# Patient Record
Sex: MALE | Race: WHITE | Employment: OTHER | URBAN - METROPOLITAN AREA
[De-identification: names, ages, dates, MRNs, and addresses within clinical notes are randomized per-mention and may not be internally consistent; named-entity substitution may affect disease eponyms.]

---

## 2017-01-23 ENCOUNTER — OFFICE VISIT (OUTPATIENT)
Dept: FAMILY MEDICINE CLINIC | Age: 67
End: 2017-01-23

## 2017-01-23 VITALS
DIASTOLIC BLOOD PRESSURE: 82 MMHG | OXYGEN SATURATION: 96 % | SYSTOLIC BLOOD PRESSURE: 130 MMHG | HEART RATE: 60 BPM | BODY MASS INDEX: 42.99 KG/M2 | RESPIRATION RATE: 16 BRPM | WEIGHT: 315 LBS

## 2017-01-23 DIAGNOSIS — Z79.4 TYPE 2 DIABETES MELLITUS WITH HYPERGLYCEMIA, WITH LONG-TERM CURRENT USE OF INSULIN (HCC): Primary | ICD-10-CM

## 2017-01-23 DIAGNOSIS — N28.9 RENAL INSUFFICIENCY: ICD-10-CM

## 2017-01-23 DIAGNOSIS — E11.65 TYPE 2 DIABETES MELLITUS WITH HYPERGLYCEMIA, WITH LONG-TERM CURRENT USE OF INSULIN (HCC): Primary | ICD-10-CM

## 2017-01-23 DIAGNOSIS — M15.9 PRIMARY OSTEOARTHRITIS INVOLVING MULTIPLE JOINTS: Chronic | ICD-10-CM

## 2017-01-23 DIAGNOSIS — I10 ESSENTIAL HYPERTENSION: Chronic | ICD-10-CM

## 2017-01-23 DIAGNOSIS — Z11.59 NEED FOR HEPATITIS C SCREENING TEST: ICD-10-CM

## 2017-01-23 LAB
A/G RATIO: 1.7 (ref 1.1–2.2)
ALBUMIN SERPL-MCNC: 4.3 G/DL (ref 3.4–5)
ALP BLD-CCNC: 89 U/L (ref 40–129)
ALT SERPL-CCNC: 31 U/L (ref 10–40)
ANION GAP SERPL CALCULATED.3IONS-SCNC: 17 MMOL/L (ref 3–16)
AST SERPL-CCNC: 24 U/L (ref 15–37)
BILIRUB SERPL-MCNC: 0.9 MG/DL (ref 0–1)
BUN BLDV-MCNC: 24 MG/DL (ref 7–20)
CALCIUM SERPL-MCNC: 9.2 MG/DL (ref 8.3–10.6)
CHLORIDE BLD-SCNC: 102 MMOL/L (ref 99–110)
CO2: 27 MMOL/L (ref 21–32)
CREAT SERPL-MCNC: 0.8 MG/DL (ref 0.8–1.3)
GFR AFRICAN AMERICAN: >60
GFR NON-AFRICAN AMERICAN: >60
GLOBULIN: 2.6 G/DL
GLUCOSE BLD-MCNC: 192 MG/DL (ref 70–99)
HEPATITIS C ANTIBODY INTERPRETATION: NORMAL
POTASSIUM SERPL-SCNC: 5.1 MMOL/L (ref 3.5–5.1)
SODIUM BLD-SCNC: 146 MMOL/L (ref 136–145)
TOTAL PROTEIN: 6.9 G/DL (ref 6.4–8.2)

## 2017-01-23 PROCEDURE — G8419 CALC BMI OUT NRM PARAM NOF/U: HCPCS | Performed by: NURSE PRACTITIONER

## 2017-01-23 PROCEDURE — 36415 COLL VENOUS BLD VENIPUNCTURE: CPT | Performed by: NURSE PRACTITIONER

## 2017-01-23 PROCEDURE — 99213 OFFICE O/P EST LOW 20 MIN: CPT | Performed by: NURSE PRACTITIONER

## 2017-01-23 PROCEDURE — G8427 DOCREV CUR MEDS BY ELIG CLIN: HCPCS | Performed by: NURSE PRACTITIONER

## 2017-01-23 PROCEDURE — 1036F TOBACCO NON-USER: CPT | Performed by: NURSE PRACTITIONER

## 2017-01-23 PROCEDURE — G8484 FLU IMMUNIZE NO ADMIN: HCPCS | Performed by: NURSE PRACTITIONER

## 2017-01-23 PROCEDURE — 4040F PNEUMOC VAC/ADMIN/RCVD: CPT | Performed by: NURSE PRACTITIONER

## 2017-01-23 PROCEDURE — 1123F ACP DISCUSS/DSCN MKR DOCD: CPT | Performed by: NURSE PRACTITIONER

## 2017-01-23 PROCEDURE — 3017F COLORECTAL CA SCREEN DOC REV: CPT | Performed by: NURSE PRACTITIONER

## 2017-01-23 PROCEDURE — 3045F PR MOST RECENT HEMOGLOBIN A1C LEVEL 7.0-9.0%: CPT | Performed by: NURSE PRACTITIONER

## 2017-01-23 RX ORDER — MELOXICAM 15 MG/1
TABLET ORAL
Qty: 90 TABLET | Refills: 1 | Status: SHIPPED | OUTPATIENT
Start: 2017-01-23 | End: 2017-05-31 | Stop reason: SDUPTHER

## 2017-01-23 RX ORDER — GLIMEPIRIDE 4 MG/1
4 TABLET ORAL 2 TIMES DAILY
Qty: 180 TABLET | Refills: 1 | Status: SHIPPED | OUTPATIENT
Start: 2017-01-23 | End: 2017-05-31 | Stop reason: SDUPTHER

## 2017-01-23 RX ORDER — HYDROCHLOROTHIAZIDE 50 MG/1
TABLET ORAL
Qty: 90 TABLET | Refills: 1 | Status: SHIPPED | OUTPATIENT
Start: 2017-01-23 | End: 2017-05-31 | Stop reason: SDUPTHER

## 2017-01-23 ASSESSMENT — ENCOUNTER SYMPTOMS
VOMITING: 0
COUGH: 0
BACK PAIN: 0
NAUSEA: 0
CHEST TIGHTNESS: 0

## 2017-03-06 ENCOUNTER — OFFICE VISIT (OUTPATIENT)
Dept: FAMILY MEDICINE CLINIC | Age: 67
End: 2017-03-06

## 2017-03-06 VITALS
HEART RATE: 74 BPM | OXYGEN SATURATION: 97 % | DIASTOLIC BLOOD PRESSURE: 82 MMHG | HEIGHT: 72 IN | WEIGHT: 311.2 LBS | SYSTOLIC BLOOD PRESSURE: 128 MMHG | BODY MASS INDEX: 42.15 KG/M2 | RESPIRATION RATE: 15 BRPM

## 2017-03-06 DIAGNOSIS — E11.9 TYPE 2 DIABETES MELLITUS WITHOUT COMPLICATION, WITH LONG-TERM CURRENT USE OF INSULIN (HCC): Primary | ICD-10-CM

## 2017-03-06 DIAGNOSIS — F33.1 MODERATE EPISODE OF RECURRENT MAJOR DEPRESSIVE DISORDER (HCC): ICD-10-CM

## 2017-03-06 DIAGNOSIS — Z79.4 TYPE 2 DIABETES MELLITUS WITHOUT COMPLICATION, WITH LONG-TERM CURRENT USE OF INSULIN (HCC): Primary | ICD-10-CM

## 2017-03-06 DIAGNOSIS — I10 ESSENTIAL HYPERTENSION: Chronic | ICD-10-CM

## 2017-03-06 DIAGNOSIS — H61.21 IMPACTED CERUMEN OF RIGHT EAR: ICD-10-CM

## 2017-03-06 LAB — HBA1C MFR BLD: 7.8 %

## 2017-03-06 PROCEDURE — 3045F PR MOST RECENT HEMOGLOBIN A1C LEVEL 7.0-9.0%: CPT | Performed by: NURSE PRACTITIONER

## 2017-03-06 PROCEDURE — 4040F PNEUMOC VAC/ADMIN/RCVD: CPT | Performed by: NURSE PRACTITIONER

## 2017-03-06 PROCEDURE — 3017F COLORECTAL CA SCREEN DOC REV: CPT | Performed by: NURSE PRACTITIONER

## 2017-03-06 PROCEDURE — G8427 DOCREV CUR MEDS BY ELIG CLIN: HCPCS | Performed by: NURSE PRACTITIONER

## 2017-03-06 PROCEDURE — 1123F ACP DISCUSS/DSCN MKR DOCD: CPT | Performed by: NURSE PRACTITIONER

## 2017-03-06 PROCEDURE — 99213 OFFICE O/P EST LOW 20 MIN: CPT | Performed by: NURSE PRACTITIONER

## 2017-03-06 PROCEDURE — 83036 HEMOGLOBIN GLYCOSYLATED A1C: CPT | Performed by: NURSE PRACTITIONER

## 2017-03-06 PROCEDURE — G8417 CALC BMI ABV UP PARAM F/U: HCPCS | Performed by: NURSE PRACTITIONER

## 2017-03-06 PROCEDURE — 1036F TOBACCO NON-USER: CPT | Performed by: NURSE PRACTITIONER

## 2017-03-06 PROCEDURE — G8484 FLU IMMUNIZE NO ADMIN: HCPCS | Performed by: NURSE PRACTITIONER

## 2017-03-06 RX ORDER — LOSARTAN POTASSIUM 100 MG/1
100 TABLET ORAL DAILY
Qty: 90 TABLET | Refills: 3 | Status: ON HOLD | OUTPATIENT
Start: 2017-03-06 | End: 2017-10-20 | Stop reason: HOSPADM

## 2017-03-06 RX ORDER — ATENOLOL 50 MG/1
50 TABLET ORAL DAILY
Qty: 90 TABLET | Refills: 3 | Status: SHIPPED | OUTPATIENT
Start: 2017-03-06 | End: 2017-11-14 | Stop reason: SDUPTHER

## 2017-03-06 RX ORDER — ATENOLOL 50 MG/1
50 TABLET ORAL DAILY
Qty: 90 TABLET | Refills: 1 | Status: CANCELLED | OUTPATIENT
Start: 2017-03-06

## 2017-03-06 ASSESSMENT — ENCOUNTER SYMPTOMS
CHEST TIGHTNESS: 0
NAUSEA: 0
BACK PAIN: 0
VOMITING: 0
COUGH: 0

## 2017-04-19 ENCOUNTER — OFFICE VISIT (OUTPATIENT)
Dept: FAMILY MEDICINE CLINIC | Age: 67
End: 2017-04-19

## 2017-04-19 VITALS
HEART RATE: 67 BPM | BODY MASS INDEX: 42.23 KG/M2 | OXYGEN SATURATION: 96 % | RESPIRATION RATE: 17 BRPM | SYSTOLIC BLOOD PRESSURE: 128 MMHG | DIASTOLIC BLOOD PRESSURE: 72 MMHG | WEIGHT: 311.8 LBS | HEIGHT: 72 IN

## 2017-04-19 DIAGNOSIS — E11.65 TYPE 2 DIABETES MELLITUS WITH HYPERGLYCEMIA, WITH LONG-TERM CURRENT USE OF INSULIN (HCC): Primary | ICD-10-CM

## 2017-04-19 DIAGNOSIS — F41.9 ANXIETY: ICD-10-CM

## 2017-04-19 DIAGNOSIS — I10 ESSENTIAL HYPERTENSION: Chronic | ICD-10-CM

## 2017-04-19 DIAGNOSIS — Z79.4 TYPE 2 DIABETES MELLITUS WITH HYPERGLYCEMIA, WITH LONG-TERM CURRENT USE OF INSULIN (HCC): Primary | ICD-10-CM

## 2017-04-19 LAB — HBA1C MFR BLD: 9.2 %

## 2017-04-19 PROCEDURE — G8417 CALC BMI ABV UP PARAM F/U: HCPCS | Performed by: NURSE PRACTITIONER

## 2017-04-19 PROCEDURE — 1036F TOBACCO NON-USER: CPT | Performed by: NURSE PRACTITIONER

## 2017-04-19 PROCEDURE — 1123F ACP DISCUSS/DSCN MKR DOCD: CPT | Performed by: NURSE PRACTITIONER

## 2017-04-19 PROCEDURE — 99213 OFFICE O/P EST LOW 20 MIN: CPT | Performed by: NURSE PRACTITIONER

## 2017-04-19 PROCEDURE — 4040F PNEUMOC VAC/ADMIN/RCVD: CPT | Performed by: NURSE PRACTITIONER

## 2017-04-19 PROCEDURE — 3017F COLORECTAL CA SCREEN DOC REV: CPT | Performed by: NURSE PRACTITIONER

## 2017-04-19 PROCEDURE — 3046F HEMOGLOBIN A1C LEVEL >9.0%: CPT | Performed by: NURSE PRACTITIONER

## 2017-04-19 PROCEDURE — 83036 HEMOGLOBIN GLYCOSYLATED A1C: CPT | Performed by: NURSE PRACTITIONER

## 2017-04-19 PROCEDURE — G8427 DOCREV CUR MEDS BY ELIG CLIN: HCPCS | Performed by: NURSE PRACTITIONER

## 2017-04-19 ASSESSMENT — ENCOUNTER SYMPTOMS
BACK PAIN: 0
CHEST TIGHTNESS: 0
COUGH: 0

## 2017-05-31 ENCOUNTER — OFFICE VISIT (OUTPATIENT)
Dept: FAMILY MEDICINE CLINIC | Age: 67
End: 2017-05-31

## 2017-05-31 VITALS
HEIGHT: 72 IN | RESPIRATION RATE: 18 BRPM | WEIGHT: 311.2 LBS | SYSTOLIC BLOOD PRESSURE: 116 MMHG | DIASTOLIC BLOOD PRESSURE: 76 MMHG | OXYGEN SATURATION: 95 % | HEART RATE: 94 BPM | BODY MASS INDEX: 42.15 KG/M2

## 2017-05-31 DIAGNOSIS — I10 ESSENTIAL HYPERTENSION: Chronic | ICD-10-CM

## 2017-05-31 DIAGNOSIS — M15.9 PRIMARY OSTEOARTHRITIS INVOLVING MULTIPLE JOINTS: Chronic | ICD-10-CM

## 2017-05-31 DIAGNOSIS — E11.65 TYPE 2 DIABETES MELLITUS WITH HYPERGLYCEMIA, WITH LONG-TERM CURRENT USE OF INSULIN (HCC): Primary | ICD-10-CM

## 2017-05-31 DIAGNOSIS — Z79.4 TYPE 2 DIABETES MELLITUS WITH HYPERGLYCEMIA, WITH LONG-TERM CURRENT USE OF INSULIN (HCC): Primary | ICD-10-CM

## 2017-05-31 LAB — HBA1C MFR BLD: 8 %

## 2017-05-31 PROCEDURE — 4040F PNEUMOC VAC/ADMIN/RCVD: CPT | Performed by: NURSE PRACTITIONER

## 2017-05-31 PROCEDURE — 99213 OFFICE O/P EST LOW 20 MIN: CPT | Performed by: NURSE PRACTITIONER

## 2017-05-31 PROCEDURE — G8427 DOCREV CUR MEDS BY ELIG CLIN: HCPCS | Performed by: NURSE PRACTITIONER

## 2017-05-31 PROCEDURE — 1123F ACP DISCUSS/DSCN MKR DOCD: CPT | Performed by: NURSE PRACTITIONER

## 2017-05-31 PROCEDURE — 3017F COLORECTAL CA SCREEN DOC REV: CPT | Performed by: NURSE PRACTITIONER

## 2017-05-31 PROCEDURE — 3045F PR MOST RECENT HEMOGLOBIN A1C LEVEL 7.0-9.0%: CPT | Performed by: NURSE PRACTITIONER

## 2017-05-31 PROCEDURE — 83036 HEMOGLOBIN GLYCOSYLATED A1C: CPT | Performed by: NURSE PRACTITIONER

## 2017-05-31 PROCEDURE — 1036F TOBACCO NON-USER: CPT | Performed by: NURSE PRACTITIONER

## 2017-05-31 PROCEDURE — G8417 CALC BMI ABV UP PARAM F/U: HCPCS | Performed by: NURSE PRACTITIONER

## 2017-05-31 RX ORDER — HYDROCHLOROTHIAZIDE 50 MG/1
TABLET ORAL
Qty: 90 TABLET | Refills: 3 | Status: ON HOLD | OUTPATIENT
Start: 2017-05-31 | End: 2017-10-20 | Stop reason: HOSPADM

## 2017-05-31 RX ORDER — GLIMEPIRIDE 4 MG/1
4 TABLET ORAL 2 TIMES DAILY
Qty: 180 TABLET | Refills: 3 | Status: SHIPPED | OUTPATIENT
Start: 2017-05-31 | End: 2018-04-23 | Stop reason: SDUPTHER

## 2017-05-31 RX ORDER — MELOXICAM 15 MG/1
TABLET ORAL
Qty: 90 TABLET | Refills: 3 | Status: ON HOLD | OUTPATIENT
Start: 2017-05-31 | End: 2017-10-20 | Stop reason: HOSPADM

## 2017-05-31 ASSESSMENT — ENCOUNTER SYMPTOMS
BACK PAIN: 0
CHEST TIGHTNESS: 0
CONSTIPATION: 0
NAUSEA: 0
COUGH: 0

## 2017-06-05 DIAGNOSIS — E11.65 TYPE 2 DIABETES MELLITUS WITH HYPERGLYCEMIA, WITH LONG-TERM CURRENT USE OF INSULIN (HCC): ICD-10-CM

## 2017-06-05 DIAGNOSIS — Z79.4 TYPE 2 DIABETES MELLITUS WITH HYPERGLYCEMIA, WITH LONG-TERM CURRENT USE OF INSULIN (HCC): ICD-10-CM

## 2017-08-30 ENCOUNTER — OFFICE VISIT (OUTPATIENT)
Dept: FAMILY MEDICINE CLINIC | Age: 67
End: 2017-08-30

## 2017-08-30 VITALS
DIASTOLIC BLOOD PRESSURE: 82 MMHG | WEIGHT: 315 LBS | HEART RATE: 64 BPM | SYSTOLIC BLOOD PRESSURE: 136 MMHG | RESPIRATION RATE: 14 BRPM | OXYGEN SATURATION: 96 % | BODY MASS INDEX: 42.66 KG/M2 | HEIGHT: 72 IN

## 2017-08-30 DIAGNOSIS — Z12.5 PROSTATE CANCER SCREENING: ICD-10-CM

## 2017-08-30 DIAGNOSIS — R35.0 FREQUENCY OF MICTURITION: ICD-10-CM

## 2017-08-30 DIAGNOSIS — N28.9 RENAL INSUFFICIENCY: ICD-10-CM

## 2017-08-30 DIAGNOSIS — J30.89 NON-SEASONAL ALLERGIC RHINITIS, UNSPECIFIED ALLERGIC RHINITIS TRIGGER: ICD-10-CM

## 2017-08-30 DIAGNOSIS — Z13.220 LIPID SCREENING: ICD-10-CM

## 2017-08-30 DIAGNOSIS — E11.65 TYPE 2 DIABETES MELLITUS WITH HYPERGLYCEMIA, WITH LONG-TERM CURRENT USE OF INSULIN (HCC): Primary | ICD-10-CM

## 2017-08-30 DIAGNOSIS — Z79.4 TYPE 2 DIABETES MELLITUS WITH HYPERGLYCEMIA, WITH LONG-TERM CURRENT USE OF INSULIN (HCC): Primary | ICD-10-CM

## 2017-08-30 PROBLEM — E78.2 MIXED HYPERLIPIDEMIA: Status: ACTIVE | Noted: 2017-08-30

## 2017-08-30 LAB
A/G RATIO: 1.5 (ref 1.1–2.2)
ALBUMIN SERPL-MCNC: 4.1 G/DL (ref 3.4–5)
ALP BLD-CCNC: 64 U/L (ref 40–129)
ALT SERPL-CCNC: 30 U/L (ref 10–40)
ANION GAP SERPL CALCULATED.3IONS-SCNC: 14 MMOL/L (ref 3–16)
AST SERPL-CCNC: 24 U/L (ref 15–37)
BASOPHILS ABSOLUTE: 0.1 K/UL (ref 0–0.2)
BASOPHILS RELATIVE PERCENT: 1 %
BILIRUB SERPL-MCNC: 1 MG/DL (ref 0–1)
BUN BLDV-MCNC: 23 MG/DL (ref 7–20)
CALCIUM SERPL-MCNC: 9.5 MG/DL (ref 8.3–10.6)
CHLORIDE BLD-SCNC: 98 MMOL/L (ref 99–110)
CHOLESTEROL, TOTAL: 129 MG/DL (ref 0–199)
CO2: 30 MMOL/L (ref 21–32)
CREAT SERPL-MCNC: 0.7 MG/DL (ref 0.8–1.3)
EOSINOPHILS ABSOLUTE: 0.2 K/UL (ref 0–0.6)
EOSINOPHILS RELATIVE PERCENT: 2.8 %
GFR AFRICAN AMERICAN: >60
GFR NON-AFRICAN AMERICAN: >60
GLOBULIN: 2.7 G/DL
GLUCOSE BLD-MCNC: 133 MG/DL (ref 70–99)
HBA1C MFR BLD: 9 %
HCT VFR BLD CALC: 47.2 % (ref 40.5–52.5)
HDLC SERPL-MCNC: 33 MG/DL (ref 40–60)
HEMOGLOBIN: 15.6 G/DL (ref 13.5–17.5)
LDL CHOLESTEROL CALCULATED: 70 MG/DL
LYMPHOCYTES ABSOLUTE: 1.2 K/UL (ref 1–5.1)
LYMPHOCYTES RELATIVE PERCENT: 18.1 %
MCH RBC QN AUTO: 29.4 PG (ref 26–34)
MCHC RBC AUTO-ENTMCNC: 33.1 G/DL (ref 31–36)
MCV RBC AUTO: 88.7 FL (ref 80–100)
MONOCYTES ABSOLUTE: 0.5 K/UL (ref 0–1.3)
MONOCYTES RELATIVE PERCENT: 7.8 %
NEUTROPHILS ABSOLUTE: 4.8 K/UL (ref 1.7–7.7)
NEUTROPHILS RELATIVE PERCENT: 70.3 %
PDW BLD-RTO: 14.6 % (ref 12.4–15.4)
PLATELET # BLD: 165 K/UL (ref 135–450)
PMV BLD AUTO: 9.3 FL (ref 5–10.5)
POTASSIUM SERPL-SCNC: 4.9 MMOL/L (ref 3.5–5.1)
PROSTATE SPECIFIC ANTIGEN: 2.64 NG/ML (ref 0–4)
RBC # BLD: 5.32 M/UL (ref 4.2–5.9)
SODIUM BLD-SCNC: 142 MMOL/L (ref 136–145)
TOTAL PROTEIN: 6.8 G/DL (ref 6.4–8.2)
TRIGL SERPL-MCNC: 132 MG/DL (ref 0–150)
VLDLC SERPL CALC-MCNC: 26 MG/DL
WBC # BLD: 6.8 K/UL (ref 4–11)

## 2017-08-30 PROCEDURE — 1036F TOBACCO NON-USER: CPT | Performed by: NURSE PRACTITIONER

## 2017-08-30 PROCEDURE — 3046F HEMOGLOBIN A1C LEVEL >9.0%: CPT | Performed by: NURSE PRACTITIONER

## 2017-08-30 PROCEDURE — 83036 HEMOGLOBIN GLYCOSYLATED A1C: CPT | Performed by: NURSE PRACTITIONER

## 2017-08-30 PROCEDURE — 3017F COLORECTAL CA SCREEN DOC REV: CPT | Performed by: NURSE PRACTITIONER

## 2017-08-30 PROCEDURE — G8427 DOCREV CUR MEDS BY ELIG CLIN: HCPCS | Performed by: NURSE PRACTITIONER

## 2017-08-30 PROCEDURE — G8417 CALC BMI ABV UP PARAM F/U: HCPCS | Performed by: NURSE PRACTITIONER

## 2017-08-30 PROCEDURE — 99213 OFFICE O/P EST LOW 20 MIN: CPT | Performed by: NURSE PRACTITIONER

## 2017-08-30 PROCEDURE — 1123F ACP DISCUSS/DSCN MKR DOCD: CPT | Performed by: NURSE PRACTITIONER

## 2017-08-30 PROCEDURE — 4040F PNEUMOC VAC/ADMIN/RCVD: CPT | Performed by: NURSE PRACTITIONER

## 2017-08-30 RX ORDER — MONTELUKAST SODIUM 10 MG/1
10 TABLET ORAL DAILY
Qty: 30 TABLET | Refills: 3 | Status: SHIPPED | OUTPATIENT
Start: 2017-08-30 | End: 2017-12-18 | Stop reason: SDUPTHER

## 2017-08-30 ASSESSMENT — ENCOUNTER SYMPTOMS
BACK PAIN: 0
NAUSEA: 0
SHORTNESS OF BREATH: 0
CHEST TIGHTNESS: 0
CONSTIPATION: 0

## 2017-09-12 ENCOUNTER — TELEPHONE (OUTPATIENT)
Dept: FAMILY MEDICINE CLINIC | Age: 67
End: 2017-09-12

## 2017-09-12 DIAGNOSIS — E11.65 TYPE 2 DIABETES MELLITUS WITH HYPERGLYCEMIA, WITH LONG-TERM CURRENT USE OF INSULIN (HCC): ICD-10-CM

## 2017-09-12 DIAGNOSIS — Z79.4 TYPE 2 DIABETES MELLITUS WITH HYPERGLYCEMIA, WITH LONG-TERM CURRENT USE OF INSULIN (HCC): ICD-10-CM

## 2017-09-13 DIAGNOSIS — Z79.4 TYPE 2 DIABETES MELLITUS WITH HYPERGLYCEMIA, WITH LONG-TERM CURRENT USE OF INSULIN (HCC): ICD-10-CM

## 2017-09-13 DIAGNOSIS — E11.65 TYPE 2 DIABETES MELLITUS WITH HYPERGLYCEMIA, WITH LONG-TERM CURRENT USE OF INSULIN (HCC): ICD-10-CM

## 2017-10-16 PROBLEM — I50.9 ACUTE CHF (HCC): Status: ACTIVE | Noted: 2017-10-16

## 2017-10-16 PROBLEM — R06.02 SHORTNESS OF BREATH: Status: ACTIVE | Noted: 2017-10-16

## 2017-10-23 ENCOUNTER — TELEPHONE (OUTPATIENT)
Dept: CARDIAC REHAB | Age: 67
End: 2017-10-23

## 2017-10-23 NOTE — TELEPHONE ENCOUNTER
[address]    SYMPTOM ASSESSMENT: Post discharge follow-up phone call made to pt. Spoke with pt directly who denies any worsening shortness of breath but he does mention he is still sleeping in his recliner chair at night as he was prior to admission and now his nose is dry from \"this Oxygen\", denies chest pain, denies worsening edema but his legs are still swollen, he states he still has difficulty sleeping as he did prior to admission but it is not any worse than his usual; stated he has been feeling \"pretty good overall considering\" since discharge. Discharge weight was 312 lb; today's weight was \"I quite honestly forgot to weigh myself this morning but yesterdays weight was 309 lb\". Reminded pt of importance of daily morning weights and discussed weighs to get in the habit each morning. He mentions his blood sugar is 113 this morning which he did check. MEDICATION REVIEW: pt was able to fill all prescriptions and states he has been taking medications as prescribed. Reviewed patient medications. No discrepancies found. Instructed on all new medications. Pt has his med list on table and reviews it every time he takes medications. He does admit his med regimen is quite different than before admission but he denies problems or questions. Advised to bring his medication list with him to all follow up appointments. FOLLOW-UP APPOINTMENT: Reminded pt of his several appointments scheduled for after hospital care. Pt will bring wife with him and is aware of this Thursday 10/26 at 3:15 pm  with ALEC Winkler CNP in PCP office. Informed pt of cardiology f/u for next Tuesday 10/31 at 1:00 pm where he will see Chantale Connell CNP in the shared group CHF education class. Pt will bring wife to learn more about HF self mgmt and f/u. Pt also has f/u with Dr Jaqcueline Epstein for 11/3 at 7:30 am. Pt will discuss if he needs all the f/u so close together after he is assessed.  Transportation is arranged with spouse. EDUCATION: Educated pt on sodium restriction of <3000 mg daily and fluid restriction of < 64 ounces daily, emphasized the need for daily morning  weights, follow-up, and medication compliance. Reviewed recommended level of activity. Pt to slowly progress activity and advised to walk lightly and avoid any heavy exertion that makes him SOB or heavy lifting at this time until further assessed at follow up visits. Notified pt to call the doctor post discharge if he experiences shortness of breath, chest pain, swelling, cough, or weight gain or loss of three pounds in a day/five pounds in a week. Also notified pt to call the doctor if he feels dizzy, increased fatigue, decreased or difficulty urinating. Pt admits he feels thirsty and c/o dry mouth. Advised on ways to curb thirst without drinking excessive fluids. Advised pt to cont to wear compression hose when on his feet and to elevate legs periodically during the day to help with swelling. Advised pt to call [employer] supplier and to ask if he can benefit from humidification to his O2 since he c/o dry nose. Pt verbalized understanding; stated he will call the doctor with any questions or signs of symptom worsening. No additional questions at this time. HF resource number made available for non-urgent questions. RECOMMENDATIONS:  ~F/U with PCP 10/26. ? If pt would benefit from outpatient sleep study  ~F/U with cardiology 10/31 for group CHF education classes-spouse to come  ~Pt to weigh daily and call early with worsening CHF symptoms  ~Consistent 64 ounces daily fluid intake  ~?  Diabetes education and improved mgmt -recent A1C 8.7% (which has been consistently similar this year)

## 2017-10-26 ENCOUNTER — OFFICE VISIT (OUTPATIENT)
Dept: FAMILY MEDICINE CLINIC | Age: 67
End: 2017-10-26

## 2017-10-26 VITALS
WEIGHT: 315 LBS | DIASTOLIC BLOOD PRESSURE: 76 MMHG | TEMPERATURE: 98.4 F | BODY MASS INDEX: 41.66 KG/M2 | OXYGEN SATURATION: 98 % | HEART RATE: 64 BPM | SYSTOLIC BLOOD PRESSURE: 134 MMHG

## 2017-10-26 DIAGNOSIS — R06.83 SNORING: ICD-10-CM

## 2017-10-26 DIAGNOSIS — Z79.4 TYPE 2 DIABETES MELLITUS WITH HYPERGLYCEMIA, WITH LONG-TERM CURRENT USE OF INSULIN (HCC): Primary | ICD-10-CM

## 2017-10-26 DIAGNOSIS — Z23 FLU VACCINE NEED: ICD-10-CM

## 2017-10-26 DIAGNOSIS — R06.02 SHORTNESS OF BREATH: ICD-10-CM

## 2017-10-26 DIAGNOSIS — G47.33 OBSTRUCTIVE SLEEP APNEA SYNDROME: ICD-10-CM

## 2017-10-26 DIAGNOSIS — I50.9 ACUTE CONGESTIVE HEART FAILURE, UNSPECIFIED CONGESTIVE HEART FAILURE TYPE: ICD-10-CM

## 2017-10-26 DIAGNOSIS — N28.9 RENAL INSUFFICIENCY: ICD-10-CM

## 2017-10-26 DIAGNOSIS — E11.65 TYPE 2 DIABETES MELLITUS WITH HYPERGLYCEMIA, WITH LONG-TERM CURRENT USE OF INSULIN (HCC): Primary | ICD-10-CM

## 2017-10-26 LAB
A/G RATIO: 0.9 (ref 1.1–2.2)
ALBUMIN SERPL-MCNC: 3.2 G/DL (ref 3.4–5)
ALP BLD-CCNC: 64 U/L (ref 40–129)
ALT SERPL-CCNC: 27 U/L (ref 10–40)
ANION GAP SERPL CALCULATED.3IONS-SCNC: 14 MMOL/L (ref 3–16)
AST SERPL-CCNC: 18 U/L (ref 15–37)
BILIRUB SERPL-MCNC: 0.5 MG/DL (ref 0–1)
BUN BLDV-MCNC: 59 MG/DL (ref 7–20)
CALCIUM SERPL-MCNC: 8.9 MG/DL (ref 8.3–10.6)
CHLORIDE BLD-SCNC: 99 MMOL/L (ref 99–110)
CO2: 27 MMOL/L (ref 21–32)
CREAT SERPL-MCNC: 1.1 MG/DL (ref 0.8–1.3)
GFR AFRICAN AMERICAN: >60
GFR NON-AFRICAN AMERICAN: >60
GLOBULIN: 3.4 G/DL
GLUCOSE BLD-MCNC: 71 MG/DL (ref 70–99)
HBA1C MFR BLD: 8.4 %
POTASSIUM SERPL-SCNC: 5 MMOL/L (ref 3.5–5.1)
PRO-BNP: 750 PG/ML (ref 0–124)
SODIUM BLD-SCNC: 140 MMOL/L (ref 136–145)
TOTAL PROTEIN: 6.6 G/DL (ref 6.4–8.2)

## 2017-10-26 PROCEDURE — 4040F PNEUMOC VAC/ADMIN/RCVD: CPT | Performed by: NURSE PRACTITIONER

## 2017-10-26 PROCEDURE — 83036 HEMOGLOBIN GLYCOSYLATED A1C: CPT | Performed by: NURSE PRACTITIONER

## 2017-10-26 PROCEDURE — 3045F PR MOST RECENT HEMOGLOBIN A1C LEVEL 7.0-9.0%: CPT | Performed by: NURSE PRACTITIONER

## 2017-10-26 PROCEDURE — G8427 DOCREV CUR MEDS BY ELIG CLIN: HCPCS | Performed by: NURSE PRACTITIONER

## 2017-10-26 PROCEDURE — 1123F ACP DISCUSS/DSCN MKR DOCD: CPT | Performed by: NURSE PRACTITIONER

## 2017-10-26 PROCEDURE — 1111F DSCHRG MED/CURRENT MED MERGE: CPT | Performed by: NURSE PRACTITIONER

## 2017-10-26 PROCEDURE — 3017F COLORECTAL CA SCREEN DOC REV: CPT | Performed by: NURSE PRACTITIONER

## 2017-10-26 PROCEDURE — G0008 ADMIN INFLUENZA VIRUS VAC: HCPCS | Performed by: NURSE PRACTITIONER

## 2017-10-26 PROCEDURE — G8417 CALC BMI ABV UP PARAM F/U: HCPCS | Performed by: NURSE PRACTITIONER

## 2017-10-26 PROCEDURE — 36415 COLL VENOUS BLD VENIPUNCTURE: CPT | Performed by: NURSE PRACTITIONER

## 2017-10-26 PROCEDURE — 90662 IIV NO PRSV INCREASED AG IM: CPT | Performed by: NURSE PRACTITIONER

## 2017-10-26 PROCEDURE — 1036F TOBACCO NON-USER: CPT | Performed by: NURSE PRACTITIONER

## 2017-10-26 PROCEDURE — 99214 OFFICE O/P EST MOD 30 MIN: CPT | Performed by: NURSE PRACTITIONER

## 2017-10-26 PROCEDURE — G8484 FLU IMMUNIZE NO ADMIN: HCPCS | Performed by: NURSE PRACTITIONER

## 2017-10-26 ASSESSMENT — ENCOUNTER SYMPTOMS
NAUSEA: 1
VOMITING: 0
COUGH: 0
BACK PAIN: 0
SHORTNESS OF BREATH: 1
CHEST TIGHTNESS: 0

## 2017-10-26 NOTE — PATIENT INSTRUCTIONS
1. Limit fluids to 2000 ml daily    2. Limit sodium to less than 2000 mg daily    3.  Colton Haley was seen today for follow-up from hospital.    Diagnoses and all orders for this visit:    Type 2 diabetes mellitus with hyperglycemia, with long-term current use of insulin (HCC)  -     POCT glycosylated hemoglobin (Hb A1C)    Renal insufficiency  -     [facility] [place] Alyssa Esparza MD ([facility]IRA)  -     Comprehensive Metabolic Panel    Shortness of breath  -     Adrian Ramirez MD    Snoring  -     Adrian Ramirez MD    Flu vaccine need  -     INFLUENZA, HIGH DOSE, 65 YRS +, IM, PF, PREFILL SYR, 0.5ML (FLUZONE HD)    Acute congestive heart failure, unspecified congestive heart failure type ([facility]  -     BRAIN NATRIURETIC PEPTIDE (BNP)    Obstructive sleep apnea syndrome  -     Adrian Ramirez MD

## 2017-10-26 NOTE — PROGRESS NOTES
Gait steady. Wearing elastic stockings. Edema bilateral lower leg, right > left. Appears stable from previous. Lymphadenopathy:     He has no cervical adenopathy. Neurological: He is alert and oriented to person, place, and time. Skin: Skin is warm and dry. Psychiatric: He has a normal mood and affect. His behavior is normal. Thought content normal.       Assessment:      1. CHF  2. Snoring  3. DM-not at goal  4. AKD  5. Edema      Plan:      1. Limit fluids to 2000 ml daily    2. Limit sodium to less than 2000 mg daily. Discussed with wife.      3. Leslie Davey was seen today for follow-up from hospital.    Diagnoses and all orders for this visit:    Type 2 diabetes mellitus with hyperglycemia, with long-term current use of insulin ([facility])  -     POCT glycosylated hemoglobin (Hb A1C)    Renal insufficiency  -     [facility] [place] Alyssa Adams MD ([facility])  -     Comprehensive Metabolic Panel    Shortness of breath  -     Timo Pierce MD    Snoring  -     Timo Pierce MD    Flu vaccine need  -     INFLUENZA, HIGH DOSE, 65 YRS +, IM, PF, PREFILL SYR, 0.5ML (FLUZONE HD)    Acute congestive heart failure, unspecified congestive heart failure type ([facility] [address]  -     BRAIN NATRIURETIC PEPTIDE (BNP)    Obstructive sleep apnea syndrome  -     Timo Pierce MD      Lab Results   Component Value Date    LABA1C 8.4 10/26/2017     Lab Results   Component Value Date    .0 10/16/2017     Although not at goal is improving

## 2017-10-31 ENCOUNTER — OFFICE VISIT (OUTPATIENT)
Dept: CARDIOLOGY CLINIC | Age: 67
End: 2017-10-31

## 2017-10-31 VITALS
BODY MASS INDEX: 42.66 KG/M2 | WEIGHT: 315 LBS | DIASTOLIC BLOOD PRESSURE: 74 MMHG | HEART RATE: 57 BPM | OXYGEN SATURATION: 96 % | SYSTOLIC BLOOD PRESSURE: 138 MMHG | HEIGHT: 72 IN

## 2017-10-31 DIAGNOSIS — I10 ESSENTIAL HYPERTENSION: Primary | Chronic | ICD-10-CM

## 2017-10-31 DIAGNOSIS — R06.02 SHORTNESS OF BREATH: ICD-10-CM

## 2017-10-31 DIAGNOSIS — I50.32 CHRONIC DIASTOLIC HEART FAILURE (HCC): ICD-10-CM

## 2017-10-31 PROCEDURE — G8427 DOCREV CUR MEDS BY ELIG CLIN: HCPCS | Performed by: NURSE PRACTITIONER

## 2017-10-31 PROCEDURE — 3017F COLORECTAL CA SCREEN DOC REV: CPT | Performed by: NURSE PRACTITIONER

## 2017-10-31 PROCEDURE — 4040F PNEUMOC VAC/ADMIN/RCVD: CPT | Performed by: NURSE PRACTITIONER

## 2017-10-31 PROCEDURE — 1123F ACP DISCUSS/DSCN MKR DOCD: CPT | Performed by: NURSE PRACTITIONER

## 2017-10-31 PROCEDURE — G8484 FLU IMMUNIZE NO ADMIN: HCPCS | Performed by: NURSE PRACTITIONER

## 2017-10-31 PROCEDURE — 99214 OFFICE O/P EST MOD 30 MIN: CPT | Performed by: NURSE PRACTITIONER

## 2017-10-31 PROCEDURE — 1111F DSCHRG MED/CURRENT MED MERGE: CPT | Performed by: NURSE PRACTITIONER

## 2017-10-31 PROCEDURE — G8417 CALC BMI ABV UP PARAM F/U: HCPCS | Performed by: NURSE PRACTITIONER

## 2017-10-31 PROCEDURE — 1036F TOBACCO NON-USER: CPT | Performed by: NURSE PRACTITIONER

## 2017-10-31 NOTE — PROGRESS NOTES
lbs or more . Carina Santana Daily  8. I am dizzy or lightheaded. Carina Santana Seldom      Allergies   Allergen Reactions    Norco [Hydrocodone-Acetaminophen]      Makes agitated     Current Outpatient Prescriptions   Medication Sig Dispense Refill    aspirin 81 MG chewable tablet Take 1 tablet by mouth daily 30 tablet 0    isosorbide mononitrate (IMDUR) 30 MG extended release tablet Take 1 tablet by mouth daily 30 tablet 0    atorvastatin (LIPITOR) 40 MG tablet Take 1 tablet by mouth nightly 30 tablet 0    hydrALAZINE (APRESOLINE) 25 MG tablet Take 1 tablet by mouth every 8 hours 90 tablet 0    amLODIPine (NORVASC) 5 MG tablet Take 1 tablet by mouth daily 30 tablet 0    furosemide (LASIX) 40 MG tablet Take 1 tablet by mouth 2 times daily 60 tablet 0    potassium chloride (KLOR-CON M) 20 MEQ extended release tablet Take 1 tablet by mouth 2 times daily (with meals) 60 tablet 0    Insulin Pen Needle (B-D UF III MINI PEN NEEDLES) 31G X 5 MM MISC Inject 1 each into the skin 4 times daily E11.65  Please dispense generic needles 400 each 5    montelukast (SINGULAIR) 10 MG tablet Take 1 tablet by mouth daily 30 tablet 3    LANTUS SOLOSTAR 100 UNIT/ML injection pen INJECT 50 UNITS SUBCUTANEOUSLY NIGHTLY (Patient taking differently: 55 units daily) 15 Pen 3    glimepiride (AMARYL) 4 MG tablet Take 1 tablet by mouth 2 times daily 180 tablet 3    insulin aspart (NOVOLOG) 100 UNIT/ML injection pen Inject 5 Units into the skin 2 times daily (with meals) (Patient taking differently: Inject 5 Units into the skin 3 times daily (before meals) ) 5 Pen 3    sertraline (ZOLOFT) 50 MG tablet Take 1 tablet by mouth daily 90 tablet 3    atenolol (TENORMIN) 50 MG tablet Take 1 tablet by mouth daily 90 tablet 3    omeprazole (PRILOSEC) 40 MG capsule Take 1 capsule by mouth daily 90 capsule 3    glucose blood VI test strips (ASCENSIA AUTODISC VI;ONE TOUCH ULTRA TEST VI) strip DX: E11.9 FSBS daily.  One Touch ultra 100 strip 5    Blood Glucose Monitoring Suppl PRABHAKAR 1 Device by Does not apply route daily One Touch Ultra 1 Device 0    oxybutynin (DITROPAN-XL) 10 MG CR tablet Take 10 mg by mouth daily Takes in afternoon      Multiple Vitamins-Minerals (THERAPEUTIC MULTIVITAMIN-MINERALS) tablet Take 1 tablet by mouth daily      ferrous sulfate 325 (65 FE) MG tablet Take 325 mg by mouth daily. No current facility-administered medications for this visit. Past Medical History:   Diagnosis Date    Bladder fistula     resolved    C. difficile diarrhea 8/18/2015    +PCR    Cellulitis of right lower extremity 3/23/2016    Diabetes ([address]     Diverticulitis     DVT of lower extremity, bilateral ([address] 10/16/2013    Hematuria 1/2/2014    Pancreatitis ([address] 8/24/2013    Pulmonary emboli ([facility] [address] [facility] 2013    after cholecystectomy      Past Surgical History:   Procedure Laterality Date    ABDOMEN SURGERY  05/16/2014    reveseral end peristomal hernia repair.  CHOLECYSTECTOMY, OPEN N/A 9-17-13    LAPAROSCOPIC CONVERTED TO OPEN CHOLECYSTECTOMY WITH    COLON SURGERY      COLONOSCOPY  2008    COLONOSCOPY  12/4/2013    Severe Diverticulosis unable to finish    COLONOSCOPY  4/30/14    diverticula-10 year f/u    COLOSTOMY  Jan 2014    CYSTOSCOPY  12/10/13    with bladder biopsy    CYSTOSCOPY  1-28-14    Cystourethroscopy, left ureteral catheter     HERNIA REPAIR  08/14/2015    OPEN INCISIONAL HERNIA REPAIR WITH MESH, BILATERAL COMPONENT SEPARATION, LYSIS OF ADHESIONS    OTHER SURGICAL HISTORY  1-28-14    LeftColectomy with End Colostomy, Splenic Flexure Mobilization, Takedown of Colovesical Fistula    TIBIA FRACTURE SURGERY Right 2003    Fracture lower leg during chain saw accident.  Surgery x 2     Family History   Problem Relation Age of Onset    Heart Disease Father     Heart Attack Father     Stroke Brother     Heart Disease Brother     High Blood Pressure Brother     Kidney Disease Mother      kidney removed     Social History     Social History    Marital status:      Spouse name: N/A    Number of children: N/A    Years of education: N/A     Occupational History    Not on file. Social History Main Topics    Smoking status: Never Smoker    Smokeless tobacco: Never Used    Alcohol use 0.0 oz/week      Comment: rare    Drug use: No    Sexual activity: Yes     Partners: Female     Other Topics Concern    Not on file     Social History Narrative    No narrative on file       Review of System:  · General ROS: negative for - chills, fever   · Psychological ROS: negative for - anxiety or depression  · Ophthalmic ROS: negative for - eye pain or loss of vision  · ENT ROS: negative for - headaches, sore throat   · Allergy and Immunology ROS: negative for - hives  · Hematological and Lymphatic ROS: negative for - bleeding problems, blood clots, bruising or jaundice  · Endocrine ROS: negative for - skin changes, temperature intolerance or unexpected weight changes  · Respiratory ROS: negative for - cough, sputum, wheezing  · Cardiovascular ROS: Per HPI.    · Gastrointestinal ROS: negative for - abdominal pain, diarrhea, nausea/vomiting, bleeding   · Genito-Urinary ROS: negative for - dysuria or incontinence  · Musculoskeletal ROS: negative for - joint swelling   · Neurological ROS: negative for - confusion, numbness/tingling, seizures, weakness  · Dermatological ROS: negative for - rash    Physical Examination:    Vitals:    10/31/17 1306   BP: 138/74   Pulse: 57   SpO2: 96%   Weight: (!) 317 lb 8 oz (144 kg)   Height: 6' (1.829 m)        Constitutional and General Appearance: Warm and dry, no apparent distress, normal coloration, obese  HEENT:  Normocephalic, atraumatic  Respiratory:  · Normal  without use of accessory muscles  · Resp Auscultation: Normal breath sounds throughout  Cardiovascular:  · The apical impulses not displaced  · Heart tones are crisp and normal  · JVP less than 8 cm H2O  · Regular rate and rhythm, + murmur, no r/g  · Peripheral pulses are symmetrical and full  · There is no clubbing, cyanosis of the extremities. · ++ BLE edema, compression socks in place  · Pedal Pulses: 2+ and equal   Abdomen:  · No masses or tenderness  · Liver/Spleen: No Abnormalities Noted  Neurological/Psychiatric:  · Alert and oriented in all spheres  · Moves all extremities   · No abnormalities of mood, affect, memory, mentation, or behavior are noted      CBC:   Lab Results   Component Value Date    WBC 9.5 10/17/2017    WBC 9.2 10/16/2017    WBC 6.8 08/30/2017    RBC 4.50 10/17/2017    RBC 4.50 10/16/2017    RBC 5.32 08/30/2017    HGB 12.8 10/17/2017    HGB 13.2 10/16/2017    HGB 15.6 08/30/2017    HCT 38.5 10/17/2017    HCT 38.6 10/16/2017    HCT 47.2 08/30/2017    MCV 85.4 10/17/2017    MCV 85.9 10/16/2017    MCV 88.7 08/30/2017    RDW 14.0 10/17/2017    RDW 13.9 10/16/2017    RDW 14.6 08/30/2017     10/17/2017     10/16/2017     08/30/2017     BMP:  Lab Results   Component Value Date     10/26/2017     10/20/2017     10/19/2017    K 5.0 10/26/2017    K 3.7 10/20/2017    K 3.7 10/19/2017    CL 99 10/26/2017    CL 98 10/20/2017    CL 98 10/19/2017    CO2 27 10/26/2017    CO2 28 10/20/2017    CO2 26 10/19/2017    PHOS 3.7 05/21/2014    PHOS 2.7 05/19/2014    PHOS 2.3 05/18/2014    BUN 59 10/26/2017    BUN 89 10/20/2017    BUN 88 10/19/2017    CREATININE 1.1 10/26/2017    CREATININE 1.4 10/20/2017    CREATININE 1.6 10/19/2017     BNP:   Lab Results   Component Value Date    PROBNP 750 10/26/2017    PROBNP 1,424 10/16/2017     ECHO: 10/17/17  Left ventricular systolic function is normal with the ejection fraction   estimated at 55%. No regional wall motion abnormalities. Moderate concentric left ventricular hypertrophy. Grade II diastolic dysfunction with elevated filling pressure. Severe bi-atrial enlargement. Right ventricle is moderately enlarged. Mild aortic stenosis.    Systolic pulmonary artery pressure (SPAP) is normal and estimated at 26 mmHg   (RA pressure 3 mmHg).     Stress test on 10/18:  Negative

## 2017-11-01 ENCOUNTER — OFFICE VISIT (OUTPATIENT)
Dept: PULMONOLOGY | Age: 67
End: 2017-11-01

## 2017-11-01 VITALS
HEIGHT: 73 IN | HEART RATE: 60 BPM | BODY MASS INDEX: 41.75 KG/M2 | SYSTOLIC BLOOD PRESSURE: 129 MMHG | OXYGEN SATURATION: 98 % | RESPIRATION RATE: 20 BRPM | WEIGHT: 315 LBS | DIASTOLIC BLOOD PRESSURE: 68 MMHG | TEMPERATURE: 98.1 F

## 2017-11-01 DIAGNOSIS — J96.11 CHRONIC RESPIRATORY FAILURE WITH HYPOXIA (HCC): ICD-10-CM

## 2017-11-01 DIAGNOSIS — I50.30 DIASTOLIC CHF WITH PRESERVED LEFT VENTRICULAR FUNCTION, NYHA CLASS 2 (HCC): ICD-10-CM

## 2017-11-01 DIAGNOSIS — G47.10 EXCESSIVE SOMNOLENCE DISORDER: Primary | ICD-10-CM

## 2017-11-01 DIAGNOSIS — K21.9 HIATAL HERNIA WITH GERD: ICD-10-CM

## 2017-11-01 DIAGNOSIS — K44.9 HIATAL HERNIA WITH GERD: ICD-10-CM

## 2017-11-01 PROCEDURE — 3017F COLORECTAL CA SCREEN DOC REV: CPT | Performed by: INTERNAL MEDICINE

## 2017-11-01 PROCEDURE — 99204 OFFICE O/P NEW MOD 45 MIN: CPT | Performed by: INTERNAL MEDICINE

## 2017-11-01 PROCEDURE — 1111F DSCHRG MED/CURRENT MED MERGE: CPT | Performed by: INTERNAL MEDICINE

## 2017-11-01 PROCEDURE — 1123F ACP DISCUSS/DSCN MKR DOCD: CPT | Performed by: INTERNAL MEDICINE

## 2017-11-01 PROCEDURE — G8417 CALC BMI ABV UP PARAM F/U: HCPCS | Performed by: INTERNAL MEDICINE

## 2017-11-01 PROCEDURE — 1036F TOBACCO NON-USER: CPT | Performed by: INTERNAL MEDICINE

## 2017-11-01 PROCEDURE — G8427 DOCREV CUR MEDS BY ELIG CLIN: HCPCS | Performed by: INTERNAL MEDICINE

## 2017-11-01 PROCEDURE — G8484 FLU IMMUNIZE NO ADMIN: HCPCS | Performed by: INTERNAL MEDICINE

## 2017-11-01 PROCEDURE — 4040F PNEUMOC VAC/ADMIN/RCVD: CPT | Performed by: INTERNAL MEDICINE

## 2017-11-01 RX ORDER — OXYBUTYNIN CHLORIDE 15 MG/1
15 TABLET, EXTENDED RELEASE ORAL DAILY
COMMUNITY
Start: 2017-10-23 | End: 2018-05-21 | Stop reason: SDUPTHER

## 2017-11-01 ASSESSMENT — SLEEP AND FATIGUE QUESTIONNAIRES
HOW LIKELY ARE YOU TO NOD OFF OR FALL ASLEEP WHILE SITTING INACTIVE IN A PUBLIC PLACE: 1
ESS TOTAL SCORE: 15
HOW LIKELY ARE YOU TO NOD OFF OR FALL ASLEEP WHEN YOU ARE A PASSENGER IN A CAR FOR AN HOUR WITHOUT A BREAK: 0
NECK CIRCUMFERENCE (INCHES): 19.75
HOW LIKELY ARE YOU TO NOD OFF OR FALL ASLEEP WHILE LYING DOWN TO REST IN THE AFTERNOON WHEN CIRCUMSTANCES PERMIT: 3
HOW LIKELY ARE YOU TO NOD OFF OR FALL ASLEEP WHILE SITTING AND TALKING TO SOMEONE: 2
HOW LIKELY ARE YOU TO NOD OFF OR FALL ASLEEP WHILE WATCHING TV: 3
HOW LIKELY ARE YOU TO NOD OFF OR FALL ASLEEP WHILE SITTING QUIETLY AFTER LUNCH WITHOUT ALCOHOL: 3
HOW LIKELY ARE YOU TO NOD OFF OR FALL ASLEEP WHILE SITTING AND READING: 3
HOW LIKELY ARE YOU TO NOD OFF OR FALL ASLEEP IN A CAR, WHILE STOPPED FOR A FEW MINUTES IN TRAFFIC: 0

## 2017-11-01 ASSESSMENT — ENCOUNTER SYMPTOMS
SHORTNESS OF BREATH: 1
EYE PAIN: 0
WHEEZING: 1
SORE THROAT: 0
EYE ITCHING: 0
EYE DISCHARGE: 0
COUGH: 0
VOICE CHANGE: 0
ABDOMINAL PAIN: 0
CONSTIPATION: 0
DIARRHEA: 0

## 2017-11-01 NOTE — PROGRESS NOTES
diverticula-10 year f/u    COLOSTOMY  Jan 2014    CYSTOSCOPY  12/10/13    with bladder biopsy    CYSTOSCOPY  1-28-14    Cystourethroscopy, left ureteral catheter     HERNIA REPAIR  08/14/2015    OPEN INCISIONAL HERNIA REPAIR WITH MESH, BILATERAL COMPONENT SEPARATION, LYSIS OF ADHESIONS    OTHER SURGICAL HISTORY  1-28-14    LeftColectomy with End Colostomy, Splenic Flexure Mobilization, Takedown of Colovesical Fistula    TIBIA FRACTURE SURGERY Right 2003    Fracture lower leg during chain saw accident.  Surgery x 2       Social History   Substance Use Topics    Smoking status: Passive Smoke Exposure - Never Smoker    Smokeless tobacco: Never Used    Alcohol use 0.0 oz/week      Comment: rare       Family History   Problem Relation Age of Onset    Heart Disease Father     Heart Attack Father     Stroke Brother     Heart Disease Brother     High Blood Pressure Brother     Kidney Disease Mother      kidney removed         Current Outpatient Prescriptions:     oxybutynin (DITROPAN XL) 15 MG extended release tablet, Take 15 mg by mouth Daily, Disp: , Rfl:     aspirin 81 MG chewable tablet, Take 1 tablet by mouth daily, Disp: 30 tablet, Rfl: 0    isosorbide mononitrate (IMDUR) 30 MG extended release tablet, Take 1 tablet by mouth daily, Disp: 30 tablet, Rfl: 0    atorvastatin (LIPITOR) 40 MG tablet, Take 1 tablet by mouth nightly, Disp: 30 tablet, Rfl: 0    hydrALAZINE (APRESOLINE) 25 MG tablet, Take 1 tablet by mouth every 8 hours, Disp: 90 tablet, Rfl: 0    amLODIPine (NORVASC) 5 MG tablet, Take 1 tablet by mouth daily, Disp: 30 tablet, Rfl: 0    furosemide (LASIX) 40 MG tablet, Take 1 tablet by mouth 2 times daily, Disp: 60 tablet, Rfl: 0    potassium chloride (KLOR-CON M) 20 MEQ extended release tablet, Take 1 tablet by mouth 2 times daily (with meals), Disp: 60 tablet, Rfl: 0    Insulin Pen Needle (B-D UF III MINI PEN NEEDLES) 31G X 5 MM MISC, Inject 1 each into the skin 4 times daily E11.65 oxygen concentrator  -Has multifactorial dyspnea (CHF, restriction from body habitus and hiatal hernia, and probable underlying MONIQUE).  May be a good candidate for cardiac or pulmonary rehab depending on what he qualifies for, also counseled on weight management.   -Follow up after above testing    Orders Placed This Encounter   Procedures    6 MIN WALK TEST    Carbon monoxide diffusing capacity    FULL PFT STUDY    Sleep Study with PAP Titration

## 2017-11-01 NOTE — PATIENT INSTRUCTIONS
Please keep all of your future appointments scheduled [place] [facility]) Pulmonary and Sleep. Here are some tips to to getting better sleep  1- Avoid napping during the day: This will ensure you are tired at bedtime. If you have to take a nap, sleep less than one hour, before 3 pm.   2- Exercise regularly, but not right before bed: but the timing of the workout is important. Exercising in the morning or early afternoon will not interfere with sleep. Exercising within two hours before bedtime can decrease your ability to fall asleep. Regular exercise is recommended to help you deepen the sleep. 3- Avoid heavy, spicy, or sugary foods 4-6 hours before bedtime: These can affect your ability to stay asleep. 4- Have a light snack before bed: Having an empty stomach can interfere with your sleep. Dairy products and turkey contain tryptophan, which acts as a natural sleep inducer. 5- Stay away from caffeine, nicotine and alcohol at least 4-6 hours before bed: Caffeine and nicotine are stimulants that interfere with your ability to fall asleep. While alcohol has an immediate sleep-inducing effect, a few hours later, as alcohol levels in your blood start to fall, there is a stimulant effect and you will experience fragmented sleep. 6- Take a hot bath 90 minutes before bedtime:  A hot bath will raise your body temperature, but it is the drop in body temperature that may leave you feeling sleepy  7- Develop sleep rituals: it is important to give your body cues that it is time to slow down and sleep. Listen to relaxing music, read something soothing for 15 minutes, have a cup of caffeine free tea, or do relaxation exercises such as yoga or deep breathing help relieve anxiety and reduce muscle tension. 8- Fix a bedtime and an awakening time: Even on weekends! When your sleep cycle has a regular rhythm, you will feel better. 9- Sleep only when sleepy:  This reduces the time you are awake in bed. 10- Get into your favorite sleeping position: If you can't fall asleep within 15-30 minutes, get up and do something boring until you feel sleepy. Sit quietly in the dark or read the warranty on your refrigerator. Don't expose yourself to bright light while you are up, it gives cues to your brain that it is time to wake up. 11- Only use your bed for sleeping: Dont use the bed as an office, workroom or recreation room. Let your body \"know\" that the bed is associated with sleeping  12- Use comfortable bedding. Uncomfortable bedding can prevent good sleep. Evaluate whether or not this is a source of your problem, and make appropriate changes. 13- Make sure your bed and bedroom are quiet and comfortable: A hot room can be uncomfortable. A cooler room, along with enough blankets to stay warm is recommended. Get a blackout shade or wear a slumber mask and wear earplugs or get a \"white noise\" machine for light and noise distractions. 14- Use sunlight to set your biological clock: When you get up in the morning, go outside and turn your face to the sun for 15 minutes. 13- Dont take your worries to bed: Leave worries about job, school, daily life, etc., behind when you go to bed. Some people find it useful to assign a \"worry period\" during the evening or afternoon for these issues. PFT TEST  You have been scheduled for a Pulmonary Function Test on 11-6-17 at 9:30 am at [address]:  Nothing my mouth 1 hour prior to test (water only is OK). No smoking or inhalers 4 hours prior to test unless directed differently by the physician. Please PRE-REGISTER at [phone] option 2, option 2,prior to your test date. Also, please remember to arrive 30 minutes prior to testing unless otherwise instructed.

## 2017-11-02 ENCOUNTER — HOSPITAL ENCOUNTER (OUTPATIENT)
Dept: SLEEP MEDICINE | Age: 67
Discharge: HOME | End: 2017-11-04
Attending: INTERNAL MEDICINE | Admitting: INTERNAL MEDICINE

## 2017-11-02 DIAGNOSIS — G47.10 EXCESSIVE SOMNOLENCE DISORDER: ICD-10-CM

## 2017-11-03 ENCOUNTER — OFFICE VISIT (OUTPATIENT)
Dept: CARDIOLOGY CLINIC | Age: 67
End: 2017-11-03

## 2017-11-03 VITALS
SYSTOLIC BLOOD PRESSURE: 128 MMHG | OXYGEN SATURATION: 95 % | WEIGHT: 315 LBS | HEIGHT: 73 IN | HEART RATE: 93 BPM | DIASTOLIC BLOOD PRESSURE: 72 MMHG | BODY MASS INDEX: 41.75 KG/M2

## 2017-11-03 DIAGNOSIS — I25.10 CORONARY ARTERY CALCIFICATION SEEN ON CAT SCAN: ICD-10-CM

## 2017-11-03 DIAGNOSIS — R60.9 PERIPHERAL EDEMA: ICD-10-CM

## 2017-11-03 DIAGNOSIS — I50.9 CHRONIC CONGESTIVE HEART FAILURE, UNSPECIFIED CONGESTIVE HEART FAILURE TYPE: Primary | ICD-10-CM

## 2017-11-03 DIAGNOSIS — I35.0 NONRHEUMATIC AORTIC VALVE STENOSIS: ICD-10-CM

## 2017-11-03 DIAGNOSIS — N18.9 CHRONIC KIDNEY DISEASE, UNSPECIFIED CKD STAGE: ICD-10-CM

## 2017-11-03 PROBLEM — J81.1 CHRONIC PULMONARY EDEMA: Status: ACTIVE | Noted: 2017-11-03

## 2017-11-03 PROBLEM — N17.9 ACUTE KIDNEY FAILURE (HCC): Status: ACTIVE | Noted: 2017-11-03

## 2017-11-03 PROCEDURE — G8417 CALC BMI ABV UP PARAM F/U: HCPCS | Performed by: INTERNAL MEDICINE

## 2017-11-03 PROCEDURE — 99214 OFFICE O/P EST MOD 30 MIN: CPT | Performed by: INTERNAL MEDICINE

## 2017-11-03 PROCEDURE — 1123F ACP DISCUSS/DSCN MKR DOCD: CPT | Performed by: INTERNAL MEDICINE

## 2017-11-03 PROCEDURE — 4040F PNEUMOC VAC/ADMIN/RCVD: CPT | Performed by: INTERNAL MEDICINE

## 2017-11-03 PROCEDURE — G8598 ASA/ANTIPLAT THER USED: HCPCS | Performed by: INTERNAL MEDICINE

## 2017-11-03 PROCEDURE — G8484 FLU IMMUNIZE NO ADMIN: HCPCS | Performed by: INTERNAL MEDICINE

## 2017-11-03 PROCEDURE — G8427 DOCREV CUR MEDS BY ELIG CLIN: HCPCS | Performed by: INTERNAL MEDICINE

## 2017-11-03 PROCEDURE — 1036F TOBACCO NON-USER: CPT | Performed by: INTERNAL MEDICINE

## 2017-11-03 PROCEDURE — 1111F DSCHRG MED/CURRENT MED MERGE: CPT | Performed by: INTERNAL MEDICINE

## 2017-11-03 PROCEDURE — 3017F COLORECTAL CA SCREEN DOC REV: CPT | Performed by: INTERNAL MEDICINE

## 2017-11-03 NOTE — LETTER
[address] Cardiology - [address] [place] [address] [address] [id number]  Phone: [phone]  Fax: [phone]    Tracie Lennon MD        November 3, 2017     [place], [place]  [address] [place] [id number]    Patient: Brandon Sheets  MR Number: [id number]  YOB: 1950  Date of Visit: 11/3/2017    Dear Dr. Alis fernandez:    Thank you for the request for consultation for Joana Salinas to me for the evaluation of CHF. Below are the relevant portions of my assessment and plan of care. Assessment:    - Chronic diastolic heart failure from hypertensive heart disease   - Peripheral edema, likely due to venous insufficiency  - CAD based on coronary calcification on CT chest  - Dilated right ventricle due to cor pulmonale vs heart failure  - Mild aortic stenosis  - CKD      Recommendation:  - His stress test showed no ischemia. Etiology of his CHF is hypertensive heart disease. ACEI/ARB and CCB have shown to be beneficial in regressing LVH, however, as Cr labile, he is on Norvasc.   - Continue Lasix for diuresis. He is compliant with his diet and 1.5 Liter fluid restriction. He is attending the heart failure sessions. He will continue pressure compression stockings for peripheral edema. - I encouraged him to go ahead with his sleep study as he likely has severe sleep apnea and benefit from BiPAP/CPAP. - He will follow up with Alfred Llanos NP in CHF clinic in 2 weeks. - I will see him back in four months. If you have questions, please do not hesitate to call me. I look forward to following Ethan Ranks along with you.     Sincerely,        Tracie Lennon MD

## 2017-11-03 NOTE — PROGRESS NOTES
Section of Cardiology                                     Cardiovascular Evaluation      PATIENT: Faustino Alvarenga  DATE: 11/3/2017  MRN: [id number]  CSN: [id number]  : 1950    Primary Care Doctor: Kavitha Dunham CNP    Reason for evaluation:   Follow-up and Congestive Heart Failure    History of present illness:  Faustino Alvarenga is a 69 y.o. patient who was admitted in the hospital for shortness of breath which was thought to be multifactorial due diastolic heart failure, morbid obesity and cor pulmonale. He underwent stress test to rule out ischemic etiology of the CHF. Stress test was negative. He has done well since the discharge. Today he reports he was diagnosed with sleep apnea. He states he is scheduled to see a nephrologist. He reports he feels well overall. He wears compression stockings most of the day every day. He denies chest pain, palpitations, dizziness or syncope. Past Medical History:   has a past medical history of Bladder fistula; C. difficile diarrhea; Cellulitis of right lower extremity; Diabetes ([address] Diverticulitis; DVT of lower extremity, bilateral ([address] Hematuria; Pancreatitis ([address] and Pulmonary emboli ([address] Surgical History:   has a past surgical history that includes Tibia fracture surgery (Right, ); Cholecystectomy, open (N/A, 13); Cystocopy (12/10/13); Cystoscopy (14); other surgical history (14); Colonoscopy (); Colonoscopy (2013); Colonoscopy (14); colostomy (2014); Colon surgery; Abdomen surgery (2014); and hernia repair (2015). Social History:   reports that he is a non-smoker but has been exposed to tobacco smoke. He has never used smokeless tobacco. He reports that he drinks alcohol. He reports that he does not use drugs.      Family History:  family history includes Heart Attack in his father; Heart Disease in his brother and father; High Blood Pressure in his brother; Kidney Disease in his mother; Stroke in his brother. Home Medications:  Reviewed and are listed in nursing record.  and/or listed below  Current Outpatient Prescriptions   Medication Sig Dispense Refill    oxybutynin (DITROPAN XL) 15 MG extended release tablet Take 15 mg by mouth Daily      aspirin 81 MG chewable tablet Take 1 tablet by mouth daily 30 tablet 0    isosorbide mononitrate (IMDUR) 30 MG extended release tablet Take 1 tablet by mouth daily 30 tablet 0    atorvastatin (LIPITOR) 40 MG tablet Take 1 tablet by mouth nightly 30 tablet 0    hydrALAZINE (APRESOLINE) 25 MG tablet Take 1 tablet by mouth every 8 hours 90 tablet 0    amLODIPine (NORVASC) 5 MG tablet Take 1 tablet by mouth daily 30 tablet 0    furosemide (LASIX) 40 MG tablet Take 1 tablet by mouth 2 times daily 60 tablet 0    potassium chloride (KLOR-CON M) 20 MEQ extended release tablet Take 1 tablet by mouth 2 times daily (with meals) 60 tablet 0    Insulin Pen Needle (B-D UF III MINI PEN NEEDLES) 31G X 5 MM MISC Inject 1 each into the skin 4 times daily E11.65  Please dispense generic needles 400 each 5    montelukast (SINGULAIR) 10 MG tablet Take 1 tablet by mouth daily 30 tablet 3    LANTUS SOLOSTAR 100 UNIT/ML injection pen INJECT 50 UNITS SUBCUTANEOUSLY NIGHTLY (Patient taking differently: 55 units daily) 15 Pen 3    glimepiride (AMARYL) 4 MG tablet Take 1 tablet by mouth 2 times daily 180 tablet 3    insulin aspart (NOVOLOG) 100 UNIT/ML injection pen Inject 5 Units into the skin 2 times daily (with meals) (Patient taking differently: Inject 5 Units into the skin 3 times daily (before meals) ) 5 Pen 3    sertraline (ZOLOFT) 50 MG tablet Take 1 tablet by mouth daily 90 tablet 3    atenolol (TENORMIN) 50 MG tablet Take 1 tablet by mouth daily 90 tablet 3    omeprazole (PRILOSEC) 40 MG capsule Take 1 capsule by mouth daily 90 capsule 3    glucose blood VI test strips (ASCENSIA AUTODISC VI;ONE TOUCH ULTRA TEST VI) strip DX: HGB 12.8 10/17/2017    HCT 38.5 10/17/2017    MCV 85.4 10/17/2017     10/17/2017     BMP:   Lab Results   Component Value Date     10/26/2017    K 5.0 10/26/2017    CL 99 10/26/2017    CO2 27 10/26/2017    PHOS 3.7 05/21/2014    BUN 59 10/26/2017    CREATININE 1.1 10/26/2017    CALCIUM 8.9 10/26/2017    GFRAA >60 10/26/2017    GFRAA >60 01/02/2012    MG 2.20 10/17/2017     LFTS:   Lab Results   Component Value Date    ALT 27 10/26/2017    AST 18 10/26/2017    ALKPHOS 64 10/26/2017    PROT 6.6 10/26/2017    PROT 7.3 01/02/2012    AGRATIO 0.9 10/26/2017    BILITOT 0.5 10/26/2017     PT/INR: No results found for: PTINR  LIPID PANEL: No components found for: CHLPL  Lab Results   Component Value Date    TRIG 132 08/30/2017    TRIG 70 05/25/2016    TRIG 109 09/16/2015     Lab Results   Component Value Date    HDL 33 (L) 08/30/2017    HDL 35 (L) 05/25/2016    HDL 39 (L) 09/16/2015     Lab Results   Component Value Date    LDLCALC 70 08/30/2017    [address] 05/25/2016    LDLCALC 89 09/16/2015     TSH:   Lab Results   Component Value Date    TSH 2.55 05/13/2015     FREET4: No results found for: Nidia Holmanze: No components found for: FREET3  BNP: No results found for: BNP      Data Review:  I have reviewed the below testing personally and my interpretation is below. ECHO: 10/17/17  Left ventricular systolic function is normal with the ejection fraction   estimated at 55%.   No regional wall motion abnormalities.   Moderate concentric left ventricular hypertrophy.   Grade II diastolic dysfunction with elevated filling pressure.   Severe bi-atrial enlargement.   Right ventricle is moderately enlarged.   Mild aortic stenosis.   Systolic pulmonary artery pressure (SPAP) is normal and estimated at 26 mmHg   (RA pressure 3 mmHg).         Assessment:    - Chronic diastolic heart failure from hypertensive heart disease   - Peripheral edema, likely due to venous insufficiency  - CAD based on coronary calcification

## 2017-11-03 NOTE — COMMUNICATION BODY
Assessment:    - Chronic diastolic heart failure from hypertensive heart disease   - Peripheral edema, likely due to venous insufficiency  - CAD based on coronary calcification on CT chest  - Dilated right ventricle due to cor pulmonale vs heart failure  - Mild aortic stenosis  - CKD      Recommendation:  - His stress test showed no ischemia. Etiology of his CHF is hypertensive heart disease. ACEI/ARB and CCB have shown to be beneficial in regressing LVH, however, as Cr labile, he is on Norvasc.   - Continue Lasix for diuresis. He is compliant with his diet and 1.5 Liter fluid restriction. He is attending the heart failure sessions. He will continue pressure compression stockings for peripheral edema. - I encouraged him to go ahead with his sleep study as he likely has severe sleep apnea and benefit from BiPAP/CPAP. - He will follow up with [facility] in CHF clinic in 2 weeks. - I will see him back in four months.

## 2017-11-03 NOTE — PATIENT INSTRUCTIONS
Recommendation:  - His stress test showed no ischemia. Etiology of his CHF is hypertensive heart disease. ACEI/ARB and CCB have shown to be beneficial in regressing LVH, however, as Cr labile, he is on Norvasc.   - Continue Lasix for diuresis. He is compliant with his diet and 1.5 Liter fluid restriction. He is attending the heart failure sessions. He will continue pressure compression stockings for peripheral edema. - I encouraged him to go ahead with his sleep study as he likely has severe sleep apnea and benefit from BiPAP/CPAP. - He will follow up with [facility], NP in CHF clinic in 2 weeks. - I will see him back in four months.

## 2017-11-06 ENCOUNTER — HOSPITAL ENCOUNTER (OUTPATIENT)
Dept: OTHER | Age: 67
Discharge: HOME | End: 2017-11-06
Attending: NURSE PRACTITIONER | Admitting: NURSE PRACTITIONER

## 2017-11-06 ENCOUNTER — OFFICE VISIT (OUTPATIENT)
Dept: PULMONOLOGY | Age: 67
End: 2017-11-06

## 2017-11-06 ENCOUNTER — HOSPITAL ENCOUNTER (OUTPATIENT)
Dept: PULMONOLOGY | Age: 67
Discharge: HOME | End: 2017-11-06
Attending: NURSE PRACTITIONER | Admitting: NURSE PRACTITIONER

## 2017-11-06 ENCOUNTER — TELEPHONE (OUTPATIENT)
Dept: CARDIOLOGY CLINIC | Age: 67
End: 2017-11-06

## 2017-11-06 VITALS
RESPIRATION RATE: 16 BRPM | DIASTOLIC BLOOD PRESSURE: 64 MMHG | HEART RATE: 61 BPM | TEMPERATURE: 98.4 F | OXYGEN SATURATION: 97 % | WEIGHT: 315 LBS | HEIGHT: 73 IN | SYSTOLIC BLOOD PRESSURE: 119 MMHG | BODY MASS INDEX: 41.75 KG/M2

## 2017-11-06 DIAGNOSIS — Z79.899 MEDICATION MANAGEMENT: Primary | ICD-10-CM

## 2017-11-06 DIAGNOSIS — J96.11 CHRONIC RESPIRATORY FAILURE WITH HYPOXIA (HCC): ICD-10-CM

## 2017-11-06 DIAGNOSIS — J41.0 SIMPLE CHRONIC BRONCHITIS (HCC): ICD-10-CM

## 2017-11-06 DIAGNOSIS — G47.33 OSA (OBSTRUCTIVE SLEEP APNEA): Primary | ICD-10-CM

## 2017-11-06 DIAGNOSIS — R06.02 SHORTNESS OF BREATH: ICD-10-CM

## 2017-11-06 DIAGNOSIS — I50.32 CHRONIC DIASTOLIC HEART FAILURE (HCC): ICD-10-CM

## 2017-11-06 DIAGNOSIS — I10 ESSENTIAL HYPERTENSION: Chronic | ICD-10-CM

## 2017-11-06 LAB
ANION GAP SERPL CALCULATED.3IONS-SCNC: 12 MMOL/L (ref 3–16)
BUN BLDV-MCNC: 37 MG/DL (ref 7–20)
CALCIUM SERPL-MCNC: 9.3 MG/DL (ref 8.3–10.6)
CHLORIDE BLD-SCNC: 102 MMOL/L (ref 99–110)
CO2: 28 MMOL/L (ref 21–32)
CREAT SERPL-MCNC: 1.2 MG/DL (ref 0.8–1.3)
GFR AFRICAN AMERICAN: >60
GFR NON-AFRICAN AMERICAN: >60
GLUCOSE BLD-MCNC: 105 MG/DL (ref 70–99)
POTASSIUM SERPL-SCNC: 5.5 MMOL/L (ref 3.5–5.1)
SODIUM BLD-SCNC: 142 MMOL/L (ref 136–145)

## 2017-11-06 PROCEDURE — 3023F SPIROM DOC REV: CPT | Performed by: INTERNAL MEDICINE

## 2017-11-06 PROCEDURE — 1123F ACP DISCUSS/DSCN MKR DOCD: CPT | Performed by: INTERNAL MEDICINE

## 2017-11-06 PROCEDURE — G8427 DOCREV CUR MEDS BY ELIG CLIN: HCPCS | Performed by: INTERNAL MEDICINE

## 2017-11-06 PROCEDURE — G8926 SPIRO NO PERF OR DOC: HCPCS | Performed by: INTERNAL MEDICINE

## 2017-11-06 PROCEDURE — 1111F DSCHRG MED/CURRENT MED MERGE: CPT | Performed by: INTERNAL MEDICINE

## 2017-11-06 PROCEDURE — 1036F TOBACCO NON-USER: CPT | Performed by: INTERNAL MEDICINE

## 2017-11-06 PROCEDURE — 3017F COLORECTAL CA SCREEN DOC REV: CPT | Performed by: INTERNAL MEDICINE

## 2017-11-06 PROCEDURE — G8598 ASA/ANTIPLAT THER USED: HCPCS | Performed by: INTERNAL MEDICINE

## 2017-11-06 PROCEDURE — 99214 OFFICE O/P EST MOD 30 MIN: CPT | Performed by: INTERNAL MEDICINE

## 2017-11-06 PROCEDURE — G8417 CALC BMI ABV UP PARAM F/U: HCPCS | Performed by: INTERNAL MEDICINE

## 2017-11-06 PROCEDURE — 4040F PNEUMOC VAC/ADMIN/RCVD: CPT | Performed by: INTERNAL MEDICINE

## 2017-11-06 PROCEDURE — G8484 FLU IMMUNIZE NO ADMIN: HCPCS | Performed by: INTERNAL MEDICINE

## 2017-11-06 RX ORDER — ALBUTEROL SULFATE 90 UG/1
4 AEROSOL, METERED RESPIRATORY (INHALATION) ONCE
Status: COMPLETED | OUTPATIENT
Start: 2017-11-06 | End: 2017-11-06

## 2017-11-06 RX ADMIN — ALBUTEROL SULFATE 4 PUFF: 90 AEROSOL, METERED RESPIRATORY (INHALATION) at 10:08

## 2017-11-06 ASSESSMENT — SLEEP AND FATIGUE QUESTIONNAIRES
HOW LIKELY ARE YOU TO NOD OFF OR FALL ASLEEP IN A CAR, WHILE STOPPED FOR A FEW MINUTES IN TRAFFIC: 2
HOW LIKELY ARE YOU TO NOD OFF OR FALL ASLEEP WHILE SITTING QUIETLY AFTER LUNCH WITHOUT ALCOHOL: 2
HOW LIKELY ARE YOU TO NOD OFF OR FALL ASLEEP WHILE LYING DOWN TO REST IN THE AFTERNOON WHEN CIRCUMSTANCES PERMIT: 2
HOW LIKELY ARE YOU TO NOD OFF OR FALL ASLEEP WHEN YOU ARE A PASSENGER IN A CAR FOR AN HOUR WITHOUT A BREAK: 0
HOW LIKELY ARE YOU TO NOD OFF OR FALL ASLEEP WHILE WATCHING TV: 3
ESS TOTAL SCORE: 14
HOW LIKELY ARE YOU TO NOD OFF OR FALL ASLEEP WHILE SITTING INACTIVE IN A PUBLIC PLACE: 2
NECK CIRCUMFERENCE (INCHES): 18.5
HOW LIKELY ARE YOU TO NOD OFF OR FALL ASLEEP WHILE SITTING AND READING: 2
HOW LIKELY ARE YOU TO NOD OFF OR FALL ASLEEP WHILE SITTING AND TALKING TO SOMEONE: 1

## 2017-11-06 ASSESSMENT — ENCOUNTER SYMPTOMS
EYE ITCHING: 0
DIARRHEA: 0
SHORTNESS OF BREATH: 1
CHOKING: 0
COUGH: 0
EYE DISCHARGE: 0
EYE PAIN: 0
CONSTIPATION: 0
SORE THROAT: 0
ABDOMINAL PAIN: 0
VOICE CHANGE: 0

## 2017-11-06 NOTE — PROGRESS NOTES
Pulmonary Outpatient Note   Grupo Hays MD       11/6/2017    Chief Complaint:  Results (sleep studt , pft)     HPI:   69y.o. year old male here for follow up of sleep issues and shortness of breath. Sleep study personally reviewed, severe MONIQUE with AHI in the 40s. Desaturation to 85%. PFT personally reviewed, moderate obstruction without significant post bronchodilator improvement. Mild restriction, extrathoracic. No desaturation on walk testing on room air. He continues to use his oxygen during the day, has orthopnea and sleeps in a chair. CT chest personally reviewed, no significant emphysematous changes. Past Medical History:   Diagnosis Date    Bladder fistula     resolved    C. difficile diarrhea 8/18/2015    +PCR    Cellulitis of right lower extremity 3/23/2016    Diabetes ([address]     Diverticulitis     DVT of lower extremity, bilateral ([address] 10/16/2013    Hematuria 1/2/2014    Pancreatitis ([address] 8/24/2013    Pulmonary emboli ([address] 2013    after cholecystectomy        Past Surgical History:   Procedure Laterality Date    ABDOMEN SURGERY  05/16/2014    reveseral end peristomal hernia repair.  CHOLECYSTECTOMY, OPEN N/A 9-17-13    LAPAROSCOPIC CONVERTED TO OPEN CHOLECYSTECTOMY WITH    COLON SURGERY      COLONOSCOPY  2008    COLONOSCOPY  12/4/2013    Severe Diverticulosis unable to finish    COLONOSCOPY  4/30/14    diverticula-10 year f/u    COLOSTOMY  Jan 2014    CYSTOSCOPY  12/10/13    with bladder biopsy    CYSTOSCOPY  1-28-14    Cystourethroscopy, left ureteral catheter     HERNIA REPAIR  08/14/2015    OPEN INCISIONAL HERNIA REPAIR WITH MESH, BILATERAL COMPONENT SEPARATION, LYSIS OF ADHESIONS    OTHER SURGICAL HISTORY  1-28-14    LeftColectomy with End Colostomy, Splenic Flexure Mobilization, Takedown of Colovesical Fistula    TIBIA FRACTURE SURGERY Right 2003    Fracture lower leg during chain saw accident.  Surgery x 2       Social History   Substance Use Topics    Smoking status: Passive Smoke Exposure - Never Smoker    Smokeless tobacco: Never Used    Alcohol use 0.0 oz/week      Comment: rare       Family History   Problem Relation Age of Onset    Heart Disease Father     Heart Attack Father     Stroke Brother     Heart Disease Brother     High Blood Pressure Brother     Kidney Disease Mother      kidney removed         Current Outpatient Prescriptions:     oxybutynin (DITROPAN XL) 15 MG extended release tablet, Take 15 mg by mouth Daily, Disp: , Rfl:     aspirin 81 MG chewable tablet, Take 1 tablet by mouth daily, Disp: 30 tablet, Rfl: 0    isosorbide mononitrate (IMDUR) 30 MG extended release tablet, Take 1 tablet by mouth daily, Disp: 30 tablet, Rfl: 0    atorvastatin (LIPITOR) 40 MG tablet, Take 1 tablet by mouth nightly, Disp: 30 tablet, Rfl: 0    hydrALAZINE (APRESOLINE) 25 MG tablet, Take 1 tablet by mouth every 8 hours, Disp: 90 tablet, Rfl: 0    amLODIPine (NORVASC) 5 MG tablet, Take 1 tablet by mouth daily, Disp: 30 tablet, Rfl: 0    furosemide (LASIX) 40 MG tablet, Take 1 tablet by mouth 2 times daily, Disp: 60 tablet, Rfl: 0    potassium chloride (KLOR-CON M) 20 MEQ extended release tablet, Take 1 tablet by mouth 2 times daily (with meals), Disp: 60 tablet, Rfl: 0    Insulin Pen Needle (B-D UF III MINI PEN NEEDLES) 31G X 5 MM MISC, Inject 1 each into the skin 4 times daily E11.65  Please dispense generic needles, Disp: 400 each, Rfl: 5    montelukast (SINGULAIR) 10 MG tablet, Take 1 tablet by mouth daily, Disp: 30 tablet, Rfl: 3    LANTUS SOLOSTAR 100 UNIT/ML injection pen, INJECT 50 UNITS SUBCUTANEOUSLY NIGHTLY (Patient taking differently: 55 units daily), Disp: 15 Pen, Rfl: 3    glimepiride (AMARYL) 4 MG tablet, Take 1 tablet by mouth 2 times daily, Disp: 180 tablet, Rfl: 3    insulin aspart (NOVOLOG) 100 UNIT/ML injection pen, Inject 5 Units into the skin 2 times daily (with meals) (Patient taking differently: Inject 5 Units into the skin 3 times daily (before meals) ), Disp: 5 Pen, Rfl: 3    sertraline (ZOLOFT) 50 MG tablet, Take 1 tablet by mouth daily, Disp: 90 tablet, Rfl: 3    atenolol (TENORMIN) 50 MG tablet, Take 1 tablet by mouth daily, Disp: 90 tablet, Rfl: 3    omeprazole (PRILOSEC) 40 MG capsule, Take 1 capsule by mouth daily, Disp: 90 capsule, Rfl: 3    glucose blood VI test strips (ASCENSIA AUTODISC VI;ONE TOUCH ULTRA TEST VI) strip, DX: E11.9 FSBS daily. One Touch ultra, Disp: 100 strip, Rfl: 5    Blood Glucose Monitoring Suppl PRABHAKAR, 1 Device by Does not apply route daily One Touch Ultra, Disp: 1 Device, Rfl: 0    oxybutynin (DITROPAN-XL) 10 MG CR tablet, Take 10 mg by mouth daily Takes in afternoon, Disp: , Rfl:     Multiple Vitamins-Minerals (THERAPEUTIC MULTIVITAMIN-MINERALS) tablet, Take 1 tablet by mouth daily, Disp: , Rfl:     ferrous sulfate 325 (65 FE) MG tablet, Take 325 mg by mouth daily. , Disp: , Rfl:     Norco [hydrocodone-acetaminophen]    Vitals:    11/06/17 1438   BP: 119/64   Pulse: 61   Resp: 16   Temp: 98.4 °F (36.9 °C)   TempSrc: Oral   SpO2: 97%   Weight: (!) 316 lb (143.3 kg)   Height: 6' 1\" (1.854 m)       Review of Systems   Constitutional: Negative for chills, fever and unexpected weight change. HENT: Negative for mouth sores, sore throat and voice change. Eyes: Negative for pain, discharge and itching. Respiratory: Positive for shortness of breath. Negative for cough and choking. Cardiovascular: Negative for chest pain, palpitations and leg swelling. Gastrointestinal: Negative for abdominal pain, constipation and diarrhea. Endocrine: Negative for cold intolerance, heat intolerance and polydipsia. Genitourinary: Negative for dysuria, frequency and hematuria. Musculoskeletal: Negative for gait problem, joint swelling and neck stiffness. Neurological: Negative for dizziness, numbness and headaches. Psychiatric/Behavioral: Negative for agitation, confusion and hallucinations. Physical Exam   Constitutional: He appears well-developed and well-nourished. No distress. HENT:   Head: Normocephalic and atraumatic. Mouth/Throat: Oropharynx is clear and moist. No oropharyngeal exudate. Eyes: EOM are normal. Pupils are equal, round, and reactive to light. Neck: Neck supple. No JVD present. Cardiovascular: Normal heart sounds. Exam reveals no gallop and no friction rub. No murmur heard. Pulmonary/Chest: Effort normal. He has no wheezes. He has no rales. Equal chest rise and expansion bilaterally   Abdominal: Soft. Bowel sounds are normal. He exhibits no distension. There is no tenderness. Musculoskeletal: Normal range of motion. He exhibits no edema. Lymphadenopathy:     He has no cervical adenopathy. Neurological: He is alert. No cranial nerve deficit. CN 2-12 grossly intact   Skin: Skin is warm and dry. No rash noted. He is not diaphoretic. ASSESSMENT:    1. MONIQUE (obstructive sleep apnea)    2. Chronic respiratory failure with hypoxia (HCC)    3. Simple chronic bronchitis (HCC)      PLAN:    -Reviewed new diagnosis of MONIQUE with the patient and treatment options, he agreed to proceed. Will set up with mask fitting, PAP therapy and follow up after to review.  -Is achieving a symptom improvement from supplemental oxygen, continue qhs bled through CPAP. No longer requires with activity.   -Counseled on sleep hygiene and weight management.      Orders Placed This Encounter   Procedures    CPAP    CPAP    CPAP continuous - nasal mask    CPAP    CPAP    CPAP

## 2017-11-07 NOTE — PROCEDURES
[address] [address]                              PULMONARY FUNCTION    PATIENT NAME: Kylah Damon                :         1950  MED REC NO:   [id number]                          ROOM:  ACCOUNT NO:   [de-identified]                          ADMIT DATE:  2017  PROVIDER:     Graciela Pinto MD    DATE OF PROCEDURE:  2017    SIX-MINUTE WALK TEST    A 6-minute walk test was performed as per standard ATS criteria on  2017. Baseline oxygen saturation was 95%, heart rate 53 per  minute, Yue dyspnea scale 1 and Yue fatigue scale 2. The patient  walked a total distance of 800 feet, 49% of the predicted distance. There was no desaturation noted. Yue dyspnea scale peaked at 5 and  Yue fatigue scale peaked at 4. IMPRESSION:  Limitation in 6-minute walk distance without evidence of  desaturation. Clinical correlation is recommended.         Rocky Ibarra MD    D: 2017 17:26:26       T: 2017 19:39:29     [email or web address]  Job#: [id number]     Doc#: [id number]    CC:  NGA Daniel CNP Deedee Becket, MD

## 2017-11-09 ENCOUNTER — TELEPHONE (OUTPATIENT)
Dept: PULMONOLOGY | Age: 67
End: 2017-11-09

## 2017-11-09 DIAGNOSIS — G47.33 OSA (OBSTRUCTIVE SLEEP APNEA): Primary | ICD-10-CM

## 2017-11-13 ENCOUNTER — HOSPITAL ENCOUNTER (OUTPATIENT)
Dept: OTHER | Age: 67
Discharge: HOME | End: 2017-11-13
Attending: NURSE PRACTITIONER | Admitting: NURSE PRACTITIONER

## 2017-11-13 DIAGNOSIS — Z79.899 MEDICATION MANAGEMENT: ICD-10-CM

## 2017-11-13 LAB
ANION GAP SERPL CALCULATED.3IONS-SCNC: 12 MMOL/L (ref 3–16)
BUN BLDV-MCNC: 33 MG/DL (ref 7–20)
CALCIUM SERPL-MCNC: 8.8 MG/DL (ref 8.3–10.6)
CHLORIDE BLD-SCNC: 100 MMOL/L (ref 99–110)
CO2: 29 MMOL/L (ref 21–32)
CREAT SERPL-MCNC: 1.4 MG/DL (ref 0.8–1.3)
GFR AFRICAN AMERICAN: >60
GFR NON-AFRICAN AMERICAN: 50
GLUCOSE BLD-MCNC: 77 MG/DL (ref 70–99)
POTASSIUM SERPL-SCNC: 4.2 MMOL/L (ref 3.5–5.1)
SODIUM BLD-SCNC: 141 MMOL/L (ref 136–145)

## 2017-11-14 ENCOUNTER — OFFICE VISIT (OUTPATIENT)
Dept: CARDIOLOGY CLINIC | Age: 67
End: 2017-11-14

## 2017-11-14 VITALS
DIASTOLIC BLOOD PRESSURE: 64 MMHG | HEART RATE: 61 BPM | SYSTOLIC BLOOD PRESSURE: 130 MMHG | OXYGEN SATURATION: 96 % | BODY MASS INDEX: 41.35 KG/M2 | WEIGHT: 312 LBS | HEIGHT: 73 IN

## 2017-11-14 DIAGNOSIS — G47.30 SEVERE SLEEP APNEA: ICD-10-CM

## 2017-11-14 DIAGNOSIS — I10 ESSENTIAL HYPERTENSION: Chronic | ICD-10-CM

## 2017-11-14 DIAGNOSIS — I50.32 CHRONIC DIASTOLIC CONGESTIVE HEART FAILURE (HCC): Primary | ICD-10-CM

## 2017-11-14 DIAGNOSIS — R60.0 LOCALIZED EDEMA: ICD-10-CM

## 2017-11-14 PROCEDURE — 3017F COLORECTAL CA SCREEN DOC REV: CPT | Performed by: NURSE PRACTITIONER

## 2017-11-14 PROCEDURE — 4040F PNEUMOC VAC/ADMIN/RCVD: CPT | Performed by: NURSE PRACTITIONER

## 2017-11-14 PROCEDURE — 1123F ACP DISCUSS/DSCN MKR DOCD: CPT | Performed by: NURSE PRACTITIONER

## 2017-11-14 PROCEDURE — G8598 ASA/ANTIPLAT THER USED: HCPCS | Performed by: NURSE PRACTITIONER

## 2017-11-14 PROCEDURE — G8427 DOCREV CUR MEDS BY ELIG CLIN: HCPCS | Performed by: NURSE PRACTITIONER

## 2017-11-14 PROCEDURE — 99214 OFFICE O/P EST MOD 30 MIN: CPT | Performed by: NURSE PRACTITIONER

## 2017-11-14 PROCEDURE — G8484 FLU IMMUNIZE NO ADMIN: HCPCS | Performed by: NURSE PRACTITIONER

## 2017-11-14 PROCEDURE — G8417 CALC BMI ABV UP PARAM F/U: HCPCS | Performed by: NURSE PRACTITIONER

## 2017-11-14 PROCEDURE — 1111F DSCHRG MED/CURRENT MED MERGE: CPT | Performed by: NURSE PRACTITIONER

## 2017-11-14 PROCEDURE — 1036F TOBACCO NON-USER: CPT | Performed by: NURSE PRACTITIONER

## 2017-11-14 RX ORDER — AMLODIPINE BESYLATE 5 MG/1
5 TABLET ORAL DAILY
Qty: 90 TABLET | Refills: 1 | Status: SHIPPED | OUTPATIENT
Start: 2017-11-14 | End: 2017-11-28 | Stop reason: SDUPTHER

## 2017-11-14 RX ORDER — ATORVASTATIN CALCIUM 40 MG/1
40 TABLET, FILM COATED ORAL NIGHTLY
Qty: 90 TABLET | Refills: 1 | Status: SHIPPED | OUTPATIENT
Start: 2017-11-14 | End: 2017-12-12 | Stop reason: SDUPTHER

## 2017-11-14 RX ORDER — HYDRALAZINE HYDROCHLORIDE 25 MG/1
25 TABLET, FILM COATED ORAL EVERY 8 HOURS SCHEDULED
Qty: 90 TABLET | Refills: 1 | Status: SHIPPED | OUTPATIENT
Start: 2017-11-14 | End: 2017-12-12 | Stop reason: SDUPTHER

## 2017-11-14 RX ORDER — ISOSORBIDE MONONITRATE 30 MG/1
30 TABLET, EXTENDED RELEASE ORAL DAILY
Qty: 90 TABLET | Refills: 1 | Status: SHIPPED | OUTPATIENT
Start: 2017-11-14 | End: 2018-03-08 | Stop reason: SDUPTHER

## 2017-11-14 RX ORDER — ATENOLOL 50 MG/1
50 TABLET ORAL DAILY
Qty: 90 TABLET | Refills: 3 | Status: SHIPPED | OUTPATIENT
Start: 2017-11-14 | End: 2018-01-11 | Stop reason: SDUPTHER

## 2017-11-14 RX ORDER — FUROSEMIDE 40 MG/1
40 TABLET ORAL SEE ADMIN INSTRUCTIONS
Qty: 90 TABLET | Refills: 1 | Status: SHIPPED | OUTPATIENT
Start: 2017-11-14 | End: 2018-01-18 | Stop reason: SDUPTHER

## 2017-11-14 NOTE — PROGRESS NOTES
[facility]  Shared Medical Appointment  [facility] [address] 1950    Primary Care Doctor:  Nayana Meraz CNP      Vitals:    11/14/17 1255   BP: 130/64   Pulse: 61   SpO2: 96%   Weight: (!) 312 lb (141.5 kg)   Height: 6' 1\" (1.854 m)        Wt Readings from Last 3 Encounters:   11/14/17 (!) 312 lb (141.5 kg)   11/06/17 (!) 316 lb (143.3 kg)   11/03/17 (!) 315 lb (142.9 kg)     BP Readings from Last 3 Encounters:   11/06/17 119/64   11/03/17 128/72   11/01/17 129/68       History of Present Illness:   Mr. León Rios is seen in [facility] shared medical appointments for 2st session. Nicho Thomson 69 y.o. male recently admitted from 10/16/17-10/20/17 with progressively worsening shortness of breath, associated with LE edema, and abdominal distention. His echo from 10/17/17 showed normal LVEF 55%. Grade II diastolic dysfunction, moderate LVH, severe bi-atrial enlargement, enlarged RV. He was diuresed 4.5L and discharged on Lasix 40mg BID. He also had NOEL with peak creatinine up to 2. ARB was discontinued. And he was discharged on Norvasc. His discharge weight was 312lbs. Since last visit, his last labs showed improved creatinine 1.2. He underwent sleep study which showed severe MONIQUE and he is going through the process of getting CPAP. He is feeling much better since last visit. He is no longer wearing the oxygen through the day but still wearing it at night. His edema is still significant on his lower legs and he has been wearing compression socks. He has some sores on the legs from the edema. He continues to sleep in his recliner as he hasn't been able to sleep in a bed for years. He is going to try to start sleeping in the bed to get his legs elevated more. He is doing better with his fluid restriction and now sipping water instead of drinking a 2L of diet coke. His shortness of breath is improving. His las potassium level was 5.5 and his oral potassium was discontinued. No    Living with Heart Failure Questionnaire  1. I feel like my overall health is. .. poor  2. I have trouble sleeping at night. [place] Daily  3. I have problems breathing. [place] Frequently   4. I have swelling in my legs/ ankles. ..frequently  5. My appetite is . ..faire  6. My energy is. .. fair  7. My weight varies by 3 lbs or more . ..seldom  8. I am dizzy or lightheaded. [place] Seldom      Allergies   Allergen Reactions    Norco [Hydrocodone-Acetaminophen]      Makes agitated     Current Outpatient Prescriptions   Medication Sig Dispense Refill    oxybutynin (DITROPAN XL) 15 MG extended release tablet Take 15 mg by mouth Daily      aspirin 81 MG chewable tablet Take 1 tablet by mouth daily 30 tablet 0    isosorbide mononitrate (IMDUR) 30 MG extended release tablet Take 1 tablet by mouth daily 30 tablet 0    atorvastatin (LIPITOR) 40 MG tablet Take 1 tablet by mouth nightly 30 tablet 0    hydrALAZINE (APRESOLINE) 25 MG tablet Take 1 tablet by mouth every 8 hours 90 tablet 0    amLODIPine (NORVASC) 5 MG tablet Take 1 tablet by mouth daily 30 tablet 0    furosemide (LASIX) 40 MG tablet Take 1 tablet by mouth 2 times daily 60 tablet 0    Insulin Pen Needle (B-D UF III MINI PEN NEEDLES) 31G X 5 MM MISC Inject 1 each into the skin 4 times daily E11.65  Please dispense generic needles 400 each 5    montelukast (SINGULAIR) 10 MG tablet Take 1 tablet by mouth daily 30 tablet 3    LANTUS SOLOSTAR 100 UNIT/ML injection pen INJECT 50 UNITS SUBCUTANEOUSLY NIGHTLY (Patient taking differently: 55 units daily) 15 Pen 3    glimepiride (AMARYL) 4 MG tablet Take 1 tablet by mouth 2 times daily 180 tablet 3    insulin aspart (NOVOLOG) 100 UNIT/ML injection pen Inject 5 Units into the skin 2 times daily (with meals) (Patient taking differently: Inject 5 Units into the skin 3 times daily (before meals) ) 5 Pen 3    sertraline (ZOLOFT) 50 MG tablet Take 1 tablet by mouth daily 90 tablet 3    atenolol (TENORMIN) 50 MG tablet Take 1 Surgery x 2     Family History   Problem Relation Age of Onset    Heart Disease Father     Heart Attack Father     Stroke Brother     Heart Disease Brother     High Blood Pressure Brother     Kidney Disease Mother      kidney removed     Social History     Social History    Marital status:      Spouse name: N/A    Number of children: N/A    Years of education: N/A     Occupational History    Not on file. Social History Main Topics    Smoking status: Passive Smoke Exposure - Never Smoker    Smokeless tobacco: Never Used    Alcohol use 0.0 oz/week      Comment: rare    Drug use: No    Sexual activity: Yes     Partners: Female     Other Topics Concern    Not on file     Social History Narrative    No narrative on file     Physical Examination:    Vitals:    11/14/17 1255   BP: 130/64   Pulse: 61   SpO2: 96%   Weight: (!) 312 lb (141.5 kg)   Height: 6' 1\" (1.854 m)        Constitutional and General Appearance: Warm and dry, no apparent distress, normal coloration, obese, improved color  HEENT:  Normocephalic, atraumatic  Respiratory:  · Normal  without use of accessory muscles  · Resp Auscultation: Normal breath sounds throughout, diminished  Cardiovascular:  · The apical impulses not displaced  · Heart tones are distant  · JVP ~9-10 cm H2O  · Regular rate and rhythm, + murmur, no r/g  · Peripheral pulses are symmetrical and full  · There is no clubbing, cyanosis of the extremities.   · ++ BLE edema, compression socks in place  · Pedal Pulses: 1+ and equal   Abdomen:  · No masses or tenderness  · Liver/Spleen: No Abnormalities Noted  Neurological/Psychiatric:  · Alert and oriented in all spheres  · Moves all extremities   · No abnormalities of mood, affect, memory, mentation, or behavior are noted      CBC:   Lab Results   Component Value Date    WBC 9.5 10/17/2017    WBC 9.2 10/16/2017    WBC 6.8 08/30/2017    RBC 4.50 10/17/2017    RBC 4.50 10/16/2017    RBC 5.32 08/30/2017    HGB 12.8 10/17/2017    HGB 13.2 10/16/2017    HGB 15.6 08/30/2017    HCT 38.5 10/17/2017    HCT 38.6 10/16/2017    HCT 47.2 08/30/2017    MCV 85.4 10/17/2017    MCV 85.9 10/16/2017    MCV 88.7 08/30/2017    RDW 14.0 10/17/2017    RDW 13.9 10/16/2017    RDW 14.6 08/30/2017     10/17/2017     10/16/2017     08/30/2017     BMP:  Lab Results   Component Value Date     11/13/2017     11/06/2017     10/26/2017    K 4.2 11/13/2017    K 5.5 11/06/2017    K 5.0 10/26/2017     11/13/2017     11/06/2017    CL 99 10/26/2017    CO2 29 11/13/2017    CO2 28 11/06/2017    CO2 27 10/26/2017    PHOS 3.7 05/21/2014    PHOS 2.7 05/19/2014    PHOS 2.3 05/18/2014    BUN 33 11/13/2017    BUN 37 11/06/2017    BUN 59 10/26/2017    CREATININE 1.4 11/13/2017    CREATININE 1.2 11/06/2017    CREATININE 1.1 10/26/2017     BNP:   Lab Results   Component Value Date    PROBNP 750 10/26/2017    PROBNP 1,424 10/16/2017     ECHO: 10/17/17  Left ventricular systolic function is normal with the ejection fraction   estimated at 55%. No regional wall motion abnormalities. Moderate concentric left ventricular hypertrophy. Grade II diastolic dysfunction with elevated filling pressure. Severe bi-atrial enlargement. Right ventricle is moderately enlarged. Mild aortic stenosis. Systolic pulmonary artery pressure (SPAP) is normal and estimated at 26 mmHg   (RA pressure 3 mmHg).     Stress test on 10/18:  Negative    6MWT 11/6/17: ambulated 800 feet, lowest SpO2 94%

## 2017-11-20 DIAGNOSIS — E11.9 TYPE 2 DIABETES MELLITUS WITHOUT COMPLICATION, WITH LONG-TERM CURRENT USE OF INSULIN (HCC): ICD-10-CM

## 2017-11-20 DIAGNOSIS — Z79.4 TYPE 2 DIABETES MELLITUS WITHOUT COMPLICATION, WITH LONG-TERM CURRENT USE OF INSULIN (HCC): ICD-10-CM

## 2017-11-20 RX ORDER — INSULIN GLARGINE 100 [IU]/ML
INJECTION, SOLUTION SUBCUTANEOUS
Qty: 15 PEN | Refills: 5 | Status: SHIPPED | OUTPATIENT
Start: 2017-11-20 | End: 2018-01-22

## 2017-11-20 NOTE — TELEPHONE ENCOUNTER
From: Shadi Prakash  Sent: 11/18/2017 4:38 AM EST  Subject: Medication Renewal Request    Shadi Prakash would like a refill of the following medications:  LANTUS SOLOSTAR 100 UNIT/ML injection pen Esequiel Grove CNP]    Preferred pharmacy: [facility] [address], [address] [facility] [phone]    Comment:

## 2017-11-27 ENCOUNTER — HOSPITAL ENCOUNTER (OUTPATIENT)
Dept: OTHER | Age: 67
Discharge: HOME | End: 2017-11-27
Attending: NURSE PRACTITIONER | Admitting: NURSE PRACTITIONER

## 2017-11-27 ENCOUNTER — TELEPHONE (OUTPATIENT)
Dept: FAMILY MEDICINE CLINIC | Age: 67
End: 2017-11-27

## 2017-11-27 NOTE — TELEPHONE ENCOUNTER
Kaela Young at Cardiologist office called to let us know they need blood orders placed in the system.

## 2017-11-28 ENCOUNTER — OFFICE VISIT (OUTPATIENT)
Dept: CARDIOLOGY CLINIC | Age: 67
End: 2017-11-28

## 2017-11-28 VITALS
DIASTOLIC BLOOD PRESSURE: 66 MMHG | HEART RATE: 66 BPM | HEIGHT: 73 IN | SYSTOLIC BLOOD PRESSURE: 136 MMHG | BODY MASS INDEX: 41.55 KG/M2 | WEIGHT: 313.5 LBS | OXYGEN SATURATION: 90 %

## 2017-11-28 DIAGNOSIS — I50.32 CHRONIC DIASTOLIC CONGESTIVE HEART FAILURE (HCC): Primary | ICD-10-CM

## 2017-11-28 DIAGNOSIS — R60.0 LOCALIZED EDEMA: ICD-10-CM

## 2017-11-28 DIAGNOSIS — I10 ESSENTIAL HYPERTENSION: ICD-10-CM

## 2017-11-28 DIAGNOSIS — R06.02 SHORTNESS OF BREATH: ICD-10-CM

## 2017-11-28 DIAGNOSIS — G47.30 SEVERE SLEEP APNEA: ICD-10-CM

## 2017-11-28 PROCEDURE — 99214 OFFICE O/P EST MOD 30 MIN: CPT | Performed by: NURSE PRACTITIONER

## 2017-11-28 PROCEDURE — 4040F PNEUMOC VAC/ADMIN/RCVD: CPT | Performed by: NURSE PRACTITIONER

## 2017-11-28 PROCEDURE — 1123F ACP DISCUSS/DSCN MKR DOCD: CPT | Performed by: NURSE PRACTITIONER

## 2017-11-28 PROCEDURE — 1036F TOBACCO NON-USER: CPT | Performed by: NURSE PRACTITIONER

## 2017-11-28 PROCEDURE — G8417 CALC BMI ABV UP PARAM F/U: HCPCS | Performed by: NURSE PRACTITIONER

## 2017-11-28 PROCEDURE — G8484 FLU IMMUNIZE NO ADMIN: HCPCS | Performed by: NURSE PRACTITIONER

## 2017-11-28 PROCEDURE — 3017F COLORECTAL CA SCREEN DOC REV: CPT | Performed by: NURSE PRACTITIONER

## 2017-11-28 PROCEDURE — G8598 ASA/ANTIPLAT THER USED: HCPCS | Performed by: NURSE PRACTITIONER

## 2017-11-28 PROCEDURE — G8427 DOCREV CUR MEDS BY ELIG CLIN: HCPCS | Performed by: NURSE PRACTITIONER

## 2017-11-28 RX ORDER — AMLODIPINE BESYLATE 5 MG/1
2.5 TABLET ORAL DAILY
Qty: 90 TABLET | Refills: 1
Start: 2017-11-28 | End: 2017-12-12 | Stop reason: SINTOL

## 2017-11-28 NOTE — LETTER
[address] Cardiology - [address] [place] [id number] [address] LUIS [facility]. [address]  Phone: [phone]  Fax: [phone] [place], [place]        December 5, 2017     [place], [place]  [address] [facility] [address]    Patient: Dimitry Giles  MR Number: [id number]  YOB: 1950  Date of Visit: 11/28/2017    Dear Dr. Alis fernandez:    I recently saw our mutual patient, listed above. Below are the relevant portions of my assessment and plan of care. [address]  Shared Medical Appointment  Shorty, 30/98/1161     DOS: 11/28/17    Primary Care Doctor:  Alis fernandez CNP      Vitals:    11/28/17 1300   BP: 136/66   Pulse: 66   SpO2: 90%   Weight: (!) 313 lb 8 oz (142.2 kg)   Height: 6' 1\" (1.854 m)        Wt Readings from Last 3 Encounters:   11/29/17 (!) 312 lb 3.2 oz (141.6 kg)   11/28/17 (!) 313 lb 8 oz (142.2 kg)   11/14/17 (!) 312 lb (141.5 kg)     BP Readings from Last 3 Encounters:   11/29/17 130/64   11/28/17 136/66   11/14/17 130/64       History of Present Illness:   Mr. Levon Cochran is seen in CHF shared medical appointments for 3rd session. Larwence Oppenheim 69 y.o. male recently admitted from 10/16/17-10/20/17 with progressively worsening shortness of breath, associated with LE edema, and abdominal distention. His echo from 10/17/17 showed normal LVEF 55%. Grade II diastolic dysfunction, moderate LVH, severe bi-atrial enlargement, enlarged RV. He was diuresed 4.5L and discharged on Lasix 40mg BID. He also had NOEL with peak creatinine up to 2. ARB was discontinued. And he was discharged on Norvasc. Since last visit, his energy level continues to improve overall. He is still trying to sleep with the CPAP machine. He is off of oxygen with activity. He does feel better when he does wear the CPAP for at least 2-3 hours.  He continues to have dyspnea with steps and he is trying to move firewood as he heats his house with wood. His wife also helps with moving the wood. He gets fatigued and tired. He continues with edema, which he thinks is a little worse. With a sore on the left leg. His home weight is 315lbs. He is planning to try to sleep in bed, as he continues to sleep in the recliner. Rosa Pruitt describes symptoms including dyspnea, fatigue, edema but denies chest pain, palpitations, syncope. NYHA class:  III ACC/ AHA Stage:  C    Pertinent Problems:  ·  Diastolic heart failure from hypertensive heart disease (LVH on echo) NYHA class III  · CAD based on coronary calcification on CT chest; negative stress 10/18/17  · Shortness of breath-multifactorial; due diastolic heart failure, morbid obesity, ?cor pulmonale  · Dilated right ventricle due to cor pulmonale vs heart failure  · Mild aortic stenosis  · Chronic hypoxemic respiratory failure  · Restrictive lung defect  · Severe MONIQUE on CPAP    Assessment:    1. Chronic diastolic congestive heart failure ([address]    2. Essential hypertension    3. Localized edema    4. Severe sleep apnea    5. Shortness of breath        Rosa Pruitt participated in a shared medical appointment for CHF disease management including a 30 minute educational session on exercise, activity, cardiac rehab. Evaluation and treatment changes were made as described above by nurse practitioner and then further consultation with pharmacist    Plan:   1. Avoid benadryl with your medications  2. Okay to take melatonin, be sure to wear your CPAP if you take a sleep aid  3. Decrease Norvasc 2.5mg daily given the LE edema, may need to consider stopping all together  4. Increase Lasix 2 tabs in the morning and 2 tabs in the evening for the next 3 days and then go back down to 2 tabs in the morning and 1 tab in the evening.   5. Follow up 2 weeks, CHF group, next topic coping and advance directives      Daily Care at [facility] 1. Did I weigh myself at home? Yes   Weight:  315lbs  2. Do I have a sodium restriction? yes   Na limit  ---  3. Do I eat out at restaurants often? Yes   Frequency 2 times a week  4. Do I have a fluid limitation? yes   Limit  ---  5. Do I take all my medications? Yes  6. Am I on an exercise program?  no   Activity  ---  7. Do I smoke? No    Living with Heart Failure Questionnaire  1. I feel like my overall health is. .. poor  2. I have trouble sleeping at night. ..frequently  3. I have problems breathing. ..seldom  4. I have swelling in my legs/ ankles. ..frequently  5. My appetite is . ..good  6. My energy is. .. fair  7. My weight varies by 3 lbs or more . ..seldom  8. I am dizzy or lightheaded. Herb Hernandez Seldom      Allergies   Allergen Reactions    Norco [Hydrocodone-Acetaminophen]      Makes agitated     Current Outpatient Prescriptions   Medication Sig Dispense Refill    amLODIPine (NORVASC) 5 MG tablet Take 0.5 tablets by mouth daily 90 tablet 1    LANTUS SOLOSTAR 100 UNIT/ML injection pen INJECT 50 UNITS SUBCUTANEOUSLY NIGHTLY 15 Pen 5    furosemide (LASIX) 40 MG tablet Take 1 tablet by mouth See Admin Instructions Take 2 tablets in the morning and 1 tablet in the evening 90 tablet 1    atenolol (TENORMIN) 50 MG tablet Take 1 tablet by mouth daily 90 tablet 3    atorvastatin (LIPITOR) 40 MG tablet Take 1 tablet by mouth nightly 90 tablet 1    hydrALAZINE (APRESOLINE) 25 MG tablet Take 1 tablet by mouth every 8 hours 90 tablet 1    isosorbide mononitrate (IMDUR) 30 MG extended release tablet Take 1 tablet by mouth daily 90 tablet 1    oxybutynin (DITROPAN XL) 15 MG extended release tablet Take 15 mg by mouth Daily      aspirin 81 MG chewable tablet Take 1 tablet by mouth daily 30 tablet 0    Insulin Pen Needle (B-D UF III MINI PEN NEEDLES) 31G X 5 MM MISC Inject 1 each into the skin 4 times daily E11.65  Please dispense generic needles 400 each 5 · Peripheral pulses are symmetrical and full  · There is no clubbing, cyanosis of the extremities. · ++ BLE edema, compression socks in place  · Pedal Pulses: 1+ and equal   Abdomen:  · No masses or tenderness  · Liver/Spleen: No Abnormalities Noted  Neurological/Psychiatric:  · Alert and oriented in all spheres  · Moves all extremities   · No abnormalities of mood, affect, memory, mentation, or behavior are noted      CBC:   Lab Results   Component Value Date    WBC 9.5 10/17/2017    WBC 9.2 10/16/2017    WBC 6.8 08/30/2017    RBC 4.50 10/17/2017    RBC 4.50 10/16/2017    RBC 5.32 08/30/2017    HGB 12.8 10/17/2017    HGB 13.2 10/16/2017    HGB 15.6 08/30/2017    HCT 38.5 10/17/2017    HCT 38.6 10/16/2017    HCT 47.2 08/30/2017    MCV 85.4 10/17/2017    MCV 85.9 10/16/2017    MCV 88.7 08/30/2017    RDW 14.0 10/17/2017    RDW 13.9 10/16/2017    RDW 14.6 08/30/2017     10/17/2017     10/16/2017     08/30/2017     BMP:  Lab Results   Component Value Date     11/29/2017     11/13/2017     11/06/2017    K 4.2 11/29/2017    K 4.2 11/13/2017    K 5.5 11/06/2017     11/29/2017     11/13/2017     11/06/2017    CO2 27 11/29/2017    CO2 29 11/13/2017    CO2 28 11/06/2017    PHOS 3.7 05/21/2014    PHOS 2.7 05/19/2014    PHOS 2.3 05/18/2014    BUN 36 11/29/2017    BUN 33 11/13/2017    BUN 37 11/06/2017    CREATININE 1.6 11/29/2017    CREATININE 1.4 11/13/2017    CREATININE 1.2 11/06/2017     BNP:   Lab Results   Component Value Date    PROBNP 750 10/26/2017    PROBNP 1,424 10/16/2017     ECHO: 10/17/17  Left ventricular systolic function is normal with the ejection fraction   estimated at 55%. No regional wall motion abnormalities. Moderate concentric left ventricular hypertrophy. Grade II diastolic dysfunction with elevated filling pressure. Severe bi-atrial enlargement. Right ventricle is moderately enlarged. Mild aortic stenosis. Systolic pulmonary artery pressure (SPAP) is normal and estimated at 26 mmHg   (RA pressure 3 mmHg). Stress test on 10/18:  Negative    6MWT 11/6/17: ambulated 800 feet, lowest SpO2 94%    If you have questions, please do not hesitate to call me. I look forward to following Dirk Board along with you.     Sincerely,        Estephanie Began, CNP

## 2017-11-29 ENCOUNTER — OFFICE VISIT (OUTPATIENT)
Dept: FAMILY MEDICINE CLINIC | Age: 67
End: 2017-11-29

## 2017-11-29 VITALS
HEIGHT: 70 IN | BODY MASS INDEX: 44.69 KG/M2 | OXYGEN SATURATION: 95 % | DIASTOLIC BLOOD PRESSURE: 64 MMHG | WEIGHT: 312.2 LBS | HEART RATE: 62 BPM | TEMPERATURE: 98.8 F | RESPIRATION RATE: 16 BRPM | SYSTOLIC BLOOD PRESSURE: 130 MMHG

## 2017-11-29 DIAGNOSIS — R60.0 LOCALIZED EDEMA: Chronic | ICD-10-CM

## 2017-11-29 DIAGNOSIS — E11.65 TYPE 2 DIABETES MELLITUS WITH HYPERGLYCEMIA, WITH LONG-TERM CURRENT USE OF INSULIN (HCC): Primary | ICD-10-CM

## 2017-11-29 DIAGNOSIS — Z79.4 TYPE 2 DIABETES MELLITUS WITH HYPERGLYCEMIA, WITH LONG-TERM CURRENT USE OF INSULIN (HCC): Primary | ICD-10-CM

## 2017-11-29 DIAGNOSIS — I10 ESSENTIAL HYPERTENSION: Chronic | ICD-10-CM

## 2017-11-29 DIAGNOSIS — L03.116 CELLULITIS OF LEFT LOWER EXTREMITY: ICD-10-CM

## 2017-11-29 DIAGNOSIS — N28.9 RENAL INSUFFICIENCY: ICD-10-CM

## 2017-11-29 PROBLEM — F41.9 ANXIETY: Status: RESOLVED | Noted: 2017-04-19 | Resolved: 2017-11-29

## 2017-11-29 LAB
ANION GAP SERPL CALCULATED.3IONS-SCNC: 17 MMOL/L (ref 3–16)
BUN BLDV-MCNC: 36 MG/DL (ref 7–20)
CALCIUM SERPL-MCNC: 8.8 MG/DL (ref 8.3–10.6)
CHLORIDE BLD-SCNC: 102 MMOL/L (ref 99–110)
CO2: 27 MMOL/L (ref 21–32)
CREAT SERPL-MCNC: 1.6 MG/DL (ref 0.8–1.3)
CREATININE URINE POCT: 50
GFR AFRICAN AMERICAN: 52
GFR NON-AFRICAN AMERICAN: 43
GLUCOSE BLD-MCNC: 91 MG/DL (ref 70–99)
HBA1C MFR BLD: 6.8 %
MICROALBUMIN/CREAT 24H UR: 150 MG/G{CREAT}
MICROALBUMIN/CREAT UR-RTO: >300
POTASSIUM SERPL-SCNC: 4.2 MMOL/L (ref 3.5–5.1)
SODIUM BLD-SCNC: 146 MMOL/L (ref 136–145)

## 2017-11-29 PROCEDURE — G8427 DOCREV CUR MEDS BY ELIG CLIN: HCPCS | Performed by: NURSE PRACTITIONER

## 2017-11-29 PROCEDURE — 3044F HG A1C LEVEL LT 7.0%: CPT | Performed by: NURSE PRACTITIONER

## 2017-11-29 PROCEDURE — 1036F TOBACCO NON-USER: CPT | Performed by: NURSE PRACTITIONER

## 2017-11-29 PROCEDURE — 83036 HEMOGLOBIN GLYCOSYLATED A1C: CPT | Performed by: NURSE PRACTITIONER

## 2017-11-29 PROCEDURE — G8417 CALC BMI ABV UP PARAM F/U: HCPCS | Performed by: NURSE PRACTITIONER

## 2017-11-29 PROCEDURE — 3017F COLORECTAL CA SCREEN DOC REV: CPT | Performed by: NURSE PRACTITIONER

## 2017-11-29 PROCEDURE — G8598 ASA/ANTIPLAT THER USED: HCPCS | Performed by: NURSE PRACTITIONER

## 2017-11-29 PROCEDURE — 99213 OFFICE O/P EST LOW 20 MIN: CPT | Performed by: NURSE PRACTITIONER

## 2017-11-29 PROCEDURE — G8484 FLU IMMUNIZE NO ADMIN: HCPCS | Performed by: NURSE PRACTITIONER

## 2017-11-29 PROCEDURE — 82044 UR ALBUMIN SEMIQUANTITATIVE: CPT | Performed by: NURSE PRACTITIONER

## 2017-11-29 PROCEDURE — 4040F PNEUMOC VAC/ADMIN/RCVD: CPT | Performed by: NURSE PRACTITIONER

## 2017-11-29 PROCEDURE — 1123F ACP DISCUSS/DSCN MKR DOCD: CPT | Performed by: NURSE PRACTITIONER

## 2017-11-29 RX ORDER — SULFAMETHOXAZOLE AND TRIMETHOPRIM 800; 160 MG/1; MG/1
1 TABLET ORAL 2 TIMES DAILY
Qty: 14 TABLET | Refills: 0 | Status: SHIPPED | OUTPATIENT
Start: 2017-11-29 | End: 2017-12-06

## 2017-11-29 RX ORDER — PHENOL 1.4 %
10 AEROSOL, SPRAY (ML) MUCOUS MEMBRANE NIGHTLY
COMMUNITY
End: 2018-01-16 | Stop reason: ALTCHOICE

## 2017-11-29 ASSESSMENT — ENCOUNTER SYMPTOMS
BACK PAIN: 0
CONSTIPATION: 0
CHEST TIGHTNESS: 0
NAUSEA: 0
VOMITING: 0

## 2017-11-29 NOTE — PROGRESS NOTES
from hypertensive heart disease (LVH on echo) NYHA class III  · CAD based on coronary calcification on CT chest; negative stress 10/18/17  · Shortness of breath-multifactorial; due diastolic heart failure, morbid obesity, ?cor pulmonale  · Dilated right ventricle due to cor pulmonale vs heart failure  · Mild aortic stenosis  · Chronic hypoxemic respiratory failure  · Restrictive lung defect  · Severe MONIQUE on CPAP    Assessment:    1. Chronic diastolic congestive heart failure ([address]    2. Essential hypertension    3. Localized edema    4. Severe sleep apnea    5. Shortness of breath        Paulo Varela participated in a shared medical appointment for CHF disease management including a 30 minute educational session on exercise, activity, cardiac rehab. Evaluation and treatment changes were made as described above by nurse practitioner and then further consultation with pharmacist    Plan:   1. Avoid benadryl with your medications  2. Okay to take melatonin, be sure to wear your CPAP if you take a sleep aid  3. Decrease Norvasc 2.5mg daily given the LE edema, may need to consider stopping all together  4. Increase Lasix 2 tabs in the morning and 2 tabs in the evening for the next 3 days and then go back down to 2 tabs in the morning and 1 tab in the evening. 5. Follow up 2 weeks, CHF group, next topic coping and advance directives      Daily Care at Home  1. Did I weigh myself at home? Yes   Weight:  315lbs  2. Do I have a sodium restriction? yes   Na limit  ---  3. Do I eat out at restaurants often? Yes   Frequency 2 times a week  4. Do I have a fluid limitation? yes   Limit  ---  5. Do I take all my medications? Yes  6. Am I on an exercise program?  no   Activity  ---  7. Do I smoke? No    Living with Heart Failure Questionnaire  1. I feel like my overall health is. .. poor  2. I have trouble sleeping at night. ..frequently  3. I have problems breathing. ..seldom  4.  I have swelling in my legs/ ankles. ..frequently  5. My appetite is . ..good  6. My energy is. .. fair  7. My weight varies by 3 lbs or more . ..seldom  8. I am dizzy or lightheaded. Marina Vaz Seldom      Allergies   Allergen Reactions    Norco [Hydrocodone-Acetaminophen]      Makes agitated     Current Outpatient Prescriptions   Medication Sig Dispense Refill    amLODIPine (NORVASC) 5 MG tablet Take 0.5 tablets by mouth daily 90 tablet 1    LANTUS SOLOSTAR 100 UNIT/ML injection pen INJECT 50 UNITS SUBCUTANEOUSLY NIGHTLY 15 Pen 5    furosemide (LASIX) 40 MG tablet Take 1 tablet by mouth See Admin Instructions Take 2 tablets in the morning and 1 tablet in the evening 90 tablet 1    atenolol (TENORMIN) 50 MG tablet Take 1 tablet by mouth daily 90 tablet 3    atorvastatin (LIPITOR) 40 MG tablet Take 1 tablet by mouth nightly 90 tablet 1    hydrALAZINE (APRESOLINE) 25 MG tablet Take 1 tablet by mouth every 8 hours 90 tablet 1    isosorbide mononitrate (IMDUR) 30 MG extended release tablet Take 1 tablet by mouth daily 90 tablet 1    oxybutynin (DITROPAN XL) 15 MG extended release tablet Take 15 mg by mouth Daily      aspirin 81 MG chewable tablet Take 1 tablet by mouth daily 30 tablet 0    Insulin Pen Needle (B-D UF III MINI PEN NEEDLES) 31G X 5 MM MISC Inject 1 each into the skin 4 times daily E11.65  Please dispense generic needles 400 each 5    montelukast (SINGULAIR) 10 MG tablet Take 1 tablet by mouth daily 30 tablet 3    glimepiride (AMARYL) 4 MG tablet Take 1 tablet by mouth 2 times daily 180 tablet 3    insulin aspart (NOVOLOG) 100 UNIT/ML injection pen Inject 5 Units into the skin 2 times daily (with meals) (Patient taking differently: Inject 5 Units into the skin 3 times daily (before meals) ) 5 Pen 3    sertraline (ZOLOFT) 50 MG tablet Take 1 tablet by mouth daily 90 tablet 3    omeprazole (PRILOSEC) 40 MG capsule Take 1 capsule by mouth daily 90 capsule 3    Blood Glucose Monitoring Suppl PRABHAKAR 1 Device by Does not apply route daily HGB 15.6 08/30/2017    HCT 38.5 10/17/2017    HCT 38.6 10/16/2017    HCT 47.2 08/30/2017    MCV 85.4 10/17/2017    MCV 85.9 10/16/2017    MCV 88.7 08/30/2017    RDW 14.0 10/17/2017    RDW 13.9 10/16/2017    RDW 14.6 08/30/2017     10/17/2017     10/16/2017     08/30/2017     BMP:  Lab Results   Component Value Date     11/29/2017     11/13/2017     11/06/2017    K 4.2 11/29/2017    K 4.2 11/13/2017    K 5.5 11/06/2017     11/29/2017     11/13/2017     11/06/2017    CO2 27 11/29/2017    CO2 29 11/13/2017    CO2 28 11/06/2017    PHOS 3.7 05/21/2014    PHOS 2.7 05/19/2014    PHOS 2.3 05/18/2014    BUN 36 11/29/2017    BUN 33 11/13/2017    BUN 37 11/06/2017    CREATININE 1.6 11/29/2017    CREATININE 1.4 11/13/2017    CREATININE 1.2 11/06/2017     BNP:   Lab Results   Component Value Date    PROBNP 750 10/26/2017    PROBNP 1,424 10/16/2017     ECHO: 10/17/17  Left ventricular systolic function is normal with the ejection fraction   estimated at 55%. No regional wall motion abnormalities. Moderate concentric left ventricular hypertrophy. Grade II diastolic dysfunction with elevated filling pressure. Severe bi-atrial enlargement. Right ventricle is moderately enlarged. Mild aortic stenosis. Systolic pulmonary artery pressure (SPAP) is normal and estimated at 26 mmHg   (RA pressure 3 mmHg).     Stress test on 10/18:  Negative    6MWT 11/6/17: ambulated 800 feet, lowest SpO2 94%

## 2017-12-05 NOTE — COMMUNICATION BODY
[facility]  Shared Medical Appointment  Shorty, [birth year]81     DOS: 11/28/17    Primary Care Doctor:  Jj Valladares CNP      Vitals:    11/28/17 1300   BP: 136/66   Pulse: 66   SpO2: 90%   Weight: (!) 313 lb 8 oz (142.2 kg)   Height: 6' 1\" (1.854 m)        Wt Readings from Last 3 Encounters:   11/29/17 (!) 312 lb 3.2 oz (141.6 kg)   11/28/17 (!) 313 lb 8 oz (142.2 kg)   11/14/17 (!) 312 lb (141.5 kg)     BP Readings from Last 3 Encounters:   11/29/17 130/64   11/28/17 136/66   11/14/17 130/64       History of Present Illness:   Mr. Jolynn Powell is seen in CHF shared medical appointments for 3rd session. Hanh Monahan 69 y.o. male recently admitted from 10/16/17-10/20/17 with progressively worsening shortness of breath, associated with LE edema, and abdominal distention. His echo from 10/17/17 showed normal LVEF 55%. Grade II diastolic dysfunction, moderate LVH, severe bi-atrial enlargement, enlarged RV. He was diuresed 4.5L and discharged on Lasix 40mg BID. He also had NOEL with peak creatinine up to 2. ARB was discontinued. And he was discharged on Norvasc. Since last visit, his energy level continues to improve overall. He is still trying to sleep with the CPAP machine. He is off of oxygen with activity. He does feel better when he does wear the CPAP for at least 2-3 hours. He continues to have dyspnea with steps and he is trying to move firewood as he heats his house with wood. His wife also helps with moving the wood. He gets fatigued and tired. He continues with edema, which he thinks is a little worse. With a sore on the left leg. His home weight is 315lbs. He is planning to try to sleep in bed, as he continues to sleep in the recliner. Navjot Room describes symptoms including dyspnea, fatigue, edema but denies chest pain, palpitations, syncope.      NYHA class:  III ACC/ AHA Stage:  C    Pertinent Problems:  ·  Diastolic heart failure from hypertensive heart disease (LVH on echo) NYHA class III  · CAD based on coronary calcification on CT chest; negative stress 10/18/17  · Shortness of breath-multifactorial; due diastolic heart failure, morbid obesity, ?cor pulmonale  · Dilated right ventricle due to cor pulmonale vs heart failure  · Mild aortic stenosis  · Chronic hypoxemic respiratory failure  · Restrictive lung defect  · Severe MONIQUE on CPAP    Assessment:    1. Chronic diastolic congestive heart failure ([address]    2. Essential hypertension    3. Localized edema    4. Severe sleep apnea    5. Shortness of breath        Oneil Poe participated in a shared medical appointment for CHF disease management including a 30 minute educational session on exercise, activity, cardiac rehab. Evaluation and treatment changes were made as described above by nurse practitioner and then further consultation with pharmacist    Plan:   1. Avoid benadryl with your medications  2. Okay to take melatonin, be sure to wear your CPAP if you take a sleep aid  3. Decrease Norvasc 2.5mg daily given the LE edema, may need to consider stopping all together  4. Increase Lasix 2 tabs in the morning and 2 tabs in the evening for the next 3 days and then go back down to 2 tabs in the morning and 1 tab in the evening. 5. Follow up 2 weeks, CHF group, next topic coping and advance directives      Daily Care at Home  1. Did I weigh myself at home? Yes   Weight:  315lbs  2. Do I have a sodium restriction? yes   Na limit  ---  3. Do I eat out at restaurants often? Yes   Frequency 2 times a week  4. Do I have a fluid limitation? yes   Limit  ---  5. Do I take all my medications? Yes  6. Am I on an exercise program?  no   Activity  ---  7. Do I smoke? No    Living with Heart Failure Questionnaire  1. I feel like my overall health is. .. poor  2. I have trouble sleeping at night. ..frequently  3. I have problems breathing. ..seldom  4.  I have swelling in my legs/

## 2017-12-12 ENCOUNTER — OFFICE VISIT (OUTPATIENT)
Dept: CARDIOLOGY CLINIC | Age: 67
End: 2017-12-12

## 2017-12-12 VITALS
WEIGHT: 311 LBS | DIASTOLIC BLOOD PRESSURE: 74 MMHG | BODY MASS INDEX: 44.62 KG/M2 | SYSTOLIC BLOOD PRESSURE: 142 MMHG | HEART RATE: 57 BPM | OXYGEN SATURATION: 92 %

## 2017-12-12 DIAGNOSIS — I50.32 CHRONIC DIASTOLIC CONGESTIVE HEART FAILURE (HCC): Primary | ICD-10-CM

## 2017-12-12 DIAGNOSIS — G47.30 SEVERE SLEEP APNEA: ICD-10-CM

## 2017-12-12 DIAGNOSIS — R60.0 LOCALIZED EDEMA: ICD-10-CM

## 2017-12-12 DIAGNOSIS — I10 ESSENTIAL HYPERTENSION: ICD-10-CM

## 2017-12-12 PROCEDURE — 1123F ACP DISCUSS/DSCN MKR DOCD: CPT | Performed by: NURSE PRACTITIONER

## 2017-12-12 PROCEDURE — G8428 CUR MEDS NOT DOCUMENT: HCPCS | Performed by: NURSE PRACTITIONER

## 2017-12-12 PROCEDURE — 3017F COLORECTAL CA SCREEN DOC REV: CPT | Performed by: NURSE PRACTITIONER

## 2017-12-12 PROCEDURE — G8484 FLU IMMUNIZE NO ADMIN: HCPCS | Performed by: NURSE PRACTITIONER

## 2017-12-12 PROCEDURE — 4040F PNEUMOC VAC/ADMIN/RCVD: CPT | Performed by: NURSE PRACTITIONER

## 2017-12-12 PROCEDURE — 1036F TOBACCO NON-USER: CPT | Performed by: NURSE PRACTITIONER

## 2017-12-12 PROCEDURE — G8598 ASA/ANTIPLAT THER USED: HCPCS | Performed by: NURSE PRACTITIONER

## 2017-12-12 PROCEDURE — G8417 CALC BMI ABV UP PARAM F/U: HCPCS | Performed by: NURSE PRACTITIONER

## 2017-12-12 PROCEDURE — 99214 OFFICE O/P EST MOD 30 MIN: CPT | Performed by: NURSE PRACTITIONER

## 2017-12-12 RX ORDER — HYDRALAZINE HYDROCHLORIDE 25 MG/1
50 TABLET, FILM COATED ORAL EVERY 8 HOURS SCHEDULED
Qty: 90 TABLET | Refills: 1
Start: 2017-12-12 | End: 2017-12-12 | Stop reason: SDUPTHER

## 2017-12-12 RX ORDER — HYDRALAZINE HYDROCHLORIDE 50 MG/1
50 TABLET, FILM COATED ORAL EVERY 8 HOURS SCHEDULED
Qty: 180 TABLET | Refills: 1 | Status: SHIPPED | OUTPATIENT
Start: 2017-12-12 | End: 2018-03-08 | Stop reason: SDUPTHER

## 2017-12-12 RX ORDER — ATORVASTATIN CALCIUM 40 MG/1
40 TABLET, FILM COATED ORAL NIGHTLY
Qty: 90 TABLET | Refills: 1 | Status: SHIPPED | OUTPATIENT
Start: 2017-12-12 | End: 2018-03-08 | Stop reason: SDUPTHER

## 2017-12-12 NOTE — PROGRESS NOTES
stress 10/18/17  · Shortness of breath-multifactorial; due diastolic heart failure, morbid obesity, ?cor pulmonale  · Dilated right ventricle due to cor pulmonale vs heart failure  · Mild aortic stenosis  · Chronic hypoxemic respiratory failure  · Restrictive lung defect  · Severe MONIQUE on CPAP    Assessment:    1. Chronic diastolic congestive heart failure ([address]    2. Essential hypertension    3. Severe sleep apnea    4. Localized edema        Jammie Cates participated in a shared medical appointment for CHF disease management including a 30 minute educational session on advance directives, coping as discussed by palliative care nurse. Evaluation and treatment changes were made as described above by nurse practitioner. Plan:   1. Refill of lipitor to [facility]'s pharmacy  2. Continue daily weights  3. Continue to hold norvasc (due to significant LE edema/wounds)  4. Increase hydralazine to 50mg three times a day (okay to take 2 tabs of the 25mg until low and then  new script of the 50mg three times a day). 5. Repeat BMP this week and again in 2 weeks  6. Follow up in 1 month    He continues to improve, his [place] living with heart failure score improved from 99 to 70 after completion of the CHF classes. Daily Care at Home  1. Did I weigh myself at home? Yes   Weight:  314lbs  2. Do I have a sodium restriction? yes   Na limit  ---  3. Do I eat out at restaurants often? Yes   Frequency 2 times a week  4. Do I have a fluid limitation? ---   Limit  ---  5. Do I take all my medications? Yes  6. Am I on an exercise program?  no   Activity  ---  7. Do I smoke? No    Living with Heart Failure Questionnaire  1. I feel like my overall health is. .. poor  2. I have trouble sleeping at night. ..frequently  3. I have problems breathing. ..seldom  4. I have swelling in my legs/ ankles. ..frequently  5. My appetite is . ..good  6. My energy is. .. fair  7. My weight varies by 3 lbs or more . ..seldom  8.  I am dizzy or lightheaded. ..never      Allergies   Allergen Reactions    Norco [Hydrocodone-Acetaminophen]      Makes agitated     Current Outpatient Prescriptions   Medication Sig Dispense Refill    Melatonin 10 MG TABS Take 10 mg by mouth nightly      glucose blood VI test strips (ASCENSIA AUTODISC VI;ONE TOUCH ULTRA TEST VI) strip DX: E11.9 FSBS daily. One Touch ultra 100 strip 5    silver sulfADIAZINE (SILVADENE) 1 % cream Apply topically daily.  50 g 1    amLODIPine (NORVASC) 5 MG tablet Take 0.5 tablets by mouth daily 90 tablet 1    LANTUS SOLOSTAR 100 UNIT/ML injection pen INJECT 50 UNITS SUBCUTANEOUSLY NIGHTLY 15 Pen 5    furosemide (LASIX) 40 MG tablet Take 1 tablet by mouth See Admin Instructions Take 2 tablets in the morning and 1 tablet in the evening 90 tablet 1    atenolol (TENORMIN) 50 MG tablet Take 1 tablet by mouth daily 90 tablet 3    atorvastatin (LIPITOR) 40 MG tablet Take 1 tablet by mouth nightly 90 tablet 1    hydrALAZINE (APRESOLINE) 25 MG tablet Take 1 tablet by mouth every 8 hours 90 tablet 1    isosorbide mononitrate (IMDUR) 30 MG extended release tablet Take 1 tablet by mouth daily 90 tablet 1    oxybutynin (DITROPAN XL) 15 MG extended release tablet Take 15 mg by mouth Daily      aspirin 81 MG chewable tablet Take 1 tablet by mouth daily 30 tablet 0    Insulin Pen Needle (B-D UF III MINI PEN NEEDLES) 31G X 5 MM MISC Inject 1 each into the skin 4 times daily E11.65  Please dispense generic needles 400 each 5    montelukast (SINGULAIR) 10 MG tablet Take 1 tablet by mouth daily 30 tablet 3    glimepiride (AMARYL) 4 MG tablet Take 1 tablet by mouth 2 times daily 180 tablet 3    insulin aspart (NOVOLOG) 100 UNIT/ML injection pen Inject 5 Units into the skin 2 times daily (with meals) (Patient taking differently: Inject 5 Units into the skin 3 times daily (before meals) ) 5 Pen 3    sertraline (ZOLOFT) 50 MG tablet Take 1 tablet by mouth daily 90 tablet 3    omeprazole (PRILOSEC) Marital status:      Spouse name: N/A    Number of children: N/A    Years of education: N/A     Occupational History    Not on file. Social History Main Topics    Smoking status: Passive Smoke Exposure - Never Smoker    Smokeless tobacco: Never Used    Alcohol use 0.0 oz/week      Comment: rare    Drug use: No    Sexual activity: Yes     Partners: Female     Other Topics Concern    Not on file     Social History Narrative    No narrative on file     Physical Examination:    Vitals:    12/12/17 1257   BP: (!) 152/78   Pulse: 57   SpO2: 92%   Weight: (!) 311 lb (141.1 kg)        Constitutional and General Appearance: Warm and dry, no apparent distress, normal coloration, obese  HEENT:  Normocephalic, atraumatic  Respiratory:  · Normal  without use of accessory muscles  · Resp Auscultation: Normal breath sounds throughout, diminished  Cardiovascular:  · The apical impulses not displaced  · Heart tones are distant  · JVP ~8-9 cm H2O  · Regular rate and rhythm, + murmur, no r/g  · Peripheral pulses are symmetrical and full  · There is no clubbing, cyanosis of the extremities.   · ++ BLE edema, compression socks in place  · Pedal Pulses: 1+ and equal   Abdomen:  · No masses or tenderness  · Liver/Spleen: No Abnormalities Noted  Neurological/Psychiatric:  · Alert and oriented in all spheres  · Moves all extremities   · No abnormalities of mood, affect, memory, mentation, or behavior are noted      CBC:   Lab Results   Component Value Date    WBC 9.5 10/17/2017    WBC 9.2 10/16/2017    WBC 6.8 08/30/2017    RBC 4.50 10/17/2017    RBC 4.50 10/16/2017    RBC 5.32 08/30/2017    HGB 12.8 10/17/2017    HGB 13.2 10/16/2017    HGB 15.6 08/30/2017    HCT 38.5 10/17/2017    HCT 38.6 10/16/2017    HCT 47.2 08/30/2017    MCV 85.4 10/17/2017    MCV 85.9 10/16/2017    MCV 88.7 08/30/2017    RDW 14.0 10/17/2017    RDW 13.9 10/16/2017    RDW 14.6 08/30/2017     10/17/2017     10/16/2017     08/30/2017     BMP:  Lab Results   Component Value Date     11/29/2017     11/13/2017     11/06/2017    K 4.2 11/29/2017    K 4.2 11/13/2017    K 5.5 11/06/2017     11/29/2017     11/13/2017     11/06/2017    CO2 27 11/29/2017    CO2 29 11/13/2017    CO2 28 11/06/2017    PHOS 3.7 05/21/2014    PHOS 2.7 05/19/2014    PHOS 2.3 05/18/2014    BUN 36 11/29/2017    BUN 33 11/13/2017    BUN 37 11/06/2017    CREATININE 1.6 11/29/2017    CREATININE 1.4 11/13/2017    CREATININE 1.2 11/06/2017     BNP:   Lab Results   Component Value Date    PROBNP 750 10/26/2017    PROBNP 1,424 10/16/2017     ECHO: 10/17/17  Left ventricular systolic function is normal with the ejection fraction   estimated at 55%. No regional wall motion abnormalities. Moderate concentric left ventricular hypertrophy. Grade II diastolic dysfunction with elevated filling pressure. Severe bi-atrial enlargement. Right ventricle is moderately enlarged. Mild aortic stenosis. Systolic pulmonary artery pressure (SPAP) is normal and estimated at 26 mmHg   (RA pressure 3 mmHg).     Stress test on 10/18:  Negative    6MWT 11/6/17: ambulated 800 feet, lowest SpO2 94%

## 2017-12-14 ENCOUNTER — OFFICE VISIT (OUTPATIENT)
Dept: FAMILY MEDICINE CLINIC | Age: 67
End: 2017-12-14

## 2017-12-14 VITALS
RESPIRATION RATE: 19 BRPM | HEART RATE: 92 BPM | HEIGHT: 70 IN | WEIGHT: 307 LBS | SYSTOLIC BLOOD PRESSURE: 134 MMHG | BODY MASS INDEX: 43.95 KG/M2 | TEMPERATURE: 98.3 F | DIASTOLIC BLOOD PRESSURE: 82 MMHG | OXYGEN SATURATION: 94 %

## 2017-12-14 DIAGNOSIS — I50.32 CHRONIC DIASTOLIC CONGESTIVE HEART FAILURE (HCC): ICD-10-CM

## 2017-12-14 DIAGNOSIS — I83.022 VENOUS STASIS ULCER OF LEFT CALF LIMITED TO BREAKDOWN OF SKIN WITH VARICOSE VEINS (HCC): ICD-10-CM

## 2017-12-14 DIAGNOSIS — L97.221 VENOUS STASIS ULCER OF LEFT CALF LIMITED TO BREAKDOWN OF SKIN WITH VARICOSE VEINS (HCC): ICD-10-CM

## 2017-12-14 DIAGNOSIS — N28.9 RENAL INSUFFICIENCY: Primary | ICD-10-CM

## 2017-12-14 LAB
ANION GAP SERPL CALCULATED.3IONS-SCNC: 13 MMOL/L (ref 3–16)
BUN BLDV-MCNC: 32 MG/DL (ref 7–20)
CALCIUM SERPL-MCNC: 9.2 MG/DL (ref 8.3–10.6)
CHLORIDE BLD-SCNC: 100 MMOL/L (ref 99–110)
CO2: 28 MMOL/L (ref 21–32)
CREAT SERPL-MCNC: 1.2 MG/DL (ref 0.8–1.3)
GFR AFRICAN AMERICAN: >60
GFR NON-AFRICAN AMERICAN: >60
GLUCOSE BLD-MCNC: 97 MG/DL (ref 70–99)
POTASSIUM SERPL-SCNC: 4.4 MMOL/L (ref 3.5–5.1)
SODIUM BLD-SCNC: 141 MMOL/L (ref 136–145)

## 2017-12-14 PROCEDURE — 1036F TOBACCO NON-USER: CPT | Performed by: NURSE PRACTITIONER

## 2017-12-14 PROCEDURE — 99213 OFFICE O/P EST LOW 20 MIN: CPT | Performed by: NURSE PRACTITIONER

## 2017-12-14 PROCEDURE — 3017F COLORECTAL CA SCREEN DOC REV: CPT | Performed by: NURSE PRACTITIONER

## 2017-12-14 PROCEDURE — G8598 ASA/ANTIPLAT THER USED: HCPCS | Performed by: NURSE PRACTITIONER

## 2017-12-14 PROCEDURE — 1123F ACP DISCUSS/DSCN MKR DOCD: CPT | Performed by: NURSE PRACTITIONER

## 2017-12-14 PROCEDURE — G8427 DOCREV CUR MEDS BY ELIG CLIN: HCPCS | Performed by: NURSE PRACTITIONER

## 2017-12-14 PROCEDURE — G8417 CALC BMI ABV UP PARAM F/U: HCPCS | Performed by: NURSE PRACTITIONER

## 2017-12-14 PROCEDURE — 4040F PNEUMOC VAC/ADMIN/RCVD: CPT | Performed by: NURSE PRACTITIONER

## 2017-12-14 PROCEDURE — G8484 FLU IMMUNIZE NO ADMIN: HCPCS | Performed by: NURSE PRACTITIONER

## 2017-12-14 ASSESSMENT — PATIENT HEALTH QUESTIONNAIRE - PHQ9
SUM OF ALL RESPONSES TO PHQ QUESTIONS 1-9: 0
1. LITTLE INTEREST OR PLEASURE IN DOING THINGS: 0
2. FEELING DOWN, DEPRESSED OR HOPELESS: 0
SUM OF ALL RESPONSES TO PHQ9 QUESTIONS 1 & 2: 0

## 2017-12-14 NOTE — PATIENT INSTRUCTIONS
1. Continue silvadene creme and wash 2 times daily with warm soapy water. 2. Continue to work with weight loss, Doing well.

## 2017-12-28 ENCOUNTER — OFFICE VISIT (OUTPATIENT)
Dept: FAMILY MEDICINE CLINIC | Age: 67
End: 2017-12-28

## 2017-12-28 VITALS
BODY MASS INDEX: 43.62 KG/M2 | HEART RATE: 67 BPM | SYSTOLIC BLOOD PRESSURE: 138 MMHG | HEIGHT: 70 IN | OXYGEN SATURATION: 95 % | RESPIRATION RATE: 18 BRPM | DIASTOLIC BLOOD PRESSURE: 76 MMHG | TEMPERATURE: 97.7 F | WEIGHT: 304.7 LBS

## 2017-12-28 DIAGNOSIS — R60.0 LOCALIZED EDEMA: Chronic | ICD-10-CM

## 2017-12-28 DIAGNOSIS — J30.89 NON-SEASONAL ALLERGIC RHINITIS, UNSPECIFIED CHRONICITY, UNSPECIFIED TRIGGER: ICD-10-CM

## 2017-12-28 DIAGNOSIS — L97.221 VENOUS STASIS ULCER OF LEFT CALF LIMITED TO BREAKDOWN OF SKIN WITH VARICOSE VEINS (HCC): Primary | ICD-10-CM

## 2017-12-28 DIAGNOSIS — I83.022 VENOUS STASIS ULCER OF LEFT CALF LIMITED TO BREAKDOWN OF SKIN WITH VARICOSE VEINS (HCC): Primary | ICD-10-CM

## 2017-12-28 DIAGNOSIS — F51.01 PRIMARY INSOMNIA: ICD-10-CM

## 2017-12-28 PROCEDURE — 99213 OFFICE O/P EST LOW 20 MIN: CPT | Performed by: NURSE PRACTITIONER

## 2017-12-28 PROCEDURE — 4040F PNEUMOC VAC/ADMIN/RCVD: CPT | Performed by: NURSE PRACTITIONER

## 2017-12-28 PROCEDURE — G8427 DOCREV CUR MEDS BY ELIG CLIN: HCPCS | Performed by: NURSE PRACTITIONER

## 2017-12-28 PROCEDURE — G8484 FLU IMMUNIZE NO ADMIN: HCPCS | Performed by: NURSE PRACTITIONER

## 2017-12-28 PROCEDURE — G8598 ASA/ANTIPLAT THER USED: HCPCS | Performed by: NURSE PRACTITIONER

## 2017-12-28 PROCEDURE — 3017F COLORECTAL CA SCREEN DOC REV: CPT | Performed by: NURSE PRACTITIONER

## 2017-12-28 PROCEDURE — 1036F TOBACCO NON-USER: CPT | Performed by: NURSE PRACTITIONER

## 2017-12-28 PROCEDURE — 1123F ACP DISCUSS/DSCN MKR DOCD: CPT | Performed by: NURSE PRACTITIONER

## 2017-12-28 PROCEDURE — G8417 CALC BMI ABV UP PARAM F/U: HCPCS | Performed by: NURSE PRACTITIONER

## 2017-12-28 RX ORDER — TRAZODONE HYDROCHLORIDE 50 MG/1
50 TABLET ORAL NIGHTLY
Qty: 30 TABLET | Refills: 2 | Status: SHIPPED | OUTPATIENT
Start: 2017-12-28 | End: 2018-01-18 | Stop reason: CLARIF

## 2017-12-28 RX ORDER — MONTELUKAST SODIUM 10 MG/1
TABLET ORAL
Qty: 90 TABLET | Refills: 3 | Status: CANCELLED | OUTPATIENT
Start: 2017-12-28

## 2017-12-28 ASSESSMENT — ENCOUNTER SYMPTOMS
BACK PAIN: 0
CHEST TIGHTNESS: 0

## 2017-12-28 NOTE — PROGRESS NOTES
Subjective:      Patient ID: Hayley Madison is a 69 y.o. male. HPI     Leg now less painful, feels it is healing. Weight continues to decrease in spite of his assumption that he gained over the holidays. Support from wife. Using C pap, more energy, very pleased. Having trouble sleeping. Taking PM's x 3 and only sleeping until 2-3. Then up to recliner. Review of Systems   Constitutional: Negative for appetite change and chills. Respiratory: Negative for chest tightness. Cardiovascular: Negative for chest pain and palpitations. Musculoskeletal: Positive for arthralgias. Negative for back pain and gait problem. Skin: Positive for wound. Neurological: Negative for dizziness. Psychiatric/Behavioral: Positive for sleep disturbance. Objective:   Physical Exam   Constitutional: He is oriented to person, place, and time. He appears well-developed and well-nourished. No distress. Cardiovascular: Normal rate and normal heart sounds. Pulmonary/Chest: Effort normal and breath sounds normal.   Abdominal: Soft. Bowel sounds are normal.   Musculoskeletal: Normal range of motion. Neurological: He is alert and oriented to person, place, and time. Skin: Skin is warm and dry. Yellow exudate almost cleared from wound site. Not to pink healthy skin. Healing appears to be progression. Edema well controlled. Psychiatric: He has a normal mood and affect. Assessment:      1. Wound left lower leg  2. Edema-stable  3. insomnia      Plan:      1. Continue GENTLE washing with warm soapy water    2. Continue silvademe creme to site    3. Will see back in 2 weeks    4. Discussed need to control edema    5. Will check BMP on next office visit. Did not wish to do so today    6. Lawson Naskris was seen today for cellulitis.     Diagnoses and all orders for this visit:    Non-seasonal allergic rhinitis, unspecified chronicity, unspecified trigger    Primary insomnia  -     traZODone

## 2017-12-30 ASSESSMENT — ENCOUNTER SYMPTOMS
SHORTNESS OF BREATH: 0
BACK PAIN: 0
CHEST TIGHTNESS: 0
CONSTIPATION: 0

## 2018-01-02 DIAGNOSIS — K21.9 GASTROESOPHAGEAL REFLUX DISEASE WITHOUT ESOPHAGITIS: ICD-10-CM

## 2018-01-03 RX ORDER — OMEPRAZOLE 40 MG/1
40 CAPSULE, DELAYED RELEASE ORAL DAILY
Qty: 90 CAPSULE | Refills: 3 | Status: SHIPPED | OUTPATIENT
Start: 2018-01-03 | End: 2018-08-03 | Stop reason: SDUPTHER

## 2018-01-11 ENCOUNTER — OFFICE VISIT (OUTPATIENT)
Dept: FAMILY MEDICINE CLINIC | Age: 68
End: 2018-01-11

## 2018-01-11 VITALS
BODY MASS INDEX: 44.47 KG/M2 | DIASTOLIC BLOOD PRESSURE: 74 MMHG | HEART RATE: 74 BPM | TEMPERATURE: 98.2 F | HEIGHT: 70 IN | OXYGEN SATURATION: 98 % | SYSTOLIC BLOOD PRESSURE: 130 MMHG | RESPIRATION RATE: 18 BRPM | WEIGHT: 310.6 LBS

## 2018-01-11 DIAGNOSIS — I10 ESSENTIAL HYPERTENSION: Primary | Chronic | ICD-10-CM

## 2018-01-11 DIAGNOSIS — Z79.4 TYPE 2 DIABETES MELLITUS WITH HYPERGLYCEMIA, WITH LONG-TERM CURRENT USE OF INSULIN (HCC): ICD-10-CM

## 2018-01-11 DIAGNOSIS — E11.65 TYPE 2 DIABETES MELLITUS WITH HYPERGLYCEMIA, WITH LONG-TERM CURRENT USE OF INSULIN (HCC): ICD-10-CM

## 2018-01-11 DIAGNOSIS — N28.9 RENAL INSUFFICIENCY: ICD-10-CM

## 2018-01-11 LAB
ANION GAP SERPL CALCULATED.3IONS-SCNC: 13 MMOL/L (ref 3–16)
BUN BLDV-MCNC: 32 MG/DL (ref 7–20)
CALCIUM SERPL-MCNC: 9 MG/DL (ref 8.3–10.6)
CHLORIDE BLD-SCNC: 101 MMOL/L (ref 99–110)
CO2: 28 MMOL/L (ref 21–32)
CREAT SERPL-MCNC: 1.2 MG/DL (ref 0.8–1.3)
GFR AFRICAN AMERICAN: >60
GFR NON-AFRICAN AMERICAN: 60
GLUCOSE BLD-MCNC: 60 MG/DL (ref 70–99)
POTASSIUM SERPL-SCNC: 4.1 MMOL/L (ref 3.5–5.1)
SODIUM BLD-SCNC: 142 MMOL/L (ref 136–145)

## 2018-01-11 PROCEDURE — G8417 CALC BMI ABV UP PARAM F/U: HCPCS | Performed by: NURSE PRACTITIONER

## 2018-01-11 PROCEDURE — 99213 OFFICE O/P EST LOW 20 MIN: CPT | Performed by: NURSE PRACTITIONER

## 2018-01-11 PROCEDURE — 4040F PNEUMOC VAC/ADMIN/RCVD: CPT | Performed by: NURSE PRACTITIONER

## 2018-01-11 PROCEDURE — G8427 DOCREV CUR MEDS BY ELIG CLIN: HCPCS | Performed by: NURSE PRACTITIONER

## 2018-01-11 PROCEDURE — 1123F ACP DISCUSS/DSCN MKR DOCD: CPT | Performed by: NURSE PRACTITIONER

## 2018-01-11 PROCEDURE — 1036F TOBACCO NON-USER: CPT | Performed by: NURSE PRACTITIONER

## 2018-01-11 PROCEDURE — G8484 FLU IMMUNIZE NO ADMIN: HCPCS | Performed by: NURSE PRACTITIONER

## 2018-01-11 PROCEDURE — 3046F HEMOGLOBIN A1C LEVEL >9.0%: CPT | Performed by: NURSE PRACTITIONER

## 2018-01-11 PROCEDURE — 3017F COLORECTAL CA SCREEN DOC REV: CPT | Performed by: NURSE PRACTITIONER

## 2018-01-11 PROCEDURE — G8599 NO ASA/ANTIPLAT THER USE RNG: HCPCS | Performed by: NURSE PRACTITIONER

## 2018-01-11 RX ORDER — ATENOLOL 50 MG/1
50 TABLET ORAL DAILY
Qty: 90 TABLET | Refills: 3 | Status: SHIPPED | OUTPATIENT
Start: 2018-01-11 | End: 2018-02-13 | Stop reason: SDUPTHER

## 2018-01-11 ASSESSMENT — ENCOUNTER SYMPTOMS
CONSTIPATION: 0
CHEST TIGHTNESS: 0
BACK PAIN: 0

## 2018-01-11 NOTE — PROGRESS NOTES
daily. One Touch ultra 100 strip 5    silver sulfADIAZINE (SILVADENE) 1 % cream Apply topically daily. 50 g 1    LANTUS SOLOSTAR 100 UNIT/ML injection pen INJECT 50 UNITS SUBCUTANEOUSLY NIGHTLY 15 Pen 5    furosemide (LASIX) 40 MG tablet Take 1 tablet by mouth See Admin Instructions Take 2 tablets in the morning and 1 tablet in the evening 90 tablet 1    isosorbide mononitrate (IMDUR) 30 MG extended release tablet Take 1 tablet by mouth daily 90 tablet 1    oxybutynin (DITROPAN XL) 15 MG extended release tablet Take 15 mg by mouth Daily      aspirin 81 MG chewable tablet Take 1 tablet by mouth daily 30 tablet 0    Insulin Pen Needle (B-D UF III MINI PEN NEEDLES) 31G X 5 MM MISC Inject 1 each into the skin 4 times daily E11.65  Please dispense generic needles 400 each 5    glimepiride (AMARYL) 4 MG tablet Take 1 tablet by mouth 2 times daily 180 tablet 3    insulin aspart (NOVOLOG) 100 UNIT/ML injection pen Inject 5 Units into the skin 2 times daily (with meals) (Patient taking differently: Inject 5 Units into the skin 3 times daily (before meals) ) 5 Pen 3    sertraline (ZOLOFT) 50 MG tablet Take 1 tablet by mouth daily 90 tablet 3    Blood Glucose Monitoring Suppl PRABHAKAR 1 Device by Does not apply route daily One Touch Ultra 1 Device 0    Multiple Vitamins-Minerals (THERAPEUTIC MULTIVITAMIN-MINERALS) tablet Take 1 tablet by mouth daily      ferrous sulfate 325 (65 FE) MG tablet Take 325 mg by mouth daily. No current facility-administered medications for this visit. Assessment:      1. Venous insufficiency  2. Venous ulcer-improving. 3. Renal insufficiency  4. DM      Plan:      1. Continue silvadene creme to left lower leg. Monitor for improvement. If worsens, return to office. 2.Discussed need to get back on diet. Weight increased     3. Genaro Doss was seen today for wound check.     Diagnoses and all orders for this visit:    Renal insufficiency  -     Basic Metabolic Panel    Essential hypertension  -     atenolol (TENORMIN) 50 MG tablet;  Take 1 tablet by mouth daily  -     Basic Metabolic Panel

## 2018-01-11 NOTE — PATIENT INSTRUCTIONS
1. Continue silvadene creme to left lower leg. Monitor for improvement. If worsens, return to office. 2.Discussed need to get back on diet. Weight increased     3. Diana Lynch was seen today for wound check. Diagnoses and all orders for this visit:    Renal insufficiency  -     Basic Metabolic Panel    Essential hypertension  -     atenolol (TENORMIN) 50 MG tablet;  Take 1 tablet by mouth daily  -     Basic Metabolic Panel

## 2018-01-16 ENCOUNTER — OFFICE VISIT (OUTPATIENT)
Dept: PULMONOLOGY | Age: 68
End: 2018-01-16

## 2018-01-16 VITALS
HEART RATE: 52 BPM | TEMPERATURE: 98.4 F | BODY MASS INDEX: 45.1 KG/M2 | OXYGEN SATURATION: 95 % | DIASTOLIC BLOOD PRESSURE: 65 MMHG | RESPIRATION RATE: 16 BRPM | WEIGHT: 315 LBS | HEIGHT: 70 IN | SYSTOLIC BLOOD PRESSURE: 128 MMHG

## 2018-01-16 DIAGNOSIS — G47.33 OSA (OBSTRUCTIVE SLEEP APNEA): Primary | ICD-10-CM

## 2018-01-16 DIAGNOSIS — J43.2 CENTRILOBULAR EMPHYSEMA (HCC): ICD-10-CM

## 2018-01-16 DIAGNOSIS — J96.11 CHRONIC RESPIRATORY FAILURE WITH HYPOXIA (HCC): ICD-10-CM

## 2018-01-16 PROCEDURE — G8599 NO ASA/ANTIPLAT THER USE RNG: HCPCS | Performed by: INTERNAL MEDICINE

## 2018-01-16 PROCEDURE — 4040F PNEUMOC VAC/ADMIN/RCVD: CPT | Performed by: INTERNAL MEDICINE

## 2018-01-16 PROCEDURE — G8417 CALC BMI ABV UP PARAM F/U: HCPCS | Performed by: INTERNAL MEDICINE

## 2018-01-16 PROCEDURE — 3023F SPIROM DOC REV: CPT | Performed by: INTERNAL MEDICINE

## 2018-01-16 PROCEDURE — G8926 SPIRO NO PERF OR DOC: HCPCS | Performed by: INTERNAL MEDICINE

## 2018-01-16 PROCEDURE — 1036F TOBACCO NON-USER: CPT | Performed by: INTERNAL MEDICINE

## 2018-01-16 PROCEDURE — 1123F ACP DISCUSS/DSCN MKR DOCD: CPT | Performed by: INTERNAL MEDICINE

## 2018-01-16 PROCEDURE — 99214 OFFICE O/P EST MOD 30 MIN: CPT | Performed by: INTERNAL MEDICINE

## 2018-01-16 PROCEDURE — G8427 DOCREV CUR MEDS BY ELIG CLIN: HCPCS | Performed by: INTERNAL MEDICINE

## 2018-01-16 PROCEDURE — G8484 FLU IMMUNIZE NO ADMIN: HCPCS | Performed by: INTERNAL MEDICINE

## 2018-01-16 PROCEDURE — 3017F COLORECTAL CA SCREEN DOC REV: CPT | Performed by: INTERNAL MEDICINE

## 2018-01-16 ASSESSMENT — SLEEP AND FATIGUE QUESTIONNAIRES
HOW LIKELY ARE YOU TO NOD OFF OR FALL ASLEEP WHILE LYING DOWN TO REST IN THE AFTERNOON WHEN CIRCUMSTANCES PERMIT: 3
ESS TOTAL SCORE: 13
HOW LIKELY ARE YOU TO NOD OFF OR FALL ASLEEP WHILE SITTING INACTIVE IN A PUBLIC PLACE: 1
HOW LIKELY ARE YOU TO NOD OFF OR FALL ASLEEP IN A CAR, WHILE STOPPED FOR A FEW MINUTES IN TRAFFIC: 1
HOW LIKELY ARE YOU TO NOD OFF OR FALL ASLEEP WHILE WATCHING TV: 3
NECK CIRCUMFERENCE (INCHES): 19
HOW LIKELY ARE YOU TO NOD OFF OR FALL ASLEEP WHILE SITTING AND TALKING TO SOMEONE: 1
HOW LIKELY ARE YOU TO NOD OFF OR FALL ASLEEP WHILE SITTING QUIETLY AFTER LUNCH WITHOUT ALCOHOL: 1
HOW LIKELY ARE YOU TO NOD OFF OR FALL ASLEEP WHEN YOU ARE A PASSENGER IN A CAR FOR AN HOUR WITHOUT A BREAK: 1
HOW LIKELY ARE YOU TO NOD OFF OR FALL ASLEEP WHILE SITTING AND READING: 2

## 2018-01-16 ASSESSMENT — ENCOUNTER SYMPTOMS
CHOKING: 0
CONSTIPATION: 0
EYE DISCHARGE: 0
EYE PAIN: 0
SORE THROAT: 0
SHORTNESS OF BREATH: 1
VOICE CHANGE: 0
EYE ITCHING: 0
COUGH: 0
DIARRHEA: 0
ABDOMINAL PAIN: 0

## 2018-01-16 NOTE — PROGRESS NOTES
Pulmonary Outpatient Note   Manas Nguyen MD       1/16/2018    Chief Complaint:  Sleep Apnea (31-90)     HPI:   69y.o. year old male here for follow up of sleep issues and shortness of breath. Compliance data reviewed, AHI 12.9, high leak, 93% compliance. He is using a nasal mask and admits to leaks through his mouth. Has had improvement in his daytime somnolence. Was previously prescribed daytime oxygen but no longer requires, he denies shortness of breath. Does regular heavy exertion including hauling and splitting wood. Past Medical History:   Diagnosis Date    Bladder fistula     resolved    C. difficile diarrhea 8/18/2015    +PCR    Cellulitis of right lower extremity 3/23/2016    Diabetes ([address]     Diverticulitis     DVT of lower extremity, bilateral ([address] 10/16/2013    Hematuria 1/2/2014    Pancreatitis ([address] 8/24/2013    Pulmonary emboli ([address] 2013    after cholecystectomy        Past Surgical History:   Procedure Laterality Date    ABDOMEN SURGERY  05/16/2014    reveseral end peristomal hernia repair.  CHOLECYSTECTOMY, OPEN N/A 9-17-13    LAPAROSCOPIC CONVERTED TO OPEN CHOLECYSTECTOMY WITH    COLON SURGERY      COLONOSCOPY  2008    COLONOSCOPY  12/4/2013    Severe Diverticulosis unable to finish    COLONOSCOPY  4/30/14    diverticula-10 year f/u    COLOSTOMY  Jan 2014    CYSTOSCOPY  12/10/13    with bladder biopsy    CYSTOSCOPY  1-28-14    Cystourethroscopy, left ureteral catheter     HERNIA REPAIR  08/14/2015    OPEN INCISIONAL HERNIA REPAIR WITH MESH, BILATERAL COMPONENT SEPARATION, LYSIS OF ADHESIONS    OTHER SURGICAL HISTORY  1-28-14    LeftColectomy with End Colostomy, Splenic Flexure Mobilization, Takedown of Colovesical Fistula    TIBIA FRACTURE SURGERY Right 2003    Fracture lower leg during chain saw accident.  Surgery x 2       Social History   Substance Use Topics    Smoking status: Passive Smoke Exposure - Never Smoker    Smokeless tobacco: Never Used   [facility] Alcohol use 0.0 oz/week      Comment: rare       Family History   Problem Relation Age of Onset    Heart Disease Father     Heart Attack Father     Stroke Brother     Heart Disease Brother     High Blood Pressure Brother     Kidney Disease Mother      kidney removed         Current Outpatient Prescriptions:     atenolol (TENORMIN) 50 MG tablet, Take 1 tablet by mouth daily, Disp: 90 tablet, Rfl: 3    omeprazole (PRILOSEC) 40 MG delayed release capsule, Take 1 capsule by mouth daily, Disp: 90 capsule, Rfl: 3    montelukast (SINGULAIR) 10 MG tablet, TAKE ONE TABLET BY MOUTH ONCE DAILY, Disp: 90 tablet, Rfl: 3    atorvastatin (LIPITOR) 40 MG tablet, Take 1 tablet by mouth nightly, Disp: 90 tablet, Rfl: 1    hydrALAZINE (APRESOLINE) 50 MG tablet, Take 1 tablet by mouth every 8 hours, Disp: 180 tablet, Rfl: 1    glucose blood VI test strips (ASCENSIA AUTODISC VI;ONE TOUCH ULTRA TEST VI) strip, DX: E11.9 FSBS daily. One Touch ultra, Disp: 100 strip, Rfl: 5    silver sulfADIAZINE (SILVADENE) 1 % cream, Apply topically daily. , Disp: 50 g, Rfl: 1    LANTUS SOLOSTAR 100 UNIT/ML injection pen, INJECT 50 UNITS SUBCUTANEOUSLY NIGHTLY, Disp: 15 Pen, Rfl: 5    furosemide (LASIX) 40 MG tablet, Take 1 tablet by mouth See Admin Instructions Take 2 tablets in the morning and 1 tablet in the evening, Disp: 90 tablet, Rfl: 1    isosorbide mononitrate (IMDUR) 30 MG extended release tablet, Take 1 tablet by mouth daily, Disp: 90 tablet, Rfl: 1    oxybutynin (DITROPAN XL) 15 MG extended release tablet, Take 15 mg by mouth Daily, Disp: , Rfl:     aspirin 81 MG chewable tablet, Take 1 tablet by mouth daily, Disp: 30 tablet, Rfl: 0    Insulin Pen Needle (B-D UF III MINI PEN NEEDLES) 31G X 5 MM MISC, Inject 1 each into the skin 4 times daily E11.65  Please dispense generic needles, Disp: 400 each, Rfl: 5    glimepiride (AMARYL) 4 MG tablet, Take 1 tablet by mouth 2 times daily, Disp: 180 tablet, Rfl: 3    insulin aspart

## 2018-01-18 ENCOUNTER — OFFICE VISIT (OUTPATIENT)
Dept: CARDIOLOGY CLINIC | Age: 68
End: 2018-01-18

## 2018-01-18 VITALS
HEART RATE: 59 BPM | BODY MASS INDEX: 41.85 KG/M2 | SYSTOLIC BLOOD PRESSURE: 122 MMHG | HEIGHT: 72 IN | OXYGEN SATURATION: 95 % | DIASTOLIC BLOOD PRESSURE: 60 MMHG | WEIGHT: 309 LBS

## 2018-01-18 DIAGNOSIS — G47.30 SEVERE SLEEP APNEA: ICD-10-CM

## 2018-01-18 DIAGNOSIS — R06.02 SHORTNESS OF BREATH: ICD-10-CM

## 2018-01-18 DIAGNOSIS — I50.32 CHRONIC DIASTOLIC CONGESTIVE HEART FAILURE (HCC): Primary | ICD-10-CM

## 2018-01-18 DIAGNOSIS — I10 ESSENTIAL HYPERTENSION: ICD-10-CM

## 2018-01-18 DIAGNOSIS — R60.0 LOCALIZED EDEMA: ICD-10-CM

## 2018-01-18 DIAGNOSIS — I25.10 CORONARY ARTERY DISEASE INVOLVING NATIVE CORONARY ARTERY OF NATIVE HEART WITHOUT ANGINA PECTORIS: ICD-10-CM

## 2018-01-18 PROCEDURE — 4040F PNEUMOC VAC/ADMIN/RCVD: CPT | Performed by: NURSE PRACTITIONER

## 2018-01-18 PROCEDURE — G8427 DOCREV CUR MEDS BY ELIG CLIN: HCPCS | Performed by: NURSE PRACTITIONER

## 2018-01-18 PROCEDURE — G8599 NO ASA/ANTIPLAT THER USE RNG: HCPCS | Performed by: NURSE PRACTITIONER

## 2018-01-18 PROCEDURE — 1123F ACP DISCUSS/DSCN MKR DOCD: CPT | Performed by: NURSE PRACTITIONER

## 2018-01-18 PROCEDURE — 99213 OFFICE O/P EST LOW 20 MIN: CPT | Performed by: NURSE PRACTITIONER

## 2018-01-18 PROCEDURE — 1036F TOBACCO NON-USER: CPT | Performed by: NURSE PRACTITIONER

## 2018-01-18 PROCEDURE — 3017F COLORECTAL CA SCREEN DOC REV: CPT | Performed by: NURSE PRACTITIONER

## 2018-01-18 PROCEDURE — G8417 CALC BMI ABV UP PARAM F/U: HCPCS | Performed by: NURSE PRACTITIONER

## 2018-01-18 PROCEDURE — G8484 FLU IMMUNIZE NO ADMIN: HCPCS | Performed by: NURSE PRACTITIONER

## 2018-01-18 RX ORDER — FUROSEMIDE 40 MG/1
40 TABLET ORAL SEE ADMIN INSTRUCTIONS
Qty: 270 TABLET | Refills: 1 | Status: SHIPPED | OUTPATIENT
Start: 2018-01-18 | End: 2018-03-08 | Stop reason: SDUPTHER

## 2018-01-18 NOTE — PROGRESS NOTES
05/19/2014    PHOS 2.3 05/18/2014    BUN 32 01/11/2018    BUN 32 12/14/2017    BUN 36 11/29/2017    CREATININE 1.2 01/11/2018    CREATININE 1.2 12/14/2017    CREATININE 1.6 11/29/2017     BNP:   Lab Results   Component Value Date    PROBNP 750 10/26/2017    PROBNP 1,424 10/16/2017       Recent Testing:  ECHO: 10/17/17  Left ventricular systolic function is normal with the ejection fraction   estimated at 55%. No regional wall motion abnormalities. Moderate concentric left ventricular hypertrophy. Grade II diastolic dysfunction with elevated filling pressure. Severe bi-atrial enlargement. Right ventricle is moderately enlarged. Mild aortic stenosis. Systolic pulmonary artery pressure (SPAP) is normal and estimated at 26 mmHg   (RA pressure 3 mmHg). Stress test on 10/18:  Negative      Pertinent Problems:  ·  Diastolic heart failure from hypertensive heart disease (LVH on echo) NYHA class III  · CAD based on coronary calcification on CT chest; negative stress 10/18/17  · Shortness of breath-multifactorial; due diastolic heart failure, morbid obesity, ?cor pulmonale  · Dilated right ventricle due to cor pulmonale vs heart failure  · Mild aortic stenosis  · Chronic hypoxemic respiratory failure- off oxygen  · Restrictive lung defect  · Severe MONIQUE on CPAP      Visit Diagnosis:    1. Chronic diastolic congestive heart failure ([address]    2. Shortness of breath    3. Essential hypertension    4. Coronary artery disease involving native coronary artery of native heart without angina pectoris    5. Severe sleep apnea    6. Localized edema          Plan:   1. No changes to medications today  2. Continue the same lasix regimen; repeat labs next month as planned  3. Continue daily weights; if weight increases or edema worsens will consider changing lasix to torsemide   4. Continue with compression socks  5. Follow up with Dr. Marii Lang as planned      QUALITY MEASURES  1. Tobacco Cessation Counseling: NA  2.  Retake of

## 2018-01-18 NOTE — LETTER
silver sulfADIAZINE (SILVADENE) 1 % cream Apply topically daily. 11/29/17   Yes Guanako Sexton CNP   LANTUS SOLOSTAR 100 UNIT/ML injection pen INJECT 50 UNITS SUBCUTANEOUSLY NIGHTLY 11/20/17   Yes Guanako Sexton CNP   furosemide (LASIX) 40 MG tablet Take 1 tablet by mouth See Admin Instructions Take 2 tablets in the morning and 1 tablet in the evening 11/14/17   Yes Dalila Almeida CNP   isosorbide mononitrate (IMDUR) 30 MG extended release tablet Take 1 tablet by mouth daily 11/14/17   Yes Dalila Almeida CNP   oxybutynin (DITROPAN XL) 15 MG extended release tablet Take 15 mg by mouth Daily 10/23/17   Yes Historical Provider, MD   aspirin 81 MG chewable tablet Take 1 tablet by mouth daily 10/21/17   Yes Jolynn Rosa MD   Insulin Pen Needle (B-D UF III MINI PEN NEEDLES) 31G X 5 MM MISC Inject 1 each into the skin 4 times daily E11.65  Please dispense generic needles 9/14/17   Yes Fanny Neal CNP   glimepiride (AMARYL) 4 MG tablet Take 1 tablet by mouth 2 times daily 5/31/17   Yes Fanny Neal CNP   insulin aspart (NOVOLOG) 100 UNIT/ML injection pen Inject 5 Units into the skin 2 times daily (with meals)  Patient taking differently: Inject 5 Units into the skin 3 times daily (before meals)  4/19/17   Yes Guanako Sexton CNP   sertraline (ZOLOFT) 50 MG tablet Take 1 tablet by mouth daily 3/6/17   Yes Guanako Sexton CNP   Blood Glucose Monitoring Suppl PRABHAKAR 1 Device by Does not apply route daily One Touch Ultra 3/23/16   Yes Guanako Sexton CNP   Multiple Vitamins-Minerals (THERAPEUTIC MULTIVITAMIN-MINERALS) tablet Take 1 tablet by mouth daily     Yes Historical Provider, MD   ferrous sulfate 325 (65 FE) MG tablet Take 325 mg by mouth daily. Yes Historical Provider, MD            Allergies:  Norco [hydrocodone-acetaminophen]      Review of Systems:   · Constitutional: there has been no unanticipated weight loss.      · Eyes: No vision changes RBC 5.32 08/30/2017     HGB 12.8 10/17/2017     HGB 13.2 10/16/2017     HGB 15.6 08/30/2017     HCT 38.5 10/17/2017     HCT 38.6 10/16/2017     HCT 47.2 08/30/2017     MCV 85.4 10/17/2017     MCV 85.9 10/16/2017     MCV 88.7 08/30/2017     RDW 14.0 10/17/2017     RDW 13.9 10/16/2017     RDW 14.6 08/30/2017      10/17/2017      10/16/2017      08/30/2017      BMP:         Lab Results   Component Value Date      01/11/2018      12/14/2017      11/29/2017     K 4.1 01/11/2018     K 4.4 12/14/2017     K 4.2 11/29/2017      01/11/2018      12/14/2017      11/29/2017     CO2 28 01/11/2018     CO2 28 12/14/2017     CO2 27 11/29/2017     PHOS 3.7 05/21/2014     PHOS 2.7 05/19/2014     PHOS 2.3 05/18/2014     BUN 32 01/11/2018     BUN 32 12/14/2017     BUN 36 11/29/2017     CREATININE 1.2 01/11/2018     CREATININE 1.2 12/14/2017     CREATININE 1.6 11/29/2017      BNP:         Lab Results   Component Value Date     PROBNP 750 10/26/2017     PROBNP 1,424 10/16/2017         Recent Testing:  ECHO: 10/17/17  Left ventricular systolic function is normal with the ejection fraction   estimated at 55%. No regional wall motion abnormalities. Moderate concentric left ventricular hypertrophy. Grade II diastolic dysfunction with elevated filling pressure. Severe bi-atrial enlargement. Right ventricle is moderately enlarged. Mild aortic stenosis. Systolic pulmonary artery pressure (SPAP) is normal and estimated at 26 mmHg   (RA pressure 3 mmHg).      Stress test on 10/18:  Negative        Pertinent Problems:  ·  Diastolic heart failure from hypertensive heart disease (LVH on echo) NYHA class III  · CAD based on coronary calcification on CT chest; negative stress 10/18/17  · Shortness of breath-multifactorial; due diastolic heart failure, morbid obesity, ?cor pulmonale  · Dilated right ventricle due to cor pulmonale vs heart failure  · Mild aortic stenosis

## 2018-01-19 ENCOUNTER — TELEPHONE (OUTPATIENT)
Dept: FAMILY MEDICINE CLINIC | Age: 68
End: 2018-01-19

## 2018-01-22 DIAGNOSIS — E11.65 TYPE 2 DIABETES MELLITUS WITH HYPERGLYCEMIA, WITH LONG-TERM CURRENT USE OF INSULIN (HCC): Primary | ICD-10-CM

## 2018-01-22 DIAGNOSIS — Z79.4 TYPE 2 DIABETES MELLITUS WITH HYPERGLYCEMIA, WITH LONG-TERM CURRENT USE OF INSULIN (HCC): Primary | ICD-10-CM

## 2018-01-24 ENCOUNTER — TELEPHONE (OUTPATIENT)
Dept: PULMONOLOGY | Age: 68
End: 2018-01-24

## 2018-01-24 ENCOUNTER — OFFICE VISIT (OUTPATIENT)
Dept: FAMILY MEDICINE CLINIC | Age: 68
End: 2018-01-24

## 2018-01-24 VITALS
DIASTOLIC BLOOD PRESSURE: 78 MMHG | WEIGHT: 304.8 LBS | RESPIRATION RATE: 20 BRPM | HEART RATE: 80 BPM | BODY MASS INDEX: 41.28 KG/M2 | HEIGHT: 72 IN | SYSTOLIC BLOOD PRESSURE: 128 MMHG | TEMPERATURE: 98.8 F | OXYGEN SATURATION: 93 %

## 2018-01-24 DIAGNOSIS — L60.0 INGROWN NAIL OF GREAT TOE OF LEFT FOOT: ICD-10-CM

## 2018-01-24 DIAGNOSIS — Z79.4 TYPE 2 DIABETES MELLITUS WITH HYPERGLYCEMIA, WITH LONG-TERM CURRENT USE OF INSULIN (HCC): ICD-10-CM

## 2018-01-24 DIAGNOSIS — E11.65 TYPE 2 DIABETES MELLITUS WITH HYPERGLYCEMIA, WITH LONG-TERM CURRENT USE OF INSULIN (HCC): ICD-10-CM

## 2018-01-24 DIAGNOSIS — R05.9 COUGH: ICD-10-CM

## 2018-01-24 DIAGNOSIS — R06.2 WHEEZING: ICD-10-CM

## 2018-01-24 DIAGNOSIS — J40 BRONCHITIS: Primary | ICD-10-CM

## 2018-01-24 PROCEDURE — G8484 FLU IMMUNIZE NO ADMIN: HCPCS | Performed by: NURSE PRACTITIONER

## 2018-01-24 PROCEDURE — 4040F PNEUMOC VAC/ADMIN/RCVD: CPT | Performed by: NURSE PRACTITIONER

## 2018-01-24 PROCEDURE — 99213 OFFICE O/P EST LOW 20 MIN: CPT | Performed by: NURSE PRACTITIONER

## 2018-01-24 PROCEDURE — G8427 DOCREV CUR MEDS BY ELIG CLIN: HCPCS | Performed by: NURSE PRACTITIONER

## 2018-01-24 PROCEDURE — 3017F COLORECTAL CA SCREEN DOC REV: CPT | Performed by: NURSE PRACTITIONER

## 2018-01-24 PROCEDURE — 1036F TOBACCO NON-USER: CPT | Performed by: NURSE PRACTITIONER

## 2018-01-24 PROCEDURE — G8417 CALC BMI ABV UP PARAM F/U: HCPCS | Performed by: NURSE PRACTITIONER

## 2018-01-24 PROCEDURE — 1123F ACP DISCUSS/DSCN MKR DOCD: CPT | Performed by: NURSE PRACTITIONER

## 2018-01-24 PROCEDURE — 3046F HEMOGLOBIN A1C LEVEL >9.0%: CPT | Performed by: NURSE PRACTITIONER

## 2018-01-24 PROCEDURE — G8599 NO ASA/ANTIPLAT THER USE RNG: HCPCS | Performed by: NURSE PRACTITIONER

## 2018-01-24 RX ORDER — DOXYCYCLINE HYCLATE 100 MG/1
100 CAPSULE ORAL 2 TIMES DAILY
Qty: 14 CAPSULE | Refills: 0 | Status: SHIPPED | OUTPATIENT
Start: 2018-01-24 | End: 2018-01-31

## 2018-01-24 RX ORDER — METHYLPREDNISOLONE 4 MG/1
TABLET ORAL
Qty: 1 KIT | Refills: 0 | Status: SHIPPED | OUTPATIENT
Start: 2018-01-24 | End: 2018-01-30

## 2018-01-24 RX ORDER — BENZONATATE 100 MG/1
100 CAPSULE ORAL 3 TIMES DAILY PRN
Qty: 30 CAPSULE | Refills: 0 | Status: SHIPPED | OUTPATIENT
Start: 2018-01-24 | End: 2018-02-23

## 2018-01-24 ASSESSMENT — ENCOUNTER SYMPTOMS
NAUSEA: 0
COUGH: 1
BACK PAIN: 0
CHEST TIGHTNESS: 0
SINUS PRESSURE: 1
WHEEZING: 1

## 2018-01-24 NOTE — PROGRESS NOTES
and all orders for this visit:    Bronchitis  -     doxycycline hyclate (VIBRAMYCIN) 100 MG capsule; Take 1 capsule by mouth 2 times daily for 7 days    Type 2 diabetes mellitus with hyperglycemia, with long-term current use of insulin (HCC)  -     insulin glargine (BASAGLAR KWIKPEN) 100 UNIT/ML injection pen; Inject 50 Units into the skin nightly    Wheezing  -     methylPREDNISolone (MEDROL, HAYDEE,) 4 MG tablet; As directed    Cough  -     benzonatate (TESSALON PERLES) 100 MG capsule;  Take 1 capsule by mouth 3 times daily as needed for Cough    Ingrown nail of great toe of left foot  -     Amb External Referral To Podiatry

## 2018-01-24 NOTE — PATIENT INSTRUCTIONS
1. Dr. Dimitrios Bernardo---[phone]    2. Lisseth Casiano was seen today for uri. Diagnoses and all orders for this visit:    Bronchitis  -     doxycycline hyclate (VIBRAMYCIN) 100 MG capsule; Take 1 capsule by mouth 2 times daily for 7 days    Type 2 diabetes mellitus with hyperglycemia, with long-term current use of insulin (HCC)  -     insulin glargine (BASAGLAR KWIKPEN) 100 UNIT/ML injection pen; Inject 50 Units into the skin nightly    Wheezing  -     methylPREDNISolone (MEDROL, HAYDEE,) 4 MG tablet; As directed    Cough  -     benzonatate (TESSALON PERLES) 100 MG capsule;  Take 1 capsule by mouth 3 times daily as needed for Cough    Ingrown nail of great toe of left foot  -     Amb External Referral To Podiatry  Robitussin DM or robitussin CF

## 2018-02-13 DIAGNOSIS — I10 ESSENTIAL HYPERTENSION: Chronic | ICD-10-CM

## 2018-02-13 NOTE — COMMUNICATION BODY
10/23/17   Yes Historical Provider, MD   aspirin 81 MG chewable tablet Take 1 tablet by mouth daily 10/21/17   Yes Jolynn Rosa MD   Insulin Pen Needle (B-D UF III MINI PEN NEEDLES) 31G X 5 MM MISC Inject 1 each into the skin 4 times daily E11.65  Please dispense generic needles 9/14/17   Yes Fanny Neal CNP   glimepiride (AMARYL) 4 MG tablet Take 1 tablet by mouth 2 times daily 5/31/17   Yes Fanny Neal CNP   insulin aspart (NOVOLOG) 100 UNIT/ML injection pen Inject 5 Units into the skin 2 times daily (with meals)  Patient taking differently: Inject 5 Units into the skin 3 times daily (before meals)  4/19/17   Yes Guanako Sexton CNP   sertraline (ZOLOFT) 50 MG tablet Take 1 tablet by mouth daily 3/6/17   Yes Guanako Sexton CNP   Blood Glucose Monitoring Suppl PRABHAKAR 1 Device by Does not apply route daily One Touch Ultra 3/23/16   Yes Guanako Sexton CNP   Multiple Vitamins-Minerals (THERAPEUTIC MULTIVITAMIN-MINERALS) tablet Take 1 tablet by mouth daily     Yes Historical Provider, MD   ferrous sulfate 325 (65 FE) MG tablet Take 325 mg by mouth daily. Yes Historical Provider, MD            Allergies:  Norco [hydrocodone-acetaminophen]      Review of Systems:   · Constitutional: there has been no unanticipated weight loss. · Eyes: No vision changes  · ENT: No Headaches, + nasal congestion. No mouth sores or sore throat. · Cardiovascular: Reviewed in HPI  · Respiratory: No cough or wheezing, no sputum production. · Gastrointestinal: No abdominal pain, no constipation or diarrhea  · Genitourinary: No dysuria, trouble voiding, or hematuria. · Musculoskeletal:  No weakness or joint complaints. · Integumentary: No rash or pruritis. · Neurological: No numbness or tingling. No weakness. No tremor. · Psychiatric: No anxiety, no depression. · Endocrine:  No excessive thirst or urination.   · Hematologic/Lymphatic: No abnormal bruising or bleeding, blood clots or swollen

## 2018-02-14 RX ORDER — ATENOLOL 50 MG/1
50 TABLET ORAL DAILY
Qty: 90 TABLET | Refills: 3 | Status: SHIPPED | OUTPATIENT
Start: 2018-02-14 | End: 2019-03-07 | Stop reason: SDUPTHER

## 2018-02-16 DIAGNOSIS — Z79.4 TYPE 2 DIABETES MELLITUS WITH HYPERGLYCEMIA, WITH LONG-TERM CURRENT USE OF INSULIN (HCC): ICD-10-CM

## 2018-02-16 DIAGNOSIS — E11.65 TYPE 2 DIABETES MELLITUS WITH HYPERGLYCEMIA, WITH LONG-TERM CURRENT USE OF INSULIN (HCC): ICD-10-CM

## 2018-02-23 ENCOUNTER — OFFICE VISIT (OUTPATIENT)
Dept: FAMILY MEDICINE CLINIC | Age: 68
End: 2018-02-23

## 2018-02-23 VITALS
HEIGHT: 72 IN | BODY MASS INDEX: 41.93 KG/M2 | WEIGHT: 309.6 LBS | DIASTOLIC BLOOD PRESSURE: 72 MMHG | HEART RATE: 56 BPM | RESPIRATION RATE: 18 BRPM | SYSTOLIC BLOOD PRESSURE: 124 MMHG | OXYGEN SATURATION: 97 %

## 2018-02-23 DIAGNOSIS — Z13.21 ENCOUNTER FOR VITAMIN DEFICIENCY SCREENING: ICD-10-CM

## 2018-02-23 DIAGNOSIS — I87.2 CHRONIC VENOUS INSUFFICIENCY: Chronic | ICD-10-CM

## 2018-02-23 DIAGNOSIS — Z79.4 TYPE 2 DIABETES MELLITUS WITH HYPERGLYCEMIA, WITH LONG-TERM CURRENT USE OF INSULIN (HCC): Primary | ICD-10-CM

## 2018-02-23 DIAGNOSIS — N18.30 ANEMIA IN STAGE 3 CHRONIC KIDNEY DISEASE (HCC): ICD-10-CM

## 2018-02-23 DIAGNOSIS — D63.1 ANEMIA IN STAGE 3 CHRONIC KIDNEY DISEASE (HCC): ICD-10-CM

## 2018-02-23 DIAGNOSIS — N25.0 RENAL OSTEODYSTROPHY: ICD-10-CM

## 2018-02-23 DIAGNOSIS — E11.65 TYPE 2 DIABETES MELLITUS WITH HYPERGLYCEMIA, WITH LONG-TERM CURRENT USE OF INSULIN (HCC): Primary | ICD-10-CM

## 2018-02-23 DIAGNOSIS — N18.30 STAGE 3 CHRONIC KIDNEY DISEASE (HCC): ICD-10-CM

## 2018-02-23 DIAGNOSIS — I50.32 CHRONIC DIASTOLIC CONGESTIVE HEART FAILURE (HCC): ICD-10-CM

## 2018-02-23 DIAGNOSIS — H69.81 DYSFUNCTION OF RIGHT EUSTACHIAN TUBE: ICD-10-CM

## 2018-02-23 DIAGNOSIS — R00.1 BRADYCARDIA: ICD-10-CM

## 2018-02-23 DIAGNOSIS — F33.1 MODERATE EPISODE OF RECURRENT MAJOR DEPRESSIVE DISORDER (HCC): ICD-10-CM

## 2018-02-23 PROBLEM — R06.02 SHORTNESS OF BREATH: Status: RESOLVED | Noted: 2017-10-16 | Resolved: 2018-02-23

## 2018-02-23 LAB — HBA1C MFR BLD: 6.9 %

## 2018-02-23 PROCEDURE — 1123F ACP DISCUSS/DSCN MKR DOCD: CPT | Performed by: NURSE PRACTITIONER

## 2018-02-23 PROCEDURE — 1036F TOBACCO NON-USER: CPT | Performed by: NURSE PRACTITIONER

## 2018-02-23 PROCEDURE — 99213 OFFICE O/P EST LOW 20 MIN: CPT | Performed by: NURSE PRACTITIONER

## 2018-02-23 PROCEDURE — 4040F PNEUMOC VAC/ADMIN/RCVD: CPT | Performed by: NURSE PRACTITIONER

## 2018-02-23 PROCEDURE — 83036 HEMOGLOBIN GLYCOSYLATED A1C: CPT | Performed by: NURSE PRACTITIONER

## 2018-02-23 PROCEDURE — 3044F HG A1C LEVEL LT 7.0%: CPT | Performed by: NURSE PRACTITIONER

## 2018-02-23 PROCEDURE — G8599 NO ASA/ANTIPLAT THER USE RNG: HCPCS | Performed by: NURSE PRACTITIONER

## 2018-02-23 PROCEDURE — G8427 DOCREV CUR MEDS BY ELIG CLIN: HCPCS | Performed by: NURSE PRACTITIONER

## 2018-02-23 PROCEDURE — 3017F COLORECTAL CA SCREEN DOC REV: CPT | Performed by: NURSE PRACTITIONER

## 2018-02-23 PROCEDURE — G8417 CALC BMI ABV UP PARAM F/U: HCPCS | Performed by: NURSE PRACTITIONER

## 2018-02-23 PROCEDURE — G8484 FLU IMMUNIZE NO ADMIN: HCPCS | Performed by: NURSE PRACTITIONER

## 2018-02-23 RX ORDER — PSEUDOEPHEDRINE HCL 120 MG/1
120 TABLET, FILM COATED, EXTENDED RELEASE ORAL EVERY 12 HOURS
Qty: 24 TABLET | Refills: 0 | Status: SHIPPED | OUTPATIENT
Start: 2018-02-23 | End: 2018-05-21 | Stop reason: SDUPTHER

## 2018-02-23 ASSESSMENT — ENCOUNTER SYMPTOMS
NAUSEA: 0
CONSTIPATION: 0
VOMITING: 0
BACK PAIN: 0
CHEST TIGHTNESS: 0

## 2018-02-23 NOTE — PROGRESS NOTES
Subjective:      Patient ID: Shana Henderson is a 69 y.o. male. HPI     1 month ago right ear started feeling stopped up. Denies pain. Went to see podiatrist after last visit, Dr. Oscar Anthony. Previously had blister and had infection and I&D. Seen back up in follow up. Wrapped leg for compression. Feels healing well. Now again wearing elastic stockings. Insulin needles ordered but too expensive. Needs Easy Touch needles      Review of Systems   Constitutional: Negative for appetite change. HENT: Positive for hearing loss. Respiratory: Negative for chest tightness. Cardiovascular: Negative for chest pain. Gastrointestinal: Negative for constipation, nausea and vomiting. Musculoskeletal: Positive for arthralgias. Negative for back pain and gait problem. Neurological: Negative for dizziness and headaches. Psychiatric/Behavioral: Negative. Objective:   Physical Exam   Constitutional: He is oriented to person, place, and time. He appears well-developed and well-nourished. HENT:   Head: Normocephalic and atraumatic. Right Ear: Ear canal normal. Tympanic membrane is retracted. Tympanic membrane is not erythematous. Left Ear: Ear canal normal. Tympanic membrane is not erythematous. Neck: Normal range of motion. Neck supple. Cardiovascular: Regular rhythm and normal heart sounds. Bradycardia present. Pulmonary/Chest: Effort normal and breath sounds normal. No respiratory distress. Abdominal: Soft. Bowel sounds are normal.   Musculoskeletal: Normal range of motion. Lymphadenopathy:     He has no cervical adenopathy. Neurological: He is alert and oriented to person, place, and time. Skin: Skin is warm and dry. Psychiatric: He has a normal mood and affect. His behavior is normal.       Assessment:      1. DM-stable  2. Eustachian tube dysfunction  3. CHF-weight elevated      Plan:      1. Mariaa Whittaker was seen today for hypertension and diabetes.     Diagnoses and all orders for this visit:    Type 2 diabetes mellitus with hyperglycemia, with long-term current use of insulin (HCC)  -     POCT glycosylated hemoglobin (Hb A1C)  -     Insulin Pen Needle 31G X 5 MM MISC; 1 each by Does not apply route daily    Stage 3 chronic kidney disease  -     Comprehensive Metabolic Panel w/ Reflex to MG; Future  -     PTH, INTACT; Future  -     Protein / Creatinine Ratio, Urine; Future    Anemia in stage 3 chronic kidney disease  -     CBC Auto Differential; Future  -     Ferritin; Future  -     Iron and TIBC; Future    Renal osteodystrophy  -     Comprehensive Metabolic Panel w/ Reflex to MG; Future  -     PTH, INTACT; Future  -     Protein / Creatinine Ratio, Urine; Future    Moderate episode of recurrent major depressive disorder (HCC)  -     sertraline (ZOLOFT) 50 MG tablet; Take 1 tablet by mouth daily    Chronic diastolic congestive heart failure (HCC)  -     BNP; Future    Encounter for vitamin deficiency screening  -     Vitamin D 25 Hydroxy; Future    Chronic kidney disease, stage III (moderate)   -     Vitamin D 25 Hydroxy; Future    Dysfunction of right eustachian tube  -     pseudoephedrine (SUDAFED 12 HOUR) 120 MG extended release tablet; Take 1 tablet by mouth every 12 hours    Labs ordered as per Dr. Amina Viveros. Discussed need to decrease weight. Neg for edema, lungs CTA.

## 2018-03-01 DIAGNOSIS — N18.30 ANEMIA IN STAGE 3 CHRONIC KIDNEY DISEASE (HCC): ICD-10-CM

## 2018-03-01 DIAGNOSIS — I50.32 CHRONIC DIASTOLIC CONGESTIVE HEART FAILURE (HCC): ICD-10-CM

## 2018-03-01 DIAGNOSIS — N25.0 RENAL OSTEODYSTROPHY: ICD-10-CM

## 2018-03-01 DIAGNOSIS — D63.1 ANEMIA IN STAGE 3 CHRONIC KIDNEY DISEASE (HCC): ICD-10-CM

## 2018-03-01 DIAGNOSIS — Z13.21 ENCOUNTER FOR VITAMIN DEFICIENCY SCREENING: ICD-10-CM

## 2018-03-01 DIAGNOSIS — N18.30 STAGE 3 CHRONIC KIDNEY DISEASE (HCC): ICD-10-CM

## 2018-03-01 LAB
A/G RATIO: 1.5 (ref 1.1–2.2)
ALBUMIN SERPL-MCNC: 4 G/DL (ref 3.4–5)
ALP BLD-CCNC: 82 U/L (ref 40–129)
ALT SERPL-CCNC: 25 U/L (ref 10–40)
ANION GAP SERPL CALCULATED.3IONS-SCNC: 11 MMOL/L (ref 3–16)
AST SERPL-CCNC: 22 U/L (ref 15–37)
BASOPHILS ABSOLUTE: 0.1 K/UL (ref 0–0.2)
BASOPHILS RELATIVE PERCENT: 0.8 %
BILIRUB SERPL-MCNC: 0.5 MG/DL (ref 0–1)
BUN BLDV-MCNC: 25 MG/DL (ref 7–20)
CALCIUM SERPL-MCNC: 8.6 MG/DL (ref 8.3–10.6)
CHLORIDE BLD-SCNC: 102 MMOL/L (ref 99–110)
CO2: 29 MMOL/L (ref 21–32)
CREAT SERPL-MCNC: 0.9 MG/DL (ref 0.8–1.3)
CREATININE URINE: 76.4 MG/DL (ref 39–259)
EOSINOPHILS ABSOLUTE: 0.2 K/UL (ref 0–0.6)
EOSINOPHILS RELATIVE PERCENT: 3.1 %
FERRITIN: 184.3 NG/ML (ref 30–400)
GFR AFRICAN AMERICAN: >60
GFR NON-AFRICAN AMERICAN: >60
GLOBULIN: 2.6 G/DL
GLUCOSE BLD-MCNC: 146 MG/DL (ref 70–99)
HCT VFR BLD CALC: 38 % (ref 40.5–52.5)
HEMOGLOBIN: 12.8 G/DL (ref 13.5–17.5)
IRON SATURATION: 20 % (ref 20–50)
IRON: 45 UG/DL (ref 59–158)
LYMPHOCYTES ABSOLUTE: 1 K/UL (ref 1–5.1)
LYMPHOCYTES RELATIVE PERCENT: 13.8 %
MCH RBC QN AUTO: 28.6 PG (ref 26–34)
MCHC RBC AUTO-ENTMCNC: 33.6 G/DL (ref 31–36)
MCV RBC AUTO: 85.2 FL (ref 80–100)
MONOCYTES ABSOLUTE: 0.6 K/UL (ref 0–1.3)
MONOCYTES RELATIVE PERCENT: 7.6 %
NEUTROPHILS ABSOLUTE: 5.6 K/UL (ref 1.7–7.7)
NEUTROPHILS RELATIVE PERCENT: 74.7 %
PARATHYROID HORMONE INTACT: 58.4 PG/ML (ref 14–72)
PDW BLD-RTO: 16.2 % (ref 12.4–15.4)
PLATELET # BLD: 207 K/UL (ref 135–450)
PMV BLD AUTO: 8.4 FL (ref 5–10.5)
POTASSIUM REFLEX MAGNESIUM: 4.3 MMOL/L (ref 3.5–5.1)
PRO-BNP: 398 PG/ML (ref 0–124)
PROTEIN PROTEIN: 29 MG/DL
PROTEIN/CREAT RATIO: 0.4 MG/DL
RBC # BLD: 4.46 M/UL (ref 4.2–5.9)
SODIUM BLD-SCNC: 142 MMOL/L (ref 136–145)
TOTAL IRON BINDING CAPACITY: 224 UG/DL (ref 260–445)
TOTAL PROTEIN: 6.6 G/DL (ref 6.4–8.2)
VITAMIN D 25-HYDROXY: 25.3 NG/ML
WBC # BLD: 7.4 K/UL (ref 4–11)

## 2018-03-08 ENCOUNTER — OFFICE VISIT (OUTPATIENT)
Dept: CARDIOLOGY CLINIC | Age: 68
End: 2018-03-08

## 2018-03-08 VITALS
OXYGEN SATURATION: 95 % | HEART RATE: 61 BPM | DIASTOLIC BLOOD PRESSURE: 72 MMHG | BODY MASS INDEX: 41.17 KG/M2 | WEIGHT: 304 LBS | HEIGHT: 72 IN | SYSTOLIC BLOOD PRESSURE: 134 MMHG

## 2018-03-08 DIAGNOSIS — I25.10 CORONARY ARTERY DISEASE INVOLVING NATIVE CORONARY ARTERY OF NATIVE HEART WITHOUT ANGINA PECTORIS: ICD-10-CM

## 2018-03-08 DIAGNOSIS — I35.0 NONRHEUMATIC AORTIC VALVE STENOSIS: ICD-10-CM

## 2018-03-08 DIAGNOSIS — I50.32 CHRONIC DIASTOLIC CONGESTIVE HEART FAILURE (HCC): Primary | ICD-10-CM

## 2018-03-08 DIAGNOSIS — R60.9 PERIPHERAL EDEMA: ICD-10-CM

## 2018-03-08 DIAGNOSIS — E78.2 MIXED HYPERLIPIDEMIA: ICD-10-CM

## 2018-03-08 DIAGNOSIS — I10 ESSENTIAL HYPERTENSION: Chronic | ICD-10-CM

## 2018-03-08 PROBLEM — R60.0 PERIPHERAL EDEMA: Status: ACTIVE | Noted: 2018-03-08

## 2018-03-08 PROCEDURE — 4040F PNEUMOC VAC/ADMIN/RCVD: CPT | Performed by: INTERNAL MEDICINE

## 2018-03-08 PROCEDURE — G8427 DOCREV CUR MEDS BY ELIG CLIN: HCPCS | Performed by: INTERNAL MEDICINE

## 2018-03-08 PROCEDURE — 99214 OFFICE O/P EST MOD 30 MIN: CPT | Performed by: INTERNAL MEDICINE

## 2018-03-08 PROCEDURE — 1123F ACP DISCUSS/DSCN MKR DOCD: CPT | Performed by: INTERNAL MEDICINE

## 2018-03-08 PROCEDURE — G8599 NO ASA/ANTIPLAT THER USE RNG: HCPCS | Performed by: INTERNAL MEDICINE

## 2018-03-08 PROCEDURE — G8417 CALC BMI ABV UP PARAM F/U: HCPCS | Performed by: INTERNAL MEDICINE

## 2018-03-08 PROCEDURE — 1036F TOBACCO NON-USER: CPT | Performed by: INTERNAL MEDICINE

## 2018-03-08 PROCEDURE — 3017F COLORECTAL CA SCREEN DOC REV: CPT | Performed by: INTERNAL MEDICINE

## 2018-03-08 PROCEDURE — G8482 FLU IMMUNIZE ORDER/ADMIN: HCPCS | Performed by: INTERNAL MEDICINE

## 2018-03-08 RX ORDER — HYDRALAZINE HYDROCHLORIDE 50 MG/1
50 TABLET, FILM COATED ORAL EVERY 8 HOURS SCHEDULED
Qty: 270 TABLET | Refills: 3 | Status: SHIPPED | OUTPATIENT
Start: 2018-03-08 | End: 2018-10-10 | Stop reason: SDUPTHER

## 2018-03-08 RX ORDER — ATORVASTATIN CALCIUM 40 MG/1
40 TABLET, FILM COATED ORAL NIGHTLY
Qty: 90 TABLET | Refills: 3 | Status: SHIPPED | OUTPATIENT
Start: 2018-03-08 | End: 2019-03-01 | Stop reason: SDUPTHER

## 2018-03-08 RX ORDER — ISOSORBIDE MONONITRATE 30 MG/1
30 TABLET, EXTENDED RELEASE ORAL DAILY
Qty: 90 TABLET | Refills: 3 | Status: SHIPPED | OUTPATIENT
Start: 2018-03-08 | End: 2018-11-21 | Stop reason: SDUPTHER

## 2018-03-08 RX ORDER — FUROSEMIDE 40 MG/1
40 TABLET ORAL SEE ADMIN INSTRUCTIONS
Qty: 270 TABLET | Refills: 3 | Status: SHIPPED | OUTPATIENT
Start: 2018-03-08 | End: 2018-10-01 | Stop reason: ALTCHOICE

## 2018-03-08 NOTE — PATIENT INSTRUCTIONS
Recommendation:  - His stress test showed no ischemia. Etiology of his CHF is hypertensive heart disease. ACEI/ARB and CCB have shown to be beneficial in regressing LVH, however, as Cr labile, he is on Norvasc.   - Continue Lasix for diuresis. He is compliant with his diet and 1.5 Liter fluid restriction. He will continue pressure compression stockings for peripheral edema. - His LDL was at goal <70 in Aug 17. He will continue Lipitor.  - He is on CPAP for severe sleep apnea. - He will follow up with Malinda Simpson NP in CHF clinic in six months and see me in 12 months.

## 2018-03-08 NOTE — PROGRESS NOTES
Section of Cardiology                                     Cardiovascular Evaluation      PATIENT: Gail Jett  DATE: 3/8/2018  MRN: [id number]  CSN: [id number]  : 1950    Primary Care Doctor: Luke Sue CNP    Reason for evaluation:   3 Month Follow-Up and Congestive Heart Failure    History of present illness:  Gail Jett is a 69 y.o. patient who was admitted in the hospital for shortness of breath which was thought to be multifactorial due diastolic heart failure, morbid obesity and cor pulmonale. He underwent stress test to rule out ischemic etiology of the CHF. Stress test was negative. He has done well since the discharge. Today he reports he was diagnosed with sleep apnea. He states he is scheduled to see a nephrologist. He reports he feels well overall. He wears compression stockings most of the day every day. He denies chest pain, palpitations, dizziness or syncope. Today he presents for follow up of CHF, peripheral edema, CAD, aortic stenosis, HTN, HLD. He has been feeling well from a cardiology standpoint. Denies chest pain, shortness of breath, edema, dizziness, palpitations and syncope. Past Medical History:   has a past medical history of Bladder fistula; C. difficile diarrhea; Cellulitis of right lower extremity; Diabetes ([address] Diverticulitis; DVT of lower extremity, bilateral ([address] Hematuria; Pancreatitis ([address] and Pulmonary emboli ([address] Surgical History:   has a past surgical history that includes Tibia fracture surgery (Right, ); Cholecystectomy, open (N/A, 13); Cystocopy (12/10/13); Cystoscopy (14); other surgical history (14); Colonoscopy (); Colonoscopy (2013); Colonoscopy (14); colostomy (2014); Colon surgery; Abdomen surgery (2014); and hernia repair (2015). Social History:   reports that he is a non-smoker but has been exposed to tobacco smoke.  He has never used extended release tablet Take 1 tablet by mouth daily 90 tablet 1    oxybutynin (DITROPAN XL) 15 MG extended release tablet Take 15 mg by mouth Daily      aspirin 81 MG chewable tablet Take 1 tablet by mouth daily 30 tablet 0    glimepiride (AMARYL) 4 MG tablet Take 1 tablet by mouth 2 times daily 180 tablet 3    insulin aspart (NOVOLOG) 100 UNIT/ML injection pen Inject 5 Units into the skin 2 times daily (with meals) (Patient taking differently: Inject 5 Units into the skin 3 times daily (before meals) ) 5 Pen 3    Blood Glucose Monitoring Suppl PRABHAKAR 1 Device by Does not apply route daily One Touch Ultra 1 Device 0    Multiple Vitamins-Minerals (THERAPEUTIC MULTIVITAMIN-MINERALS) tablet Take 1 tablet by mouth daily      ferrous sulfate 325 (65 FE) MG tablet Take 325 mg by mouth daily. No current facility-administered medications for this visit. Allergies:  Norco [hydrocodone-acetaminophen]     Review of Systems:   All 14 point review of symptoms completed. Pertinent positives identified in the HPI, all other review of symptoms negative.     Physical Examination:    /72   Pulse 61   Ht 6' (1.829 m)   Wt (!) 304 lb (137.9 kg)   SpO2 95%   BMI 41.23 kg/m²      General Appearance:  Alert, cooperative, no distress, appears stated age       Head:  Normocephalic, without obvious abnormality, atraumatic   Eyes:  PERRL, conjunctiva/corneas clear       Nose: Nares normal, no drainage or sinus tenderness   Throat: Lips, mucosa, and tongue normal       Neck: Supple, symmetrical, trachea midline, no adenopathy, thyroid: not enlarged, symmetric, no tenderness/mass/nodules, no carotid bruit or JVD       Lungs:   Clear to auscultation bilaterally, respirations unlabored   Chest Wall:  No tenderness or deformity       Heart:  Regular rhythm and normal rate; S1, S2 are normal; 2/6 MSM at base; no rub or gallop       Abdomen:   Soft, non-tender, bowel sounds active all four quadrants,  no masses, no filling pressure. Severe bi-atrial enlargement. Right ventricle is moderately enlarged. Mild aortic stenosis. Systolic pulmonary artery pressure (SPAP) is normal and estimated at 26 mmHg  (RA pressure 3 mmHg).       Assessment:    - Chronic diastolic heart failure from hypertensive heart disease   - Peripheral edema, likely due to venous insufficiency  - CAD based on coronary calcification on CT chest  - Dilated right ventricle due to cor pulmonale vs heart failure  - Mild aortic stenosis  - Hypertension  - MONIQUE on CPAP  - CKD      Recommendation:  - His stress test showed no ischemia. Etiology of his CHF is hypertensive heart disease. ACEI/ARB and CCB have shown to be beneficial in regressing LVH, however, as Cr labile, he is on Norvasc.   - Continue Lasix for diuresis. He is compliant with his diet and 1.5 Liter fluid restriction. He will continue pressure compression stockings for peripheral edema. - His LDL was at goal <70 in Aug 17. He will continue Lipitor.  - He is on CPAP for severe sleep apnea. - He will follow up with Tatum Teixeira NP in CHF clinic in six months and see me in 12 months. If you have questions, please do not hesitate to call me. I look forward to following Elizabeth Matter along with you.       Momo Obando MD, [facility] [place], [place]  [phone] [facility]  [phone] [facility]  3/8/2018 8:46 AM

## 2018-03-19 ENCOUNTER — OFFICE VISIT (OUTPATIENT)
Dept: PULMONOLOGY | Age: 68
End: 2018-03-19

## 2018-03-19 VITALS
TEMPERATURE: 98.1 F | HEART RATE: 77 BPM | SYSTOLIC BLOOD PRESSURE: 131 MMHG | BODY MASS INDEX: 41.58 KG/M2 | RESPIRATION RATE: 16 BRPM | OXYGEN SATURATION: 94 % | DIASTOLIC BLOOD PRESSURE: 75 MMHG | WEIGHT: 307 LBS | HEIGHT: 72 IN

## 2018-03-19 DIAGNOSIS — J96.11 CHRONIC RESPIRATORY FAILURE WITH HYPOXIA (HCC): ICD-10-CM

## 2018-03-19 DIAGNOSIS — G47.33 OSA (OBSTRUCTIVE SLEEP APNEA): Primary | ICD-10-CM

## 2018-03-19 DIAGNOSIS — J30.89 NON-SEASONAL ALLERGIC RHINITIS, UNSPECIFIED CHRONICITY, UNSPECIFIED TRIGGER: ICD-10-CM

## 2018-03-19 DIAGNOSIS — J41.0 SIMPLE CHRONIC BRONCHITIS (HCC): ICD-10-CM

## 2018-03-19 PROCEDURE — 99214 OFFICE O/P EST MOD 30 MIN: CPT | Performed by: INTERNAL MEDICINE

## 2018-03-19 PROCEDURE — G8427 DOCREV CUR MEDS BY ELIG CLIN: HCPCS | Performed by: INTERNAL MEDICINE

## 2018-03-19 PROCEDURE — G8599 NO ASA/ANTIPLAT THER USE RNG: HCPCS | Performed by: INTERNAL MEDICINE

## 2018-03-19 PROCEDURE — 1123F ACP DISCUSS/DSCN MKR DOCD: CPT | Performed by: INTERNAL MEDICINE

## 2018-03-19 PROCEDURE — 1036F TOBACCO NON-USER: CPT | Performed by: INTERNAL MEDICINE

## 2018-03-19 PROCEDURE — 4040F PNEUMOC VAC/ADMIN/RCVD: CPT | Performed by: INTERNAL MEDICINE

## 2018-03-19 PROCEDURE — 3023F SPIROM DOC REV: CPT | Performed by: INTERNAL MEDICINE

## 2018-03-19 PROCEDURE — G8482 FLU IMMUNIZE ORDER/ADMIN: HCPCS | Performed by: INTERNAL MEDICINE

## 2018-03-19 PROCEDURE — 3017F COLORECTAL CA SCREEN DOC REV: CPT | Performed by: INTERNAL MEDICINE

## 2018-03-19 PROCEDURE — G8926 SPIRO NO PERF OR DOC: HCPCS | Performed by: INTERNAL MEDICINE

## 2018-03-19 PROCEDURE — G8417 CALC BMI ABV UP PARAM F/U: HCPCS | Performed by: INTERNAL MEDICINE

## 2018-03-19 NOTE — PATIENT INSTRUCTIONS
enough blankets to stay warm is recommended. If your room is too noisy, try a white noise machine. If too bright, try black out shades or an eye mask. Dont take worries to bed. Leave worries about work, school etc. behind you when you go to bed. Some people find it helpful to assign a worry period in the evening or late afternoon to write down your worries and get them out of your system. CPAP Equipment Cleaning and Disinfecting Schedule  Equipment Cleaning Frequency Instructions  Disinfecting Frequency   Non-Disposable Filters  Weekly Mild soapy water, Rinse, Air Dry Not Required   Disposable Filters Change as needed  2-4 weeks Do Not Wash Not Required   Hose/tubing Daily Mild soapy water, Rinse, Air Dry Once a week   Mask / Nasal Pillows Daily Mild soapy water, Rinse, Air Dry Once a week   Headgear Weekly Hand wash, Mild soapy water, Rinse, Dry  Not Required   Humidifier Daily Empty water daily  Mild soapy water, Rinse well, Air Dry  Once a week   CPAP Unit As Needed Dust with damp cloth,  No detergents or sprays Not Required         Disinfect (per schedule) with 1 part white vinegar and 3 parts water- soak mask and water chamber for 30 minutes every 1-2 weeks, more often if sick. Allow water/vinegar mixture to run through tubing. Allow all equipment to air dry. Drying Hints:   Always hang tubing away from direct sunlight, as this will cause the tubing to become yellow, brittle and crack over a period of time. DO NOT attach the wet tubing to your CPAP unit to blow-dry it. The moisture from the tubing can drain back into your machine. Moisture in your unit can cause sudden pressure increases or short circuits  DO's and DON'Ts:  - Don't use alcohol-based products to clean your mask, because it can cause the materials to become hard and brittle.    - Don't put headgear in the washer or dryer  - Don't use any caustic or household cleaning solutions such as bleach on your CPAP   equipment.  - Do follow the recommended cleaning schedule. - Do change your disposable filter frequently. Adapted From: [email or web address]/cleaning. shtm.   These are general suggestions for all models please follow specific s recommendations and specific instructions

## 2018-03-21 ASSESSMENT — ENCOUNTER SYMPTOMS
STRIDOR: 0
EYE ITCHING: 0
DIARRHEA: 0
CONSTIPATION: 0
EYE DISCHARGE: 0
ABDOMINAL PAIN: 0
VOICE CHANGE: 0
CHOKING: 0
SORE THROAT: 0
EYE PAIN: 0
CHEST TIGHTNESS: 0

## 2018-03-21 NOTE — PROGRESS NOTES
Pulmonary Outpatient Note   Juan Shore MD       3/19/2018    Chief Complaint:  Sleep Apnea (5 week follow up)     HPI:   69y.o. year old male here for follow up of sleep apnea. Compliance data reviewed, AHI 8.8, 93% compliance with device. When seen previously he was experiencing leaks with his device, subsequently had mask refitting and this is no longer an issue. Continue to have improvement in daytime somnolence. Will feel more symptoms when not bleeding his oxygen through his device at night, no longer using supplemental oxygen during the daytime. Comorbid CHF and follows with cardiology/nephrology for medical management, determined to be compensated based on notes from last visit. Denies orthopnea or PND symptoms     Past Medical History:   Diagnosis Date    Bladder fistula     resolved    C. difficile diarrhea 8/18/2015    +PCR    Cellulitis of right lower extremity 3/23/2016    Diabetes ([address]     Diverticulitis     DVT of lower extremity, bilateral ([address] 10/16/2013    Hematuria 1/2/2014    Pancreatitis ([address] 8/24/2013    Pulmonary emboli ([address] 2013    after cholecystectomy        Past Surgical History:   Procedure Laterality Date    ABDOMEN SURGERY  05/16/2014    reveseral end peristomal hernia repair.     CHOLECYSTECTOMY, OPEN N/A 9-17-13    LAPAROSCOPIC CONVERTED TO OPEN CHOLECYSTECTOMY WITH    COLON SURGERY      COLONOSCOPY  2008    COLONOSCOPY  12/4/2013    Severe Diverticulosis unable to finish    COLONOSCOPY  4/30/14    diverticula-10 year f/u    COLOSTOMY  Jan 2014    CYSTOSCOPY  12/10/13    with bladder biopsy    CYSTOSCOPY  1-28-14    Cystourethroscopy, left ureteral catheter     HERNIA REPAIR  08/14/2015    OPEN INCISIONAL HERNIA REPAIR WITH MESH, BILATERAL COMPONENT SEPARATION, LYSIS OF ADHESIONS    OTHER SURGICAL HISTORY  1-28-14    LeftColectomy with End Colostomy, Splenic Flexure Mobilization, Takedown of Colovesical Fistula    TIBIA FRACTURE SURGERY Right 2003

## 2018-03-26 ENCOUNTER — TELEPHONE (OUTPATIENT)
Dept: BARIATRICS/WEIGHT MGMT | Age: 68
End: 2018-03-26

## 2018-04-19 DIAGNOSIS — F51.01 PRIMARY INSOMNIA: ICD-10-CM

## 2018-04-19 RX ORDER — TRAZODONE HYDROCHLORIDE 50 MG/1
50 TABLET ORAL NIGHTLY
Qty: 30 TABLET | Refills: 5 | Status: SHIPPED | OUTPATIENT
Start: 2018-04-19 | End: 2018-10-03 | Stop reason: SDUPTHER

## 2018-05-21 ENCOUNTER — OFFICE VISIT (OUTPATIENT)
Dept: FAMILY MEDICINE CLINIC | Age: 68
End: 2018-05-21

## 2018-05-21 VITALS
SYSTOLIC BLOOD PRESSURE: 138 MMHG | HEIGHT: 72 IN | DIASTOLIC BLOOD PRESSURE: 82 MMHG | BODY MASS INDEX: 41.63 KG/M2 | OXYGEN SATURATION: 96 % | HEART RATE: 94 BPM | WEIGHT: 307.4 LBS | RESPIRATION RATE: 18 BRPM

## 2018-05-21 DIAGNOSIS — I35.0 NONRHEUMATIC AORTIC VALVE STENOSIS: ICD-10-CM

## 2018-05-21 DIAGNOSIS — I50.32 CHRONIC DIASTOLIC CONGESTIVE HEART FAILURE (HCC): ICD-10-CM

## 2018-05-21 DIAGNOSIS — E11.65 TYPE 2 DIABETES MELLITUS WITH HYPERGLYCEMIA, WITH LONG-TERM CURRENT USE OF INSULIN (HCC): Primary | ICD-10-CM

## 2018-05-21 DIAGNOSIS — Z79.4 TYPE 2 DIABETES MELLITUS WITH HYPERGLYCEMIA, WITH LONG-TERM CURRENT USE OF INSULIN (HCC): Primary | ICD-10-CM

## 2018-05-21 DIAGNOSIS — E78.2 MIXED HYPERLIPIDEMIA: ICD-10-CM

## 2018-05-21 DIAGNOSIS — B96.89 ACUTE BACTERIAL SINUSITIS: ICD-10-CM

## 2018-05-21 DIAGNOSIS — H69.81 DYSFUNCTION OF RIGHT EUSTACHIAN TUBE: ICD-10-CM

## 2018-05-21 DIAGNOSIS — R60.0 LOCALIZED EDEMA: ICD-10-CM

## 2018-05-21 DIAGNOSIS — I10 ESSENTIAL HYPERTENSION: Chronic | ICD-10-CM

## 2018-05-21 DIAGNOSIS — J01.90 ACUTE BACTERIAL SINUSITIS: ICD-10-CM

## 2018-05-21 LAB — HBA1C MFR BLD: 6.8 %

## 2018-05-21 PROCEDURE — G8599 NO ASA/ANTIPLAT THER USE RNG: HCPCS | Performed by: NURSE PRACTITIONER

## 2018-05-21 PROCEDURE — 99213 OFFICE O/P EST LOW 20 MIN: CPT | Performed by: NURSE PRACTITIONER

## 2018-05-21 PROCEDURE — G8417 CALC BMI ABV UP PARAM F/U: HCPCS | Performed by: NURSE PRACTITIONER

## 2018-05-21 PROCEDURE — 1123F ACP DISCUSS/DSCN MKR DOCD: CPT | Performed by: NURSE PRACTITIONER

## 2018-05-21 PROCEDURE — 3044F HG A1C LEVEL LT 7.0%: CPT | Performed by: NURSE PRACTITIONER

## 2018-05-21 PROCEDURE — 3017F COLORECTAL CA SCREEN DOC REV: CPT | Performed by: NURSE PRACTITIONER

## 2018-05-21 PROCEDURE — G8427 DOCREV CUR MEDS BY ELIG CLIN: HCPCS | Performed by: NURSE PRACTITIONER

## 2018-05-21 PROCEDURE — 2022F DILAT RTA XM EVC RTNOPTHY: CPT | Performed by: NURSE PRACTITIONER

## 2018-05-21 PROCEDURE — 1036F TOBACCO NON-USER: CPT | Performed by: NURSE PRACTITIONER

## 2018-05-21 PROCEDURE — 4040F PNEUMOC VAC/ADMIN/RCVD: CPT | Performed by: NURSE PRACTITIONER

## 2018-05-21 PROCEDURE — 83036 HEMOGLOBIN GLYCOSYLATED A1C: CPT | Performed by: NURSE PRACTITIONER

## 2018-05-21 RX ORDER — AMOXICILLIN AND CLAVULANATE POTASSIUM 875; 125 MG/1; MG/1
1 TABLET, FILM COATED ORAL 2 TIMES DAILY
Qty: 14 TABLET | Refills: 0 | Status: SHIPPED | OUTPATIENT
Start: 2018-05-21 | End: 2018-05-28

## 2018-05-21 RX ORDER — PSEUDOEPHEDRINE HCL 120 MG/1
120 TABLET, FILM COATED, EXTENDED RELEASE ORAL EVERY 12 HOURS
Qty: 24 TABLET | Refills: 0 | Status: SHIPPED | OUTPATIENT
Start: 2018-05-21 | End: 2018-08-20

## 2018-05-21 RX ORDER — OXYBUTYNIN CHLORIDE 15 MG/1
15 TABLET, EXTENDED RELEASE ORAL DAILY
Qty: 90 TABLET | Refills: 1 | Status: SHIPPED | OUTPATIENT
Start: 2018-05-21 | End: 2018-11-14 | Stop reason: SDUPTHER

## 2018-05-21 ASSESSMENT — ENCOUNTER SYMPTOMS
BACK PAIN: 0
NAUSEA: 0
CHEST TIGHTNESS: 0
CONSTIPATION: 0

## 2018-06-25 ENCOUNTER — OFFICE VISIT (OUTPATIENT)
Dept: PULMONOLOGY | Age: 68
End: 2018-06-25

## 2018-06-25 VITALS
SYSTOLIC BLOOD PRESSURE: 138 MMHG | BODY MASS INDEX: 42.12 KG/M2 | HEIGHT: 72 IN | WEIGHT: 311 LBS | HEART RATE: 56 BPM | TEMPERATURE: 98.3 F | RESPIRATION RATE: 16 BRPM | DIASTOLIC BLOOD PRESSURE: 75 MMHG | OXYGEN SATURATION: 97 %

## 2018-06-25 DIAGNOSIS — J41.0 SIMPLE CHRONIC BRONCHITIS (HCC): ICD-10-CM

## 2018-06-25 DIAGNOSIS — J01.01 ACUTE RECURRENT MAXILLARY SINUSITIS: ICD-10-CM

## 2018-06-25 DIAGNOSIS — G47.33 OSA (OBSTRUCTIVE SLEEP APNEA): Primary | ICD-10-CM

## 2018-06-25 DIAGNOSIS — J96.11 CHRONIC RESPIRATORY FAILURE WITH HYPOXIA (HCC): ICD-10-CM

## 2018-06-25 PROCEDURE — 99214 OFFICE O/P EST MOD 30 MIN: CPT | Performed by: INTERNAL MEDICINE

## 2018-06-25 PROCEDURE — G8417 CALC BMI ABV UP PARAM F/U: HCPCS | Performed by: INTERNAL MEDICINE

## 2018-06-25 PROCEDURE — 3017F COLORECTAL CA SCREEN DOC REV: CPT | Performed by: INTERNAL MEDICINE

## 2018-06-25 PROCEDURE — G8599 NO ASA/ANTIPLAT THER USE RNG: HCPCS | Performed by: INTERNAL MEDICINE

## 2018-06-25 PROCEDURE — 3023F SPIROM DOC REV: CPT | Performed by: INTERNAL MEDICINE

## 2018-06-25 PROCEDURE — 1036F TOBACCO NON-USER: CPT | Performed by: INTERNAL MEDICINE

## 2018-06-25 PROCEDURE — 4040F PNEUMOC VAC/ADMIN/RCVD: CPT | Performed by: INTERNAL MEDICINE

## 2018-06-25 PROCEDURE — G8926 SPIRO NO PERF OR DOC: HCPCS | Performed by: INTERNAL MEDICINE

## 2018-06-25 PROCEDURE — G8427 DOCREV CUR MEDS BY ELIG CLIN: HCPCS | Performed by: INTERNAL MEDICINE

## 2018-06-25 PROCEDURE — 1123F ACP DISCUSS/DSCN MKR DOCD: CPT | Performed by: INTERNAL MEDICINE

## 2018-06-25 RX ORDER — AMOXICILLIN AND CLAVULANATE POTASSIUM 875; 125 MG/1; MG/1
1 TABLET, FILM COATED ORAL 2 TIMES DAILY
Qty: 28 TABLET | Refills: 1 | Status: SHIPPED | OUTPATIENT
Start: 2018-06-25 | End: 2018-07-23

## 2018-06-25 ASSESSMENT — SLEEP AND FATIGUE QUESTIONNAIRES
HOW LIKELY ARE YOU TO NOD OFF OR FALL ASLEEP WHEN YOU ARE A PASSENGER IN A CAR FOR AN HOUR WITHOUT A BREAK: 1
HOW LIKELY ARE YOU TO NOD OFF OR FALL ASLEEP WHILE SITTING QUIETLY AFTER LUNCH WITHOUT ALCOHOL: 2
ESS TOTAL SCORE: 13
NECK CIRCUMFERENCE (INCHES): 19
HOW LIKELY ARE YOU TO NOD OFF OR FALL ASLEEP WHILE WATCHING TV: 3
HOW LIKELY ARE YOU TO NOD OFF OR FALL ASLEEP WHILE SITTING AND READING: 2
HOW LIKELY ARE YOU TO NOD OFF OR FALL ASLEEP WHILE SITTING AND TALKING TO SOMEONE: 0
HOW LIKELY ARE YOU TO NOD OFF OR FALL ASLEEP WHILE LYING DOWN TO REST IN THE AFTERNOON WHEN CIRCUMSTANCES PERMIT: 2
HOW LIKELY ARE YOU TO NOD OFF OR FALL ASLEEP WHILE SITTING INACTIVE IN A PUBLIC PLACE: 2
HOW LIKELY ARE YOU TO NOD OFF OR FALL ASLEEP IN A CAR, WHILE STOPPED FOR A FEW MINUTES IN TRAFFIC: 1

## 2018-06-25 ASSESSMENT — ENCOUNTER SYMPTOMS
DIARRHEA: 0
EYE DISCHARGE: 0
SHORTNESS OF BREATH: 0
CONSTIPATION: 0
ABDOMINAL PAIN: 0
CHOKING: 0
VOICE CHANGE: 0
EYE PAIN: 0
EYE ITCHING: 0
COUGH: 0
SORE THROAT: 0

## 2018-07-29 ENCOUNTER — HOSPITAL ENCOUNTER (EMERGENCY)
Age: 68
Discharge: HOME | End: 2018-07-29
Payer: MEDICARE

## 2018-07-29 ENCOUNTER — APPOINTMENT (OUTPATIENT)
Dept: GENERAL RADIOLOGY | Age: 68
End: 2018-07-29
Payer: MEDICARE

## 2018-07-29 VITALS
RESPIRATION RATE: 20 BRPM | DIASTOLIC BLOOD PRESSURE: 87 MMHG | TEMPERATURE: 98.1 F | OXYGEN SATURATION: 95 % | BODY MASS INDEX: 39.76 KG/M2 | HEIGHT: 73 IN | WEIGHT: 300 LBS | SYSTOLIC BLOOD PRESSURE: 167 MMHG | HEART RATE: 57 BPM

## 2018-07-29 DIAGNOSIS — M54.31 SCIATICA OF RIGHT SIDE: ICD-10-CM

## 2018-07-29 DIAGNOSIS — M25.551 RIGHT HIP PAIN: Primary | ICD-10-CM

## 2018-07-29 PROCEDURE — 6370000000 HC RX 637 (ALT 250 FOR IP): Performed by: NURSE PRACTITIONER

## 2018-07-29 PROCEDURE — 73502 X-RAY EXAM HIP UNI 2-3 VIEWS: CPT

## 2018-07-29 PROCEDURE — 6360000002 HC RX W HCPCS: Performed by: NURSE PRACTITIONER

## 2018-07-29 PROCEDURE — 96372 THER/PROPH/DIAG INJ SC/IM: CPT

## 2018-07-29 PROCEDURE — 99283 EMERGENCY DEPT VISIT LOW MDM: CPT

## 2018-07-29 RX ORDER — PREDNISONE 10 MG/1
60 TABLET ORAL DAILY
Qty: 30 TABLET | Refills: 0 | Status: SHIPPED | OUTPATIENT
Start: 2018-07-29 | End: 2018-08-03

## 2018-07-29 RX ORDER — NAPROXEN 500 MG/1
500 TABLET ORAL 2 TIMES DAILY WITH MEALS
Qty: 30 TABLET | Refills: 0 | Status: SHIPPED | OUTPATIENT
Start: 2018-07-29 | End: 2018-08-20

## 2018-07-29 RX ORDER — PREDNISONE 20 MG/1
60 TABLET ORAL ONCE
Status: COMPLETED | OUTPATIENT
Start: 2018-07-29 | End: 2018-07-29

## 2018-07-29 RX ORDER — KETOROLAC TROMETHAMINE 30 MG/ML
30 INJECTION, SOLUTION INTRAMUSCULAR; INTRAVENOUS ONCE
Status: COMPLETED | OUTPATIENT
Start: 2018-07-29 | End: 2018-07-29

## 2018-07-29 RX ORDER — ORPHENADRINE CITRATE 30 MG/ML
60 INJECTION INTRAMUSCULAR; INTRAVENOUS ONCE
Status: COMPLETED | OUTPATIENT
Start: 2018-07-29 | End: 2018-07-29

## 2018-07-29 RX ORDER — METHOCARBAMOL 500 MG/1
500 TABLET, FILM COATED ORAL 3 TIMES DAILY
Qty: 20 TABLET | Refills: 0 | Status: SHIPPED | OUTPATIENT
Start: 2018-07-29 | End: 2018-08-08

## 2018-07-29 RX ADMIN — ORPHENADRINE CITRATE 60 MG: 30 INJECTION INTRAMUSCULAR; INTRAVENOUS at 17:13

## 2018-07-29 RX ADMIN — KETOROLAC TROMETHAMINE 30 MG: 30 INJECTION, SOLUTION INTRAMUSCULAR at 17:13

## 2018-07-29 RX ADMIN — PREDNISONE 60 MG: 20 TABLET ORAL at 17:13

## 2018-07-29 ASSESSMENT — ENCOUNTER SYMPTOMS
ABDOMINAL PAIN: 0
BACK PAIN: 0
SHORTNESS OF BREATH: 0
COUGH: 0
NAUSEA: 0
DIARRHEA: 0
COLOR CHANGE: 0
VOMITING: 0

## 2018-07-29 ASSESSMENT — PAIN SCALES - GENERAL: PAINLEVEL_OUTOF10: 3

## 2018-07-29 ASSESSMENT — PAIN DESCRIPTION - ORIENTATION: ORIENTATION: RIGHT

## 2018-07-29 ASSESSMENT — PAIN DESCRIPTION - LOCATION: LOCATION: HIP

## 2018-07-29 NOTE — ED PROVIDER NOTES
reveseral end peristomal hernia repair.  CHOLECYSTECTOMY, OPEN N/A 9-17-13    LAPAROSCOPIC CONVERTED TO OPEN CHOLECYSTECTOMY WITH    COLON SURGERY      COLONOSCOPY  2008    COLONOSCOPY  12/4/2013    Severe Diverticulosis unable to finish    COLONOSCOPY  4/30/14    diverticula-10 year f/u    COLOSTOMY  Jan 2014    CYSTOSCOPY  12/10/13    with bladder biopsy    CYSTOSCOPY  1-28-14    Cystourethroscopy, left ureteral catheter     HERNIA REPAIR  08/14/2015    OPEN INCISIONAL HERNIA REPAIR WITH MESH, BILATERAL COMPONENT SEPARATION, LYSIS OF ADHESIONS    OTHER SURGICAL HISTORY  1-28-14    LeftColectomy with End Colostomy, Splenic Flexure Mobilization, Takedown of Colovesical Fistula    TIBIA FRACTURE SURGERY Right 2003    Fracture lower leg during chain saw accident. Surgery x 2       Medications:  Discharge Medication List as of 7/29/2018  5:16 PM      CONTINUE these medications which have NOT CHANGED    Details   BASAGLAR KWIKPEN 100 UNIT/ML injection pen INJECT 50 UNITS INTO THE SKIN NIGHTLY, Disp-15 pen, R-3Normal      pseudoephedrine (SUDAFED 12 HOUR) 120 MG extended release tablet Take 1 tablet by mouth every 12 hours, Disp-24 tablet, R-0Normal      silver sulfADIAZINE (SILVADENE) 1 % cream Apply topically daily. , Disp-50 g, R-1, Normal      insulin aspart (NOVOLOG) 100 UNIT/ML injection pen Inject 5 Units into the skin 2 times daily (with meals), Disp-5 pen, R-3Normal      oxybutynin (DITROPAN XL) 15 MG extended release tablet Take 1 tablet by mouth Daily, Disp-90 tablet, R-1Normal      glimepiride (AMARYL) 4 MG tablet TAKE 1 TABLET TWICE A DAY, Disp-180 tablet, R-3Normal      traZODone (DESYREL) 50 MG tablet Take 1 tablet by mouth nightly, Disp-30 tablet, R-5Normal      furosemide (LASIX) 40 MG tablet Take 1 tablet by mouth See Admin Instructions Take 2 tablets in the morning and 1 tablet in the evening, Disp-270 tablet, R-3Normal      hydrALAZINE (APRESOLINE) 50 MG tablet Take 1 tablet by mouth every 8 hours, Disp-270 tablet, R-3Normal      atorvastatin (LIPITOR) 40 MG tablet Take 1 tablet by mouth nightly, Disp-90 tablet, R-3Normal      isosorbide mononitrate (IMDUR) 30 MG extended release tablet Take 1 tablet by mouth daily, Disp-90 tablet, R-3Normal      Insulin Pen Needle 31G X 5 MM MISC DAILY Starting Fri 2/23/2018, Disp-400 each, R-5, Normal      sertraline (ZOLOFT) 50 MG tablet Take 1 tablet by mouth daily, Disp-90 tablet, R-3Normal      atenolol (TENORMIN) 50 MG tablet Take 1 tablet by mouth daily, Disp-90 tablet, R-3Normal      omeprazole (PRILOSEC) 40 MG delayed release capsule Take 1 capsule by mouth daily, Disp-90 capsule, R-3Normal      montelukast (SINGULAIR) 10 MG tablet TAKE ONE TABLET BY MOUTH ONCE DAILY, Disp-90 tablet, R-3Please consider 90 day supplies to promote better adherenceNormal      glucose blood VI test strips (ASCENSIA AUTODISC VI;ONE TOUCH ULTRA TEST VI) strip Disp-100 strip, R-5, NormalDX: E11.9 FSBS daily. One Touch ultra      aspirin 81 MG chewable tablet Take 1 tablet by mouth daily, Disp-30 tablet, R-0Print      Blood Glucose Monitoring Suppl PRABHAKAR DAILY Starting 3/23/2016, Until Discontinued, Disp-1 Device, R-0, NormalOne Touch Ultra      Multiple Vitamins-Minerals (THERAPEUTIC MULTIVITAMIN-MINERALS) tablet Take 1 tablet by mouth daily      ferrous sulfate 325 (65 FE) MG tablet Take 325 mg by mouth daily. Review of Systems:  Review of Systems   HENT: Negative for congestion. Respiratory: Negative for cough and shortness of breath. Cardiovascular: Negative for chest pain. Gastrointestinal: Negative for abdominal pain, diarrhea, nausea and vomiting. Genitourinary: Negative for difficulty urinating and dysuria. Musculoskeletal: Positive for arthralgias. Negative for back pain. Patient is complaining of right hip pain, states about 2 weeks ago he went to step up on a ladder to get out of the swimming pool developed sudden onset of pain.   He states radiates into the thigh when I manipulate the right leg. He also has some underlying arthritis. However, I estimate there is LOW risk for FRACTURE, COMPARTMENT SYNDROME, DEEP VENOUS THROMBOSIS, SEPTIC ARTHRITIS, TENDON OR NEUROVASCULAR INJURY, thus I consider the discharge disposition reasonable. Therefore, shared medical decision was made between the patient myself and we agreed he could be discharged home with outpatient follow-up. Patient was discharged home with a referral to orthopedic. Given prescriptions for Robaxin, Naprosyn and prednisone. Educated take medicine as prescribed. Educated to return for any worsening symptoms with the follow-up with PCP as well. The patient tolerated their visit well. I saw the patient independently with physician available for consultation as needed. The patient and / or the family were informed of the results of any tests, a time was given to answer questions, a plan was proposed and they agreed with plan. Patient verbalized understanding of discharge instructions and was discharged from the department in stable condition. CLINICAL IMPRESSION:  1. Right hip pain    2.  Sciatica of right side        DISPOSITION Decision To Discharge 07/29/2018 05:14:30 PM      PATIENT REFERRED TO:  SHAKIRA Hernandez - [address] [address]  [phone]    Schedule an appointment as soon as possible for a visit   As needed    Elizabeth Henning MD  [facility]  [phone]    Schedule an appointment as soon as possible for a visit in 1 day  This is an orthopedic referral, call tomorrow make an appointment      DISCHARGE MEDICATIONS:  Discharge Medication List as of 7/29/2018  5:16 PM      START taking these medications    Details   naproxen (NAPROSYN) 500 MG tablet Take 1 tablet by mouth 2 times daily (with meals), Disp-30 tablet, R-0Print      methocarbamol (ROBAXIN) 500 MG tablet Take 1 tablet by mouth 3 times daily for 10

## 2018-07-30 ENCOUNTER — OFFICE VISIT (OUTPATIENT)
Dept: ORTHOPEDIC SURGERY | Age: 68
End: 2018-07-30

## 2018-07-30 VITALS
WEIGHT: 300 LBS | DIASTOLIC BLOOD PRESSURE: 77 MMHG | SYSTOLIC BLOOD PRESSURE: 97 MMHG | BODY MASS INDEX: 39.76 KG/M2 | HEART RATE: 63 BPM | HEIGHT: 73 IN

## 2018-07-30 DIAGNOSIS — M70.61 GREATER TROCHANTERIC BURSITIS OF RIGHT HIP: Primary | ICD-10-CM

## 2018-07-30 PROCEDURE — 1036F TOBACCO NON-USER: CPT | Performed by: ORTHOPAEDIC SURGERY

## 2018-07-30 PROCEDURE — 1101F PT FALLS ASSESS-DOCD LE1/YR: CPT | Performed by: ORTHOPAEDIC SURGERY

## 2018-07-30 PROCEDURE — 99203 OFFICE O/P NEW LOW 30 MIN: CPT | Performed by: ORTHOPAEDIC SURGERY

## 2018-07-30 PROCEDURE — 4040F PNEUMOC VAC/ADMIN/RCVD: CPT | Performed by: ORTHOPAEDIC SURGERY

## 2018-07-30 PROCEDURE — G8417 CALC BMI ABV UP PARAM F/U: HCPCS | Performed by: ORTHOPAEDIC SURGERY

## 2018-07-30 PROCEDURE — 3017F COLORECTAL CA SCREEN DOC REV: CPT | Performed by: ORTHOPAEDIC SURGERY

## 2018-07-30 PROCEDURE — 1123F ACP DISCUSS/DSCN MKR DOCD: CPT | Performed by: ORTHOPAEDIC SURGERY

## 2018-07-30 PROCEDURE — 20611 DRAIN/INJ JOINT/BURSA W/US: CPT | Performed by: ORTHOPAEDIC SURGERY

## 2018-07-30 PROCEDURE — G8427 DOCREV CUR MEDS BY ELIG CLIN: HCPCS | Performed by: ORTHOPAEDIC SURGERY

## 2018-07-30 PROCEDURE — G8599 NO ASA/ANTIPLAT THER USE RNG: HCPCS | Performed by: ORTHOPAEDIC SURGERY

## 2018-07-30 NOTE — PROGRESS NOTES
CHIEF COMPLAINT:    Chief Complaint   Patient presents with    Hip Pain     CK RIGHT HIP, INJURED 2 WEEKS AGO WHILE CLIMBING UP A LADDER TO GET ON A BOAT. INJURED SAME HIP ABOUT 1 YEAR AGO DOING SAME THING       HISTORY OF PRESENT ILLNESS:                The patient is a 69 y.o. male who presents to clinic for evaluation of right hip pain. He injured this hip about 2 weeks ago when climbing up a ladder trying to get into a boat. Past Medical History:   Diagnosis Date    Bladder fistula     resolved    C. difficile diarrhea 8/18/2015    +PCR    Cellulitis of right lower extremity 3/23/2016    Diabetes ([address]     Diverticulitis     DVT of lower extremity, bilateral ([address] 10/16/2013    Hematuria 1/2/2014    Pancreatitis ([address] 8/24/2013    Pulmonary emboli ([address] 2013    after cholecystectomy           The pain assessment was noted & is as follows:  Pain Assessment  Location of Pain: Pelvis  Location Modifiers: Right  Severity of Pain: 2  Quality of Pain: Aching, Dull, Sharp, Throbbing  Duration of Pain: Persistent  Frequency of Pain: Intermittent  Aggravating Factors: Stairs, Walking, Standing  Limiting Behavior: Yes  Relieving Factors: Rest  Result of Injury: Yes  Work-Related Injury: No  Are there other pain locations you wish to document?: No]      Work Status/Functionality:     Past Medical History: Medical history form was reviewed today & can be found in the media tab  Past Medical History:   Diagnosis Date    Bladder fistula     resolved    C. difficile diarrhea 8/18/2015    +PCR    Cellulitis of right lower extremity 3/23/2016    Diabetes ([address]     Diverticulitis     DVT of lower extremity, bilateral ([address] 10/16/2013    Hematuria 1/2/2014    Pancreatitis ([address] 8/24/2013    Pulmonary emboli ([address] 2013    after cholecystectomy       Past Surgical History:     Past Surgical History:   Procedure Laterality Date    ABDOMEN SURGERY  05/16/2014    reveseral end peristomal hernia repair.     CHOLECYSTECTOMY, OPEN N/A 9-17-13    LAPAROSCOPIC CONVERTED TO OPEN CHOLECYSTECTOMY WITH    COLON SURGERY      COLONOSCOPY  2008    COLONOSCOPY  12/4/2013    Severe Diverticulosis unable to finish    COLONOSCOPY  4/30/14    diverticula-10 year f/u    COLOSTOMY  Jan 2014    CYSTOSCOPY  12/10/13    with bladder biopsy    CYSTOSCOPY  1-28-14    Cystourethroscopy, left ureteral catheter     HERNIA REPAIR  08/14/2015    OPEN INCISIONAL HERNIA REPAIR WITH MESH, BILATERAL COMPONENT SEPARATION, LYSIS OF ADHESIONS    OTHER SURGICAL HISTORY  1-28-14    LeftColectomy with End Colostomy, Splenic Flexure Mobilization, Takedown of Colovesical Fistula    TIBIA FRACTURE SURGERY Right 2003    Fracture lower leg during chain saw accident. Surgery x 2     Current Medications:     Current Outpatient Prescriptions:     naproxen (NAPROSYN) 500 MG tablet, Take 1 tablet by mouth 2 times daily (with meals), Disp: 30 tablet, Rfl: 0    methocarbamol (ROBAXIN) 500 MG tablet, Take 1 tablet by mouth 3 times daily for 10 days, Disp: 20 tablet, Rfl: 0    predniSONE (DELTASONE) 10 MG tablet, Take 6 tablets by mouth daily for 5 doses, Disp: 30 tablet, Rfl: 0    BASAGLAR KWIKPEN 100 UNIT/ML injection pen, INJECT 50 UNITS INTO THE SKIN NIGHTLY, Disp: 15 pen, Rfl: 3    pseudoephedrine (SUDAFED 12 HOUR) 120 MG extended release tablet, Take 1 tablet by mouth every 12 hours, Disp: 24 tablet, Rfl: 0    silver sulfADIAZINE (SILVADENE) 1 % cream, Apply topically daily. , Disp: 50 g, Rfl: 1    insulin aspart (NOVOLOG) 100 UNIT/ML injection pen, Inject 5 Units into the skin 2 times daily (with meals), Disp: 5 pen, Rfl: 3    oxybutynin (DITROPAN XL) 15 MG extended release tablet, Take 1 tablet by mouth Daily, Disp: 90 tablet, Rfl: 1    glimepiride (AMARYL) 4 MG tablet, TAKE 1 TABLET TWICE A DAY, Disp: 180 tablet, Rfl: 3    traZODone (DESYREL) 50 MG tablet, Take 1 tablet by mouth nightly, Disp: 30 tablet, Rfl: 5    furosemide (LASIX) 40 MG 1.  Right hip trochanteric bursitis     We discussed treatment options and the patient elected to receive a trochanteric cortisone injection to the right hip. He is diabetic and was warned to closely monitor his blood sugars. He was also given some stretching exercises he may perform at home. He will return if his symptoms do not improve with this treatment. I discussed in detail the risks, benefits, and complications of an injection which include but are not limited to infection, skin reactions, hot swollen joints, and anaphylaxis with the patient. The patient verbalized good understanding and gave informed consent for the injection. The skin was prepped using sterile alcohol. A sterile 22-gauge needle was inserted into the area of maximal tenderness over the greater trochanter and a mixture of 4 mL of 2% Carbocaine, 4 mL of 0.25% Marcaine, and 80 mg of Depo-Medrol was injected under sterile technique. The needle was withdrawn and the puncture site sealed with a Band-Aid. Technique: Under sterile conditions a Son[employer] ultrasound unit with a variable frequency (6.0-15.0 MHz) linear transducer was used to localize the placement of a 22-gauge needle into the area of maximal tenderness over the right hip greater trochanter. Findings: Successful needle placement for Hip injection. Final images were taken and saved for permanent record. The patient tolerated the injection well. The patient was instructed to call the office immediately if there is any pain, redness, warmth, fever, or chills. I have personally performed and/or participated in the history, exam and medical decision making and agree with all pertinent clinical information. I have also reviewed and agree with the past medical, family and social history unless otherwise noted. This dictation was performed with a verbal recognition program (DRAGON) and it was checked for errors.  It is possible that there are still dictated errors within this office note. If so, please bring any errors to my attention for an addendum. All efforts were made to ensure that this office note is accurate.           Winter Sanches MD

## 2018-08-03 DIAGNOSIS — K21.9 GASTROESOPHAGEAL REFLUX DISEASE WITHOUT ESOPHAGITIS: ICD-10-CM

## 2018-08-03 RX ORDER — OMEPRAZOLE 40 MG/1
40 CAPSULE, DELAYED RELEASE ORAL DAILY
Qty: 90 CAPSULE | Refills: 3 | Status: SHIPPED | OUTPATIENT
Start: 2018-08-03 | End: 2019-04-15 | Stop reason: SDUPTHER

## 2018-08-13 ENCOUNTER — OFFICE VISIT (OUTPATIENT)
Dept: ORTHOPEDIC SURGERY | Age: 68
End: 2018-08-13

## 2018-08-13 VITALS — WEIGHT: 300 LBS | HEIGHT: 73 IN | BODY MASS INDEX: 39.76 KG/M2

## 2018-08-13 DIAGNOSIS — S76.011A STRAIN OF FLEXOR MUSCLE OF RIGHT HIP, INITIAL ENCOUNTER: Primary | ICD-10-CM

## 2018-08-13 PROCEDURE — G8427 DOCREV CUR MEDS BY ELIG CLIN: HCPCS | Performed by: PHYSICIAN ASSISTANT

## 2018-08-13 PROCEDURE — 4040F PNEUMOC VAC/ADMIN/RCVD: CPT | Performed by: PHYSICIAN ASSISTANT

## 2018-08-13 PROCEDURE — 1036F TOBACCO NON-USER: CPT | Performed by: PHYSICIAN ASSISTANT

## 2018-08-13 PROCEDURE — 1123F ACP DISCUSS/DSCN MKR DOCD: CPT | Performed by: PHYSICIAN ASSISTANT

## 2018-08-13 PROCEDURE — G8417 CALC BMI ABV UP PARAM F/U: HCPCS | Performed by: PHYSICIAN ASSISTANT

## 2018-08-13 PROCEDURE — 99213 OFFICE O/P EST LOW 20 MIN: CPT | Performed by: PHYSICIAN ASSISTANT

## 2018-08-13 PROCEDURE — 3017F COLORECTAL CA SCREEN DOC REV: CPT | Performed by: PHYSICIAN ASSISTANT

## 2018-08-13 PROCEDURE — G8599 NO ASA/ANTIPLAT THER USE RNG: HCPCS | Performed by: PHYSICIAN ASSISTANT

## 2018-08-13 PROCEDURE — 1101F PT FALLS ASSESS-DOCD LE1/YR: CPT | Performed by: PHYSICIAN ASSISTANT

## 2018-08-13 RX ORDER — MELOXICAM 15 MG/1
15 TABLET ORAL DAILY
Qty: 30 TABLET | Refills: 0 | Status: SHIPPED | OUTPATIENT
Start: 2018-08-13 | End: 2018-09-04 | Stop reason: SDUPTHER

## 2018-08-13 NOTE — PROGRESS NOTES
CHIEF COMPLAINT:    Chief Complaint   Patient presents with    Hip Pain     CK RIGHT HIP. CORTISONE LAST VISIT ON 7/30/18-WHICH DIDN'T HELP       HISTORY OF PRESENT ILLNESS:                The patient is a 69 y.o. male returns to clinic with continued right hip pain. He was given a cortisone injection at his last visit which did not help with his symptoms. This is the gentleman who had an injury to this hip when getting out of his boat. He felt a pop in the hip at the time of the injury. Today, he points to the lateral aspect of the hip and groin for most of his pain. Pain is worsened with activity and relieved by rest.  He states that his pain is unpredictable and intermittent.     Past Medical History:   Diagnosis Date    Bladder fistula     resolved    C. difficile diarrhea 8/18/2015    +PCR    Cellulitis of right lower extremity 3/23/2016    Diabetes ([address]     Diverticulitis     DVT of lower extremity, bilateral ([address] 10/16/2013    Hematuria 1/2/2014    Pancreatitis ([address] 8/24/2013    Pulmonary emboli ([address] 2013    after cholecystectomy           The pain assessment was noted & is as follows:  Pain Assessment  Location of Pain: Pelvis  Location Modifiers: Right  Severity of Pain: 7  Quality of Pain: Aching, Sharp, Throbbing  Duration of Pain: Persistent  Frequency of Pain: Intermittent  Aggravating Factors: Stretching, Bending, Walking  Limiting Behavior: Some  Relieving Factors: Rest  Result of Injury: No  Work-Related Injury: No  Are there other pain locations you wish to document?: No]      Work Status/Functionality:     Past Medical History: Medical history form was reviewed today & can be found in the media tab  Past Medical History:   Diagnosis Date    Bladder fistula     resolved    C. difficile diarrhea 8/18/2015    +PCR    Cellulitis of right lower extremity 3/23/2016    Diabetes ([address]     Diverticulitis     DVT of lower extremity, bilateral ([address] 10/16/2013    Hematuria 1/2/2014  Pancreatitis ([facility] 8/24/2013    Pulmonary emboli ([facility] 2013    after cholecystectomy       Past Surgical History:     Past Surgical History:   Procedure Laterality Date    ABDOMEN SURGERY  05/16/2014    reveseral end peristomal hernia repair.  CHOLECYSTECTOMY, OPEN N/A 9-17-13    LAPAROSCOPIC CONVERTED TO OPEN CHOLECYSTECTOMY WITH    COLON SURGERY      COLONOSCOPY  2008    COLONOSCOPY  12/4/2013    Severe Diverticulosis unable to finish    COLONOSCOPY  4/30/14    diverticula-10 year f/u    COLOSTOMY  Jan 2014    CYSTOSCOPY  12/10/13    with bladder biopsy    CYSTOSCOPY  1-28-14    Cystourethroscopy, left ureteral catheter     HERNIA REPAIR  08/14/2015    OPEN INCISIONAL HERNIA REPAIR WITH MESH, BILATERAL COMPONENT SEPARATION, LYSIS OF ADHESIONS    OTHER SURGICAL HISTORY  1-28-14    LeftColectomy with End Colostomy, Splenic Flexure Mobilization, Takedown of Colovesical Fistula    TIBIA FRACTURE SURGERY Right 2003    Fracture lower leg during chain saw accident. Surgery x 2     Current Medications:     Current Outpatient Prescriptions:     meloxicam (MOBIC) 15 MG tablet, Take 1 tablet by mouth daily, Disp: 30 tablet, Rfl: 0    omeprazole (PRILOSEC) 40 MG delayed release capsule, Take 1 capsule by mouth daily, Disp: 90 capsule, Rfl: 3    naproxen (NAPROSYN) 500 MG tablet, Take 1 tablet by mouth 2 times daily (with meals), Disp: 30 tablet, Rfl: 0    BASAGLAR KWIKPEN 100 UNIT/ML injection pen, INJECT 50 UNITS INTO THE SKIN NIGHTLY, Disp: 15 pen, Rfl: 3    pseudoephedrine (SUDAFED 12 HOUR) 120 MG extended release tablet, Take 1 tablet by mouth every 12 hours, Disp: 24 tablet, Rfl: 0    silver sulfADIAZINE (SILVADENE) 1 % cream, Apply topically daily. , Disp: 50 g, Rfl: 1    insulin aspart (NOVOLOG) 100 UNIT/ML injection pen, Inject 5 Units into the skin 2 times daily (with meals), Disp: 5 pen, Rfl: 3    oxybutynin (DITROPAN XL) 15 MG extended release tablet, Take 1 tablet by mouth Daily, Disp: 90 tablet, Rfl: 1    glimepiride (AMARYL) 4 MG tablet, TAKE 1 TABLET TWICE A DAY, Disp: 180 tablet, Rfl: 3    traZODone (DESYREL) 50 MG tablet, Take 1 tablet by mouth nightly, Disp: 30 tablet, Rfl: 5    furosemide (LASIX) 40 MG tablet, Take 1 tablet by mouth See Admin Instructions Take 2 tablets in the morning and 1 tablet in the evening, Disp: 270 tablet, Rfl: 3    hydrALAZINE (APRESOLINE) 50 MG tablet, Take 1 tablet by mouth every 8 hours, Disp: 270 tablet, Rfl: 3    atorvastatin (LIPITOR) 40 MG tablet, Take 1 tablet by mouth nightly, Disp: 90 tablet, Rfl: 3    isosorbide mononitrate (IMDUR) 30 MG extended release tablet, Take 1 tablet by mouth daily, Disp: 90 tablet, Rfl: 3    Insulin Pen Needle 31G X 5 MM MISC, 1 each by Does not apply route daily, Disp: 400 each, Rfl: 5    sertraline (ZOLOFT) 50 MG tablet, Take 1 tablet by mouth daily, Disp: 90 tablet, Rfl: 3    atenolol (TENORMIN) 50 MG tablet, Take 1 tablet by mouth daily, Disp: 90 tablet, Rfl: 3    montelukast (SINGULAIR) 10 MG tablet, TAKE ONE TABLET BY MOUTH ONCE DAILY, Disp: 90 tablet, Rfl: 3    glucose blood VI test strips (ASCENSIA AUTODISC VI;ONE TOUCH ULTRA TEST VI) strip, DX: E11.9 FSBS daily. One Touch ultra, Disp: 100 strip, Rfl: 5    aspirin 81 MG chewable tablet, Take 1 tablet by mouth daily, Disp: 30 tablet, Rfl: 0    Blood Glucose Monitoring Suppl PRABHAKAR, 1 Device by Does not apply route daily One Touch Ultra, Disp: 1 Device, Rfl: 0    Multiple Vitamins-Minerals (THERAPEUTIC MULTIVITAMIN-MINERALS) tablet, Take 1 tablet by mouth daily, Disp: , Rfl:     ferrous sulfate 325 (65 FE) MG tablet, Take 325 mg by mouth daily. , Disp: , Rfl:   Allergies:  Norco [hydrocodone-acetaminophen]  Social History:    reports that he is a non-smoker but has been exposed to tobacco smoke. He has never used smokeless tobacco. He reports that he drinks alcohol. He reports that he does not use drugs.   Family History:   Family History   Problem Relation Age of Onset    Heart Disease Father     Heart Attack Father     Stroke Brother     Heart Disease Brother     High Blood Pressure Brother     Kidney Disease Mother         kidney removed       REVIEW OF SYSTEMS:   For new problems, a full review of systems will be found scanned in the patient's chart. CONSTITUTIONAL: Denies unexplained weight loss, fevers, chills   NEUROLOGICAL: Denies unsteady gait or progressive weakness  SKIN: Denies skin changes, delayed healing, rash, itching       PHYSICAL EXAM:    Vitals: Height 6' 1\" (1.854 m), weight 300 lb (136.1 kg). GENERAL EXAM:  · General Apparence: Patient is adequately groomed with no evidence of malnutrition. · Orientation: The patient is oriented to time, place and person. · Mood & Affect:The patient's mood and affect are appropriate       Right hip PHYSICAL EXAMINATION:  · Inspection:  No visible deformity. No significant edema, erythema or ecchymosis. · Palpation:  Mild tenderness to palpation along the anterior aspect of the greater trochanter and at the groin      · Range of Motion: Range of motion of the hip does not significantly reproduce pain    · Strength: No gross strength deficits are noted    · Special Tests:  Negative logroll testing. Negative straight leg raise testing. Negative Homans testing. · Skin:  There are no rashes, ulcerations or lesions. · Gait & station: Normal gait today      · Additional Examinations:        Left Lower Extremity: Examination of the left lower extremity does not show any tenderness, deformity or injury. Range of motion is unremarkable. There is no gross instability. There are no rashes, ulcerations or lesions.   Strength and tone are normal.      Diagnostic Testing:      none    Orders     Orders Placed This Encounter   Procedures    [facility]     Referral Priority:   Routine     Referral Type:   Eval and Treat     Referral Reason:   Specialty Services Required

## 2018-08-15 ENCOUNTER — HOSPITAL ENCOUNTER (OUTPATIENT)
Dept: PHYSICAL THERAPY | Age: 68
Setting detail: THERAPIES SERIES
Discharge: HOME | End: 2018-08-15
Payer: MEDICARE

## 2018-08-15 PROCEDURE — 97110 THERAPEUTIC EXERCISES: CPT

## 2018-08-15 PROCEDURE — G8979 MOBILITY GOAL STATUS: HCPCS

## 2018-08-15 PROCEDURE — 97161 PT EVAL LOW COMPLEX 20 MIN: CPT

## 2018-08-15 PROCEDURE — G8978 MOBILITY CURRENT STATUS: HCPCS

## 2018-08-15 NOTE — PLAN OF CARE
[facility], [address]  Phone: [phone]  Fax [phone]     Physical Therapy Certification    Dear Referring Practitioner: Dr Gillian Christensen,    We had the pleasure of evaluating the following patient for physical therapy services at [address] [place] [address]. A summary of our findings can be found in the initial assessment below. This includes our plan of care. If you have any questions or concerns regarding these findings, please do not hesitate to contact me at the office phone number checked above. Thank you for the referral.       Physician Signature:_______________________________Date:__________________  By signing above (or electronic signature), therapists plan is approved by physician    Patient: Quita Schaefer   : 1950   MRN: [id number]  Referring Physician: Referring Practitioner: Dr Gillian Christensen      Evaluation Date: 8/15/2018      Medical Diagnosis Information:  Diagnosis: I81.667M (ICD-10-CM) - Strain of flexor muscle of right hip, initial encounter   Treatment Diagnosis: R hip pain M25.551                                         Insurance information: PT Insurance Information:      Precautions/ Contra-indications:Type 2 DM, H/O CHF, h/o DVT, h/o Cellulitis, HTN, [place]  Latex Allergy:  [x]NO      []YES  Preferred Language for Healthcare:   [x]English       []other:    SUBJECTIVE: Patient stated complaint of R hip pain that began about 6 weeks ago while he was exiting the water via Boat ladder and he felt a pain the day after. He has had an injection that did not relieve the pain. He uses Icy hot and extra strength Tylenol for temporary relief.     Relevant Medical History: Similar past hip injury climbing up a boat ladder (resolved itself)  Functional Disability Index:PT G-Codes  Functional Assessment Tool Used: LEFS  Score: 76%  Functional Limitation: Mobility: Walking and moving contact their primary care physician regarding ROS issues if not already being addressed at this time. Co-morbidities/Complexities (which will affect course of rehabilitation):   []None           Arthritic conditions   []Rheumatoid arthritis (M05.9)  []Osteoarthritis (M19.91)   Cardiovascular conditions   [x]Hypertension (I10)  []Hyperlipidemia (E78.5)  []Angina pectoris (I20)  []Atherosclerosis (I70)   Musculoskeletal conditions   []Disc pathology   []Congenital spine pathologies   []Prior surgical intervention  []Osteoporosis (M81.8)  []Osteopenia (M85.8)   Endocrine conditions   []Hypothyroid (E03.9)  []Hyperthyroid Gastrointestinal conditions   []Constipation (F44.23)   Metabolic conditions   [x]Morbid obesity (E66.01)  [x]Diabetes type 1(E10.65) or 2 (E11.65)   []Neuropathy (G60.9)     Pulmonary conditions   []Asthma (J45)  []Coughing   []COPD (J44.9)   Psychological Disorders  []Anxiety (F41.9)  []Depression (F32.9)   []Other:   [x]Other:          Barriers to/and or personal factors that will affect rehab potential:              []Age  []Sex              []Motivation/Lack of Motivation                        [x]Co-Morbidities              []Cognitive Function, education/learning barriers              []Environmental, home barriers              []profession/work barriers  []past PT/medical experience  []other:  Justification:    Falls Risk Assessment (30 days):   [x] Falls Risk assessed and no intervention required. [] Falls Risk assessed and Patient requires intervention due to being higher risk   TUG score (>12s at risk):     [] Falls education provided, including       G-Codes:  PT G-Codes  Functional Assessment Tool Used: LEFS  Score: 76%  Functional Limitation: Mobility: Walking and moving around  Mobility: Walking and Moving Around Current Status (): At least 60 percent but less than 80 percent impaired, limited or restricted  Mobility: Walking and Moving Around Goal Status ():  At least 20 percent but less than 40 percent impaired, limited or restricted    ASSESSMENT:   Functional Impairments:     [x]Noted lumbar/proximal hip/LE joint hypomobility   [x]Decreased LE functional ROM   [x]Decreased core/proximal hip strength and neuromuscular control   [x]Decreased LE functional strength   [x]Reduced balance/proprioceptive control   []other:      Functional Activity Limitations (from functional questionnaire and intake)   [x]Reduced ability to tolerate prolonged functional positions   [x]Reduced ability or difficulty with changes of positions or transfers between positions   []Reduced ability to maintain good posture and demonstrate good body mechanics with sitting, bending, and lifting   []Reduced ability to sleep   [] Reduced ability or tolerance with driving and/or computer work   [x]Reduced ability to perform lifting, carrying tasks   [x]Reduced ability to squat   []Reduced ability to forward bend   [x]Reduced ability to ambulate prolonged functional periods/distances/surfaces   [x]Reduced ability to ascend/descend stairs   []Reduced ability to run, hop, cut or jump   []other:    Participation Restrictions   []Reduced participation in self care activities   [x]Reduced participation in home management activities   []Reduced participation in work activities   [x]Reduced participation in social activities. [x]Reduced participation in sport/recreation activities. Classification :    []Signs/symptoms consistent with post-surgical status including decreased ROM, strength and function.    [x]Signs/symptoms consistent with joint sprain/strain   []Signs/symptoms consistent with patella-femoral syndrome   []Signs/symptoms consistent with knee OA/hip OA   []Signs/symptoms consistent with internal derangement of knee/Hip   []Signs/symptoms consistent with functional hip weakness/NMR control      []Signs/symptoms consistent with tendinitis/tendinosis    []signs/symptoms consistent with pathology which may protection, postural re-education, activity modification, progression of HEP. HEP instruction: (see scanned forms)    GOALS:  Patient stated goal: Pain to go away    Therapist goals for Patient:   Short Term Goals: To be achieved in: 2 weeks  1. Independent in HEP and progression per patient tolerance, in order to prevent re-injury. 2. Patient will have a decrease in pain to facilitate improvement in movement, function, and ADLs as indicated by Functional Deficits. Long Term Goals: To be achieved in: 4 weeks  1. Disability index score of 28% or less for the LEFS to assist with reaching prior level of function. 2. Patient will demonstrate increased AROM to R hip ER = to L to allow for proper joint functioning as indicated by patients Functional Deficits. 3. Patient will demonstrate an increase in Strength to good proximal hip strength and control, within 5lb HHD in LE to allow for proper functional mobility as indicated by patients Functional Deficits. 4. Patient will return to walking, transfers, standing, lifting and stairs functional activities without increased symptoms or restriction.    5. Pain to go away(patient specific functional goal)       Electronically signed by:  Marlene Medina PT

## 2018-08-20 ENCOUNTER — HOSPITAL ENCOUNTER (OUTPATIENT)
Dept: PHYSICAL THERAPY | Age: 68
Setting detail: THERAPIES SERIES
Discharge: HOME | End: 2018-08-20
Payer: MEDICARE

## 2018-08-20 ENCOUNTER — OFFICE VISIT (OUTPATIENT)
Dept: FAMILY MEDICINE CLINIC | Age: 68
End: 2018-08-20

## 2018-08-20 VITALS
DIASTOLIC BLOOD PRESSURE: 90 MMHG | HEIGHT: 73 IN | OXYGEN SATURATION: 96 % | BODY MASS INDEX: 41.54 KG/M2 | RESPIRATION RATE: 19 BRPM | SYSTOLIC BLOOD PRESSURE: 144 MMHG | WEIGHT: 313.4 LBS | HEART RATE: 91 BPM

## 2018-08-20 DIAGNOSIS — J30.89 NON-SEASONAL ALLERGIC RHINITIS, UNSPECIFIED TRIGGER: ICD-10-CM

## 2018-08-20 DIAGNOSIS — Z12.5 PROSTATE CANCER SCREENING: ICD-10-CM

## 2018-08-20 DIAGNOSIS — I10 ESSENTIAL HYPERTENSION: Chronic | ICD-10-CM

## 2018-08-20 DIAGNOSIS — E66.01 CLASS 3 SEVERE OBESITY DUE TO EXCESS CALORIES WITH SERIOUS COMORBIDITY AND BODY MASS INDEX (BMI) OF 40.0 TO 44.9 IN ADULT (HCC): ICD-10-CM

## 2018-08-20 DIAGNOSIS — Z79.4 TYPE 2 DIABETES MELLITUS WITH HYPERGLYCEMIA, WITH LONG-TERM CURRENT USE OF INSULIN (HCC): Primary | ICD-10-CM

## 2018-08-20 DIAGNOSIS — E66.01 MORBID OBESITY WITH BMI OF 40.0-44.9, ADULT (HCC): ICD-10-CM

## 2018-08-20 DIAGNOSIS — E11.65 TYPE 2 DIABETES MELLITUS WITH HYPERGLYCEMIA, WITH LONG-TERM CURRENT USE OF INSULIN (HCC): Primary | ICD-10-CM

## 2018-08-20 DIAGNOSIS — I35.0 NONRHEUMATIC AORTIC VALVE STENOSIS: ICD-10-CM

## 2018-08-20 DIAGNOSIS — E78.2 MIXED HYPERLIPIDEMIA: ICD-10-CM

## 2018-08-20 DIAGNOSIS — H69.81 DYSFUNCTION OF RIGHT EUSTACHIAN TUBE: ICD-10-CM

## 2018-08-20 DIAGNOSIS — I50.42 CHRONIC COMBINED SYSTOLIC AND DIASTOLIC CONGESTIVE HEART FAILURE (HCC): ICD-10-CM

## 2018-08-20 DIAGNOSIS — N28.9 RENAL INSUFFICIENCY: ICD-10-CM

## 2018-08-20 LAB
A/G RATIO: 2 (ref 1.1–2.2)
ALBUMIN SERPL-MCNC: 4.3 G/DL (ref 3.4–5)
ALP BLD-CCNC: 72 U/L (ref 40–129)
ALT SERPL-CCNC: 28 U/L (ref 10–40)
ANION GAP SERPL CALCULATED.3IONS-SCNC: 19 MMOL/L (ref 3–16)
AST SERPL-CCNC: 23 U/L (ref 15–37)
BASOPHILS ABSOLUTE: 0.1 K/UL (ref 0–0.2)
BASOPHILS RELATIVE PERCENT: 0.9 %
BILIRUB SERPL-MCNC: 1 MG/DL (ref 0–1)
BUN BLDV-MCNC: 23 MG/DL (ref 7–20)
CALCIUM SERPL-MCNC: 9.1 MG/DL (ref 8.3–10.6)
CHLORIDE BLD-SCNC: 102 MMOL/L (ref 99–110)
CHOLESTEROL, TOTAL: 98 MG/DL (ref 0–199)
CO2: 22 MMOL/L (ref 21–32)
CREAT SERPL-MCNC: 0.8 MG/DL (ref 0.8–1.3)
EOSINOPHILS ABSOLUTE: 0.2 K/UL (ref 0–0.6)
EOSINOPHILS RELATIVE PERCENT: 2.6 %
GFR AFRICAN AMERICAN: >60
GFR NON-AFRICAN AMERICAN: >60
GLOBULIN: 2.2 G/DL
GLUCOSE BLD-MCNC: 101 MG/DL (ref 70–99)
HBA1C MFR BLD: 6.9 %
HCT VFR BLD CALC: 45 % (ref 40.5–52.5)
HDLC SERPL-MCNC: 41 MG/DL (ref 40–60)
HEMOGLOBIN: 14.7 G/DL (ref 13.5–17.5)
LDL CHOLESTEROL CALCULATED: 44 MG/DL
LYMPHOCYTES ABSOLUTE: 1 K/UL (ref 1–5.1)
LYMPHOCYTES RELATIVE PERCENT: 15 %
MCH RBC QN AUTO: 28.4 PG (ref 26–34)
MCHC RBC AUTO-ENTMCNC: 32.6 G/DL (ref 31–36)
MCV RBC AUTO: 87 FL (ref 80–100)
MONOCYTES ABSOLUTE: 0.4 K/UL (ref 0–1.3)
MONOCYTES RELATIVE PERCENT: 5.7 %
NEUTROPHILS ABSOLUTE: 5.1 K/UL (ref 1.7–7.7)
NEUTROPHILS RELATIVE PERCENT: 75.8 %
PDW BLD-RTO: 17.1 % (ref 12.4–15.4)
PLATELET # BLD: 160 K/UL (ref 135–450)
PMV BLD AUTO: 8.6 FL (ref 5–10.5)
POTASSIUM SERPL-SCNC: 4.4 MMOL/L (ref 3.5–5.1)
PRO-BNP: 945 PG/ML (ref 0–124)
PROSTATE SPECIFIC ANTIGEN: 3.5 NG/ML (ref 0–4)
RBC # BLD: 5.17 M/UL (ref 4.2–5.9)
SODIUM BLD-SCNC: 143 MMOL/L (ref 136–145)
TOTAL PROTEIN: 6.5 G/DL (ref 6.4–8.2)
TRIGL SERPL-MCNC: 66 MG/DL (ref 0–150)
VLDLC SERPL CALC-MCNC: 13 MG/DL
WBC # BLD: 6.7 K/UL (ref 4–11)

## 2018-08-20 PROCEDURE — 99213 OFFICE O/P EST LOW 20 MIN: CPT | Performed by: NURSE PRACTITIONER

## 2018-08-20 PROCEDURE — 1101F PT FALLS ASSESS-DOCD LE1/YR: CPT | Performed by: NURSE PRACTITIONER

## 2018-08-20 PROCEDURE — G8417 CALC BMI ABV UP PARAM F/U: HCPCS | Performed by: NURSE PRACTITIONER

## 2018-08-20 PROCEDURE — G8599 NO ASA/ANTIPLAT THER USE RNG: HCPCS | Performed by: NURSE PRACTITIONER

## 2018-08-20 PROCEDURE — 3044F HG A1C LEVEL LT 7.0%: CPT | Performed by: NURSE PRACTITIONER

## 2018-08-20 PROCEDURE — 97140 MANUAL THERAPY 1/> REGIONS: CPT

## 2018-08-20 PROCEDURE — 97110 THERAPEUTIC EXERCISES: CPT

## 2018-08-20 PROCEDURE — 83036 HEMOGLOBIN GLYCOSYLATED A1C: CPT | Performed by: NURSE PRACTITIONER

## 2018-08-20 PROCEDURE — 1036F TOBACCO NON-USER: CPT | Performed by: NURSE PRACTITIONER

## 2018-08-20 PROCEDURE — 3017F COLORECTAL CA SCREEN DOC REV: CPT | Performed by: NURSE PRACTITIONER

## 2018-08-20 PROCEDURE — G8427 DOCREV CUR MEDS BY ELIG CLIN: HCPCS | Performed by: NURSE PRACTITIONER

## 2018-08-20 PROCEDURE — 1123F ACP DISCUSS/DSCN MKR DOCD: CPT | Performed by: NURSE PRACTITIONER

## 2018-08-20 PROCEDURE — 2022F DILAT RTA XM EVC RTNOPTHY: CPT | Performed by: NURSE PRACTITIONER

## 2018-08-20 PROCEDURE — 4040F PNEUMOC VAC/ADMIN/RCVD: CPT | Performed by: NURSE PRACTITIONER

## 2018-08-20 RX ORDER — AZELASTINE 1 MG/ML
1 SPRAY, METERED NASAL 2 TIMES DAILY
Qty: 1 BOTTLE | Refills: 3 | Status: SHIPPED | OUTPATIENT
Start: 2018-08-20 | End: 2018-12-20 | Stop reason: SDUPTHER

## 2018-08-20 ASSESSMENT — ENCOUNTER SYMPTOMS
NAUSEA: 0
CONSTIPATION: 0
BACK PAIN: 0
SHORTNESS OF BREATH: 1
CHEST TIGHTNESS: 0

## 2018-08-20 ASSESSMENT — PATIENT HEALTH QUESTIONNAIRE - PHQ9
2. FEELING DOWN, DEPRESSED OR HOPELESS: 0
1. LITTLE INTEREST OR PLEASURE IN DOING THINGS: 0
SUM OF ALL RESPONSES TO PHQ9 QUESTIONS 1 & 2: 0
SUM OF ALL RESPONSES TO PHQ QUESTIONS 1-9: 0
SUM OF ALL RESPONSES TO PHQ QUESTIONS 1-9: 0

## 2018-08-20 NOTE — PROGRESS NOTES
He is alert and oriented to person, place, and time. Sensory exam of the foot is normal, tested with the monofilament. Good pulses, no lesions or ulcers, good peripheral pulses. Skin: Skin is warm and dry. Psychiatric: He has a normal mood and affect. Current Outpatient Prescriptions   Medication Sig Dispense Refill    meloxicam (MOBIC) 15 MG tablet Take 1 tablet by mouth daily 30 tablet 0    omeprazole (PRILOSEC) 40 MG delayed release capsule Take 1 capsule by mouth daily 90 capsule 3    BASAGLAR KWIKPEN 100 UNIT/ML injection pen INJECT 50 UNITS INTO THE SKIN NIGHTLY 15 pen 3    silver sulfADIAZINE (SILVADENE) 1 % cream Apply topically daily. 50 g 1    oxybutynin (DITROPAN XL) 15 MG extended release tablet Take 1 tablet by mouth Daily 90 tablet 1    glimepiride (AMARYL) 4 MG tablet TAKE 1 TABLET TWICE A  tablet 3    traZODone (DESYREL) 50 MG tablet Take 1 tablet by mouth nightly 30 tablet 5    furosemide (LASIX) 40 MG tablet Take 1 tablet by mouth See Admin Instructions Take 2 tablets in the morning and 1 tablet in the evening 270 tablet 3    hydrALAZINE (APRESOLINE) 50 MG tablet Take 1 tablet by mouth every 8 hours 270 tablet 3    atorvastatin (LIPITOR) 40 MG tablet Take 1 tablet by mouth nightly 90 tablet 3    isosorbide mononitrate (IMDUR) 30 MG extended release tablet Take 1 tablet by mouth daily 90 tablet 3    Insulin Pen Needle 31G X 5 MM MISC 1 each by Does not apply route daily 400 each 5    sertraline (ZOLOFT) 50 MG tablet Take 1 tablet by mouth daily 90 tablet 3    atenolol (TENORMIN) 50 MG tablet Take 1 tablet by mouth daily 90 tablet 3    montelukast (SINGULAIR) 10 MG tablet TAKE ONE TABLET BY MOUTH ONCE DAILY 90 tablet 3    glucose blood VI test strips (ASCENSIA AUTODISC VI;ONE TOUCH ULTRA TEST VI) strip DX: E11.9 FSBS daily.  One Touch ultra 100 strip 5    aspirin 81 MG chewable tablet Take 1 tablet by mouth daily 30 tablet 0    Blood Glucose Monitoring Suppl PRABHAKAR 1

## 2018-08-20 NOTE — FLOWSHEET NOTE
Manual Treatments:  PROM / STM / Oscillations-Mobs:  G-I, II, III, IV (PA's, Inf., Post.)  [x] (38024) Provided manual therapy to mobilize LE, proximal hip and/or LS spine soft tissue/joints for the purpose of modulating pain, promoting relaxation,  increasing ROM, reducing/eliminating soft tissue swelling/inflammation/restriction, improving soft tissue extensibility and allowing for proper ROM for normal function with self care, mobility, lifting and ambulation. Modalities:   Patient declined  Charges:  Timed Code Treatment Minutes: 40   Total Treatment Minutes: 40     [] EVAL (LOW) 59488 (typically 20 minutes face-to-face)  [] EVAL (MOD) 96151 (typically 30 minutes face-to-face)  [] EVAL (HIGH) 20199 (typically 45 minutes face-to-face)  [] RE-EVAL     [x] DW(44901) x  2   [] IONTO  [] NMR (50014) x      [] VASO  [x] Manual (09832) x  1    [] Other:  [] TA x       [] Mech Traction (73686)  [] ES(attended) (00502)      [] ES (un) (05271):     GOALS:  Patient stated goal: Pain to go away     Therapist goals for Patient:   Short Term Goals: To be achieved in: 2 weeks  1. Independent in HEP and progression per patient tolerance, in order to prevent re-injury. 2. Patient will have a decrease in pain to facilitate improvement in movement, function, and ADLs as indicated by Functional Deficits.     Long Term Goals: To be achieved in: 4 weeks  1. Disability index score of 28% or less for the LEFS to assist with reaching prior level of function. 2. Patient will demonstrate increased AROM to R hip ER = to L to allow for proper joint functioning as indicated by patients Functional Deficits. 3. Patient will demonstrate an increase in Strength to good proximal hip strength and control, within 5lb HHD in LE to allow for proper functional mobility as indicated by patients Functional Deficits.    4. Patient will return to walking, transfers, standing, lifting and stairs functional activities without increased

## 2018-08-22 ENCOUNTER — HOSPITAL ENCOUNTER (OUTPATIENT)
Dept: PHYSICAL THERAPY | Age: 68
Setting detail: THERAPIES SERIES
Discharge: HOME | End: 2018-08-22
Payer: MEDICARE

## 2018-08-22 PROCEDURE — 97110 THERAPEUTIC EXERCISES: CPT | Performed by: PHYSICAL THERAPIST

## 2018-08-22 PROCEDURE — 97140 MANUAL THERAPY 1/> REGIONS: CPT | Performed by: PHYSICAL THERAPIST

## 2018-08-22 NOTE — FLOWSHEET NOTE
Long sit piriformis S HEP   Standing Modified HF S To be done on the stairs  W/ a handrail HEP   Supine HF S over the edge of the bed  HEP   Bridging Add to HEP 8/20/18   Supine B Hip Fallout GTT Add to Hep 8/20/18   Clamshells  2x10    LTR x10 R/L    SL ITB S x1' W/ pillow support             Standing ITB S 3x20\" HEP 8/22/18   Standing [address]  Standing hip ext x10  x10 posture cues and hands on wall for both             Manual Intervention     HF S, Piriformis S and  ITB S, HS S, STM TFL/ITB/lat quad 20 min                             NMR re-education                                       Therapeutic Exercise and NMR EXR  [x] (75597) Provided verbal/tactile cueing for activities related to strengthening, flexibility, endurance, ROM for improvements in LE, proximal hip, and core control with self care, mobility, lifting, ambulation.  [] (73543) Provided verbal/tactile cueing for activities related to improving balance, coordination, kinesthetic sense, posture, motor skill, proprioception  to assist with LE, proximal hip, and core control in self care, mobility, lifting, ambulation and eccentric single leg control.      NMR and Therapeutic Activities:    [] (51865 or 29529) Provided verbal/tactile cueing for activities related to improving balance, coordination, kinesthetic sense, posture, motor skill, proprioception and motor activation to allow for proper function of core, proximal hip and LE with self care and ADLs  [] (23670) Gait Re-education- Provided training and instruction to the patient for proper LE, core and proximal hip recruitment and positioning and eccentric body weight control with ambulation re-education including up and down stairs     Home Exercise Program:    [x] (83333) Reviewed/Progressed HEP activities related to strengthening, flexibility, endurance, ROM of core, proximal hip and LE for functional self-care, mobility, lifting and ambulation/stair navigation   [] (86135)Reviewed/Progressed HEP HHD in LE to allow for proper functional mobility as indicated by patients Functional Deficits. 4. Patient will return to walking, transfers, standing, lifting and stairs functional activities without increased symptoms or restriction. 5. Pain to go away(patient specific functional goal)    Progression Towards Functional goals:  [x] Patient is progressing as expected towards functional goals listed. [] Progression is slowed due to complexities listed. [] Progression has been slowed due to co-morbidities. [] Plan just implemented, too soon to assess goals progression  [] Other:     ASSESSMENT: decreased hip pain and improved posture following manuals and stretching. Cues not to push in to pain with activities and to stretch as soon as able after sitting in the car for extended time periods.     Treatment/Activity Tolerance:  [x] Patient tolerated treatment well [] Patient limited by fatique  [] Patient limited by pain  [] Patient limited by other medical complications  [] Other:     Prognosis: [x] Good [] Fair  [] Poor    Patient Requires Follow-up: [x] Yes  [] No    PLAN:   [x] Continue per plan of care [] Alter current plan (see comments)  [] Plan of care initiated [] Hold pending MD visit [] Discharge    Electronically signed by: Bhanu Scott, PT PT

## 2018-08-27 ENCOUNTER — HOSPITAL ENCOUNTER (OUTPATIENT)
Dept: PHYSICAL THERAPY | Age: 68
Setting detail: THERAPIES SERIES
Discharge: HOME | End: 2018-08-27
Payer: MEDICARE

## 2018-08-27 PROCEDURE — 97110 THERAPEUTIC EXERCISES: CPT | Performed by: PHYSICAL THERAPIST

## 2018-08-27 PROCEDURE — 97140 MANUAL THERAPY 1/> REGIONS: CPT | Performed by: PHYSICAL THERAPIST

## 2018-08-27 NOTE — FLOWSHEET NOTE
[facility],  [address]  Phone: [phone]  Fax [phone]    Physical Therapy Daily Treatment Note  Date:  2018    Patient Name:  Jeri Dooley  \"Joe\"  :  1950  MRN: [id number]  Restrictions/Precautions:    Medical/Treatment Diagnosis Information:  Diagnosis: V08.737T (ICD-10-CM) - Strain of flexor muscle of right hip, initial encounter  Treatment Diagnosis: R hip pain Z79.308  Insurance/Certification information:  PT Insurance Information: 1971 [employer]  Physician Information:  Referring Practitioner: Dr Larry Wagner of care signed (Y/N):     Date of Patient follow up with Physician:     G-Code (if applicable):      Date G-Code Applied:    PT G-Codes  Functional Assessment Tool Used: LEFS  Score: 76%  Functional Limitation: Mobility: Walking and moving around  Mobility: Walking and Moving Around Current Status (): At least 60 percent but less than 80 percent impaired, limited or restricted  Mobility: Walking and Moving Around Goal Status (): At least 20 percent but less than 40 percent impaired, limited or restricted    Progress Note: []  Yes  []  No  Next due by: Visit #10       Latex Allergy:  [x]NO      []YES  Preferred Language for Healthcare:   [x]English       []other:    Visit # Insurance Allowable Requires auth   [address]  [address]    []no        []yes:       Pain level:  6/10  Currently    SUBJECTIVE:  Pt reports feeling better the rest of the day following last session. Pain has come back though and giving him trouble sleeping as well. Standing ITB is bothering his foot so stopped that for now.     OBJECTIVE:  Observation: tightness R ITB/TFL/lateral quad  Test measurements:      RESTRICTIONS/PRECAUTIONS: DM, H/O Cellulitis, H/O DVT  Exercises/Interventions:     Therapeutic Ex Sets/sec/reps Notes   Supine piriformis S HEP   Long sit piriformis S HEP   Standing Modified HF S To be done on the stairs  W/ a handrail HEP   Supine HF S over the edge of the bed  HEP   Bridging Add to HEP 8/20/18   Supine B Hip Fallout GTT Add to Hep 8/20/18   Clamshells  2x10    LTR x10 R/L    SL ITB S x1' W/ pillow support   BKFO x10 R         Standing ITB S  HEP 8/22/18   Standing HSC  Standing hip ext x10  x10 posture cues and hands on wall for both, HEP 8/27   Standing gastroc/hip flexor S 5x10\" R HEP 8/27/18        Manual Intervention     HF S, S, STM TFL/ITB/lat quad 20 min                             NMR re-education                                       Therapeutic Exercise and NMR EXR  [x] (20708) Provided verbal/tactile cueing for activities related to strengthening, flexibility, endurance, ROM for improvements in LE, proximal hip, and core control with self care, mobility, lifting, ambulation.  [] (05042) Provided verbal/tactile cueing for activities related to improving balance, coordination, kinesthetic sense, posture, motor skill, proprioception  to assist with LE, proximal hip, and core control in self care, mobility, lifting, ambulation and eccentric single leg control.      NMR and Therapeutic Activities:    [] (92944 or 19107) Provided verbal/tactile cueing for activities related to improving balance, coordination, kinesthetic sense, posture, motor skill, proprioception and motor activation to allow for proper function of core, proximal hip and LE with self care and ADLs  [] (04907) Gait Re-education- Provided training and instruction to the patient for proper LE, core and proximal hip recruitment and positioning and eccentric body weight control with ambulation re-education including up and down stairs     Home Exercise Program:    [x] (88437) Reviewed/Progressed HEP activities related to strengthening, flexibility, endurance, ROM of core, proximal hip and LE for functional self-care, mobility, lifting and ambulation/stair navigation   [] (65440)Reviewed/Progressed HEP activities related to improving balance, coordination, kinesthetic sense, posture, motor skill, proprioception of core, proximal hip and LE for self care, mobility, lifting, and ambulation/stair navigation      Manual Treatments:  PROM / STM / Oscillations-Mobs:  G-I, II, III, IV (PA's, Inf., Post.)  [x] (92097) Provided manual therapy to mobilize LE, proximal hip and/or LS spine soft tissue/joints for the purpose of modulating pain, promoting relaxation,  increasing ROM, reducing/eliminating soft tissue swelling/inflammation/restriction, improving soft tissue extensibility and allowing for proper ROM for normal function with self care, mobility, lifting and ambulation. Modalities:  CP x 10 min R hip and thigh    Charges:  Timed Code Treatment Minutes: 40   Total Treatment Minutes: 50     [] EVAL (LOW) 34033 (typically 20 minutes face-to-face)  [] EVAL (MOD) 69523 (typically 30 minutes face-to-face)  [] EVAL (HIGH) 64880 (typically 45 minutes face-to-face)  [] RE-EVAL     [x] HN(28490) x  2   [] IONTO  [] NMR (28200) x      [] VASO  [x] Manual (37381) x  1    [] Other:  [] TA x       [] Mech Traction (46824)  [] ES(attended) (25073)      [] ES (un) (27217):     GOALS:  Patient stated goal: Pain to go away     Therapist goals for Patient:   Short Term Goals: To be achieved in: 2 weeks  1. Independent in HEP and progression per patient tolerance, in order to prevent re-injury. 2. Patient will have a decrease in pain to facilitate improvement in movement, function, and ADLs as indicated by Functional Deficits.     Long Term Goals: To be achieved in: 4 weeks  1. Disability index score of 28% or less for the LEFS to assist with reaching prior level of function. 2. Patient will demonstrate increased AROM to R hip ER = to L to allow for proper joint functioning as indicated by patients Functional Deficits.    3. Patient will demonstrate an increase in Strength to good proximal hip strength and control, within 5lb HHD in LE to allow for proper functional mobility as indicated by patients Functional Deficits. 4. Patient will return to walking, transfers, standing, lifting and stairs functional activities without increased symptoms or restriction. 5. Pain to go away(patient specific functional goal)    Progression Towards Functional goals:  [x] Patient is progressing as expected towards functional goals listed. [] Progression is slowed due to complexities listed. [] Progression has been slowed due to co-morbidities. [] Plan just implemented, too soon to assess goals progression  [] Other:     ASSESSMENT: Able to tolerate standing gastroc/hip flexor stretch without increased pain. HEP progressed as above. Continued tightness and difficulty relaxing.     Treatment/Activity Tolerance:  [x] Patient tolerated treatment well [] Patient limited by fatique  [] Patient limited by pain  [] Patient limited by other medical complications  [] Other:     Prognosis: [x] Good [] Fair  [] Poor    Patient Requires Follow-up: [x] Yes  [] No    PLAN:   [x] Continue per plan of care [] Alter current plan (see comments)  [] Plan of care initiated [] Hold pending MD visit [] Discharge    Electronically signed by: Jeff Obrien PT PT

## 2018-08-29 ENCOUNTER — HOSPITAL ENCOUNTER (OUTPATIENT)
Dept: PHYSICAL THERAPY | Age: 68
Setting detail: THERAPIES SERIES
Discharge: HOME | End: 2018-08-29
Payer: MEDICARE

## 2018-08-29 PROCEDURE — 97110 THERAPEUTIC EXERCISES: CPT | Performed by: PHYSICAL THERAPIST

## 2018-08-29 PROCEDURE — 97140 MANUAL THERAPY 1/> REGIONS: CPT | Performed by: PHYSICAL THERAPIST

## 2018-08-29 NOTE — FLOWSHEET NOTE
[facility],  [address]  Phone: [phone]  Fax [phone]    Physical Therapy Daily Treatment Note  Date:  2018    Patient Name:  Graeme Gupta  \"Joe\"  :  1950  MRN: [id number]  Restrictions/Precautions:    Medical/Treatment Diagnosis Information:  Diagnosis: C86.662V (ICD-10-CM) - Strain of flexor muscle of right hip, initial encounter  Treatment Diagnosis: R hip pain R99.561  Insurance/Certification information:  PT Insurance Information: [facility]  Physician Information:  Referring Practitioner: Dr Zunilda Montes De Oca of care signed (Y/N):     Date of Patient follow up with Physician:     G-Code (if applicable):      Date G-Code Applied:    PT G-Codes  Functional Assessment Tool Used: LEFS  Score: 76%  Functional Limitation: Mobility: Walking and moving around  Mobility: Walking and Moving Around Current Status (): At least 60 percent but less than 80 percent impaired, limited or restricted  Mobility: Walking and Moving Around Goal Status (): At least 20 percent but less than 40 percent impaired, limited or restricted    Progress Note: []  Yes  []  No  Next due by: Visit #10       Latex Allergy:  [x]NO      []YES  Preferred Language for Healthcare:   [x]English       []other:    Visit # Insurance Allowable Requires auth   5 [facility]    []no        []yes:       Pain level:  4/10  Currently    SUBJECTIVE:  Pt reports he has been using his SPC with walking as instructed LV. Hip is feeling better and he was able to mow the grass some yesterday.     OBJECTIVE:  Observation: tightness R ITB/TFL/lateral quad  Test measurements:      RESTRICTIONS/PRECAUTIONS: DM, H/O Cellulitis, H/O DVT  Exercises/Interventions:     Therapeutic Ex Sets/sec/reps Notes   Supine piriformis S HEP   Long sit piriformis S HEP   Standing Modified HF S To be done on the stairs  W/ a handrail HEP   Supine HF S over the edge of the related to improving balance, coordination, kinesthetic sense, posture, motor skill, proprioception of core, proximal hip and LE for self care, mobility, lifting, and ambulation/stair navigation      Manual Treatments:  PROM / STM / Oscillations-Mobs:  G-I, II, III, IV (PA's, Inf., Post.)  [x] (78730) Provided manual therapy to mobilize LE, proximal hip and/or LS spine soft tissue/joints for the purpose of modulating pain, promoting relaxation,  increasing ROM, reducing/eliminating soft tissue swelling/inflammation/restriction, improving soft tissue extensibility and allowing for proper ROM for normal function with self care, mobility, lifting and ambulation. Modalities:  CP x 10 min R hip and thigh    Charges:  Timed Code Treatment Minutes: 40   Total Treatment Minutes: 50     [] EVAL (LOW) 93198 (typically 20 minutes face-to-face)  [] EVAL (MOD) 72339 (typically 30 minutes face-to-face)  [] EVAL (HIGH) 09976 (typically 45 minutes face-to-face)  [] RE-EVAL     [x] AG(70500) x  2   [] IONTO  [] NMR (20245) x      [] VASO  [x] Manual (05507) x  1    [] Other:  [] TA x       [] Mech Traction (11831)  [] ES(attended) (94806)      [] ES (un) (54160):     GOALS:  Patient stated goal: Pain to go away     Therapist goals for Patient:   Short Term Goals: To be achieved in: 2 weeks  1. Independent in HEP and progression per patient tolerance, in order to prevent re-injury. 2. Patient will have a decrease in pain to facilitate improvement in movement, function, and ADLs as indicated by Functional Deficits.     Long Term Goals: To be achieved in: 4 weeks  1. Disability index score of 28% or less for the LEFS to assist with reaching prior level of function. 2. Patient will demonstrate increased AROM to R hip ER = to L to allow for proper joint functioning as indicated by patients Functional Deficits.    3. Patient will demonstrate an increase in Strength to good proximal hip strength and control, within 5lb HHD in LE to

## 2018-09-04 ENCOUNTER — HOSPITAL ENCOUNTER (OUTPATIENT)
Dept: PHYSICAL THERAPY | Age: 68
Setting detail: THERAPIES SERIES
Discharge: HOME | End: 2018-09-04
Payer: MEDICARE

## 2018-09-04 ENCOUNTER — OFFICE VISIT (OUTPATIENT)
Dept: ENT CLINIC | Age: 68
End: 2018-09-04

## 2018-09-04 VITALS
TEMPERATURE: 98.4 F | BODY MASS INDEX: 41.75 KG/M2 | SYSTOLIC BLOOD PRESSURE: 172 MMHG | HEIGHT: 73 IN | HEART RATE: 51 BPM | WEIGHT: 315 LBS | DIASTOLIC BLOOD PRESSURE: 83 MMHG

## 2018-09-04 DIAGNOSIS — H61.21 IMPACTED CERUMEN OF RIGHT EAR: ICD-10-CM

## 2018-09-04 DIAGNOSIS — H93.8X1 SENSATION OF FULLNESS IN RIGHT EAR: ICD-10-CM

## 2018-09-04 PROCEDURE — 69210 REMOVE IMPACTED EAR WAX UNI: CPT | Performed by: OTOLARYNGOLOGY

## 2018-09-04 PROCEDURE — 97110 THERAPEUTIC EXERCISES: CPT

## 2018-09-04 PROCEDURE — 97140 MANUAL THERAPY 1/> REGIONS: CPT

## 2018-09-04 NOTE — PROGRESS NOTES
Cerumen removed right ear with suction and curettes. Ear drum and middle ear normal.  Hearing is now subjectively improved.     Follow up: prn

## 2018-09-04 NOTE — FLOWSHEET NOTE
[facility], [address]  Phone: [phone]  Fax [phone]    Physical Therapy Daily Treatment Note  Date:  2018    Patient Name:  Loyda Machado  \"Joe\"  :  1950  MRN: [id number]  Restrictions/Precautions:    Medical/Treatment Diagnosis Information:  Diagnosis: S07.695N (ICD-10-CM) - Strain of flexor muscle of right hip, initial encounter  Treatment Diagnosis: R hip pain C52.777  Insurance/Certification information:  PT Insurance Information:  [address]  Physician Information:  Referring Practitioner: Dr Nicol Serrato of care signed (Y/N):     Date of Patient follow up with Physician:     G-Code (if applicable):      Date G-Code Applied:    PT G-Codes  Functional Assessment Tool Used: LEFS  Score: 76%  Functional Limitation: Mobility: Walking and moving around  Mobility: Walking and Moving Around Current Status (): At least 60 percent but less than 80 percent impaired, limited or restricted  Mobility: Walking and Moving Around Goal Status (): At least 20 percent but less than 40 percent impaired, limited or restricted    Progress Note: []  Yes  [x]  No  Next due by: Visit #10       Latex Allergy:  [x]NO      []YES  Preferred Language for Healthcare:   [x]English       []other:    Visit # Insurance Allowable Requires auth   [address]  [address]    []no        []yes:       Pain level:  4-9/10  Currently    SUBJECTIVE: Patient reports that he is very stiff and painful over the weekend. He did feel good last week until he mowed on a rider and went to [employer] where he had to transition  sit on a very low bench multiple signs. OBJECTIVE: Last MD progress note patient is to call MD office and report his ongoing pain.   Observation: tightness R ITB/TFL/lateral quad  Test measurements:      RESTRICTIONS/PRECAUTIONS: DM, H/O Cellulitis, H/O DVT  Exercises/Interventions:     Therapeutic Ex Sets/sec/reps Notes   Supine piriformis S HEP   Long sit piriformis S HEP   Standing Modified HF S To be done on the stairs  W/ a handrail HEP   Supine HF S over the edge of the bed  HEP   Bridging Add to HEP 8/20/18   Supine B Hip Fallout GTT Add to Hep 8/20/18   Clamshells Reverse clam     LTR x10 R/L    SL ITB S 3 min W/ pillow support prn   BKFO x10 R    Seated ring IR iso's 5\" x15    LAQ R/L 2 x 10           HEP 8/22/18   Standing [address]  Standing hip ext  Standing hip ext/TKE to fwd march    posture cues and hands on wall for both, HEP 8/27   Standing gastroc/hip flexor S 5x10\" R HEP 8/27/18        Manual Intervention     HF S,  ITB SS, STM TFL/ITB/lat quad, [place] HF/ITB 25 min                             NMR re-education                                       Therapeutic Exercise and NMR EXR  [x] (19704) Provided verbal/tactile cueing for activities related to strengthening, flexibility, endurance, ROM for improvements in LE, proximal hip, and core control with self care, mobility, lifting, ambulation.  [] (12026) Provided verbal/tactile cueing for activities related to improving balance, coordination, kinesthetic sense, posture, motor skill, proprioception  to assist with LE, proximal hip, and core control in self care, mobility, lifting, ambulation and eccentric single leg control.      NMR and Therapeutic Activities:    [] (60737 or 33567) Provided verbal/tactile cueing for activities related to improving balance, coordination, kinesthetic sense, posture, motor skill, proprioception and motor activation to allow for proper function of core, proximal hip and LE with self care and ADLs  [] (08797) Gait Re-education- Provided training and instruction to the patient for proper LE, core and proximal hip recruitment and positioning and eccentric body weight control with ambulation re-education including up and down stairs     Home Exercise Program:    [x] (85271) Reviewed/Progressed HEP activities related to strengthening, flexibility, as indicated by patients Functional Deficits. 3. Patient will demonstrate an increase in Strength to good proximal hip strength and control, within 5lb HHD in LE to allow for proper functional mobility as indicated by patients Functional Deficits. 4. Patient will return to walking, transfers, standing, lifting and stairs functional activities without increased symptoms or restriction. 5. Pain to go away(patient specific functional goal)    Progression Towards Functional goals:  [x] Patient is progressing as expected towards functional goals listed. [] Progression is slowed due to complexities listed. [] Progression has been slowed due to co-morbidities. [] Plan just implemented, too soon to assess goals progression  [] Other:     ASSESSMENT: Omit standing TB S at the wall and replace with Sidelying ITB S over the edge of the bed. Patient's pain level is not consistently improving. Tightness ITB and Hip Flexor. Treatment/Activity Tolerance:  [x] Patient tolerated treatment well [] Patient limited by fatique  [] Patient limited by pain  [] Patient limited by other medical complications  [] Other:     Prognosis: [x] Good [] Fair  [] Poor    Patient Requires Follow-up: [x] Yes  [] No    PLAN: Patient is to call MD office as instructed after 3 weeks of PT.   [x] Continue per plan of care [] Alter current plan (see comments)  [] Plan of care initiated [] Hold pending MD visit [] Discharge    Electronically signed by: Milka Nickerson PT

## 2018-09-05 ENCOUNTER — HOSPITAL ENCOUNTER (EMERGENCY)
Age: 68
Discharge: HOME | End: 2018-09-05
Payer: MEDICARE

## 2018-09-05 ENCOUNTER — TELEPHONE (OUTPATIENT)
Dept: FAMILY MEDICINE CLINIC | Age: 68
End: 2018-09-05

## 2018-09-05 ENCOUNTER — APPOINTMENT (OUTPATIENT)
Dept: GENERAL RADIOLOGY | Age: 68
End: 2018-09-05
Payer: MEDICARE

## 2018-09-05 VITALS
RESPIRATION RATE: 18 BRPM | WEIGHT: 300 LBS | DIASTOLIC BLOOD PRESSURE: 95 MMHG | SYSTOLIC BLOOD PRESSURE: 182 MMHG | TEMPERATURE: 98.1 F | HEIGHT: 73 IN | HEART RATE: 56 BPM | BODY MASS INDEX: 39.76 KG/M2 | OXYGEN SATURATION: 96 %

## 2018-09-05 DIAGNOSIS — S43.431A TEAR OF RIGHT GLENOID LABRUM, INITIAL ENCOUNTER: Primary | ICD-10-CM

## 2018-09-05 DIAGNOSIS — R06.02 SHORTNESS OF BREATH: Primary | ICD-10-CM

## 2018-09-05 DIAGNOSIS — S73.191A TEAR OF RIGHT ACETABULAR LABRUM, INITIAL ENCOUNTER: Primary | ICD-10-CM

## 2018-09-05 DIAGNOSIS — M25.551 PAIN OF RIGHT HIP JOINT: Primary | ICD-10-CM

## 2018-09-05 LAB
A/G RATIO: 1.5 (ref 1.1–2.2)
ALBUMIN SERPL-MCNC: 4.3 G/DL (ref 3.4–5)
ALP BLD-CCNC: 71 U/L (ref 40–129)
ALT SERPL-CCNC: 31 U/L (ref 10–40)
ANION GAP SERPL CALCULATED.3IONS-SCNC: 12 MMOL/L (ref 3–16)
AST SERPL-CCNC: 29 U/L (ref 15–37)
BACTERIA: ABNORMAL /HPF
BASOPHILS ABSOLUTE: 0.1 K/UL (ref 0–0.2)
BASOPHILS RELATIVE PERCENT: 1.1 %
BILIRUB SERPL-MCNC: 0.9 MG/DL (ref 0–1)
BILIRUBIN URINE: NEGATIVE
BLOOD, URINE: ABNORMAL
BUN BLDV-MCNC: 22 MG/DL (ref 7–20)
CALCIUM SERPL-MCNC: 9.4 MG/DL (ref 8.3–10.6)
CHLORIDE BLD-SCNC: 103 MMOL/L (ref 99–110)
CLARITY: CLEAR
CO2: 27 MMOL/L (ref 21–32)
COLOR: YELLOW
CREAT SERPL-MCNC: 0.7 MG/DL (ref 0.8–1.3)
EOSINOPHILS ABSOLUTE: 0.2 K/UL (ref 0–0.6)
EOSINOPHILS RELATIVE PERCENT: 3.2 %
EPITHELIAL CELLS, UA: ABNORMAL /HPF
GFR AFRICAN AMERICAN: >60
GFR NON-AFRICAN AMERICAN: >60
GLOBULIN: 2.9 G/DL
GLUCOSE BLD-MCNC: 87 MG/DL (ref 70–99)
GLUCOSE URINE: NEGATIVE MG/DL
HCT VFR BLD CALC: 45 % (ref 40.5–52.5)
HEMOGLOBIN: 14.6 G/DL (ref 13.5–17.5)
KETONES, URINE: NEGATIVE MG/DL
LEUKOCYTE ESTERASE, URINE: NEGATIVE
LYMPHOCYTES ABSOLUTE: 1.3 K/UL (ref 1–5.1)
LYMPHOCYTES RELATIVE PERCENT: 17.6 %
MCH RBC QN AUTO: 28 PG (ref 26–34)
MCHC RBC AUTO-ENTMCNC: 32.5 G/DL (ref 31–36)
MCV RBC AUTO: 86.1 FL (ref 80–100)
MICROSCOPIC EXAMINATION: YES
MONOCYTES ABSOLUTE: 0.5 K/UL (ref 0–1.3)
MONOCYTES RELATIVE PERCENT: 6.7 %
NEUTROPHILS ABSOLUTE: 5.4 K/UL (ref 1.7–7.7)
NEUTROPHILS RELATIVE PERCENT: 71.4 %
NITRITE, URINE: NEGATIVE
PDW BLD-RTO: 16.7 % (ref 12.4–15.4)
PH UA: 7
PLATELET # BLD: 182 K/UL (ref 135–450)
PMV BLD AUTO: 8 FL (ref 5–10.5)
POTASSIUM SERPL-SCNC: 4.5 MMOL/L (ref 3.5–5.1)
PRO-BNP: 1293 PG/ML (ref 0–124)
PROTEIN UA: 100 MG/DL
RBC # BLD: 5.22 M/UL (ref 4.2–5.9)
RBC UA: ABNORMAL /HPF (ref 0–2)
SODIUM BLD-SCNC: 142 MMOL/L (ref 136–145)
SPECIFIC GRAVITY UA: 1.02
TOTAL PROTEIN: 7.2 G/DL (ref 6.4–8.2)
TROPONIN: <0.01 NG/ML
URINE TYPE: ABNORMAL
UROBILINOGEN, URINE: 0.2 E.U./DL
WBC # BLD: 7.5 K/UL (ref 4–11)
WBC UA: ABNORMAL /HPF (ref 0–5)

## 2018-09-05 PROCEDURE — 80053 COMPREHEN METABOLIC PANEL: CPT

## 2018-09-05 PROCEDURE — 99285 EMERGENCY DEPT VISIT HI MDM: CPT

## 2018-09-05 PROCEDURE — 85025 COMPLETE CBC W/AUTO DIFF WBC: CPT

## 2018-09-05 PROCEDURE — 96374 THER/PROPH/DIAG INJ IV PUSH: CPT

## 2018-09-05 PROCEDURE — 71046 X-RAY EXAM CHEST 2 VIEWS: CPT

## 2018-09-05 PROCEDURE — 83880 ASSAY OF NATRIURETIC PEPTIDE: CPT

## 2018-09-05 PROCEDURE — 84484 ASSAY OF TROPONIN QUANT: CPT

## 2018-09-05 PROCEDURE — 6360000002 HC RX W HCPCS: Performed by: PHYSICIAN ASSISTANT

## 2018-09-05 PROCEDURE — 93005 ELECTROCARDIOGRAM TRACING: CPT | Performed by: PHYSICIAN ASSISTANT

## 2018-09-05 PROCEDURE — 81001 URINALYSIS AUTO W/SCOPE: CPT

## 2018-09-05 RX ORDER — TIZANIDINE 4 MG/1
4 TABLET ORAL EVERY 8 HOURS PRN
Qty: 30 TABLET | Refills: 0 | Status: SHIPPED | OUTPATIENT
Start: 2018-09-05 | End: 2018-09-12

## 2018-09-05 RX ORDER — CYCLOBENZAPRINE HCL 5 MG
5 TABLET ORAL 3 TIMES DAILY PRN
Qty: 30 TABLET | Status: CANCELLED | OUTPATIENT
Start: 2018-09-05 | End: 2018-09-15

## 2018-09-05 RX ORDER — MELOXICAM 15 MG/1
15 TABLET ORAL DAILY
Qty: 90 TABLET | Refills: 1 | Status: SHIPPED | OUTPATIENT
Start: 2018-09-05 | End: 2018-09-12

## 2018-09-05 RX ORDER — FUROSEMIDE 10 MG/ML
40 INJECTION INTRAMUSCULAR; INTRAVENOUS ONCE
Status: COMPLETED | OUTPATIENT
Start: 2018-09-05 | End: 2018-09-05

## 2018-09-05 RX ORDER — DIAZEPAM 2 MG/1
TABLET ORAL
Qty: 2 TABLET | Refills: 0 | Status: SHIPPED | OUTPATIENT
Start: 2018-09-05 | End: 2018-09-05

## 2018-09-05 RX ADMIN — FUROSEMIDE 40 MG: 10 INJECTION, SOLUTION INTRAMUSCULAR; INTRAVENOUS at 21:44

## 2018-09-05 ASSESSMENT — PAIN SCALES - GENERAL: PAINLEVEL_OUTOF10: 3

## 2018-09-05 ASSESSMENT — PAIN DESCRIPTION - PAIN TYPE: TYPE: ACUTE PAIN;CHRONIC PAIN

## 2018-09-05 ASSESSMENT — PAIN DESCRIPTION - LOCATION: LOCATION: HIP

## 2018-09-06 LAB
EKG ATRIAL RATE: 66 BPM
EKG DIAGNOSIS: NORMAL
EKG P AXIS: -18 DEGREES
EKG P-R INTERVAL: 284 MS
EKG Q-T INTERVAL: 416 MS
EKG QRS DURATION: 86 MS
EKG QTC CALCULATION (BAZETT): 436 MS
EKG R AXIS: 1 DEGREES
EKG T AXIS: 31 DEGREES
EKG VENTRICULAR RATE: 66 BPM

## 2018-09-06 PROCEDURE — 93010 ELECTROCARDIOGRAM REPORT: CPT | Performed by: INTERNAL MEDICINE

## 2018-09-06 NOTE — ED PROVIDER NOTES
 COLONOSCOPY  2008    COLONOSCOPY  12/4/2013    Severe Diverticulosis unable to finish    COLONOSCOPY  4/30/14    diverticula-10 year f/u    COLOSTOMY  Jan 2014    CYSTOSCOPY  12/10/13    with bladder biopsy    CYSTOSCOPY  1-28-14    Cystourethroscopy, left ureteral catheter     HERNIA REPAIR  08/14/2015    OPEN INCISIONAL HERNIA REPAIR WITH MESH, BILATERAL COMPONENT SEPARATION, LYSIS OF ADHESIONS    OTHER SURGICAL HISTORY  1-28-14    LeftColectomy with End Colostomy, Splenic Flexure Mobilization, Takedown of Colovesical Fistula    TIBIA FRACTURE SURGERY Right 2003    Fracture lower leg during chain saw accident. Surgery x 2     Family History   Problem Relation Age of Onset    Heart Disease Father     Heart Attack Father     Stroke Brother     Heart Disease Brother     High Blood Pressure Brother     Kidney Disease Mother         kidney removed     Social History     Social History    Marital status:      Spouse name: N/A    Number of children: N/A    Years of education: N/A     Occupational History    Not on file. Social History Main Topics    Smoking status: Passive Smoke Exposure - Never Smoker    Smokeless tobacco: Never Used    Alcohol use 0.0 oz/week      Comment: rare    Drug use: No    Sexual activity: Yes     Partners: Female     Other Topics Concern    Not on file     Social History Narrative    No narrative on file     No current facility-administered medications for this encounter.       Current Outpatient Prescriptions   Medication Sig Dispense Refill    meloxicam (MOBIC) 15 MG tablet Take 1 tablet by mouth daily 90 tablet 1    tiZANidine (ZANAFLEX) 4 MG tablet Take 1 tablet by mouth every 8 hours as needed (muscle pain) 30 tablet 0    azelastine (ASTELIN) 0.1 % nasal spray 1 spray by Nasal route 2 times daily Use in each nostril as directed 1 Bottle 3    omeprazole (PRILOSEC) 40 MG delayed release capsule Take 1 capsule by mouth daily 90 capsule 3    BASAGLAR Resp 18   Ht 6' 1\" (1.854 m)   Wt 300 lb (136.1 kg)   SpO2 98%   BMI 39.58 kg/m²   GENERAL APPEARANCE: Awake and alert. Cooperative. No acute distress. HEAD: Normocephalic. Atraumatic. EYES: PERRL. EOM's grossly intact. ENT: Mucous membranes are moist.   NECK: Supple. No JVD. No tracheal tenderness or deviation. No crepitus. HEART: RRR. No murmurs. No chest wall tenderness. LUNGS: Respirations unlabored. CTAB. Good air exchange. Speaking comfortably in full sentences. No wheezing, rhonchi, rales. ABDOMEN: Soft. Non-distended. Non-tender. No guarding or rebound. No midline pulsatile mass. EXTREMITIES: No peripheral edema. Moves all extremities equally. All extremities neurovascularly intact. SKIN: Warm and dry. No acute rashes. NEUROLOGICAL: Alert and oriented. CN's 2-12 intact. No gross facial drooping. Strength 5/5, sensation intact. PSYCHIATRIC: Normal mood and affect. RADIOLOGY  Xr Chest Standard (2 Vw)    Result Date: 9/5/2018  EXAMINATION: TWO VIEWS OF THE CHEST 9/5/2018 8:26 pm COMPARISON: 10/16/2017 HISTORY: ORDERING SYSTEM PROVIDED HISTORY: sob TECHNOLOGIST PROVIDED HISTORY: Reason for exam:->sob Ordering Physician Provided Reason for Exam: sob Acuity: Acute Type of Exam: Initial FINDINGS: Heart size is normal.  Moderate-sized hiatal hernia. Pulmonary vascular redistribution. No pneumothorax or pleural effusion. No lung consolidation. Pulmonary vascular redistribution. Otherwise no acute abnormality. Moderate-sized hiatal hernia. ED COURSE   I have evaluated this patient with him as couple practice. Pain control was not required while here in the emergency department. Urinalysis with 10-20 red blood cells. Otherwise unremarkable. CBC without leukocytosis or anemia. CMP unremarkable. Troponin less than 0.01. BNP approximately 12,000. Chest x-ray with pulmonary vascular redistribution. No other findings otherwise. EKG was sinus rhythm first-degree AV block.

## 2018-09-10 ENCOUNTER — HOSPITAL ENCOUNTER (OUTPATIENT)
Dept: INTERVENTIONAL RADIOLOGY/VASCULAR | Age: 68
Discharge: HOME | End: 2018-09-10
Payer: MEDICARE

## 2018-09-10 ENCOUNTER — HOSPITAL ENCOUNTER (OUTPATIENT)
Dept: MRI IMAGING | Age: 68
Discharge: HOME | End: 2018-09-10
Payer: MEDICARE

## 2018-09-10 DIAGNOSIS — S73.191A TEAR OF RIGHT ACETABULAR LABRUM, INITIAL ENCOUNTER: ICD-10-CM

## 2018-09-10 DIAGNOSIS — S73.191A ACETABULAR LABRUM TEAR, RIGHT, INITIAL ENCOUNTER: ICD-10-CM

## 2018-09-10 PROCEDURE — 73722 MRI JOINT OF LWR EXTR W/DYE: CPT

## 2018-09-10 PROCEDURE — 27093 INJECTION FOR HIP X-RAY: CPT

## 2018-09-10 PROCEDURE — A9579 GAD-BASE MR CONTRAST NOS,1ML: HCPCS | Performed by: RADIOLOGY

## 2018-09-10 PROCEDURE — 77002 NEEDLE LOCALIZATION BY XRAY: CPT

## 2018-09-10 PROCEDURE — 6360000004 HC RX CONTRAST MEDICATION: Performed by: RADIOLOGY

## 2018-09-10 RX ADMIN — GADOPENTETATE DIMEGLUMINE 0.1 ML: 469.01 INJECTION INTRAVENOUS at 12:45

## 2018-09-10 RX ADMIN — IOVERSOL 25 ML: 741 INJECTION INTRA-ARTERIAL; INTRAVENOUS at 12:40

## 2018-09-11 ENCOUNTER — OFFICE VISIT (OUTPATIENT)
Dept: CARDIOLOGY CLINIC | Age: 68
End: 2018-09-11

## 2018-09-11 VITALS
DIASTOLIC BLOOD PRESSURE: 70 MMHG | OXYGEN SATURATION: 95 % | HEART RATE: 61 BPM | SYSTOLIC BLOOD PRESSURE: 130 MMHG | BODY MASS INDEX: 41.75 KG/M2 | HEIGHT: 73 IN | WEIGHT: 315 LBS

## 2018-09-11 DIAGNOSIS — I25.10 CORONARY ARTERY DISEASE INVOLVING NATIVE CORONARY ARTERY OF NATIVE HEART WITHOUT ANGINA PECTORIS: ICD-10-CM

## 2018-09-11 DIAGNOSIS — G47.30 SEVERE SLEEP APNEA: ICD-10-CM

## 2018-09-11 DIAGNOSIS — R60.9 PERIPHERAL EDEMA: ICD-10-CM

## 2018-09-11 DIAGNOSIS — I10 ESSENTIAL HYPERTENSION: ICD-10-CM

## 2018-09-11 DIAGNOSIS — R06.02 SHORTNESS OF BREATH: ICD-10-CM

## 2018-09-11 DIAGNOSIS — I50.33 ACUTE ON CHRONIC DIASTOLIC CONGESTIVE HEART FAILURE (HCC): Primary | ICD-10-CM

## 2018-09-11 PROCEDURE — G8417 CALC BMI ABV UP PARAM F/U: HCPCS | Performed by: NURSE PRACTITIONER

## 2018-09-11 PROCEDURE — 3017F COLORECTAL CA SCREEN DOC REV: CPT | Performed by: NURSE PRACTITIONER

## 2018-09-11 PROCEDURE — 1036F TOBACCO NON-USER: CPT | Performed by: NURSE PRACTITIONER

## 2018-09-11 PROCEDURE — 1123F ACP DISCUSS/DSCN MKR DOCD: CPT | Performed by: NURSE PRACTITIONER

## 2018-09-11 PROCEDURE — 99215 OFFICE O/P EST HI 40 MIN: CPT | Performed by: NURSE PRACTITIONER

## 2018-09-11 PROCEDURE — G8427 DOCREV CUR MEDS BY ELIG CLIN: HCPCS | Performed by: NURSE PRACTITIONER

## 2018-09-11 PROCEDURE — 4040F PNEUMOC VAC/ADMIN/RCVD: CPT | Performed by: NURSE PRACTITIONER

## 2018-09-11 PROCEDURE — 1101F PT FALLS ASSESS-DOCD LE1/YR: CPT | Performed by: NURSE PRACTITIONER

## 2018-09-11 PROCEDURE — G8599 NO ASA/ANTIPLAT THER USE RNG: HCPCS | Performed by: NURSE PRACTITIONER

## 2018-09-11 NOTE — PROGRESS NOTES
[facility]   Cardiac Follow-up    Primary Care Doctor:  SHAKIRA Larios - CNP    Chief Complaint   Patient presents with    6 Month Follow-Up    Shortness of Breath     last wk, went to ER-getting better        History of Present Illness:   I had the pleasure of seeing Elnita Blizzard in follow up for diastolic heart failure. Since last visit, seen in the ED on 9/5/18 with shortness of breath, last week had to call the life squad due to shortness of breath. Issues with the sinuses improved with nasal spray. Continues   He was off of his water pills and his blood pressure pills. Issues with taking the lunch time dose of the hydralazine. He was treated with IV lasix in the ED and had good response. Now he is taking the hydralazine TID. Having issues with hip pain, seeing ortho. Seeing podiatry, issues with blisters on the legs  Taking lasix daily now, but only took 1 tab this morning of the 40mg. He is suppose to be taking 3 tabs of the lasix daily, has only taken the lasix 3 tabs for maybe 2-3 days since seen in the ED. Office weight 319lbs  Home weight 312lbs. Elnita Blizzard describes symptoms including dyspnea, orthopnea, PND but denies chest pain, palpitations, early saiety, syncope. NYHA:   III  ACC/ AHA Stage:    C    Past Medical History:   has a past medical history of Allergic rhinitis; Arthritis; Bladder fistula; C. difficile diarrhea; Cellulitis of right lower extremity; Dental disease; Diabetes ([address] Diverticulitis; Dizziness; DVT of lower extremity, bilateral (HCC); GERD (gastroesophageal reflux disease); Headache; Hearing loss; Hematuria; Hypertension; Lung disease; Pancreatitis ([address] Pulmonary emboli ([address] Rash; Sleep apnea; and Tinnitus. Surgical History:   has a past surgical history that includes Tibia fracture surgery (Right, 2003); Cholecystectomy, open (N/A, 9-17-13); Cystocopy (12/10/13);  Cystoscopy (1-28-14); other surgical history 3/8/18   Chelsie Medina MD   hydrALAZINE (APRESOLINE) 50 MG tablet Take 1 tablet by mouth every 8 hours 3/8/18   Chelsie Medina MD   atorvastatin (LIPITOR) 40 MG tablet Take 1 tablet by mouth nightly 3/8/18   Chelsie Medina MD   isosorbide mononitrate (IMDUR) 30 MG extended release tablet Take 1 tablet by mouth daily 3/8/18   Chelsie Medina MD   Insulin Pen Needle 31G X 5 MM MISC 1 each by Does not apply route daily 2/23/18   SHAKIRA Royal CNP   sertraline (ZOLOFT) 50 MG tablet Take 1 tablet by mouth daily 2/23/18   SHAKIRA Royal CNP   atenolol (TENORMIN) 50 MG tablet Take 1 tablet by mouth daily 2/14/18   SHAKIRA Royal CNP   montelukast (SINGULAIR) 10 MG tablet TAKE ONE TABLET BY MOUTH ONCE DAILY 12/18/17   SHAKIRA Royal CNP   glucose blood VI test strips (ASCENSIA AUTODISC VI;ONE TOUCH ULTRA TEST VI) strip DX: E11.9 FSBS daily. One Touch ultra 11/29/17   SHAKIRA Royal CNP   aspirin 81 MG chewable tablet Take 1 tablet by mouth daily 10/21/17   Kamilla Petersen MD   Blood Glucose Monitoring Suppl PRABHAKAR 1 Device by Does not apply route daily One Touch Ultra 3/23/16   SHAKIRA Royla CNP   Multiple Vitamins-Minerals (THERAPEUTIC MULTIVITAMIN-MINERALS) tablet Take 1 tablet by mouth daily    Historical Provider, MD   ferrous sulfate 325 (65 FE) MG tablet Take 325 mg by mouth daily. Historical Provider, MD        Allergies:  Norco [hydrocodone-acetaminophen]     Review of Systems:   · Constitutional: there has been no unanticipated weight loss. · Eyes: No vision changes  · ENT: No Headaches, + nasal congestion. No mouth sores or sore throat. · Cardiovascular: Reviewed in HPI  · Respiratory: No cough or wheezing, no sputum production. · Gastrointestinal: No abdominal pain, no constipation or diarrhea  · Genitourinary: No dysuria, trouble voiding, or hematuria.   · Musculoskeletal:  + weakness or

## 2018-09-12 ENCOUNTER — TELEPHONE (OUTPATIENT)
Dept: CARDIOLOGY CLINIC | Age: 68
End: 2018-09-12

## 2018-09-12 ENCOUNTER — HOSPITAL ENCOUNTER (OUTPATIENT)
Age: 68
Discharge: HOME | End: 2018-09-12
Payer: MEDICARE

## 2018-09-12 ENCOUNTER — OFFICE VISIT (OUTPATIENT)
Dept: ORTHOPEDIC SURGERY | Age: 68
End: 2018-09-12

## 2018-09-12 VITALS — WEIGHT: 300 LBS | BODY MASS INDEX: 39.76 KG/M2 | HEIGHT: 73 IN

## 2018-09-12 DIAGNOSIS — I25.10 CORONARY ARTERY DISEASE INVOLVING NATIVE CORONARY ARTERY OF NATIVE HEART WITHOUT ANGINA PECTORIS: ICD-10-CM

## 2018-09-12 DIAGNOSIS — I50.33 ACUTE ON CHRONIC DIASTOLIC CONGESTIVE HEART FAILURE (HCC): ICD-10-CM

## 2018-09-12 DIAGNOSIS — G47.30 SEVERE SLEEP APNEA: ICD-10-CM

## 2018-09-12 DIAGNOSIS — R60.9 PERIPHERAL EDEMA: ICD-10-CM

## 2018-09-12 DIAGNOSIS — M16.11 PRIMARY OSTEOARTHRITIS OF RIGHT HIP: Primary | ICD-10-CM

## 2018-09-12 DIAGNOSIS — I10 ESSENTIAL HYPERTENSION: ICD-10-CM

## 2018-09-12 DIAGNOSIS — E66.01 CLASS 2 SEVERE OBESITY DUE TO EXCESS CALORIES WITH SERIOUS COMORBIDITY AND BODY MASS INDEX (BMI) OF 39.0 TO 39.9 IN ADULT (HCC): ICD-10-CM

## 2018-09-12 DIAGNOSIS — R06.02 SHORTNESS OF BREATH: ICD-10-CM

## 2018-09-12 LAB
ANION GAP SERPL CALCULATED.3IONS-SCNC: 12 MMOL/L (ref 3–16)
BUN BLDV-MCNC: 24 MG/DL (ref 7–20)
CALCIUM SERPL-MCNC: 9.2 MG/DL (ref 8.3–10.6)
CHLORIDE BLD-SCNC: 105 MMOL/L (ref 99–110)
CO2: 28 MMOL/L (ref 21–32)
CREAT SERPL-MCNC: 1 MG/DL (ref 0.8–1.3)
GFR AFRICAN AMERICAN: >60
GFR NON-AFRICAN AMERICAN: >60
GLUCOSE BLD-MCNC: 92 MG/DL (ref 70–99)
POTASSIUM SERPL-SCNC: 4.3 MMOL/L (ref 3.5–5.1)
PRO-BNP: 246 PG/ML (ref 0–124)
SODIUM BLD-SCNC: 145 MMOL/L (ref 136–145)

## 2018-09-12 PROCEDURE — 80048 BASIC METABOLIC PNL TOTAL CA: CPT

## 2018-09-12 PROCEDURE — G8417 CALC BMI ABV UP PARAM F/U: HCPCS | Performed by: ORTHOPAEDIC SURGERY

## 2018-09-12 PROCEDURE — 1036F TOBACCO NON-USER: CPT | Performed by: ORTHOPAEDIC SURGERY

## 2018-09-12 PROCEDURE — 36415 COLL VENOUS BLD VENIPUNCTURE: CPT

## 2018-09-12 PROCEDURE — G8599 NO ASA/ANTIPLAT THER USE RNG: HCPCS | Performed by: ORTHOPAEDIC SURGERY

## 2018-09-12 PROCEDURE — 3017F COLORECTAL CA SCREEN DOC REV: CPT | Performed by: ORTHOPAEDIC SURGERY

## 2018-09-12 PROCEDURE — 99214 OFFICE O/P EST MOD 30 MIN: CPT | Performed by: ORTHOPAEDIC SURGERY

## 2018-09-12 PROCEDURE — 83880 ASSAY OF NATRIURETIC PEPTIDE: CPT

## 2018-09-12 PROCEDURE — 1123F ACP DISCUSS/DSCN MKR DOCD: CPT | Performed by: ORTHOPAEDIC SURGERY

## 2018-09-12 PROCEDURE — 4040F PNEUMOC VAC/ADMIN/RCVD: CPT | Performed by: ORTHOPAEDIC SURGERY

## 2018-09-12 PROCEDURE — G8427 DOCREV CUR MEDS BY ELIG CLIN: HCPCS | Performed by: ORTHOPAEDIC SURGERY

## 2018-09-12 PROCEDURE — 1101F PT FALLS ASSESS-DOCD LE1/YR: CPT | Performed by: ORTHOPAEDIC SURGERY

## 2018-09-12 RX ORDER — SULINDAC 200 MG/1
200 TABLET ORAL 2 TIMES DAILY
Qty: 60 TABLET | Refills: 0 | Status: SHIPPED | OUTPATIENT
Start: 2018-09-12 | End: 2018-10-11 | Stop reason: SDUPTHER

## 2018-09-12 NOTE — PROGRESS NOTES
of Pain: Aching  Duration of Pain: Persistent  Frequency of Pain: Constant]      Work Status/Functionality:     Past Medical History: Medical history form was reviewed today & can be found in the media tab  Past Medical History:   Diagnosis Date    Allergic rhinitis     Arthritis     Bladder fistula     resolved    C. difficile diarrhea 8/18/2015    +PCR    Cellulitis of right lower extremity 3/23/2016    Dental disease     Diabetes ([address]     Diverticulitis     Dizziness     DVT of lower extremity, bilateral ([address] 10/16/2013    GERD (gastroesophageal reflux disease)     Headache     Hearing loss     Hematuria 1/2/2014    Hypertension     Lung disease     Pancreatitis ([address] 8/24/2013    Pulmonary emboli ([address] 2013    after cholecystectomy     Rash     Sleep apnea     Tinnitus       Past Surgical History:     Past Surgical History:   Procedure Laterality Date    ABDOMEN SURGERY  05/16/2014    reveseral end peristomal hernia repair.  CHOLECYSTECTOMY, OPEN N/A 9-17-13    LAPAROSCOPIC CONVERTED TO OPEN CHOLECYSTECTOMY WITH    COLON SURGERY      COLONOSCOPY  2008    COLONOSCOPY  12/4/2013    Severe Diverticulosis unable to finish    COLONOSCOPY  4/30/14    diverticula-10 year f/u    COLOSTOMY  Jan 2014    CYSTOSCOPY  12/10/13    with bladder biopsy    CYSTOSCOPY  1-28-14    Cystourethroscopy, left ureteral catheter     HERNIA REPAIR  08/14/2015    OPEN INCISIONAL HERNIA REPAIR WITH MESH, BILATERAL COMPONENT SEPARATION, LYSIS OF ADHESIONS    OTHER SURGICAL HISTORY  1-28-14    LeftColectomy with End Colostomy, Splenic Flexure Mobilization, Takedown of Colovesical Fistula    TIBIA FRACTURE SURGERY Right 2003    Fracture lower leg during chain saw accident.  Surgery x 2     Current Medications:     Current Outpatient Prescriptions:     BASAGLAR KWIKPEN 100 UNIT/ML injection pen, INJECT 50 UNITS INTO THE SKIN NIGHTLY, Disp: 15 pen, Rfl: 3    meloxicam (MOBIC) 15 MG tablet, Take 1 tablet by is not a surgical candidate for a hip replacement at some point he may have to consider this. I'll see, he would require major optimization perform consideration of this operation. 3.Obesity. BMI 39.58. I also discussed with the patient the importance of weight loss and the impact that obesity can have on their overall joint health. I have personally performed and/or participated in the history, exam and medical decision making and agree with all pertinent clinical information. I have also reviewed and agree with the past medical, family and social history unless otherwise noted. This dictation was performed with a verbal recognition program (DRAGON) and it was checked for errors. It is possible that there are still dictated errors within this office note. If so, please bring any errors to my attention for an addendum. All efforts were made to ensure that this office note is accurate.           Tena Mir MD

## 2018-09-18 ENCOUNTER — ANESTHESIA EVENT (OUTPATIENT)
Dept: OPERATING ROOM | Age: 68
End: 2018-09-18
Payer: MEDICARE

## 2018-09-18 ENCOUNTER — OFFICE VISIT (OUTPATIENT)
Dept: FAMILY MEDICINE CLINIC | Age: 68
End: 2018-09-18

## 2018-09-18 ENCOUNTER — TELEPHONE (OUTPATIENT)
Dept: CARDIOLOGY CLINIC | Age: 68
End: 2018-09-18

## 2018-09-18 ENCOUNTER — HOSPITAL ENCOUNTER (OUTPATIENT)
Age: 68
Discharge: HOME | End: 2018-09-18
Payer: MEDICARE

## 2018-09-18 VITALS
SYSTOLIC BLOOD PRESSURE: 138 MMHG | WEIGHT: 315 LBS | HEART RATE: 88 BPM | BODY MASS INDEX: 41.82 KG/M2 | OXYGEN SATURATION: 91 % | DIASTOLIC BLOOD PRESSURE: 88 MMHG

## 2018-09-18 DIAGNOSIS — I25.10 CORONARY ARTERY DISEASE INVOLVING NATIVE CORONARY ARTERY OF NATIVE HEART WITHOUT ANGINA PECTORIS: ICD-10-CM

## 2018-09-18 DIAGNOSIS — E78.2 MIXED HYPERLIPIDEMIA: ICD-10-CM

## 2018-09-18 DIAGNOSIS — G47.30 SEVERE SLEEP APNEA: ICD-10-CM

## 2018-09-18 DIAGNOSIS — R60.9 PERIPHERAL EDEMA: ICD-10-CM

## 2018-09-18 DIAGNOSIS — M16.11 PRIMARY OSTEOARTHRITIS OF RIGHT HIP: ICD-10-CM

## 2018-09-18 DIAGNOSIS — R06.02 SHORTNESS OF BREATH: ICD-10-CM

## 2018-09-18 DIAGNOSIS — I50.33 ACUTE ON CHRONIC DIASTOLIC CONGESTIVE HEART FAILURE (HCC): ICD-10-CM

## 2018-09-18 DIAGNOSIS — Z01.818 PRE-OP EXAMINATION: Primary | ICD-10-CM

## 2018-09-18 DIAGNOSIS — I10 ESSENTIAL HYPERTENSION: ICD-10-CM

## 2018-09-18 DIAGNOSIS — J81.1 CHRONIC PULMONARY EDEMA: ICD-10-CM

## 2018-09-18 LAB
ANION GAP SERPL CALCULATED.3IONS-SCNC: 13 MMOL/L (ref 3–16)
BUN BLDV-MCNC: 22 MG/DL (ref 7–20)
CALCIUM SERPL-MCNC: 8.9 MG/DL (ref 8.3–10.6)
CHLORIDE BLD-SCNC: 103 MMOL/L (ref 99–110)
CO2: 27 MMOL/L (ref 21–32)
CREAT SERPL-MCNC: 0.9 MG/DL (ref 0.8–1.3)
GFR AFRICAN AMERICAN: >60
GFR NON-AFRICAN AMERICAN: >60
GLUCOSE BLD-MCNC: 109 MG/DL (ref 70–99)
POTASSIUM SERPL-SCNC: 4.1 MMOL/L (ref 3.5–5.1)
PRO-BNP: 427 PG/ML (ref 0–124)
SODIUM BLD-SCNC: 143 MMOL/L (ref 136–145)

## 2018-09-18 PROCEDURE — G8427 DOCREV CUR MEDS BY ELIG CLIN: HCPCS | Performed by: PHYSICIAN ASSISTANT

## 2018-09-18 PROCEDURE — G8599 NO ASA/ANTIPLAT THER USE RNG: HCPCS | Performed by: PHYSICIAN ASSISTANT

## 2018-09-18 PROCEDURE — 1036F TOBACCO NON-USER: CPT | Performed by: PHYSICIAN ASSISTANT

## 2018-09-18 PROCEDURE — 1101F PT FALLS ASSESS-DOCD LE1/YR: CPT | Performed by: PHYSICIAN ASSISTANT

## 2018-09-18 PROCEDURE — 99214 OFFICE O/P EST MOD 30 MIN: CPT | Performed by: PHYSICIAN ASSISTANT

## 2018-09-18 PROCEDURE — 80048 BASIC METABOLIC PNL TOTAL CA: CPT

## 2018-09-18 PROCEDURE — 83880 ASSAY OF NATRIURETIC PEPTIDE: CPT

## 2018-09-18 PROCEDURE — G8417 CALC BMI ABV UP PARAM F/U: HCPCS | Performed by: PHYSICIAN ASSISTANT

## 2018-09-18 PROCEDURE — 3017F COLORECTAL CA SCREEN DOC REV: CPT | Performed by: PHYSICIAN ASSISTANT

## 2018-09-18 PROCEDURE — 1123F ACP DISCUSS/DSCN MKR DOCD: CPT | Performed by: PHYSICIAN ASSISTANT

## 2018-09-18 PROCEDURE — 4040F PNEUMOC VAC/ADMIN/RCVD: CPT | Performed by: PHYSICIAN ASSISTANT

## 2018-09-18 PROCEDURE — 36415 COLL VENOUS BLD VENIPUNCTURE: CPT

## 2018-09-18 NOTE — PROGRESS NOTES
Preoperative Consultation      Robert Conley  YOB: 1950    Date of Service:  9/18/2018    Vitals:    09/18/18 1353   BP: 138/88   Site: Left Upper Arm   Position: Sitting   Cuff Size: Large Adult   Pulse: 88   SpO2: 91%   Weight: (!) 317 lb (143.8 kg)      Wt Readings from Last 2 Encounters:   09/18/18 (!) 317 lb (143.8 kg)   09/12/18 300 lb (136.1 kg)     BP Readings from Last 3 Encounters:   09/18/18 138/88   09/11/18 130/70   09/05/18 (!) 182/95        Chief Complaint   Patient presents with   Mora Dakins Pre-op Exam     Surgery 9-19-18, [facility], Cortisone injection right hip, Dr. Keith Tucker [Hydrocodone-Acetaminophen]      Makes agitated     Outpatient Prescriptions Marked as Taking for the 9/18/18 encounter (Office Visit) with ZAYRA Shah   Medication Sig Dispense Refill    sulindac (CLINORIL) 200 MG tablet Take 1 tablet by mouth 2 times daily 60 tablet 0    BASAGLAR KWIKPEN 100 UNIT/ML injection pen INJECT 50 UNITS INTO THE SKIN NIGHTLY 15 pen 3    azelastine (ASTELIN) 0.1 % nasal spray 1 spray by Nasal route 2 times daily Use in each nostril as directed 1 Bottle 3    omeprazole (PRILOSEC) 40 MG delayed release capsule Take 1 capsule by mouth daily 90 capsule 3    silver sulfADIAZINE (SILVADENE) 1 % cream Apply topically daily.  50 g 1    oxybutynin (DITROPAN XL) 15 MG extended release tablet Take 1 tablet by mouth Daily 90 tablet 1    glimepiride (AMARYL) 4 MG tablet TAKE 1 TABLET TWICE A  tablet 3    traZODone (DESYREL) 50 MG tablet Take 1 tablet by mouth nightly 30 tablet 5    furosemide (LASIX) 40 MG tablet Take 1 tablet by mouth See Admin Instructions Take 2 tablets in the morning and 1 tablet in the evening 270 tablet 3    hydrALAZINE (APRESOLINE) 50 MG tablet Take 1 tablet by mouth every 8 hours 270 tablet 3    atorvastatin (LIPITOR) 40 MG tablet Take 1 tablet by mouth nightly 90 tablet 3    isosorbide mononitrate (IMDUR) 30 MG extended release tablet Take 1 tablet by mouth daily 90 tablet 3    Insulin Pen Needle 31G X 5 MM MISC 1 each by Does not apply route daily 400 each 5    sertraline (ZOLOFT) 50 MG tablet Take 1 tablet by mouth daily 90 tablet 3    atenolol (TENORMIN) 50 MG tablet Take 1 tablet by mouth daily 90 tablet 3    montelukast (SINGULAIR) 10 MG tablet TAKE ONE TABLET BY MOUTH ONCE DAILY 90 tablet 3    glucose blood VI test strips (ASCENSIA AUTODISC VI;ONE TOUCH ULTRA TEST VI) strip DX: E11.9 FSBS daily. One Touch ultra 100 strip 5    aspirin 81 MG chewable tablet Take 1 tablet by mouth daily 30 tablet 0    Blood Glucose Monitoring Suppl PRABHAKAR 1 Device by Does not apply route daily One Touch Ultra 1 Device 0    Multiple Vitamins-Minerals (THERAPEUTIC MULTIVITAMIN-MINERALS) tablet Take 1 tablet by mouth daily      ferrous sulfate 325 (65 FE) MG tablet Take 325 mg by mouth daily. This patient presents to the office today for a preoperative consultation at the request of surgeon, Dr. Papi Theodore, who plans on performing arthrogram and cortisone injection right hip on September 19 at [facility]. The current problem began 4 months ago, and symptoms have been worsening with time. Conservative therapy: Yes: PT and cortisone injection, which has been ineffective. .    Planned anesthesia: IV sedation   Known anesthesia problems: None   Bleeding risk: No recent or remote history of abnormal bleeding and taking aspirin  Personal or FH of DVT/PE: Yes - 5 years ago following gallbladder surgery    Patient objection to receiving blood products: No    Patient Active Problem List   Diagnosis    Type 2 diabetes mellitus with hyperglycemia [facility]    Osteoarthritis    Class 2 severe obesity due to excess calories with serious comorbidity and body mass index (BMI) of 39.0 to 39.9 in adult ([address]    Edema    Anemia    Dysfunction of right eustachian tube    Bradycardia    Vasculogenic erectile CYSTOSCOPY  1-28-14    Cystourethroscopy, left ureteral catheter     HERNIA REPAIR  08/14/2015    OPEN INCISIONAL HERNIA REPAIR WITH MESH, BILATERAL COMPONENT SEPARATION, LYSIS OF ADHESIONS    OTHER SURGICAL HISTORY  1-28-14    LeftColectomy with End Colostomy, Splenic Flexure Mobilization, Takedown of Colovesical Fistula    TIBIA FRACTURE SURGERY Right 2003    Fracture lower leg during chain saw accident. Surgery x 2     Family History   Problem Relation Age of Onset    Heart Disease Father     Heart Attack Father     Stroke Brother     Heart Disease Brother     High Blood Pressure Brother     Kidney Disease Mother         kidney removed     Social History     Social History    Marital status:      Spouse name: N/A    Number of children: N/A    Years of education: N/A     Occupational History    Not on file. Social History Main Topics    Smoking status: Passive Smoke Exposure - Never Smoker    Smokeless tobacco: Never Used    Alcohol use 0.0 oz/week      Comment: rare    Drug use: No    Sexual activity: Yes     Partners: Female     Other Topics Concern    Not on file     Social History Narrative    No narrative on file       Review of Systems  A comprehensive review of systems was negative. Physical Exam   Constitutional: He is oriented to person, place, and time. He appears well-developed and well-nourished. No distress. HENT:   Head: Normocephalic and atraumatic. Mouth/Throat: Uvula is midline, oropharynx is clear and moist and mucous membranes are normal.   Eyes: Conjunctivae and EOM are normal. Pupils are equal, round, and reactive to light. Neck: Trachea normal and normal range of motion. Neck supple. Carotid bruit is not present. No mass and no thyromegaly present. Cardiovascular: Normal rate, regular rhythm, normal heart sounds and intact distal pulses. Exam reveals no gallop and no friction rub. No murmur heard.   Pulmonary/Chest: Effort normal and breath sounds normal. No respiratory distress. He has no wheezes. He has no rales. Abdominal: Soft. Normal aorta and bowel sounds are normal. He exhibits no distension and no mass. There is no hepatosplenomegaly. No tenderness. Musculoskeletal: He exhibits no edema and no tenderness. Neurological: He is alert and oriented to person, place, and time. He has normal strength. No cranial nerve deficit or sensory deficit. Coordination and gait normal.   Skin: Skin is warm and dry. No rash noted. No erythema. Psychiatric: He has a normal mood and affect. His behavior is normal.     EKG Interpretation:  1st degree AV block, unchanged from previous tracings. Pt has been cleared by cardiology. Lab Review   Lab Results   Component Value Date     09/18/2018    K 4.1 09/18/2018    K 4.3 03/01/2018     09/18/2018    CO2 27 09/18/2018    BUN 22 09/18/2018    CREATININE 0.9 09/18/2018    GLUCOSE 109 09/18/2018    CALCIUM 8.9 09/18/2018     Lab Results   Component Value Date    WBC 7.5 09/05/2018    HGB 14.6 09/05/2018    HCT 45.0 09/05/2018    MCV 86.1 09/05/2018     09/05/2018           Assessment:       69 y.o. patient with planned surgery as above. Known risk factors for perioperative complications: History of DVT and pulmonary emboli, Congestive heart failure, Diabetes mellitus, Hypertension, Obstructive sleep apnea  Current medications which may produce withdrawal symptoms if withheld perioperatively: none   1. MONIQUE: using CPAP  2. HX of DVT and pulmonary emobli: takes aspirin  3. Hypertension: well controlled with medication; seeing cardiology  4. Diabetes: last HgbA1C: 6.9%  5. Hyperlipidemia: stable on medication  6. GERD: stable on medication     Plan:     1. Preoperative workup as follows: none  2. Change in medication regimen before surgery: Take atenolol and hydralazine on morning of surgery with sip of water, and hold all other medications until after surgery.  Take 40 U of Basaglar on 09/18 and go

## 2018-09-19 ENCOUNTER — ANESTHESIA (OUTPATIENT)
Dept: OPERATING ROOM | Age: 68
End: 2018-09-19
Payer: MEDICARE

## 2018-09-19 ENCOUNTER — HOSPITAL ENCOUNTER (OUTPATIENT)
Age: 68
Setting detail: OUTPATIENT SURGERY
Discharge: HOME | End: 2018-09-19
Attending: ORTHOPAEDIC SURGERY | Admitting: ORTHOPAEDIC SURGERY
Payer: MEDICARE

## 2018-09-19 ENCOUNTER — APPOINTMENT (OUTPATIENT)
Dept: GENERAL RADIOLOGY | Age: 68
End: 2018-09-19
Attending: ORTHOPAEDIC SURGERY
Payer: MEDICARE

## 2018-09-19 VITALS
SYSTOLIC BLOOD PRESSURE: 132 MMHG | RESPIRATION RATE: 3 BRPM | DIASTOLIC BLOOD PRESSURE: 73 MMHG | OXYGEN SATURATION: 100 %

## 2018-09-19 VITALS
RESPIRATION RATE: 22 BRPM | SYSTOLIC BLOOD PRESSURE: 152 MMHG | HEART RATE: 51 BPM | WEIGHT: 300 LBS | BODY MASS INDEX: 39.76 KG/M2 | HEIGHT: 73 IN | OXYGEN SATURATION: 93 % | TEMPERATURE: 97.9 F | DIASTOLIC BLOOD PRESSURE: 77 MMHG

## 2018-09-19 LAB
GLUCOSE BLD-MCNC: 80 MG/DL (ref 70–99)
GLUCOSE BLD-MCNC: 97 MG/DL (ref 70–99)
PERFORMED ON: NORMAL
PERFORMED ON: NORMAL

## 2018-09-19 PROCEDURE — 7100000011 HC PHASE II RECOVERY - ADDTL 15 MIN: Performed by: ORTHOPAEDIC SURGERY

## 2018-09-19 PROCEDURE — 3209999900 FLUORO FOR SURGICAL PROCEDURES

## 2018-09-19 PROCEDURE — 3700000000 HC ANESTHESIA ATTENDED CARE: Performed by: ORTHOPAEDIC SURGERY

## 2018-09-19 PROCEDURE — 2500000003 HC RX 250 WO HCPCS: Performed by: ORTHOPAEDIC SURGERY

## 2018-09-19 PROCEDURE — 3700000001 HC ADD 15 MINUTES (ANESTHESIA): Performed by: ORTHOPAEDIC SURGERY

## 2018-09-19 PROCEDURE — 3600000012 HC SURGERY LEVEL 2 ADDTL 15MIN: Performed by: ORTHOPAEDIC SURGERY

## 2018-09-19 PROCEDURE — 2580000003 HC RX 258: Performed by: ANESTHESIOLOGY

## 2018-09-19 PROCEDURE — 20610 DRAIN/INJ JOINT/BURSA W/O US: CPT

## 2018-09-19 PROCEDURE — 2709999900 HC NON-CHARGEABLE SUPPLY: Performed by: ORTHOPAEDIC SURGERY

## 2018-09-19 PROCEDURE — 7100000001 HC PACU RECOVERY - ADDTL 15 MIN: Performed by: ORTHOPAEDIC SURGERY

## 2018-09-19 PROCEDURE — 7100000010 HC PHASE II RECOVERY - FIRST 15 MIN: Performed by: ORTHOPAEDIC SURGERY

## 2018-09-19 PROCEDURE — 3600000002 HC SURGERY LEVEL 2 BASE: Performed by: ORTHOPAEDIC SURGERY

## 2018-09-19 PROCEDURE — 6360000002 HC RX W HCPCS: Performed by: ORTHOPAEDIC SURGERY

## 2018-09-19 PROCEDURE — L1820 KO ELAS W/ CONDYLE PADS & JO: HCPCS | Performed by: ORTHOPAEDIC SURGERY

## 2018-09-19 PROCEDURE — 6360000004 HC RX CONTRAST MEDICATION: Performed by: ORTHOPAEDIC SURGERY

## 2018-09-19 PROCEDURE — 7100000000 HC PACU RECOVERY - FIRST 15 MIN: Performed by: ORTHOPAEDIC SURGERY

## 2018-09-19 RX ORDER — LIDOCAINE HYDROCHLORIDE 10 MG/ML
1 INJECTION, SOLUTION EPIDURAL; INFILTRATION; INTRACAUDAL; PERINEURAL
Status: DISCONTINUED | OUTPATIENT
Start: 2018-09-19 | End: 2018-09-19 | Stop reason: HOSPADM

## 2018-09-19 RX ORDER — SODIUM CHLORIDE 0.9 % (FLUSH) 0.9 %
10 SYRINGE (ML) INJECTION EVERY 12 HOURS SCHEDULED
Status: DISCONTINUED | OUTPATIENT
Start: 2018-09-19 | End: 2018-09-19 | Stop reason: HOSPADM

## 2018-09-19 RX ORDER — LABETALOL HYDROCHLORIDE 5 MG/ML
5 INJECTION, SOLUTION INTRAVENOUS EVERY 10 MIN PRN
Status: DISCONTINUED | OUTPATIENT
Start: 2018-09-19 | End: 2018-09-19 | Stop reason: HOSPADM

## 2018-09-19 RX ORDER — TIZANIDINE 4 MG/1
4 TABLET ORAL EVERY 8 HOURS PRN
Qty: 270 TABLET | Refills: 3 | Status: SHIPPED | OUTPATIENT
Start: 2018-09-19 | End: 2019-09-10 | Stop reason: SDUPTHER

## 2018-09-19 RX ORDER — ONDANSETRON 2 MG/ML
4 INJECTION INTRAMUSCULAR; INTRAVENOUS PRN
Status: DISCONTINUED | OUTPATIENT
Start: 2018-09-19 | End: 2018-09-19 | Stop reason: HOSPADM

## 2018-09-19 RX ORDER — MEPERIDINE HYDROCHLORIDE 50 MG/ML
12.5 INJECTION INTRAMUSCULAR; INTRAVENOUS; SUBCUTANEOUS EVERY 5 MIN PRN
Status: DISCONTINUED | OUTPATIENT
Start: 2018-09-19 | End: 2018-09-19 | Stop reason: HOSPADM

## 2018-09-19 RX ORDER — SODIUM CHLORIDE, SODIUM LACTATE, POTASSIUM CHLORIDE, CALCIUM CHLORIDE 600; 310; 30; 20 MG/100ML; MG/100ML; MG/100ML; MG/100ML
INJECTION, SOLUTION INTRAVENOUS CONTINUOUS
Status: DISCONTINUED | OUTPATIENT
Start: 2018-09-19 | End: 2018-09-19 | Stop reason: HOSPADM

## 2018-09-19 RX ORDER — SODIUM CHLORIDE 0.9 % (FLUSH) 0.9 %
10 SYRINGE (ML) INJECTION PRN
Status: DISCONTINUED | OUTPATIENT
Start: 2018-09-19 | End: 2018-09-19 | Stop reason: HOSPADM

## 2018-09-19 RX ORDER — PROMETHAZINE HYDROCHLORIDE 25 MG/ML
6.25 INJECTION, SOLUTION INTRAMUSCULAR; INTRAVENOUS
Status: DISCONTINUED | OUTPATIENT
Start: 2018-09-19 | End: 2018-09-19 | Stop reason: HOSPADM

## 2018-09-19 RX ORDER — HYDRALAZINE HYDROCHLORIDE 20 MG/ML
5 INJECTION INTRAMUSCULAR; INTRAVENOUS EVERY 10 MIN PRN
Status: DISCONTINUED | OUTPATIENT
Start: 2018-09-19 | End: 2018-09-19 | Stop reason: HOSPADM

## 2018-09-19 RX ORDER — DIPHENHYDRAMINE HYDROCHLORIDE 50 MG/ML
12.5 INJECTION INTRAMUSCULAR; INTRAVENOUS
Status: DISCONTINUED | OUTPATIENT
Start: 2018-09-19 | End: 2018-09-19 | Stop reason: HOSPADM

## 2018-09-19 RX ADMIN — SODIUM CHLORIDE, POTASSIUM CHLORIDE, SODIUM LACTATE AND CALCIUM CHLORIDE: 600; 310; 30; 20 INJECTION, SOLUTION INTRAVENOUS at 08:29

## 2018-09-19 ASSESSMENT — PULMONARY FUNCTION TESTS
PIF_VALUE: 1
PIF_VALUE: 11
PIF_VALUE: 4
PIF_VALUE: 2
PIF_VALUE: 23
PIF_VALUE: 6
PIF_VALUE: 1
PIF_VALUE: 3
PIF_VALUE: 10
PIF_VALUE: 11
PIF_VALUE: 3
PIF_VALUE: 2
PIF_VALUE: 7
PIF_VALUE: 11

## 2018-09-19 ASSESSMENT — PAIN - FUNCTIONAL ASSESSMENT: PAIN_FUNCTIONAL_ASSESSMENT: 0-10

## 2018-09-19 ASSESSMENT — PAIN DESCRIPTION - DESCRIPTORS: DESCRIPTORS: SHOOTING

## 2018-09-19 NOTE — PROGRESS NOTES
POCT      Blood Glucose:97      (Normal Range 70-99)    PT:      (Normal Range 9.8 - 13)    INR:      (Normal Range 0.86-1.14)

## 2018-09-19 NOTE — ANESTHESIA PRE PROCEDURE
comorbidity and body mass index (BMI) of 39.0 to 39.9 in adult ([facility]) E66.01, Z68.39    Edema R60.9    Anemia D64.9    Dysfunction of right eustachian tube H69.81    Bradycardia R00.1    Vasculogenic erectile dysfunction N52.9    Venous stasis ulcer of right lower extremity ([facility]) I83.019, L97.919    Venous thrombosis of leg I82.90    Chronic venous insufficiency I87.2    Hyperbilirubinemia E80.6    Moderate episode of recurrent major depressive disorder ([facility]) F33.1    Renal insufficiency N28.9    Anxiety F41.9    Essential hypertension I10    Non-seasonal allergic rhinitis J30.89    Mixed hyperlipidemia E78.2    Chronic congestive heart failure ([facility]) I50.9    Chronic pulmonary edema J81.1    Coronary artery disease involving native coronary artery of native heart without angina pectoris I25.10    Nonrheumatic aortic valve stenosis I35.0    Acute kidney inury  N17.9    Severe sleep apnea G47.30    Primary insomnia F51.01    Venous stasis ulcer of left calf limited to breakdown of skin with varicose veins ([facility]) I83.022, L97.221    Stage 3 chronic kidney disease N18.3    Renal osteodystrophy N25.0    Peripheral edema R60.9    Strain of flexor muscle of right hip S76.011A    Primary osteoarthritis of right hip M16.11       Past Medical History:        Diagnosis Date    Allergic rhinitis     Arthritis     Bladder fistula     resolved    C. difficile diarrhea 8/18/2015    +PCR    Cellulitis of right lower extremity 3/23/2016    Dental disease     Diabetes ([address]     Diverticulitis     Dizziness     DVT of lower extremity, bilateral ([address] 10/16/2013    GERD (gastroesophageal reflux disease)     Headache     Hearing loss     Hematuria 1/2/2014    Hypertension     Lung disease     Pancreatitis ([address] 8/24/2013    Pulmonary emboli ([address] 2013    after cholecystectomy     Rash     Sleep apnea     Tinnitus        Past Surgical History:        Procedure Laterality Date    ABDOMEN SURGERY  05/16/2014    reveseral end peristomal hernia repair.  CHOLECYSTECTOMY, OPEN N/A 9-17-13    LAPAROSCOPIC CONVERTED TO OPEN CHOLECYSTECTOMY WITH    COLON SURGERY      COLONOSCOPY  2008    COLONOSCOPY  12/4/2013    Severe Diverticulosis unable to finish    COLONOSCOPY  4/30/14    diverticula-10 year f/u    COLOSTOMY  Jan 2014    CYSTOSCOPY  12/10/13    with bladder biopsy    CYSTOSCOPY  1-28-14    Cystourethroscopy, left ureteral catheter     HERNIA REPAIR  08/14/2015    OPEN INCISIONAL HERNIA REPAIR WITH MESH, BILATERAL COMPONENT SEPARATION, LYSIS OF ADHESIONS    OTHER SURGICAL HISTORY  1-28-14    LeftColectomy with End Colostomy, Splenic Flexure Mobilization, Takedown of Colovesical Fistula    TIBIA FRACTURE SURGERY Right 2003    Fracture lower leg during chain saw accident. Surgery x 2       Social History:    Social History   Substance Use Topics    Smoking status: Passive Smoke Exposure - Never Smoker    Smokeless tobacco: Never Used    Alcohol use 0.0 oz/week      Comment: rare                                Counseling given: Not Answered      Vital Signs (Current):   Vitals:    09/12/18 1334 09/19/18 0759   Pulse:  56   Resp:  20   Temp:  97.5 °F (36.4 °C)   TempSrc:  Temporal   SpO2:  96%   Weight: 300 lb (136.1 kg) 300 lb (136.1 kg)   Height: 6' 1\" (1.854 m) 6' 1\" (1.854 m)                                              BP Readings from Last 3 Encounters:   09/18/18 138/88   09/11/18 130/70   09/05/18 (!) 182/95       NPO Status: Time of last liquid consumption: 2200                        Time of last solid consumption: 2200                        Date of last liquid consumption: 09/18/18                        Date of last solid food consumption: 09/18/18    BMI:   Wt Readings from Last 3 Encounters:   09/19/18 300 lb (136.1 kg)   09/18/18 (!) 317 lb (143.8 kg)   09/12/18 300 lb (136.1 kg)     Body mass index is 39.58 kg/m².     CBC:   Lab Results   Component Value Date    WBC 7.5

## 2018-09-19 NOTE — PROGRESS NOTES
Discharge inst reviewed with pt and family. Pt strong amb without diff. Pt SOB w/ exertion. Pt states normal for him. INst. To call 911 for CP or worsening SOB. Also  BG was 80, drinking regular pepsi. Says he took 40 units of insulin last night as advised by PCP. LEft amb to go home with spouse.  And belongings

## 2018-09-19 NOTE — OP NOTE
[place], [place] [address]                                 OPERATIVE REPORT    PATIENT NAME: Rik Arevalo                :        1950  MED REC NO:   [id number]                          ROOM:  ACCOUNT NO:   [de-identified]                           ADMIT DATE: 2018  PROVIDER:     Joseph Hudson MD    DATE OF PROCEDURE:  2018    SERVICE:  Orthopedic Surgery. SURGEON:  Wilber Ruby MD    FIRST ASSISTANT:  ZAYRA Ni    PREOPERATIVE DIAGNOSIS:  Right hip pain of unclear etiology. POSTOPERATIVE DIAGNOSIS:  Right hip pain of unclear etiology. OPERATIONS PERFORMED:  1. Arthrogram, right hip. 2.  Corticosteroid injection, right hip, 80 mg of Depo-Medrol and 5 mL of  0.25% plain Marcaine. ANESTHESIA:  IV sedation. INDICATIONS FOR PROCEDURE:  The patient is a 57-year-old man who was  struggling with ongoing disabling right hip pain. His clinical examination  was unclear as to whether this is all coming from his back or his hip. We  talked operative measures to treat him and resolving pain. Ultimately, he  is elected to perform a diagnostic and therapeutic hip injection. We  discussed the relatively remote possibility of complications in this type  of procedure. I did elias his hip in the preprocedure holding area. DETAILS OF PROCEDURE:  The patient was taken to the operating room and  positioned on the operating table such an arthrogram could be done to the  right hip. After the IV sedation was administered, the hip was prepped and  draped and then an 18-gauge spinal needle was introduced into the hip  joint. He is a fairly large gentleman, so had some difficulty with getting  the needle exactly into the joint but ultimately this was done and  arthrogram was completed. Once this was documented, steroid/Marcaine  solution was injected without difficulty under minimal resistance. The  patient then had a band-Aid applied to his incision site. After the needle  was removed, the hip was taken through a range of motion. The patient went  to recovery room in stable condition upon awakening from his sedative  state.     Georgiana Toney MD    D: 09/19/2018 11:11:58       T: 09/19/2018 13:11:36     [email or web address]  Job#: [id number]     Doc#: [id number]    CC:

## 2018-09-19 NOTE — ANESTHESIA POSTPROCEDURE EVALUATION
Department of Anesthesiology  Postprocedure Note    Patient: Daina Beck  MRN: [id number]  YOB: 1950  Date of evaluation: 9/19/2018  Time:  11:25 AM     Procedure Summary     Date:  09/19/18 Room / Location:  [facility]ospitals OR [address] [place] [address] ASC OR    Anesthesia Start:  1051 Anesthesia Stop:  5522    Procedure:  ARTHROGRAM AND CORTISONE INJECTION RIGHT HIP (Right Hip) Diagnosis:       Arthritis of right hip      (ARTHRITIS RIGHT HIP)    Surgeon:  Jennie Rutherford MD Responsible Provider:  Zarina Christopher MD    Anesthesia Type:  general ASA Status:  4          Anesthesia Type: general    Tiffany Phase I: Tiffany Score: 7    Tiffany Phase II: Tiffany Score: 10    Last vitals: Reviewed and per EMR flowsheets.        Anesthesia Post Evaluation    Patient location during evaluation: PACU  Patient participation: complete - patient participated  Level of consciousness: awake and alert  Pain score: 0  Airway patency: patent  Nausea & Vomiting: no nausea and no vomiting  Complications: no  Cardiovascular status: blood pressure returned to baseline  Respiratory status: acceptable  Hydration status: stable

## 2018-09-25 ENCOUNTER — HOSPITAL ENCOUNTER (OUTPATIENT)
Age: 68
Discharge: HOME | End: 2018-09-25
Payer: MEDICARE

## 2018-09-25 DIAGNOSIS — I50.33 ACUTE ON CHRONIC DIASTOLIC CONGESTIVE HEART FAILURE (HCC): ICD-10-CM

## 2018-09-25 DIAGNOSIS — R60.9 PERIPHERAL EDEMA: ICD-10-CM

## 2018-09-25 DIAGNOSIS — G47.30 SEVERE SLEEP APNEA: ICD-10-CM

## 2018-09-25 DIAGNOSIS — I10 ESSENTIAL HYPERTENSION: ICD-10-CM

## 2018-09-25 DIAGNOSIS — I25.10 CORONARY ARTERY DISEASE INVOLVING NATIVE CORONARY ARTERY OF NATIVE HEART WITHOUT ANGINA PECTORIS: ICD-10-CM

## 2018-09-25 DIAGNOSIS — R06.02 SHORTNESS OF BREATH: ICD-10-CM

## 2018-09-25 LAB
ANION GAP SERPL CALCULATED.3IONS-SCNC: 13 MMOL/L (ref 3–16)
BUN BLDV-MCNC: 24 MG/DL (ref 7–20)
CALCIUM SERPL-MCNC: 9.2 MG/DL (ref 8.3–10.6)
CHLORIDE BLD-SCNC: 101 MMOL/L (ref 99–110)
CO2: 29 MMOL/L (ref 21–32)
CREAT SERPL-MCNC: 0.8 MG/DL (ref 0.8–1.3)
GFR AFRICAN AMERICAN: >60
GFR NON-AFRICAN AMERICAN: >60
GLUCOSE BLD-MCNC: 107 MG/DL (ref 70–99)
POTASSIUM SERPL-SCNC: 4.1 MMOL/L (ref 3.5–5.1)
PRO-BNP: 635 PG/ML (ref 0–124)
SODIUM BLD-SCNC: 143 MMOL/L (ref 136–145)

## 2018-09-25 PROCEDURE — 80048 BASIC METABOLIC PNL TOTAL CA: CPT

## 2018-09-25 PROCEDURE — 83880 ASSAY OF NATRIURETIC PEPTIDE: CPT

## 2018-09-25 PROCEDURE — 36415 COLL VENOUS BLD VENIPUNCTURE: CPT

## 2018-10-01 ENCOUNTER — OFFICE VISIT (OUTPATIENT)
Dept: CARDIOLOGY CLINIC | Age: 68
End: 2018-10-01
Payer: MEDICARE

## 2018-10-01 VITALS
DIASTOLIC BLOOD PRESSURE: 78 MMHG | SYSTOLIC BLOOD PRESSURE: 138 MMHG | BODY MASS INDEX: 41.75 KG/M2 | HEART RATE: 66 BPM | WEIGHT: 315 LBS | OXYGEN SATURATION: 91 % | HEIGHT: 73 IN

## 2018-10-01 DIAGNOSIS — I25.10 CORONARY ARTERY DISEASE INVOLVING NATIVE CORONARY ARTERY OF NATIVE HEART WITHOUT ANGINA PECTORIS: ICD-10-CM

## 2018-10-01 DIAGNOSIS — I10 ESSENTIAL HYPERTENSION: Chronic | ICD-10-CM

## 2018-10-01 DIAGNOSIS — I50.32 CHRONIC DIASTOLIC CONGESTIVE HEART FAILURE (HCC): Primary | ICD-10-CM

## 2018-10-01 PROCEDURE — 1123F ACP DISCUSS/DSCN MKR DOCD: CPT | Performed by: NURSE PRACTITIONER

## 2018-10-01 PROCEDURE — G8417 CALC BMI ABV UP PARAM F/U: HCPCS | Performed by: NURSE PRACTITIONER

## 2018-10-01 PROCEDURE — 3017F COLORECTAL CA SCREEN DOC REV: CPT | Performed by: NURSE PRACTITIONER

## 2018-10-01 PROCEDURE — 4040F PNEUMOC VAC/ADMIN/RCVD: CPT | Performed by: NURSE PRACTITIONER

## 2018-10-01 PROCEDURE — G8427 DOCREV CUR MEDS BY ELIG CLIN: HCPCS | Performed by: NURSE PRACTITIONER

## 2018-10-01 PROCEDURE — 1101F PT FALLS ASSESS-DOCD LE1/YR: CPT | Performed by: NURSE PRACTITIONER

## 2018-10-01 PROCEDURE — 99214 OFFICE O/P EST MOD 30 MIN: CPT | Performed by: NURSE PRACTITIONER

## 2018-10-01 PROCEDURE — G8484 FLU IMMUNIZE NO ADMIN: HCPCS | Performed by: NURSE PRACTITIONER

## 2018-10-01 PROCEDURE — G8599 NO ASA/ANTIPLAT THER USE RNG: HCPCS | Performed by: NURSE PRACTITIONER

## 2018-10-01 PROCEDURE — 1036F TOBACCO NON-USER: CPT | Performed by: NURSE PRACTITIONER

## 2018-10-01 RX ORDER — TORSEMIDE 20 MG/1
40 TABLET ORAL DAILY
Qty: 60 TABLET | Refills: 3 | Status: SHIPPED | OUTPATIENT
Start: 2018-10-01 | End: 2018-11-01 | Stop reason: SDUPTHER

## 2018-10-02 ENCOUNTER — TELEPHONE (OUTPATIENT)
Dept: CARDIOLOGY CLINIC | Age: 68
End: 2018-10-02

## 2018-10-02 RX ORDER — POTASSIUM CHLORIDE 750 MG/1
20 TABLET, EXTENDED RELEASE ORAL DAILY
Qty: 60 TABLET | Refills: 3
Start: 2018-10-02 | End: 2019-03-07 | Stop reason: SDUPTHER

## 2018-10-09 ENCOUNTER — HOSPITAL ENCOUNTER (OUTPATIENT)
Age: 68
Discharge: HOME | End: 2018-10-09
Payer: MEDICARE

## 2018-10-09 ENCOUNTER — TELEPHONE (OUTPATIENT)
Dept: PULMONOLOGY | Age: 68
End: 2018-10-09

## 2018-10-09 DIAGNOSIS — I50.32 CHRONIC DIASTOLIC CONGESTIVE HEART FAILURE (HCC): ICD-10-CM

## 2018-10-09 DIAGNOSIS — I25.10 CORONARY ARTERY DISEASE INVOLVING NATIVE CORONARY ARTERY OF NATIVE HEART WITHOUT ANGINA PECTORIS: ICD-10-CM

## 2018-10-09 DIAGNOSIS — I10 ESSENTIAL HYPERTENSION: Chronic | ICD-10-CM

## 2018-10-09 LAB
ANION GAP SERPL CALCULATED.3IONS-SCNC: 13 MMOL/L (ref 3–16)
BUN BLDV-MCNC: 33 MG/DL (ref 7–20)
CALCIUM SERPL-MCNC: 9.4 MG/DL (ref 8.3–10.6)
CHLORIDE BLD-SCNC: 102 MMOL/L (ref 99–110)
CO2: 30 MMOL/L (ref 21–32)
CREAT SERPL-MCNC: 1.1 MG/DL (ref 0.8–1.3)
GFR AFRICAN AMERICAN: >60
GFR NON-AFRICAN AMERICAN: >60
GLUCOSE BLD-MCNC: 101 MG/DL (ref 70–99)
POTASSIUM SERPL-SCNC: 4.8 MMOL/L (ref 3.5–5.1)
PRO-BNP: 384 PG/ML (ref 0–124)
SODIUM BLD-SCNC: 145 MMOL/L (ref 136–145)

## 2018-10-09 PROCEDURE — 80048 BASIC METABOLIC PNL TOTAL CA: CPT

## 2018-10-09 PROCEDURE — 36415 COLL VENOUS BLD VENIPUNCTURE: CPT

## 2018-10-09 PROCEDURE — 83880 ASSAY OF NATRIURETIC PEPTIDE: CPT

## 2018-10-09 NOTE — TELEPHONE ENCOUNTER
Khanhe called and stated that Itzel Salcedo needs a face-to-face to re certify for his oxygen sooner than 12/2018. Left message for patient to call the office.

## 2018-10-10 ENCOUNTER — TELEPHONE (OUTPATIENT)
Dept: CARDIOLOGY CLINIC | Age: 68
End: 2018-10-10

## 2018-10-10 RX ORDER — HYDRALAZINE HYDROCHLORIDE 50 MG/1
50 TABLET, FILM COATED ORAL EVERY 8 HOURS SCHEDULED
Qty: 270 TABLET | Refills: 3 | Status: SHIPPED | OUTPATIENT
Start: 2018-10-10 | End: 2019-07-24

## 2018-10-10 NOTE — TELEPHONE ENCOUNTER
Hina Millie, APRN - CNP  P [facility]             Covering for Khloe Matthews CNP   Kidney function overall stable.  Potassium is good.  BNP \"heart failure number\" is improved. Continue present management. Cristiano Payer upcoming appointment with Andrew Chan CNP. SHAKIRA Schmidt CNP      Spoke to patient informed of results patient verbally understood.

## 2018-10-10 NOTE — TELEPHONE ENCOUNTER
[facility] Pt  Last office visit 10/01/18        Plan:   1. Check Standing order labs  2. Continue BMP weekly- next week  3. Continue hydralazine 50mg three times a day  4. Continue atenolol   5. Continue imdur 30mg daily   6. Stop Lasix and change to torsemide 40mg once a day; check weights every day. If your weight goes up above 315lbs at home then increase the torsemide to 40mg twice a day until weight is back down to 310lbs   7. Continue daily weights and record them  8. Call me with your potassium strength   9.  Continue with compression socks  Follow up 5 weeks

## 2018-10-11 RX ORDER — SULINDAC 200 MG/1
200 TABLET ORAL 2 TIMES DAILY
Qty: 180 TABLET | Refills: 0 | Status: SHIPPED | OUTPATIENT
Start: 2018-10-11 | End: 2019-01-07 | Stop reason: SDUPTHER

## 2018-10-15 RX ORDER — MONTELUKAST SODIUM 10 MG/1
TABLET ORAL
Qty: 90 TABLET | Refills: 3 | Status: SHIPPED | OUTPATIENT
Start: 2018-10-15 | End: 2020-01-08

## 2018-10-16 ENCOUNTER — OFFICE VISIT (OUTPATIENT)
Dept: PULMONOLOGY | Age: 68
End: 2018-10-16
Payer: MEDICARE

## 2018-10-16 ENCOUNTER — HOSPITAL ENCOUNTER (OUTPATIENT)
Age: 68
Discharge: HOME | End: 2018-10-16
Payer: MEDICARE

## 2018-10-16 VITALS
SYSTOLIC BLOOD PRESSURE: 151 MMHG | BODY MASS INDEX: 41.75 KG/M2 | DIASTOLIC BLOOD PRESSURE: 84 MMHG | HEIGHT: 73 IN | WEIGHT: 315 LBS | HEART RATE: 55 BPM | RESPIRATION RATE: 16 BRPM | OXYGEN SATURATION: 95 % | TEMPERATURE: 98.1 F

## 2018-10-16 DIAGNOSIS — I25.10 CORONARY ARTERY DISEASE INVOLVING NATIVE CORONARY ARTERY OF NATIVE HEART WITHOUT ANGINA PECTORIS: ICD-10-CM

## 2018-10-16 DIAGNOSIS — I51.89 DIASTOLIC DYSFUNCTION: ICD-10-CM

## 2018-10-16 DIAGNOSIS — G47.30 SEVERE SLEEP APNEA: ICD-10-CM

## 2018-10-16 DIAGNOSIS — J30.89 NON-SEASONAL ALLERGIC RHINITIS, UNSPECIFIED TRIGGER: ICD-10-CM

## 2018-10-16 DIAGNOSIS — R09.02 HYPOXEMIA: ICD-10-CM

## 2018-10-16 DIAGNOSIS — I50.32 CHRONIC DIASTOLIC CONGESTIVE HEART FAILURE (HCC): ICD-10-CM

## 2018-10-16 DIAGNOSIS — R09.81 NASAL CONGESTION: ICD-10-CM

## 2018-10-16 DIAGNOSIS — I10 ESSENTIAL HYPERTENSION: Chronic | ICD-10-CM

## 2018-10-16 DIAGNOSIS — E66.01 CLASS 2 SEVERE OBESITY DUE TO EXCESS CALORIES WITH SERIOUS COMORBIDITY AND BODY MASS INDEX (BMI) OF 39.0 TO 39.9 IN ADULT (HCC): Primary | ICD-10-CM

## 2018-10-16 LAB
ANION GAP SERPL CALCULATED.3IONS-SCNC: 17 MMOL/L (ref 3–16)
BUN BLDV-MCNC: 26 MG/DL (ref 7–20)
CALCIUM SERPL-MCNC: 9.2 MG/DL (ref 8.3–10.6)
CHLORIDE BLD-SCNC: 105 MMOL/L (ref 99–110)
CO2: 23 MMOL/L (ref 21–32)
CREAT SERPL-MCNC: 0.9 MG/DL (ref 0.8–1.3)
GFR AFRICAN AMERICAN: >60
GFR NON-AFRICAN AMERICAN: >60
GLUCOSE BLD-MCNC: 124 MG/DL (ref 70–99)
POTASSIUM SERPL-SCNC: 4.8 MMOL/L (ref 3.5–5.1)
PRO-BNP: 431 PG/ML (ref 0–124)
SODIUM BLD-SCNC: 145 MMOL/L (ref 136–145)

## 2018-10-16 PROCEDURE — G0008 ADMIN INFLUENZA VIRUS VAC: HCPCS | Performed by: INTERNAL MEDICINE

## 2018-10-16 PROCEDURE — 1036F TOBACCO NON-USER: CPT | Performed by: INTERNAL MEDICINE

## 2018-10-16 PROCEDURE — 80048 BASIC METABOLIC PNL TOTAL CA: CPT

## 2018-10-16 PROCEDURE — 1101F PT FALLS ASSESS-DOCD LE1/YR: CPT | Performed by: INTERNAL MEDICINE

## 2018-10-16 PROCEDURE — G8427 DOCREV CUR MEDS BY ELIG CLIN: HCPCS | Performed by: INTERNAL MEDICINE

## 2018-10-16 PROCEDURE — G8599 NO ASA/ANTIPLAT THER USE RNG: HCPCS | Performed by: INTERNAL MEDICINE

## 2018-10-16 PROCEDURE — 83880 ASSAY OF NATRIURETIC PEPTIDE: CPT

## 2018-10-16 PROCEDURE — G8482 FLU IMMUNIZE ORDER/ADMIN: HCPCS | Performed by: INTERNAL MEDICINE

## 2018-10-16 PROCEDURE — 99214 OFFICE O/P EST MOD 30 MIN: CPT | Performed by: INTERNAL MEDICINE

## 2018-10-16 PROCEDURE — 1123F ACP DISCUSS/DSCN MKR DOCD: CPT | Performed by: INTERNAL MEDICINE

## 2018-10-16 PROCEDURE — 36415 COLL VENOUS BLD VENIPUNCTURE: CPT

## 2018-10-16 PROCEDURE — 90662 IIV NO PRSV INCREASED AG IM: CPT | Performed by: INTERNAL MEDICINE

## 2018-10-16 PROCEDURE — 4040F PNEUMOC VAC/ADMIN/RCVD: CPT | Performed by: INTERNAL MEDICINE

## 2018-10-16 PROCEDURE — 3017F COLORECTAL CA SCREEN DOC REV: CPT | Performed by: INTERNAL MEDICINE

## 2018-10-16 PROCEDURE — G8417 CALC BMI ABV UP PARAM F/U: HCPCS | Performed by: INTERNAL MEDICINE

## 2018-10-16 ASSESSMENT — SLEEP AND FATIGUE QUESTIONNAIRES
ESS TOTAL SCORE: 15
HOW LIKELY ARE YOU TO NOD OFF OR FALL ASLEEP WHILE SITTING AND READING: 2
HOW LIKELY ARE YOU TO NOD OFF OR FALL ASLEEP WHILE WATCHING TV: 3
HOW LIKELY ARE YOU TO NOD OFF OR FALL ASLEEP WHILE LYING DOWN TO REST IN THE AFTERNOON WHEN CIRCUMSTANCES PERMIT: 3
HOW LIKELY ARE YOU TO NOD OFF OR FALL ASLEEP IN A CAR, WHILE STOPPED FOR A FEW MINUTES IN TRAFFIC: 1
HOW LIKELY ARE YOU TO NOD OFF OR FALL ASLEEP WHILE SITTING QUIETLY AFTER LUNCH WITHOUT ALCOHOL: 2
HOW LIKELY ARE YOU TO NOD OFF OR FALL ASLEEP WHEN YOU ARE A PASSENGER IN A CAR FOR AN HOUR WITHOUT A BREAK: 0
HOW LIKELY ARE YOU TO NOD OFF OR FALL ASLEEP WHILE SITTING AND TALKING TO SOMEONE: 1
HOW LIKELY ARE YOU TO NOD OFF OR FALL ASLEEP WHILE SITTING INACTIVE IN A PUBLIC PLACE: 3
NECK CIRCUMFERENCE (INCHES): 19

## 2018-10-16 NOTE — PATIENT INSTRUCTIONS
Vaccine Information Statement    Influenza (Flu) Vaccine (Inactivated or Recombinant): What you need to know    Many Vaccine Information Statements are available in [place] and other languages. See www.immunize.org/vis  Hojas de Información Sobre Vacunas están disponibles en Español y en muchos otros idiomas. Visite www.immunize.org/vis    1. Why get vaccinated? Influenza (flu) is a contagious disease that spreads around the [place] every year, usually between October and May. Flu is caused by influenza viruses, and is spread mainly by coughing, sneezing, and close contact. Anyone can get flu. Flu strikes suddenly and can last several days. Symptoms vary by age, but can include:   fever/chills   sore throat   muscle aches   fatigue   cough   headache    runny or stuffy nose    Flu can also lead to pneumonia and blood infections, and cause diarrhea and seizures in children. If you have a medical condition, such as heart or lung disease, flu can make it worse. Flu is more dangerous for some people. Infants and young children, people 72years of age and older, pregnant women, and people with certain health conditions or a weakened immune system are at greatest risk. Each year thousands of people in the [facility] die from flu, and many more are hospitalized. Flu vaccine can:   keep you from getting flu,   make flu less severe if you do get it, and   keep you from spreading flu to your family and other people. 2. Inactivated and recombinant flu vaccines    A dose of flu vaccine is recommended every flu season. Children 6 months through 6years of age may need two doses during the same flu season. Everyone else needs only one dose each flu season.        Some inactivated flu vaccines contain a very small amount of a mercury-based preservative called thimerosal. Studies have not shown thimerosal in vaccines to be harmful, but flu vaccines that do not contain thimerosal are or 2 days. More serious problems following a flu shot can include the following:     There may be a small increased risk of Guillain-Barré Syndrome (GBS) after inactivated flu vaccine. This risk has been estimated at 1 or 2 additional cases per million people vaccinated. This is much lower than the risk of severe complications from flu, which can be prevented by flu vaccine.  Young children who get the flu shot along with pneumococcal vaccine (PCV13) and/or DTaP vaccine at the same time might be slightly more likely to have a seizure caused by fever. Ask your doctor for more information. Tell your doctor if a child who is getting flu vaccine has ever had a seizure. Problems that could happen after any injected vaccine:      People sometimes faint after a medical procedure, including vaccination. Sitting or lying down for about 15 minutes can help prevent fainting, and injuries caused by a fall. Tell your doctor if you feel dizzy, or have vision changes or ringing in the ears.  Some people get severe pain in the shoulder and have difficulty moving the arm where a shot was given. This happens very rarely.  Any medication can cause a severe allergic reaction. Such reactions from a vaccine are very rare, estimated at about 1 in a million doses, and would happen within a few minutes to a few hours after the vaccination. As with any medicine, there is a very remote chance of a vaccine causing a serious injury or death. The safety of vaccines is always being monitored. For more information, visit: www.cdc.gov/vaccinesafety/    5. What if there is a serious reaction? What should I look for?  Look for anything that concerns you, such as signs of a severe allergic reaction, very high fever, or unusual behavior.     Signs of a severe allergic reaction can include hives, swelling of the face and throat, difficulty breathing, a fast heartbeat, dizziness, and weakness - usually within a few

## 2018-10-16 NOTE — PROGRESS NOTES
(1.854 m), weight (!) 321 lb (145.6 kg), SpO2 95 %.'  Constitutional:  No acute distress. HENT:  Oropharynx is clear and moist. Nothyromegaly. Nasal mucosal hyperemia and congestion  Eyes:  Conjunctivae are normal. Pupils equal, round, and reactive to light. No scleral icterus. Neck: . No tracheal deviation present. No obviousthyroid mass. Diameter is increased  Cardiovascular: Normal rate, regular rhythm, normal heart sounds. No right ventricular heave. 2-3+ lower extremity edema. Pulmonary/Chest: No wheezes. No rales. Chest wall is not dull to percussion. No accessory muscle usage or stridor. Decreased breath or intensity  Abdominal: Soft. Bowel sounds present. No distension or hernia. No tenderness. Musculoskeletal: No cyanosis. No clubbing. No obvious joint deformity. Lymphadenopathy: No cervical or supraclavicularadenopathy. Skin: Skin is warm and dry. No rash or nodules on the exposed extremities. Stasis dermatitis  Psychiatric: Normal mood and affect. Behavior is normal.  No anxiety. Neurologic: Alert, awake and oriented. PERRL. Speechfluent      Sleep Medicine Data:  Sitting and reading: Moderate chance of dozing  Watching TV: High chance of dozing  Sitting, inactive in a public place (e.g. a theatre or a meeting): High chance of dozing  As a passenger in a car for an hour without a break: Would never doze  Lying down to rest in the afternoon when circumstances permit: High chance of dozing  Sitting and talking to someone: Slight chance of dozing  Sitting quietly after a lunch without alcohol: Moderate chance of dozing  In a car, while stopped for a few minutes in traffic: Slight chance of dozing  Total score: 15  Neck circumference: 19        Data:     Imaging:  I have reviewed radiology images personally.   No orders to display     Fl Arthr/asp/inj Major Jt/bursa Rt Wo Us    Result Date: 9/20/2018  EXAMINATION: SPOT FLUOROSCOPIC IMAGES OF THE RIGHT HIP 9/19/2018 11:08 am TECHNIQUE:

## 2018-10-18 ENCOUNTER — TELEPHONE (OUTPATIENT)
Dept: CARDIOLOGY CLINIC | Age: 68
End: 2018-10-18

## 2018-10-18 NOTE — TELEPHONE ENCOUNTER
Spoke with patient. He states he is not near where he writes down his weights, but states they are the same. He states his swelling is doing better. He is taking the torsemide twice daily.

## 2018-10-18 NOTE — TELEPHONE ENCOUNTER
Basic Metabolic Panel   Order: [id number]   Status:  Final result   Visible to patient:  Yes (MyChart) Dx:  Essential hypertension; Chronic diast... Notes recorded by SHAKIRA Hou CNP on 10/18/2018 at 8:55 AM EDT  plese get an update on his weights and swelling. Please clarify if he is taking his water pills. Left message to call.

## 2018-10-23 ENCOUNTER — HOSPITAL ENCOUNTER (OUTPATIENT)
Age: 68
Discharge: HOME | End: 2018-10-23
Payer: MEDICARE

## 2018-10-23 DIAGNOSIS — I10 ESSENTIAL HYPERTENSION: Chronic | ICD-10-CM

## 2018-10-23 DIAGNOSIS — I25.10 CORONARY ARTERY DISEASE INVOLVING NATIVE CORONARY ARTERY OF NATIVE HEART WITHOUT ANGINA PECTORIS: ICD-10-CM

## 2018-10-23 DIAGNOSIS — I50.32 CHRONIC DIASTOLIC CONGESTIVE HEART FAILURE (HCC): ICD-10-CM

## 2018-10-23 LAB
ANION GAP SERPL CALCULATED.3IONS-SCNC: 11 MMOL/L (ref 3–16)
BUN BLDV-MCNC: 26 MG/DL (ref 7–20)
CALCIUM SERPL-MCNC: 9.4 MG/DL (ref 8.3–10.6)
CHLORIDE BLD-SCNC: 101 MMOL/L (ref 99–110)
CO2: 28 MMOL/L (ref 21–32)
CREAT SERPL-MCNC: 0.8 MG/DL (ref 0.8–1.3)
GFR AFRICAN AMERICAN: >60
GFR NON-AFRICAN AMERICAN: >60
GLUCOSE BLD-MCNC: 210 MG/DL (ref 70–99)
POTASSIUM SERPL-SCNC: 4.7 MMOL/L (ref 3.5–5.1)
PRO-BNP: 322 PG/ML (ref 0–124)
SODIUM BLD-SCNC: 140 MMOL/L (ref 136–145)

## 2018-10-23 PROCEDURE — 36415 COLL VENOUS BLD VENIPUNCTURE: CPT

## 2018-10-23 PROCEDURE — 80048 BASIC METABOLIC PNL TOTAL CA: CPT

## 2018-10-23 PROCEDURE — 83880 ASSAY OF NATRIURETIC PEPTIDE: CPT

## 2018-10-24 ENCOUNTER — OFFICE VISIT (OUTPATIENT)
Dept: FAMILY MEDICINE CLINIC | Age: 68
End: 2018-10-24
Payer: MEDICARE

## 2018-10-24 VITALS
WEIGHT: 315 LBS | DIASTOLIC BLOOD PRESSURE: 80 MMHG | BODY MASS INDEX: 41.75 KG/M2 | OXYGEN SATURATION: 95 % | HEIGHT: 73 IN | HEART RATE: 57 BPM | RESPIRATION RATE: 16 BRPM | SYSTOLIC BLOOD PRESSURE: 124 MMHG

## 2018-10-24 DIAGNOSIS — Z79.4 TYPE 2 DIABETES MELLITUS WITH HYPERGLYCEMIA, WITH LONG-TERM CURRENT USE OF INSULIN (HCC): Primary | ICD-10-CM

## 2018-10-24 DIAGNOSIS — L97.221 VENOUS STASIS ULCER OF LEFT CALF LIMITED TO BREAKDOWN OF SKIN WITH VARICOSE VEINS (HCC): ICD-10-CM

## 2018-10-24 DIAGNOSIS — E11.65 TYPE 2 DIABETES MELLITUS WITH HYPERGLYCEMIA, WITH LONG-TERM CURRENT USE OF INSULIN (HCC): Primary | ICD-10-CM

## 2018-10-24 DIAGNOSIS — I83.022 VENOUS STASIS ULCER OF LEFT CALF LIMITED TO BREAKDOWN OF SKIN WITH VARICOSE VEINS (HCC): ICD-10-CM

## 2018-10-24 DIAGNOSIS — I83.019 VENOUS STASIS ULCER OF RIGHT LOWER EXTREMITY (HCC): Chronic | ICD-10-CM

## 2018-10-24 DIAGNOSIS — I50.32 CHRONIC DIASTOLIC CONGESTIVE HEART FAILURE (HCC): ICD-10-CM

## 2018-10-24 DIAGNOSIS — F33.1 MODERATE EPISODE OF RECURRENT MAJOR DEPRESSIVE DISORDER (HCC): ICD-10-CM

## 2018-10-24 DIAGNOSIS — L97.919 VENOUS STASIS ULCER OF RIGHT LOWER EXTREMITY (HCC): Chronic | ICD-10-CM

## 2018-10-24 LAB — HBA1C MFR BLD: 7.1 %

## 2018-10-24 PROCEDURE — G8482 FLU IMMUNIZE ORDER/ADMIN: HCPCS | Performed by: NURSE PRACTITIONER

## 2018-10-24 PROCEDURE — G8599 NO ASA/ANTIPLAT THER USE RNG: HCPCS | Performed by: NURSE PRACTITIONER

## 2018-10-24 PROCEDURE — 4040F PNEUMOC VAC/ADMIN/RCVD: CPT | Performed by: NURSE PRACTITIONER

## 2018-10-24 PROCEDURE — 1101F PT FALLS ASSESS-DOCD LE1/YR: CPT | Performed by: NURSE PRACTITIONER

## 2018-10-24 PROCEDURE — 2022F DILAT RTA XM EVC RTNOPTHY: CPT | Performed by: NURSE PRACTITIONER

## 2018-10-24 PROCEDURE — G8427 DOCREV CUR MEDS BY ELIG CLIN: HCPCS | Performed by: NURSE PRACTITIONER

## 2018-10-24 PROCEDURE — 1123F ACP DISCUSS/DSCN MKR DOCD: CPT | Performed by: NURSE PRACTITIONER

## 2018-10-24 PROCEDURE — 83036 HEMOGLOBIN GLYCOSYLATED A1C: CPT | Performed by: NURSE PRACTITIONER

## 2018-10-24 PROCEDURE — 99213 OFFICE O/P EST LOW 20 MIN: CPT | Performed by: NURSE PRACTITIONER

## 2018-10-24 PROCEDURE — 3017F COLORECTAL CA SCREEN DOC REV: CPT | Performed by: NURSE PRACTITIONER

## 2018-10-24 PROCEDURE — 1036F TOBACCO NON-USER: CPT | Performed by: NURSE PRACTITIONER

## 2018-10-24 PROCEDURE — 3045F PR MOST RECENT HEMOGLOBIN A1C LEVEL 7.0-9.0%: CPT | Performed by: NURSE PRACTITIONER

## 2018-10-24 PROCEDURE — G8417 CALC BMI ABV UP PARAM F/U: HCPCS | Performed by: NURSE PRACTITIONER

## 2018-10-24 ASSESSMENT — ENCOUNTER SYMPTOMS
BACK PAIN: 0
NAUSEA: 0
CONSTIPATION: 0
CHEST TIGHTNESS: 0

## 2018-10-24 NOTE — PROGRESS NOTES
1.   Lab Results   Component Value Date    LABA1C 7.1 10/24/2018     Lab Results   Component Value Date    .0 10/16/2017     2. Lab Results   Component Value Date     10/23/2018    K 4.7 10/23/2018    K 4.3 03/01/2018     10/23/2018    CO2 28 10/23/2018    BUN 26 10/23/2018    CREATININE 0.8 10/23/2018    GLUCOSE 210 10/23/2018    CALCIUM 9.4 10/23/2018        3. BNP improved. Yesterday was 322.     4. Encouraged to decrease weight. 5. Recommend follow up with ENT for ongoing decreased hearing right ear. Neg for cerumen impaction. 10. Bridget Fan was seen today for diabetes. Diagnoses and all orders for this visit:    Type 2 diabetes mellitus with hyperglycemia, with long-term current use of insulin (HCC)  -     POCT glycosylated hemoglobin (Hb A1C)    Chronic diastolic congestive heart failure (HCC)    Venous stasis ulcer of right lower extremity (HCC)    Venous stasis ulcer of left calf limited to breakdown of skin with varicose veins (HCC)    Moderate episode of recurrent major depressive disorder (HCC)  -     sertraline (ZOLOFT) 50 MG tablet;  Take 1 tablet by mouth daily                SHAKIRA LOWERY - CNP

## 2018-10-30 ENCOUNTER — HOSPITAL ENCOUNTER (OUTPATIENT)
Age: 68
Discharge: HOME | End: 2018-10-30
Payer: MEDICARE

## 2018-10-30 DIAGNOSIS — I50.32 CHRONIC DIASTOLIC CONGESTIVE HEART FAILURE (HCC): ICD-10-CM

## 2018-10-30 DIAGNOSIS — I25.10 CORONARY ARTERY DISEASE INVOLVING NATIVE CORONARY ARTERY OF NATIVE HEART WITHOUT ANGINA PECTORIS: ICD-10-CM

## 2018-10-30 DIAGNOSIS — I10 ESSENTIAL HYPERTENSION: Chronic | ICD-10-CM

## 2018-10-30 LAB
ANION GAP SERPL CALCULATED.3IONS-SCNC: 13 MMOL/L (ref 3–16)
BUN BLDV-MCNC: 33 MG/DL (ref 7–20)
CALCIUM SERPL-MCNC: 9 MG/DL (ref 8.3–10.6)
CHLORIDE BLD-SCNC: 103 MMOL/L (ref 99–110)
CO2: 27 MMOL/L (ref 21–32)
CREAT SERPL-MCNC: 1 MG/DL (ref 0.8–1.3)
GFR AFRICAN AMERICAN: >60
GFR NON-AFRICAN AMERICAN: >60
GLUCOSE BLD-MCNC: 185 MG/DL (ref 70–99)
POTASSIUM SERPL-SCNC: 4.6 MMOL/L (ref 3.5–5.1)
PRO-BNP: 422 PG/ML (ref 0–124)
SODIUM BLD-SCNC: 143 MMOL/L (ref 136–145)

## 2018-10-30 PROCEDURE — 36415 COLL VENOUS BLD VENIPUNCTURE: CPT

## 2018-10-30 PROCEDURE — 83880 ASSAY OF NATRIURETIC PEPTIDE: CPT

## 2018-10-30 PROCEDURE — 80048 BASIC METABOLIC PNL TOTAL CA: CPT

## 2018-11-01 RX ORDER — TORSEMIDE 20 MG/1
TABLET ORAL
Qty: 60 TABLET | Refills: 3 | Status: SHIPPED | OUTPATIENT
Start: 2018-11-01 | End: 2019-04-25 | Stop reason: SDUPTHER

## 2018-11-06 ENCOUNTER — OFFICE VISIT (OUTPATIENT)
Dept: CARDIOLOGY CLINIC | Age: 68
End: 2018-11-06
Payer: MEDICARE

## 2018-11-06 VITALS
HEART RATE: 54 BPM | DIASTOLIC BLOOD PRESSURE: 60 MMHG | OXYGEN SATURATION: 93 % | SYSTOLIC BLOOD PRESSURE: 104 MMHG | HEIGHT: 73 IN | WEIGHT: 315 LBS | BODY MASS INDEX: 41.75 KG/M2

## 2018-11-06 DIAGNOSIS — I50.32 CHRONIC DIASTOLIC CONGESTIVE HEART FAILURE (HCC): Primary | ICD-10-CM

## 2018-11-06 DIAGNOSIS — I10 ESSENTIAL HYPERTENSION: Chronic | ICD-10-CM

## 2018-11-06 DIAGNOSIS — I25.10 CORONARY ARTERY DISEASE INVOLVING NATIVE CORONARY ARTERY OF NATIVE HEART WITHOUT ANGINA PECTORIS: ICD-10-CM

## 2018-11-06 DIAGNOSIS — G47.30 SEVERE SLEEP APNEA: ICD-10-CM

## 2018-11-06 PROCEDURE — G8482 FLU IMMUNIZE ORDER/ADMIN: HCPCS | Performed by: NURSE PRACTITIONER

## 2018-11-06 PROCEDURE — 99214 OFFICE O/P EST MOD 30 MIN: CPT | Performed by: NURSE PRACTITIONER

## 2018-11-06 PROCEDURE — 1101F PT FALLS ASSESS-DOCD LE1/YR: CPT | Performed by: NURSE PRACTITIONER

## 2018-11-06 PROCEDURE — 3017F COLORECTAL CA SCREEN DOC REV: CPT | Performed by: NURSE PRACTITIONER

## 2018-11-06 PROCEDURE — G8427 DOCREV CUR MEDS BY ELIG CLIN: HCPCS | Performed by: NURSE PRACTITIONER

## 2018-11-06 PROCEDURE — G8599 NO ASA/ANTIPLAT THER USE RNG: HCPCS | Performed by: NURSE PRACTITIONER

## 2018-11-06 PROCEDURE — 1123F ACP DISCUSS/DSCN MKR DOCD: CPT | Performed by: NURSE PRACTITIONER

## 2018-11-06 PROCEDURE — 4040F PNEUMOC VAC/ADMIN/RCVD: CPT | Performed by: NURSE PRACTITIONER

## 2018-11-06 PROCEDURE — 1036F TOBACCO NON-USER: CPT | Performed by: NURSE PRACTITIONER

## 2018-11-06 PROCEDURE — G8417 CALC BMI ABV UP PARAM F/U: HCPCS | Performed by: NURSE PRACTITIONER

## 2018-11-06 NOTE — COMMUNICATION BODY
oxybutynin (DITROPAN XL) 15 MG extended release tablet Take 1 tablet by mouth Daily 5/21/18  Yes SHAKIRA Sims CNP   glimepiride (AMARYL) 4 MG tablet TAKE 1 TABLET TWICE A DAY 4/23/18  Yes SHAKIRA Sims CNP   atorvastatin (LIPITOR) 40 MG tablet Take 1 tablet by mouth nightly 3/8/18  Yes Eduardo Ugalde MD   isosorbide mononitrate (IMDUR) 30 MG extended release tablet Take 1 tablet by mouth daily 3/8/18  Yes Eduardo Ugalde MD   atenolol (TENORMIN) 50 MG tablet Take 1 tablet by mouth daily 2/14/18  Yes SHAKIRA Sims CNP   aspirin 81 MG chewable tablet Take 1 tablet by mouth daily 10/21/17  Yes Reinier Vera MD   Multiple Vitamins-Minerals (THERAPEUTIC MULTIVITAMIN-MINERALS) tablet Take 1 tablet by mouth daily   Yes Historical Provider, MD   ferrous sulfate 325 (65 FE) MG tablet Take 325 mg by mouth daily. Yes Historical Provider, MD   tiZANidine (ZANAFLEX) 4 MG tablet Take 1 tablet by mouth every 8 hours as needed (muscle pain) 9/19/18 12/18/18  Russell, PA   silver sulfADIAZINE (SILVADENE) 1 % cream Apply topically daily. 5/21/18   SHAKIRA Sims CNP   Insulin Pen Needle 31G X 5 MM MISC 1 each by Does not apply route daily 2/23/18   SHAKIRA Sims CNP   glucose blood VI test strips (ASCENSIA AUTODISC VI;ONE TOUCH ULTRA TEST VI) strip DX: E11.9 FSBS daily. One Touch ultra 11/29/17   SHAKIRA Sims CNP   Blood Glucose Monitoring Suppl PRABHAKAR 1 Device by Does not apply route daily One Touch Ultra 3/23/16   SHAKIRA Sims CNP        Allergies:  Norco [hydrocodone-acetaminophen]     Review of Systems:   · Constitutional: there has been no unanticipated weight loss. · Eyes: No vision changes  · ENT: No Headaches, + nasal congestion. No mouth sores or sore throat. · Cardiovascular: Reviewed in HPI  · Respiratory: No cough or wheezing, no sputum production.   · Gastrointestinal: No 09/05/2018    RDW 17.1 08/20/2018    RDW 16.2 03/01/2018     09/05/2018     08/20/2018     03/01/2018     BMP:   Lab Results   Component Value Date     10/30/2018     10/23/2018     10/16/2018    K 4.6 10/30/2018    K 4.7 10/23/2018    K 4.8 10/16/2018    K 4.3 03/01/2018     10/30/2018     10/23/2018     10/16/2018    CO2 27 10/30/2018    CO2 28 10/23/2018    CO2 23 10/16/2018    PHOS 3.7 03/01/2018    PHOS 3.7 05/21/2014    PHOS 2.7 05/19/2014    BUN 33 10/30/2018    BUN 26 10/23/2018    BUN 26 10/16/2018    CREATININE 1.0 10/30/2018    CREATININE 0.8 10/23/2018    CREATININE 0.9 10/16/2018     BNP:   Lab Results   Component Value Date    PROBNP 422 10/30/2018    PROBNP 322 10/23/2018    PROBNP 431 10/16/2018       Recent Testing:  ECHO: 10/17/17  Left ventricular systolic function is normal with the ejection fraction   estimated at 55%. No regional wall motion abnormalities. Moderate concentric left ventricular hypertrophy. Grade II diastolic dysfunction with elevated filling pressure. Severe bi-atrial enlargement. Right ventricle is moderately enlarged. Mild aortic stenosis. Systolic pulmonary artery pressure (SPAP) is normal and estimated at 26 mmHg   (RA pressure 3 mmHg). Stress test on 10/18/17:  Negative    Pertinent Problems:  ·  Diastolic heart failure from hypertensive heart disease; NYHA class III  · CAD based on coronary calcification on CT chest; negative stress 10/18/17  · Shortness of breath-multifactorial; due diastolic heart failure, morbid obesity, cor pulmonale  · Dilated right ventricle due to cor pulmonale vs heart failure  · Mild aortic stenosis  · Chronic hypoxemic respiratory failure- off oxygen  · Restrictive lung defect  · Severe MONIQUE on CPAP    Visit Diagnosis:    1. Chronic diastolic congestive heart failure ([address]    2. Essential hypertension    3.  Coronary artery disease involving native coronary artery of native

## 2018-11-06 NOTE — LETTER
tablets by mouth daily 10/2/18  Yes SHAKIRA Solitario CNP   BASAGLAR KWIKPEN 100 UNIT/ML injection pen INJECT 50 UNITS INTO THE SKIN NIGHTLY 9/10/18  Yes SHAKIRA Wilkins CNP   azelastine (ASTELIN) 0.1 % nasal spray 1 spray by Nasal route 2 times daily Use in each nostril as directed 8/20/18  Yes SHAKIRA Wilkins CNP   omeprazole (PRILOSEC) 40 MG delayed release capsule Take 1 capsule by mouth daily 8/3/18  Yes SHAKIRA Wilkins CNP   oxybutynin (DITROPAN XL) 15 MG extended release tablet Take 1 tablet by mouth Daily 5/21/18  Yes SHAKIRA Wilkins CNP   glimepiride (AMARYL) 4 MG tablet TAKE 1 TABLET TWICE A DAY 4/23/18  Yes SHAKIRA Wilkins CNP   atorvastatin (LIPITOR) 40 MG tablet Take 1 tablet by mouth nightly 3/8/18  Yes Jacquelyn Gutierrez MD   isosorbide mononitrate (IMDUR) 30 MG extended release tablet Take 1 tablet by mouth daily 3/8/18  Yes Jacquelyn Gutierrez MD   atenolol (TENORMIN) 50 MG tablet Take 1 tablet by mouth daily 2/14/18  Yes SHAKIRA Wilkins CNP   aspirin 81 MG chewable tablet Take 1 tablet by mouth daily 10/21/17  Yes Ryan Morrison MD   Multiple Vitamins-Minerals (THERAPEUTIC MULTIVITAMIN-MINERALS) tablet Take 1 tablet by mouth daily   Yes Historical Provider, MD   ferrous sulfate 325 (65 FE) MG tablet Take 325 mg by mouth daily. Yes Historical Provider, MD   tiZANidine (ZANAFLEX) 4 MG tablet Take 1 tablet by mouth every 8 hours as needed (muscle pain) 9/19/18 12/18/18  ZAYRA Druán   silver sulfADIAZINE (SILVADENE) 1 % cream Apply topically daily. 5/21/18   SHAKIRA Wilkins CNP   Insulin Pen Needle 31G X 5 MM MISC 1 each by Does not apply route daily 2/23/18   SHAKIRA Wilkins CNP   glucose blood VI test strips (ASCENSIA AUTODISC VI;ONE TOUCH ULTRA TEST VI) strip DX: E11.9 FSBS daily.  One Touch ultra 11/29/17   Eileen Dsouza, SHAKIRA - CNP Blood Glucose Monitoring Suppl PRABHAKAR 1 Device by Does not apply route daily One Touch Ultra 3/23/16   SHAKIRA Wilkins - CNP        Allergies:  Norco [hydrocodone-acetaminophen]     Review of Systems:   · Constitutional: there has been no unanticipated weight loss. · Eyes: No vision changes  · ENT: No Headaches, + nasal congestion. No mouth sores or sore throat. · Cardiovascular: Reviewed in HPI  · Respiratory: No cough or wheezing, no sputum production. · Gastrointestinal: No abdominal pain, no constipation or diarrhea  · Genitourinary: No dysuria, trouble voiding, or hematuria. · Musculoskeletal:  + weakness or joint complaints. · Integumentary: No rash or pruritis. · Neurological: No numbness or tingling. + weakness. No tremor. · Psychiatric: No anxiety, no depression. · Endocrine:  No excessive thirst or urination. · Hematologic/Lymphatic: No abnormal bruising or bleeding, blood clots or swollen lymph nodes.     Physical Examination:    Vitals:    11/06/18 0949   BP: 104/60   Pulse: 54   SpO2: 93%   Weight: (!) 323 lb (146.5 kg)   Height: 6' 1\" (1.854 m)        Constitutional and General Appearance: no apparent distress  HEENT: non-icteric sclera, oropharynx without exudate, oral mucosa moist  Neck: difficult to assess JVD due to body habitus  Respiratory:  · No use of accessory muscles  · Dim breath sounds throughout, no wheezing, no crackles, no rhonchi  Cardiovascular:  · The apical impulses not displaced  ·  + murmur/rub/gallop  · Regular rate and rhythm, S1,S2 normal  · Radial pulses 2+ and equal bilaterally  · ++  BLE edema, compression socks in place  · Pedal Pulses: 1+ and equal   Abdomen:  · No masses or tenderness- large abd  · Liver: No Abnormalities Noted  Musculoskeletal/Skin:  · Gait is slow- walking with a limp  · There is no clubbing, cyanosis of the extremities  · Skin is warm and dry  · Moves all extremities well  Neurological/Psychiatric:

## 2018-11-06 NOTE — PATIENT INSTRUCTIONS
Plan:   1. Check BMP in 2 weeks  2. Continue hydralazine 50mg three times a day  3. Continue atenolol   4. Continue imdur 30mg daily   5. Continue the torsemide 40mg daily and add a dose in the afternoon of 20mg three times a week  6. Continue daily weights and record them  7. Try sugar free candies, ice chips, frozen grapes to help curb the dry mouth.     Ice chips would count toward your fluid restriction (equates to 1/2 of the volume of the cup)  Continue with compression socks  Follow up 8 weeks

## 2018-11-14 ENCOUNTER — HOSPITAL ENCOUNTER (OUTPATIENT)
Age: 68
Discharge: HOME | End: 2018-11-14
Payer: MEDICARE

## 2018-11-14 DIAGNOSIS — I25.10 CORONARY ARTERY DISEASE INVOLVING NATIVE CORONARY ARTERY OF NATIVE HEART WITHOUT ANGINA PECTORIS: ICD-10-CM

## 2018-11-14 DIAGNOSIS — I10 ESSENTIAL HYPERTENSION: Chronic | ICD-10-CM

## 2018-11-14 DIAGNOSIS — G47.30 SEVERE SLEEP APNEA: ICD-10-CM

## 2018-11-14 DIAGNOSIS — I50.32 CHRONIC DIASTOLIC CONGESTIVE HEART FAILURE (HCC): ICD-10-CM

## 2018-11-14 LAB
ANION GAP SERPL CALCULATED.3IONS-SCNC: 13 MMOL/L (ref 3–16)
BUN BLDV-MCNC: 38 MG/DL (ref 7–20)
CALCIUM SERPL-MCNC: 9.1 MG/DL (ref 8.3–10.6)
CHLORIDE BLD-SCNC: 103 MMOL/L (ref 99–110)
CO2: 27 MMOL/L (ref 21–32)
CREAT SERPL-MCNC: 1 MG/DL (ref 0.8–1.3)
GFR AFRICAN AMERICAN: >60
GFR NON-AFRICAN AMERICAN: >60
GLUCOSE BLD-MCNC: 115 MG/DL (ref 70–99)
POTASSIUM SERPL-SCNC: 4.4 MMOL/L (ref 3.5–5.1)
PRO-BNP: 431 PG/ML (ref 0–124)
SODIUM BLD-SCNC: 143 MMOL/L (ref 136–145)

## 2018-11-14 PROCEDURE — 36415 COLL VENOUS BLD VENIPUNCTURE: CPT

## 2018-11-14 PROCEDURE — 83880 ASSAY OF NATRIURETIC PEPTIDE: CPT

## 2018-11-14 PROCEDURE — 80048 BASIC METABOLIC PNL TOTAL CA: CPT

## 2018-11-21 ENCOUNTER — TELEPHONE (OUTPATIENT)
Dept: CARDIOLOGY CLINIC | Age: 68
End: 2018-11-21

## 2018-11-21 RX ORDER — ISOSORBIDE MONONITRATE 30 MG/1
30 TABLET, EXTENDED RELEASE ORAL DAILY
Qty: 90 TABLET | Refills: 3 | Status: SHIPPED | OUTPATIENT
Start: 2018-11-21 | End: 2018-11-29 | Stop reason: SDUPTHER

## 2018-11-21 NOTE — TELEPHONE ENCOUNTER
11-21-18 LM at [phone] informing pt to call back. Pt needs to be scheduled with [facility] for the first part of Jan 2019. The schedule was not out at his last ov.

## 2018-11-29 ENCOUNTER — TELEPHONE (OUTPATIENT)
Dept: CARDIOLOGY CLINIC | Age: 68
End: 2018-11-29

## 2018-11-29 ENCOUNTER — HOSPITAL ENCOUNTER (OUTPATIENT)
Age: 68
Discharge: HOME | End: 2018-11-29
Payer: MEDICARE

## 2018-11-29 DIAGNOSIS — G47.30 SEVERE SLEEP APNEA: ICD-10-CM

## 2018-11-29 DIAGNOSIS — I50.32 CHRONIC DIASTOLIC CONGESTIVE HEART FAILURE (HCC): ICD-10-CM

## 2018-11-29 DIAGNOSIS — I10 ESSENTIAL HYPERTENSION: Chronic | ICD-10-CM

## 2018-11-29 DIAGNOSIS — I25.10 CORONARY ARTERY DISEASE INVOLVING NATIVE CORONARY ARTERY OF NATIVE HEART WITHOUT ANGINA PECTORIS: ICD-10-CM

## 2018-11-29 LAB
ANION GAP SERPL CALCULATED.3IONS-SCNC: 12 MMOL/L (ref 3–16)
BUN BLDV-MCNC: 40 MG/DL (ref 7–20)
CALCIUM SERPL-MCNC: 9.1 MG/DL (ref 8.3–10.6)
CHLORIDE BLD-SCNC: 99 MMOL/L (ref 99–110)
CO2: 27 MMOL/L (ref 21–32)
CREAT SERPL-MCNC: 1.1 MG/DL (ref 0.8–1.3)
GFR AFRICAN AMERICAN: >60
GFR NON-AFRICAN AMERICAN: >60
GLUCOSE BLD-MCNC: 205 MG/DL (ref 70–99)
POTASSIUM SERPL-SCNC: 4.2 MMOL/L (ref 3.5–5.1)
PRO-BNP: 215 PG/ML (ref 0–124)
SODIUM BLD-SCNC: 138 MMOL/L (ref 136–145)

## 2018-11-29 PROCEDURE — 36415 COLL VENOUS BLD VENIPUNCTURE: CPT

## 2018-11-29 PROCEDURE — 83880 ASSAY OF NATRIURETIC PEPTIDE: CPT

## 2018-11-29 PROCEDURE — 80048 BASIC METABOLIC PNL TOTAL CA: CPT

## 2018-11-29 RX ORDER — ISOSORBIDE MONONITRATE 30 MG/1
30 TABLET, EXTENDED RELEASE ORAL DAILY
Qty: 90 TABLET | Refills: 5 | Status: SHIPPED | OUTPATIENT
Start: 2018-11-29 | End: 2020-02-24 | Stop reason: SDUPTHER

## 2018-12-13 ENCOUNTER — HOSPITAL ENCOUNTER (OUTPATIENT)
Age: 68
Discharge: HOME | End: 2018-12-13
Payer: MEDICARE

## 2018-12-13 DIAGNOSIS — I25.10 CORONARY ARTERY DISEASE INVOLVING NATIVE CORONARY ARTERY OF NATIVE HEART WITHOUT ANGINA PECTORIS: ICD-10-CM

## 2018-12-13 DIAGNOSIS — I50.32 CHRONIC DIASTOLIC CONGESTIVE HEART FAILURE (HCC): ICD-10-CM

## 2018-12-13 DIAGNOSIS — I10 ESSENTIAL HYPERTENSION: Chronic | ICD-10-CM

## 2018-12-13 DIAGNOSIS — G47.30 SEVERE SLEEP APNEA: ICD-10-CM

## 2018-12-13 LAB
ANION GAP SERPL CALCULATED.3IONS-SCNC: 15 MMOL/L (ref 3–16)
BUN BLDV-MCNC: 35 MG/DL (ref 7–20)
CALCIUM SERPL-MCNC: 8.9 MG/DL (ref 8.3–10.6)
CHLORIDE BLD-SCNC: 100 MMOL/L (ref 99–110)
CO2: 28 MMOL/L (ref 21–32)
CREAT SERPL-MCNC: 1.2 MG/DL (ref 0.8–1.3)
GFR AFRICAN AMERICAN: >60
GFR NON-AFRICAN AMERICAN: 60
GLUCOSE BLD-MCNC: 172 MG/DL (ref 70–99)
POTASSIUM SERPL-SCNC: 4.1 MMOL/L (ref 3.5–5.1)
PRO-BNP: 245 PG/ML (ref 0–124)
SODIUM BLD-SCNC: 143 MMOL/L (ref 136–145)

## 2018-12-13 PROCEDURE — 36415 COLL VENOUS BLD VENIPUNCTURE: CPT

## 2018-12-13 PROCEDURE — 80048 BASIC METABOLIC PNL TOTAL CA: CPT

## 2018-12-13 PROCEDURE — 83880 ASSAY OF NATRIURETIC PEPTIDE: CPT

## 2018-12-20 ENCOUNTER — OFFICE VISIT (OUTPATIENT)
Dept: ORTHOPEDIC SURGERY | Age: 68
End: 2018-12-20
Payer: MEDICARE

## 2018-12-20 VITALS — HEIGHT: 73 IN | BODY MASS INDEX: 41.75 KG/M2 | WEIGHT: 315 LBS

## 2018-12-20 DIAGNOSIS — M16.11 PRIMARY OSTEOARTHRITIS OF RIGHT HIP: Primary | ICD-10-CM

## 2018-12-20 PROCEDURE — 1101F PT FALLS ASSESS-DOCD LE1/YR: CPT | Performed by: ORTHOPAEDIC SURGERY

## 2018-12-20 PROCEDURE — G8599 NO ASA/ANTIPLAT THER USE RNG: HCPCS | Performed by: ORTHOPAEDIC SURGERY

## 2018-12-20 PROCEDURE — 1123F ACP DISCUSS/DSCN MKR DOCD: CPT | Performed by: ORTHOPAEDIC SURGERY

## 2018-12-20 PROCEDURE — 3017F COLORECTAL CA SCREEN DOC REV: CPT | Performed by: ORTHOPAEDIC SURGERY

## 2018-12-20 PROCEDURE — G8482 FLU IMMUNIZE ORDER/ADMIN: HCPCS | Performed by: ORTHOPAEDIC SURGERY

## 2018-12-20 PROCEDURE — 4040F PNEUMOC VAC/ADMIN/RCVD: CPT | Performed by: ORTHOPAEDIC SURGERY

## 2018-12-20 PROCEDURE — 1036F TOBACCO NON-USER: CPT | Performed by: ORTHOPAEDIC SURGERY

## 2018-12-20 PROCEDURE — 99213 OFFICE O/P EST LOW 20 MIN: CPT | Performed by: ORTHOPAEDIC SURGERY

## 2018-12-20 PROCEDURE — G8417 CALC BMI ABV UP PARAM F/U: HCPCS | Performed by: ORTHOPAEDIC SURGERY

## 2018-12-20 PROCEDURE — G8427 DOCREV CUR MEDS BY ELIG CLIN: HCPCS | Performed by: ORTHOPAEDIC SURGERY

## 2018-12-20 RX ORDER — DICLOFENAC SODIUM 75 MG/1
75 TABLET, DELAYED RELEASE ORAL 2 TIMES DAILY
Qty: 60 TABLET | Refills: 1 | Status: SHIPPED | OUTPATIENT
Start: 2018-12-20 | End: 2019-01-24

## 2018-12-20 NOTE — PROGRESS NOTES
+PCR    Cellulitis of right lower extremity 3/23/2016    Dental disease     Diabetes ([address]     Diverticulitis     Dizziness     DVT of lower extremity, bilateral (HCC) 10/16/2013    GERD (gastroesophageal reflux disease)     Headache     Hearing loss     Hematuria 1/2/2014    Hypertension     Lung disease     Pancreatitis ([address] 8/24/2013    Pulmonary emboli ([address] 2013    after cholecystectomy     Rash     Sleep apnea     Tinnitus       Past Surgical History:     Past Surgical History:   Procedure Laterality Date    ABDOMEN SURGERY  05/16/2014    reveseral end peristomal hernia repair.  CHOLECYSTECTOMY, OPEN N/A 9-17-13    LAPAROSCOPIC CONVERTED TO OPEN CHOLECYSTECTOMY WITH    COLON SURGERY      COLONOSCOPY  2008    COLONOSCOPY  12/4/2013    Severe Diverticulosis unable to finish    COLONOSCOPY  4/30/14    diverticula-10 year f/u    COLOSTOMY  Jan 2014    CYSTOSCOPY  12/10/13    with bladder biopsy    CYSTOSCOPY  1-28-14    Cystourethroscopy, left ureteral catheter     HERNIA REPAIR  08/14/2015    OPEN INCISIONAL HERNIA REPAIR WITH MESH, BILATERAL COMPONENT SEPARATION, LYSIS OF ADHESIONS    OTHER SURGICAL HISTORY  1-28-14    LeftColectomy with End Colostomy, Splenic Flexure Mobilization, Takedown of Colovesical Fistula    DC INJECTION HIP ARTHROGRAM,ANESTH Right 9/19/2018    ARTHROGRAM AND CORTISONE INJECTION RIGHT HIP performed by Liliya Hill MD at [address] Right 2003    Fracture lower leg during chain saw accident.  Surgery x 2     Current Medications:     Current Outpatient Prescriptions:     isosorbide mononitrate (IMDUR) 30 MG extended release tablet, Take 1 tablet by mouth daily, Disp: 90 tablet, Rfl: 5    oxybutynin (DITROPAN XL) 15 MG extended release tablet, TAKE 1 TABLET BY MOUTH EVERY DAY, Disp: 90 tablet, Rfl: 1    Naproxen Sodium (ALEVE PO), Take by mouth, Disp: , Rfl:     torsemide (DEMADEX) 20 MG tablet, TAKE 2 TABLETS BY MOUTH EVERY

## 2018-12-27 ENCOUNTER — OFFICE VISIT (OUTPATIENT)
Dept: PULMONOLOGY | Age: 68
End: 2018-12-27
Payer: MEDICARE

## 2018-12-27 VITALS
HEIGHT: 73 IN | HEART RATE: 54 BPM | OXYGEN SATURATION: 95 % | DIASTOLIC BLOOD PRESSURE: 65 MMHG | TEMPERATURE: 97.8 F | SYSTOLIC BLOOD PRESSURE: 124 MMHG | BODY MASS INDEX: 41.75 KG/M2 | RESPIRATION RATE: 16 BRPM | WEIGHT: 315 LBS

## 2018-12-27 DIAGNOSIS — J30.89 NON-SEASONAL ALLERGIC RHINITIS, UNSPECIFIED TRIGGER: ICD-10-CM

## 2018-12-27 DIAGNOSIS — G47.33 OSA (OBSTRUCTIVE SLEEP APNEA): Primary | ICD-10-CM

## 2018-12-27 PROCEDURE — G8599 NO ASA/ANTIPLAT THER USE RNG: HCPCS | Performed by: INTERNAL MEDICINE

## 2018-12-27 PROCEDURE — 4040F PNEUMOC VAC/ADMIN/RCVD: CPT | Performed by: INTERNAL MEDICINE

## 2018-12-27 PROCEDURE — 1123F ACP DISCUSS/DSCN MKR DOCD: CPT | Performed by: INTERNAL MEDICINE

## 2018-12-27 PROCEDURE — 1036F TOBACCO NON-USER: CPT | Performed by: INTERNAL MEDICINE

## 2018-12-27 PROCEDURE — 3017F COLORECTAL CA SCREEN DOC REV: CPT | Performed by: INTERNAL MEDICINE

## 2018-12-27 PROCEDURE — G8427 DOCREV CUR MEDS BY ELIG CLIN: HCPCS | Performed by: INTERNAL MEDICINE

## 2018-12-27 PROCEDURE — 99214 OFFICE O/P EST MOD 30 MIN: CPT | Performed by: INTERNAL MEDICINE

## 2018-12-27 PROCEDURE — G8417 CALC BMI ABV UP PARAM F/U: HCPCS | Performed by: INTERNAL MEDICINE

## 2018-12-27 PROCEDURE — 1101F PT FALLS ASSESS-DOCD LE1/YR: CPT | Performed by: INTERNAL MEDICINE

## 2018-12-27 PROCEDURE — G8482 FLU IMMUNIZE ORDER/ADMIN: HCPCS | Performed by: INTERNAL MEDICINE

## 2018-12-27 ASSESSMENT — ENCOUNTER SYMPTOMS
SHORTNESS OF BREATH: 0
EYE ITCHING: 0
DIARRHEA: 0
ABDOMINAL PAIN: 0
SORE THROAT: 0
COUGH: 0
EYE DISCHARGE: 0
EYE PAIN: 0
CHOKING: 0
CONSTIPATION: 0
VOICE CHANGE: 0

## 2018-12-27 ASSESSMENT — SLEEP AND FATIGUE QUESTIONNAIRES
NECK CIRCUMFERENCE (INCHES): 19
HOW LIKELY ARE YOU TO NOD OFF OR FALL ASLEEP WHILE SITTING QUIETLY AFTER LUNCH WITHOUT ALCOHOL: 3
HOW LIKELY ARE YOU TO NOD OFF OR FALL ASLEEP WHILE SITTING AND TALKING TO SOMEONE: 1
HOW LIKELY ARE YOU TO NOD OFF OR FALL ASLEEP WHILE LYING DOWN TO REST IN THE AFTERNOON WHEN CIRCUMSTANCES PERMIT: 2
HOW LIKELY ARE YOU TO NOD OFF OR FALL ASLEEP WHEN YOU ARE A PASSENGER IN A CAR FOR AN HOUR WITHOUT A BREAK: 0
HOW LIKELY ARE YOU TO NOD OFF OR FALL ASLEEP WHILE SITTING AND READING: 2
HOW LIKELY ARE YOU TO NOD OFF OR FALL ASLEEP IN A CAR, WHILE STOPPED FOR A FEW MINUTES IN TRAFFIC: 1
HOW LIKELY ARE YOU TO NOD OFF OR FALL ASLEEP WHILE WATCHING TV: 3
HOW LIKELY ARE YOU TO NOD OFF OR FALL ASLEEP WHILE SITTING INACTIVE IN A PUBLIC PLACE: 1
ESS TOTAL SCORE: 13

## 2018-12-27 NOTE — PROGRESS NOTES
NIGHTLY, Disp: 15 pen, Rfl: 3    omeprazole (PRILOSEC) 40 MG delayed release capsule, Take 1 capsule by mouth daily, Disp: 90 capsule, Rfl: 3    silver sulfADIAZINE (SILVADENE) 1 % cream, Apply topically daily. , Disp: 50 g, Rfl: 1    glimepiride (AMARYL) 4 MG tablet, TAKE 1 TABLET TWICE A DAY, Disp: 180 tablet, Rfl: 3    atorvastatin (LIPITOR) 40 MG tablet, Take 1 tablet by mouth nightly, Disp: 90 tablet, Rfl: 3    Insulin Pen Needle 31G X 5 MM MISC, 1 each by Does not apply route daily, Disp: 400 each, Rfl: 5    atenolol (TENORMIN) 50 MG tablet, Take 1 tablet by mouth daily, Disp: 90 tablet, Rfl: 3    glucose blood VI test strips (ASCENSIA AUTODISC VI;ONE TOUCH ULTRA TEST VI) strip, DX: E11.9 FSBS daily. One Touch ultra, Disp: 100 strip, Rfl: 5    aspirin 81 MG chewable tablet, Take 1 tablet by mouth daily, Disp: 30 tablet, Rfl: 0    Blood Glucose Monitoring Suppl PRABHAKAR, 1 Device by Does not apply route daily One Touch Ultra, Disp: 1 Device, Rfl: 0    Multiple Vitamins-Minerals (THERAPEUTIC MULTIVITAMIN-MINERALS) tablet, Take 1 tablet by mouth daily, Disp: , Rfl:     ferrous sulfate 325 (65 FE) MG tablet, Take 325 mg by mouth daily. , Disp: , Rfl:     Norco [hydrocodone-acetaminophen]    Vitals:    12/27/18 0855   BP: 124/65   Pulse: 54   Resp: 16   Temp: 97.8 °F (36.6 °C)   TempSrc: Oral   SpO2: 95%   Weight: (!) 329 lb (149.2 kg)   Height: 6' 1\" (1.854 m)       Review of Systems   Constitutional: Negative for chills, fever and unexpected weight change. HENT: Negative for mouth sores, sore throat and voice change. Eyes: Negative for pain, discharge and itching. Respiratory: Negative for cough, choking and shortness of breath. Cardiovascular: Negative for chest pain, palpitations and leg swelling. Gastrointestinal: Negative for abdominal pain, constipation and diarrhea. Endocrine: Negative for cold intolerance, heat intolerance and polydipsia.    Genitourinary: Negative for dysuria, frequency and hematuria. Musculoskeletal: Negative for gait problem, joint swelling and neck stiffness. Neurological: Negative for dizziness, numbness and headaches. Psychiatric/Behavioral: Negative for agitation, confusion and hallucinations. Physical Exam   Constitutional: He appears well-developed and well-nourished. No distress. HENT:   Head: Normocephalic and atraumatic. Mouth/Throat: Oropharynx is clear and moist. No oropharyngeal exudate. Eyes: Pupils are equal, round, and reactive to light. EOM are normal.   Neck: Neck supple. No JVD present. Cardiovascular: Normal heart sounds. Exam reveals no gallop and no friction rub. No murmur heard. Pulmonary/Chest: Effort normal. He has no wheezes. He has no rales. Equal chest rise and expansion bilaterally   Abdominal: Soft. Bowel sounds are normal. He exhibits no distension. There is no tenderness. Musculoskeletal: Normal range of motion. He exhibits no edema. Lymphadenopathy:     He has no cervical adenopathy. Neurological: He is alert. No cranial nerve deficit. CN 2-12 grossly intact   Skin: Skin is warm and dry. No rash noted. He is not diaphoretic. ASSESSMENT:    1. MONIQUE (obstructive sleep apnea)    2. Non-seasonal allergic rhinitis, unspecified trigger      PLAN:    -Continue CPAP device, currently set to 7-18 with a mean of 7.   -Sent request for replacement masks and supplies.   -Encouraged to follow up with ENT due to ongoing ear symptoms.   -Discontinue supplemental oxygen.   -Counseled on weight management and sleep hygiene.      Orders Placed This Encounter   Procedures    Nasal Cannula Oxygen    CPAP       Morris Goodson MD

## 2019-01-02 ENCOUNTER — OFFICE VISIT (OUTPATIENT)
Dept: ORTHOPEDIC SURGERY | Age: 69
End: 2019-01-02
Payer: MEDICARE

## 2019-01-02 VITALS
DIASTOLIC BLOOD PRESSURE: 87 MMHG | HEART RATE: 84 BPM | HEIGHT: 73 IN | SYSTOLIC BLOOD PRESSURE: 144 MMHG | WEIGHT: 315 LBS | BODY MASS INDEX: 41.75 KG/M2

## 2019-01-02 DIAGNOSIS — M54.16 LUMBAR RADICULITIS: ICD-10-CM

## 2019-01-02 DIAGNOSIS — M54.5 LOW BACK PAIN, UNSPECIFIED BACK PAIN LATERALITY, UNSPECIFIED CHRONICITY, WITH SCIATICA PRESENCE UNSPECIFIED: Primary | ICD-10-CM

## 2019-01-02 DIAGNOSIS — M51.36 DDD (DEGENERATIVE DISC DISEASE), LUMBAR: ICD-10-CM

## 2019-01-02 PROCEDURE — 99213 OFFICE O/P EST LOW 20 MIN: CPT | Performed by: PHYSICIAN ASSISTANT

## 2019-01-02 RX ORDER — METHYLPREDNISOLONE 4 MG/1
TABLET ORAL
Qty: 1 KIT | Refills: 0 | Status: SHIPPED | OUTPATIENT
Start: 2019-01-02 | End: 2019-01-08

## 2019-01-03 ENCOUNTER — HOSPITAL ENCOUNTER (OUTPATIENT)
Dept: MRI IMAGING | Age: 69
Discharge: HOME | End: 2019-01-03
Payer: MEDICARE

## 2019-01-03 DIAGNOSIS — M51.36 DDD (DEGENERATIVE DISC DISEASE), LUMBAR: ICD-10-CM

## 2019-01-03 DIAGNOSIS — M54.16 LUMBAR RADICULITIS: ICD-10-CM

## 2019-01-03 DIAGNOSIS — M54.5 LOW BACK PAIN, UNSPECIFIED BACK PAIN LATERALITY, UNSPECIFIED CHRONICITY, WITH SCIATICA PRESENCE UNSPECIFIED: ICD-10-CM

## 2019-01-03 PROCEDURE — 72148 MRI LUMBAR SPINE W/O DYE: CPT

## 2019-01-04 ENCOUNTER — HOSPITAL ENCOUNTER (OUTPATIENT)
Age: 69
Setting detail: SPECIMEN
Discharge: HOME | End: 2019-01-04
Payer: MEDICARE

## 2019-01-04 DIAGNOSIS — I25.10 CORONARY ARTERY DISEASE INVOLVING NATIVE CORONARY ARTERY OF NATIVE HEART WITHOUT ANGINA PECTORIS: ICD-10-CM

## 2019-01-04 DIAGNOSIS — I50.32 CHRONIC DIASTOLIC CONGESTIVE HEART FAILURE (HCC): ICD-10-CM

## 2019-01-04 DIAGNOSIS — I10 ESSENTIAL HYPERTENSION: Chronic | ICD-10-CM

## 2019-01-04 DIAGNOSIS — G47.30 SEVERE SLEEP APNEA: ICD-10-CM

## 2019-01-04 LAB
ANION GAP SERPL CALCULATED.3IONS-SCNC: 17 MMOL/L (ref 3–16)
BUN BLDV-MCNC: 35 MG/DL (ref 7–20)
CALCIUM SERPL-MCNC: 9.3 MG/DL (ref 8.3–10.6)
CHLORIDE BLD-SCNC: 99 MMOL/L (ref 99–110)
CO2: 24 MMOL/L (ref 21–32)
CREAT SERPL-MCNC: 0.9 MG/DL (ref 0.8–1.3)
GFR AFRICAN AMERICAN: >60
GFR NON-AFRICAN AMERICAN: >60
GLUCOSE BLD-MCNC: 226 MG/DL (ref 70–99)
POTASSIUM SERPL-SCNC: 4.6 MMOL/L (ref 3.5–5.1)
PRO-BNP: 315 PG/ML (ref 0–124)
SODIUM BLD-SCNC: 140 MMOL/L (ref 136–145)

## 2019-01-04 PROCEDURE — 83880 ASSAY OF NATRIURETIC PEPTIDE: CPT

## 2019-01-04 PROCEDURE — 80048 BASIC METABOLIC PNL TOTAL CA: CPT

## 2019-01-04 PROCEDURE — 36415 COLL VENOUS BLD VENIPUNCTURE: CPT

## 2019-01-07 RX ORDER — SULINDAC 200 MG/1
200 TABLET ORAL 2 TIMES DAILY
Qty: 180 TABLET | Refills: 0 | Status: SHIPPED | OUTPATIENT
Start: 2019-01-07 | End: 2019-03-31 | Stop reason: SDUPTHER

## 2019-01-08 ENCOUNTER — OFFICE VISIT (OUTPATIENT)
Dept: ORTHOPEDIC SURGERY | Age: 69
End: 2019-01-08
Payer: MEDICARE

## 2019-01-08 VITALS
SYSTOLIC BLOOD PRESSURE: 131 MMHG | BODY MASS INDEX: 41.75 KG/M2 | HEIGHT: 73 IN | HEART RATE: 58 BPM | DIASTOLIC BLOOD PRESSURE: 71 MMHG | WEIGHT: 315 LBS

## 2019-01-08 DIAGNOSIS — M54.16 LUMBAR RADICULITIS: ICD-10-CM

## 2019-01-08 DIAGNOSIS — M51.36 DDD (DEGENERATIVE DISC DISEASE), LUMBAR: ICD-10-CM

## 2019-01-08 DIAGNOSIS — M48.062 LUMBAR STENOSIS WITH NEUROGENIC CLAUDICATION: Primary | ICD-10-CM

## 2019-01-08 PROCEDURE — G8482 FLU IMMUNIZE ORDER/ADMIN: HCPCS | Performed by: PHYSICIAN ASSISTANT

## 2019-01-08 PROCEDURE — 3017F COLORECTAL CA SCREEN DOC REV: CPT | Performed by: PHYSICIAN ASSISTANT

## 2019-01-08 PROCEDURE — G8427 DOCREV CUR MEDS BY ELIG CLIN: HCPCS | Performed by: PHYSICIAN ASSISTANT

## 2019-01-08 PROCEDURE — 4040F PNEUMOC VAC/ADMIN/RCVD: CPT | Performed by: PHYSICIAN ASSISTANT

## 2019-01-08 PROCEDURE — G8599 NO ASA/ANTIPLAT THER USE RNG: HCPCS | Performed by: PHYSICIAN ASSISTANT

## 2019-01-08 PROCEDURE — 1123F ACP DISCUSS/DSCN MKR DOCD: CPT | Performed by: PHYSICIAN ASSISTANT

## 2019-01-08 PROCEDURE — 1036F TOBACCO NON-USER: CPT | Performed by: PHYSICIAN ASSISTANT

## 2019-01-08 PROCEDURE — G8417 CALC BMI ABV UP PARAM F/U: HCPCS | Performed by: PHYSICIAN ASSISTANT

## 2019-01-08 PROCEDURE — 99214 OFFICE O/P EST MOD 30 MIN: CPT | Performed by: PHYSICIAN ASSISTANT

## 2019-01-08 PROCEDURE — 1101F PT FALLS ASSESS-DOCD LE1/YR: CPT | Performed by: PHYSICIAN ASSISTANT

## 2019-01-08 RX ORDER — GABAPENTIN 100 MG/1
CAPSULE ORAL
Qty: 30 CAPSULE | Refills: 0 | Status: SHIPPED | OUTPATIENT
Start: 2019-01-08 | End: 2019-01-24

## 2019-01-09 ENCOUNTER — OFFICE VISIT (OUTPATIENT)
Dept: CARDIOLOGY CLINIC | Age: 69
End: 2019-01-09
Payer: MEDICARE

## 2019-01-09 VITALS
WEIGHT: 315 LBS | HEIGHT: 72 IN | OXYGEN SATURATION: 96 % | DIASTOLIC BLOOD PRESSURE: 60 MMHG | SYSTOLIC BLOOD PRESSURE: 110 MMHG | BODY MASS INDEX: 42.66 KG/M2 | HEART RATE: 52 BPM

## 2019-01-09 DIAGNOSIS — I83.019 VENOUS STASIS ULCER OF RIGHT LOWER EXTREMITY (HCC): ICD-10-CM

## 2019-01-09 DIAGNOSIS — I10 ESSENTIAL HYPERTENSION: ICD-10-CM

## 2019-01-09 DIAGNOSIS — G47.30 SEVERE SLEEP APNEA: ICD-10-CM

## 2019-01-09 DIAGNOSIS — L97.919 VENOUS STASIS ULCER OF RIGHT LOWER EXTREMITY (HCC): ICD-10-CM

## 2019-01-09 DIAGNOSIS — I50.32 CHRONIC DIASTOLIC CONGESTIVE HEART FAILURE (HCC): Primary | ICD-10-CM

## 2019-01-09 DIAGNOSIS — I25.10 CORONARY ARTERY DISEASE INVOLVING NATIVE CORONARY ARTERY OF NATIVE HEART WITHOUT ANGINA PECTORIS: ICD-10-CM

## 2019-01-09 PROCEDURE — G8599 NO ASA/ANTIPLAT THER USE RNG: HCPCS | Performed by: NURSE PRACTITIONER

## 2019-01-09 PROCEDURE — 4040F PNEUMOC VAC/ADMIN/RCVD: CPT | Performed by: NURSE PRACTITIONER

## 2019-01-09 PROCEDURE — 1101F PT FALLS ASSESS-DOCD LE1/YR: CPT | Performed by: NURSE PRACTITIONER

## 2019-01-09 PROCEDURE — G8427 DOCREV CUR MEDS BY ELIG CLIN: HCPCS | Performed by: NURSE PRACTITIONER

## 2019-01-09 PROCEDURE — 1036F TOBACCO NON-USER: CPT | Performed by: NURSE PRACTITIONER

## 2019-01-09 PROCEDURE — G8482 FLU IMMUNIZE ORDER/ADMIN: HCPCS | Performed by: NURSE PRACTITIONER

## 2019-01-09 PROCEDURE — G8417 CALC BMI ABV UP PARAM F/U: HCPCS | Performed by: NURSE PRACTITIONER

## 2019-01-09 PROCEDURE — 1123F ACP DISCUSS/DSCN MKR DOCD: CPT | Performed by: NURSE PRACTITIONER

## 2019-01-09 PROCEDURE — 3017F COLORECTAL CA SCREEN DOC REV: CPT | Performed by: NURSE PRACTITIONER

## 2019-01-09 PROCEDURE — 99213 OFFICE O/P EST LOW 20 MIN: CPT | Performed by: NURSE PRACTITIONER

## 2019-01-14 DIAGNOSIS — M16.11 PRIMARY OSTEOARTHRITIS OF RIGHT HIP: ICD-10-CM

## 2019-01-14 RX ORDER — DICLOFENAC SODIUM 75 MG/1
75 TABLET, DELAYED RELEASE ORAL 2 TIMES DAILY
Qty: 180 TABLET | Refills: 0 | OUTPATIENT
Start: 2019-01-14

## 2019-01-14 RX ORDER — MELOXICAM 15 MG/1
15 TABLET ORAL DAILY
Qty: 90 TABLET | Refills: 0 | Status: SHIPPED | OUTPATIENT
Start: 2019-01-14 | End: 2019-02-04

## 2019-01-16 ENCOUNTER — TELEPHONE (OUTPATIENT)
Dept: ORTHOPEDIC SURGERY | Age: 69
End: 2019-01-16

## 2019-01-17 RX ORDER — TIZANIDINE 2 MG/1
2 TABLET ORAL EVERY 6 HOURS PRN
COMMUNITY
End: 2019-01-24

## 2019-01-21 ENCOUNTER — HOSPITAL ENCOUNTER (OUTPATIENT)
Age: 69
Setting detail: OUTPATIENT SURGERY
Discharge: HOME | End: 2019-01-21
Attending: PHYSICAL MEDICINE & REHABILITATION | Admitting: PHYSICAL MEDICINE & REHABILITATION
Payer: MEDICARE

## 2019-01-21 VITALS
WEIGHT: 315 LBS | TEMPERATURE: 97.2 F | DIASTOLIC BLOOD PRESSURE: 75 MMHG | SYSTOLIC BLOOD PRESSURE: 129 MMHG | BODY MASS INDEX: 43.67 KG/M2 | HEART RATE: 57 BPM | RESPIRATION RATE: 16 BRPM | OXYGEN SATURATION: 96 %

## 2019-01-21 LAB
GLUCOSE BLD-MCNC: 100 MG/DL (ref 70–99)
PERFORMED ON: ABNORMAL

## 2019-01-21 PROCEDURE — 7100000011 HC PHASE II RECOVERY - ADDTL 15 MIN: Performed by: PHYSICAL MEDICINE & REHABILITATION

## 2019-01-21 PROCEDURE — 3600000002 HC SURGERY LEVEL 2 BASE: Performed by: PHYSICAL MEDICINE & REHABILITATION

## 2019-01-21 PROCEDURE — 6360000002 HC RX W HCPCS: Performed by: PHYSICAL MEDICINE & REHABILITATION

## 2019-01-21 PROCEDURE — 6360000004 HC RX CONTRAST MEDICATION: Performed by: PHYSICAL MEDICINE & REHABILITATION

## 2019-01-21 PROCEDURE — 2709999900 HC NON-CHARGEABLE SUPPLY: Performed by: PHYSICAL MEDICINE & REHABILITATION

## 2019-01-21 PROCEDURE — 7100000010 HC PHASE II RECOVERY - FIRST 15 MIN: Performed by: PHYSICAL MEDICINE & REHABILITATION

## 2019-01-21 PROCEDURE — 2500000003 HC RX 250 WO HCPCS: Performed by: PHYSICAL MEDICINE & REHABILITATION

## 2019-01-21 PROCEDURE — 3600000012 HC SURGERY LEVEL 2 ADDTL 15MIN: Performed by: PHYSICAL MEDICINE & REHABILITATION

## 2019-01-21 RX ORDER — LIDOCAINE HYDROCHLORIDE 10 MG/ML
INJECTION, SOLUTION EPIDURAL; INFILTRATION; INTRACAUDAL; PERINEURAL PRN
Status: DISCONTINUED | OUTPATIENT
Start: 2019-01-21 | End: 2019-01-21 | Stop reason: HOSPADM

## 2019-01-21 ASSESSMENT — PAIN DESCRIPTION - DESCRIPTORS: DESCRIPTORS: ACHING

## 2019-01-21 ASSESSMENT — PAIN - FUNCTIONAL ASSESSMENT
PAIN_FUNCTIONAL_ASSESSMENT: PREVENTS OR INTERFERES SOME ACTIVE ACTIVITIES AND ADLS
PAIN_FUNCTIONAL_ASSESSMENT: 0-10

## 2019-01-24 ENCOUNTER — OFFICE VISIT (OUTPATIENT)
Dept: FAMILY MEDICINE CLINIC | Age: 69
End: 2019-01-24
Payer: MEDICARE

## 2019-01-24 VITALS
HEIGHT: 72 IN | HEART RATE: 74 BPM | RESPIRATION RATE: 21 BRPM | DIASTOLIC BLOOD PRESSURE: 70 MMHG | OXYGEN SATURATION: 98 % | BODY MASS INDEX: 42.66 KG/M2 | SYSTOLIC BLOOD PRESSURE: 128 MMHG | WEIGHT: 315 LBS

## 2019-01-24 DIAGNOSIS — Z79.4 TYPE 2 DIABETES MELLITUS WITH HYPERGLYCEMIA, WITH LONG-TERM CURRENT USE OF INSULIN (HCC): Primary | ICD-10-CM

## 2019-01-24 DIAGNOSIS — E66.01 MORBID OBESITY WITH BMI OF 40.0-44.9, ADULT (HCC): ICD-10-CM

## 2019-01-24 DIAGNOSIS — E11.65 TYPE 2 DIABETES MELLITUS WITH HYPERGLYCEMIA, WITH LONG-TERM CURRENT USE OF INSULIN (HCC): Primary | ICD-10-CM

## 2019-01-24 DIAGNOSIS — N28.9 RENAL INSUFFICIENCY: ICD-10-CM

## 2019-01-24 DIAGNOSIS — E78.2 MIXED HYPERLIPIDEMIA: ICD-10-CM

## 2019-01-24 DIAGNOSIS — F33.1 MODERATE EPISODE OF RECURRENT MAJOR DEPRESSIVE DISORDER (HCC): ICD-10-CM

## 2019-01-24 LAB — HBA1C MFR BLD: 7.3 %

## 2019-01-24 PROCEDURE — 2022F DILAT RTA XM EVC RTNOPTHY: CPT | Performed by: NURSE PRACTITIONER

## 2019-01-24 PROCEDURE — 82044 UR ALBUMIN SEMIQUANTITATIVE: CPT | Performed by: NURSE PRACTITIONER

## 2019-01-24 PROCEDURE — G8510 SCR DEP NEG, NO PLAN REQD: HCPCS | Performed by: NURSE PRACTITIONER

## 2019-01-24 PROCEDURE — 4040F PNEUMOC VAC/ADMIN/RCVD: CPT | Performed by: NURSE PRACTITIONER

## 2019-01-24 PROCEDURE — G8599 NO ASA/ANTIPLAT THER USE RNG: HCPCS | Performed by: NURSE PRACTITIONER

## 2019-01-24 PROCEDURE — 1036F TOBACCO NON-USER: CPT | Performed by: NURSE PRACTITIONER

## 2019-01-24 PROCEDURE — 1123F ACP DISCUSS/DSCN MKR DOCD: CPT | Performed by: NURSE PRACTITIONER

## 2019-01-24 PROCEDURE — G8417 CALC BMI ABV UP PARAM F/U: HCPCS | Performed by: NURSE PRACTITIONER

## 2019-01-24 PROCEDURE — 99213 OFFICE O/P EST LOW 20 MIN: CPT | Performed by: NURSE PRACTITIONER

## 2019-01-24 PROCEDURE — 3017F COLORECTAL CA SCREEN DOC REV: CPT | Performed by: NURSE PRACTITIONER

## 2019-01-24 PROCEDURE — 3045F PR MOST RECENT HEMOGLOBIN A1C LEVEL 7.0-9.0%: CPT | Performed by: NURSE PRACTITIONER

## 2019-01-24 PROCEDURE — G8427 DOCREV CUR MEDS BY ELIG CLIN: HCPCS | Performed by: NURSE PRACTITIONER

## 2019-01-24 PROCEDURE — 1101F PT FALLS ASSESS-DOCD LE1/YR: CPT | Performed by: NURSE PRACTITIONER

## 2019-01-24 PROCEDURE — G8482 FLU IMMUNIZE ORDER/ADMIN: HCPCS | Performed by: NURSE PRACTITIONER

## 2019-01-24 PROCEDURE — 83036 HEMOGLOBIN GLYCOSYLATED A1C: CPT | Performed by: NURSE PRACTITIONER

## 2019-01-24 ASSESSMENT — PATIENT HEALTH QUESTIONNAIRE - PHQ9
2. FEELING DOWN, DEPRESSED OR HOPELESS: 0
SUM OF ALL RESPONSES TO PHQ QUESTIONS 1-9: 0
SUM OF ALL RESPONSES TO PHQ9 QUESTIONS 1 & 2: 0
SUM OF ALL RESPONSES TO PHQ QUESTIONS 1-9: 0
1. LITTLE INTEREST OR PLEASURE IN DOING THINGS: 0

## 2019-01-24 ASSESSMENT — ENCOUNTER SYMPTOMS
CHEST TIGHTNESS: 0
CONSTIPATION: 0
NAUSEA: 0
BACK PAIN: 1

## 2019-02-04 ENCOUNTER — TELEPHONE (OUTPATIENT)
Dept: ORTHOPEDIC SURGERY | Age: 69
End: 2019-02-04

## 2019-02-04 ENCOUNTER — OFFICE VISIT (OUTPATIENT)
Dept: ORTHOPEDIC SURGERY | Age: 69
End: 2019-02-04
Payer: MEDICARE

## 2019-02-04 VITALS
SYSTOLIC BLOOD PRESSURE: 137 MMHG | HEART RATE: 72 BPM | HEIGHT: 72 IN | BODY MASS INDEX: 42.66 KG/M2 | DIASTOLIC BLOOD PRESSURE: 70 MMHG | WEIGHT: 315 LBS

## 2019-02-04 DIAGNOSIS — M51.36 DDD (DEGENERATIVE DISC DISEASE), LUMBAR: ICD-10-CM

## 2019-02-04 DIAGNOSIS — M54.16 LUMBAR RADICULITIS: ICD-10-CM

## 2019-02-04 DIAGNOSIS — M48.062 LUMBAR STENOSIS WITH NEUROGENIC CLAUDICATION: Primary | ICD-10-CM

## 2019-02-04 PROCEDURE — 99214 OFFICE O/P EST MOD 30 MIN: CPT | Performed by: PHYSICIAN ASSISTANT

## 2019-02-04 PROCEDURE — G8482 FLU IMMUNIZE ORDER/ADMIN: HCPCS | Performed by: PHYSICIAN ASSISTANT

## 2019-02-04 PROCEDURE — 1036F TOBACCO NON-USER: CPT | Performed by: PHYSICIAN ASSISTANT

## 2019-02-04 PROCEDURE — G8417 CALC BMI ABV UP PARAM F/U: HCPCS | Performed by: PHYSICIAN ASSISTANT

## 2019-02-04 PROCEDURE — 4040F PNEUMOC VAC/ADMIN/RCVD: CPT | Performed by: PHYSICIAN ASSISTANT

## 2019-02-04 PROCEDURE — G8599 NO ASA/ANTIPLAT THER USE RNG: HCPCS | Performed by: PHYSICIAN ASSISTANT

## 2019-02-04 PROCEDURE — 1123F ACP DISCUSS/DSCN MKR DOCD: CPT | Performed by: PHYSICIAN ASSISTANT

## 2019-02-04 PROCEDURE — 3017F COLORECTAL CA SCREEN DOC REV: CPT | Performed by: PHYSICIAN ASSISTANT

## 2019-02-04 PROCEDURE — G8427 DOCREV CUR MEDS BY ELIG CLIN: HCPCS | Performed by: PHYSICIAN ASSISTANT

## 2019-02-04 PROCEDURE — 1101F PT FALLS ASSESS-DOCD LE1/YR: CPT | Performed by: PHYSICIAN ASSISTANT

## 2019-02-04 RX ORDER — GABAPENTIN 100 MG/1
CAPSULE ORAL
Qty: 30 CAPSULE | Refills: 0 | Status: SHIPPED | OUTPATIENT
Start: 2019-02-04 | End: 2019-07-15 | Stop reason: SDUPTHER

## 2019-02-11 ENCOUNTER — HOSPITAL ENCOUNTER (OUTPATIENT)
Age: 69
Setting detail: OUTPATIENT SURGERY
Discharge: HOME | End: 2019-02-11
Attending: PHYSICAL MEDICINE & REHABILITATION | Admitting: PHYSICAL MEDICINE & REHABILITATION
Payer: MEDICARE

## 2019-02-11 VITALS
OXYGEN SATURATION: 97 % | DIASTOLIC BLOOD PRESSURE: 69 MMHG | BODY MASS INDEX: 41.75 KG/M2 | RESPIRATION RATE: 17 BRPM | TEMPERATURE: 97 F | WEIGHT: 315 LBS | SYSTOLIC BLOOD PRESSURE: 136 MMHG | HEART RATE: 82 BPM | HEIGHT: 73 IN

## 2019-02-11 LAB
GLUCOSE BLD-MCNC: 155 MG/DL (ref 70–99)
PERFORMED ON: ABNORMAL

## 2019-02-11 PROCEDURE — 6360000004 HC RX CONTRAST MEDICATION: Performed by: PHYSICAL MEDICINE & REHABILITATION

## 2019-02-11 PROCEDURE — 3600000002 HC SURGERY LEVEL 2 BASE: Performed by: PHYSICAL MEDICINE & REHABILITATION

## 2019-02-11 PROCEDURE — 3600000012 HC SURGERY LEVEL 2 ADDTL 15MIN: Performed by: PHYSICAL MEDICINE & REHABILITATION

## 2019-02-11 PROCEDURE — 2709999900 HC NON-CHARGEABLE SUPPLY: Performed by: PHYSICAL MEDICINE & REHABILITATION

## 2019-02-11 PROCEDURE — 7100000010 HC PHASE II RECOVERY - FIRST 15 MIN: Performed by: PHYSICAL MEDICINE & REHABILITATION

## 2019-02-11 PROCEDURE — 6360000002 HC RX W HCPCS: Performed by: PHYSICAL MEDICINE & REHABILITATION

## 2019-02-11 RX ORDER — BETAMETHASONE SODIUM PHOSPHATE AND BETAMETHASONE ACETATE 3; 3 MG/ML; MG/ML
INJECTION, SUSPENSION INTRA-ARTICULAR; INTRALESIONAL; INTRAMUSCULAR; SOFT TISSUE PRN
Status: DISCONTINUED | OUTPATIENT
Start: 2019-02-11 | End: 2019-02-11 | Stop reason: ALTCHOICE

## 2019-02-11 ASSESSMENT — PAIN - FUNCTIONAL ASSESSMENT: PAIN_FUNCTIONAL_ASSESSMENT: 0-10

## 2019-02-11 ASSESSMENT — PAIN SCALES - GENERAL: PAINLEVEL_OUTOF10: 0

## 2019-02-11 ASSESSMENT — PAIN DESCRIPTION - DESCRIPTORS: DESCRIPTORS: TINGLING

## 2019-02-18 DIAGNOSIS — E11.65 TYPE 2 DIABETES MELLITUS WITH HYPERGLYCEMIA, WITH LONG-TERM CURRENT USE OF INSULIN (HCC): ICD-10-CM

## 2019-02-18 DIAGNOSIS — Z79.4 TYPE 2 DIABETES MELLITUS WITH HYPERGLYCEMIA, WITH LONG-TERM CURRENT USE OF INSULIN (HCC): ICD-10-CM

## 2019-02-18 RX ORDER — GLIMEPIRIDE 4 MG/1
4 TABLET ORAL
Qty: 180 TABLET | Refills: 3 | Status: SHIPPED | OUTPATIENT
Start: 2019-02-18 | End: 2019-04-15

## 2019-02-25 ENCOUNTER — OFFICE VISIT (OUTPATIENT)
Dept: ORTHOPEDIC SURGERY | Age: 69
End: 2019-02-25
Payer: MEDICARE

## 2019-02-25 VITALS
DIASTOLIC BLOOD PRESSURE: 77 MMHG | HEART RATE: 79 BPM | HEIGHT: 73 IN | SYSTOLIC BLOOD PRESSURE: 138 MMHG | BODY MASS INDEX: 41.75 KG/M2 | WEIGHT: 315 LBS

## 2019-02-25 DIAGNOSIS — M51.36 DDD (DEGENERATIVE DISC DISEASE), LUMBAR: ICD-10-CM

## 2019-02-25 DIAGNOSIS — M54.16 LUMBAR RADICULITIS: ICD-10-CM

## 2019-02-25 DIAGNOSIS — M48.062 LUMBAR STENOSIS WITH NEUROGENIC CLAUDICATION: Primary | ICD-10-CM

## 2019-02-25 PROCEDURE — 1123F ACP DISCUSS/DSCN MKR DOCD: CPT | Performed by: PHYSICIAN ASSISTANT

## 2019-02-25 PROCEDURE — G8482 FLU IMMUNIZE ORDER/ADMIN: HCPCS | Performed by: PHYSICIAN ASSISTANT

## 2019-02-25 PROCEDURE — G8417 CALC BMI ABV UP PARAM F/U: HCPCS | Performed by: PHYSICIAN ASSISTANT

## 2019-02-25 PROCEDURE — G8599 NO ASA/ANTIPLAT THER USE RNG: HCPCS | Performed by: PHYSICIAN ASSISTANT

## 2019-02-25 PROCEDURE — 4040F PNEUMOC VAC/ADMIN/RCVD: CPT | Performed by: PHYSICIAN ASSISTANT

## 2019-02-25 PROCEDURE — 99213 OFFICE O/P EST LOW 20 MIN: CPT | Performed by: PHYSICIAN ASSISTANT

## 2019-02-25 PROCEDURE — 1101F PT FALLS ASSESS-DOCD LE1/YR: CPT | Performed by: PHYSICIAN ASSISTANT

## 2019-02-25 PROCEDURE — 3017F COLORECTAL CA SCREEN DOC REV: CPT | Performed by: PHYSICIAN ASSISTANT

## 2019-02-25 PROCEDURE — G8427 DOCREV CUR MEDS BY ELIG CLIN: HCPCS | Performed by: PHYSICIAN ASSISTANT

## 2019-02-25 PROCEDURE — 1036F TOBACCO NON-USER: CPT | Performed by: PHYSICIAN ASSISTANT

## 2019-03-01 ENCOUNTER — TELEPHONE (OUTPATIENT)
Dept: CARDIOLOGY CLINIC | Age: 69
End: 2019-03-01

## 2019-03-01 DIAGNOSIS — E78.2 MIXED HYPERLIPIDEMIA: Primary | ICD-10-CM

## 2019-03-01 RX ORDER — ATORVASTATIN CALCIUM 40 MG/1
40 TABLET, FILM COATED ORAL NIGHTLY
Qty: 90 TABLET | Refills: 3 | Status: SHIPPED | OUTPATIENT
Start: 2019-03-01 | End: 2020-02-24 | Stop reason: SDUPTHER

## 2019-03-07 RX ORDER — POTASSIUM CHLORIDE 750 MG/1
20 TABLET, EXTENDED RELEASE ORAL DAILY
Qty: 60 TABLET | Refills: 3 | Status: SHIPPED | OUTPATIENT
Start: 2019-03-07 | End: 2019-05-13 | Stop reason: ALTCHOICE

## 2019-04-01 RX ORDER — SULINDAC 200 MG/1
TABLET ORAL
Qty: 180 TABLET | Refills: 0 | Status: SHIPPED | OUTPATIENT
Start: 2019-04-01 | End: 2019-04-15

## 2019-04-05 ENCOUNTER — HOSPITAL ENCOUNTER (OUTPATIENT)
Age: 69
Discharge: HOME | End: 2019-04-05
Payer: MEDICARE

## 2019-04-05 LAB
A/G RATIO: 1.3 (ref 1.1–2.2)
ALBUMIN SERPL-MCNC: 4 G/DL (ref 3.4–5)
ALP BLD-CCNC: 85 U/L (ref 40–129)
ALT SERPL-CCNC: 24 U/L (ref 10–40)
ANION GAP SERPL CALCULATED.3IONS-SCNC: 13 MMOL/L (ref 3–16)
AST SERPL-CCNC: 25 U/L (ref 15–37)
BILIRUB SERPL-MCNC: 0.9 MG/DL (ref 0–1)
BUN BLDV-MCNC: 29 MG/DL (ref 7–20)
CALCIUM SERPL-MCNC: 9 MG/DL (ref 8.3–10.6)
CHLORIDE BLD-SCNC: 102 MMOL/L (ref 99–110)
CHOLESTEROL, TOTAL: 92 MG/DL (ref 0–199)
CO2: 29 MMOL/L (ref 21–32)
CREAT SERPL-MCNC: 0.9 MG/DL (ref 0.8–1.3)
GFR AFRICAN AMERICAN: >60
GFR NON-AFRICAN AMERICAN: >60
GLOBULIN: 3.1 G/DL
GLUCOSE BLD-MCNC: 143 MG/DL (ref 70–99)
HDLC SERPL-MCNC: 31 MG/DL (ref 40–60)
LDL CHOLESTEROL CALCULATED: 44 MG/DL
POTASSIUM SERPL-SCNC: 4.1 MMOL/L (ref 3.5–5.1)
SODIUM BLD-SCNC: 144 MMOL/L (ref 136–145)
TOTAL PROTEIN: 7.1 G/DL (ref 6.4–8.2)
TRIGL SERPL-MCNC: 85 MG/DL (ref 0–150)
VLDLC SERPL CALC-MCNC: 17 MG/DL

## 2019-04-05 PROCEDURE — 80053 COMPREHEN METABOLIC PANEL: CPT

## 2019-04-05 PROCEDURE — 80061 LIPID PANEL: CPT

## 2019-04-05 PROCEDURE — 36415 COLL VENOUS BLD VENIPUNCTURE: CPT

## 2019-04-12 ENCOUNTER — TELEPHONE (OUTPATIENT)
Dept: PULMONOLOGY | Age: 69
End: 2019-04-12

## 2019-04-12 DIAGNOSIS — G47.33 OSA (OBSTRUCTIVE SLEEP APNEA): Primary | ICD-10-CM

## 2019-04-15 ENCOUNTER — OFFICE VISIT (OUTPATIENT)
Dept: FAMILY MEDICINE CLINIC | Age: 69
End: 2019-04-15
Payer: MEDICARE

## 2019-04-15 VITALS
HEART RATE: 51 BPM | HEIGHT: 73 IN | RESPIRATION RATE: 16 BRPM | TEMPERATURE: 97.8 F | DIASTOLIC BLOOD PRESSURE: 76 MMHG | SYSTOLIC BLOOD PRESSURE: 126 MMHG | BODY MASS INDEX: 41.75 KG/M2 | OXYGEN SATURATION: 98 % | WEIGHT: 315 LBS

## 2019-04-15 DIAGNOSIS — E11.65 TYPE 2 DIABETES MELLITUS WITH HYPERGLYCEMIA, WITH LONG-TERM CURRENT USE OF INSULIN (HCC): Primary | ICD-10-CM

## 2019-04-15 DIAGNOSIS — F33.1 MODERATE EPISODE OF RECURRENT MAJOR DEPRESSIVE DISORDER (HCC): ICD-10-CM

## 2019-04-15 DIAGNOSIS — N32.81 OVERACTIVE BLADDER: ICD-10-CM

## 2019-04-15 DIAGNOSIS — I83.022 VENOUS STASIS ULCER OF LEFT CALF LIMITED TO BREAKDOWN OF SKIN WITH VARICOSE VEINS (HCC): ICD-10-CM

## 2019-04-15 DIAGNOSIS — L97.221 VENOUS STASIS ULCER OF LEFT CALF LIMITED TO BREAKDOWN OF SKIN WITH VARICOSE VEINS (HCC): ICD-10-CM

## 2019-04-15 DIAGNOSIS — K21.9 GASTROESOPHAGEAL REFLUX DISEASE WITHOUT ESOPHAGITIS: ICD-10-CM

## 2019-04-15 DIAGNOSIS — Z79.4 TYPE 2 DIABETES MELLITUS WITH HYPERGLYCEMIA, WITH LONG-TERM CURRENT USE OF INSULIN (HCC): Primary | ICD-10-CM

## 2019-04-15 DIAGNOSIS — E66.01 CLASS 2 SEVERE OBESITY DUE TO EXCESS CALORIES WITH SERIOUS COMORBIDITY AND BODY MASS INDEX (BMI) OF 39.0 TO 39.9 IN ADULT (HCC): ICD-10-CM

## 2019-04-15 LAB — HBA1C MFR BLD: 8.4 %

## 2019-04-15 PROCEDURE — G8599 NO ASA/ANTIPLAT THER USE RNG: HCPCS | Performed by: NURSE PRACTITIONER

## 2019-04-15 PROCEDURE — 2022F DILAT RTA XM EVC RTNOPTHY: CPT | Performed by: NURSE PRACTITIONER

## 2019-04-15 PROCEDURE — 99213 OFFICE O/P EST LOW 20 MIN: CPT | Performed by: NURSE PRACTITIONER

## 2019-04-15 PROCEDURE — 83036 HEMOGLOBIN GLYCOSYLATED A1C: CPT | Performed by: NURSE PRACTITIONER

## 2019-04-15 PROCEDURE — G8417 CALC BMI ABV UP PARAM F/U: HCPCS | Performed by: NURSE PRACTITIONER

## 2019-04-15 PROCEDURE — 3017F COLORECTAL CA SCREEN DOC REV: CPT | Performed by: NURSE PRACTITIONER

## 2019-04-15 PROCEDURE — 1123F ACP DISCUSS/DSCN MKR DOCD: CPT | Performed by: NURSE PRACTITIONER

## 2019-04-15 PROCEDURE — 3045F PR MOST RECENT HEMOGLOBIN A1C LEVEL 7.0-9.0%: CPT | Performed by: NURSE PRACTITIONER

## 2019-04-15 PROCEDURE — 4040F PNEUMOC VAC/ADMIN/RCVD: CPT | Performed by: NURSE PRACTITIONER

## 2019-04-15 PROCEDURE — 1036F TOBACCO NON-USER: CPT | Performed by: NURSE PRACTITIONER

## 2019-04-15 PROCEDURE — G8427 DOCREV CUR MEDS BY ELIG CLIN: HCPCS | Performed by: NURSE PRACTITIONER

## 2019-04-15 RX ORDER — GLIMEPIRIDE 4 MG/1
4 TABLET ORAL 2 TIMES DAILY
Qty: 180 TABLET | Refills: 3 | Status: SHIPPED | OUTPATIENT
Start: 2019-04-15 | End: 2020-04-19

## 2019-04-15 RX ORDER — OMEPRAZOLE 40 MG/1
40 CAPSULE, DELAYED RELEASE ORAL DAILY
Qty: 90 CAPSULE | Refills: 3 | Status: SHIPPED | OUTPATIENT
Start: 2019-04-15 | End: 2020-07-11 | Stop reason: SDUPTHER

## 2019-04-15 RX ORDER — GLIMEPIRIDE 4 MG/1
4 TABLET ORAL 2 TIMES DAILY
COMMUNITY
End: 2019-04-15 | Stop reason: SDUPTHER

## 2019-04-15 RX ORDER — OXYBUTYNIN CHLORIDE 15 MG/1
TABLET, EXTENDED RELEASE ORAL
Qty: 90 TABLET | Refills: 3 | Status: SHIPPED | OUTPATIENT
Start: 2019-04-15 | End: 2020-06-15

## 2019-04-15 ASSESSMENT — ENCOUNTER SYMPTOMS
CONSTIPATION: 0
CHEST TIGHTNESS: 0
NAUSEA: 0
BACK PAIN: 0
COUGH: 1

## 2019-04-15 NOTE — PROGRESS NOTES
Subjective:      Patient ID: Christo Hardin is a 77 y.o. male. HPI     For routine follow up DM, hyperlipidemia. Not paying attention to sugar and legs go. Following with Dr. Manisha Shin for venous insufficiency. Felt  To be progressing. Weight down 3 pounds from January visit. Will follow up with cardiology in May. Basaglar at 50 units. Taking amaryl 4mg BID    Using cpap nightly for 6-7 hours. Review of Systems   Constitutional: Negative for appetite change and chills. Respiratory: Positive for cough. Negative for chest tightness. Cardiovascular: Negative for chest pain. Gastrointestinal: Negative for constipation and nausea. Musculoskeletal: Positive for arthralgias and gait problem. Negative for back pain. Neurological: Negative for dizziness and headaches. Psychiatric/Behavioral: Negative. Objective:   Physical Exam   Constitutional: He is oriented to person, place, and time. He appears well-developed and well-nourished. No distress. Cardiovascular: Normal rate, regular rhythm and normal heart sounds. Pulmonary/Chest: Effort normal and breath sounds normal.   Abdominal: Soft. Bowel sounds are normal.   Musculoskeletal: Normal range of motion. Neurological: He is alert and oriented to person, place, and time. Skin: Skin is warm and dry. Psychiatric: He has a normal mood and affect.         Current Outpatient Medications   Medication Sig Dispense Refill    glimepiride (AMARYL) 4 MG tablet Take 4 mg by mouth 2 times daily      traZODone (DESYREL) 50 MG tablet TAKE 1 TABLET BY MOUTH EVERY DAY AT NIGHT 30 tablet 5    atenolol (TENORMIN) 50 MG tablet Take 1 tablet by mouth daily 90 tablet 3    potassium chloride (KLOR-CON M) 10 MEQ extended release tablet Take 2 tablets by mouth daily 60 tablet 3    atorvastatin (LIPITOR) 40 MG tablet Take 1 tablet by mouth nightly 90 tablet 3    Insulin Pen Needle 31G X 5 MM MISC 1 each by Does not apply route daily 400 each 5    gabapentin (NEURONTIN) 100 MG capsule i po QD PRN. 30 capsule 0    Gabapentin, Once-Daily, 300 MG TABS i po TID & qHS, PRN. 90 tablet 0    azelastine (ASTELIN) 0.1 % nasal spray USE 1 SPRAY IN EACH NOSTRIL TWICE DAILY AS DIRECTED 1 Bottle 5    isosorbide mononitrate (IMDUR) 30 MG extended release tablet Take 1 tablet by mouth daily 90 tablet 5    oxybutynin (DITROPAN XL) 15 MG extended release tablet TAKE 1 TABLET BY MOUTH EVERY DAY 90 tablet 1    torsemide (DEMADEX) 20 MG tablet TAKE 2 TABLETS BY MOUTH EVERY DAY (Patient taking differently: TAKE 2 TABLETS BY MOUTH EVERY DAY, 1 tablet in the evening MOnday Wednesday and Friday when needed.) 60 tablet 3    sertraline (ZOLOFT) 50 MG tablet Take 1 tablet by mouth daily 90 tablet 3    montelukast (SINGULAIR) 10 MG tablet TAKE ONE TABLET BY MOUTH ONCE DAILY 90 tablet 3    hydrALAZINE (APRESOLINE) 50 MG tablet Take 1 tablet by mouth every 8 hours 270 tablet 3    BASAGLAR KWIKPEN 100 UNIT/ML injection pen INJECT 50 UNITS INTO THE SKIN NIGHTLY 15 pen 3    omeprazole (PRILOSEC) 40 MG delayed release capsule Take 1 capsule by mouth daily 90 capsule 3    glucose blood VI test strips (ASCENSIA AUTODISC VI;ONE TOUCH ULTRA TEST VI) strip DX: E11.9 FSBS daily. One Touch ultra 100 strip 5    aspirin 81 MG chewable tablet Take 1 tablet by mouth daily 30 tablet 0    Blood Glucose Monitoring Suppl PRABHAKAR 1 Device by Does not apply route daily One Touch Ultra 1 Device 0    Multiple Vitamins-Minerals (THERAPEUTIC MULTIVITAMIN-MINERALS) tablet Take 1 tablet by mouth daily      ferrous sulfate 325 (65 FE) MG tablet Take 325 mg by mouth daily.  silver sulfADIAZINE (SILVADENE) 1 % cream Apply topically daily. 50 g 1     No current facility-administered medications for this visit. Assessment:      1. DM-worsening  2. Venous insufficiency  3. CAD-stable  4. Obesity-stable but not at goal      Plan:      1.    Lab Results   Component Value Date    LABA1C 8.4

## 2019-04-25 RX ORDER — TORSEMIDE 20 MG/1
TABLET ORAL
Qty: 60 TABLET | Refills: 1 | Status: SHIPPED | OUTPATIENT
Start: 2019-04-25 | End: 2019-06-24 | Stop reason: SDUPTHER

## 2019-04-25 NOTE — TELEPHONE ENCOUNTER
1/9/19 NPRB  1. Check weights every day  2. No medication changes today  3. Renew lab order for every 3 months BNP and BMP   4. Continue with compression socks  5. Stay as active as possible  6. Elevate your legs as much as possible   7.  Follow up 4 months       4/5/19- [facility]

## 2019-04-29 RX ORDER — MELOXICAM 15 MG/1
TABLET ORAL
Qty: 90 TABLET | Refills: 0 | Status: SHIPPED | OUTPATIENT
Start: 2019-04-29 | End: 2019-05-13

## 2019-05-07 ENCOUNTER — HOSPITAL ENCOUNTER (OUTPATIENT)
Age: 69
Discharge: HOME | End: 2019-05-07
Payer: MEDICARE

## 2019-05-07 ENCOUNTER — TELEPHONE (OUTPATIENT)
Dept: CARDIOLOGY CLINIC | Age: 69
End: 2019-05-07

## 2019-05-07 DIAGNOSIS — I50.32 CHRONIC DIASTOLIC CONGESTIVE HEART FAILURE (HCC): ICD-10-CM

## 2019-05-07 DIAGNOSIS — G47.30 SEVERE SLEEP APNEA: ICD-10-CM

## 2019-05-07 DIAGNOSIS — I10 ESSENTIAL HYPERTENSION: ICD-10-CM

## 2019-05-07 DIAGNOSIS — I25.10 CORONARY ARTERY DISEASE INVOLVING NATIVE CORONARY ARTERY OF NATIVE HEART WITHOUT ANGINA PECTORIS: ICD-10-CM

## 2019-05-07 LAB
ANION GAP SERPL CALCULATED.3IONS-SCNC: 11 MMOL/L (ref 3–16)
BUN BLDV-MCNC: 27 MG/DL (ref 7–20)
CALCIUM SERPL-MCNC: 8.9 MG/DL (ref 8.3–10.6)
CHLORIDE BLD-SCNC: 106 MMOL/L (ref 99–110)
CO2: 26 MMOL/L (ref 21–32)
CREAT SERPL-MCNC: 0.9 MG/DL (ref 0.8–1.3)
GFR AFRICAN AMERICAN: >60
GFR NON-AFRICAN AMERICAN: >60
GLUCOSE BLD-MCNC: 134 MG/DL (ref 70–99)
POTASSIUM SERPL-SCNC: 4.6 MMOL/L (ref 3.5–5.1)
PRO-BNP: 260 PG/ML (ref 0–124)
SODIUM BLD-SCNC: 143 MMOL/L (ref 136–145)

## 2019-05-07 PROCEDURE — 80048 BASIC METABOLIC PNL TOTAL CA: CPT

## 2019-05-07 PROCEDURE — 36415 COLL VENOUS BLD VENIPUNCTURE: CPT

## 2019-05-07 PROCEDURE — 83880 ASSAY OF NATRIURETIC PEPTIDE: CPT

## 2019-05-07 NOTE — TELEPHONE ENCOUNTER
----- Message from SHAKIRA Dumas CNP sent at 5/7/2019  4:24 PM EDT -----  Looks great!! Labs very stable

## 2019-05-13 ENCOUNTER — OFFICE VISIT (OUTPATIENT)
Dept: CARDIOLOGY CLINIC | Age: 69
End: 2019-05-13
Payer: MEDICARE

## 2019-05-13 VITALS
HEART RATE: 59 BPM | HEIGHT: 73 IN | BODY MASS INDEX: 41.75 KG/M2 | DIASTOLIC BLOOD PRESSURE: 80 MMHG | SYSTOLIC BLOOD PRESSURE: 118 MMHG | WEIGHT: 315 LBS | OXYGEN SATURATION: 94 %

## 2019-05-13 DIAGNOSIS — I10 ESSENTIAL HYPERTENSION: ICD-10-CM

## 2019-05-13 DIAGNOSIS — I50.32 CHRONIC DIASTOLIC CONGESTIVE HEART FAILURE (HCC): Primary | ICD-10-CM

## 2019-05-13 DIAGNOSIS — G47.30 SEVERE SLEEP APNEA: ICD-10-CM

## 2019-05-13 DIAGNOSIS — I35.0 NONRHEUMATIC AORTIC VALVE STENOSIS: ICD-10-CM

## 2019-05-13 PROCEDURE — 1123F ACP DISCUSS/DSCN MKR DOCD: CPT | Performed by: NURSE PRACTITIONER

## 2019-05-13 PROCEDURE — G8427 DOCREV CUR MEDS BY ELIG CLIN: HCPCS | Performed by: NURSE PRACTITIONER

## 2019-05-13 PROCEDURE — 3017F COLORECTAL CA SCREEN DOC REV: CPT | Performed by: NURSE PRACTITIONER

## 2019-05-13 PROCEDURE — 99214 OFFICE O/P EST MOD 30 MIN: CPT | Performed by: NURSE PRACTITIONER

## 2019-05-13 PROCEDURE — G8599 NO ASA/ANTIPLAT THER USE RNG: HCPCS | Performed by: NURSE PRACTITIONER

## 2019-05-13 PROCEDURE — G8417 CALC BMI ABV UP PARAM F/U: HCPCS | Performed by: NURSE PRACTITIONER

## 2019-05-13 PROCEDURE — 4040F PNEUMOC VAC/ADMIN/RCVD: CPT | Performed by: NURSE PRACTITIONER

## 2019-05-13 PROCEDURE — 1036F TOBACCO NON-USER: CPT | Performed by: NURSE PRACTITIONER

## 2019-05-13 RX ORDER — SPIRONOLACTONE 25 MG/1
25 TABLET ORAL DAILY
Qty: 30 TABLET | Refills: 2 | Status: SHIPPED | OUTPATIENT
Start: 2019-05-13 | End: 2019-08-03 | Stop reason: SDUPTHER

## 2019-05-13 NOTE — LETTER
epidural steroid injection (Right, 1/21/2019); and epidural steroid injection (Right, 2/11/2019). Social History:   reports that he is a non-smoker but has been exposed to tobacco smoke. He has never used smokeless tobacco. He reports that he drinks alcohol. He reports that he does not use drugs. Family History:   Family History   Problem Relation Age of Onset    Heart Disease Father     Heart Attack Father     Stroke Brother     Heart Disease Brother     High Blood Pressure Brother     Kidney Disease Mother         kidney removed       Home Medications:  Prior to Admission medications    Medication Sig Start Date End Date Taking?  Authorizing Provider   meloxicam (MOBIC) 15 MG tablet TAKE 1 TABLET BY MOUTH EVERY DAY 4/29/19   Terence Pichardo MD   torsemide (DEMADEX) 20 MG tablet TAKE 2 TABLETS BY MOUTH EVERY DAY 4/25/19   SHAKIRA Peacock CNP   sertraline (ZOLOFT) 50 MG tablet Take 1 tablet by mouth daily 4/25/19   SHAKIRA Oneil CNP   azelastine (ASTELIN) 0.1 % nasal spray 1 spray by Nasal route 2 times daily Use in each nostril as directed 4/18/19   SHAKIRA Oneil CNP   omeprazole (PRILOSEC) 40 MG delayed release capsule Take 1 capsule by mouth daily 4/15/19   SHAKIRA Oneil CNP   Insulin Pen Needle 31G X 5 MM MISC 1 each by Does not apply route daily 4/15/19   SHAKIRA Oneil CNP   Glucosamine-Chondroitin (GLUCOSAMINE CHONDR COMPLEX PO) Take 1 tablet by mouth 2 times daily    Historical Provider, MD   glimepiride (AMARYL) 4 MG tablet Take 1 tablet by mouth 2 times daily 4/15/19   SHAKIRA Oneil CNP   oxybutynin (DITROPAN XL) 15 MG extended release tablet TAKE 1 TABLET BY MOUTH EVERY DAY 4/15/19   SHAKIRA Oneil CNP   blood glucose monitor kit and supplies by Other route daily One Touch Ultra 4/15/19   SHAKIRA Oneil CNP   insulin glargine (BASAGLAR KWIKPEN) 100 UNIT/ML injection pen Inject 60 Units into the skin nightly 4/15/19   Rosary SHAKIRA Nolan CNP   traZODone (DESYREL) 50 MG tablet TAKE 1 TABLET BY MOUTH EVERY DAY AT NIGHT 3/29/19   Rosary SHAKIRA Nolan - CNP   atenolol (TENORMIN) 50 MG tablet Take 1 tablet by mouth daily 3/7/19   Rosary QuSHAKIRA fraser - CNP   silver sulfADIAZINE (SILVADENE) 1 % cream Apply topically daily. 3/7/19   Rosary QuSHAKIRA fraser CNP   potassium chloride (KLOR-CON M) 10 MEQ extended release tablet Take 2 tablets by mouth daily 3/7/19   SAHKIRA Nelson CNP   atorvastatin (LIPITOR) 40 MG tablet Take 1 tablet by mouth nightly 3/1/19   SHAKIRA Nelson CNP   gabapentin (NEURONTIN) 100 MG capsule i po QD PRN. 2/4/19 4/15/19  Radha Ma PA-C   Gabapentin, Once-Daily, 300 MG TABS i po TID & qHS, PRN. 1/21/19 4/15/19  Ericka Villanueva MD   isosorbide mononitrate (IMDUR) 30 MG extended release tablet Take 1 tablet by mouth daily 11/29/18   Denise Alvarez MD   montelukast (SINGULAIR) 10 MG tablet TAKE ONE TABLET BY MOUTH ONCE DAILY 10/15/18   SHAKIRA Carson CNP   hydrALAZINE (APRESOLINE) 50 MG tablet Take 1 tablet by mouth every 8 hours 10/10/18   SHAKIRA Champion CNP   glucose blood VI test strips (ASCENSIA AUTODISC VI;ONE TOUCH ULTRA TEST VI) strip DX: E11.9 FSBS daily. One Touch ultra 11/29/17   Rosary SHAKIRA Nolan CNP   aspirin 81 MG chewable tablet Take 1 tablet by mouth daily 10/21/17   Felix Dwyer MD   Multiple Vitamins-Minerals (THERAPEUTIC MULTIVITAMIN-MINERALS) tablet Take 1 tablet by mouth daily    Historical Provider, MD   ferrous sulfate 325 (65 FE) MG tablet Take 325 mg by mouth daily. Historical Provider, MD        Allergies:  Norco [hydrocodone-acetaminophen]     Review of Systems:   · Constitutional: there has been no unanticipated weight loss. · Eyes: No vision changes  · ENT: No Headaches, no nasal congestion. No mouth sores or sore throat. · Cardiovascular: Reviewed in HPI  · Respiratory: No cough or wheezing, no sputum production. · Gastrointestinal: No abdominal pain, no constipation or diarrhea  · Genitourinary: No dysuria, trouble voiding, or hematuria. · Musculoskeletal:  + weakness or joint complaints. · Integumentary: No rash or pruritis. · Neurological: No numbness or tingling. + weakness. No tremor. · Psychiatric: No anxiety, no depression. · Endocrine:  No excessive thirst or urination. · Hematologic/Lymphatic: No abnormal bruising or bleeding, blood clots or swollen lymph nodes.     Physical Examination:    Vitals:    05/13/19 0823   BP: 118/80   Pulse: 59   SpO2: 94%   Weight: (!) 316 lb 6.4 oz (143.5 kg)   Height: 6' 1\" (1.854 m)        Constitutional and General Appearance: no apparent distress, obese  HEENT: non-icteric sclera, oropharynx without exudate, oral mucosa moist  Neck: difficult to assess JVD due to body habitus  Respiratory:  · No use of accessory muscles  · Dim breath sounds throughout, no wheezing, no crackles, no rhonchi  Cardiovascular:  · The apical impulses not displaced  ·  + murmur/rub/gallop  · Regular rate and rhythm, S1,S2 normal  · Radial pulses 2+ and equal bilaterally  · ++  BLE edema, compression socks in place  · Pedal Pulses: 1+ and equal; right leg with venous ulcers on the lateral right side   Abdomen:   · No masses or tenderness  · Liver: No Abnormalities Noted  Musculoskeletal/Skin:  · Gait is slow  · There is no clubbing, cyanosis of the extremities  · Skin is warm and dry  · Moves all extremities well  Neurological/Psychiatric:  · Alert and oriented in all spheres  · No abnormalities of mood, affect, memory, mentation, or behavior are noted    Lab Data:  CBC:   Lab Results   Component Value Date    WBC 7.5 09/05/2018    WBC 6.7 08/20/2018    WBC 7.4 03/01/2018    RBC 5.22 09/05/2018    RBC 5.17 08/20/2018    RBC 4.46 03/01/2018    HGB 14.6 09/05/2018    HGB 14.7 08/20/2018 HGB 12.8 03/01/2018    HCT 45.0 09/05/2018    HCT 45.0 08/20/2018    HCT 38.0 03/01/2018    MCV 86.1 09/05/2018    MCV 87.0 08/20/2018    MCV 85.2 03/01/2018    RDW 16.7 09/05/2018    RDW 17.1 08/20/2018    RDW 16.2 03/01/2018     09/05/2018     08/20/2018     03/01/2018     BMP:   Lab Results   Component Value Date     05/07/2019     04/05/2019     01/04/2019    K 4.6 05/07/2019    K 4.1 04/05/2019    K 4.6 01/04/2019    K 4.3 03/01/2018     05/07/2019     04/05/2019    CL 99 01/04/2019    CO2 26 05/07/2019    CO2 29 04/05/2019    CO2 24 01/04/2019    PHOS 3.7 03/01/2018    PHOS 3.7 05/21/2014    PHOS 2.7 05/19/2014    BUN 27 05/07/2019    BUN 29 04/05/2019    BUN 35 01/04/2019    CREATININE 0.9 05/07/2019    CREATININE 0.9 04/05/2019    CREATININE 0.9 01/04/2019     BNP:   Lab Results   Component Value Date    PROBNP 260 05/07/2019    PROBNP 315 01/04/2019    PROBNP 245 12/13/2018       Recent Testing:  ECHO: 10/17/17  Left ventricular systolic function is normal with the ejection fraction   estimated at 55%. No regional wall motion abnormalities. Moderate concentric left ventricular hypertrophy. Grade II diastolic dysfunction with elevated filling pressure. Severe bi-atrial enlargement. Right ventricle is moderately enlarged. Mild aortic stenosis. Systolic pulmonary artery pressure (SPAP) is normal and estimated at 26 mmHg   (RA pressure 3 mmHg).     Stress test on 10/18/17:  Negative    Pertinent Problems:  ·  Diastolic heart failure from hypertensive heart disease; NYHA class III  · CAD based on coronary calcification on CT chest; negative stress 10/18/17  · Shortness of breath-multifactorial; due diastolic heart failure, morbid obesity, cor pulmonale  · Dilated right ventricle due to cor pulmonale vs heart failure  · Mild aortic stenosis  · Chronic hypoxemic respiratory failure- off oxygen  · Restrictive lung defect  · Severe MONIQUE on CPAP Visit Diagnosis:    1. Chronic diastolic congestive heart failure ([facility] [address]    2. Nonrheumatic aortic valve stenosis    3. Severe sleep apnea    4. Essential hypertension        1. Check weights every day  Continue the torsemide 40mg daily   2. Stop potassium and start Spironolactone (aldactone) 25mg once a day  3. Check labs next week with starting the Spironolactone (aldactone) and then repeat labs again   4. Continue with compression socks- wound care for the legs  5. Stay as active as possible  6. Elevate your legs as much as possible   Repeat echocardiogram to evaluate heart valves  7. Follow up 2 months     QUALITY MEASURES  1. Tobacco Cessation Counseling: NA  2. Retake of BP if >140/90:   NA  3. Documentation to PCP/referring for new patient:  Sent to PCP at close of office visit  4. CAD patient on anti-platelet: Yes  5. CAD patient on STATIN therapy:  Yes  6. Patient with CHF and aFib on anticoagulation:  NA     I appreciate the opportunity for caring for this patient.      Cristy Chan CNP, 5/13/2019, 8:16 AM

## 2019-05-13 NOTE — PATIENT INSTRUCTIONS
1. Check weights every day  Continue the torsemide 40mg daily   2. Stop potassium and start Spironolactone (aldactone) 25mg once a day  3. Check labs next week with starting the Spironolactone (aldactone) and then repeat labs again   4. Continue with compression socks- wound care for the legs  5. Stay as active as possible  6. Elevate your legs as much as possible   Repeat echocardiogram to evaluate heart valves  7.  Follow up 2 months

## 2019-05-13 NOTE — PROGRESS NOTES
[facility]   Cardiac Follow-up    Primary Care Doctor:  Werner Kendrick, SHAKIRA - NGA    Chief Complaint   Patient presents with    Follow-up        History of Present Illness:   I had the pleasure of seeing Frida Hayden in follow up for diastolic heart failure. Hx of CKD, DM, MONIQUE. [place] [facility] [place]  Last ED visit on 9/5/18 with shortness of breath    Since last visit, he continues with dyspnea with exertion. Right hip better, not having to using the walker as much. No coughing. He was having his legs wrapped due to previous sores, right leg sores worse than the left. He doesn't take 2 water pills when he has appts, so he will take 1 pill the ams. Continues to have issues with the edema. Doesn't like to take the water pills with camping as he does this on the weekends. Continues to wear CPAP. Frida Hayden describes symptoms including dyspnea, fatigue, edema but denies chest pain, palpitations, orthopnea, early saiety, syncope. NYHA:   II  ACC/ AHA Stage:    C    Past Medical History:   has a past medical history of Allergic rhinitis, Arthritis, Bladder fistula, C. difficile diarrhea, Cellulitis of right lower extremity, Dental disease, Diabetes ([address] Diverticulitis, Dizziness, DVT of lower extremity, bilateral ([address] GERD (gastroesophageal reflux disease), Headache, Hearing loss, Hematuria, Hypertension, Lung disease, Pancreatitis ([address] Pulmonary emboli ([address] Rash, Sleep apnea, and Tinnitus. Surgical History:   has a past surgical history that includes Tibia fracture surgery (Right, 2003); Cholecystectomy, open (N/A, 9-17-13); Cystocopy (12/10/13); Cystoscopy (1-28-14); other surgical history (1-28-14); Colonoscopy (2008); Colonoscopy (12/4/2013); Colonoscopy (4/30/14); colostomy (Jan 2014); Colon surgery;  Abdomen surgery (05/16/2014); hernia repair (08/14/2015); pr injection hip arthrogram,anesth (Right, 9/19/2018); epidural steroid injection SHAKIRA Montelongo CNP   traZODone (DESYREL) 50 MG tablet TAKE 1 TABLET BY MOUTH EVERY DAY AT NIGHT 3/29/19   SHAKIRA Montelongo CNP   atenolol (TENORMIN) 50 MG tablet Take 1 tablet by mouth daily 3/7/19   SHAKIRA Montelongo CNP   silver sulfADIAZINE (SILVADENE) 1 % cream Apply topically daily. 3/7/19   HSAKIRA Montelongo CNP   potassium chloride (KLOR-CON M) 10 MEQ extended release tablet Take 2 tablets by mouth daily 3/7/19   SHAKIRA Renae CNP   atorvastatin (LIPITOR) 40 MG tablet Take 1 tablet by mouth nightly 3/1/19   SHAKIRA Renae CNP   gabapentin (NEURONTIN) 100 MG capsule i po QD PRN. 2/4/19 4/15/19  Radha Ny PA-C   Gabapentin, Once-Daily, 300 MG TABS i po TID & qHS, PRN. 1/21/19 4/15/19  Jennifer Park MD   isosorbide mononitrate (IMDUR) 30 MG extended release tablet Take 1 tablet by mouth daily 11/29/18   Tisha Jones MD   montelukast (SINGULAIR) 10 MG tablet TAKE ONE TABLET BY MOUTH ONCE DAILY 10/15/18   SHAKIRA Montelongo CNP   hydrALAZINE (APRESOLINE) 50 MG tablet Take 1 tablet by mouth every 8 hours 10/10/18   SHAKIRA Cervantes CNP   glucose blood VI test strips (ASCENSIA AUTODISC VI;ONE TOUCH ULTRA TEST VI) strip DX: E11.9 FSBS daily. One Touch ultra 11/29/17   SHAKIRA Montelongo CNP   aspirin 81 MG chewable tablet Take 1 tablet by mouth daily 10/21/17   Maikel Peacock MD   Multiple Vitamins-Minerals (THERAPEUTIC MULTIVITAMIN-MINERALS) tablet Take 1 tablet by mouth daily    Historical Provider, MD   ferrous sulfate 325 (65 FE) MG tablet Take 325 mg by mouth daily. Historical Provider, MD        Allergies:  Norco [hydrocodone-acetaminophen]     Review of Systems:   · Constitutional: there has been no unanticipated weight loss. · Eyes: No vision changes  · ENT: No Headaches, no nasal congestion. No mouth sores or sore throat.   · Cardiovascular: Reviewed in HPI  · Respiratory: No cough or wheezing, no sputum production. · Gastrointestinal: No abdominal pain, no constipation or diarrhea  · Genitourinary: No dysuria, trouble voiding, or hematuria. · Musculoskeletal:  + weakness or joint complaints. · Integumentary: No rash or pruritis. · Neurological: No numbness or tingling. + weakness. No tremor. · Psychiatric: No anxiety, no depression. · Endocrine:  No excessive thirst or urination. · Hematologic/Lymphatic: No abnormal bruising or bleeding, blood clots or swollen lymph nodes.     Physical Examination:    Vitals:    05/13/19 0823   BP: 118/80   Pulse: 59   SpO2: 94%   Weight: (!) 316 lb 6.4 oz (143.5 kg)   Height: 6' 1\" (1.854 m)        Constitutional and General Appearance: no apparent distress, obese  HEENT: non-icteric sclera, oropharynx without exudate, oral mucosa moist  Neck: difficult to assess JVD due to body habitus  Respiratory:  · No use of accessory muscles  · Dim breath sounds throughout, no wheezing, no crackles, no rhonchi  Cardiovascular:  · The apical impulses not displaced  ·  + murmur/rub/gallop  · Regular rate and rhythm, S1,S2 normal  · Radial pulses 2+ and equal bilaterally  · ++  BLE edema, compression socks in place  · Pedal Pulses: 1+ and equal; right leg with venous ulcers on the lateral right side   Abdomen:   · No masses or tenderness  · Liver: No Abnormalities Noted  Musculoskeletal/Skin:  · Gait is slow  · There is no clubbing, cyanosis of the extremities  · Skin is warm and dry  · Moves all extremities well  Neurological/Psychiatric:  · Alert and oriented in all spheres  · No abnormalities of mood, affect, memory, mentation, or behavior are noted    Lab Data:  CBC:   Lab Results   Component Value Date    WBC 7.5 09/05/2018    WBC 6.7 08/20/2018    WBC 7.4 03/01/2018    RBC 5.22 09/05/2018    RBC 5.17 08/20/2018    RBC 4.46 03/01/2018    HGB 14.6 09/05/2018    HGB 14.7 08/20/2018    HGB 12.8 03/01/2018    HCT 45.0 09/05/2018    HCT 45.0 08/20/2018    HCT 38.0 03/01/2018    MCV 86.1 09/05/2018    MCV 87.0 08/20/2018    MCV 85.2 03/01/2018    RDW 16.7 09/05/2018    RDW 17.1 08/20/2018    RDW 16.2 03/01/2018     09/05/2018     08/20/2018     03/01/2018     BMP:   Lab Results   Component Value Date     05/07/2019     04/05/2019     01/04/2019    K 4.6 05/07/2019    K 4.1 04/05/2019    K 4.6 01/04/2019    K 4.3 03/01/2018     05/07/2019     04/05/2019    CL 99 01/04/2019    CO2 26 05/07/2019    CO2 29 04/05/2019    CO2 24 01/04/2019    PHOS 3.7 03/01/2018    PHOS 3.7 05/21/2014    PHOS 2.7 05/19/2014    BUN 27 05/07/2019    BUN 29 04/05/2019    BUN 35 01/04/2019    CREATININE 0.9 05/07/2019    CREATININE 0.9 04/05/2019    CREATININE 0.9 01/04/2019     BNP:   Lab Results   Component Value Date    PROBNP 260 05/07/2019    PROBNP 315 01/04/2019    PROBNP 245 12/13/2018       Recent Testing:  ECHO: 10/17/17  Left ventricular systolic function is normal with the ejection fraction   estimated at 55%. No regional wall motion abnormalities. Moderate concentric left ventricular hypertrophy. Grade II diastolic dysfunction with elevated filling pressure. Severe bi-atrial enlargement. Right ventricle is moderately enlarged. Mild aortic stenosis. Systolic pulmonary artery pressure (SPAP) is normal and estimated at 26 mmHg   (RA pressure 3 mmHg). Stress test on 10/18/17:  Negative    Pertinent Problems:  ·  Diastolic heart failure from hypertensive heart disease; NYHA class III  · CAD based on coronary calcification on CT chest; negative stress 10/18/17  · Shortness of breath-multifactorial; due diastolic heart failure, morbid obesity, cor pulmonale  · Dilated right ventricle due to cor pulmonale vs heart failure  · Mild aortic stenosis  · Chronic hypoxemic respiratory failure- off oxygen  · Restrictive lung defect  · Severe MONIQUE on CPAP    Visit Diagnosis:    1. Chronic diastolic congestive heart failure ([address]    2. Nonrheumatic aortic valve stenosis    3. Severe sleep apnea    4. Essential hypertension        1. Check weights every day  Continue the torsemide 40mg daily   2. Stop potassium and start Spironolactone (aldactone) 25mg once a day  3. Check labs next week with starting the Spironolactone (aldactone) and then repeat labs again   4. Continue with compression socks- wound care for the legs  5. Stay as active as possible  6. Elevate your legs as much as possible   Repeat echocardiogram to evaluate heart valves  7. Follow up 2 months     QUALITY MEASURES  1. Tobacco Cessation Counseling: NA  2. Retake of BP if >140/90:   NA  3. Documentation to PCP/referring for new patient:  Sent to PCP at close of office visit  4. CAD patient on anti-platelet: Yes  5. CAD patient on STATIN therapy:  Yes  6. Patient with CHF and aFib on anticoagulation:  NA     I appreciate the opportunity for caring for this patient.      Cy Barker CNP, 5/13/2019, 8:16 AM

## 2019-05-21 ENCOUNTER — HOSPITAL ENCOUNTER (OUTPATIENT)
Age: 69
Discharge: HOME | End: 2019-05-21
Payer: MEDICARE

## 2019-05-21 DIAGNOSIS — I35.0 NONRHEUMATIC AORTIC VALVE STENOSIS: ICD-10-CM

## 2019-05-21 DIAGNOSIS — I50.32 CHRONIC DIASTOLIC CONGESTIVE HEART FAILURE (HCC): ICD-10-CM

## 2019-05-21 LAB
ANION GAP SERPL CALCULATED.3IONS-SCNC: 14 MMOL/L (ref 3–16)
BUN BLDV-MCNC: 21 MG/DL (ref 7–20)
CALCIUM SERPL-MCNC: 9.3 MG/DL (ref 8.3–10.6)
CHLORIDE BLD-SCNC: 102 MMOL/L (ref 99–110)
CO2: 26 MMOL/L (ref 21–32)
CREAT SERPL-MCNC: 0.9 MG/DL (ref 0.8–1.3)
GFR AFRICAN AMERICAN: >60
GFR NON-AFRICAN AMERICAN: >60
GLUCOSE BLD-MCNC: 189 MG/DL (ref 70–99)
POTASSIUM SERPL-SCNC: 4.2 MMOL/L (ref 3.5–5.1)
SODIUM BLD-SCNC: 142 MMOL/L (ref 136–145)

## 2019-05-21 PROCEDURE — 36415 COLL VENOUS BLD VENIPUNCTURE: CPT

## 2019-05-21 PROCEDURE — 80048 BASIC METABOLIC PNL TOTAL CA: CPT

## 2019-05-23 ENCOUNTER — TELEPHONE (OUTPATIENT)
Dept: CARDIOLOGY CLINIC | Age: 69
End: 2019-05-23

## 2019-05-23 DIAGNOSIS — I50.9 CHRONIC HEART FAILURE, UNSPECIFIED HEART FAILURE TYPE (HCC): Primary | ICD-10-CM

## 2019-05-23 NOTE — TELEPHONE ENCOUNTER
----- Message from SHAKIRA Michael - CNP sent at 5/22/2019  9:16 AM EDT -----  Kidneys and potassium are normal since starting the Spironolactone (aldactone). Please repeat BMP in about 4-6 weeks to monitor the potassium level.

## 2019-06-05 ENCOUNTER — HOSPITAL ENCOUNTER (OUTPATIENT)
Dept: CARDIOLOGY | Age: 69
Discharge: HOME | End: 2019-06-05
Payer: MEDICARE

## 2019-06-05 DIAGNOSIS — I50.32 CHRONIC DIASTOLIC CONGESTIVE HEART FAILURE (HCC): ICD-10-CM

## 2019-06-05 DIAGNOSIS — I35.0 NONRHEUMATIC AORTIC VALVE STENOSIS: ICD-10-CM

## 2019-06-05 LAB
LV EF: 55 %
LVEF MODALITY: NORMAL

## 2019-06-05 PROCEDURE — 93306 TTE W/DOPPLER COMPLETE: CPT

## 2019-06-06 ENCOUNTER — TELEPHONE (OUTPATIENT)
Dept: CARDIOLOGY CLINIC | Age: 69
End: 2019-06-06

## 2019-07-15 ENCOUNTER — TELEPHONE (OUTPATIENT)
Dept: FAMILY MEDICINE CLINIC | Age: 69
End: 2019-07-15

## 2019-07-15 ENCOUNTER — OFFICE VISIT (OUTPATIENT)
Dept: FAMILY MEDICINE CLINIC | Age: 69
End: 2019-07-15
Payer: MEDICARE

## 2019-07-15 VITALS
BODY MASS INDEX: 40.95 KG/M2 | RESPIRATION RATE: 18 BRPM | DIASTOLIC BLOOD PRESSURE: 72 MMHG | WEIGHT: 309 LBS | OXYGEN SATURATION: 98 % | HEIGHT: 73 IN | TEMPERATURE: 98.5 F | SYSTOLIC BLOOD PRESSURE: 116 MMHG | HEART RATE: 65 BPM

## 2019-07-15 DIAGNOSIS — L97.221 VENOUS STASIS ULCER OF LEFT CALF LIMITED TO BREAKDOWN OF SKIN WITH VARICOSE VEINS (HCC): ICD-10-CM

## 2019-07-15 DIAGNOSIS — M54.16 LUMBAR RADICULITIS: ICD-10-CM

## 2019-07-15 DIAGNOSIS — I50.32 CHRONIC DIASTOLIC CONGESTIVE HEART FAILURE (HCC): ICD-10-CM

## 2019-07-15 DIAGNOSIS — I83.019 VENOUS STASIS ULCER OF RIGHT LOWER EXTREMITY (HCC): ICD-10-CM

## 2019-07-15 DIAGNOSIS — M51.36 DDD (DEGENERATIVE DISC DISEASE), LUMBAR: ICD-10-CM

## 2019-07-15 DIAGNOSIS — I83.022 VENOUS STASIS ULCER OF LEFT CALF LIMITED TO BREAKDOWN OF SKIN WITH VARICOSE VEINS (HCC): ICD-10-CM

## 2019-07-15 DIAGNOSIS — F51.01 PRIMARY INSOMNIA: ICD-10-CM

## 2019-07-15 DIAGNOSIS — L97.919 VENOUS STASIS ULCER OF RIGHT LOWER EXTREMITY (HCC): ICD-10-CM

## 2019-07-15 DIAGNOSIS — E11.65 TYPE 2 DIABETES MELLITUS WITH HYPERGLYCEMIA, WITH LONG-TERM CURRENT USE OF INSULIN (HCC): Primary | ICD-10-CM

## 2019-07-15 DIAGNOSIS — Z79.4 TYPE 2 DIABETES MELLITUS WITH HYPERGLYCEMIA, WITH LONG-TERM CURRENT USE OF INSULIN (HCC): Primary | ICD-10-CM

## 2019-07-15 DIAGNOSIS — I87.2 CHRONIC VENOUS INSUFFICIENCY: Chronic | ICD-10-CM

## 2019-07-15 DIAGNOSIS — M48.062 LUMBAR STENOSIS WITH NEUROGENIC CLAUDICATION: ICD-10-CM

## 2019-07-15 LAB
BASOPHILS ABSOLUTE: 0.1 K/UL (ref 0–0.2)
BASOPHILS RELATIVE PERCENT: 1.2 %
EOSINOPHILS ABSOLUTE: 0.2 K/UL (ref 0–0.6)
EOSINOPHILS RELATIVE PERCENT: 2.3 %
HBA1C MFR BLD: 7.8 %
HCT VFR BLD CALC: 41.8 % (ref 40.5–52.5)
HEMOGLOBIN: 13.8 G/DL (ref 13.5–17.5)
LYMPHOCYTES ABSOLUTE: 1.3 K/UL (ref 1–5.1)
LYMPHOCYTES RELATIVE PERCENT: 14.6 %
MCH RBC QN AUTO: 28.7 PG (ref 26–34)
MCHC RBC AUTO-ENTMCNC: 33 G/DL (ref 31–36)
MCV RBC AUTO: 87.1 FL (ref 80–100)
MONOCYTES ABSOLUTE: 0.5 K/UL (ref 0–1.3)
MONOCYTES RELATIVE PERCENT: 5.8 %
NEUTROPHILS ABSOLUTE: 6.7 K/UL (ref 1.7–7.7)
NEUTROPHILS RELATIVE PERCENT: 76.1 %
PDW BLD-RTO: 16 % (ref 12.4–15.4)
PLATELET # BLD: 200 K/UL (ref 135–450)
PMV BLD AUTO: 9.1 FL (ref 5–10.5)
RBC # BLD: 4.8 M/UL (ref 4.2–5.9)
WBC # BLD: 8.9 K/UL (ref 4–11)

## 2019-07-15 PROCEDURE — 99214 OFFICE O/P EST MOD 30 MIN: CPT | Performed by: NURSE PRACTITIONER

## 2019-07-15 PROCEDURE — 3045F PR MOST RECENT HEMOGLOBIN A1C LEVEL 7.0-9.0%: CPT | Performed by: NURSE PRACTITIONER

## 2019-07-15 PROCEDURE — 83036 HEMOGLOBIN GLYCOSYLATED A1C: CPT | Performed by: NURSE PRACTITIONER

## 2019-07-15 PROCEDURE — 1036F TOBACCO NON-USER: CPT | Performed by: NURSE PRACTITIONER

## 2019-07-15 PROCEDURE — 3017F COLORECTAL CA SCREEN DOC REV: CPT | Performed by: NURSE PRACTITIONER

## 2019-07-15 PROCEDURE — G8427 DOCREV CUR MEDS BY ELIG CLIN: HCPCS | Performed by: NURSE PRACTITIONER

## 2019-07-15 PROCEDURE — 2022F DILAT RTA XM EVC RTNOPTHY: CPT | Performed by: NURSE PRACTITIONER

## 2019-07-15 PROCEDURE — 1123F ACP DISCUSS/DSCN MKR DOCD: CPT | Performed by: NURSE PRACTITIONER

## 2019-07-15 PROCEDURE — G8599 NO ASA/ANTIPLAT THER USE RNG: HCPCS | Performed by: NURSE PRACTITIONER

## 2019-07-15 PROCEDURE — G8417 CALC BMI ABV UP PARAM F/U: HCPCS | Performed by: NURSE PRACTITIONER

## 2019-07-15 PROCEDURE — 4040F PNEUMOC VAC/ADMIN/RCVD: CPT | Performed by: NURSE PRACTITIONER

## 2019-07-15 RX ORDER — GABAPENTIN 100 MG/1
100 CAPSULE ORAL 3 TIMES DAILY
Qty: 30 CAPSULE | Refills: 0 | Status: SHIPPED | OUTPATIENT
Start: 2019-07-15 | End: 2019-08-05 | Stop reason: SDUPTHER

## 2019-07-15 RX ORDER — TIZANIDINE 4 MG/1
TABLET ORAL
Refills: 3 | COMMUNITY
Start: 2019-06-13 | End: 2019-10-14

## 2019-07-15 ASSESSMENT — ENCOUNTER SYMPTOMS
CONSTIPATION: 0
COUGH: 0
BACK PAIN: 0
NAUSEA: 0
SHORTNESS OF BREATH: 0
CHEST TIGHTNESS: 0

## 2019-07-15 ASSESSMENT — PATIENT HEALTH QUESTIONNAIRE - PHQ9
2. FEELING DOWN, DEPRESSED OR HOPELESS: 0
SUM OF ALL RESPONSES TO PHQ QUESTIONS 1-9: 0
SUM OF ALL RESPONSES TO PHQ QUESTIONS 1-9: 0
1. LITTLE INTEREST OR PLEASURE IN DOING THINGS: 0
SUM OF ALL RESPONSES TO PHQ9 QUESTIONS 1 & 2: 0

## 2019-07-15 NOTE — PROGRESS NOTES
Subjective:      Patient ID: Dyan Finch is a 77 y.o. male. HPI     For routine follow up DM    Weight down 15 pounds over past 3 months. Not eating desserts. Using c pap 7-8 hours nightly. Again with venous ulcers, following with podiatry, Dr. Monik Rouse. Removed unaboots this AM to shower. Will see her later today. Camping out but using c pap. Review of Systems   Constitutional: Negative for appetite change, chills and fever. Respiratory: Negative for cough, chest tightness and shortness of breath. Cardiovascular: Positive for leg swelling. Negative for chest pain and palpitations. Gastrointestinal: Negative for constipation and nausea. Musculoskeletal: Positive for arthralgias. Negative for back pain and gait problem. Skin: Positive for wound. Neurological: Negative for dizziness and headaches. Psychiatric/Behavioral: Negative. Objective:   Physical Exam   Constitutional: He is oriented to person, place, and time. He appears well-developed and well-nourished. No distress. Cardiovascular: Normal rate, regular rhythm and normal heart sounds. Pulmonary/Chest: Effort normal and breath sounds normal.   Abdominal: Soft. Bowel sounds are normal.   Musculoskeletal:   Limited ROM generalized. Independent. Neurological: He is alert and oriented to person, place, and time. Skin: Skin is warm and dry. Skin lower legs, thickened, dry. Minimal swelling today. Psychiatric: He has a normal mood and affect. Current Outpatient Medications   Medication Sig Dispense Refill    tiZANidine (ZANAFLEX) 4 MG tablet TAKE 1 TABLET BY MOUTH EVERY 8 HOURS AS NEEDED (MUSCLE PAIN)  3    silver sulfADIAZINE (SILVADENE) 1 % cream Apply topically daily.  50 g 3    torsemide (DEMADEX) 20 MG tablet TAKE 2 TABLETS BY MOUTH EVERY DAY 60 tablet 3    spironolactone (ALDACTONE) 25 MG tablet Take 1 tablet by mouth daily 30 tablet 2    sertraline (ZOLOFT) 50 MG tablet Take 1 tablet by mouth daily 90 tablet 3    azelastine (ASTELIN) 0.1 % nasal spray 1 spray by Nasal route 2 times daily Use in each nostril as directed 1 Bottle 5    omeprazole (PRILOSEC) 40 MG delayed release capsule Take 1 capsule by mouth daily 90 capsule 3    Insulin Pen Needle 31G X 5 MM MISC 1 each by Does not apply route daily 400 each 5    Glucosamine-Chondroitin (GLUCOSAMINE CHONDR COMPLEX PO) Take 1 tablet by mouth 2 times daily      glimepiride (AMARYL) 4 MG tablet Take 1 tablet by mouth 2 times daily 180 tablet 3    oxybutynin (DITROPAN XL) 15 MG extended release tablet TAKE 1 TABLET BY MOUTH EVERY DAY 90 tablet 3    blood glucose monitor kit and supplies by Other route daily One Touch Ultra 1 kit 0    insulin glargine (BASAGLAR KWIKPEN) 100 UNIT/ML injection pen Inject 60 Units into the skin nightly 15 pen 5    traZODone (DESYREL) 50 MG tablet TAKE 1 TABLET BY MOUTH EVERY DAY AT NIGHT 30 tablet 5    atenolol (TENORMIN) 50 MG tablet Take 1 tablet by mouth daily 90 tablet 3    atorvastatin (LIPITOR) 40 MG tablet Take 1 tablet by mouth nightly 90 tablet 3    gabapentin (NEURONTIN) 100 MG capsule i po QD PRN. 30 capsule 0    isosorbide mononitrate (IMDUR) 30 MG extended release tablet Take 1 tablet by mouth daily 90 tablet 5    montelukast (SINGULAIR) 10 MG tablet TAKE ONE TABLET BY MOUTH ONCE DAILY 90 tablet 3    hydrALAZINE (APRESOLINE) 50 MG tablet Take 1 tablet by mouth every 8 hours 270 tablet 3    glucose blood VI test strips (ASCENSIA AUTODISC VI;ONE TOUCH ULTRA TEST VI) strip DX: E11.9 FSBS daily. One Touch ultra 100 strip 5    aspirin 81 MG chewable tablet Take 1 tablet by mouth daily 30 tablet 0    Multiple Vitamins-Minerals (THERAPEUTIC MULTIVITAMIN-MINERALS) tablet Take 1 tablet by mouth daily      ferrous sulfate 325 (65 FE) MG tablet Take 325 mg by mouth daily. No current facility-administered medications for this visit. Assessment:      1. DM-stable  2.  Venous insufficiency bilateral.   3. Chronic leg pain  4. CHF  5. insomnia        Plan:      1. Lab Results   Component Value Date    LABA1C 7.8 07/15/2019     Lab Results   Component Value Date    .0 10/16/2017     2. No changes in medication. 3. Evelin Cronin was seen today for diabetes. Diagnoses and all orders for this visit:    Type 2 diabetes mellitus with hyperglycemia, with long-term current use of insulin ([facility])  -     POCT glycosylated hemoglobin (Hb A1C)  -     CBC Auto Differential    Lumbar stenosis with neurogenic claudication  -     gabapentin (NEURONTIN) 100 MG capsule; Take 1 capsule by mouth 3 times daily for 30 doses. i po QD PRN    Lumbar radiculitis  -     gabapentin (NEURONTIN) 100 MG capsule; Take 1 capsule by mouth 3 times daily for 30 doses. i po QD PRN    DDD (degenerative disc disease), lumbar  -     gabapentin (NEURONTIN) 100 MG capsule; Take 1 capsule by mouth 3 times daily for 30 doses.  i po QD PRN    Primary insomnia    Chronic venous insufficiency  -     CBC Auto Differential    Chronic diastolic congestive heart failure (HCC)              SHAKIRA LOWERY - CNP

## 2019-07-24 ENCOUNTER — OFFICE VISIT (OUTPATIENT)
Dept: CARDIOLOGY CLINIC | Age: 69
End: 2019-07-24
Payer: MEDICARE

## 2019-07-24 VITALS
DIASTOLIC BLOOD PRESSURE: 70 MMHG | SYSTOLIC BLOOD PRESSURE: 100 MMHG | HEART RATE: 65 BPM | HEIGHT: 73 IN | BODY MASS INDEX: 40.16 KG/M2 | WEIGHT: 303 LBS | OXYGEN SATURATION: 98 %

## 2019-07-24 DIAGNOSIS — I50.32 CHRONIC DIASTOLIC CONGESTIVE HEART FAILURE (HCC): Primary | ICD-10-CM

## 2019-07-24 DIAGNOSIS — I10 ESSENTIAL HYPERTENSION: ICD-10-CM

## 2019-07-24 DIAGNOSIS — I35.0 NONRHEUMATIC AORTIC VALVE STENOSIS: ICD-10-CM

## 2019-07-24 DIAGNOSIS — R06.02 SHORTNESS OF BREATH: ICD-10-CM

## 2019-07-24 PROBLEM — S76.011A STRAIN OF FLEXOR MUSCLE OF RIGHT HIP: Status: RESOLVED | Noted: 2018-08-13 | Resolved: 2019-07-24

## 2019-07-24 PROBLEM — R09.81 NASAL CONGESTION: Status: RESOLVED | Noted: 2018-10-16 | Resolved: 2019-07-24

## 2019-07-24 PROCEDURE — G8417 CALC BMI ABV UP PARAM F/U: HCPCS | Performed by: NURSE PRACTITIONER

## 2019-07-24 PROCEDURE — 99214 OFFICE O/P EST MOD 30 MIN: CPT | Performed by: NURSE PRACTITIONER

## 2019-07-24 PROCEDURE — 4040F PNEUMOC VAC/ADMIN/RCVD: CPT | Performed by: NURSE PRACTITIONER

## 2019-07-24 PROCEDURE — 1123F ACP DISCUSS/DSCN MKR DOCD: CPT | Performed by: NURSE PRACTITIONER

## 2019-07-24 PROCEDURE — G8599 NO ASA/ANTIPLAT THER USE RNG: HCPCS | Performed by: NURSE PRACTITIONER

## 2019-07-24 PROCEDURE — 1036F TOBACCO NON-USER: CPT | Performed by: NURSE PRACTITIONER

## 2019-07-24 PROCEDURE — 3017F COLORECTAL CA SCREEN DOC REV: CPT | Performed by: NURSE PRACTITIONER

## 2019-07-24 PROCEDURE — G8427 DOCREV CUR MEDS BY ELIG CLIN: HCPCS | Performed by: NURSE PRACTITIONER

## 2019-07-24 RX ORDER — HYDRALAZINE HYDROCHLORIDE 50 MG/1
25 TABLET, FILM COATED ORAL EVERY 8 HOURS SCHEDULED
Qty: 270 TABLET | Refills: 3
Start: 2019-07-24 | End: 2020-01-28 | Stop reason: SDUPTHER

## 2019-07-24 NOTE — PROGRESS NOTES
[facility]   Cardiac Follow-up    Primary Care Doctor:  SHAKIRA Christianson - CNP    Chief Complaint   Patient presents with    Follow-up        History of Present Illness:   I had the pleasure of seeing Rea Sheehan in follow up for diastolic heart failure. Hx of CKD, DM, MONIQUE. Last admission to the [facility] 10/2017  Last ED visit on 9/5/18 with shortness of breath. Since last visit, he had echo showing grade III diastolic dysfunction. He was started on Spironolactone (aldactone), last labs were stable. He went a period without the water pills while he was fishing so his edema was worse for a while, to the point he almost came to the hospital. Restarted his water pills and cut back on his fluid, sugar and salt intake. He is trying to lose weight, weight is down 20lbs since the beginning of the year. Stopped drinking the mountain dew. No dizziness or lightheadedness. Breathing is improved since last month. He was struggling with shortness of breath with short distance walking when he came in for his echo. Now he is able to walk farther without much shortness of breath. More active, going camping. Continues to wear his cpap. Home weights 290lbs     Rea Sheehan describes symptoms including dyspnea, edema but denies chest pain, palpitations, early saiety, syncope. NYHA:   II  ACC/ AHA Stage:    C    Past Medical History:   has a past medical history of Allergic rhinitis, Arthritis, Bladder fistula, C. difficile diarrhea, Cellulitis of right lower extremity, Dental disease, Diabetes ([address] Diverticulitis, Dizziness, DVT of lower extremity, bilateral ([address] GERD (gastroesophageal reflux disease), Headache, Hearing loss, Hematuria, Hypertension, Lung disease, MONIQUE (obstructive sleep apnea), Pancreatitis ([address] Pulmonary emboli ([address] Rash, Sleep apnea, and Tinnitus.   Surgical History:   has a past surgical history that includes Tibia fracture surgery (Right, 2003); Cholecystectomy, open (N/A, 9-17-13); Cystocopy (12/10/13); Cystoscopy (1-28-14); other surgical history (1-28-14); Colonoscopy (2008); Colonoscopy (12/4/2013); Colonoscopy (4/30/14); colostomy (Jan 2014); Colon surgery; Abdomen surgery (05/16/2014); hernia repair (08/14/2015); pr injection hip arthrogram,anesth (Right, 9/19/2018); epidural steroid injection (Right, 1/21/2019); and epidural steroid injection (Right, 2/11/2019). Social History:   reports that he is a non-smoker but has been exposed to tobacco smoke. He has never used smokeless tobacco. He reports that he drinks alcohol. He reports that he does not use drugs. Family History:   Family History   Problem Relation Age of Onset    Heart Disease Father     Heart Attack Father     Stroke Brother     Heart Disease Brother     High Blood Pressure Brother     Kidney Disease Mother         kidney removed       Home Medications:  Prior to Admission medications    Medication Sig Start Date End Date Taking? Authorizing Provider   tiZANidine (ZANAFLEX) 4 MG tablet TAKE 1 TABLET BY MOUTH EVERY 8 HOURS AS NEEDED (MUSCLE PAIN) 6/13/19   Historical Provider, MD   gabapentin (NEURONTIN) 100 MG capsule Take 1 capsule by mouth 3 times daily for 30 doses. i po QD PRN 7/15/19 7/25/19  SHAKIRA Weinstein CNP   silver sulfADIAZINE (SILVADENE) 1 % cream Apply topically daily.  7/8/19   SHAKIRA Weinstein CNP   torsemide (DEMADEX) 20 MG tablet TAKE 2 TABLETS BY MOUTH EVERY DAY 6/25/19   SHAKIRA Lozoya CNP   spironolactone (ALDACTONE) 25 MG tablet Take 1 tablet by mouth daily 5/13/19   SHAKIRA Lozoya CNP   sertraline (ZOLOFT) 50 MG tablet Take 1 tablet by mouth daily 4/25/19   SHAKIRA Weinstein CNP   azelastine (ASTELIN) 0.1 % nasal spray 1 spray by Nasal route 2 times daily Use in each nostril as directed 4/18/19   SHAKIRA Weinstein CNP   omeprazole (PRILOSEC) 40 MG delayed release capsule Take 1 capsule by

## 2019-07-24 NOTE — PATIENT INSTRUCTIONS
1. Check weights every day  Continue the torsemide 40mg daily   2. Spironolactone (aldactone) 25mg once a day  3. Check labs in the next 1-2 weeks to monitor potassium levels  4. Decrease hydralazine to 25mg three times a day   5. Stay as active as possible  6. Elevate your legs as much as possible   7.  Follow up 3 months or sooner if needed

## 2019-07-24 NOTE — LETTER
2003); Cholecystectomy, open (N/A, 9-17-13); Cystocopy (12/10/13); Cystoscopy (1-28-14); other surgical history (1-28-14); Colonoscopy (2008); Colonoscopy (12/4/2013); Colonoscopy (4/30/14); colostomy (Jan 2014); Colon surgery; Abdomen surgery (05/16/2014); hernia repair (08/14/2015); pr injection hip arthrogram,anesth (Right, 9/19/2018); epidural steroid injection (Right, 1/21/2019); and epidural steroid injection (Right, 2/11/2019). Social History:   reports that he is a non-smoker but has been exposed to tobacco smoke. He has never used smokeless tobacco. He reports that he drinks alcohol. He reports that he does not use drugs. Family History:   Family History   Problem Relation Age of Onset    Heart Disease Father     Heart Attack Father     Stroke Brother     Heart Disease Brother     High Blood Pressure Brother     Kidney Disease Mother         kidney removed       Home Medications:  Prior to Admission medications    Medication Sig Start Date End Date Taking? Authorizing Provider   tiZANidine (ZANAFLEX) 4 MG tablet TAKE 1 TABLET BY MOUTH EVERY 8 HOURS AS NEEDED (MUSCLE PAIN) 6/13/19   Historical Provider, MD   gabapentin (NEURONTIN) 100 MG capsule Take 1 capsule by mouth 3 times daily for 30 doses. i po QD PRN 7/15/19 7/25/19  SHAKIRA Varela CNP   silver sulfADIAZINE (SILVADENE) 1 % cream Apply topically daily.  7/8/19   SHAKIRA Varela CNP   torsemide (DEMADEX) 20 MG tablet TAKE 2 TABLETS BY MOUTH EVERY DAY 6/25/19   SHAKIRA Najera CNP   spironolactone (ALDACTONE) 25 MG tablet Take 1 tablet by mouth daily 5/13/19   SHAKIRA Najera CNP   sertraline (ZOLOFT) 50 MG tablet Take 1 tablet by mouth daily 4/25/19   SHAKIRA Varela CNP   azelastine (ASTELIN) 0.1 % nasal spray 1 spray by Nasal route 2 times daily Use in each nostril as directed 4/18/19   SHAKIRA Varela CNP omeprazole (PRILOSEC) 40 MG delayed release capsule Take 1 capsule by mouth daily 4/15/19   SHAKIRA Fernandez CNP   Insulin Pen Needle 31G X 5 MM MISC 1 each by Does not apply route daily 4/15/19   SHAKIRA Fernandez CNP   Glucosamine-Chondroitin (GLUCOSAMINE CHONDR COMPLEX PO) Take 1 tablet by mouth 2 times daily    Historical Provider, MD   glimepiride (AMARYL) 4 MG tablet Take 1 tablet by mouth 2 times daily 4/15/19   SHAKIRA Fernandez CNP   oxybutynin (DITROPAN XL) 15 MG extended release tablet TAKE 1 TABLET BY MOUTH EVERY DAY 4/15/19   SHAKIRA Fernandez CNP   blood glucose monitor kit and supplies by Other route daily One Touch Ultra 4/15/19   SHAKIRA Fernandez CNP   insulin glargine (BASAGLAR KWIKPEN) 100 UNIT/ML injection pen Inject 60 Units into the skin nightly 4/15/19   SHAKIRA Fernandez CNP   traZODone (DESYREL) 50 MG tablet TAKE 1 TABLET BY MOUTH EVERY DAY AT NIGHT 3/29/19   SHAKIRA Fernandez CNP   atenolol (TENORMIN) 50 MG tablet Take 1 tablet by mouth daily 3/7/19   SHAKIRA Fernandez CNP   atorvastatin (LIPITOR) 40 MG tablet Take 1 tablet by mouth nightly 3/1/19   SHAKIRA Bui CNP   isosorbide mononitrate (IMDUR) 30 MG extended release tablet Take 1 tablet by mouth daily 11/29/18   Maddie Mendoza MD   montelukast (SINGULAIR) 10 MG tablet TAKE ONE TABLET BY MOUTH ONCE DAILY 10/15/18   SHAKIRA Fernandez CNP   hydrALAZINE (APRESOLINE) 50 MG tablet Take 1 tablet by mouth every 8 hours 10/10/18   SHAKIRA Cummings CNP   glucose blood VI test strips (ASCENSIA AUTODISC VI;ONE TOUCH ULTRA TEST VI) strip DX: E11.9 FSBS daily.  One Touch ultra 11/29/17   SHAKIRA Fernandez CNP   aspirin 81 MG chewable tablet Take 1 tablet by mouth daily 10/21/17   Yon Shields MD   Multiple Vitamins-Minerals (THERAPEUTIC MULTIVITAMIN-MINERALS) tablet Take · No abnormalities of mood, affect, memory, mentation, or behavior are noted    Lab Data:  CBC:   Lab Results   Component Value Date    WBC 8.9 07/15/2019    WBC 7.5 09/05/2018    WBC 6.7 08/20/2018    RBC 4.80 07/15/2019    RBC 5.22 09/05/2018    RBC 5.17 08/20/2018    HGB 13.8 07/15/2019    HGB 14.6 09/05/2018    HGB 14.7 08/20/2018    HCT 41.8 07/15/2019    HCT 45.0 09/05/2018    HCT 45.0 08/20/2018    MCV 87.1 07/15/2019    MCV 86.1 09/05/2018    MCV 87.0 08/20/2018    RDW 16.0 07/15/2019    RDW 16.7 09/05/2018    RDW 17.1 08/20/2018     07/15/2019     09/05/2018     08/20/2018     BMP:   Lab Results   Component Value Date     05/21/2019     05/07/2019     04/05/2019    K 4.2 05/21/2019    K 4.6 05/07/2019    K 4.1 04/05/2019    K 4.3 03/01/2018     05/21/2019     05/07/2019     04/05/2019    CO2 26 05/21/2019    CO2 26 05/07/2019    CO2 29 04/05/2019    PHOS 3.7 03/01/2018    PHOS 3.7 05/21/2014    PHOS 2.7 05/19/2014    BUN 21 05/21/2019    BUN 27 05/07/2019    BUN 29 04/05/2019    CREATININE 0.9 05/21/2019    CREATININE 0.9 05/07/2019    CREATININE 0.9 04/05/2019     BNP:   Lab Results   Component Value Date    PROBNP 260 05/07/2019    PROBNP 315 01/04/2019    PROBNP 245 12/13/2018       Recent Testing:  ECHO: 10/17/17  Left ventricular systolic function is normal with the ejection fraction   estimated at 55%. No regional wall motion abnormalities. Moderate concentric left ventricular hypertrophy. Grade II diastolic dysfunction with elevated filling pressure. Severe bi-atrial enlargement. Right ventricle is moderately enlarged. Mild aortic stenosis. Systolic pulmonary artery pressure (SPAP) is normal and estimated at 26 mmHg   (RA pressure 3 mmHg). Stress test on 10/18/17:  Negative    Echo 6/2019  Summary   Technically difficult examination due to body habitus. IV access was   attempted but could not be obtained.

## 2019-08-05 RX ORDER — SPIRONOLACTONE 25 MG/1
TABLET ORAL
Qty: 30 TABLET | Refills: 2 | Status: SHIPPED | OUTPATIENT
Start: 2019-08-05 | End: 2019-11-13 | Stop reason: SDUPTHER

## 2019-08-06 ENCOUNTER — OFFICE VISIT (OUTPATIENT)
Dept: PULMONOLOGY | Age: 69
End: 2019-08-06
Payer: MEDICARE

## 2019-08-06 ENCOUNTER — TELEPHONE (OUTPATIENT)
Dept: CARDIOLOGY CLINIC | Age: 69
End: 2019-08-06

## 2019-08-06 ENCOUNTER — HOSPITAL ENCOUNTER (OUTPATIENT)
Age: 69
Discharge: HOME | End: 2019-08-06
Payer: MEDICARE

## 2019-08-06 VITALS
OXYGEN SATURATION: 96 % | RESPIRATION RATE: 16 BRPM | HEIGHT: 73 IN | WEIGHT: 305 LBS | DIASTOLIC BLOOD PRESSURE: 60 MMHG | HEART RATE: 59 BPM | BODY MASS INDEX: 40.42 KG/M2 | SYSTOLIC BLOOD PRESSURE: 111 MMHG

## 2019-08-06 DIAGNOSIS — R06.3 CENTRAL SLEEP APNEA DUE TO CHEYNE-STOKES RESPIRATION: ICD-10-CM

## 2019-08-06 DIAGNOSIS — G47.33 OSA (OBSTRUCTIVE SLEEP APNEA): Primary | ICD-10-CM

## 2019-08-06 DIAGNOSIS — I50.32 CHRONIC DIASTOLIC CONGESTIVE HEART FAILURE (HCC): ICD-10-CM

## 2019-08-06 LAB
ANION GAP SERPL CALCULATED.3IONS-SCNC: 14 MMOL/L (ref 3–16)
BUN BLDV-MCNC: 31 MG/DL (ref 7–20)
CALCIUM SERPL-MCNC: 9.4 MG/DL (ref 8.3–10.6)
CHLORIDE BLD-SCNC: 103 MMOL/L (ref 99–110)
CO2: 24 MMOL/L (ref 21–32)
CREAT SERPL-MCNC: 1 MG/DL (ref 0.8–1.3)
GFR AFRICAN AMERICAN: >60
GFR NON-AFRICAN AMERICAN: >60
GLUCOSE BLD-MCNC: 142 MG/DL (ref 70–99)
POTASSIUM SERPL-SCNC: 4 MMOL/L (ref 3.5–5.1)
SODIUM BLD-SCNC: 141 MMOL/L (ref 136–145)

## 2019-08-06 PROCEDURE — 99214 OFFICE O/P EST MOD 30 MIN: CPT | Performed by: INTERNAL MEDICINE

## 2019-08-06 PROCEDURE — 1036F TOBACCO NON-USER: CPT | Performed by: INTERNAL MEDICINE

## 2019-08-06 PROCEDURE — G8417 CALC BMI ABV UP PARAM F/U: HCPCS | Performed by: INTERNAL MEDICINE

## 2019-08-06 PROCEDURE — 80048 BASIC METABOLIC PNL TOTAL CA: CPT

## 2019-08-06 PROCEDURE — G8427 DOCREV CUR MEDS BY ELIG CLIN: HCPCS | Performed by: INTERNAL MEDICINE

## 2019-08-06 PROCEDURE — 1123F ACP DISCUSS/DSCN MKR DOCD: CPT | Performed by: INTERNAL MEDICINE

## 2019-08-06 PROCEDURE — 3017F COLORECTAL CA SCREEN DOC REV: CPT | Performed by: INTERNAL MEDICINE

## 2019-08-06 PROCEDURE — 4040F PNEUMOC VAC/ADMIN/RCVD: CPT | Performed by: INTERNAL MEDICINE

## 2019-08-06 PROCEDURE — 36415 COLL VENOUS BLD VENIPUNCTURE: CPT

## 2019-08-06 PROCEDURE — G8599 NO ASA/ANTIPLAT THER USE RNG: HCPCS | Performed by: INTERNAL MEDICINE

## 2019-08-06 ASSESSMENT — ENCOUNTER SYMPTOMS
CHOKING: 0
CHEST TIGHTNESS: 0
EYE PAIN: 0
CONSTIPATION: 0
SORE THROAT: 0
DIARRHEA: 0
EYE DISCHARGE: 0
STRIDOR: 0
ABDOMINAL PAIN: 0
VOICE CHANGE: 0
EYE ITCHING: 0

## 2019-08-06 ASSESSMENT — SLEEP AND FATIGUE QUESTIONNAIRES
NECK CIRCUMFERENCE (INCHES): 19
HOW LIKELY ARE YOU TO NOD OFF OR FALL ASLEEP WHILE SITTING AND TALKING TO SOMEONE: 1
ESS TOTAL SCORE: 14
HOW LIKELY ARE YOU TO NOD OFF OR FALL ASLEEP IN A CAR, WHILE STOPPED FOR A FEW MINUTES IN TRAFFIC: 1
HOW LIKELY ARE YOU TO NOD OFF OR FALL ASLEEP WHILE WATCHING TV: 3
HOW LIKELY ARE YOU TO NOD OFF OR FALL ASLEEP WHEN YOU ARE A PASSENGER IN A CAR FOR AN HOUR WITHOUT A BREAK: 1
HOW LIKELY ARE YOU TO NOD OFF OR FALL ASLEEP WHILE SITTING INACTIVE IN A PUBLIC PLACE: 2
HOW LIKELY ARE YOU TO NOD OFF OR FALL ASLEEP WHILE SITTING AND READING: 2
HOW LIKELY ARE YOU TO NOD OFF OR FALL ASLEEP WHILE SITTING QUIETLY AFTER LUNCH WITHOUT ALCOHOL: 1
HOW LIKELY ARE YOU TO NOD OFF OR FALL ASLEEP WHILE LYING DOWN TO REST IN THE AFTERNOON WHEN CIRCUMSTANCES PERMIT: 3

## 2019-08-06 NOTE — PROGRESS NOTES
THREE EPIDURAL STEROID INJECTION SITE CONFIRMED BY FLUOROSCOPY performed by Arcadio Mendieta MD at [address]  08/14/2015    OPEN INCISIONAL HERNIA REPAIR WITH MESH, BILATERAL COMPONENT SEPARATION, LYSIS OF ADHESIONS    OTHER SURGICAL HISTORY  1-28-14    LeftColectomy with End Colostomy, Splenic Flexure Mobilization, Takedown of Colovesical Fistula    NE INJECTION HIP [facility] Right 9/19/2018    ARTHROGRAM AND CORTISONE INJECTION RIGHT HIP performed by Joaquín Briscoe MD at [address] Right 2003    Fracture lower leg during chain saw accident. Surgery x 2       Social History     Tobacco Use    Smoking status: Passive Smoke Exposure - Never Smoker    Smokeless tobacco: Never Used   Substance Use Topics    Alcohol use: Yes     Alcohol/week: 0.0 standard drinks     Comment: rare          Family History   Problem Relation Age of Onset    Heart Disease Father     Heart Attack Father     Stroke Brother     Heart Disease Brother     High Blood Pressure Brother     Kidney Disease Mother         kidney removed         Current Outpatient Medications:     spironolactone (ALDACTONE) 25 MG tablet, TAKE 1 TABLET BY MOUTH EVERY DAY, Disp: 30 tablet, Rfl: 2    hydrALAZINE (APRESOLINE) 50 MG tablet, Take 0.5 tablets by mouth every 8 hours, Disp: 270 tablet, Rfl: 3    tiZANidine (ZANAFLEX) 4 MG tablet, TAKE 1 TABLET BY MOUTH EVERY 8 HOURS AS NEEDED (MUSCLE PAIN), Disp: , Rfl: 3    silver sulfADIAZINE (SILVADENE) 1 % cream, Apply topically daily. , Disp: 50 g, Rfl: 3    torsemide (DEMADEX) 20 MG tablet, TAKE 2 TABLETS BY MOUTH EVERY DAY, Disp: 60 tablet, Rfl: 3    sertraline (ZOLOFT) 50 MG tablet, Take 1 tablet by mouth daily, Disp: 90 tablet, Rfl: 3    azelastine (ASTELIN) 0.1 % nasal spray, 1 spray by Nasal route 2 times daily Use in each nostril as directed, Disp: 1 Bottle, Rfl: 5    omeprazole (PRILOSEC) 40 MG delayed release capsule, Take 1 capsule settings to 10-20 and recheck device next visit in 4 weeks  -Has , this is likely related to his cardiac issues/CHF.  Typically it does not require specific addressment, was identified in previous sleep study     Orders Placed This Encounter   Procedures    CPAP       Sabrina Hicks MD

## 2019-08-06 NOTE — TELEPHONE ENCOUNTER
Created telephone encounter. Per Pt HIPAA from can leave results on machine. LMOM relaying message per NPRB. Pt to call the office with any concerns.

## 2019-08-06 NOTE — TELEPHONE ENCOUNTER
----- Message from SHAKIRA Torres CNP sent at 8/6/2019  3:57 PM EDT -----  Stable. No changes for now.  Keep follow up as planned

## 2019-08-06 NOTE — PATIENT INSTRUCTIONS
fragmented sleep. Sleep rituals are important. Give your body clues it is time to slow down and sleep. Examples include; yoga, deep breathing, listen to relaxing music, a hot bath or a few minutes of reading. Have a fixed bedtime and awakening time, Even on weekends! You will feel better keeping a regular sleep cycle, even if you are retired or not working. Get into your favorite sleep position. If not asleep in 30 minutes, get up and do something boring until you feel sleepy. Remember not to expose yourself to bright lights such as TV, phone or tablet screens. Only use your bed for sleeping. Do not use your bed as an office, workroom or recreation room. Use comfortable bedding. Uncomfortable bedding can prevent good sleep. Ensure your bedroom is quiet and comfortable. A cooler room along with enough blankets to stay warm is recommended. If your room is too noisy, try a white noise machine. If too bright, try black out shades or an eye mask. Dont take worries to bed. Leave worries about work, school etc. behind you when you go to bed. Some people find it helpful to assign a worry period in the evening or late afternoon to write down your worries and get them out of your system.      CPAP Equipment Cleaning and Disinfecting Schedule  Equipment Cleaning Frequency Instructions  Disinfecting Frequency   Non-Disposable Filters  Weekly Mild soapy water, Rinse, Air Dry Not Required   Disposable Filters Change as needed  2-4 weeks Do Not Wash Not Required   Hose/tubing Daily Mild soapy water, Rinse, Air Dry Once a week   Mask / Nasal Pillows Daily Mild soapy water, Rinse, Air Dry Once a week   Headgear Weekly Hand wash, Mild soapy water, Rinse, Dry  Not Required   Humidifier Daily Empty water daily  Mild soapy water, Rinse well, Air Dry  Once a week   CPAP Unit As Needed Dust with damp cloth,  No detergents or sprays Not Required         Disinfect (per schedule) with 1 part white vinegar and 3 parts water-

## 2019-08-14 DIAGNOSIS — M48.062 LUMBAR STENOSIS WITH NEUROGENIC CLAUDICATION: ICD-10-CM

## 2019-08-14 DIAGNOSIS — M54.16 LUMBAR RADICULITIS: ICD-10-CM

## 2019-08-14 DIAGNOSIS — M51.36 DDD (DEGENERATIVE DISC DISEASE), LUMBAR: ICD-10-CM

## 2019-08-14 RX ORDER — GABAPENTIN 100 MG/1
100 CAPSULE ORAL 3 TIMES DAILY
Qty: 90 CAPSULE | Refills: 0 | Status: SHIPPED | OUTPATIENT
Start: 2019-08-14 | End: 2019-09-10 | Stop reason: SDUPTHER

## 2019-08-30 ENCOUNTER — TELEPHONE (OUTPATIENT)
Dept: PULMONOLOGY | Age: 69
End: 2019-08-30

## 2019-09-10 DIAGNOSIS — M48.062 LUMBAR STENOSIS WITH NEUROGENIC CLAUDICATION: ICD-10-CM

## 2019-09-10 DIAGNOSIS — M51.36 DDD (DEGENERATIVE DISC DISEASE), LUMBAR: ICD-10-CM

## 2019-09-10 DIAGNOSIS — M54.16 LUMBAR RADICULITIS: ICD-10-CM

## 2019-09-10 RX ORDER — TIZANIDINE 4 MG/1
4 TABLET ORAL EVERY 8 HOURS PRN
Qty: 270 TABLET | Refills: 3 | Status: SHIPPED | OUTPATIENT
Start: 2019-09-10 | End: 2019-12-09

## 2019-09-13 RX ORDER — GABAPENTIN 100 MG/1
100 CAPSULE ORAL 3 TIMES DAILY
Qty: 90 CAPSULE | Refills: 0 | Status: SHIPPED | OUTPATIENT
Start: 2019-09-13 | End: 2019-10-14 | Stop reason: SDUPTHER

## 2019-09-16 ENCOUNTER — NURSE ONLY (OUTPATIENT)
Dept: FAMILY MEDICINE CLINIC | Age: 69
End: 2019-09-16
Payer: MEDICARE

## 2019-09-16 PROCEDURE — G0008 ADMIN INFLUENZA VIRUS VAC: HCPCS | Performed by: NURSE PRACTITIONER

## 2019-09-16 PROCEDURE — 90653 IIV ADJUVANT VACCINE IM: CPT | Performed by: NURSE PRACTITIONER

## 2019-09-30 RX ORDER — TORSEMIDE 20 MG/1
TABLET ORAL
Qty: 180 TABLET | Refills: 1 | Status: SHIPPED | OUTPATIENT
Start: 2019-09-30 | End: 2020-06-11

## 2019-10-14 ENCOUNTER — OFFICE VISIT (OUTPATIENT)
Dept: FAMILY MEDICINE CLINIC | Age: 69
End: 2019-10-14
Payer: MEDICARE

## 2019-10-14 VITALS
WEIGHT: 309 LBS | OXYGEN SATURATION: 97 % | BODY MASS INDEX: 40.95 KG/M2 | RESPIRATION RATE: 16 BRPM | HEIGHT: 73 IN | TEMPERATURE: 97.8 F | HEART RATE: 64 BPM | DIASTOLIC BLOOD PRESSURE: 70 MMHG | SYSTOLIC BLOOD PRESSURE: 138 MMHG

## 2019-10-14 DIAGNOSIS — E11.65 TYPE 2 DIABETES MELLITUS WITH HYPERGLYCEMIA, WITH LONG-TERM CURRENT USE OF INSULIN (HCC): Primary | ICD-10-CM

## 2019-10-14 DIAGNOSIS — M54.16 LUMBAR RADICULITIS: ICD-10-CM

## 2019-10-14 DIAGNOSIS — Z00.00 ROUTINE GENERAL MEDICAL EXAMINATION AT A HEALTH CARE FACILITY: ICD-10-CM

## 2019-10-14 DIAGNOSIS — M48.062 LUMBAR STENOSIS WITH NEUROGENIC CLAUDICATION: ICD-10-CM

## 2019-10-14 DIAGNOSIS — M51.36 DDD (DEGENERATIVE DISC DISEASE), LUMBAR: ICD-10-CM

## 2019-10-14 DIAGNOSIS — Z79.4 TYPE 2 DIABETES MELLITUS WITH HYPERGLYCEMIA, WITH LONG-TERM CURRENT USE OF INSULIN (HCC): Primary | ICD-10-CM

## 2019-10-14 DIAGNOSIS — E78.2 MIXED HYPERLIPIDEMIA: ICD-10-CM

## 2019-10-14 LAB — HBA1C MFR BLD: 8 %

## 2019-10-14 PROCEDURE — G8599 NO ASA/ANTIPLAT THER USE RNG: HCPCS | Performed by: NURSE PRACTITIONER

## 2019-10-14 PROCEDURE — 3017F COLORECTAL CA SCREEN DOC REV: CPT | Performed by: NURSE PRACTITIONER

## 2019-10-14 PROCEDURE — 1123F ACP DISCUSS/DSCN MKR DOCD: CPT | Performed by: NURSE PRACTITIONER

## 2019-10-14 PROCEDURE — G8482 FLU IMMUNIZE ORDER/ADMIN: HCPCS | Performed by: NURSE PRACTITIONER

## 2019-10-14 PROCEDURE — G0439 PPPS, SUBSEQ VISIT: HCPCS | Performed by: NURSE PRACTITIONER

## 2019-10-14 PROCEDURE — 83036 HEMOGLOBIN GLYCOSYLATED A1C: CPT | Performed by: NURSE PRACTITIONER

## 2019-10-14 PROCEDURE — 4040F PNEUMOC VAC/ADMIN/RCVD: CPT | Performed by: NURSE PRACTITIONER

## 2019-10-14 RX ORDER — GABAPENTIN 100 MG/1
100 CAPSULE ORAL 3 TIMES DAILY
Qty: 90 CAPSULE | Refills: 0 | Status: SHIPPED | OUTPATIENT
Start: 2019-10-14 | End: 2019-11-10 | Stop reason: SDUPTHER

## 2019-10-14 RX ORDER — GABAPENTIN 100 MG/1
100 CAPSULE ORAL 3 TIMES DAILY
Qty: 90 CAPSULE | Refills: 0 | OUTPATIENT
Start: 2019-10-14 | End: 2019-11-13

## 2019-10-14 ASSESSMENT — LIFESTYLE VARIABLES
HOW OFTEN DURING THE LAST YEAR HAVE YOU BEEN UNABLE TO REMEMBER WHAT HAPPENED THE NIGHT BEFORE BECAUSE YOU HAD BEEN DRINKING: 0
AUDIT-C TOTAL SCORE: 2
HOW MANY STANDARD DRINKS CONTAINING ALCOHOL DO YOU HAVE ON A TYPICAL DAY: 0
HOW OFTEN DO YOU HAVE SIX OR MORE DRINKS ON ONE OCCASION: 0
HOW OFTEN DURING THE LAST YEAR HAVE YOU FOUND THAT YOU WERE NOT ABLE TO STOP DRINKING ONCE YOU HAD STARTED: 0
HAVE YOU OR SOMEONE ELSE BEEN INJURED AS A RESULT OF YOUR DRINKING: 0
HOW OFTEN DURING THE LAST YEAR HAVE YOU NEEDED AN ALCOHOLIC DRINK FIRST THING IN THE MORNING TO GET YOURSELF GOING AFTER A NIGHT OF HEAVY DRINKING: 0
HOW OFTEN DURING THE LAST YEAR HAVE YOU FAILED TO DO WHAT WAS NORMALLY EXPECTED FROM YOU BECAUSE OF DRINKING: 0
AUDIT TOTAL SCORE: 2
HOW OFTEN DURING THE LAST YEAR HAVE YOU HAD A FEELING OF GUILT OR REMORSE AFTER DRINKING: 0
HAS A RELATIVE, FRIEND, DOCTOR, OR ANOTHER HEALTH PROFESSIONAL EXPRESSED CONCERN ABOUT YOUR DRINKING OR SUGGESTED YOU CUT DOWN: 0
HOW OFTEN DO YOU HAVE A DRINK CONTAINING ALCOHOL: 2

## 2019-10-14 ASSESSMENT — PATIENT HEALTH QUESTIONNAIRE - PHQ9
SUM OF ALL RESPONSES TO PHQ QUESTIONS 1-9: 0
SUM OF ALL RESPONSES TO PHQ QUESTIONS 1-9: 0

## 2019-10-21 ENCOUNTER — OFFICE VISIT (OUTPATIENT)
Dept: CARDIOLOGY CLINIC | Age: 69
End: 2019-10-21
Payer: MEDICARE

## 2019-10-21 ENCOUNTER — TELEPHONE (OUTPATIENT)
Dept: CARDIOLOGY CLINIC | Age: 69
End: 2019-10-21

## 2019-10-21 VITALS
SYSTOLIC BLOOD PRESSURE: 130 MMHG | OXYGEN SATURATION: 96 % | WEIGHT: 307.2 LBS | BODY MASS INDEX: 40.71 KG/M2 | DIASTOLIC BLOOD PRESSURE: 70 MMHG | HEART RATE: 51 BPM | HEIGHT: 73 IN

## 2019-10-21 DIAGNOSIS — I35.0 NONRHEUMATIC AORTIC VALVE STENOSIS: ICD-10-CM

## 2019-10-21 DIAGNOSIS — I10 ESSENTIAL HYPERTENSION: Chronic | ICD-10-CM

## 2019-10-21 DIAGNOSIS — I49.9 IRREGULAR HEART BEAT: ICD-10-CM

## 2019-10-21 DIAGNOSIS — I50.32 CHRONIC DIASTOLIC CONGESTIVE HEART FAILURE (HCC): Primary | ICD-10-CM

## 2019-10-21 DIAGNOSIS — I48.0 PAROXYSMAL ATRIAL FIBRILLATION (HCC): ICD-10-CM

## 2019-10-21 PROCEDURE — 99214 OFFICE O/P EST MOD 30 MIN: CPT | Performed by: NURSE PRACTITIONER

## 2019-10-21 PROCEDURE — 3017F COLORECTAL CA SCREEN DOC REV: CPT | Performed by: NURSE PRACTITIONER

## 2019-10-21 PROCEDURE — G8482 FLU IMMUNIZE ORDER/ADMIN: HCPCS | Performed by: NURSE PRACTITIONER

## 2019-10-21 PROCEDURE — 4040F PNEUMOC VAC/ADMIN/RCVD: CPT | Performed by: NURSE PRACTITIONER

## 2019-10-21 PROCEDURE — G8598 ASA/ANTIPLAT THER USED: HCPCS | Performed by: NURSE PRACTITIONER

## 2019-10-21 PROCEDURE — 93000 ELECTROCARDIOGRAM COMPLETE: CPT | Performed by: NURSE PRACTITIONER

## 2019-10-21 PROCEDURE — 1123F ACP DISCUSS/DSCN MKR DOCD: CPT | Performed by: NURSE PRACTITIONER

## 2019-10-21 PROCEDURE — 1036F TOBACCO NON-USER: CPT | Performed by: NURSE PRACTITIONER

## 2019-10-21 PROCEDURE — G8427 DOCREV CUR MEDS BY ELIG CLIN: HCPCS | Performed by: NURSE PRACTITIONER

## 2019-10-21 PROCEDURE — G8417 CALC BMI ABV UP PARAM F/U: HCPCS | Performed by: NURSE PRACTITIONER

## 2019-10-21 RX ORDER — ATENOLOL 50 MG/1
25 TABLET ORAL DAILY
Qty: 90 TABLET | Refills: 3 | Status: SHIPPED | OUTPATIENT
Start: 2019-10-21 | End: 2020-05-03 | Stop reason: SDUPTHER

## 2019-10-22 PROBLEM — I48.0 PAROXYSMAL ATRIAL FIBRILLATION (HCC): Status: ACTIVE | Noted: 2019-10-22

## 2019-11-01 ENCOUNTER — OFFICE VISIT (OUTPATIENT)
Dept: CARDIOLOGY CLINIC | Age: 69
End: 2019-11-01
Payer: MEDICARE

## 2019-11-01 ENCOUNTER — TELEPHONE (OUTPATIENT)
Dept: CARDIOLOGY CLINIC | Age: 69
End: 2019-11-01

## 2019-11-01 VITALS
HEART RATE: 63 BPM | WEIGHT: 309 LBS | BODY MASS INDEX: 40.95 KG/M2 | HEIGHT: 73 IN | SYSTOLIC BLOOD PRESSURE: 108 MMHG | OXYGEN SATURATION: 96 % | DIASTOLIC BLOOD PRESSURE: 70 MMHG

## 2019-11-01 DIAGNOSIS — I48.4 ATYPICAL ATRIAL FLUTTER (HCC): ICD-10-CM

## 2019-11-01 DIAGNOSIS — I48.91 ATRIAL FIBRILLATION, UNSPECIFIED TYPE (HCC): Primary | ICD-10-CM

## 2019-11-01 PROCEDURE — 93000 ELECTROCARDIOGRAM COMPLETE: CPT | Performed by: INTERNAL MEDICINE

## 2019-11-01 PROCEDURE — G8427 DOCREV CUR MEDS BY ELIG CLIN: HCPCS | Performed by: INTERNAL MEDICINE

## 2019-11-01 PROCEDURE — 99204 OFFICE O/P NEW MOD 45 MIN: CPT | Performed by: INTERNAL MEDICINE

## 2019-11-01 PROCEDURE — G8482 FLU IMMUNIZE ORDER/ADMIN: HCPCS | Performed by: INTERNAL MEDICINE

## 2019-11-01 PROCEDURE — G8417 CALC BMI ABV UP PARAM F/U: HCPCS | Performed by: INTERNAL MEDICINE

## 2019-11-01 RX ORDER — FLECAINIDE ACETATE 100 MG/1
100 TABLET ORAL 2 TIMES DAILY
Qty: 60 TABLET | Refills: 3 | Status: SHIPPED | OUTPATIENT
Start: 2019-11-01 | End: 2020-01-20

## 2019-11-10 RX ORDER — GABAPENTIN 100 MG/1
100 CAPSULE ORAL 3 TIMES DAILY
Qty: 90 CAPSULE | Refills: 0 | Status: SHIPPED | OUTPATIENT
Start: 2019-11-10 | End: 2019-12-05 | Stop reason: SDUPTHER

## 2019-11-14 RX ORDER — SPIRONOLACTONE 25 MG/1
TABLET ORAL
Qty: 90 TABLET | Refills: 1 | Status: SHIPPED | OUTPATIENT
Start: 2019-11-14 | End: 2020-05-03 | Stop reason: SDUPTHER

## 2019-12-03 ENCOUNTER — OFFICE VISIT (OUTPATIENT)
Dept: PULMONOLOGY | Age: 69
End: 2019-12-03
Payer: MEDICARE

## 2019-12-03 VITALS
HEART RATE: 71 BPM | RESPIRATION RATE: 16 BRPM | DIASTOLIC BLOOD PRESSURE: 77 MMHG | SYSTOLIC BLOOD PRESSURE: 116 MMHG | BODY MASS INDEX: 41.35 KG/M2 | TEMPERATURE: 97.3 F | WEIGHT: 312 LBS | OXYGEN SATURATION: 95 % | HEIGHT: 73 IN

## 2019-12-03 DIAGNOSIS — G47.33 OSA (OBSTRUCTIVE SLEEP APNEA): ICD-10-CM

## 2019-12-03 DIAGNOSIS — R06.3 CENTRAL SLEEP APNEA DUE TO CHEYNE-STOKES RESPIRATION: Primary | ICD-10-CM

## 2019-12-03 PROCEDURE — 99214 OFFICE O/P EST MOD 30 MIN: CPT | Performed by: INTERNAL MEDICINE

## 2019-12-03 PROCEDURE — G8598 ASA/ANTIPLAT THER USED: HCPCS | Performed by: INTERNAL MEDICINE

## 2019-12-03 PROCEDURE — 3017F COLORECTAL CA SCREEN DOC REV: CPT | Performed by: INTERNAL MEDICINE

## 2019-12-03 PROCEDURE — 4040F PNEUMOC VAC/ADMIN/RCVD: CPT | Performed by: INTERNAL MEDICINE

## 2019-12-03 PROCEDURE — G8427 DOCREV CUR MEDS BY ELIG CLIN: HCPCS | Performed by: INTERNAL MEDICINE

## 2019-12-03 PROCEDURE — 1036F TOBACCO NON-USER: CPT | Performed by: INTERNAL MEDICINE

## 2019-12-03 PROCEDURE — G8482 FLU IMMUNIZE ORDER/ADMIN: HCPCS | Performed by: INTERNAL MEDICINE

## 2019-12-03 PROCEDURE — G8417 CALC BMI ABV UP PARAM F/U: HCPCS | Performed by: INTERNAL MEDICINE

## 2019-12-03 PROCEDURE — 1123F ACP DISCUSS/DSCN MKR DOCD: CPT | Performed by: INTERNAL MEDICINE

## 2019-12-03 ASSESSMENT — ENCOUNTER SYMPTOMS
VOICE CHANGE: 0
ABDOMINAL PAIN: 0
CONSTIPATION: 0
EYE PAIN: 0
STRIDOR: 0
CHOKING: 0
EYE DISCHARGE: 0
CHEST TIGHTNESS: 0
DIARRHEA: 0
SORE THROAT: 0
EYE ITCHING: 0

## 2019-12-03 ASSESSMENT — SLEEP AND FATIGUE QUESTIONNAIRES
HOW LIKELY ARE YOU TO NOD OFF OR FALL ASLEEP WHILE WATCHING TV: 3
HOW LIKELY ARE YOU TO NOD OFF OR FALL ASLEEP IN A CAR, WHILE STOPPED FOR A FEW MINUTES IN TRAFFIC: 0
HOW LIKELY ARE YOU TO NOD OFF OR FALL ASLEEP WHILE SITTING QUIETLY AFTER LUNCH WITHOUT ALCOHOL: 2
ESS TOTAL SCORE: 16
HOW LIKELY ARE YOU TO NOD OFF OR FALL ASLEEP WHILE SITTING INACTIVE IN A PUBLIC PLACE: 2
HOW LIKELY ARE YOU TO NOD OFF OR FALL ASLEEP WHILE SITTING AND TALKING TO SOMEONE: 1
HOW LIKELY ARE YOU TO NOD OFF OR FALL ASLEEP WHILE SITTING AND READING: 3
NECK CIRCUMFERENCE (INCHES): 20.5
HOW LIKELY ARE YOU TO NOD OFF OR FALL ASLEEP WHEN YOU ARE A PASSENGER IN A CAR FOR AN HOUR WITHOUT A BREAK: 2
HOW LIKELY ARE YOU TO NOD OFF OR FALL ASLEEP WHILE LYING DOWN TO REST IN THE AFTERNOON WHEN CIRCUMSTANCES PERMIT: 3

## 2019-12-04 ENCOUNTER — HOSPITAL ENCOUNTER (OUTPATIENT)
Dept: CARDIAC CATH/INVASIVE PROCEDURES | Age: 69
Discharge: HOME | End: 2019-12-04
Attending: INTERNAL MEDICINE | Admitting: INTERNAL MEDICINE
Payer: MEDICARE

## 2019-12-04 ENCOUNTER — ANESTHESIA (OUTPATIENT)
Dept: CARDIAC CATH/INVASIVE PROCEDURES | Age: 69
End: 2019-12-04
Payer: MEDICARE

## 2019-12-04 ENCOUNTER — ANESTHESIA EVENT (OUTPATIENT)
Dept: CARDIAC CATH/INVASIVE PROCEDURES | Age: 69
End: 2019-12-04
Payer: MEDICARE

## 2019-12-04 VITALS — OXYGEN SATURATION: 98 % | SYSTOLIC BLOOD PRESSURE: 105 MMHG | DIASTOLIC BLOOD PRESSURE: 67 MMHG

## 2019-12-04 VITALS — WEIGHT: 311 LBS | HEIGHT: 73 IN | BODY MASS INDEX: 41.22 KG/M2

## 2019-12-04 LAB
ANION GAP SERPL CALCULATED.3IONS-SCNC: 11 MMOL/L (ref 3–16)
BUN BLDV-MCNC: 38 MG/DL (ref 7–20)
CALCIUM SERPL-MCNC: 9.3 MG/DL (ref 8.3–10.6)
CHLORIDE BLD-SCNC: 102 MMOL/L (ref 99–110)
CO2: 28 MMOL/L (ref 21–32)
CREAT SERPL-MCNC: 0.9 MG/DL (ref 0.8–1.3)
EKG ATRIAL RATE: 227 BPM
EKG ATRIAL RATE: 60 BPM
EKG DIAGNOSIS: NORMAL
EKG DIAGNOSIS: NORMAL
EKG P AXIS: 40 DEGREES
EKG P AXIS: 90 DEGREES
EKG P-R INTERVAL: 346 MS
EKG Q-T INTERVAL: 456 MS
EKG Q-T INTERVAL: 468 MS
EKG QRS DURATION: 102 MS
EKG QRS DURATION: 104 MS
EKG QTC CALCULATION (BAZETT): 424 MS
EKG QTC CALCULATION (BAZETT): 468 MS
EKG R AXIS: -3 DEGREES
EKG R AXIS: 11 DEGREES
EKG T AXIS: 40 DEGREES
EKG T AXIS: 55 DEGREES
EKG VENTRICULAR RATE: 52 BPM
EKG VENTRICULAR RATE: 60 BPM
GFR AFRICAN AMERICAN: >60
GFR NON-AFRICAN AMERICAN: >60
GLUCOSE BLD-MCNC: 214 MG/DL (ref 70–99)
HCT VFR BLD CALC: 48.2 % (ref 40.5–52.5)
HEMOGLOBIN: 15.9 G/DL (ref 13.5–17.5)
INR BLD: 1.28 (ref 0.86–1.14)
MCH RBC QN AUTO: 28.5 PG (ref 26–34)
MCHC RBC AUTO-ENTMCNC: 33 G/DL (ref 31–36)
MCV RBC AUTO: 86.4 FL (ref 80–100)
PDW BLD-RTO: 15.5 % (ref 12.4–15.4)
PLATELET # BLD: 181 K/UL (ref 135–450)
PMV BLD AUTO: 8 FL (ref 5–10.5)
POTASSIUM REFLEX MAGNESIUM: 4.5 MMOL/L (ref 3.5–5.1)
PROTHROMBIN TIME: 14.9 SEC (ref 10–13.2)
RBC # BLD: 5.59 M/UL (ref 4.2–5.9)
SODIUM BLD-SCNC: 141 MMOL/L (ref 136–145)
WBC # BLD: 7.8 K/UL (ref 4–11)

## 2019-12-04 PROCEDURE — 80048 BASIC METABOLIC PNL TOTAL CA: CPT

## 2019-12-04 PROCEDURE — 85027 COMPLETE CBC AUTOMATED: CPT

## 2019-12-04 PROCEDURE — 6360000002 HC RX W HCPCS: Performed by: NURSE ANESTHETIST, CERTIFIED REGISTERED

## 2019-12-04 PROCEDURE — 2500000003 HC RX 250 WO HCPCS

## 2019-12-04 PROCEDURE — 3700000000 HC ANESTHESIA ATTENDED CARE

## 2019-12-04 PROCEDURE — 2580000003 HC RX 258: Performed by: NURSE ANESTHETIST, CERTIFIED REGISTERED

## 2019-12-04 PROCEDURE — 2580000003 HC RX 258

## 2019-12-04 PROCEDURE — 85610 PROTHROMBIN TIME: CPT

## 2019-12-04 PROCEDURE — 92960 CARDIOVERSION ELECTRIC EXT: CPT

## 2019-12-04 PROCEDURE — 93005 ELECTROCARDIOGRAM TRACING: CPT | Performed by: INTERNAL MEDICINE

## 2019-12-04 RX ORDER — SODIUM CHLORIDE 9 MG/ML
INJECTION, SOLUTION INTRAVENOUS CONTINUOUS PRN
Status: DISCONTINUED | OUTPATIENT
Start: 2019-12-04 | End: 2019-12-04 | Stop reason: SDUPTHER

## 2019-12-04 RX ORDER — PROPOFOL 10 MG/ML
INJECTION, EMULSION INTRAVENOUS PRN
Status: DISCONTINUED | OUTPATIENT
Start: 2019-12-04 | End: 2019-12-04 | Stop reason: SDUPTHER

## 2019-12-04 RX ADMIN — PROPOFOL 80 MG: 10 INJECTION, EMULSION INTRAVENOUS at 10:11

## 2019-12-04 RX ADMIN — SODIUM CHLORIDE: 9 INJECTION, SOLUTION INTRAVENOUS at 10:07

## 2019-12-04 ASSESSMENT — LIFESTYLE VARIABLES: SMOKING_STATUS: 0

## 2019-12-12 ENCOUNTER — TELEPHONE (OUTPATIENT)
Dept: CARDIOLOGY CLINIC | Age: 69
End: 2019-12-12

## 2019-12-12 ENCOUNTER — HOSPITAL ENCOUNTER (OUTPATIENT)
Age: 69
Discharge: HOME | End: 2019-12-12
Payer: MEDICARE

## 2019-12-12 DIAGNOSIS — E78.2 MIXED HYPERLIPIDEMIA: ICD-10-CM

## 2019-12-12 DIAGNOSIS — I49.9 IRREGULAR HEART BEAT: ICD-10-CM

## 2019-12-12 DIAGNOSIS — I10 ESSENTIAL HYPERTENSION: Chronic | ICD-10-CM

## 2019-12-12 LAB
ANION GAP SERPL CALCULATED.3IONS-SCNC: 14 MMOL/L (ref 3–16)
BUN BLDV-MCNC: 33 MG/DL (ref 7–20)
CALCIUM SERPL-MCNC: 9.6 MG/DL (ref 8.3–10.6)
CHLORIDE BLD-SCNC: 96 MMOL/L (ref 99–110)
CHOLESTEROL, TOTAL: 102 MG/DL (ref 0–199)
CO2: 27 MMOL/L (ref 21–32)
CREAT SERPL-MCNC: 0.8 MG/DL (ref 0.8–1.3)
GFR AFRICAN AMERICAN: >60
GFR NON-AFRICAN AMERICAN: >60
GLUCOSE BLD-MCNC: 196 MG/DL (ref 70–99)
HDLC SERPL-MCNC: 48 MG/DL (ref 40–60)
LDL CHOLESTEROL CALCULATED: 41 MG/DL
POTASSIUM SERPL-SCNC: 4.9 MMOL/L (ref 3.5–5.1)
SODIUM BLD-SCNC: 137 MMOL/L (ref 136–145)
TRIGL SERPL-MCNC: 65 MG/DL (ref 0–150)
VLDLC SERPL CALC-MCNC: 13 MG/DL

## 2019-12-12 PROCEDURE — 80061 LIPID PANEL: CPT

## 2019-12-12 PROCEDURE — 80048 BASIC METABOLIC PNL TOTAL CA: CPT

## 2019-12-12 PROCEDURE — 36415 COLL VENOUS BLD VENIPUNCTURE: CPT

## 2019-12-23 ENCOUNTER — OFFICE VISIT (OUTPATIENT)
Dept: CARDIOLOGY CLINIC | Age: 69
End: 2019-12-23
Payer: MEDICARE

## 2019-12-23 VITALS
DIASTOLIC BLOOD PRESSURE: 80 MMHG | BODY MASS INDEX: 41.51 KG/M2 | SYSTOLIC BLOOD PRESSURE: 136 MMHG | HEIGHT: 73 IN | OXYGEN SATURATION: 98 % | HEART RATE: 56 BPM | WEIGHT: 313.2 LBS

## 2019-12-23 DIAGNOSIS — I10 ESSENTIAL HYPERTENSION: ICD-10-CM

## 2019-12-23 DIAGNOSIS — I48.4 ATYPICAL ATRIAL FLUTTER (HCC): ICD-10-CM

## 2019-12-23 DIAGNOSIS — I50.32 CHRONIC DIASTOLIC CONGESTIVE HEART FAILURE (HCC): Primary | ICD-10-CM

## 2019-12-23 PROCEDURE — 99214 OFFICE O/P EST MOD 30 MIN: CPT | Performed by: NURSE PRACTITIONER

## 2019-12-23 PROCEDURE — 1036F TOBACCO NON-USER: CPT | Performed by: NURSE PRACTITIONER

## 2019-12-23 PROCEDURE — 93000 ELECTROCARDIOGRAM COMPLETE: CPT | Performed by: NURSE PRACTITIONER

## 2019-12-23 PROCEDURE — G8417 CALC BMI ABV UP PARAM F/U: HCPCS | Performed by: NURSE PRACTITIONER

## 2019-12-23 PROCEDURE — G8482 FLU IMMUNIZE ORDER/ADMIN: HCPCS | Performed by: NURSE PRACTITIONER

## 2019-12-23 PROCEDURE — 1123F ACP DISCUSS/DSCN MKR DOCD: CPT | Performed by: NURSE PRACTITIONER

## 2019-12-23 PROCEDURE — G8598 ASA/ANTIPLAT THER USED: HCPCS | Performed by: NURSE PRACTITIONER

## 2019-12-23 PROCEDURE — 4040F PNEUMOC VAC/ADMIN/RCVD: CPT | Performed by: NURSE PRACTITIONER

## 2019-12-23 PROCEDURE — G8427 DOCREV CUR MEDS BY ELIG CLIN: HCPCS | Performed by: NURSE PRACTITIONER

## 2019-12-23 PROCEDURE — 3017F COLORECTAL CA SCREEN DOC REV: CPT | Performed by: NURSE PRACTITIONER

## 2020-01-03 NOTE — TELEPHONE ENCOUNTER
.  Last office visit 10/14/2019     Last written 12/9/19 390 no refills     Next office visit scheduled 1/16/2020    Requested Prescriptions     Pending Prescriptions Disp Refills    gabapentin (NEURONTIN) 100 MG capsule [Pharmacy Med Name: GABAPENTIN 100 MG CAPSULE] 90 capsule 0     Sig: TAKE 1 CAPSULE BY MOUTH 3 TIMES DAILY FOR 30 DAYS.

## 2020-01-06 RX ORDER — GABAPENTIN 100 MG/1
CAPSULE ORAL
Qty: 90 CAPSULE | Refills: 0 | Status: SHIPPED | OUTPATIENT
Start: 2020-01-06 | End: 2020-03-03 | Stop reason: SDUPTHER

## 2020-01-16 ENCOUNTER — OFFICE VISIT (OUTPATIENT)
Dept: FAMILY MEDICINE CLINIC | Age: 70
End: 2020-01-16
Payer: MEDICARE

## 2020-01-16 VITALS
DIASTOLIC BLOOD PRESSURE: 70 MMHG | HEART RATE: 62 BPM | TEMPERATURE: 97.7 F | SYSTOLIC BLOOD PRESSURE: 110 MMHG | RESPIRATION RATE: 16 BRPM | BODY MASS INDEX: 41.75 KG/M2 | OXYGEN SATURATION: 98 % | WEIGHT: 315 LBS | HEIGHT: 73 IN

## 2020-01-16 PROBLEM — J41.0 SIMPLE CHRONIC BRONCHITIS (HCC): Status: ACTIVE | Noted: 2020-01-16

## 2020-01-16 LAB
CREATININE URINE POCT: 100
HBA1C MFR BLD: 10 %
MICROALBUMIN/CREAT 24H UR: 30 MG/G{CREAT}
MICROALBUMIN/CREAT UR-RTO: <30

## 2020-01-16 PROCEDURE — 99213 OFFICE O/P EST LOW 20 MIN: CPT | Performed by: NURSE PRACTITIONER

## 2020-01-16 PROCEDURE — 82044 UR ALBUMIN SEMIQUANTITATIVE: CPT | Performed by: NURSE PRACTITIONER

## 2020-01-16 PROCEDURE — 83036 HEMOGLOBIN GLYCOSYLATED A1C: CPT | Performed by: NURSE PRACTITIONER

## 2020-01-16 SDOH — HEALTH STABILITY: MENTAL HEALTH: HOW OFTEN DO YOU HAVE A DRINK CONTAINING ALCOHOL?: MONTHLY OR LESS

## 2020-01-16 ASSESSMENT — ENCOUNTER SYMPTOMS
NAUSEA: 0
CHEST TIGHTNESS: 0
BACK PAIN: 0
DIARRHEA: 0

## 2020-01-16 NOTE — PROGRESS NOTES
Subjective:      Patient ID: Nasim Smalls is a 68 y.o. male. HPI     For follow up DM. BS elevated at home, 140 today. 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 Feels running high all the time. Feels he is not able to stop overeating. Gets hungry, pleasure from food. Weight up up 7 pounds over past 3 months. Recent cardioversion. Stable since  Legs improved. Now that not cutting wood less injuries. Wearing stockings  Breathing stable. Follow up with Dr. Marek Yee. Wearing cpap at night adequate amount of time. Review of Systems   Constitutional: Negative for appetite change and unexpected weight change. Respiratory: Negative for chest tightness. Cardiovascular: Negative for chest pain and palpitations. Gastrointestinal: Negative for diarrhea and nausea. Musculoskeletal: Negative for arthralgias, back pain and gait problem. Neurological: Negative for dizziness and headaches. Psychiatric/Behavioral: Negative. Objective:   Physical Exam  Constitutional:       General: He is not in acute distress. Appearance: Normal appearance. He is well-developed. Cardiovascular:      Rate and Rhythm: Normal rate and regular rhythm. Heart sounds: Normal heart sounds. Pulmonary:      Effort: Pulmonary effort is normal.      Breath sounds: Normal breath sounds. Abdominal:      General: Bowel sounds are normal.      Palpations: Abdomen is soft. Musculoskeletal: Normal range of motion. Skin:     General: Skin is warm and dry. Neurological:      Mental Status: He is alert and oriented to person, place, and time. Comments: Sensory exam of the foot is normal, tested with the monofilament. Good pulses, no lesions or ulcers, good peripheral pulses. Legs neg for edema today. Wearing elastic stockings.            Current Outpatient Medications   Medication Sig Dispense Refill    montelukast (SINGULAIR) 10 MG tablet TAKE 1 TABLET BY MOUTH EVERY DAY 90 tablet 3    gabapentin (NEURONTIN) 100 MG capsule TAKE 1 CAPSULE BY MOUTH 3 TIMES DAILY FOR 30 DAYS. 90 capsule 0    apixaban (ELIQUIS) 5 MG TABS tablet Take 1 tablet by mouth 2 times daily 60 tablet 3    spironolactone (ALDACTONE) 25 MG tablet TAKE 1 TABLET BY MOUTH EVERY DAY 90 tablet 1    flecainide (TAMBOCOR) 100 MG tablet Take 1 tablet by mouth 2 times daily 60 tablet 3    atenolol (TENORMIN) 50 MG tablet Take 0.5 tablets by mouth daily 90 tablet 3    insulin glargine (BASAGLAR KWIKPEN) 100 UNIT/ML injection pen Inject 35 Units into the skin 2 times daily 15 pen 5    torsemide (DEMADEX) 20 MG tablet TAKE 2 TABLETS BY MOUTH EVERY  tablet 1    hydrALAZINE (APRESOLINE) 50 MG tablet Take 0.5 tablets by mouth every 8 hours (Patient taking differently: Take 50 mg by mouth every 8 hours ) 270 tablet 3    silver sulfADIAZINE (SILVADENE) 1 % cream Apply topically daily.  50 g 3    sertraline (ZOLOFT) 50 MG tablet Take 1 tablet by mouth daily 90 tablet 3    azelastine (ASTELIN) 0.1 % nasal spray 1 spray by Nasal route 2 times daily Use in each nostril as directed 1 Bottle 5    omeprazole (PRILOSEC) 40 MG delayed release capsule Take 1 capsule by mouth daily 90 capsule 3    Insulin Pen Needle 31G X 5 MM MISC 1 each by Does not apply route daily 400 each 5    Glucosamine-Chondroitin (GLUCOSAMINE CHONDR COMPLEX PO) Take 1 tablet by mouth 2 times daily      glimepiride (AMARYL) 4 MG tablet Take 1 tablet by mouth 2 times daily 180 tablet 3    oxybutynin (DITROPAN XL) 15 MG extended release tablet TAKE 1 TABLET BY MOUTH EVERY DAY 90 tablet 3    blood glucose monitor kit and supplies by Other route daily One Touch Ultra 1 kit 0    traZODone (DESYREL) 50 MG tablet TAKE 1 TABLET BY MOUTH EVERY DAY AT NIGHT 30 tablet 5    atorvastatin (LIPITOR) 40 MG tablet Take 1 tablet by mouth nightly 90 tablet 3    isosorbide mononitrate (IMDUR) 30 MG extended release tablet Take 1 tablet by mouth daily 90 tablet 5    glucose blood VI test strips (ASCENSIA AUTODISC VI;ONE TOUCH ULTRA TEST VI) strip DX: E11.9 FSBS daily. One Touch ultra 100 strip 5    aspirin 81 MG chewable tablet Take 1 tablet by mouth daily 30 tablet 0    Multiple Vitamins-Minerals (THERAPEUTIC MULTIVITAMIN-MINERALS) tablet Take 1 tablet by mouth daily      ferrous sulfate 325 (65 FE) MG tablet Take 325 mg by mouth daily. No current facility-administered medications for this visit. Assessment:      1. DM-worsening  2. Atrial fib-stable  3. Obesity-worsening  4. Depression-stable  5. Chronic bronchitis-stable        Plan:      1. Lab Results   Component Value Date    LABA1C 10.0 01/16/2020     Lab Results   Component Value Date    .0 10/16/2017     2. Will increase basaglar insulin to 40 units 2 times daily    3. Work to decrease weight by 10 pounds over next 6 weeks    4. Will repeat HgA1c in 6 weeks as well    5. No change in other medication    6. Silvia Dl was seen today for diabetes.     Diagnoses and all orders for this visit:    Type 2 diabetes mellitus with hyperglycemia, with long-term current use of insulin (HCC)  -     POCT glycosylated hemoglobin (Hb A1C)  -      DIABETES FOOT EXAM  -     POCT microalbumin    Venous stasis ulcer of right lower extremity (HCC)    Simple chronic bronchitis (HCC)    Chronic diastolic congestive heart failure (HCC)    Moderate episode of recurrent major depressive disorder (HCC)    Atypical atrial flutter (HCC)    Class 2 severe obesity due to excess calories with serious comorbidity and body mass index (BMI) of 39.0 to 39.9 in adult ([facility] [address]                FRANK MCKEON, APRN - CNP

## 2020-01-28 RX ORDER — HYDRALAZINE HYDROCHLORIDE 50 MG/1
25 TABLET, FILM COATED ORAL EVERY 8 HOURS SCHEDULED
Qty: 270 TABLET | Refills: 3 | Status: SHIPPED | OUTPATIENT
Start: 2020-01-28 | End: 2021-02-01

## 2020-02-24 ENCOUNTER — OFFICE VISIT (OUTPATIENT)
Dept: FAMILY MEDICINE CLINIC | Age: 70
End: 2020-02-24
Payer: MEDICARE

## 2020-02-24 VITALS
BODY MASS INDEX: 41.59 KG/M2 | OXYGEN SATURATION: 93 % | HEIGHT: 73 IN | DIASTOLIC BLOOD PRESSURE: 82 MMHG | TEMPERATURE: 97.6 F | WEIGHT: 313.8 LBS | HEART RATE: 62 BPM | RESPIRATION RATE: 16 BRPM | SYSTOLIC BLOOD PRESSURE: 130 MMHG

## 2020-02-24 LAB — HBA1C MFR BLD: 9.1 %

## 2020-02-24 PROCEDURE — 99213 OFFICE O/P EST LOW 20 MIN: CPT | Performed by: NURSE PRACTITIONER

## 2020-02-24 RX ORDER — ATORVASTATIN CALCIUM 40 MG/1
40 TABLET, FILM COATED ORAL NIGHTLY
Qty: 90 TABLET | Refills: 2 | Status: SHIPPED | OUTPATIENT
Start: 2020-02-24 | End: 2020-12-14 | Stop reason: SDUPTHER

## 2020-02-24 RX ORDER — ISOSORBIDE MONONITRATE 30 MG/1
30 TABLET, EXTENDED RELEASE ORAL DAILY
Qty: 90 TABLET | Refills: 2 | Status: SHIPPED | OUTPATIENT
Start: 2020-02-24 | End: 2020-12-17

## 2020-02-24 ASSESSMENT — ENCOUNTER SYMPTOMS
CHEST TIGHTNESS: 0
BACK PAIN: 0
SHORTNESS OF BREATH: 0
DIARRHEA: 0

## 2020-02-24 NOTE — PROGRESS NOTES
Subjective:      Patient ID: Mariana Damon is a 68 y.o. male. HPI     For follow up DM. Weight down 3 pounds. Fasting BS at home 160's. Feels legs are doing well. Review of Systems   Constitutional: Negative for appetite change, chills and unexpected weight change. Respiratory: Negative for chest tightness and shortness of breath. Cardiovascular: Negative for chest pain and palpitations. Gastrointestinal: Negative for diarrhea. Musculoskeletal: Positive for arthralgias. Negative for back pain and gait problem. Neurological: Negative for dizziness and headaches. Psychiatric/Behavioral: Negative. Objective:   Physical Exam  Constitutional:       General: He is not in acute distress. Appearance: Normal appearance. He is well-developed. Neck:      Vascular: No carotid bruit. Cardiovascular:      Rate and Rhythm: Normal rate and regular rhythm. Heart sounds: Normal heart sounds. Pulmonary:      Effort: Pulmonary effort is normal.      Breath sounds: Normal breath sounds. Abdominal:      General: Bowel sounds are normal.      Palpations: Abdomen is soft. Musculoskeletal: Normal range of motion. Skin:     General: Skin is warm and dry. Neurological:      Mental Status: He is alert and oriented to person, place, and time.          Current Outpatient Medications   Medication Sig Dispense Refill    azelastine (ASTELIN) 0.1 % nasal spray USE 1 SPRAY IN EACH NOSTRIL TWICE DAILY AS DIRECTED 1 Bottle 5    Insulin Glargine, 2 Unit Dial, (TOUJEO MAX SOLOSTAR) 300 UNIT/ML SOPN Inject 40 Units into the skin 2 times daily 9 pen 5    hydrALAZINE (APRESOLINE) 50 MG tablet Take 0.5 tablets by mouth every 8 hours 270 tablet 3    flecainide (TAMBOCOR) 100 MG tablet TAKE 1 TABLET BY MOUTH TWICE A  tablet 1    montelukast (SINGULAIR) 10 MG tablet TAKE 1 TABLET BY MOUTH EVERY DAY 90 tablet 3    gabapentin (NEURONTIN) 100 MG capsule TAKE 1 CAPSULE BY MOUTH 3 TIMES DAILY FOR 30 DAYS. 90 capsule 0    apixaban (ELIQUIS) 5 MG TABS tablet Take 1 tablet by mouth 2 times daily 60 tablet 3    spironolactone (ALDACTONE) 25 MG tablet TAKE 1 TABLET BY MOUTH EVERY DAY 90 tablet 1    atenolol (TENORMIN) 50 MG tablet Take 0.5 tablets by mouth daily 90 tablet 3    torsemide (DEMADEX) 20 MG tablet TAKE 2 TABLETS BY MOUTH EVERY  tablet 1    silver sulfADIAZINE (SILVADENE) 1 % cream Apply topically daily. 50 g 3    sertraline (ZOLOFT) 50 MG tablet Take 1 tablet by mouth daily 90 tablet 3    omeprazole (PRILOSEC) 40 MG delayed release capsule Take 1 capsule by mouth daily 90 capsule 3    Insulin Pen Needle 31G X 5 MM MISC 1 each by Does not apply route daily 400 each 5    Glucosamine-Chondroitin (GLUCOSAMINE CHONDR COMPLEX PO) Take 1 tablet by mouth 2 times daily      glimepiride (AMARYL) 4 MG tablet Take 1 tablet by mouth 2 times daily 180 tablet 3    oxybutynin (DITROPAN XL) 15 MG extended release tablet TAKE 1 TABLET BY MOUTH EVERY DAY 90 tablet 3    blood glucose monitor kit and supplies by Other route daily One Touch Ultra 1 kit 0    traZODone (DESYREL) 50 MG tablet TAKE 1 TABLET BY MOUTH EVERY DAY AT NIGHT 30 tablet 5    atorvastatin (LIPITOR) 40 MG tablet Take 1 tablet by mouth nightly 90 tablet 3    isosorbide mononitrate (IMDUR) 30 MG extended release tablet Take 1 tablet by mouth daily 90 tablet 5    glucose blood VI test strips (ASCENSIA AUTODISC VI;ONE TOUCH ULTRA TEST VI) strip DX: E11.9 FSBS daily. One Touch ultra 100 strip 5    aspirin 81 MG chewable tablet Take 1 tablet by mouth daily 30 tablet 0    Multiple Vitamins-Minerals (THERAPEUTIC MULTIVITAMIN-MINERALS) tablet Take 1 tablet by mouth daily      ferrous sulfate 325 (65 FE) MG tablet Take 325 mg by mouth daily. No current facility-administered medications for this visit. Assessment:      1. DM-improving. 2. HTN-stable  3/ venous insufficiency      Plan:      1.    Lab Results   Component

## 2020-02-27 ENCOUNTER — TELEPHONE (OUTPATIENT)
Dept: PULMONOLOGY | Age: 70
End: 2020-02-27

## 2020-02-27 NOTE — TELEPHONE ENCOUNTER
Appointment canceled for Zachary Carpenter ([id number])   Visit Type: OFFICE VISIT   Date        Time      Length    Provider                  Department   3/2/2020     9:15 AM  15 mins. Karmen Gregorio MD           [facility]      Reason for Cancellation: Patient preference         Patient did not R/S appt

## 2020-03-03 RX ORDER — GABAPENTIN 100 MG/1
100 CAPSULE ORAL 3 TIMES DAILY
Qty: 90 CAPSULE | Refills: 0 | Status: SHIPPED | OUTPATIENT
Start: 2020-03-03 | End: 2020-04-15 | Stop reason: SDUPTHER

## 2020-04-13 NOTE — TELEPHONE ENCOUNTER
12/23/2019 NPRB  Plan:  1. Check weights every day; Continue the torsemide 20mg daily; Okay to take an extra 20mg if your weight is up. 2.  Spironolactone (aldactone) 25mg once a day  3.  hydralazine to 25mg three times a day   4. Stay as active as possible  5 .  Elevate your legs as much as possible, continue compression socks  6. atenolol to 25mg daily; continue flecainide per EP  Eliquis 1 tablet twice a day for blood thinner for the irregular heart rhythm  Follow up with EP in 4 months   Follow up with heart failure team in 6 months

## 2020-05-04 RX ORDER — SPIRONOLACTONE 25 MG/1
25 TABLET ORAL DAILY
Qty: 90 TABLET | Refills: 1 | Status: SHIPPED | OUTPATIENT
Start: 2020-05-04 | End: 2020-06-24 | Stop reason: SDUPTHER

## 2020-05-04 RX ORDER — ATENOLOL 50 MG/1
25 TABLET ORAL DAILY
Qty: 90 TABLET | Refills: 3 | Status: SHIPPED | OUTPATIENT
Start: 2020-05-04 | End: 2021-05-02 | Stop reason: SDUPTHER

## 2020-05-14 ENCOUNTER — TELEPHONE (OUTPATIENT)
Dept: FAMILY MEDICINE CLINIC | Age: 70
End: 2020-05-14

## 2020-05-14 DIAGNOSIS — Z79.4 TYPE 2 DIABETES MELLITUS WITH HYPERGLYCEMIA, WITH LONG-TERM CURRENT USE OF INSULIN (HCC): ICD-10-CM

## 2020-05-14 DIAGNOSIS — E11.65 TYPE 2 DIABETES MELLITUS WITH HYPERGLYCEMIA, WITH LONG-TERM CURRENT USE OF INSULIN (HCC): ICD-10-CM

## 2020-05-14 RX ORDER — INSULIN GLARGINE 300 U/ML
40 INJECTION, SOLUTION SUBCUTANEOUS 2 TIMES DAILY
Qty: 9 PEN | Refills: 5 | Status: SHIPPED | OUTPATIENT
Start: 2020-05-14 | End: 2020-12-06 | Stop reason: SDUPTHER

## 2020-05-18 ENCOUNTER — TELEPHONE (OUTPATIENT)
Dept: FAMILY MEDICINE CLINIC | Age: 70
End: 2020-05-18

## 2020-06-11 RX ORDER — TORSEMIDE 20 MG/1
TABLET ORAL
Qty: 180 TABLET | Refills: 1 | Status: SHIPPED | OUTPATIENT
Start: 2020-06-11 | End: 2021-02-11 | Stop reason: SDUPTHER

## 2020-06-15 ENCOUNTER — OFFICE VISIT (OUTPATIENT)
Dept: FAMILY MEDICINE CLINIC | Age: 70
End: 2020-06-15
Payer: MEDICARE

## 2020-06-15 VITALS
SYSTOLIC BLOOD PRESSURE: 122 MMHG | TEMPERATURE: 97.7 F | OXYGEN SATURATION: 93 % | HEART RATE: 64 BPM | DIASTOLIC BLOOD PRESSURE: 70 MMHG | BODY MASS INDEX: 41.3 KG/M2 | RESPIRATION RATE: 16 BRPM | WEIGHT: 313 LBS

## 2020-06-15 LAB
ANION GAP SERPL CALCULATED.3IONS-SCNC: 8 MMOL/L (ref 3–16)
BUN BLDV-MCNC: 19 MG/DL (ref 7–20)
CALCIUM SERPL-MCNC: 8.3 MG/DL (ref 8.3–10.6)
CHLORIDE BLD-SCNC: 108 MMOL/L (ref 99–110)
CO2: 27 MMOL/L (ref 21–32)
CREAT SERPL-MCNC: 0.6 MG/DL (ref 0.8–1.3)
GFR AFRICAN AMERICAN: >60
GFR NON-AFRICAN AMERICAN: >60
GLUCOSE BLD-MCNC: 177 MG/DL (ref 70–99)
HBA1C MFR BLD: 9.5 %
POTASSIUM SERPL-SCNC: 4.4 MMOL/L (ref 3.5–5.1)
SODIUM BLD-SCNC: 143 MMOL/L (ref 136–145)

## 2020-06-15 PROCEDURE — G8510 SCR DEP NEG, NO PLAN REQD: HCPCS | Performed by: NURSE PRACTITIONER

## 2020-06-15 PROCEDURE — 83036 HEMOGLOBIN GLYCOSYLATED A1C: CPT | Performed by: NURSE PRACTITIONER

## 2020-06-15 PROCEDURE — 99213 OFFICE O/P EST LOW 20 MIN: CPT | Performed by: NURSE PRACTITIONER

## 2020-06-15 RX ORDER — GABAPENTIN 300 MG/1
300 CAPSULE ORAL 2 TIMES DAILY
Qty: 60 CAPSULE | Refills: 0 | Status: SHIPPED | OUTPATIENT
Start: 2020-06-15 | End: 2020-07-13 | Stop reason: SDUPTHER

## 2020-06-15 ASSESSMENT — ENCOUNTER SYMPTOMS
BACK PAIN: 0
CONSTIPATION: 0
DIARRHEA: 0
CHEST TIGHTNESS: 0

## 2020-06-15 ASSESSMENT — PATIENT HEALTH QUESTIONNAIRE - PHQ9
1. LITTLE INTEREST OR PLEASURE IN DOING THINGS: 0
SUM OF ALL RESPONSES TO PHQ QUESTIONS 1-9: 0
SUM OF ALL RESPONSES TO PHQ9 QUESTIONS 1 & 2: 0
2. FEELING DOWN, DEPRESSED OR HOPELESS: 0
SUM OF ALL RESPONSES TO PHQ QUESTIONS 1-9: 0

## 2020-06-24 ENCOUNTER — OFFICE VISIT (OUTPATIENT)
Dept: CARDIOLOGY CLINIC | Age: 70
End: 2020-06-24
Payer: MEDICARE

## 2020-06-24 VITALS
WEIGHT: 307.5 LBS | OXYGEN SATURATION: 98 % | BODY MASS INDEX: 40.75 KG/M2 | SYSTOLIC BLOOD PRESSURE: 138 MMHG | DIASTOLIC BLOOD PRESSURE: 88 MMHG | HEIGHT: 73 IN | HEART RATE: 65 BPM

## 2020-06-24 PROCEDURE — 99214 OFFICE O/P EST MOD 30 MIN: CPT | Performed by: NURSE PRACTITIONER

## 2020-06-24 PROCEDURE — 93000 ELECTROCARDIOGRAM COMPLETE: CPT | Performed by: NURSE PRACTITIONER

## 2020-06-24 RX ORDER — FLECAINIDE ACETATE 100 MG/1
100 TABLET ORAL 2 TIMES DAILY
Qty: 180 TABLET | Refills: 1 | Status: SHIPPED | OUTPATIENT
Start: 2020-06-24 | End: 2021-01-04

## 2020-06-24 RX ORDER — SPIRONOLACTONE 25 MG/1
25 TABLET ORAL DAILY
Qty: 90 TABLET | Refills: 1 | Status: SHIPPED | OUTPATIENT
Start: 2020-06-24 | End: 2020-09-22 | Stop reason: SDUPTHER

## 2020-06-24 NOTE — PATIENT INSTRUCTIONS
Plan:  1. Check weights every day; Continue the torsemide 20mg daily; Okay to take an extra 20mg if your weight is up.   ekg today; call and schedule repeat echocardiogram.   2.  Spironolactone (aldactone) 25mg once a day  3.  hydralazine to 25mg three times a day  5 .  Elevate your legs as much as possible, continue compression socks- monitor left leg for infection   6. atenolol to 25mg daily; continue flecainide per EP  Continue Eliquis 1 tablet twice a day for blood thinner for the irregular heart rhythm  Follow up with EP in 3 months   Follow up with heart failure team in 6 months

## 2020-06-24 NOTE — PROGRESS NOTES
9-17-13); Cystocopy (12/10/13); Cystoscopy (1-28-14); other surgical history (1-28-14); Colonoscopy (2008); Colonoscopy (12/4/2013); Colonoscopy (4/30/14); colostomy (Jan 2014); Colon surgery; Abdomen surgery (05/16/2014); hernia repair (08/14/2015); pr injection hip arthrogram,anesth (Right, 9/19/2018); epidural steroid injection (Right, 1/21/2019); epidural steroid injection (Right, 2/11/2019); and Cardioversion (12/04/2019). Social History:   reports that he is a non-smoker but has been exposed to tobacco smoke. He has never used smokeless tobacco. He reports current alcohol use. He reports that he does not use drugs. Family History:   Family History   Problem Relation Age of Onset    Heart Disease Father     Heart Attack Father     Stroke Brother     Heart Disease Brother     High Blood Pressure Brother     Kidney Disease Mother         kidney removed       Home Medications:  Prior to Admission medications    Medication Sig Start Date End Date Taking? Authorizing Provider   gabapentin (NEURONTIN) 300 MG capsule Take 1 capsule by mouth 2 times daily for 30 days. Intended supply: 90 days 6/15/20 7/15/20 Yes SHAKIRA Lucero CNP   torsemide (DEMADEX) 20 MG tablet TAKE 2 TABLETS BY MOUTH EVERY DAY 6/11/20  Yes SHAKIRA Nelson CNP   Insulin Glargine, 2 Unit Dial, (TOUJEO MAX SOLOSTAR) 300 UNIT/ML SOPN Inject 40 Units into the skin 2 times daily  Patient taking differently: Inject 45 Units into the skin 2 times daily  5/14/20  Yes SHAKIRA Lucero CNP   silver sulfADIAZINE (SILVADENE) 1 % cream Apply topically daily.  5/7/20  Yes SHAKIRA Lucero CNP   atenolol (TENORMIN) 50 MG tablet Take 0.5 tablets by mouth daily 5/4/20  Yes SHAKIRA De CNP   spironolactone (ALDACTONE) 25 MG tablet Take 1 tablet by mouth daily 5/4/20  Yes SHAKIRA De CNP   glimepiride (AMARYL) 4 MG tablet TAKE 1 TABLET BY MOUTH TWICE A DAY 4/19/20  Yes Luis Alfredo Perkins SHAKIRA Neal CNP   glucose blood VI test strips (ASCENSIA AUTODISC VI;ONE TOUCH ULTRA TEST VI) strip DX: E11.9 FSBS daily. One Touch ultra 11/29/17   SHAKIRA Reyes CNP        Allergies:  Norco [hydrocodone-acetaminophen]     Review of Systems:   · Constitutional: there has been no unanticipated weight loss. · Eyes: No vision changes  · ENT: No Headaches, no nasal congestion. No mouth sores or sore throat. · Cardiovascular: Reviewed in HPI  · Respiratory: No cough or wheezing, no sputum production. · Gastrointestinal: No abdominal pain, no constipation or diarrhea  · Genitourinary: No dysuria, trouble voiding, or hematuria. · Musculoskeletal:  + weakness or joint complaints. · Integumentary: No rash or pruritis. · Neurological: No numbness or tingling. + weakness. No tremor. · Psychiatric: No anxiety, no depression. · Endocrine:  No excessive thirst or urination. · Hematologic/Lymphatic: No abnormal bruising or bleeding, blood clots or swollen lymph nodes. Physical Examination:    Vitals:    06/24/20 0902   BP: 138/88   Pulse: 65   SpO2: 98%   Weight: (!) 307 lb 8 oz (139.5 kg)   Height: 6' 1\" (1.854 m)        Constitutional and General Appearance: no apparent distress, obese  HEENT: non-icteric sclera, oropharynx without exudate, oral mucosa moist  Neck: difficult to assess JVD due to body habitus  Respiratory:  · No use of accessory muscles  · Dim breath sounds throughout, no wheezing, no crackles, no rhonchi  Cardiovascular:  · The apical impulses not displaced  ·  + 2/6 systolic  Murmur, no rub/gallop  · Ranjan rate and regular rhythm, S1,S2 normal  · Radial pulses 2+ and equal bilaterally  · + BLE edema, compression socks in place; left anterior shin with 2 small wounds- one scabbed over ,without drainage, no significant redness or warmth.    Abdomen:   · No masses or tenderness  · Liver: No Abnormalities Noted  Musculoskeletal/Skin:  · Gait is slow  · There is no clubbing, blood thinner for the irregular heart rhythm  Follow up with EP in 3 months   Follow up with heart failure team in 6 months     [id number] Score for Atrial Fibrillation Stroke Risk   Risk   Factors  Component Value   C CHF Yes 1   H HTN Yes 1   A2 Age >= 76 No,  (73 y.o.) 0   D DM Yes 1   S2 Prior Stroke/TIA No 0   V Vascular Disease No 0   A Age 74-69 Yes,  (73 y.o.) 1   Sc Sex male 0    [id number]  Score  4   Score last updated 54/95/08 6:67 PM    Click here for a link to the UpToDate guideline \"Atrial Fibrillation: Anticoagulation therapy to prevent embolization    Disclaimer: Risk Score calculation is dependent on accuracy of patient problem list and past encounter diagnosis. QUALITY MEASURES  1. Tobacco Cessation Counseling: NA  2. Retake of BP if >140/90:   NA  3. Documentation to PCP/referring for new patient:  Sent to PCP at close of office visit  4. CAD patient on anti-platelet: Yes  5. CAD patient on STATIN therapy:  Yes  6. Patient with CHF and aFib on anticoagulation:  YES    I appreciate the opportunity for caring for this patient.      Pacheco Aquino CNP, 6/24/2020, 9:49 AM

## 2020-07-08 NOTE — TELEPHONE ENCOUNTER
Last office visit 6/15/2020     Last written 6-    Next office visit scheduled 9/14/2020    Requested Prescriptions     Pending Prescriptions Disp Refills    gabapentin (NEURONTIN) 300 MG capsule 60 capsule 0     Sig: Take 1 capsule by mouth 2 times daily for 30 days.  Intended supply: 90 days

## 2020-07-11 RX ORDER — OMEPRAZOLE 40 MG/1
40 CAPSULE, DELAYED RELEASE ORAL DAILY
Qty: 90 CAPSULE | Refills: 3 | Status: SHIPPED | OUTPATIENT
Start: 2020-07-11 | End: 2021-07-07

## 2020-07-13 RX ORDER — GABAPENTIN 300 MG/1
300 CAPSULE ORAL 2 TIMES DAILY
Qty: 60 CAPSULE | Refills: 0 | Status: SHIPPED | OUTPATIENT
Start: 2020-07-13 | End: 2020-09-14 | Stop reason: DRUGHIGH

## 2020-08-11 ENCOUNTER — HOSPITAL ENCOUNTER (OUTPATIENT)
Dept: CARDIOLOGY | Age: 70
Discharge: HOME | End: 2020-08-11
Payer: MEDICARE

## 2020-08-11 LAB
LV EF: 55 %
LVEF MODALITY: NORMAL

## 2020-08-11 PROCEDURE — C8929 TTE W OR WO FOL WCON,DOPPLER: HCPCS

## 2020-08-11 PROCEDURE — 6360000004 HC RX CONTRAST MEDICATION: Performed by: NURSE PRACTITIONER

## 2020-08-11 RX ADMIN — PERFLUTREN 2.2 MG: 6.52 INJECTION, SUSPENSION INTRAVENOUS at 08:50

## 2020-08-13 NOTE — RESULT ENCOUNTER NOTE
Echo showed improvement in pressures and heart chambers not as dilated compared to prior. He has mild narrowing (stenosis) of the aortic valve - continue to monitor.  Heart strength is normal and walls of the heart are moving normal.

## 2020-08-14 ENCOUNTER — TELEPHONE (OUTPATIENT)
Dept: CARDIOLOGY CLINIC | Age: 70
End: 2020-08-14

## 2020-09-14 ENCOUNTER — OFFICE VISIT (OUTPATIENT)
Dept: FAMILY MEDICINE CLINIC | Age: 70
End: 2020-09-14
Payer: MEDICARE

## 2020-09-14 VITALS
HEART RATE: 63 BPM | TEMPERATURE: 98.3 F | OXYGEN SATURATION: 96 % | RESPIRATION RATE: 18 BRPM | WEIGHT: 298.2 LBS | SYSTOLIC BLOOD PRESSURE: 138 MMHG | BODY MASS INDEX: 39.34 KG/M2 | DIASTOLIC BLOOD PRESSURE: 64 MMHG

## 2020-09-14 LAB — HBA1C MFR BLD: 8 %

## 2020-09-14 PROCEDURE — 99213 OFFICE O/P EST LOW 20 MIN: CPT | Performed by: NURSE PRACTITIONER

## 2020-09-14 PROCEDURE — 83036 HEMOGLOBIN GLYCOSYLATED A1C: CPT | Performed by: NURSE PRACTITIONER

## 2020-09-14 PROCEDURE — G0008 ADMIN INFLUENZA VIRUS VAC: HCPCS | Performed by: NURSE PRACTITIONER

## 2020-09-14 PROCEDURE — 90654 INFLUENZA, QUADV, ADJUVANTED, 65 YRS +, IM, PF, PREFILL SYR, 0.5ML (FLUAD): CPT | Performed by: NURSE PRACTITIONER

## 2020-09-14 PROCEDURE — 3052F HG A1C>EQUAL 8.0%<EQUAL 9.0%: CPT | Performed by: NURSE PRACTITIONER

## 2020-09-14 RX ORDER — GABAPENTIN 100 MG/1
100 CAPSULE ORAL 2 TIMES DAILY
Qty: 180 CAPSULE | Refills: 0 | Status: SHIPPED | OUTPATIENT
Start: 2020-09-14 | End: 2020-12-14 | Stop reason: SINTOL

## 2020-09-14 ASSESSMENT — ENCOUNTER SYMPTOMS
WHEEZING: 0
SHORTNESS OF BREATH: 0
CONSTIPATION: 0
GASTROINTESTINAL NEGATIVE: 1
CHEST TIGHTNESS: 0
COUGH: 0
ABDOMINAL DISTENTION: 0
DIARRHEA: 0

## 2020-09-14 NOTE — PROGRESS NOTES
Vaccine Information Sheet, \"Influenza - Inactivated\"  given to Cristine Dumont, or parent/legal guardian of  Cristine Dumont and verbalized understanding. Patient responses:    Have you ever had a reaction to a flu vaccine? No  Do you have any current illness? No  Have you ever had Guillian Maurepas Syndrome? No  Do you have a serious allergy to any of the follow: Neomycin, Polymyxin, Thimerosal, eggs or egg products? No    Flu vaccine given per order. Please see immunization tab. Risks and benefits explained. Current VIS given.

## 2020-09-14 NOTE — PROGRESS NOTES
Comments: Sore to right and left lower extremities. Not draining. Does not appear infected. Neurological:      Mental Status: He is alert and oriented to person, place, and time. Psychiatric:         Mood and Affect: Mood normal.         Behavior: Behavior normal.       Current Outpatient Medications   Medication Sig Dispense Refill    sertraline (ZOLOFT) 50 MG tablet Take 1 tablet by mouth daily 90 tablet 2    omeprazole (PRILOSEC) 40 MG delayed release capsule Take 1 capsule by mouth daily 90 capsule 3    flecainide (TAMBOCOR) 100 MG tablet Take 1 tablet by mouth 2 times daily 180 tablet 1    spironolactone (ALDACTONE) 25 MG tablet Take 1 tablet by mouth daily 90 tablet 1    torsemide (DEMADEX) 20 MG tablet TAKE 2 TABLETS BY MOUTH EVERY  tablet 1    Insulin Glargine, 2 Unit Dial, (TOUJEO MAX SOLOSTAR) 300 UNIT/ML SOPN Inject 40 Units into the skin 2 times daily 9 pen 5    silver sulfADIAZINE (SILVADENE) 1 % cream Apply topically daily.  50 g 3    atenolol (TENORMIN) 50 MG tablet Take 0.5 tablets by mouth daily 90 tablet 3    glimepiride (AMARYL) 4 MG tablet TAKE 1 TABLET BY MOUTH TWICE A  tablet 1    apixaban (ELIQUIS) 5 MG TABS tablet Take 1 tablet by mouth 2 times daily 180 tablet 1    Insulin Pen Needle 31G X 5 MM MISC 1 each by Does not apply route daily 400 each 5    isosorbide mononitrate (IMDUR) 30 MG extended release tablet Take 1 tablet by mouth daily 90 tablet 2    atorvastatin (LIPITOR) 40 MG tablet Take 1 tablet by mouth nightly 90 tablet 2    azelastine (ASTELIN) 0.1 % nasal spray USE 1 SPRAY IN EACH NOSTRIL TWICE DAILY AS DIRECTED 1 Bottle 5    hydrALAZINE (APRESOLINE) 50 MG tablet Take 0.5 tablets by mouth every 8 hours 270 tablet 3    montelukast (SINGULAIR) 10 MG tablet TAKE 1 TABLET BY MOUTH EVERY DAY 90 tablet 3    Glucosamine-Chondroitin (GLUCOSAMINE CHONDR COMPLEX PO) Take 1 tablet by mouth 2 times daily      blood glucose monitor kit and supplies by Other route daily One Touch Ultra 1 kit 0    traZODone (DESYREL) 50 MG tablet TAKE 1 TABLET BY MOUTH EVERY DAY AT NIGHT 30 tablet 5    glucose blood VI test strips (ASCENSIA AUTODISC VI;ONE TOUCH ULTRA TEST VI) strip DX: E11.9 FSBS daily. One Touch ultra 100 strip 5    aspirin 81 MG chewable tablet Take 1 tablet by mouth daily 30 tablet 0    Multiple Vitamins-Minerals (THERAPEUTIC MULTIVITAMIN-MINERALS) tablet Take 1 tablet by mouth daily      ferrous sulfate 325 (65 FE) MG tablet Take 325 mg by mouth daily.  gabapentin (NEURONTIN) 300 MG capsule Take 1 capsule by mouth 2 times daily for 30 days. Intended supply: 90 days (Patient not taking: Reported on 9/14/2020) 60 capsule 0     No current facility-administered medications for this visit. Assessment:      1. DM- HBA1C down to 8.   2. HTN-stable  3. Neuropathy-Gabapentin dose changed  4. Venous insufficiency-using silvadene cream on two wound sites         Plan:      1. Told patient he could get Shingles Vaccine at local pharmacy. 2. Flu vaccine today  3. Follow up 3 months for AMW   4. Nazanin Isaacs was seen today for diabetes. Diagnoses and all orders for this visit:    Type 2 diabetes mellitus with hyperglycemia, with long-term current use of insulin (HCC)  -     POCT glycosylated hemoglobin (Hb A1C)  -     gabapentin (NEURONTIN) 100 MG capsule; Take 1 capsule by mouth 2 times daily for 180 days.  Intended supply: 90 days    Flu vaccine need  -     INFLUENZA, QUADV, ADJUVANTED, 65 YRS =, IM, PF, PREFILL SYR, 0.5ML (FLUAD)            FRANK MCKEON, SHAKIRA - CNP

## 2020-09-22 RX ORDER — SPIRONOLACTONE 25 MG/1
25 TABLET ORAL DAILY
Qty: 90 TABLET | Refills: 1 | Status: SHIPPED | OUTPATIENT
Start: 2020-09-22 | End: 2021-04-06

## 2020-09-25 NOTE — PROGRESS NOTES
[facility]   Electrophysiology Note      Reason for office visit: 3 Month Follow-Up (no new cardiac complaints); Atrial Fibrillation; and Congestive Heart Failure  Primary Care:  SHAKIRA Bates CNP      HISTORY OF PRESENT ILLNESS: Amber Pacheco is a 68 y.o. male who presents for follow up for afib and heart failure. He has a PMH of diastolic heart failure, aortic valve stenosis,  chronic kidney disease, diabetes mellitus, and sleep apnea; CPAP. He had a normal stress test in 10/2017. Echocardiogram from 6/5/19 showed an EF of 55%, with aortic valve stenosis. Left atrial size is 42. He underwent cardioversion 12/4/2019 for atrial flutter. Echo on 8/11/2020 showed an EF of 55% with type II DD with elevated filling pressures. Today he reports that he is feeling pretty good. He denies any bleeding issues on the Eliquis. Patient denies chest pain, sob, palpitations, dizziness or syncope. He denies edema in his BLE as long as he takes his diuretics. He is taking his medications as prescribed. He is tolerating them well. EKG today shows SB 51 bpm.     Past Medical History:   has a past medical history of Allergic rhinitis, Arthritis, Bladder fistula, C. difficile diarrhea, Cellulitis of right lower extremity, Dental disease, Diabetes ([address] Diverticulitis, Dizziness, DVT of lower extremity, bilateral ([address] GERD (gastroesophageal reflux disease), Headache, Hearing loss, Hematuria, Hyperlipidemia, Hypertension, Lung disease, MONIQUE (obstructive sleep apnea), Pancreatitis ([address] Pulmonary emboli ([address] Rash, Sleep apnea, and Tinnitus. Past Surgical History:   has a past surgical history that includes Tibia fracture surgery (Right, 2003); Cholecystectomy, open (N/A, 9-17-13); Cystocopy (12/10/13); Cystoscopy (1-28-14); other surgical history (1-28-14); Colonoscopy (2008); Colonoscopy (12/4/2013); Colonoscopy (4/30/14); colostomy (Jan 2014); Colon surgery;  Abdomen surgery (05/16/2014); hernia repair (08/14/2015); pr injection hip arthrogram,anesth (Right, 9/19/2018); epidural steroid injection (Right, 1/21/2019); epidural steroid injection (Right, 2/11/2019); and Cardioversion (12/04/2019). Social History:   reports that he is a non-smoker but has been exposed to tobacco smoke. He has never used smokeless tobacco. He reports current alcohol use. He reports that he does not use drugs. Family History: family history includes Heart Attack in his father; Heart Disease in his brother and father; High Blood Pressure in his brother; Kidney Disease in his mother; Stroke in his brother. Allergies:  Norco [hydrocodone-acetaminophen]    Home Medications:    Prior to Admission medications    Medication Sig Start Date End Date Taking? Authorizing Provider   Handicap Placard MISC by Does not apply route Please issue for duration of 5 years 9/24/20  Yes SHAKIRA Freedman CNP   [facility] Does not apply route Please issue for duration of 5 years 9/24/20  Yes SHAKIRA Freedman CNP   spironolactone (ALDACTONE) 25 MG tablet Take 1 tablet by mouth daily 9/22/20  Yes SHAKIRA Bruno CNP   gabapentin (NEURONTIN) 100 MG capsule Take 1 capsule by mouth 2 times daily for 180 days. Intended supply: 90 days  Patient taking differently: Take 100 mg by mouth daily.  Intended supply: 90 days 9/14/20 3/13/21 Yes SHAKIRA Freedman CNP   sertraline (ZOLOFT) 50 MG tablet Take 1 tablet by mouth daily 7/21/20  Yes SHAKIRA Freedman CNP   omeprazole (PRILOSEC) 40 MG delayed release capsule Take 1 capsule by mouth daily 7/11/20  Yes SHAKIRA Freedman CNP   flecainide (TAMBOCOR) 100 MG tablet Take 1 tablet by mouth 2 times daily 6/24/20  Yes SHAKIRA Butler CNP   torsemide (DEMADEX) 20 MG tablet TAKE 2 TABLETS BY MOUTH EVERY DAY 6/11/20  Yes SHAKIRA Butler CNP   Insulin Glargine, 2 Unit Dial, (TOUJEO MAX SOLOSTAR) 300 UNIT/ML SOPN Inject 40 Units into the skin 2 times daily 5/14/20  Yes SHAKIRA Maciel CNP   silver sulfADIAZINE (SILVADENE) 1 % cream Apply topically daily. 5/7/20  Yes SHAKIRA Maciel CNP   atenolol (TENORMIN) 50 MG tablet Take 0.5 tablets by mouth daily 5/4/20  Yes SHAKIRA Stockton CNP   glimepiride (AMARYL) 4 MG tablet TAKE 1 TABLET BY MOUTH TWICE A DAY 4/19/20  Yes SHAKIRA Maciel CNP   apixaban (ELIQUIS) 5 MG TABS tablet Take 1 tablet by mouth 2 times daily 4/13/20  Yes SHAKIRA Rodriguez CNP   Insulin Pen Needle 31G X 5 MM MISC 1 each by Does not apply route daily 2/24/20  Yes SHAKIRA Maciel CNP   isosorbide mononitrate (IMDUR) 30 MG extended release tablet Take 1 tablet by mouth daily 2/24/20  Yes SHAKIRA Maciel CNP   atorvastatin (LIPITOR) 40 MG tablet Take 1 tablet by mouth nightly 2/24/20  Yes SHAKIRA Maciel CNP   azelastine (ASTELIN) 0.1 % nasal spray USE 1 SPRAY IN EACH NOSTRIL TWICE DAILY AS DIRECTED 2/4/20  Yes SHAKIRA Maciel CNP   hydrALAZINE (APRESOLINE) 50 MG tablet Take 0.5 tablets by mouth every 8 hours 1/28/20  Yes SHAKIRA Rodriguez CNP   montelukast (SINGULAIR) 10 MG tablet TAKE 1 TABLET BY MOUTH EVERY DAY 1/8/20  Yes SHAKIRA Maciel CNP   Glucosamine-Chondroitin (GLUCOSAMINE CHONDR COMPLEX PO) Take 1 tablet by mouth 2 times daily   Yes Historical Provider, MD   blood glucose monitor kit and supplies by Other route daily One Touch Ultra 4/15/19  Yes SHAKIRA Maciel CNP   traZODone (DESYREL) 50 MG tablet TAKE 1 TABLET BY MOUTH EVERY DAY AT NIGHT 3/29/19  Yes SHAKIRA Dixon CNP   glucose blood VI test strips (ASCENSIA AUTODISC VI;ONE TOUCH ULTRA TEST VI) strip DX: E11.9 FSBS daily.  One Touch ultra 11/29/17  Yes SHAKIRA Maciel - CNP   aspirin 81 MG chewable tablet Take 1 tablet by mouth daily 10/21/17  Yes Madhuri Parr MD   Multiple Vitamins-Minerals (THERAPEUTIC MULTIVITAMIN-MINERALS) tablet Take 1 tablet by mouth daily   Yes Historical Provider, MD   ferrous sulfate 325 (65 FE) MG tablet Take 325 mg by mouth daily. Yes Historical Provider, MD       REVIEW OF SYSTEMS:      All 14-point review of systems are completed and pertinent positives are mentioned in the history of present illness. Other systems are reviewed and are negative. Physical Examination:    BP 90/64   Pulse 53   Ht 6' 1\" (1.854 m)   Wt 298 lb (135.2 kg)   SpO2 97%   BMI 39.32 kg/m²    Constitutional and General Appearance: alert, cooperative, no distress and appears stated age  [de-identified]: PERRL, no cervical lymphadenopathy. No masses palpable. Normal oral mucosa  Respiratory:  · Normal excursion and expansion without use of accessory muscles  · Resp Auscultation: Normal breath sounds without dullness or wheezing  Cardiovascular:  · The apical impulse is not displaced  · Heart tones are crisp and normal. regular S1 and S2. Abdomen:  · No masses or tenderness  · Bowel sounds present  Extremities:  ·  No Cyanosis or Clubbing  ·  Lower extremity edema: No  · Skin: Warm and dry  Neurological:  · Alert and oriented. · Moves all extremities well  · No abnormalities of mood, affect, memory, mentation, or behavior are noted    DATA:    EC2020  Atrial  Bradycardia  -First degree A-V block   P:QRS - 1:1, Abnormal P axis, H Rate 51   Jessica = 280  -Left axis -anterior fascicular block. ECHO: 2020   Technically difficult examination. Normal LV systolic function with an estimated EF of 55%. No regional wall motion abnormalities are seen. Type II diastolic dysfunction with elevated filling pressure. Lipomatous hypertrophy of the interatrial septum. Mild bi-atrial enlargement. Mild mitral annular calcification. Maximal transaortic velocity is 2.62m/s which gives peak pressure gradient=   27mmHg and mean pressure gradient= 15mmHg c/w mild AS.    Trace mitral and tricuspid regurgitation. Definity contrast administered with no definite evidence of LV mass or   thrombus noted. Systolic pulmonary artery pressure (SPAP) estimated at 40mmHg (RAP 3mmHg),   consistent with mild pulm HTN. ECHO: 6/5/19  Summary  Technically difficult examination due to body habitus. IV access was  attempted but could not be obtained. LV systolic function appears normal with a visually estimated EF of 55%. Endocardium not entirely well visualized but no obvious segmental wall  motion abnormalities. Mild left ventricular hypertrophy. Grade III diastolic dysfunction with elevated LV filling pressures. Individual AV leaflets are not well visualized but appear calcified and  thickened with adequate opening c/w AV sclerosis. The right ventricle is not well visualized but appears mildly dilated in  [address]. Moderate bi-atrial enlargement. Frequent PVCs during exam.   LV Diastolic Dimension: 3.9 cm LV Systolic Dimension: 8.30 cm  LV Septum Diastolic: 5.49 cm  LV PW Diastolic: 5.99 cm       AO Root Dimension: 3.6 cm                                  AV Cusp Separation: 1.6 cm                                  LA Dimension: 4.6 cm   LVOT: 2.6 cm                   LA Area: 30.5 cm2                                  LA volume/Index: 110 ml /42 ml/m2   [id number] Score for Atrial Fibrillation Stroke Risk   Risk   Factors  Component Value   C CHF Yes 1   H HTN Yes 1   A2 Age >= 75 No,  (73 y.o.) 0   D DM Yes 1   S2 Prior Stroke/TIA No 0   V Vascular Disease No 0   A Age 74-69 Yes,  (73 y.o.) 1   Sc Sex male 0    [id number]  Score  4   Score last updated 9/25/20 7:13 PM EDT    Click here for a link to the UpToDate guideline \"Atrial Fibrillation: Anticoagulation therapy to prevent embolization    Disclaimer: Risk Score calculation is dependent on accuracy of patient problem list and past encounter diagnosis.     IMPRESSION:    1.  Atrial flutter ([id number])  Patient is a pleasant 25-year-old male with a medical history significant for atypical flutter status post cardioversion heart failure preserved ejection fraction, aortic valve stenosis, diabetes mellitus type 2, and obstructive sleep apnea who presents from home for follow-up. Patient is doing well with his flecainide. No palpitations, chest pain, shortness of breath, etc.  We discussed ablation however at this point, given that he is a good and stable place, we will continue medical therapy. Patient will take an additional dose of flecainide if he does have some palpitations. These palpitations last more than 6 hours he was called for assistance. We will follow-up with patient in 1 year. Of note, patient has been tolerating his apixaban without any issues. - Continue apixaban. - Continue flecainide. Ok to take additional dose for pill in pocket strategy. - Continue atenolol. 2. Chronic dCHF  - Follow up with cardiology. 3. HTN    4. Aortic valve stenoisis (mild)  - Follow up with cardiology. RECOMMENDATIONS:  1. Continue current course. You can take an extra flecainide (1 tablet only) if you feel palpitations once a day  2. For future reference, ablation was discussed. 3.  Follow up in 1 year or sooner if needed    QUALITY MEASURES  1. Tobacco Cessation Counseling: NA  2. Retake of BP if >140/90:   NA  3. Documentation to PCP/referring for new patient:  Sent to PCP at close of office visit  4. CAD patient on anti-platelet: Yes  5. CAD patient on STATIN therapy:  Yes  6. Patient with CHF and aFib on anticoagulation:  Yes     This note was scribed in the presence of Lei Torres MD by Segundo Barragan RN. All questions and concerns were addressed to the patient/family. Alternatives to my treatment were discussed. Dr. Lei Torres MD  Electrophysiology  [facility] [address].   [place] [id number]  Phone: [phone]  Fax: [phone]   9/29/2020     NOTE: This report was transcribed using voice recognition software. Every effort was made to ensure accuracy, however, inadvertent computerized transcription errors may be present. The scribe's documentation has been prepared under my direction and personally reviewed by me in its entirety. I confirm that the note above accurately reflects all work, physical examination, the discussion of treatments and procedures, and medical decision making performed by me. Joyce Amezcua MD personally performed the services described in this documentation as scribed by nurse in my presence, and is both accurate and complete.     Electronically signed by Shayla Cisse MD on 9/29/2020 at 9:28 AM

## 2020-09-29 ENCOUNTER — OFFICE VISIT (OUTPATIENT)
Dept: CARDIOLOGY CLINIC | Age: 70
End: 2020-09-29
Payer: MEDICARE

## 2020-09-29 VITALS
HEIGHT: 73 IN | BODY MASS INDEX: 39.49 KG/M2 | OXYGEN SATURATION: 97 % | SYSTOLIC BLOOD PRESSURE: 90 MMHG | HEART RATE: 53 BPM | WEIGHT: 298 LBS | DIASTOLIC BLOOD PRESSURE: 64 MMHG

## 2020-09-29 PROCEDURE — 93000 ELECTROCARDIOGRAM COMPLETE: CPT | Performed by: INTERNAL MEDICINE

## 2020-09-29 PROCEDURE — 99214 OFFICE O/P EST MOD 30 MIN: CPT | Performed by: INTERNAL MEDICINE

## 2020-09-29 NOTE — LETTER
[facility]   Electrophysiology Note      Reason for office visit: 3 Month Follow-Up (no new cardiac complaints); Atrial Fibrillation; and Congestive Heart Failure  Primary Care:  SHAKIRA Chiu CNP      HISTORY OF PRESENT ILLNESS: Cristine Dumont is a 68 y.o. male who presents for follow up for afib and heart failure. He has a PMH of diastolic heart failure, aortic valve stenosis,  chronic kidney disease, diabetes mellitus, and sleep apnea; CPAP. He had a normal stress test in 10/2017. Echocardiogram from 6/5/19 showed an EF of 55%, with aortic valve stenosis. Left atrial size is 42. He underwent cardioversion 12/4/2019 for atrial flutter. Echo on 8/11/2020 showed an EF of 55% with type II DD with elevated filling pressures. Today he reports that he is feeling pretty good. He denies any bleeding issues on the Eliquis. Patient denies chest pain, sob, palpitations, dizziness or syncope. He denies edema in his BLE as long as he takes his diuretics. He is taking his medications as prescribed. He is tolerating them well. EKG today shows SB 51 bpm.     Past Medical History:   has a past medical history of Allergic rhinitis, Arthritis, Bladder fistula, C. difficile diarrhea, Cellulitis of right lower extremity, Dental disease, Diabetes ([address] Diverticulitis, Dizziness, DVT of lower extremity, bilateral ([address] GERD (gastroesophageal reflux disease), Headache, Hearing loss, Hematuria, Hyperlipidemia, Hypertension, Lung disease, MONIQUE (obstructive sleep apnea), Pancreatitis ([address] Pulmonary emboli ([address] Rash, Sleep apnea, and Tinnitus. Past Surgical History:   has a past surgical history that includes Tibia fracture surgery (Right, 2003); Cholecystectomy, open (N/A, 9-17-13); Cystocopy (12/10/13); Cystoscopy (1-28-14); other surgical history (1-28-14); Colonoscopy (2008); Colonoscopy (12/4/2013); Colonoscopy (4/30/14); colostomy (Jan 2014); Colon surgery;  Abdomen surgery (05/16/2014); hernia repair (08/14/2015); pr injection hip arthrogram,anesth (Right, 9/19/2018); epidural steroid injection (Right, 1/21/2019); epidural steroid injection (Right, 2/11/2019); and Cardioversion (12/04/2019). Social History:   reports that he is a non-smoker but has been exposed to tobacco smoke. He has never used smokeless tobacco. He reports current alcohol use. He reports that he does not use drugs. Family History: family history includes Heart Attack in his father; Heart Disease in his brother and father; High Blood Pressure in his brother; Kidney Disease in his mother; Stroke in his brother. Allergies:  Norco [hydrocodone-acetaminophen]    Home Medications:    Prior to Admission medications    Medication Sig Start Date End Date Taking? Authorizing Provider   Handicap Placard MISC by Does not apply route Please issue for duration of 5 years 9/24/20  Yes SHAKIRA Domínguez CNP   Handicap Placard MISC by Does not apply route Please issue for duration of 5 years 9/24/20  Yes Evie OrSHAKIRA CNP   spironolactone (ALDACTONE) 25 MG tablet Take 1 tablet by mouth daily 9/22/20  Yes SHAKIRA Peng CNP   gabapentin (NEURONTIN) 100 MG capsule Take 1 capsule by mouth 2 times daily for 180 days. Intended supply: 90 days  Patient taking differently: Take 100 mg by mouth daily.  Intended supply: 90 days 9/14/20 3/13/21 Yes SHAKIRA Domínguez CNP   sertraline (ZOLOFT) 50 MG tablet Take 1 tablet by mouth daily 7/21/20  Yes SHAKIRA Domínguez CNP   omeprazole (PRILOSEC) 40 MG delayed release capsule Take 1 capsule by mouth daily 7/11/20  Yes SHAKIRA Domínguez CNP   flecainide (TAMBOCOR) 100 MG tablet Take 1 tablet by mouth 2 times daily 6/24/20  Yes SHAKIRA Jordan CNP   torsemide (DEMADEX) 20 MG tablet TAKE 2 TABLETS BY MOUTH EVERY DAY 6/11/20  Yes SHAKIRA Jordan CNP Insulin Glargine, 2 Unit Dial, (TOUJEO MAX SOLOSTAR) 300 UNIT/ML SOPN Inject 40 Units into the skin 2 times daily 5/14/20  Yes SHAKIRA Bruno CNP   silver sulfADIAZINE (SILVADENE) 1 % cream Apply topically daily. 5/7/20  Yes SHAKIRA Bruno CNP   atenolol (TENORMIN) 50 MG tablet Take 0.5 tablets by mouth daily 5/4/20  Yes SHAKIRA Herbert CNP   glimepiride (AMARYL) 4 MG tablet TAKE 1 TABLET BY MOUTH TWICE A DAY 4/19/20  Yes SHAKIRA Bruno CNP   apixaban (ELIQUIS) 5 MG TABS tablet Take 1 tablet by mouth 2 times daily 4/13/20  Yes SHAKIRA Villegas CNP   Insulin Pen Needle 31G X 5 MM MISC 1 each by Does not apply route daily 2/24/20  Yes SHAKIRA Bruno CNP   isosorbide mononitrate (IMDUR) 30 MG extended release tablet Take 1 tablet by mouth daily 2/24/20  Yes SHAKIRA Bruno CNP   atorvastatin (LIPITOR) 40 MG tablet Take 1 tablet by mouth nightly 2/24/20  Yes SHAKIRA Bruno CNP   azelastine (ASTELIN) 0.1 % nasal spray USE 1 SPRAY IN EACH NOSTRIL TWICE DAILY AS DIRECTED 2/4/20  Yes SHAKIRA Bruno CNP   hydrALAZINE (APRESOLINE) 50 MG tablet Take 0.5 tablets by mouth every 8 hours 1/28/20  Yes SHAKIRA Villegas CNP   montelukast (SINGULAIR) 10 MG tablet TAKE 1 TABLET BY MOUTH EVERY DAY 1/8/20  Yes SHAKIRA Bruno CNP   Glucosamine-Chondroitin (GLUCOSAMINE CHONDR COMPLEX PO) Take 1 tablet by mouth 2 times daily   Yes Historical Provider, MD   blood glucose monitor kit and supplies by Other route daily One Touch Ultra 4/15/19  Yes SHAKIRA Bruno CNP   traZODone (DESYREL) 50 MG tablet TAKE 1 TABLET BY MOUTH EVERY DAY AT NIGHT 3/29/19  Yes Fanny Rossburg-Michelle, APRN - CNP   glucose blood VI test strips (ASCENSIA AUTODISC VI;ONE TOUCH ULTRA TEST VI) strip DX: E11.9 FSBS daily.  One Touch ultra 11/29/17  Yes SHAKIRA Bruno - CNP aspirin 81 MG chewable tablet Take 1 tablet by mouth daily 10/21/17  Yes Scarlette Gaucher, MD   Multiple Vitamins-Minerals (THERAPEUTIC MULTIVITAMIN-MINERALS) tablet Take 1 tablet by mouth daily   Yes Historical Provider, MD   ferrous sulfate 325 (65 FE) MG tablet Take 325 mg by mouth daily. Yes Historical Provider, MD       REVIEW OF SYSTEMS:      All 14-point review of systems are completed and pertinent positives are mentioned in the history of present illness. Other systems are reviewed and are negative. Physical Examination:    BP 90/64   Pulse 53   Ht 6' 1\" (1.854 m)   Wt 298 lb (135.2 kg)   SpO2 97%   BMI 39.32 kg/m²    Constitutional and General Appearance: alert, cooperative, no distress and appears stated age  [de-identified]: PERRL, no cervical lymphadenopathy. No masses palpable. Normal oral mucosa  Respiratory:  · Normal excursion and expansion without use of accessory muscles  · Resp Auscultation: Normal breath sounds without dullness or wheezing  Cardiovascular:  · The apical impulse is not displaced  · Heart tones are crisp and normal. regular S1 and S2. Abdomen:  · No masses or tenderness  · Bowel sounds present  Extremities:  ·  No Cyanosis or Clubbing  ·  Lower extremity edema: No  · Skin: Warm and dry  Neurological:  · Alert and oriented. · Moves all extremities well  · No abnormalities of mood, affect, memory, mentation, or behavior are noted    DATA:    EC2020  Atrial  Bradycardia  -First degree A-V block   P:QRS - 1:1, Abnormal P axis, H Rate 51   Jessica = 280  -Left axis -anterior fascicular block. ECHO: 2020   Technically difficult examination. Normal LV systolic function with an estimated EF of 55%. No regional wall motion abnormalities are seen. Type II diastolic dysfunction with elevated filling pressure. Lipomatous hypertrophy of the interatrial septum. Mild bi-atrial enlargement. Mild mitral annular calcification. Maximal transaortic velocity is 2.62m/s which gives peak pressure gradient=   27mmHg and mean pressure gradient= 15mmHg c/w mild AS. Trace mitral and tricuspid regurgitation. Definity contrast administered with no definite evidence of LV mass or   thrombus noted. Systolic pulmonary artery pressure (SPAP) estimated at 40mmHg (RAP 3mmHg),   consistent with mild pulm HTN. ECHO: 6/5/19  Summary  Technically difficult examination due to body habitus. IV access was  attempted but could not be obtained. LV systolic function appears normal with a visually estimated EF of 55%. Endocardium not entirely well visualized but no obvious segmental wall  motion abnormalities. Mild left ventricular hypertrophy. Grade III diastolic dysfunction with elevated LV filling pressures. Individual AV leaflets are not well visualized but appear calcified and  thickened with adequate opening c/w AV sclerosis. The right ventricle is not well visualized but appears mildly dilated in  [address]. Moderate bi-atrial enlargement.   Frequent PVCs during exam.   LV Diastolic Dimension: 3.9 cm LV Systolic Dimension: 5.32 cm  LV Septum Diastolic: 7.11 cm  LV PW Diastolic: 0.61 cm       AO Root Dimension: 3.6 cm                                  AV Cusp Separation: 1.6 cm                                  LA Dimension: 4.6 cm   LVOT: 2.6 cm                   LA Area: 30.5 cm2                                  LA volume/Index: 110 ml /42 ml/m2   [id number] Score for Atrial Fibrillation Stroke Risk   Risk   Factors  Component Value   C CHF Yes 1   H HTN Yes 1   A2 Age >= 75 No,  (73 y.o.) 0   D DM Yes 1   S2 Prior Stroke/TIA No 0   V Vascular Disease No 0   A Age 74-69 Yes,  (73 y.o.) 1   Sc Sex male 0    [id number]  Score  4   Score last updated 9/25/20 0:76 PM EDT    Click here for a link to the UpToDate guideline \"Atrial Fibrillation: Anticoagulation therapy to prevent embolization Disclaimer: Risk Score calculation is dependent on accuracy of patient problem list and past encounter diagnosis.     IMPRESSION:    1. Atrial flutter ([id number] 3)  Patient is a pleasant 70-year-old male with a medical history significant for atypical flutter status post cardioversion heart failure preserved ejection fraction, aortic valve stenosis, diabetes mellitus type 2, and obstructive sleep apnea who presents from home for follow-up. Patient is doing well with his flecainide. No palpitations, chest pain, shortness of breath, etc.  We discussed ablation however at this point, given that he is a good and stable place, we will continue medical therapy. Patient will take an additional dose of flecainide if he does have some palpitations. These palpitations last more than 6 hours he was called for assistance. We will follow-up with patient in 1 year. Of note, patient has been tolerating his apixaban without any issues. - Continue apixaban. - Continue flecainide. Ok to take additional dose for pill in pocket strategy. - Continue atenolol. 2. Chronic dCHF  - Follow up with cardiology. 3. HTN    4. Aortic valve stenoisis (mild)  - Follow up with cardiology. RECOMMENDATIONS:  1. Continue current course. You can take an extra flecainide (1 tablet only) if you feel palpitations once a day  2. For future reference, ablation was discussed. 3.  Follow up in 1 year or sooner if needed    QUALITY MEASURES  1. Tobacco Cessation Counseling: NA  2. Retake of BP if >140/90:   NA  3. Documentation to PCP/referring for new patient:  Sent to PCP at close of office visit  4. CAD patient on anti-platelet: Yes  5. CAD patient on STATIN therapy:  Yes  6. Patient with CHF and aFib on anticoagulation:  Yes     This note was scribed in the presence of Bhanu Mcdaniel MD by Mauro Veliz RN. All questions and concerns were addressed to the patient/family. Alternatives to my treatment were discussed. Dr. Vazquez Varma MD  Electrophysiology  [address]. [place] [address]  Phone: [phone]  Fax: [phone]   9/29/2020     NOTE: This report was transcribed using voice recognition software. Every effort was made to ensure accuracy, however, inadvertent computerized transcription errors may be present. The scribe's documentation has been prepared under my direction and personally reviewed by me in its entirety. I confirm that the note above accurately reflects all work, physical examination, the discussion of treatments and procedures, and medical decision making performed by me. Jethro Miranda MD personally performed the services described in this documentation as scribed by nurse in my presence, and is both accurate and complete.     Electronically signed by Que Light MD on 9/29/2020 at 9:28 AM

## 2020-09-29 NOTE — PATIENT INSTRUCTIONS
RECOMMENDATIONS:  1. Continue current course. You can take an extra flecainide (1 tablet only) if you feel palpitations once a day  2. For future reference, ablation was discussed.     3.  Follow up in 1 year or sooner if needed

## 2020-12-14 ENCOUNTER — OFFICE VISIT (OUTPATIENT)
Dept: FAMILY MEDICINE CLINIC | Age: 70
End: 2020-12-14
Payer: MEDICARE

## 2020-12-14 VITALS
SYSTOLIC BLOOD PRESSURE: 138 MMHG | TEMPERATURE: 97.1 F | WEIGHT: 305.4 LBS | HEART RATE: 73 BPM | BODY MASS INDEX: 45.23 KG/M2 | DIASTOLIC BLOOD PRESSURE: 82 MMHG | HEIGHT: 69 IN | OXYGEN SATURATION: 95 % | RESPIRATION RATE: 16 BRPM

## 2020-12-14 LAB — HBA1C MFR BLD: 8.5 %

## 2020-12-14 PROCEDURE — 3052F HG A1C>EQUAL 8.0%<EQUAL 9.0%: CPT | Performed by: NURSE PRACTITIONER

## 2020-12-14 PROCEDURE — G0439 PPPS, SUBSEQ VISIT: HCPCS | Performed by: NURSE PRACTITIONER

## 2020-12-14 PROCEDURE — 83036 HEMOGLOBIN GLYCOSYLATED A1C: CPT | Performed by: NURSE PRACTITIONER

## 2020-12-14 RX ORDER — ATORVASTATIN CALCIUM 40 MG/1
40 TABLET, FILM COATED ORAL NIGHTLY
Qty: 90 TABLET | Refills: 3 | Status: SHIPPED | OUTPATIENT
Start: 2020-12-14 | End: 2021-12-07

## 2020-12-14 RX ORDER — INSULIN GLARGINE 300 U/ML
INJECTION, SOLUTION SUBCUTANEOUS
Qty: 3 PEN | Refills: 5 | Status: SHIPPED | OUTPATIENT
Start: 2020-12-14 | End: 2021-12-17 | Stop reason: SDUPTHER

## 2020-12-14 ASSESSMENT — PATIENT HEALTH QUESTIONNAIRE - PHQ9
SUM OF ALL RESPONSES TO PHQ9 QUESTIONS 1 & 2: 0
2. FEELING DOWN, DEPRESSED OR HOPELESS: 0
SUM OF ALL RESPONSES TO PHQ QUESTIONS 1-9: 0
1. LITTLE INTEREST OR PLEASURE IN DOING THINGS: 0
SUM OF ALL RESPONSES TO PHQ QUESTIONS 1-9: 0
SUM OF ALL RESPONSES TO PHQ QUESTIONS 1-9: 0

## 2020-12-14 ASSESSMENT — LIFESTYLE VARIABLES
HOW OFTEN DURING THE LAST YEAR HAVE YOU FAILED TO DO WHAT WAS NORMALLY EXPECTED FROM YOU BECAUSE OF DRINKING: 0
HAVE YOU OR SOMEONE ELSE BEEN INJURED AS A RESULT OF YOUR DRINKING: 0
HOW OFTEN DO YOU HAVE A DRINK CONTAINING ALCOHOL: 2
HAS A RELATIVE, FRIEND, DOCTOR, OR ANOTHER HEALTH PROFESSIONAL EXPRESSED CONCERN ABOUT YOUR DRINKING OR SUGGESTED YOU CUT DOWN: 0
AUDIT TOTAL SCORE: 2
HOW OFTEN DURING THE LAST YEAR HAVE YOU FOUND THAT YOU WERE NOT ABLE TO STOP DRINKING ONCE YOU HAD STARTED: 0
HOW OFTEN DO YOU HAVE SIX OR MORE DRINKS ON ONE OCCASION: 0
AUDIT-C TOTAL SCORE: 2
HOW MANY STANDARD DRINKS CONTAINING ALCOHOL DO YOU HAVE ON A TYPICAL DAY: 0
HOW OFTEN DURING THE LAST YEAR HAVE YOU HAD A FEELING OF GUILT OR REMORSE AFTER DRINKING: 0
HOW OFTEN DURING THE LAST YEAR HAVE YOU BEEN UNABLE TO REMEMBER WHAT HAPPENED THE NIGHT BEFORE BECAUSE YOU HAD BEEN DRINKING: 0
HOW OFTEN DURING THE LAST YEAR HAVE YOU NEEDED AN ALCOHOLIC DRINK FIRST THING IN THE MORNING TO GET YOURSELF GOING AFTER A NIGHT OF HEAVY DRINKING: 0

## 2020-12-14 NOTE — PATIENT INSTRUCTIONS
Personalized Preventive Plan for Ahsa Abbasi - 12/14/2020  Medicare offers a range of preventive health benefits. Some of the tests and screenings are paid in full while other may be subject to a deductible, co-insurance, and/or copay. Some of these benefits include a comprehensive review of your medical history including lifestyle, illnesses that may run in your family, and various assessments and screenings as appropriate. After reviewing your medical record and screening and assessments performed today your provider may have ordered immunizations, labs, imaging, and/or referrals for you. A list of these orders (if applicable) as well as your Preventive Care list are included within your After Visit Summary for your review. Other Preventive Recommendations:    · A preventive eye exam performed by an eye specialist is recommended every 1-2 years to screen for glaucoma; cataracts, macular degeneration, and other eye disorders. · A preventive dental visit is recommended every 6 months. · Try to get at least 150 minutes of exercise per week or 10,000 steps per day on a pedometer . · Order or download the FREE \"Exercise & Physical Activity: Your Everyday Guide\" from [employer]. Call 5-774.295.6720 or search [employer] online. · You need 9577-4158 mg of calcium and 2264-4731 IU of vitamin D per day. It is possible to meet your calcium requirement with diet alone, but a vitamin D supplement is usually necessary to meet this goal.  · When exposed to the sun, use a sunscreen that protects against both UVA and UVB radiation with an SPF of 30 or greater. Reapply every 2 to 3 hours or after sweating, drying off with a towel, or swimming. · Always wear a seat belt when traveling in a car. Always wear a helmet when riding a bicycle or motorcycle.

## 2020-12-14 NOTE — PROGRESS NOTES
sertraline (ZOLOFT) 50 MG tablet Take 1 tablet by mouth daily Yes SHAKIRA Saldana CNP   omeprazole (PRILOSEC) 40 MG delayed release capsule Take 1 capsule by mouth daily Yes SHAKIRA Saldana CNP   flecainide (TAMBOCOR) 100 MG tablet Take 1 tablet by mouth 2 times daily Yes SHAKIRA Winters CNP   torsemide (DEMADEX) 20 MG tablet TAKE 2 TABLETS BY MOUTH EVERY DAY Yes SHAKIRA Winters CNP   atenolol (TENORMIN) 50 MG tablet Take 0.5 tablets by mouth daily Yes SHAKIRA Man CNP   Insulin Pen Needle 31G X 5 MM MISC 1 each by Does not apply route daily Yes SHAKIRA Saldana CNP   isosorbide mononitrate (IMDUR) 30 MG extended release tablet Take 1 tablet by mouth daily Yes SHAKIRA Saldana CNP   atorvastatin (LIPITOR) 40 MG tablet Take 1 tablet by mouth nightly Yes SHAKIRA Dixon CNP   azelastine (ASTELIN) 0.1 % nasal spray USE 1 SPRAY IN EACH NOSTRIL TWICE DAILY AS DIRECTED Yes SHAKIRA Dixon CNP   hydrALAZINE (APRESOLINE) 50 MG tablet Take 0.5 tablets by mouth every 8 hours Yes SHAKIRA Winters CNP   montelukast (SINGULAIR) 10 MG tablet TAKE 1 TABLET BY MOUTH EVERY DAY Yes SHAKIRA Dixon CNP   Glucosamine-Chondroitin (GLUCOSAMINE CHONDR COMPLEX PO) Take 1 tablet by mouth 2 times daily Yes Historical Provider, MD   blood glucose monitor kit and supplies by Other route daily One Touch Ultra Yes SHAKIRA Dixon CNP   traZODone (DESYREL) 50 MG tablet TAKE 1 TABLET BY MOUTH EVERY DAY AT NIGHT Yes SHAKIRA Dixon CNP   glucose blood VI test strips (ASCENSIA AUTODISC VI;ONE TOUCH ULTRA TEST VI) strip DX: E11.9 FSBS daily.  One Touch ultra Yes SHAKIRA Saldana CNP   aspirin 81 MG chewable tablet Take 1 tablet by mouth daily Yes Darren Cannon MD   Multiple Vitamins-Minerals (THERAPEUTIC MULTIVITAMIN-MINERALS) tablet Take 1 tablet by mouth daily Yes Historical Provider, MD   ferrous sulfate 325 (65 FE) MG tablet Take 325 mg by mouth daily. Yes Historical Provider, MD       Past Medical History:   Diagnosis Date    Allergic rhinitis     Arthritis     Bladder fistula     resolved    C. difficile diarrhea 8/18/2015    +PCR    Cellulitis of right lower extremity 3/23/2016    Dental disease     Diabetes ([address]     Diverticulitis     Dizziness     DVT of lower extremity, bilateral ([address] 10/16/2013    GERD (gastroesophageal reflux disease)     Headache     Hearing loss     Hematuria 1/2/2014    Hyperlipidemia     Hypertension     Lung disease     MONIQUE (obstructive sleep apnea)     Pancreatitis ([address] 8/24/2013    Pulmonary emboli ([address] 2013    after cholecystectomy     Rash     Sleep apnea     Tinnitus        Past Surgical History:   Procedure Laterality Date    ABDOMEN SURGERY  05/16/2014    reveseral end peristomal hernia repair.     CARDIOVERSION  12/04/2019    Dr. Sonja Carreon, OPEN N/A 9-17-13    LAPAROSCOPIC CONVERTED TO OPEN CHOLECYSTECTOMY WITH    COLON SURGERY      COLONOSCOPY  2008    COLONOSCOPY  12/4/2013    Severe Diverticulosis unable to finish    COLONOSCOPY  4/30/14    diverticula-10 year f/u    COLOSTOMY  Jan 2014    CYSTOSCOPY  12/10/13    with bladder biopsy    CYSTOSCOPY  1-28-14    Cystourethroscopy, left ureteral catheter     EPIDURAL STEROID INJECTION Right 1/21/2019    RIGHT LUMBAR TWO THREE EPIDURAL STEROID INJECTION SITE CONFIRMED BY FLUROOSCOPY performed by [facility] Ochoa MD at [facility] Right 2/11/2019    RIGHT LUMBAR TWO THREE EPIDURAL STEROID INJECTION SITE CONFIRMED BY FLUOROSCOPY performed by Amanda Ochoa MD at [place] [address]  08/14/2015    [address] [place], BILATERAL COMPONENT SEPARATION, LYSIS OF ADHESIONS    OTHER SURGICAL HISTORY  1-28-14    LeftColectomy with End Colostomy, Splenic Flexure Mobilization, Takedown of Colovesical Fistula    SD INJECTION HIP [facility] Right 9/19/2018    ARTHROGRAM AND CORTISONE INJECTION RIGHT HIP performed by Willow Carlson MD at [address] Right 2003    Fracture lower leg during chain saw accident. Surgery x 2       Family History   Problem Relation Age of Onset    Heart Disease Father     Heart Attack Father     Stroke Brother     Heart Disease Brother     High Blood Pressure Brother     Kidney Disease Mother         kidney removed       CareTeam (Including outside providers/suppliers regularly involved in providing care):   Patient Care Team:  SHAKIRA Carrasquillo CNP as PCP - General (Nurse Practitioner)  SHAKIRA [facility]rasquillo CNP as PCP - [facility]  Endy Mayer MD as Consulting Physician (Urology)  Cheryl Delgado MD as Wound Care (General Surgery)  Bridget Mendoza MD as Consulting Physician (Vascular Surgery)  Zachary Granado MD as Consulting Physician (General Surgery)  Francisco Leiva MD as Consulting Physician (Otolaryngology)  Zohreh Arias DPM as Consulting Physician (Podiatry)  Errol Hamman, MD as Consulting Physician (Pulmonary Disease)  SHAKIRA Mart CNP as Nurse Practitioner (Cardiology)  Iván Mcmillan MD (Ophthalmology)  Umm Guzmán MD as Consulting Physician (Dermatology)  SHAKIRA Carrasquillo CNP as Nurse Practitioner (Family Nurse Practitioner)    Wt Readings from Last 3 Encounters:   12/14/20 (!) 305 lb 6.4 oz (138.5 kg)   09/29/20 298 lb (135.2 kg)   09/14/20 298 lb 3.2 oz (135.3 kg)     Vitals:    12/14/20 1426   BP: 138/82   Site: Left Upper Arm   Position: Sitting   Cuff Size: Large Adult   Pulse: 68   Resp: 16   Temp: 97.1 °F (36.2 °C)   TempSrc: Temporal   Weight: (!) 305 lb 6.4 oz (138.5 kg)   Height: 5' 9\" (1.753 m)     Body mass index is 45.1 kg/m².     Based upon direct observation of the patient, evaluation of cognition reveals recent and remote memory driving, watching TV, or doing any of your daily activities because of your eyesight?: No  Hearing/Vision Interventions:  · Hearing concerns:  has appt tomorrow for audiology appt      Personalized Preventive Plan   Current Health Maintenance Status  Immunization History   Administered Date(s) Administered    Influenza 09/20/2012    Influenza A ([id number]) Vaccine PF IM 01/26/2010    Influenza Virus Vaccine 09/18/2013    Influenza, High Dose (Fluzone 65 yrs and older) 10/13/2014, 09/16/2015, 09/26/2016, 10/26/2017, 10/16/2018    Influenza, Quadv, adjuvanted, 65 yrs +, IM, PF (Fluad) 09/14/2020    Influenza, Triv, inactivated, subunit, adjuvanted, IM (Fluad 65 yrs and older) 09/16/2019    Pneumococcal Conjugate 13-valent ([id number]) 12/16/2015    Pneumococcal Polysaccharide ([id number]) 08/17/2013, 10/19/2013, 10/13/2014    Td, unspecified formulation 12/08/2014, 12/08/2014    Zoster Live (Zostavax) 06/25/2014        Health Maintenance   Topic Date Due    Shingles Vaccine (2 of 3) 08/20/2014    Diabetic retinal exam  11/20/2016    Annual Wellness Visit (AWV)  01/16/2020    Lipid screen  12/12/2020    DTaP/Tdap/Td vaccine (1 - Tdap) 01/16/2021 (Originally 10/19/1969)    Diabetic foot exam  01/16/2021    Diabetic microalbuminuria test  01/16/2021    Potassium monitoring  06/15/2021    Creatinine monitoring  06/15/2021    A1C test (Diabetic or Prediabetic)  12/14/2021    Colon cancer screen colonoscopy  04/30/2024    Flu vaccine  Completed    Pneumococcal 65+ years Vaccine  Completed    Hepatitis C screen  Completed    Hepatitis A vaccine  Aged Out    Hib vaccine  Aged Out    Meningococcal (ACWY) vaccine  Aged Out     Recommendations for [employer] Due: see orders and patient instructions/AVS.  .   Recommended screening schedule for the next 5-10 years is provided to the patient in written form: see Patient Instructions/AVS.       Will increase toujeo to 45 units in the morning and 40 units in the evening. Marnie Bose was seen today for medicare awv. Diagnoses and all orders for this visit:    Type 2 diabetes mellitus with hyperglycemia, with long-term current use of insulin (HCC)  -     POCT glycosylated hemoglobin (Hb A1C)  -     Insulin Glargine, 2 Unit Dial, (TOUJEO MAX SOLOSTAR) 300 UNIT/ML SOPN; 45 units morning and 40units in the evening. Routine general medical examination at a health care facility    Essential hypertension  -     Comprehensive Metabolic Panel; Future  -     CBC Auto Differential; Future    Mixed hyperlipidemia  -     Lipid Panel; Future  -     Comprehensive Metabolic Panel; Future  -     atorvastatin (LIPITOR) 40 MG tablet;  Take 1 tablet by mouth nightly

## 2021-01-04 RX ORDER — FLECAINIDE ACETATE 100 MG/1
TABLET ORAL
Qty: 180 TABLET | Refills: 1 | Status: SHIPPED | OUTPATIENT
Start: 2021-01-04 | End: 2021-06-30

## 2021-01-11 ENCOUNTER — OFFICE VISIT (OUTPATIENT)
Dept: CARDIOLOGY CLINIC | Age: 71
End: 2021-01-11
Payer: MEDICARE

## 2021-01-11 VITALS
HEART RATE: 63 BPM | HEIGHT: 69 IN | BODY MASS INDEX: 45.47 KG/M2 | WEIGHT: 307 LBS | SYSTOLIC BLOOD PRESSURE: 106 MMHG | OXYGEN SATURATION: 98 % | DIASTOLIC BLOOD PRESSURE: 68 MMHG

## 2021-01-11 DIAGNOSIS — I50.32 CHRONIC DIASTOLIC CONGESTIVE HEART FAILURE (HCC): Primary | ICD-10-CM

## 2021-01-11 DIAGNOSIS — I35.0 NONRHEUMATIC AORTIC VALVE STENOSIS: ICD-10-CM

## 2021-01-11 DIAGNOSIS — D50.9 IRON DEFICIENCY ANEMIA, UNSPECIFIED IRON DEFICIENCY ANEMIA TYPE: ICD-10-CM

## 2021-01-11 DIAGNOSIS — I48.0 PAROXYSMAL ATRIAL FIBRILLATION (HCC): ICD-10-CM

## 2021-01-11 PROCEDURE — 99214 OFFICE O/P EST MOD 30 MIN: CPT | Performed by: NURSE PRACTITIONER

## 2021-01-11 NOTE — LETTER
[address]   Cardiac Follow-up    Primary Care Doctor:  Vanessa Plascencia, APRN - CNP    Chief Complaint   Patient presents with    Follow-up    Congestive Heart Failure        History of Present Illness:   I had the pleasure of seeing Alejandro Saldivar in follow up for diastolic heart failure. Hx of CKD, DM, MONIQUE. [place] [facility] [place]; ED visit on 9/5/18 with shortness of breath. S/p successful  DCCV 12/4/19 for atrial fib/flutter and started on flecainide. Since last visit, he has been having LE edema. Seeing podiatry- legs being wrapped. Had some sores; reports that his legs are improving. No chest pain. Breathing is stable. He is not very active. Compliant with CPAP. He is worried that he wont be able to go camping this year, [employer] was sold. this is his main activity of the year. Able to complete few chores around the house; denies any  Shortness of breath these. He tries to stay busy. frustrated with not being able to see his family through the pandemic. He has been compliant with his medications, he is taking torsemide 1.5 tablets daily. Denies any bleeding. Appetite is fair. No Nausea vomiting or diarrhea. Out of iron for the last 1 month     Home weights: 303lbs, not checking daily. Alejandro Saldivar describes symptoms including fatigue, edema but denies chest pain, syncope. NYHA:   II-III  ACC/ AHA Stage:    C       Past Medical History:   has a past medical history of Allergic rhinitis, Arthritis, Bladder fistula, C. difficile diarrhea, Cellulitis of right lower extremity, Dental disease, Diabetes ([address] Diverticulitis, Dizziness, DVT of lower extremity, bilateral ([address] GERD (gastroesophageal reflux disease), Headache, Hearing loss, Hematuria, Hyperlipidemia, Hypertension, Lung disease, MONIQUE (obstructive sleep apnea), Pancreatitis ([address] Pulmonary emboli ([address] Rash, Sleep apnea, and Tinnitus.   Surgical History: has a past surgical history that includes Tibia fracture surgery (Right, 2003); Cholecystectomy, open (N/A, 9-17-13); Cystocopy (12/10/13); Cystoscopy (1-28-14); other surgical history (1-28-14); Colonoscopy (2008); Colonoscopy (12/4/2013); Colonoscopy (4/30/14); colostomy (Jan 2014); Colon surgery; Abdomen surgery (05/16/2014); hernia repair (08/14/2015); pr injection hip arthrogram,anesth (Right, 9/19/2018); epidural steroid injection (Right, 1/21/2019); epidural steroid injection (Right, 2/11/2019); and Cardioversion (12/04/2019). Social History:   reports that he is a non-smoker but has been exposed to tobacco smoke. He has never used smokeless tobacco. He reports current alcohol use. He reports that he does not use drugs. Family History:   Family History   Problem Relation Age of Onset    Heart Disease Father     Heart Attack Father     Stroke Brother     Heart Disease Brother     High Blood Pressure Brother     Kidney Disease Mother         kidney removed       Home Medications:  Prior to Admission medications    Medication Sig Start Date End Date Taking? Authorizing Provider   flecainide (TAMBOCOR) 100 MG tablet TAKE 1 TABLET BY MOUTH TWICE A DAY 1/4/21   FREDERICK Grant MD   isosorbide mononitrate (IMDUR) 30 MG extended release tablet TAKE 1 TABLET BY MOUTH EVERY DAY 12/17/20   Severo Morn, APRN - CNP   montelukast (SINGULAIR) 10 MG tablet TAKE 1 TABLET BY MOUTH EVERY DAY 12/17/20   Severo Morn, APRN - CNP   Insulin Glargine, 2 Unit Dial, (TOUJEO MAX SOLOSTAR) 300 UNIT/ML SOPN 45 units morning and 40units in the evening. 12/14/20   Severo Morn, APRN - CNP   atorvastatin (LIPITOR) 40 MG tablet Take 1 tablet by mouth nightly 12/14/20   Severo Morn, APRN - CNP   silver sulfADIAZINE (SILVADENE) 1 % cream Apply topically daily.  10/20/20   Severo Morn, APRN - CNP aspirin 81 MG chewable tablet Take 1 tablet by mouth daily 10/21/17   Morgan Isaacs MD   Multiple Vitamins-Minerals (THERAPEUTIC MULTIVITAMIN-MINERALS) tablet Take 1 tablet by mouth daily    Historical Provider, MD   ferrous sulfate 325 (65 FE) MG tablet Take 325 mg by mouth daily. Historical Provider, MD      Allergies:  Norco [hydrocodone-acetaminophen]     Physical Examination:    Vitals:    01/11/21 0828   BP: 106/68   Pulse: 63   SpO2: 98%   Weight: (!) 307 lb (139.3 kg)   Height: 5' 9\" (1.753 m)        Constitutional and General Appearance: no apparent distress, obese  HEENT: non-icteric sclera, mask in place  Neck: difficult to assess JVD due to body habitus  Respiratory:  · No use of accessory muscles  · Dim breath sounds throughout, no wheezing, no crackles, no rhonchi  Cardiovascular:  · The apical impulses not displaced  · 2 /6 systolic murmur. no rub/gallop  · Reg rate and regular rhythm, S1,S2 normal  · Radial pulses 2+ and equal bilaterally  · Bilateral lower extremities wrapped  Abdomen:   · No masses or tenderness  · Liver: No Abnormalities Noted  Musculoskeletal/Skin:  · Gait is slow walks with cane  · There is no clubbing, cyanosis of the extremities  · Skin is warm and dry  · Moves all extremities well  Neurological/Psychiatric:  · Alert and oriented in all spheres  · No abnormalities of mood, affect, memory, mentation, or behavior are noted    Lab Data:  CBC:   Lab Results   Component Value Date    WBC 7.8 12/04/2019    WBC 8.9 07/15/2019    WBC 7.5 09/05/2018    RBC 5.59 12/04/2019    RBC 4.80 07/15/2019    RBC 5.22 09/05/2018    HGB 15.9 12/04/2019    HGB 13.8 07/15/2019    HGB 14.6 09/05/2018    HCT 48.2 12/04/2019    HCT 41.8 07/15/2019    HCT 45.0 09/05/2018    MCV 86.4 12/04/2019    MCV 87.1 07/15/2019    MCV 86.1 09/05/2018    RDW 15.5 12/04/2019    RDW 16.0 07/15/2019    RDW 16.7 09/05/2018     12/04/2019     07/15/2019     09/05/2018     BMP: Lab Results   Component Value Date     06/15/2020     12/12/2019     12/04/2019    K 4.4 06/15/2020    K 4.9 12/12/2019    K 4.5 12/04/2019    K 4.0 08/06/2019    K 4.3 03/01/2018     06/15/2020    CL 96 12/12/2019     12/04/2019    CO2 27 06/15/2020    CO2 27 12/12/2019    CO2 28 12/04/2019    PHOS 3.7 03/01/2018    PHOS 3.7 05/21/2014    PHOS 2.7 05/19/2014    BUN 19 06/15/2020    BUN 33 12/12/2019    BUN 38 12/04/2019    CREATININE 0.6 06/15/2020    CREATININE 0.8 12/12/2019    CREATININE 0.9 12/04/2019     BNP:   Lab Results   Component Value Date    PROBNP 260 05/07/2019    PROBNP 315 01/04/2019    PROBNP 245 12/13/2018       Recent Testing:  ECHO: 10/17/17  Left ventricular systolic function is normal with the ejection fraction estimated at 55%. No regional wall motion abnormalities. Moderate concentric left ventricular hypertrophy. Grade II diastolic dysfunction with elevated filling pressure. Severe bi-atrial enlargement. Right ventricle is moderately enlarged. Mild aortic stenosis. Systolic pulmonary artery pressure (SPAP) is normal and estimated at 26 mmHg   (RA pressure 3 mmHg). Stress test on 10/18/17:  Negative    Echo 6/2019  Summary   Technically difficult examination due to body habitus. IV access was attempted but could not be obtained.   LV systolic function appears normal with a visually estimated EF of 55%.   Endocardium not entirely well visualized but no obvious segmental wall  motion abnormalities.   Mild left ventricular hypertrophy.   Grade III diastolic dysfunction with elevated LV filling pressures.   Individual AV leaflets are not well visualized but appear calcified and thickened with adequate opening c/w AV sclerosis.   The right ventricle is not well visualized but appears mildly dilated in size.   Moderate bi-atrial enlargement.   Frequent PVCs during exam.    Echo 8/11/20  Summary   Technically difficult examination. Normal LV systolic function with an estimated EF of 55%. No regional wall motion abnormalities are seen. Type II diastolic dysfunction with elevated filling pressure. Lipomatous hypertrophy of the interatrial septum. Mild bi-atrial enlargement. Mild mitral annular calcification. Maximal transaortic velocity is 2.62m/s which gives peak pressure gradient=27mmHg and mean pressure gradient= 15mmHg c/w mild AS. Trace mitral and tricuspid regurgitation. Definity contrast administered with no definite evidence of LV mass or thrombus noted. Systolic pulmonary artery pressure (SPAP) estimated at 40mmHg (RAP 3mmHg), consistent with mild pulm HTN. Assessment:  ·  Diastolic heart failure from hypertensive heart disease; NYHA class II-III  · CAD based on coronary calcification on CT chest; negative stress 10/18/17  · Shortness of breath-multifactorial; due diastolic heart failure, morbid obesity, cor pulmonale  · Dilated right ventricle due to cor pulmonale vs heart failure  · Mild aortic stenosis  · Chronic hypoxemic respiratory failure- off oxygen  · Restrictive lung defect  · Severe MONIQUE on CPAP  · Atypical atrial flutter; s/p DCCV 12/4/19; On flecainide   ·  [id number] score 4    Visit Diagnosis:    1. Chronic diastolic congestive heart failure ([address]    2. Iron deficiency anemia, unspecified iron deficiency anemia type    3. Nonrheumatic aortic valve stenosis    4. Paroxysmal atrial fibrillation (HCC)      Plan:  Echocardiogram results were discussed in detail with patient today. He is due to have blood work through his primary care. He appears euvolemic and his heart failure stable.   Check weights every day  Check labs as planned add on iron studies; okay to stop iron for now until labs reviewed   Continue torsemide 1.5 tabs daily   Spironolactone (aldactone) 25mg once a day  Continue imdur 30mg daily   hydralazine to 25mg three times a day   atenolol to 25mg daily; continue flecainide per EP Continue Eliquis  Stay as active as possible  Follow up with EP as planned  Follow up with heart failure team 6 months with repeat echocardiogram    QUALITY MEASURES  1. Tobacco Cessation Counseling: NA  2. Retake of BP if >140/90:   NA  3. Documentation to PCP/referring for new patient:  Sent to PCP at close of office visit  4. CAD patient on anti-platelet: Yes  5. CAD patient on STATIN therapy:  Yes  6. Patient with CHF and aFib on anticoagulation:  YES    I appreciate the opportunity for caring for this patient.      Xochilt Finnegan CNP, 1/11/2021, 9:18 AM

## 2021-01-11 NOTE — PATIENT INSTRUCTIONS
Check weights every day  Check labs as planned add on iron studies; okay to stop iron for now until labs reviewed   Continue torsemide 1.5 tabs daily   Spironolactone (aldactone) 25mg once a day  Continue imdur 30mg daily   hydralazine to 25mg three times a day   atenolol to 25mg daily; continue flecainide per EP  Continue Eliquis  Stay as active as possible  Follow up with EP as planned  Follow up with heart failure team 6 months

## 2021-01-11 NOTE — PROGRESS NOTES
[facility]   Cardiac Follow-up    Primary Care Doctor:  SHAKIRA Diaz - CNP    Chief Complaint   Patient presents with    Follow-up    Congestive Heart Failure        History of Present Illness:   I had the pleasure of seeing Taco Holguin in follow up for diastolic heart failure. Hx of CKD, DM, MONIQUE. [place] [facility] [place] visit on 9/5/18 with shortness of breath. S/p successful  DCCV 12/4/19 for atrial fib/flutter and started on flecainide. Since last visit, he has been having LE edema. Seeing podiatry- legs being wrapped. Had some sores; reports that his legs are improving. No chest pain. Breathing is stable. He is not very active. Compliant with CPAP. He is worried that he wont be able to go camping this year, [employer] was sold. this is his main activity of the year. Able to complete few chores around the house; denies any  Shortness of breath these. He tries to stay busy. frustrated with not being able to see his family through the pandemic. He has been compliant with his medications, he is taking torsemide 1.5 tablets daily. Denies any bleeding. Appetite is fair. No Nausea vomiting or diarrhea. Out of iron for the last 1 month     Home weights: 303lbs, not checking daily. Taco Holguin describes symptoms including fatigue, edema but denies chest pain, syncope. NYHA:   II-III  ACC/ AHA Stage:    C       Past Medical History:   has a past medical history of Allergic rhinitis, Arthritis, Bladder fistula, C. difficile diarrhea, Cellulitis of right lower extremity, Dental disease, Diabetes ([address] Diverticulitis, Dizziness, DVT of lower extremity, bilateral ([address] GERD (gastroesophageal reflux disease), Headache, Hearing loss, Hematuria, Hyperlipidemia, Hypertension, Lung disease, MONIQUE (obstructive sleep apnea), Pancreatitis ([address] Pulmonary emboli ([address] Rash, Sleep apnea, and Tinnitus.   Surgical History:   has a past surgical history that includes Tibia fracture surgery (Right, 2003); Cholecystectomy, open (N/A, 9-17-13); Cystocopy (12/10/13); Cystoscopy (1-28-14); other surgical history (1-28-14); Colonoscopy (2008); Colonoscopy (12/4/2013); Colonoscopy (4/30/14); colostomy (Jan 2014); Colon surgery; Abdomen surgery (05/16/2014); hernia repair (08/14/2015); pr injection hip arthrogram,anesth (Right, 9/19/2018); epidural steroid injection (Right, 1/21/2019); epidural steroid injection (Right, 2/11/2019); and Cardioversion (12/04/2019). Social History:   reports that he is a non-smoker but has been exposed to tobacco smoke. He has never used smokeless tobacco. He reports current alcohol use. He reports that he does not use drugs. Family History:   Family History   Problem Relation Age of Onset    Heart Disease Father     Heart Attack Father     Stroke Brother     Heart Disease Brother     High Blood Pressure Brother     Kidney Disease Mother         kidney removed       Home Medications:  Prior to Admission medications    Medication Sig Start Date End Date Taking? Authorizing Provider   flecainide (TAMBOCOR) 100 MG tablet TAKE 1 TABLET BY MOUTH TWICE A DAY 1/4/21   FREDERICK Gomez MD   isosorbide mononitrate (IMDUR) 30 MG extended release tablet TAKE 1 TABLET BY MOUTH EVERY DAY 12/17/20   SHAKIRA Perera CNP   montelukast (SINGULAIR) 10 MG tablet TAKE 1 TABLET BY MOUTH EVERY DAY 12/17/20   SHAKIRA Perera CNP   Insulin Glargine, 2 Unit Dial, (TOUJEO MAX SOLOSTAR) 300 UNIT/ML SOPN 45 units morning and 40units in the evening. 12/14/20   SHAKIRA Perera CNP   atorvastatin (LIPITOR) 40 MG tablet Take 1 tablet by mouth nightly 12/14/20   SHAKIRA Perera CNP   silver sulfADIAZINE (SILVADENE) 1 % cream Apply topically daily.  10/20/20   SHAKIRA Perera CNP   glimepiride (AMARYL) 4 MG tablet TAKE 1 TABLET BY MOUTH TWICE A DAY 10/7/20   SHAKIRA Perera CNP apixaban (ELIQUIS) 5 MG TABS tablet Take 1 tablet by mouth 2 times daily 10/1/20   SHAKIRA Williamson CNP   Handicap Placard MISC by Does not apply route Please issue for duration of 5 years 9/24/20   SHAKIRA Schmid CNP   Handicap Placard MISC by Does not apply route Please issue for duration of 5 years 9/24/20   SHAKIRA Schmid CNP   spironolactone (ALDACTONE) 25 MG tablet Take 1 tablet by mouth daily 9/22/20   SHAKIRA Bobo CNP   sertraline (ZOLOFT) 50 MG tablet Take 1 tablet by mouth daily 7/21/20   SHAKIRA Schmid CNP   omeprazole (PRILOSEC) 40 MG delayed release capsule Take 1 capsule by mouth daily 7/11/20   SHAKIRA Schmid CNP   torsemide (DEMADEX) 20 MG tablet TAKE 2 TABLETS BY MOUTH EVERY DAY 6/11/20   SHAKIRA Williamson CNP   atenolol (TENORMIN) 50 MG tablet Take 0.5 tablets by mouth daily 5/4/20   SHAKIRA Hernandez CNP   Insulin Pen Needle 31G X 5 MM MISC 1 each by Does not apply route daily 2/24/20   SHAKIRA Schmid CNP   azelastine (ASTELIN) 0.1 % nasal spray USE 1 SPRAY IN EACH NOSTRIL TWICE DAILY AS DIRECTED 2/4/20   SHAKIRA Schmid CNP   hydrALAZINE (APRESOLINE) 50 MG tablet Take 0.5 tablets by mouth every 8 hours 1/28/20   SHAKIRA Williamson CNP   Glucosamine-Chondroitin (GLUCOSAMINE CHONDR COMPLEX PO) Take 1 tablet by mouth 2 times daily    Historical Provider, MD   blood glucose monitor kit and supplies by Other route daily One Touch Ultra 4/15/19   SHAKIRA Schmid CNP   traZODone (DESYREL) 50 MG tablet TAKE 1 TABLET BY MOUTH EVERY DAY AT NIGHT 3/29/19   SHAKIRA Schmid CNP   glucose blood VI test strips (ASCENSIA AUTODISC VI;ONE TOUCH ULTRA TEST VI) strip DX: E11.9 FSBS daily.  One Touch ultra 11/29/17   Grace Cradle, APRN - CNP   aspirin 81 MG chewable tablet Take 1 tablet by mouth daily 10/21/17   Jonas Beck MD   Multiple Vitamins-Minerals (THERAPEUTIC MULTIVITAMIN-MINERALS) tablet Take 1 tablet by mouth daily    Historical Provider, MD   ferrous sulfate 325 (65 FE) MG tablet Take 325 mg by mouth daily. Historical Provider, MD      Allergies:  Norco [hydrocodone-acetaminophen]     Physical Examination:    Vitals:    01/11/21 0828   BP: 106/68   Pulse: 63   SpO2: 98%   Weight: (!) 307 lb (139.3 kg)   Height: 5' 9\" (1.753 m)        Constitutional and General Appearance: no apparent distress, obese  HEENT: non-icteric sclera, mask in place  Neck: difficult to assess JVD due to body habitus  Respiratory:  · No use of accessory muscles  · Dim breath sounds throughout, no wheezing, no crackles, no rhonchi  Cardiovascular:  · The apical impulses not displaced  · 2 /6 systolic murmur. no rub/gallop  · Reg rate and regular rhythm, S1,S2 normal  · Radial pulses 2+ and equal bilaterally  · Bilateral lower extremities wrapped  Abdomen:   · No masses or tenderness  · Liver: No Abnormalities Noted  Musculoskeletal/Skin:  · Gait is slow walks with cane  · There is no clubbing, cyanosis of the extremities  · Skin is warm and dry  · Moves all extremities well  Neurological/Psychiatric:  · Alert and oriented in all spheres  · No abnormalities of mood, affect, memory, mentation, or behavior are noted    Lab Data:  CBC:   Lab Results   Component Value Date    WBC 7.8 12/04/2019    WBC 8.9 07/15/2019    WBC 7.5 09/05/2018    RBC 5.59 12/04/2019    RBC 4.80 07/15/2019    RBC 5.22 09/05/2018    HGB 15.9 12/04/2019    HGB 13.8 07/15/2019    HGB 14.6 09/05/2018    HCT 48.2 12/04/2019    HCT 41.8 07/15/2019    HCT 45.0 09/05/2018    MCV 86.4 12/04/2019    MCV 87.1 07/15/2019    MCV 86.1 09/05/2018    RDW 15.5 12/04/2019    RDW 16.0 07/15/2019    RDW 16.7 09/05/2018     12/04/2019     07/15/2019     09/05/2018     BMP:   Lab Results   Component Value Date     06/15/2020     12/12/2019     12/04/2019    K 4.4 06/15/2020    K 4.9 12/12/2019    K 4.5 12/04/2019    K 4.0 08/06/2019    K 4.3 03/01/2018     06/15/2020    CL 96 12/12/2019     12/04/2019    CO2 27 06/15/2020    CO2 27 12/12/2019    CO2 28 12/04/2019    PHOS 3.7 03/01/2018    PHOS 3.7 05/21/2014    PHOS 2.7 05/19/2014    BUN 19 06/15/2020    BUN 33 12/12/2019    BUN 38 12/04/2019    CREATININE 0.6 06/15/2020    CREATININE 0.8 12/12/2019    CREATININE 0.9 12/04/2019     BNP:   Lab Results   Component Value Date    PROBNP 260 05/07/2019    PROBNP 315 01/04/2019    PROBNP 245 12/13/2018       Recent Testing:  ECHO: 10/17/17  Left ventricular systolic function is normal with the ejection fraction estimated at 55%. No regional wall motion abnormalities. Moderate concentric left ventricular hypertrophy. Grade II diastolic dysfunction with elevated filling pressure. Severe bi-atrial enlargement. Right ventricle is moderately enlarged. Mild aortic stenosis. Systolic pulmonary artery pressure (SPAP) is normal and estimated at 26 mmHg   (RA pressure 3 mmHg). Stress test on 10/18/17:  Negative    Echo 6/2019  Summary   Technically difficult examination due to body habitus. IV access was attempted but could not be obtained.   LV systolic function appears normal with a visually estimated EF of 55%.   Endocardium not entirely well visualized but no obvious segmental wall  motion abnormalities.   Mild left ventricular hypertrophy.   Grade III diastolic dysfunction with elevated LV filling pressures.   Individual AV leaflets are not well visualized but appear calcified and thickened with adequate opening c/w AV sclerosis.   The right ventricle is not well visualized but appears mildly dilated in size.   Moderate bi-atrial enlargement.   Frequent PVCs during exam.    Echo 8/11/20  Summary   Technically difficult examination. Normal LV systolic function with an estimated EF of 55%. No regional wall motion abnormalities are seen.    Type II diastolic dysfunction with elevated filling pressure. Lipomatous hypertrophy of the interatrial septum. Mild bi-atrial enlargement. Mild mitral annular calcification. Maximal transaortic velocity is 2.62m/s which gives peak pressure gradient=27mmHg and mean pressure gradient= 15mmHg c/w mild AS. Trace mitral and tricuspid regurgitation. Definity contrast administered with no definite evidence of LV mass or thrombus noted. Systolic pulmonary artery pressure (SPAP) estimated at 40mmHg (RAP 3mmHg), consistent with mild pulm HTN. Assessment:  ·  Diastolic heart failure from hypertensive heart disease; NYHA class II-III  · CAD based on coronary calcification on CT chest; negative stress 10/18/17  · Shortness of breath-multifactorial; due diastolic heart failure, morbid obesity, cor pulmonale  · Dilated right ventricle due to cor pulmonale vs heart failure  · Mild aortic stenosis  · Chronic hypoxemic respiratory failure- off oxygen  · Restrictive lung defect  · Severe MONIQUE on CPAP  · Atypical atrial flutter; s/p DCCV 12/4/19; On flecainide   ·  [id number] score 4    Visit Diagnosis:    1. Chronic diastolic congestive heart failure ([address]    2. Iron deficiency anemia, unspecified iron deficiency anemia type    3. Nonrheumatic aortic valve stenosis    4. Paroxysmal atrial fibrillation (HCC)      Plan:  Echocardiogram results were discussed in detail with patient today. He is due to have blood work through his primary care. He appears euvolemic and his heart failure stable.   Check weights every day  Check labs as planned add on iron studies; okay to stop iron for now until labs reviewed   Continue torsemide 1.5 tabs daily   Spironolactone (aldactone) 25mg once a day  Continue imdur 30mg daily   hydralazine to 25mg three times a day   atenolol to 25mg daily; continue flecainide per EP  Continue Eliquis  Stay as active as possible  Follow up with EP as planned  Follow up with heart failure team 6 months with repeat echocardiogram    QUALITY MEASURES  1. Tobacco Cessation Counseling: NA  2. Retake of BP if >140/90:   NA  3. Documentation to PCP/referring for new patient:  Sent to PCP at close of office visit  4. CAD patient on anti-platelet: Yes  5. CAD patient on STATIN therapy:  Yes  6. Patient with CHF and aFib on anticoagulation:  YES    I appreciate the opportunity for caring for this patient.      Omero Tejeda CNP, 1/11/2021, 9:18 AM

## 2021-01-15 DIAGNOSIS — D50.9 IRON DEFICIENCY ANEMIA, UNSPECIFIED IRON DEFICIENCY ANEMIA TYPE: ICD-10-CM

## 2021-01-15 DIAGNOSIS — E78.2 MIXED HYPERLIPIDEMIA: ICD-10-CM

## 2021-01-15 DIAGNOSIS — I10 ESSENTIAL HYPERTENSION: Chronic | ICD-10-CM

## 2021-01-15 LAB
A/G RATIO: 1.3 (ref 1.1–2.2)
ALBUMIN SERPL-MCNC: 4 G/DL (ref 3.4–5)
ALP BLD-CCNC: 85 U/L (ref 40–129)
ALT SERPL-CCNC: 29 U/L (ref 10–40)
ANION GAP SERPL CALCULATED.3IONS-SCNC: 11 MMOL/L (ref 3–16)
AST SERPL-CCNC: 25 U/L (ref 15–37)
BASOPHILS ABSOLUTE: 0.1 K/UL (ref 0–0.2)
BASOPHILS RELATIVE PERCENT: 1 %
BILIRUB SERPL-MCNC: 1 MG/DL (ref 0–1)
BUN BLDV-MCNC: 22 MG/DL (ref 7–20)
CALCIUM SERPL-MCNC: 9.2 MG/DL (ref 8.3–10.6)
CHLORIDE BLD-SCNC: 102 MMOL/L (ref 99–110)
CHOLESTEROL, TOTAL: 101 MG/DL (ref 0–199)
CO2: 28 MMOL/L (ref 21–32)
CREAT SERPL-MCNC: 0.8 MG/DL (ref 0.8–1.3)
EOSINOPHILS ABSOLUTE: 0.3 K/UL (ref 0–0.6)
EOSINOPHILS RELATIVE PERCENT: 5 %
FERRITIN: 70.2 NG/ML (ref 30–400)
GFR AFRICAN AMERICAN: >60
GFR NON-AFRICAN AMERICAN: >60
GLOBULIN: 3.1 G/DL
GLUCOSE BLD-MCNC: 117 MG/DL (ref 70–99)
HCT VFR BLD CALC: 43.5 % (ref 40.5–52.5)
HDLC SERPL-MCNC: 37 MG/DL (ref 40–60)
HEMOGLOBIN: 14.3 G/DL (ref 13.5–17.5)
IRON SATURATION: 24 % (ref 20–50)
IRON: 78 UG/DL (ref 59–158)
LDL CHOLESTEROL CALCULATED: 46 MG/DL
LYMPHOCYTES ABSOLUTE: 1.2 K/UL (ref 1–5.1)
LYMPHOCYTES RELATIVE PERCENT: 20.5 %
MCH RBC QN AUTO: 27.8 PG (ref 26–34)
MCHC RBC AUTO-ENTMCNC: 32.9 G/DL (ref 31–36)
MCV RBC AUTO: 84.6 FL (ref 80–100)
MONOCYTES ABSOLUTE: 0.5 K/UL (ref 0–1.3)
MONOCYTES RELATIVE PERCENT: 7.5 %
NEUTROPHILS ABSOLUTE: 4 K/UL (ref 1.7–7.7)
NEUTROPHILS RELATIVE PERCENT: 66 %
PDW BLD-RTO: 17.4 % (ref 12.4–15.4)
PLATELET # BLD: 142 K/UL (ref 135–450)
PMV BLD AUTO: 9 FL (ref 5–10.5)
POTASSIUM SERPL-SCNC: 4.4 MMOL/L (ref 3.5–5.1)
RBC # BLD: 5.14 M/UL (ref 4.2–5.9)
SODIUM BLD-SCNC: 141 MMOL/L (ref 136–145)
TOTAL IRON BINDING CAPACITY: 325 UG/DL (ref 260–445)
TOTAL PROTEIN: 7.1 G/DL (ref 6.4–8.2)
TRIGL SERPL-MCNC: 92 MG/DL (ref 0–150)
VLDLC SERPL CALC-MCNC: 18 MG/DL
WBC # BLD: 6.1 K/UL (ref 4–11)

## 2021-02-01 NOTE — TELEPHONE ENCOUNTER
Pt/pharmacy requesting hydralazine 50 mg tab. Pending script sent to [facility] pharmacy.     Last OV 1/11/2021 w/You  Per last OV note: Pt to continue hydralazine to 25 mg three time a day  Last Labs 1/15/2021  Next OV 7/19/2021 w/You

## 2021-02-02 RX ORDER — HYDRALAZINE HYDROCHLORIDE 50 MG/1
TABLET, FILM COATED ORAL
Qty: 135 TABLET | Refills: 7 | Status: SHIPPED | OUTPATIENT
Start: 2021-02-02

## 2021-02-12 ENCOUNTER — HOSPITAL ENCOUNTER (OUTPATIENT)
Dept: ULTRASOUND IMAGING | Age: 71
Discharge: HOME | End: 2021-02-12
Payer: MEDICARE

## 2021-02-12 ENCOUNTER — HOSPITAL ENCOUNTER (OUTPATIENT)
Dept: ULTRASOUND IMAGING | Age: 71
End: 2021-02-12
Payer: MEDICARE

## 2021-02-12 DIAGNOSIS — M79.89 LEG SWELLING: ICD-10-CM

## 2021-02-12 PROCEDURE — 93970 EXTREMITY STUDY: CPT

## 2021-03-02 RX ORDER — APIXABAN 5 MG/1
TABLET, FILM COATED ORAL
Qty: 180 TABLET | Refills: 3 | Status: SHIPPED | OUTPATIENT
Start: 2021-03-02 | End: 2022-03-07

## 2021-03-23 ENCOUNTER — HOSPITAL ENCOUNTER (OUTPATIENT)
Dept: WOUND CARE | Age: 71
Discharge: HOME | End: 2021-03-23
Payer: MEDICARE

## 2021-03-23 VITALS
RESPIRATION RATE: 18 BRPM | HEART RATE: 65 BPM | WEIGHT: 293.1 LBS | DIASTOLIC BLOOD PRESSURE: 66 MMHG | SYSTOLIC BLOOD PRESSURE: 115 MMHG | HEIGHT: 73 IN | BODY MASS INDEX: 38.85 KG/M2 | TEMPERATURE: 98.1 F

## 2021-03-23 DIAGNOSIS — L97.222 NON-PRESSURE CHRONIC ULCER OF LEFT CALF WITH FAT LAYER EXPOSED (HCC): ICD-10-CM

## 2021-03-23 DIAGNOSIS — L97.212 NON-PRESSURE CHRONIC ULCER OF RIGHT CALF WITH FAT LAYER EXPOSED (HCC): ICD-10-CM

## 2021-03-23 DIAGNOSIS — L97.212 ULCER OF RIGHT CALF WITH FAT LAYER EXPOSED (HCC): ICD-10-CM

## 2021-03-23 DIAGNOSIS — I73.89 OTHER SPECIFIED PERIPHERAL VASCULAR DISEASES (HCC): Primary | ICD-10-CM

## 2021-03-23 DIAGNOSIS — L97.222 ULCER OF LEFT CALF WITH FAT LAYER EXPOSED (HCC): ICD-10-CM

## 2021-03-23 DIAGNOSIS — R60.0 LEG EDEMA: ICD-10-CM

## 2021-03-23 PROCEDURE — 29581 APPL MULTLAYER CMPRN SYS LEG: CPT

## 2021-03-23 PROCEDURE — 11042 DBRDMT SUBQ TIS 1ST 20SQCM/<: CPT

## 2021-03-23 PROCEDURE — 99214 OFFICE O/P EST MOD 30 MIN: CPT

## 2021-03-23 RX ORDER — LIDOCAINE 40 MG/G
CREAM TOPICAL PRN
Status: DISCONTINUED | OUTPATIENT
Start: 2021-03-23 | End: 2021-03-24 | Stop reason: HOSPADM

## 2021-03-23 ASSESSMENT — PAIN DESCRIPTION - PROGRESSION: CLINICAL_PROGRESSION: NOT CHANGED

## 2021-03-23 ASSESSMENT — PAIN DESCRIPTION - ORIENTATION: ORIENTATION: LEFT

## 2021-03-23 ASSESSMENT — PAIN DESCRIPTION - LOCATION: LOCATION: LEG

## 2021-03-23 ASSESSMENT — PAIN DESCRIPTION - FREQUENCY: FREQUENCY: INTERMITTENT

## 2021-03-23 ASSESSMENT — PAIN - FUNCTIONAL ASSESSMENT: PAIN_FUNCTIONAL_ASSESSMENT: ACTIVITIES ARE NOT PREVENTED

## 2021-03-23 NOTE — PLAN OF CARE
Pt seen in [facility] as initial visit - pt reports being seen in [facility] several years ago 2016- presents with BLE pre-tib wounds from trauma- non healing - pt obese and noncompliant with diabetic diet - reports last HgA1C 8?- Pt states he has compression stockings but hasnt worn them since he has open wounds.  Debrided per dr. Hemant Olson and will treat with gentamycin and collagen - CHELITA WNL - will apply compri 2 compression wrap - will also get venous studies ordered for pt R/o perforators- Discussed dietary changes , taking blood sugars daily - elevation and exercise- reviewed AVS - F/U in 1 week

## 2021-03-30 ENCOUNTER — HOSPITAL ENCOUNTER (OUTPATIENT)
Dept: WOUND CARE | Age: 71
Discharge: HOME | End: 2021-03-30
Payer: MEDICARE

## 2021-03-30 VITALS
HEIGHT: 73 IN | TEMPERATURE: 97.9 F | HEART RATE: 70 BPM | BODY MASS INDEX: 38.38 KG/M2 | WEIGHT: 289.6 LBS | SYSTOLIC BLOOD PRESSURE: 120 MMHG | DIASTOLIC BLOOD PRESSURE: 69 MMHG | RESPIRATION RATE: 18 BRPM

## 2021-03-30 PROCEDURE — 29581 APPL MULTLAYER CMPRN SYS LEG: CPT

## 2021-03-30 PROCEDURE — 11042 DBRDMT SUBQ TIS 1ST 20SQCM/<: CPT

## 2021-03-30 RX ORDER — LIDOCAINE 40 MG/G
CREAM TOPICAL PRN
Status: DISCONTINUED | OUTPATIENT
Start: 2021-03-30 | End: 2021-03-31 | Stop reason: HOSPADM

## 2021-03-30 ASSESSMENT — PAIN DESCRIPTION - PROGRESSION: CLINICAL_PROGRESSION: NOT CHANGED

## 2021-03-30 ASSESSMENT — PAIN DESCRIPTION - DESCRIPTORS: DESCRIPTORS: BURNING

## 2021-03-30 ASSESSMENT — PAIN DESCRIPTION - ORIENTATION: ORIENTATION: LEFT;RIGHT

## 2021-03-30 NOTE — PROGRESS NOTES
Jacques   Progress Note and Procedure Note      Aura Diaz  AGE: 65 y.o. GENDER: male  : 1950  TODAY'S DATE:  3/30/2021    Subjective:     Chief Complaint   Patient presents with    Wound Check         HISTORY of PRESENT ILLNESS HPI     Aura Diaz is a 65 y.o. male who presents today for wound evaluation. History of Wound: Patient admits to bilateral leg wounds that have not been healing. He states that he has been improving his diet. He is lost 4 pounds since last week. He has been trying to avoid simple carbohydrates and sugary foods. He has no complaints at this time. He states the legs feel less swollen. He had his vascular studies canceled since his last visit. Wound Pain:  intermittent  Severity:  3 / 10   Wound Type:  venous and diabetic  Modifying Factors:  venous stasis, lymphedema, diabetes, poor glucose control and obesity  Associated Signs/Symptoms:  edema, drainage and pain        PAST MEDICAL HISTORY        Diagnosis Date    Allergic rhinitis     Arthritis     Bladder fistula     resolved    C. difficile diarrhea 2015    +PCR    Cellulitis of right lower extremity 3/23/2016    CHF (congestive heart failure) ([address]     Dental disease     Diabetes ([address]     Diverticulitis     Dizziness     DVT of lower extremity, bilateral ([address] 10/16/2013    GERD (gastroesophageal reflux disease)     Headache     Hearing loss     Hematuria 2014    Hx of blood clots     Hyperlipidemia     Hypertension     Lung disease     MONIQUE (obstructive sleep apnea)     Pancreatitis ([address] 2013    Pulmonary emboli ([address]     after cholecystectomy     Rash     Sleep apnea     Tinnitus        PAST SURGICAL HISTORY    Past Surgical History:   Procedure Laterality Date    ABDOMEN SURGERY  2014    reveseral end peristomal hernia repair.     CARDIOVERSION  2019    Dr. Darshan Stock, OPEN N/A 9-17-13 LAPAROSCOPIC CONVERTED TO OPEN CHOLECYSTECTOMY WITH    COLON SURGERY      COLONOSCOPY  2008    COLONOSCOPY  12/4/2013    Severe Diverticulosis unable to finish    COLONOSCOPY  4/30/14    diverticula-10 year f/u    COLOSTOMY  Jan 2014    CYSTOSCOPY  12/10/13    with bladder biopsy    CYSTOSCOPY  1-28-14    Cystourethroscopy, left ureteral catheter     EPIDURAL STEROID INJECTION Right 1/21/2019    RIGHT LUMBAR TWO THREE EPIDURAL STEROID INJECTION SITE CONFIRMED BY FLUROOSCOPY performed by Chantelle Fernandez MD at [facility] Right 2/11/2019    RIGHT LUMBAR TWO THREE EPIDURAL STEROID INJECTION SITE CONFIRMED BY FLUOROSCOPY performed by Chantelle Fernandez MD at [facility]  08/14/2015    [facility], BILATERAL COMPONENT SEPARATION, LYSIS OF ADHESIONS    OTHER SURGICAL HISTORY  1-28-14    LeftColectomy with End Colostomy, Splenic Flexure Mobilization, Takedown of Colovesical Fistula    AL INJECTION HIP [facility] Right 9/19/2018    ARTHROGRAM AND CORTISONE INJECTION RIGHT HIP performed by Perez Rodríguez MD at [address] Right 2003    Fracture lower leg during chain saw accident. Surgery x 2       FAMILY HISTORY    Family History   Problem Relation Age of Onset    Heart Disease Father     Heart Attack Father     Stroke Brother     Heart Disease Brother     High Blood Pressure Brother     Kidney Disease Mother         kidney removed       SOCIAL HISTORY    Social History     Tobacco Use    Smoking status: Never Smoker    Smokeless tobacco: Never Used   Substance Use Topics    Alcohol use:  Yes     Alcohol/week: 0.0 standard drinks     Frequency: Monthly or less     Comment: every several months     Drug use: No       ALLERGIES    Allergies   Allergen Reactions    Norco [Hydrocodone-Acetaminophen]      Makes agitated       MEDICATIONS    Current Outpatient Medications on File Prior to Encounter   Medication Sig Dispense Refill    ELIQUIS 5 MG TABS tablet TAKE 1 TABLET BY MOUTH TWICE A  tablet 3    torsemide (DEMADEX) 20 MG tablet Take 2 tablets by mouth daily 180 tablet 1    hydrALAZINE (APRESOLINE) 50 MG tablet TAKE 1/2 TABLET BY MOUTH EVERY 8 HOURS 135 tablet 7    flecainide (TAMBOCOR) 100 MG tablet TAKE 1 TABLET BY MOUTH TWICE A  tablet 1    isosorbide mononitrate (IMDUR) 30 MG extended release tablet TAKE 1 TABLET BY MOUTH EVERY DAY 90 tablet 3    montelukast (SINGULAIR) 10 MG tablet TAKE 1 TABLET BY MOUTH EVERY DAY 90 tablet 3    Insulin Glargine, 2 Unit Dial, (TOUJEO MAX SOLOSTAR) 300 UNIT/ML SOPN 45 units morning and 40units in the evening. 3 pen 5    atorvastatin (LIPITOR) 40 MG tablet Take 1 tablet by mouth nightly 90 tablet 3    silver sulfADIAZINE (SILVADENE) 1 % cream Apply topically daily. (Patient taking differently: Apply topically daily.     \"Takes as needed, hasn't used yet on wounds \") 50 g 1    glimepiride (AMARYL) 4 MG tablet TAKE 1 TABLET BY MOUTH TWICE A  tablet 3    Handicap Placard MISC by Does not apply route Please issue for duration of 5 years 1 each 0    Handicap Placard MISC by Does not apply route Please issue for duration of 5 years 1 each 0    spironolactone (ALDACTONE) 25 MG tablet Take 1 tablet by mouth daily 90 tablet 1    sertraline (ZOLOFT) 50 MG tablet Take 1 tablet by mouth daily 90 tablet 2    omeprazole (PRILOSEC) 40 MG delayed release capsule Take 1 capsule by mouth daily 90 capsule 3    Insulin Pen Needle 31G X 5 MM MISC 1 each by Does not apply route daily 400 each 5    azelastine (ASTELIN) 0.1 % nasal spray USE 1 SPRAY IN EACH NOSTRIL TWICE DAILY AS DIRECTED 1 Bottle 5    Glucosamine-Chondroitin (GLUCOSAMINE CHONDR COMPLEX PO) Take 1 tablet by mouth 2 times daily      blood glucose monitor kit and supplies by Other route daily One Touch Ultra 1 kit 0    traZODone (DESYREL) 50 MG tablet TAKE 1 TABLET BY MOUTH EVERY DAY AT NIGHT 30 tablet 5    glucose blood VI test strips (ASCENSIA AUTODISC VI;ONE TOUCH ULTRA TEST VI) strip DX: E11.9 FSBS daily. One Touch ultra 100 strip 5    aspirin 81 MG chewable tablet Take 1 tablet by mouth daily 30 tablet 0    Multiple Vitamins-Minerals (THERAPEUTIC MULTIVITAMIN-MINERALS) tablet Take 1 tablet by mouth daily      atenolol (TENORMIN) 50 MG tablet Take 0.5 tablets by mouth daily 90 tablet 3    ferrous sulfate 325 (65 FE) MG tablet Take 325 mg by mouth daily Currently;y states he is not taking this       No current facility-administered medications on file prior to encounter. REVIEW OF SYSTEMS    Pertinent items are noted in HPI. Objective:      /69   Pulse 70   Temp 97.9 °F (36.6 °C) (Oral)   Resp 18   Ht 6' 1\" (1.854 m)   Wt 289 lb 9.6 oz (131.4 kg)   BMI 38.21 kg/m²     PHYSICAL EXAM    Vascular: Vascular status Impaired  palpable pedal pulses, right DP2/4 and PT1/4, left DP2/4 and PT1/4. CFT 3 seconds digits 1 to 5 bilateral.  Hair growthAbsent  both lower extremities and feet. Skin temperature is warm to warm from pretibial area to distal digits bilateral.  Exam is negative for rubor, pallor, cyanosis or signs of acute vascular compromise bilaterally. Exam is positive for edema bilateral lower extremity. Varicosities Present bilateral lower extremity. Neuro: Neurologic status diminished bilateral with epicritic diminished, proprioceptive diminished,  and protopathic diminished. DTRs Present bilateral Achilles. There were no reproducible neuritic symptoms on exam bilateral feet/ankles. Derm: Ulceration to bilateral legs. Ecchymosis Absent  bilateral feet/foot. Musculoskeletal: Mild pain with debridement of wounds 5/5 muscle strength in/eversion and dorsi/plantarflexion bilateral feet. No gross instability noted.           Assessment:     Patient Active Problem List   Diagnosis    Type 2 diabetes mellitus with hyperglycemia ([facility]    Osteoarthritis    Class 2 severe obesity due to excess calories with serious comorbidity and body mass index (BMI) of 39.0 to 39.9 in adult (HCC)    Edema    Anemia    Bradycardia    Vasculogenic erectile dysfunction    Venous stasis ulcer of right lower extremity (HCC)    Venous thrombosis of leg    Chronic venous insufficiency    Hyperbilirubinemia    Moderate episode of recurrent major depressive disorder (HCC)    Renal insufficiency    Anxiety    Essential hypertension    Non-seasonal allergic rhinitis    Mixed hyperlipidemia    Chronic congestive heart failure (HCC)    Chronic pulmonary edema    Coronary artery disease involving native coronary artery of native heart without angina pectoris    Nonrheumatic aortic valve stenosis    Acute kidney inury     Severe sleep apnea    Primary insomnia    Venous stasis ulcer of left calf limited to breakdown of skin with varicose veins (HCC)    Stage 3 chronic kidney disease (HCC)    Renal osteodystrophy    Peripheral edema    Primary osteoarthritis of right hip    Hypoxemia    Grade III diastolic dysfunction    Paroxysmal atrial fibrillation (HCC)    Atypical atrial flutter (HCC)    Simple chronic bronchitis (HCC)    Other specified peripheral vascular diseases (HCC)    Non-pressure chronic ulcer of right calf with fat layer exposed ([address]    Non-pressure chronic ulcer of left calf with fat layer exposed ([address]    Leg edema       Procedure Note    Performed by: Edelmira Mg DPM    Consent obtained: Yes    Time out taken:  Yes    Pain Control: Anesthetic  Anesthetic: 4% Lidocaine Cream     Debridement:Excisional Debridement    Using curette the wound was sharply debrided    down through and including the removal of epidermis, dermis and subcutaneous tissue.         Devitalized Tissue Debrided:  fibrin, biofilm, slough, necrotic/eschar and exudate    Pre Debridement Measurements:  Are located in the Wound Documentation Flow Sheet      Post  Debridement Measurements:  Wound 04/14/16 wound #1 Right medial lower leg (onset 3/1/16 trauma) Venous, full thickness (Active)   Number of days: 5791       Wound 04/14/16 wound #2 Right lateral lower leg (onset 2/15/16 trauma) Venous, full thickness healed 4/21/16 (Active)   Number of days: 4702       Wound 04/28/16 wound #2 left pretib wound, venous, due to trauma bumped leg 4/26/16 full thicvkness  healed 5/26/16 (Active)   Number of days: 2373       Wound 05/05/16 Wound #3 Left Medial Leg,trauma,venous ulcer,partial thickness,onset 5/3/16 healed 5/19/16 (Active)   Number of days: 7337       Wound 06/02/16 Venous ulcer Pretibial Right #4  Cluster-full thicknessonset 6-1-16) venous,left pretibial (Active)   Number of days: 1762       Wound 10/16/17 Abrasion(s) Leg Left; Lower 2.8 cm x 3 cm (Active)   Number of days: 1260       Incision 05/16/14 Abdomen (Active)   Number of days: 2510       Incision 08/14/15 Abdomen (Active)   Number of days: 2055       Incision 09/19/18 Hip Right (Active)   Number of days: 923       Wound 03/23/21 #1, left leg, venous, full thickness ? onset 12/2020 (Active)   Wound Image   03/23/21 0907   Wound Etiology Venous 03/30/21 0916   Dressing Status New dressing applied 03/23/21 1056   Wound Cleansed Soap and water 03/30/21 0916   Dressing/Treatment Other (comment) 03/23/21 1056   Wound Length (cm) 1.6 cm 03/30/21 0916   Wound Width (cm) 1.8 cm 03/30/21 0916   Wound Depth (cm) 0.2 cm 03/30/21 0916   Wound Surface Area (cm^2) 2.88 cm^2 03/30/21 0916   Change in Wound Size % (l*w) -37.14 03/30/21 0916   Wound Volume (cm^3) 0.58 cm^3 03/30/21 0916   Wound Healing % -38 03/30/21 0916   Post-Procedure Length (cm) 1.6 cm 03/30/21 0958   Post-Procedure Width (cm) 1.8 cm 03/30/21 0958   Post-Procedure Depth (cm) 0.2 cm 03/30/21 0958   Post-Procedure Surface Area (cm^2) 2.88 cm^2 03/30/21 0958   Post-Procedure Volume (cm^3) 0.58 cm^3 03/30/21 0958   Distance Tunneling (cm) 0 cm 03/30/21 0916   Undermining Maxium Distance (cm) 0 03/30/21 0916   Wound Assessment Pink/red;Slough 03/30/21 0916   Drainage Amount Small 03/30/21 0916   Drainage Description Serosanguinous 03/30/21 0916   Odor None 03/30/21 0916   Mago-wound Assessment Hemosiderin staining (brown yellow) 03/30/21 0916   Number of days: 7       Wound 03/23/21 #2, right leg, venous, full thickness, onset 2/2021 (Active)   Wound Image   03/23/21 0907   Wound Etiology Venous 03/30/21 0916   Dressing Status New dressing applied 03/23/21 1056   Wound Cleansed Soap and water 03/30/21 0916   Dressing/Treatment Other (comment) 03/23/21 1056   Wound Length (cm) 1.7 cm 03/30/21 0916   Wound Width (cm) 1.6 cm 03/30/21 0916   Wound Depth (cm) 0.1 cm 03/30/21 0916   Wound Surface Area (cm^2) 2.72 cm^2 03/30/21 0916   Change in Wound Size % (l*w) -20.89 03/30/21 0916   Wound Volume (cm^3) 0.27 cm^3 03/30/21 0916   Wound Healing % -23 03/30/21 0916   Post-Procedure Length (cm) 1.7 cm 03/30/21 0958   Post-Procedure Width (cm) 1.6 cm 03/30/21 0958   Post-Procedure Depth (cm) 0.2 cm 03/30/21 0958   Post-Procedure Surface Area (cm^2) 2.72 cm^2 03/30/21 0958   Post-Procedure Volume (cm^3) 0.54 cm^3 03/30/21 0958   Distance Tunneling (cm) 0 cm 03/30/21 0916   Undermining Maxium Distance (cm) 0 03/30/21 0916   Wound Assessment Pink/red;Slough 03/30/21 0916   Drainage Amount Small 03/30/21 0916   Drainage Description Serosanguinous;Green 03/30/21 0916   Odor None 03/30/21 0916   Mago-wound Assessment Hemosiderin staining (brown yellow) 03/30/21 0916   Number of days: 7           Total Surface Area Debrided: Less than 5.5 sq cm bilateral lower leg wounds including subcutaneous tissue    Percentage of wound debrided 100%    Bleeding:  Minimal    Hemostasis Achieved:  by pressure    Procedural Pain:  0  / 10     Post Procedural Pain:  0 / 10     Response to treatment:  Well tolerated by patient.            Plan:   Patient examined and evaluated wound debrided without incident   Discussed appropriate diabetic diet  Legs elevated all times and not active  Vascular studies ordered these are scheduled for next week    The nature of the patient's condition was explained in depth.  The patient was informed that their compliance to the treatment plan is paramount to successful healing and prevention of further ulceration and/or infection     Discharge Treatment      Written Patient Discharge Instructions Given            Electronically signed by Matilde Brock DPM on 3/30/2021 at 11:30 AM

## 2021-03-30 NOTE — PLAN OF CARE
Pt seen in [address] [place] [address],[address] - not much changed in BLE wounds - debride per Dr. Mariza Hall - weight decreased by 4#, edema unchanged - will changed treatment to [facility] on LLE and cont gent and collagen to RLE wounds - cont compri 2 compression again for the week - pt tolerated wraps well.  Pt has venous studies scheduled for next Tuesday- reviewed AVS - stressed Blood sugar control - pt reports making changes - f/u in 1 week

## 2021-03-30 NOTE — PROGRESS NOTES
Jacques Lu  Progress Note and Procedure Note      Gregor Lamas  AGE: 65 y.o. GENDER: male  : 1950  TODAY'S DATE:  3/23/2021    Subjective:     Chief Complaint   Patient presents with    Wound Check         HISTORY of PRESENT ILLNESS HPI     Gregor Lamas is a 65 y.o. male who presents today for wound evaluation. History of Wound: Patient admits to bilateral leg wounds that have not been healing. He also admits to being diabetic and not following a diabetic diet. He has swelling in his legs and does not keep his legs elevated. He was referred here for wound care. Wound Pain:  intermittent  Severity:  5 / 10   Wound Type:  venous and diabetic  Modifying Factors:  venous stasis, lymphedema, diabetes, poor glucose control and obesity  Associated Signs/Symptoms:  edema, drainage and pain        PAST MEDICAL HISTORY        Diagnosis Date    Allergic rhinitis     Arthritis     Bladder fistula     resolved    C. difficile diarrhea 2015    +PCR    Cellulitis of right lower extremity 3/23/2016    CHF (congestive heart failure) ([address]     Dental disease     Diabetes ([address]     Diverticulitis     Dizziness     DVT of lower extremity, bilateral ([address] 10/16/2013    GERD (gastroesophageal reflux disease)     Headache     Hearing loss     Hematuria 2014    Hx of blood clots     Hyperlipidemia     Hypertension     Lung disease     MONIQUE (obstructive sleep apnea)     Pancreatitis ([address] 2013    Pulmonary emboli ([address]     after cholecystectomy     Rash     Sleep apnea     Tinnitus        PAST SURGICAL HISTORY    Past Surgical History:   Procedure Laterality Date    ABDOMEN SURGERY  2014    reveseral end peristomal hernia repair.     CARDIOVERSION  2019    Dr. Rasheed Wright, OPEN N/A 9-17-13    LAPAROSCOPIC CONVERTED TO OPEN CHOLECYSTECTOMY WITH    COLON SURGERY      COLONOSCOPY      COLONOSCOPY  2013  torsemide (DEMADEX) 20 MG tablet Take 2 tablets by mouth daily 180 tablet 1    hydrALAZINE (APRESOLINE) 50 MG tablet TAKE 1/2 TABLET BY MOUTH EVERY 8 HOURS 135 tablet 7    flecainide (TAMBOCOR) 100 MG tablet TAKE 1 TABLET BY MOUTH TWICE A  tablet 1    isosorbide mononitrate (IMDUR) 30 MG extended release tablet TAKE 1 TABLET BY MOUTH EVERY DAY 90 tablet 3    montelukast (SINGULAIR) 10 MG tablet TAKE 1 TABLET BY MOUTH EVERY DAY 90 tablet 3    Insulin Glargine, 2 Unit Dial, (TOUJEO MAX SOLOSTAR) 300 UNIT/ML SOPN 45 units morning and 40units in the evening. 3 pen 5    atorvastatin (LIPITOR) 40 MG tablet Take 1 tablet by mouth nightly 90 tablet 3    glimepiride (AMARYL) 4 MG tablet TAKE 1 TABLET BY MOUTH TWICE A  tablet 3    Handicap Placard MISC by Does not apply route Please issue for duration of 5 years 1 each 0    Handicap Placard MISC by Does not apply route Please issue for duration of 5 years 1 each 0    spironolactone (ALDACTONE) 25 MG tablet Take 1 tablet by mouth daily 90 tablet 1    sertraline (ZOLOFT) 50 MG tablet Take 1 tablet by mouth daily 90 tablet 2    omeprazole (PRILOSEC) 40 MG delayed release capsule Take 1 capsule by mouth daily 90 capsule 3    atenolol (TENORMIN) 50 MG tablet Take 0.5 tablets by mouth daily 90 tablet 3    Insulin Pen Needle 31G X 5 MM MISC 1 each by Does not apply route daily 400 each 5    azelastine (ASTELIN) 0.1 % nasal spray USE 1 SPRAY IN EACH NOSTRIL TWICE DAILY AS DIRECTED 1 Bottle 5    Glucosamine-Chondroitin (GLUCOSAMINE CHONDR COMPLEX PO) Take 1 tablet by mouth 2 times daily      blood glucose monitor kit and supplies by Other route daily One Touch Ultra 1 kit 0    traZODone (DESYREL) 50 MG tablet TAKE 1 TABLET BY MOUTH EVERY DAY AT NIGHT 30 tablet 5    glucose blood VI test strips (ASCENSIA AUTODISC VI;ONE TOUCH ULTRA TEST VI) strip DX: E11.9 FSBS daily.  One Touch ultra 100 strip 5    aspirin 81 MG chewable tablet Take 1 tablet by mouth daily 30 tablet 0    Multiple Vitamins-Minerals (THERAPEUTIC MULTIVITAMIN-MINERALS) tablet Take 1 tablet by mouth daily      silver sulfADIAZINE (SILVADENE) 1 % cream Apply topically daily. (Patient taking differently: Apply topically daily. \"Takes as needed, hasn't used yet on wounds \") 50 g 1    ferrous sulfate 325 (65 FE) MG tablet Take 325 mg by mouth daily Currently;y states he is not taking this       No current facility-administered medications on file prior to encounter. REVIEW OF SYSTEMS    Pertinent items are noted in HPI. Objective:      /66   Pulse 65   Temp 98.1 °F (36.7 °C) (Oral)   Resp 18   Ht 6' 1\" (1.854 m)   Wt 293 lb 1.6 oz (132.9 kg)   BMI 38.67 kg/m²     PHYSICAL EXAM    Vascular: Vascular status Impaired  palpable pedal pulses, right DP2/4 and PT1/4, left DP2/4 and PT1/4. CFT 3 seconds digits 1 to 5 bilateral.  Hair growthAbsent  both lower extremities and feet. Skin temperature is warm to warm from pretibial area to distal digits bilateral.  Exam is negative for rubor, pallor, cyanosis or signs of acute vascular compromise bilaterally. Exam is positive for edema bilateral lower extremity. Varicosities Present bilateral lower extremity. Neuro: Neurologic status diminished bilateral with epicritic diminished, proprioceptive diminished,  and protopathic diminished. DTRs Present bilateral Achilles. There were no reproducible neuritic symptoms on exam bilateral feet/ankles. Derm: Ulceration to bilateral legs. Ecchymosis Absent  bilateral feet/foot. Musculoskeletal: Mild pain with debridement of wounds 5/5 muscle strength in/eversion and dorsi/plantarflexion bilateral feet. No gross instability noted.           Assessment:     Patient Active Problem List   Diagnosis    Type 2 diabetes mellitus with hyperglycemia (HCC)    Osteoarthritis    Class 2 severe obesity due to excess calories with serious comorbidity and body mass index (BMI) of 39.0 to 39.9 in adult [facility])    Edema    Anemia    Bradycardia    Vasculogenic erectile dysfunction    Venous stasis ulcer of right lower extremity (HCC)    Venous thrombosis of leg    Chronic venous insufficiency    Hyperbilirubinemia    Moderate episode of recurrent major depressive disorder (HCC)    Renal insufficiency    Anxiety    Essential hypertension    Non-seasonal allergic rhinitis    Mixed hyperlipidemia    Chronic congestive heart failure (HCC)    Chronic pulmonary edema    Coronary artery disease involving native coronary artery of native heart without angina pectoris    Nonrheumatic aortic valve stenosis    Acute kidney inury     Severe sleep apnea    Primary insomnia    Venous stasis ulcer of left calf limited to breakdown of skin with varicose veins (HCC)    Stage 3 chronic kidney disease (HCC)    Renal osteodystrophy    Peripheral edema    Primary osteoarthritis of right hip    Hypoxemia    Grade III diastolic dysfunction    Paroxysmal atrial fibrillation (HCC)    Atypical atrial flutter (HCC)    Simple chronic bronchitis (HCC)    Other specified peripheral vascular diseases (HCC)    Non-pressure chronic ulcer of right calf with fat layer exposed ([address]    Non-pressure chronic ulcer of left calf with fat layer exposed ([address]    Leg edema       Procedure Note    Performed by: Chichi Boggs DPM    Consent obtained: Yes    Time out taken:  Yes    Pain Control: Anesthetic  Anesthetic: 4% Lidocaine Cream     Debridement:Excisional Debridement    Using curette the wound was sharply debrided    down through and including the removal of epidermis, dermis and subcutaneous tissue.         Devitalized Tissue Debrided:  fibrin, biofilm, slough, necrotic/eschar and exudate    Pre Debridement Measurements:  Are located in the Wound Documentation Flow Sheet      Post  Debridement Measurements:  Wound 04/14/16 wound #1 Right medial lower leg (onset 3/1/16 trauma) Venous, full thickness (Active)   Number of days: 6111       Wound 04/14/16 wound #2 Right lateral lower leg (onset 2/15/16 trauma) Venous, full thickness healed 4/21/16 (Active)   Number of days: 0992       Wound 04/28/16 wound #2 left pretib wound, venous, due to trauma bumped leg 4/26/16 full thicvkness  healed 5/26/16 (Active)   Number of days: 8776       Wound 05/05/16 Wound #3 Left Medial Leg,trauma,venous ulcer,partial thickness,onset 5/3/16 healed 5/19/16 (Active)   Number of days: 4681       Wound 06/02/16 Venous ulcer Pretibial Right #4  Cluster-full thicknessonset 6-1-16) venous,left pretibial (Active)   Number of days: 8150       Wound 10/16/17 Abrasion(s) Leg Left; Lower 2.8 cm x 3 cm (Active)   Number of days: 1260       Incision 05/16/14 Abdomen (Active)   Number of days: 2510       Incision 08/14/15 Abdomen (Active)   Number of days: 2055       Incision 09/19/18 Hip Right (Active)   Number of days: 923       Wound 03/23/21 #1, left leg, venous, full thickness ? onset 12/2020 (Active)   Wound Image   03/23/21 0907   Wound Etiology Venous 03/30/21 0916   Dressing Status New dressing applied 03/23/21 1056   Wound Cleansed Soap and water 03/30/21 0916   Dressing/Treatment Other (comment) 03/23/21 1056   Wound Length (cm) 1.6 cm 03/30/21 0916   Wound Width (cm) 1.8 cm 03/30/21 0916   Wound Depth (cm) 0.2 cm 03/30/21 0916   Wound Surface Area (cm^2) 2.88 cm^2 03/30/21 0916   Change in Wound Size % (l*w) -37.14 03/30/21 0916   Wound Volume (cm^3) 0.58 cm^3 03/30/21 0916   Wound Healing % -38 03/30/21 0916   Post-Procedure Length (cm) 1.6 cm 03/30/21 0958   Post-Procedure Width (cm) 1.8 cm 03/30/21 0958   Post-Procedure Depth (cm) 0.2 cm 03/30/21 0958   Post-Procedure Surface Area (cm^2) 2.88 cm^2 03/30/21 0958   Post-Procedure Volume (cm^3) 0.58 cm^3 03/30/21 0958   Distance Tunneling (cm) 0 cm 03/30/21 0916   Undermining Maxium Distance (cm) 0 03/30/21 0916   Wound Assessment Pink/red;Slough 03/30/21 0916   Drainage Amount Small

## 2021-04-06 ENCOUNTER — HOSPITAL ENCOUNTER (OUTPATIENT)
Dept: VASCULAR LAB | Age: 71
Discharge: HOME | End: 2021-04-06
Payer: MEDICARE

## 2021-04-06 ENCOUNTER — HOSPITAL ENCOUNTER (OUTPATIENT)
Dept: WOUND CARE | Age: 71
Discharge: HOME | End: 2021-04-06
Payer: MEDICARE

## 2021-04-06 VITALS
DIASTOLIC BLOOD PRESSURE: 70 MMHG | TEMPERATURE: 97.7 F | SYSTOLIC BLOOD PRESSURE: 117 MMHG | WEIGHT: 286.8 LBS | HEIGHT: 73 IN | RESPIRATION RATE: 20 BRPM | HEART RATE: 58 BPM | BODY MASS INDEX: 38.01 KG/M2

## 2021-04-06 DIAGNOSIS — I73.89 OTHER SPECIFIED PERIPHERAL VASCULAR DISEASES (HCC): ICD-10-CM

## 2021-04-06 DIAGNOSIS — L97.222 ULCER OF LEFT CALF WITH FAT LAYER EXPOSED (HCC): ICD-10-CM

## 2021-04-06 DIAGNOSIS — L97.212 ULCER OF RIGHT CALF WITH FAT LAYER EXPOSED (HCC): ICD-10-CM

## 2021-04-06 PROCEDURE — 93970 EXTREMITY STUDY: CPT

## 2021-04-06 PROCEDURE — 29581 APPL MULTLAYER CMPRN SYS LEG: CPT

## 2021-04-06 PROCEDURE — 11042 DBRDMT SUBQ TIS 1ST 20SQCM/<: CPT

## 2021-04-06 RX ORDER — SPIRONOLACTONE 25 MG/1
TABLET ORAL
Qty: 90 TABLET | Refills: 1 | Status: SHIPPED | OUTPATIENT
Start: 2021-04-06 | End: 2021-09-30

## 2021-04-06 RX ORDER — LIDOCAINE 40 MG/G
CREAM TOPICAL ONCE
Status: DISCONTINUED | OUTPATIENT
Start: 2021-04-06 | End: 2021-04-07 | Stop reason: HOSPADM

## 2021-04-06 ASSESSMENT — PAIN DESCRIPTION - ONSET: ONSET: ON-GOING

## 2021-04-06 ASSESSMENT — PAIN DESCRIPTION - ORIENTATION: ORIENTATION: LEFT

## 2021-04-06 NOTE — PLAN OF CARE
Pt seen in [facility] - BLE wounds unchanged - debride per Dr. Nneka Sheppard - treatment with triad to both wounds  and cont compression with compri 2 - pt has recent venous studies - results pending - plan to order long term compression garments beth Gilman - reviewed AVS - f/u in 1 week

## 2021-04-06 NOTE — PROGRESS NOTES
Jacques Lu  Progress Note and Procedure Note      Claudia Esteves  AGE: 65 y.o. GENDER: male  : 1950  TODAY'S DATE:  2021    Subjective:     Chief Complaint   Patient presents with    Wound Check         HISTORY of PRESENT ILLNESS HPI     Claudia Esteves is a 65 y.o. male who presents today for wound evaluation. History of Wound: Patient admits to bilateral leg wounds that have not been healing. He had his vascular studies this morning. He states his left the dressings on as directed. Is been trying to keep the legs elevated and participate in the diabetic diet. He admits to losing some weight and his blood sugars being below 100. Wound Pain:  intermittent  Severity:  3 / 10   Wound Type:  venous and diabetic  Modifying Factors:  venous stasis, lymphedema, diabetes, poor glucose control and obesity  Associated Signs/Symptoms:  edema, drainage and pain        PAST MEDICAL HISTORY        Diagnosis Date    Allergic rhinitis     Arthritis     Bladder fistula     resolved    C. difficile diarrhea 2015    +PCR    Cellulitis of right lower extremity 3/23/2016    CHF (congestive heart failure) ([address]     Dental disease     Diabetes ([address]     Diverticulitis     Dizziness     DVT of lower extremity, bilateral ([address] 10/16/2013    GERD (gastroesophageal reflux disease)     Headache     Hearing loss     Hematuria 2014    Hx of blood clots     Hyperlipidemia     Hypertension     Lung disease     MONIQUE (obstructive sleep apnea)     Pancreatitis ([address] 2013    Pulmonary emboli ([address]     after cholecystectomy     Rash     Sleep apnea     Tinnitus        PAST SURGICAL HISTORY    Past Surgical History:   Procedure Laterality Date    ABDOMEN SURGERY  2014    reveseral end peristomal hernia repair.     CARDIOVERSION  2019    Dr. Ashley Montes, OPEN N/A --    LAPAROSCOPIC CONVERTED TO OPEN CHOLECYSTECTOMY WITH  COLON SURGERY      COLONOSCOPY  2008    COLONOSCOPY  12/4/2013    Severe Diverticulosis unable to finish    COLONOSCOPY  4/30/14    diverticula-10 year f/u    COLOSTOMY  Jan 2014    CYSTOSCOPY  12/10/13    with bladder biopsy    CYSTOSCOPY  1-28-14    Cystourethroscopy, left ureteral catheter     EPIDURAL STEROID INJECTION Right 1/21/2019    RIGHT LUMBAR TWO THREE EPIDURAL STEROID INJECTION SITE CONFIRMED BY FLUROOSCOPY performed by Dorian Domínguez MD at [facility] Right 2/11/2019    RIGHT LUMBAR TWO THREE EPIDURAL STEROID INJECTION SITE CONFIRMED BY FLUOROSCOPY performed by Dorian Domínguez MD at [facility] 66  08/14/2015    [facility], BILATERAL COMPONENT SEPARATION, LYSIS OF ADHESIONS    OTHER SURGICAL HISTORY  1-28-14    LeftColectomy with End Colostomy, Splenic Flexure Mobilization, Takedown of Colovesical Fistula    SD INJECTION HIP [facility] Right 9/19/2018    ARTHROGRAM AND CORTISONE INJECTION RIGHT HIP performed by France Andrews MD at [address] [place] [address] St Right 2003    Fracture lower leg during chain saw accident. Surgery x 2       FAMILY HISTORY    Family History   Problem Relation Age of Onset    Heart Disease Father     Heart Attack Father     Stroke Brother     Heart Disease Brother     High Blood Pressure Brother     Kidney Disease Mother         kidney removed       SOCIAL HISTORY    Social History     Tobacco Use    Smoking status: Never Smoker    Smokeless tobacco: Never Used   Substance Use Topics    Alcohol use:  Yes     Alcohol/week: 0.0 standard drinks     Frequency: Monthly or less     Comment: every several months     Drug use: No       ALLERGIES    Allergies   Allergen Reactions    Norco [Hydrocodone-Acetaminophen]      Makes agitated       MEDICATIONS    Current Outpatient Medications on File Prior to Encounter   Medication Sig Dispense Refill    spironolactone (ALDACTONE) 25 MG tablet TAKE 1 TABLET BY MOUTH EVERY DAY 90 tablet 1    Lactobacillus (PROBIOTIC ACIDOPHILUS PO) Take 1 tablet by mouth as needed      ELIQUIS 5 MG TABS tablet TAKE 1 TABLET BY MOUTH TWICE A  tablet 3    torsemide (DEMADEX) 20 MG tablet Take 2 tablets by mouth daily 180 tablet 1    hydrALAZINE (APRESOLINE) 50 MG tablet TAKE 1/2 TABLET BY MOUTH EVERY 8 HOURS 135 tablet 7    flecainide (TAMBOCOR) 100 MG tablet TAKE 1 TABLET BY MOUTH TWICE A  tablet 1    isosorbide mononitrate (IMDUR) 30 MG extended release tablet TAKE 1 TABLET BY MOUTH EVERY DAY 90 tablet 3    montelukast (SINGULAIR) 10 MG tablet TAKE 1 TABLET BY MOUTH EVERY DAY 90 tablet 3    Insulin Glargine, 2 Unit Dial, (TOUJEO MAX SOLOSTAR) 300 UNIT/ML SOPN 45 units morning and 40units in the evening. 3 pen 5    atorvastatin (LIPITOR) 40 MG tablet Take 1 tablet by mouth nightly 90 tablet 3    silver sulfADIAZINE (SILVADENE) 1 % cream Apply topically daily. (Patient taking differently: Apply topically daily.     \"Takes as needed, hasn't used yet on wounds \") 50 g 1    glimepiride (AMARYL) 4 MG tablet TAKE 1 TABLET BY MOUTH TWICE A  tablet 3    Handicap Placard MISC by Does not apply route Please issue for duration of 5 years 1 each 0    Handicap Placard MISC by Does not apply route Please issue for duration of 5 years 1 each 0    sertraline (ZOLOFT) 50 MG tablet Take 1 tablet by mouth daily 90 tablet 2    omeprazole (PRILOSEC) 40 MG delayed release capsule Take 1 capsule by mouth daily 90 capsule 3    atenolol (TENORMIN) 50 MG tablet Take 0.5 tablets by mouth daily 90 tablet 3    Insulin Pen Needle 31G X 5 MM MISC 1 each by Does not apply route daily 400 each 5    azelastine (ASTELIN) 0.1 % nasal spray USE 1 SPRAY IN EACH NOSTRIL TWICE DAILY AS DIRECTED 1 Bottle 5    Glucosamine-Chondroitin (GLUCOSAMINE CHONDR COMPLEX PO) Take 1 tablet by mouth 2 times daily      blood glucose monitor kit and with hyperglycemia ([address]    Osteoarthritis    Class 2 severe obesity due to excess calories with serious comorbidity and body mass index (BMI) of 39.0 to 39.9 in adult (HCC)    Edema    Anemia    Bradycardia    Vasculogenic erectile dysfunction    Venous stasis ulcer of right lower extremity (HCC)    Venous thrombosis of leg    Chronic venous insufficiency    Hyperbilirubinemia    Moderate episode of recurrent major depressive disorder (HCC)    Renal insufficiency    Anxiety    Essential hypertension    Non-seasonal allergic rhinitis    Mixed hyperlipidemia    Chronic congestive heart failure (HCC)    Chronic pulmonary edema    Coronary artery disease involving native coronary artery of native heart without angina pectoris    Nonrheumatic aortic valve stenosis    Acute kidney inury     Severe sleep apnea    Primary insomnia    Venous stasis ulcer of left calf limited to breakdown of skin with varicose veins (HCC)    Stage 3 chronic kidney disease (HCC)    Renal osteodystrophy    Peripheral edema    Primary osteoarthritis of right hip    Hypoxemia    Grade III diastolic dysfunction    Paroxysmal atrial fibrillation (HCC)    Atypical atrial flutter (HCC)    Simple chronic bronchitis (HCC)    Other specified peripheral vascular diseases (HCC)    Non-pressure chronic ulcer of right calf with fat layer exposed ([address]    Non-pressure chronic ulcer of left calf with fat layer exposed ([address]    Leg edema       Procedure Note    Performed by: Jami Hickman DPM    Consent obtained: Yes    Time out taken:  Yes    Pain Control: Anesthetic  Anesthetic: 4% Lidocaine Cream     Debridement:Excisional Debridement    Using curette the wound was sharply debrided    down through and including the removal of epidermis, dermis and subcutaneous tissue.         Devitalized Tissue Debrided:  fibrin, biofilm, slough, necrotic/eschar and exudate    Pre Debridement Measurements:  Are located in the Wound Documentation Flow Sheet      Wound Care Documentation:  Wound 04/14/16 wound #1 Right medial lower leg (onset 3/1/16 trauma) Venous, full thickness (Active)   Number of days: 1818       Wound 04/14/16 wound #2 Right lateral lower leg (onset 2/15/16 trauma) Venous, full thickness healed 4/21/16 (Active)   Number of days: 8866       Wound 04/28/16 wound #2 left pretib wound, venous, due to trauma bumped leg 4/26/16 full thicvkness  healed 5/26/16 (Active)   Number of days: 0830       Wound 05/05/16 Wound #3 Left Medial Leg,trauma,venous ulcer,partial thickness,onset 5/3/16 healed 5/19/16 (Active)   Number of days: 2068       Wound 06/02/16 Venous ulcer Pretibial Right #4  Cluster-full thicknessonset 6-1-16) venous,left pretibial (Active)   Number of days: 1769       Wound 10/16/17 Abrasion(s) Leg Left; Lower 2.8 cm x 3 cm (Active)   Number of days: 1268       Incision 05/16/14 Abdomen (Active)   Number of days: 6940       Incision 08/14/15 Abdomen (Active)   Number of days: 2062       Incision 09/19/18 Hip Right (Active)   Number of days: 930       Wound 03/23/21 #1, left leg, venous, full thickness ? onset 12/2020 (Active)   Wound Image   03/23/21 0907   Wound Etiology Venous 04/06/21 1126   Dressing Status New dressing applied 03/30/21 1025   Wound Cleansed Irrigated with saline; Soap and water 04/06/21 1126   Dressing/Treatment Other (comment) 03/30/21 1025   Wound Length (cm) 1.5 cm 04/06/21 1126   Wound Width (cm) 1.7 cm 04/06/21 1126   Wound Depth (cm) 0.1 cm 04/06/21 1126   Wound Surface Area (cm^2) 2.55 cm^2 04/06/21 1126   Change in Wound Size % (l*w) -21.43 04/06/21 1126   Wound Volume (cm^3) 0.26 cm^3 04/06/21 1126   Wound Healing % 38 04/06/21 1126   Post-Procedure Length (cm) 1.5 cm 04/06/21 1146   Post-Procedure Width (cm) 1.7 cm 04/06/21 1146   Post-Procedure Depth (cm) 0.2 cm 04/06/21 1146   Post-Procedure Surface Area (cm^2) 2.55 cm^2 04/06/21 1146   Post-Procedure Volume (cm^3) 0.51 cm^3 04/06/21 1146 Distance Tunneling (cm) 0 cm 03/30/21 0916   Undermining Maxium Distance (cm) 0 03/30/21 0916   Wound Assessment Pink/red; Other (Comment) 04/06/21 1126   Drainage Amount Small 04/06/21 1126   Drainage Description Serosanguinous 04/06/21 1126   Odor None 04/06/21 1126   Mago-wound Assessment Hemosiderin staining (brown yellow) 04/06/21 1126   Margins Epibole (rolled edges) 04/06/21 1126   Number of days: 14       Wound 03/23/21 #2, right leg, venous, full thickness, onset 2/2021 (Active)   Wound Image   03/23/21 0907   Wound Etiology Venous 04/06/21 1126   Dressing Status New dressing applied 03/30/21 1025   Wound Cleansed Irrigated with saline; Soap and water 04/06/21 1126   Dressing/Treatment Other (comment) 03/30/21 1025   Wound Length (cm) 1.9 cm 04/06/21 1126   Wound Width (cm) 1 cm 04/06/21 1126   Wound Depth (cm) 0.1 cm 04/06/21 1126   Wound Surface Area (cm^2) 1.9 cm^2 04/06/21 1126   Change in Wound Size % (l*w) 15.56 04/06/21 1126   Wound Volume (cm^3) 0.19 cm^3 04/06/21 1126   Wound Healing % 14 04/06/21 1126   Post-Procedure Length (cm) 1.9 cm 04/06/21 1146   Post-Procedure Width (cm) 1 cm 04/06/21 1146   Post-Procedure Depth (cm) 0.2 cm 04/06/21 1146   Post-Procedure Surface Area (cm^2) 1.9 cm^2 04/06/21 1146   Post-Procedure Volume (cm^3) 0.38 cm^3 04/06/21 1146   Distance Tunneling (cm) 0 cm 03/30/21 0916   Undermining Maxium Distance (cm) 0 03/30/21 0916   Wound Assessment Pink/red; Other (Comment) 04/06/21 1126   Drainage Amount Small 04/06/21 1126   Drainage Description Serosanguinous 04/06/21 1126   Odor None 04/06/21 1126   Mago-wound Assessment Hemosiderin staining (brown yellow) 04/06/21 1126   Margins Epibole (rolled edges) 04/06/21 1126   Number of days: 14           Total Surface Area Debrided: Less than 4.45 sq cm bilateral lower leg wounds including subcutaneous tissue    Percentage of wound debrided 100%    Bleeding:  Minimal    Hemostasis Achieved:  by pressure    Procedural Pain:  0  /

## 2021-04-13 ENCOUNTER — HOSPITAL ENCOUNTER (OUTPATIENT)
Dept: WOUND CARE | Age: 71
Discharge: HOME | End: 2021-04-13
Payer: MEDICARE

## 2021-04-13 VITALS
HEART RATE: 55 BPM | HEIGHT: 73 IN | RESPIRATION RATE: 20 BRPM | BODY MASS INDEX: 38.83 KG/M2 | SYSTOLIC BLOOD PRESSURE: 119 MMHG | WEIGHT: 293 LBS | DIASTOLIC BLOOD PRESSURE: 70 MMHG | TEMPERATURE: 97.2 F

## 2021-04-13 PROCEDURE — 29581 APPL MULTLAYER CMPRN SYS LEG: CPT

## 2021-04-13 PROCEDURE — 11721 DEBRIDE NAIL 6 OR MORE: CPT

## 2021-04-13 RX ORDER — LIDOCAINE 40 MG/G
CREAM TOPICAL
Status: DISPENSED | OUTPATIENT
Start: 2021-04-13 | End: 2021-04-13

## 2021-04-13 ASSESSMENT — PAIN DESCRIPTION - DESCRIPTORS: DESCRIPTORS: STABBING

## 2021-04-13 ASSESSMENT — PAIN DESCRIPTION - PAIN TYPE: TYPE: CHRONIC PAIN

## 2021-04-13 ASSESSMENT — PAIN DESCRIPTION - ORIENTATION: ORIENTATION: RIGHT;LEFT

## 2021-04-13 ASSESSMENT — PAIN DESCRIPTION - PROGRESSION: CLINICAL_PROGRESSION: NOT CHANGED

## 2021-04-13 ASSESSMENT — PAIN - FUNCTIONAL ASSESSMENT: PAIN_FUNCTIONAL_ASSESSMENT: PREVENTS OR INTERFERES SOME ACTIVE ACTIVITIES AND ADLS

## 2021-04-13 NOTE — PLAN OF CARE
Pt seen in [facility] - nails cut per Dr. Horner Penta - no debridement of wounds - noted epithelization - cont current treatment with triad and compri 2 - discussed results of venous studies - pt needs to f/u with Dr. Adelaida Allen . Pt concerned about low BS, weight gain and bills from insurance company- pt encouraged to f/u with PCP re: low blood sugars . Reviewed AVS - f/u in 1 week  .  Pt reports that he is waiting on Compression garments ordered from [facility]- instructed to bring in next week

## 2021-04-13 NOTE — PROGRESS NOTES
Jacques   Progress Note and Procedure Note      Jone Wright  AGE: 65 y.o. GENDER: male  : 1950  TODAY'S DATE:  2021    Subjective:     Chief Complaint   Patient presents with    Wound Check         HISTORY of PRESENT ILLNESS HPI     Jone Wright is a 65 y.o. male who presents today for wound evaluation. History of Wound: Patient admits to bilateral leg wounds that have not been healing. He has left the dressings on as directed. He has no other complaints about his wounds at this time. He is trying to keep the legs elevated when keep up with his diabetic diet. He admits that his blood sugar was 64 this morning. He does have some concerns with his blood sugar. Wound Pain:  intermittent  Severity:  3 / 10   Wound Type:  venous and diabetic  Modifying Factors:  venous stasis, lymphedema, diabetes, poor glucose control and obesity  Associated Signs/Symptoms:  edema, drainage and pain        PAST MEDICAL HISTORY        Diagnosis Date    Allergic rhinitis     Arthritis     Bladder fistula     resolved    C. difficile diarrhea 2015    +PCR    Cellulitis of right lower extremity 3/23/2016    CHF (congestive heart failure) ([address]     Dental disease     Diabetes ([address]     Diverticulitis     Dizziness     DVT of lower extremity, bilateral ([address] 10/16/2013    GERD (gastroesophageal reflux disease)     Headache     Hearing loss     Hematuria 2014    Hx of blood clots     Hyperlipidemia     Hypertension     Lung disease     MONIQUE (obstructive sleep apnea)     Pancreatitis ([address] 2013    Pulmonary emboli ([address]     after cholecystectomy     Rash     Sleep apnea     Tinnitus        PAST SURGICAL HISTORY    Past Surgical History:   Procedure Laterality Date    ABDOMEN SURGERY  2014    reveseral end peristomal hernia repair.     CARDIOVERSION  2019    Dr. Alissa Steward, OPEN N/A 9-17-13    LAPAROSCOPIC CONVERTED TO OPEN CHOLECYSTECTOMY WITH    COLON SURGERY      COLONOSCOPY  2008    COLONOSCOPY  12/4/2013    Severe Diverticulosis unable to finish    COLONOSCOPY  4/30/14    diverticula-10 year f/u    COLOSTOMY  Jan 2014    CYSTOSCOPY  12/10/13    with bladder biopsy    CYSTOSCOPY  1-28-14    Cystourethroscopy, left ureteral catheter     EPIDURAL STEROID INJECTION Right 1/21/2019    RIGHT LUMBAR TWO THREE EPIDURAL STEROID INJECTION SITE CONFIRMED BY FLUROOSCOPY performed by Rubén Richard MD at [facility] Right 2/11/2019    RIGHT LUMBAR TWO THREE EPIDURAL STEROID INJECTION SITE CONFIRMED BY FLUOROSCOPY performed by Rubén Richard MD at [facility] 66  08/14/2015    [facility], BILATERAL COMPONENT SEPARATION, LYSIS OF ADHESIONS    OTHER SURGICAL HISTORY  1-28-14    LeftColectomy with End Colostomy, Splenic Flexure Mobilization, Takedown of Colovesical Fistula    DC INJECTION HIP [facility] Right 9/19/2018    ARTHROGRAM AND CORTISONE INJECTION RIGHT HIP performed by Sebastian Tellez MD at [address] Right 2003    Fracture lower leg during chain saw accident. Surgery x 2       FAMILY HISTORY    Family History   Problem Relation Age of Onset    Heart Disease Father     Heart Attack Father     Stroke Brother     Heart Disease Brother     High Blood Pressure Brother     Kidney Disease Mother         kidney removed       SOCIAL HISTORY    Social History     Tobacco Use    Smoking status: Never Smoker    Smokeless tobacco: Never Used   Substance Use Topics    Alcohol use:  Yes     Alcohol/week: 0.0 standard drinks     Frequency: Monthly or less     Comment: every several months     Drug use: No       ALLERGIES    Allergies   Allergen Reactions    Norco [Hydrocodone-Acetaminophen]      Makes agitated       MEDICATIONS    Current Outpatient Medications on File Prior to Encounter Medication Sig Dispense Refill    spironolactone (ALDACTONE) 25 MG tablet TAKE 1 TABLET BY MOUTH EVERY DAY 90 tablet 1    Lactobacillus (PROBIOTIC ACIDOPHILUS PO) Take 1 tablet by mouth as needed      ELIQUIS 5 MG TABS tablet TAKE 1 TABLET BY MOUTH TWICE A  tablet 3    torsemide (DEMADEX) 20 MG tablet Take 2 tablets by mouth daily 180 tablet 1    hydrALAZINE (APRESOLINE) 50 MG tablet TAKE 1/2 TABLET BY MOUTH EVERY 8 HOURS 135 tablet 7    flecainide (TAMBOCOR) 100 MG tablet TAKE 1 TABLET BY MOUTH TWICE A  tablet 1    isosorbide mononitrate (IMDUR) 30 MG extended release tablet TAKE 1 TABLET BY MOUTH EVERY DAY 90 tablet 3    montelukast (SINGULAIR) 10 MG tablet TAKE 1 TABLET BY MOUTH EVERY DAY 90 tablet 3    Insulin Glargine, 2 Unit Dial, (TOUJEO MAX SOLOSTAR) 300 UNIT/ML SOPN 45 units morning and 40units in the evening. 3 pen 5    atorvastatin (LIPITOR) 40 MG tablet Take 1 tablet by mouth nightly 90 tablet 3    silver sulfADIAZINE (SILVADENE) 1 % cream Apply topically daily. (Patient taking differently: Apply topically daily.     \"Takes as needed, hasn't used yet on wounds \") 50 g 1    glimepiride (AMARYL) 4 MG tablet TAKE 1 TABLET BY MOUTH TWICE A  tablet 3    Handicap Placard MISC by Does not apply route Please issue for duration of 5 years 1 each 0    Handicap Placard MISC by Does not apply route Please issue for duration of 5 years 1 each 0    sertraline (ZOLOFT) 50 MG tablet Take 1 tablet by mouth daily 90 tablet 2    omeprazole (PRILOSEC) 40 MG delayed release capsule Take 1 capsule by mouth daily 90 capsule 3    atenolol (TENORMIN) 50 MG tablet Take 0.5 tablets by mouth daily 90 tablet 3    Insulin Pen Needle 31G X 5 MM MISC 1 each by Does not apply route daily 400 each 5    azelastine (ASTELIN) 0.1 % nasal spray USE 1 SPRAY IN EACH NOSTRIL TWICE DAILY AS DIRECTED 1 Bottle 5    Glucosamine-Chondroitin (GLUCOSAMINE CHONDR COMPLEX PO) Take 1 tablet by mouth 2 times daily  blood glucose monitor kit and supplies by Other route daily One Touch Ultra 1 kit 0    traZODone (DESYREL) 50 MG tablet TAKE 1 TABLET BY MOUTH EVERY DAY AT NIGHT 30 tablet 5    glucose blood VI test strips (ASCENSIA AUTODISC VI;ONE TOUCH ULTRA TEST VI) strip DX: E11.9 FSBS daily. One Touch ultra 100 strip 5    aspirin 81 MG chewable tablet Take 1 tablet by mouth daily 30 tablet 0    Multiple Vitamins-Minerals (THERAPEUTIC MULTIVITAMIN-MINERALS) tablet Take 1 tablet by mouth daily      ferrous sulfate 325 (65 FE) MG tablet Take 325 mg by mouth daily Currently;y states he is not taking this       No current facility-administered medications on file prior to encounter. REVIEW OF SYSTEMS    Pertinent items are noted in HPI. Objective:      /70   Pulse 55   Temp 97.2 °F (36.2 °C) (Oral)   Resp 20   Ht 6' 1\" (1.854 m)   Wt 293 lb (132.9 kg)   BMI 38.66 kg/m²     PHYSICAL EXAM    Vascular: Vascular status Impaired  palpable pedal pulses, right DP2/4 and PT1/4, left DP2/4 and PT1/4. CFT 3 seconds digits 1 to 5 bilateral.  Hair growthAbsent  both lower extremities and feet. Skin temperature is warm to warm from pretibial area to distal digits bilateral.  Exam is negative for rubor, pallor, cyanosis or signs of acute vascular compromise bilaterally. Exam is positive for edema bilateral lower extremity. Varicosities Present bilateral lower extremity. Neuro: Neurologic status diminished bilateral with epicritic diminished, proprioceptive diminished,  and protopathic diminished. DTRs Present bilateral Achilles. There were no reproducible neuritic symptoms on exam bilateral feet/ankles. Derm: Ulceration to bilateral legs. Ecchymosis Absent  bilateral feet/foot. Musculoskeletal: Mild pain with debridement of wounds 5/5 muscle strength in/eversion and dorsi/plantarflexion bilateral feet. No gross instability noted.           Assessment:     Patient Active Problem List   Diagnosis    Type 2 diabetes mellitus with hyperglycemia (HCC)    Osteoarthritis    Class 2 severe obesity due to excess calories with serious comorbidity and body mass index (BMI) of 39.0 to 39.9 in adult (HCC)    Edema    Anemia    Bradycardia    Vasculogenic erectile dysfunction    Venous stasis ulcer of right lower extremity (HCC)    Venous thrombosis of leg    Chronic venous insufficiency    Hyperbilirubinemia    Moderate episode of recurrent major depressive disorder (HCC)    Renal insufficiency    Anxiety    Essential hypertension    Non-seasonal allergic rhinitis    Mixed hyperlipidemia    Chronic congestive heart failure (HCC)    Chronic pulmonary edema    Coronary artery disease involving native coronary artery of native heart without angina pectoris    Nonrheumatic aortic valve stenosis    Acute kidney inury     Severe sleep apnea    Primary insomnia    Venous stasis ulcer of left calf limited to breakdown of skin with varicose veins (HCC)    Stage 3 chronic kidney disease (HCC)    Renal osteodystrophy    Peripheral edema    Primary osteoarthritis of right hip    Hypoxemia    Grade III diastolic dysfunction    Paroxysmal atrial fibrillation (HCC)    Atypical atrial flutter (HCC)    Simple chronic bronchitis (HCC)    Other specified peripheral vascular diseases ([address]    Non-pressure chronic ulcer of right calf with fat layer exposed ([address]    Non-pressure chronic ulcer of left calf with fat layer exposed ([address]    Leg edema       Wound Care Documentation:  Wound 04/14/16 wound #1 Right medial lower leg (onset 3/1/16 trauma) Venous, full thickness (Active)   Number of days: 1825       Wound 04/14/16 wound #2 Right lateral lower leg (onset 2/15/16 trauma) Venous, full thickness healed 4/21/16 (Active)   Number of days: 1825       Wound 04/28/16 wound #2 left pretib wound, venous, due to trauma bumped leg 4/26/16 full thicvkness  healed 5/26/16 (Active)   Number of days: 1811       Wound 05/05/16 Wound #3 Left Medial Leg,trauma,venous ulcer,partial thickness,onset 5/3/16 healed 5/19/16 (Active)   Number of days: 5909       Wound 06/02/16 Venous ulcer Pretibial Right #4  Cluster-full thicknessonset 6-1-16) venous,left pretibial (Active)   Number of days: 2591       Wound 10/16/17 Abrasion(s) Leg Left; Lower 2.8 cm x 3 cm (Active)   Number of days: 1275       Incision 05/16/14 Abdomen (Active)   Number of days: 8965       Incision 08/14/15 Abdomen (Active)   Number of days: 2069       Incision 09/19/18 Hip Right (Active)   Number of days: 099       Wound 03/23/21 #1, left leg, venous, full thickness ? onset 12/2020 (Active)   Wound Image   03/23/21 0907   Wound Etiology Venous 04/13/21 0907   Dressing Status New dressing applied 04/06/21 1214   Wound Cleansed Wound cleanser 04/13/21 0907   Dressing/Treatment Other (comment) 04/06/21 1214   Wound Length (cm) 1 cm 04/13/21 0907   Wound Width (cm) 0.8 cm 04/13/21 0907   Wound Depth (cm) 0.2 cm 04/13/21 0907   Wound Surface Area (cm^2) 0.8 cm^2 04/13/21 0907   Change in Wound Size % (l*w) 61.9 04/13/21 0907   Wound Volume (cm^3) 0.16 cm^3 04/13/21 0907   Wound Healing % 62 04/13/21 0907   Post-Procedure Length (cm) 1.5 cm 04/06/21 1146   Post-Procedure Width (cm) 1.7 cm 04/06/21 1146   Post-Procedure Depth (cm) 0.2 cm 04/06/21 1146   Post-Procedure Surface Area (cm^2) 2.55 cm^2 04/06/21 1146   Post-Procedure Volume (cm^3) 0.51 cm^3 04/06/21 1146   Distance Tunneling (cm) 0 cm 04/13/21 0907   Tunneling Position ___ O'Clock 0 04/13/21 0907   Undermining Starts ___ O'Clock 0 04/13/21 0907   Undermining Ends___ O'Clock 0 04/13/21 0907   Undermining Maxium Distance (cm) 0 04/13/21 0907   Wound Assessment Epithelialization 04/13/21 0954   Drainage Amount Small 04/13/21 0907   Drainage Description Serosanguinous 04/13/21 0907   Odor None 04/13/21 0907   Mago-wound Assessment Hyperpigmented 04/13/21 0907   Margins Epibole (rolled edges) 04/06/21 1126   Number of days: 21       Wound 03/23/21 #2, right leg, venous, full thickness, onset 2/2021 (Active)   Wound Image   03/23/21 0907   Wound Etiology Venous 04/13/21 0907   Dressing Status New dressing applied 04/06/21 1214   Wound Cleansed Wound cleanser 04/13/21 0907   Dressing/Treatment Other (comment) 04/06/21 1214   Wound Length (cm) 2 cm 04/13/21 0907   Wound Width (cm) 1.6 cm 04/13/21 0907   Wound Depth (cm) 0.2 cm 04/13/21 0907   Wound Surface Area (cm^2) 3.2 cm^2 04/13/21 0907   Change in Wound Size % (l*w) -42.22 04/13/21 0907   Wound Volume (cm^3) 0.64 cm^3 04/13/21 0907   Wound Healing % -191 04/13/21 0907   Post-Procedure Length (cm) 1.9 cm 04/06/21 1146   Post-Procedure Width (cm) 1 cm 04/06/21 1146   Post-Procedure Depth (cm) 0.2 cm 04/06/21 1146   Post-Procedure Surface Area (cm^2) 1.9 cm^2 04/06/21 1146   Post-Procedure Volume (cm^3) 0.38 cm^3 04/06/21 1146   Distance Tunneling (cm) 0 cm 04/13/21 3269   Tunneling Position ___ O'Clock 0 04/13/21 0907   Undermining Starts ___ O'Clock 0 04/13/21 0907   Undermining Ends___ O'Clock 0 04/13/21 0907   Undermining Maxium Distance (cm) 0 04/13/21 0907   Wound Assessment Epithelialization 04/13/21 0954   Drainage Amount Small 04/13/21 0907   Drainage Description Serosanguinous 04/13/21 0907   Odor None 04/13/21 0907   Mago-wound Assessment Hyperpigmented 04/13/21 0907   Margins Epibole (rolled edges) 04/06/21 1126   Number of days: 21             Plan:   Patient examined and evaluated  Call primary care to discuss blood sugars in the morning  Discussed appropriate diabetic diet  Legs elevated all times and not active  Vascular studies completed this morning    The nature of the patient's condition was explained in depth.  The patient was informed that their compliance to the treatment plan is paramount to successful healing and prevention of further ulceration and/or infection     Discharge Treatment  Leave compression wraps on for the week    Written Patient Discharge

## 2021-04-14 ENCOUNTER — OFFICE VISIT (OUTPATIENT)
Dept: FAMILY MEDICINE CLINIC | Age: 71
End: 2021-04-14
Payer: MEDICARE

## 2021-04-14 ENCOUNTER — TELEPHONE (OUTPATIENT)
Dept: FAMILY MEDICINE CLINIC | Age: 71
End: 2021-04-14

## 2021-04-14 VITALS
DIASTOLIC BLOOD PRESSURE: 70 MMHG | SYSTOLIC BLOOD PRESSURE: 130 MMHG | HEART RATE: 59 BPM | RESPIRATION RATE: 18 BRPM | WEIGHT: 295.2 LBS | BODY MASS INDEX: 38.95 KG/M2 | OXYGEN SATURATION: 94 %

## 2021-04-14 DIAGNOSIS — E11.65 TYPE 2 DIABETES MELLITUS WITH HYPERGLYCEMIA, WITH LONG-TERM CURRENT USE OF INSULIN (HCC): Primary | ICD-10-CM

## 2021-04-14 DIAGNOSIS — Z79.4 TYPE 2 DIABETES MELLITUS WITH HYPERGLYCEMIA, WITH LONG-TERM CURRENT USE OF INSULIN (HCC): Primary | ICD-10-CM

## 2021-04-14 LAB — HBA1C MFR BLD: 7 %

## 2021-04-14 PROCEDURE — 3051F HG A1C>EQUAL 7.0%<8.0%: CPT | Performed by: NURSE PRACTITIONER

## 2021-04-14 PROCEDURE — 99213 OFFICE O/P EST LOW 20 MIN: CPT | Performed by: NURSE PRACTITIONER

## 2021-04-14 PROCEDURE — 83036 HEMOGLOBIN GLYCOSYLATED A1C: CPT | Performed by: NURSE PRACTITIONER

## 2021-04-14 RX ORDER — GLIMEPIRIDE 4 MG/1
TABLET ORAL
Qty: 90 TABLET | Refills: 3 | Status: SHIPPED | OUTPATIENT
Start: 2021-04-14 | End: 2021-10-21

## 2021-04-14 NOTE — PROGRESS NOTES
Oumar Mike (:  1950) is a 65 y.o. male,Established patient, here for evaluation of the following chief complaint(s):  Diabetes (low blood sugars)      ASSESSMENT/PLAN:  1. Type 2 diabetes mellitus with hyperglycemia, with long-term current use of insulin (HCC)  -     POCT glycosylated hemoglobin (Hb A1C)  -     glimepiride (AMARYL) 4 MG tablet; TAKE 1 TABLET in the morning., Disp-90 tablet, R-3Normal    Will decrease amaryl to 4 mg AM only    Will decrease toujeo to 45 units AM and decrease to 30 units PM.     Encouraged to continue diet efforts. Has lost weight in the past but not able to maintain. Return to office sooner if BS not stable. Will look to monitor blood pressure as well. Return in about 3 months (around 2021) for diabetes. SUBJECTIVE/OBJECTIVE:  HPI     Has a wound left lower leg. Following at Wound center at [facility] with Dr. Sachin Cervantes. 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 Placed on no bread and eating meat and vegetables and feels he is doing well. Now with low blood BS in the mornings. Range 120 to 67. Referred to vein specialist at [facility], Dr. Tamra Donohue. Unable to see record from him in [facility]. Few weeks ago awoke with redness in his eye. Thought was pink eye and went to Urgent Care and at the same time evaluated for wound left lower leg. Continued to follow up with Urgent and then was referred to Wound center at [facility]. Weight down 12 pounds over past 4 months. Review of Systems   All other systems reviewed and are negative. Physical Exam  Constitutional:       Appearance: Normal appearance. Cardiovascular:      Rate and Rhythm: Normal rate and regular rhythm. Heart sounds: Normal heart sounds. Pulmonary:      Breath sounds: Normal breath sounds. Musculoskeletal:      Comments: Elastic stockings bilateral lower legs. Skin:     General: Skin is dry.    Neurological: Mental Status: He is alert and oriented to person, place, and time.                  An electronic signature was used to authenticate this note.    --SHAKIRA LOWERY - CNP

## 2021-04-14 NOTE — TELEPHONE ENCOUNTER
----- Message from Alfredo Barlow sent at 4/13/2021 11:58 AM EDT -----  Subject: Appointment Request    Reason for Call: Routine Existing Condition Follow Up (Diabetes)    QUESTIONS  Type of Appointment? Established Patient  Reason for appointment request? Available appointments did not meet   patient need  Additional Information for Provider? Pt would like to be seen in person   sooner than the 29th of April. He stated he was advised to be seen by PCP   for low sugar due to a diabetic diet that he is on. Advised by melissa Weaver-Please contact if a sooner appt can be made.  ---------------------------------------------------------------------------  --------------  CALL BACK INFO  What is the best way for the office to contact you? OK to leave message on   voicemail  Preferred Call Back Phone Number? [phone]  ---------------------------------------------------------------------------  --------------  SCRIPT ANSWERS  Relationship to Patient? Self  Appointment reason? Well Care/Follow Ups  Select a Well Care/Follow Ups appointment reason? Adult Existing Condition   Follow Up [Diabetes   CHF   COPD   Hypertension/Blood Pressure Check]  (Is the patient requesting to be seen urgently for their symptoms?)? No  Is this follow up request related to routine Diabetes Management? Yes  Are you having any new concerns about your existing condition? No  Have you been diagnosed with   tested for   or told that you are suspected of having COVID-19 (Coronavirus)? No  Have you had a fever or taken medication to treat a fever within the past   3 days? No  Have you had a cough   shortness of breath or flu-like symptoms within the past 3 days? No  Do you currently have flu-like symptoms including fever or chills   cough   shortness of breath   or difficulty breathing   or new loss of taste or smell? No  (Service Expert  click yes below to proceed with [facility] [employer] [facility]   Scheduling)?  Yes

## 2021-04-16 PROBLEM — R60.0 LEG EDEMA: Status: RESOLVED | Noted: 2021-03-23 | Resolved: 2021-04-16

## 2021-04-16 PROBLEM — N17.9 ACUTE KIDNEY FAILURE (HCC): Status: RESOLVED | Noted: 2017-11-03 | Resolved: 2021-04-16

## 2021-04-16 PROBLEM — R09.02 HYPOXEMIA: Status: RESOLVED | Noted: 2018-10-16 | Resolved: 2021-04-16

## 2021-04-16 PROBLEM — I48.4 ATYPICAL ATRIAL FLUTTER (HCC): Status: RESOLVED | Noted: 2019-11-01 | Resolved: 2021-04-16

## 2021-04-20 ENCOUNTER — HOSPITAL ENCOUNTER (OUTPATIENT)
Dept: WOUND CARE | Age: 71
Discharge: HOME | End: 2021-04-20
Payer: MEDICARE

## 2021-04-20 VITALS
HEART RATE: 57 BPM | SYSTOLIC BLOOD PRESSURE: 104 MMHG | DIASTOLIC BLOOD PRESSURE: 60 MMHG | HEIGHT: 73 IN | WEIGHT: 290.6 LBS | BODY MASS INDEX: 38.51 KG/M2 | TEMPERATURE: 99.3 F | RESPIRATION RATE: 20 BRPM

## 2021-04-20 PROCEDURE — 29581 APPL MULTLAYER CMPRN SYS LEG: CPT

## 2021-04-20 RX ORDER — LIDOCAINE 40 MG/G
CREAM TOPICAL ONCE
Status: DISCONTINUED | OUTPATIENT
Start: 2021-04-20 | End: 2021-04-21 | Stop reason: HOSPADM

## 2021-04-20 ASSESSMENT — PAIN SCALES - GENERAL: PAINLEVEL_OUTOF10: 0

## 2021-04-20 NOTE — PROGRESS NOTES
[facility]  Progress Note and Procedure Note      Jennifer Matthews  AGE: 65 y.o. GENDER: male  : 1950  TODAY'S DATE:  2021    Subjective:     Chief Complaint   Patient presents with    Wound Check         HISTORY of PRESENT ILLNESS HPI     Jennifer Matthews is a 65 y.o. male who presents today for wound evaluation. History of Wound: Patient admits to bilateral leg wounds that have not been healing. States that he is followed up with his primary care and with vascular surgery since his last visit. He is in a proceed with treatment for his venous disease. He did get his compression garments he did not bring them this week. He admits the pain is improving  Wound Pain:  intermittent  Severity:  1 / 10   Wound Type:  venous and diabetic  Modifying Factors:  venous stasis, lymphedema, diabetes, poor glucose control and obesity  Associated Signs/Symptoms:  edema, drainage and pain        PAST MEDICAL HISTORY        Diagnosis Date    Allergic rhinitis     Arthritis     Bladder fistula     resolved    C. difficile diarrhea 2015    +PCR    Cellulitis of right lower extremity 3/23/2016    CHF (congestive heart failure) ([address]     Dental disease     Diabetes ([address]     Diverticulitis     Dizziness     DVT of lower extremity, bilateral ([address] 10/16/2013    GERD (gastroesophageal reflux disease)     Headache     Hearing loss     Hematuria 2014    Hx of blood clots     Hyperlipidemia     Hypertension     Lung disease     MONIQUE (obstructive sleep apnea)     Pancreatitis ([address] 2013    Pulmonary emboli ([address]     after cholecystectomy     Rash     Sleep apnea     Tinnitus        PAST SURGICAL HISTORY    Past Surgical History:   Procedure Laterality Date    ABDOMEN SURGERY  2014    reveseral end peristomal hernia repair.     CARDIOVERSION  2019    Dr. Severa Bores, OPEN N/A 9-17-13    LAPAROSCOPIC CONVERTED TO OPEN CHOLECYSTECTOMY WITH    COLON SURGERY      COLONOSCOPY  2008    COLONOSCOPY  12/4/2013    Severe Diverticulosis unable to finish    COLONOSCOPY  4/30/14    diverticula-10 year f/u    COLOSTOMY  Jan 2014    CYSTOSCOPY  12/10/13    with bladder biopsy    CYSTOSCOPY  1-28-14    Cystourethroscopy, left ureteral catheter     EPIDURAL STEROID INJECTION Right 1/21/2019    RIGHT LUMBAR TWO THREE EPIDURAL STEROID INJECTION SITE CONFIRMED BY FLUROOSCOPY performed by Susie Angelo MD at [facility] Right 2/11/2019    RIGHT LUMBAR TWO THREE EPIDURAL STEROID INJECTION SITE CONFIRMED BY FLUOROSCOPY performed by Susie Angelo MD at [facility]  08/14/2015    [facility], BILATERAL COMPONENT SEPARATION, LYSIS OF ADHESIONS    OTHER SURGICAL HISTORY  1-28-14    LeftColectomy with End Colostomy, Splenic Flexure Mobilization, Takedown of Colovesical Fistula    ID INJECTION HIP [facility] Right 9/19/2018    ARTHROGRAM AND CORTISONE INJECTION RIGHT HIP performed by Terence Monteiro MD at [address] Right 2003    Fracture lower leg during chain saw accident. Surgery x 2       FAMILY HISTORY    Family History   Problem Relation Age of Onset    Heart Disease Father     Heart Attack Father     Stroke Brother     Heart Disease Brother     High Blood Pressure Brother     Kidney Disease Mother         kidney removed       SOCIAL HISTORY    Social History     Tobacco Use    Smoking status: Never Smoker    Smokeless tobacco: Never Used   Substance Use Topics    Alcohol use:  Yes     Alcohol/week: 0.0 standard drinks     Frequency: Monthly or less     Comment: every several months     Drug use: No       ALLERGIES    Allergies   Allergen Reactions    Norco [Hydrocodone-Acetaminophen]      Makes agitated       MEDICATIONS    Current Outpatient Medications on File Prior to Encounter   Medication Sig Dispense Refill    sertraline (ZOLOFT) 50 MG tablet TAKE 1 TABLET BY MOUTH EVERY DAY 90 tablet 3    glimepiride (AMARYL) 4 MG tablet TAKE 1 TABLET in the morning. 90 tablet 3    spironolactone (ALDACTONE) 25 MG tablet TAKE 1 TABLET BY MOUTH EVERY DAY 90 tablet 1    Lactobacillus (PROBIOTIC ACIDOPHILUS PO) Take 1 tablet by mouth as needed      ELIQUIS 5 MG TABS tablet TAKE 1 TABLET BY MOUTH TWICE A  tablet 3    torsemide (DEMADEX) 20 MG tablet Take 2 tablets by mouth daily 180 tablet 1    hydrALAZINE (APRESOLINE) 50 MG tablet TAKE 1/2 TABLET BY MOUTH EVERY 8 HOURS 135 tablet 7    flecainide (TAMBOCOR) 100 MG tablet TAKE 1 TABLET BY MOUTH TWICE A  tablet 1    isosorbide mononitrate (IMDUR) 30 MG extended release tablet TAKE 1 TABLET BY MOUTH EVERY DAY 90 tablet 3    montelukast (SINGULAIR) 10 MG tablet TAKE 1 TABLET BY MOUTH EVERY DAY 90 tablet 3    Insulin Glargine, 2 Unit Dial, (TOUJEO MAX SOLOSTAR) 300 UNIT/ML SOPN 45 units morning and 40units in the evening.  (Patient taking differently: 45 units morning and 30 units in the evening.) 3 pen 5    atorvastatin (LIPITOR) 40 MG tablet Take 1 tablet by mouth nightly 90 tablet 3    omeprazole (PRILOSEC) 40 MG delayed release capsule Take 1 capsule by mouth daily 90 capsule 3    atenolol (TENORMIN) 50 MG tablet Take 0.5 tablets by mouth daily 90 tablet 3    azelastine (ASTELIN) 0.1 % nasal spray USE 1 SPRAY IN EACH NOSTRIL TWICE DAILY AS DIRECTED 1 Bottle 5    Glucosamine-Chondroitin (GLUCOSAMINE CHONDR COMPLEX PO) Take 1 tablet by mouth 2 times daily      aspirin 81 MG chewable tablet Take 1 tablet by mouth daily 30 tablet 0    Multiple Vitamins-Minerals (THERAPEUTIC MULTIVITAMIN-MINERALS) tablet Take 1 tablet by mouth daily      ferrous sulfate 325 (65 FE) MG tablet Take 325 mg by mouth daily       Handicap Placard MISC by Does not apply route Please issue for duration of 5 years 1 each 0    Handicap Placard MISC by Does not apply route Please issue for duration of 5 years 1 each 0    Insulin Pen Needle 31G X 5 MM MISC 1 each by Does not apply route daily 400 each 5    blood glucose monitor kit and supplies by Other route daily One Touch Ultra 1 kit 0    glucose blood VI test strips (ASCENSIA AUTODISC VI;ONE TOUCH ULTRA TEST VI) strip DX: E11.9 FSBS daily. One Touch ultra 100 strip 5     No current facility-administered medications on file prior to encounter. REVIEW OF SYSTEMS    Pertinent items are noted in HPI. Objective:      /60   Pulse 57   Temp 99.3 °F (37.4 °C) (Oral)   Resp 20   Ht 6' 1\" (1.854 m)   Wt 290 lb 9.6 oz (131.8 kg)   BMI 38.34 kg/m²     PHYSICAL EXAM    Vascular: Vascular status Impaired  palpable pedal pulses, right DP2/4 and PT1/4, left DP2/4 and PT1/4. CFT 3 seconds digits 1 to 5 bilateral.  Hair growthAbsent  both lower extremities and feet. Skin temperature is warm to warm from pretibial area to distal digits bilateral.  Exam is negative for rubor, pallor, cyanosis or signs of acute vascular compromise bilaterally. Exam is positive for edema bilateral lower extremity. Varicosities Present bilateral lower extremity. Neuro: Neurologic status diminished bilateral with epicritic diminished, proprioceptive diminished,  and protopathic diminished. DTRs Present bilateral Achilles. There were no reproducible neuritic symptoms on exam bilateral feet/ankles. Derm: Ulceration to bilateral legs. Ecchymosis Absent  bilateral feet/foot. Musculoskeletal: Mild pain with debridement of wounds 5/5 muscle strength in/eversion and dorsi/plantarflexion bilateral feet. No gross instability noted.           Assessment:     Patient Active Problem List   Diagnosis    Type 2 diabetes mellitus with hyperglycemia (HCC)    Osteoarthritis    Class 2 severe obesity due to excess calories with serious comorbidity and body mass index (BMI) of 39.0 to 39.9 in adult (HCC)    Edema    Anemia    Bradycardia    Vasculogenic erectile dysfunction    Venous stasis ulcer of right lower extremity (HCC)    Venous thrombosis of leg    Chronic venous insufficiency    Hyperbilirubinemia    Moderate episode of recurrent major depressive disorder (HCC)    Renal insufficiency    Anxiety    Essential hypertension    Non-seasonal allergic rhinitis    Mixed hyperlipidemia    Chronic congestive heart failure (HCC)    Chronic pulmonary edema    Coronary artery disease involving native coronary artery of native heart without angina pectoris    Nonrheumatic aortic valve stenosis    Severe sleep apnea    Primary insomnia    Venous stasis ulcer of left calf limited to breakdown of skin with varicose veins (HCC)    Stage 3 chronic kidney disease (HCC)    Renal osteodystrophy    Peripheral edema    Primary osteoarthritis of right hip    Grade III diastolic dysfunction    Paroxysmal atrial fibrillation (HCC)    Simple chronic bronchitis (HCC)    Other specified peripheral vascular diseases ([address]    Non-pressure chronic ulcer of right calf with fat layer exposed ([address]    Non-pressure chronic ulcer of left calf with fat layer exposed ([address]   Wound Care Documentation:  Wound 04/14/16 wound #1 Right medial lower leg (onset 3/1/16 trauma) Venous, full thickness (Active)   Number of days: 1832       Wound 04/14/16 wound #2 Right lateral lower leg (onset 2/15/16 trauma) Venous, full thickness healed 4/21/16 (Active)   Number of days: 1832       Wound 04/28/16 wound #2 left pretib wound, venous, due to trauma bumped leg 4/26/16 full thicvkness  healed 5/26/16 (Active)   Number of days: 1818       Wound 05/05/16 Wound #3 Left Medial Leg,trauma,venous ulcer,partial thickness,onset 5/3/16 healed 5/19/16 (Active)   Number of days: 1811       Wound 06/02/16 Venous ulcer Pretibial Right #4  Cluster-full thicknessonset 6-1-16) venous,left pretibial (Active)   Number of days: 1783       Wound 10/16/17 Abrasion(s) Leg Left; Lower 2.8 cm x 3 cm (Active)   Number of days: 1281       Incision 05/16/14 Abdomen (Active)   Number of days: 9996       Incision 08/14/15 Abdomen (Active)   Number of days: 2075       Incision 09/19/18 Hip Right (Active)   Number of days: 223       Wound 03/23/21 #1, left leg, venous, full thickness ? onset 12/2020 (Active)   Wound Image   04/20/21 0844   Wound Etiology Venous 04/20/21 0844   Dressing Status New dressing applied 04/13/21 1024   Wound Cleansed Soap and water;Cleansed with saline 04/20/21 0844   Dressing/Treatment Other (comment) 04/13/21 1024   Wound Length (cm) 1.3 cm 04/20/21 0844   Wound Width (cm) 0.4 cm 04/20/21 0844   Wound Depth (cm) 0.1 cm 04/20/21 0844   Wound Surface Area (cm^2) 0.52 cm^2 04/20/21 0844   Change in Wound Size % (l*w) 75.24 04/20/21 0844   Wound Volume (cm^3) 0.05 cm^3 04/20/21 0844   Wound Healing % 88 04/20/21 0844   Post-Procedure Length (cm) 1.5 cm 04/06/21 1146   Post-Procedure Width (cm) 1.7 cm 04/06/21 1146   Post-Procedure Depth (cm) 0.2 cm 04/06/21 1146   Post-Procedure Surface Area (cm^2) 2.55 cm^2 04/06/21 1146   Post-Procedure Volume (cm^3) 0.51 cm^3 04/06/21 1146   Distance Tunneling (cm) 0 cm 04/20/21 0844   Tunneling Position ___ O'Clock 0 04/13/21 0907   Undermining Starts ___ O'Clock 0 04/13/21 0907   Undermining Ends___ O'Clock 0 04/13/21 0907   Undermining Maxium Distance (cm) 0 04/20/21 0844   Wound Assessment Pink/red 04/20/21 0844   Drainage Amount Scant 04/20/21 0844   Drainage Description Serosanguinous 04/20/21 0844   Odor None 04/20/21 0844   Mago-wound Assessment Intact; Hyperpigmented 04/20/21 0844   Margins Epibole (rolled edges) 04/06/21 1126   Number of days: 28       Wound 03/23/21 #2, right leg, venous, full thickness, onset 2/2021 (Active)   Wound Image   04/20/21 0844   Wound Etiology Venous 04/20/21 0844   Dressing Status New dressing applied 04/13/21 1024   Wound Cleansed Soap and water;Cleansed with saline 04/20/21 0844   Dressing/Treatment Other (comment) 04/13/21 1024   Wound Length (cm) 1 cm 04/20/21 0844   Wound Width (cm) 0.9 cm 04/20/21 0844   Wound Depth (cm) 0.1 cm 04/20/21 0844   Wound Surface Area (cm^2) 0.9 cm^2 04/20/21 0844   Change in Wound Size % (l*w) 60 04/20/21 0844   Wound Volume (cm^3) 0.09 cm^3 04/20/21 0844   Wound Healing % 59 04/20/21 0844   Post-Procedure Length (cm) 1.9 cm 04/06/21 1146   Post-Procedure Width (cm) 1 cm 04/06/21 1146   Post-Procedure Depth (cm) 0.2 cm 04/06/21 1146   Post-Procedure Surface Area (cm^2) 1.9 cm^2 04/06/21 1146   Post-Procedure Volume (cm^3) 0.38 cm^3 04/06/21 1146   Distance Tunneling (cm) 0 cm 04/20/21 0844   Tunneling Position ___ O'Clock 0 04/13/21 0907   Undermining Starts ___ O'Clock 0 04/13/21 0907   Undermining Ends___ O'Clock 0 04/13/21 0907   Undermining Maxium Distance (cm) 0 04/20/21 0844   Wound Assessment Pink/red 04/20/21 0844   Drainage Amount Scant 04/20/21 0844   Drainage Description Serosanguinous 04/20/21 0844   Odor None 04/20/21 0844   Mago-wound Assessment Intact; Hyperpigmented 04/20/21 0844   Margins Epibole (rolled edges) 04/06/21 1126   Number of days: 28         Plan:   Patient examined and evaluated  Continue follow-up with primary care for management of diabetes and medications. Discussed appropriate diabetic diet  Legs elevated all times and not active  Tinea follow-up with vascular surgery  The nature of the patient's condition was explained in depth.  The patient was informed that their compliance to the treatment plan is paramount to successful healing and prevention of further ulceration and/or infection     Discharge Treatment  Leave compression wraps on for the week    Written Patient Discharge Instructions Given            Electronically signed by Aleda Epley, DPM on 4/20/2021 at 9:16 AM

## 2021-04-20 NOTE — PLAN OF CARE
Pt seen in [facility] wounds improved - no debride today - will cont current compression with compri 2 - triad to wounds - reviewed AVS - pt has venous procedure May 2rd per Dr. Maryan Steen - F/U in 1 week

## 2021-04-27 ENCOUNTER — HOSPITAL ENCOUNTER (OUTPATIENT)
Dept: WOUND CARE | Age: 71
Discharge: HOME | End: 2021-04-27
Payer: MEDICARE

## 2021-04-27 VITALS
HEIGHT: 73 IN | BODY MASS INDEX: 37.59 KG/M2 | TEMPERATURE: 99.8 F | DIASTOLIC BLOOD PRESSURE: 68 MMHG | HEART RATE: 52 BPM | RESPIRATION RATE: 20 BRPM | SYSTOLIC BLOOD PRESSURE: 115 MMHG | WEIGHT: 283.6 LBS

## 2021-04-27 PROCEDURE — 29581 APPL MULTLAYER CMPRN SYS LEG: CPT

## 2021-04-27 PROCEDURE — 11042 DBRDMT SUBQ TIS 1ST 20SQCM/<: CPT

## 2021-04-27 RX ORDER — LIDOCAINE 40 MG/G
CREAM TOPICAL ONCE
Status: DISCONTINUED | OUTPATIENT
Start: 2021-04-27 | End: 2021-04-28 | Stop reason: HOSPADM

## 2021-04-27 ASSESSMENT — PAIN SCALES - GENERAL: PAINLEVEL_OUTOF10: 0

## 2021-04-27 NOTE — PROGRESS NOTES
[facility]  Progress Note and Procedure Note      Aubrey Macias  AGE: 65 y.o. GENDER: male  : 1950  TODAY'S DATE:  2021    Subjective:     Chief Complaint   Patient presents with    Wound Check         HISTORY of PRESENT ILLNESS HPI     Aubrey Macias is a 65 y.o. male who presents today for wound evaluation. History of Wound: Patient admits to bilateral leg wounds that have not been healing. States that he is followed up with his primary care and with vascular surgery since his last visit. He is in a proceed with treatment for his venous disease. He did get his compression garments he did not bring them this week. He admits the pain is improving  Wound Pain:  intermittent  Severity:  1 / 10   Wound Type:  venous and diabetic  Modifying Factors:  venous stasis, lymphedema, diabetes, poor glucose control and obesity  Associated Signs/Symptoms:  edema, drainage and pain        PAST MEDICAL HISTORY        Diagnosis Date    Allergic rhinitis     Arthritis     Bladder fistula     resolved    C. difficile diarrhea 2015    +PCR    Cellulitis of right lower extremity 3/23/2016    CHF (congestive heart failure) ([address]     Dental disease     Diabetes ([address]     Diverticulitis     Dizziness     DVT of lower extremity, bilateral ([address] 10/16/2013    GERD (gastroesophageal reflux disease)     Headache     Hearing loss     Hematuria 2014    Hx of blood clots     Hyperlipidemia     Hypertension     Lung disease     MONIQUE (obstructive sleep apnea)     Pancreatitis ([address] 2013    Pulmonary emboli ([address]     after cholecystectomy     Rash     Sleep apnea     Tinnitus        PAST SURGICAL HISTORY    Past Surgical History:   Procedure Laterality Date    ABDOMEN SURGERY  2014    reveseral end peristomal hernia repair.     CARDIOVERSION  2019    Dr. Kacie Joe, OPEN N/A 9-17-13    LAPAROSCOPIC CONVERTED TO OPEN CHOLECYSTECTOMY WITH    COLON SURGERY      COLONOSCOPY  2008    COLONOSCOPY  12/4/2013    Severe Diverticulosis unable to finish    COLONOSCOPY  4/30/14    diverticula-10 year f/u    COLOSTOMY  Jan 2014    CYSTOSCOPY  12/10/13    with bladder biopsy    CYSTOSCOPY  1-28-14    Cystourethroscopy, left ureteral catheter     EPIDURAL STEROID INJECTION Right 1/21/2019    RIGHT LUMBAR TWO THREE EPIDURAL STEROID INJECTION SITE CONFIRMED [facility]COPY [facility]    RIGHT LUMBAR TWO THREE EPIDURAL STEROID INJECTION SITE CONFIRMED BY FLUOROSCOPY performed by Jeffrey De La Rosa MD at [facility]  08/14/2015    [facility], BILATERAL COMPONENT SEPARATION, LYSIS OF ADHESIONS    OTHER SURGICAL HISTORY  1-28-14    LeftColectomy with End Colostomy, Splenic Flexure Mobilization, Takedown of Colovesical Fistula    [place] INJECTION HIP [facility] Right 9/19/2018    ARTHROGRAM AND CORTISONE INJECTION RIGHT HIP performed by Víctor Arguello MD at [address] [place] [address] St Right 2003    Fracture lower leg during chain saw accident. Surgery x 2       FAMILY HISTORY    Family History   Problem Relation Age of Onset    Heart Disease Father     Heart Attack Father     Stroke Brother     Heart Disease Brother     High Blood Pressure Brother     Kidney Disease Mother         kidney removed       SOCIAL HISTORY    Social History     Tobacco Use    Smoking status: Never Smoker    Smokeless tobacco: Never Used   Substance Use Topics    Alcohol use:  Yes     Alcohol/week: 0.0 standard drinks     Frequency: Monthly or less     Comment: every several months     Drug use: No       ALLERGIES    Allergies   Allergen Reactions    Norco [Hydrocodone-Acetaminophen]      Makes agitated       MEDICATIONS    Current Outpatient Medications on File Prior to Encounter   Medication Sig Dispense Refill    sertraline (ZOLOFT) 50 MG tablet TAKE 1 TABLET BY MOUTH EVERY DAY 90 tablet 3    glimepiride (AMARYL) 4 MG tablet TAKE 1 TABLET in the morning. 90 tablet 3    spironolactone (ALDACTONE) 25 MG tablet TAKE 1 TABLET BY MOUTH EVERY DAY 90 tablet 1    Lactobacillus (PROBIOTIC ACIDOPHILUS PO) Take 1 tablet by mouth as needed      ELIQUIS 5 MG TABS tablet TAKE 1 TABLET BY MOUTH TWICE A  tablet 3    torsemide (DEMADEX) 20 MG tablet Take 2 tablets by mouth daily 180 tablet 1    hydrALAZINE (APRESOLINE) 50 MG tablet TAKE 1/2 TABLET BY MOUTH EVERY 8 HOURS 135 tablet 7    flecainide (TAMBOCOR) 100 MG tablet TAKE 1 TABLET BY MOUTH TWICE A  tablet 1    isosorbide mononitrate (IMDUR) 30 MG extended release tablet TAKE 1 TABLET BY MOUTH EVERY DAY 90 tablet 3    montelukast (SINGULAIR) 10 MG tablet TAKE 1 TABLET BY MOUTH EVERY DAY 90 tablet 3    Insulin Glargine, 2 Unit Dial, (TOUJEO MAX SOLOSTAR) 300 UNIT/ML SOPN 45 units morning and 40units in the evening.  (Patient taking differently: 45 units morning and 30 units in the evening.) 3 pen 5    atorvastatin (LIPITOR) 40 MG tablet Take 1 tablet by mouth nightly 90 tablet 3    Handicap Placard MISC by Does not apply route Please issue for duration of 5 years 1 each 0    Handicap Placard MISC by Does not apply route Please issue for duration of 5 years 1 each 0    omeprazole (PRILOSEC) 40 MG delayed release capsule Take 1 capsule by mouth daily 90 capsule 3    atenolol (TENORMIN) 50 MG tablet Take 0.5 tablets by mouth daily 90 tablet 3    Insulin Pen Needle 31G X 5 MM MISC 1 each by Does not apply route daily 400 each 5    azelastine (ASTELIN) 0.1 % nasal spray USE 1 SPRAY IN EACH NOSTRIL TWICE DAILY AS DIRECTED 1 Bottle 5    Glucosamine-Chondroitin (GLUCOSAMINE CHONDR COMPLEX PO) Take 1 tablet by mouth 2 times daily      blood glucose monitor kit and supplies by Other route daily One Touch Ultra 1 kit 0    glucose blood VI test strips (ASCENSIA AUTODISC VI;ONE TOUCH ULTRA TEST VI) strip DX: E11.9 FSBS daily. One Touch ultra 100 strip 5    aspirin 81 MG chewable tablet Take 1 tablet by mouth daily 30 tablet 0    Multiple Vitamins-Minerals (THERAPEUTIC MULTIVITAMIN-MINERALS) tablet Take 1 tablet by mouth daily      ferrous sulfate 325 (65 FE) MG tablet Take 325 mg by mouth daily        No current facility-administered medications on file prior to encounter. REVIEW OF SYSTEMS    Pertinent items are noted in HPI. Objective:      /68   Pulse 52   Temp 99.8 °F (37.7 °C) (Oral)   Resp 20   Ht 6' 1\" (1.854 m)   Wt 283 lb 9.6 oz (128.6 kg)   BMI 37.42 kg/m²     PHYSICAL EXAM    Vascular: Vascular status Impaired  palpable pedal pulses, right DP2/4 and PT1/4, left DP2/4 and PT1/4. CFT 3 seconds digits 1 to 5 bilateral.  Hair growthAbsent  both lower extremities and feet. Skin temperature is warm to warm from pretibial area to distal digits bilateral.  Exam is negative for rubor, pallor, cyanosis or signs of acute vascular compromise bilaterally. Exam is positive for edema bilateral lower extremity. Varicosities Present bilateral lower extremity. Neuro: Neurologic status diminished bilateral with epicritic diminished, proprioceptive diminished,  and protopathic diminished. DTRs Present bilateral Achilles. There were no reproducible neuritic symptoms on exam bilateral feet/ankles. Derm: Ulceration to bilateral legs. Ecchymosis Absent  bilateral feet/foot. Musculoskeletal: Mild pain with debridement of wounds 5/5 muscle strength in/eversion and dorsi/plantarflexion bilateral feet. No gross instability noted.           Assessment:     Patient Active Problem List   Diagnosis    Type 2 diabetes mellitus with hyperglycemia (HCC)    Osteoarthritis    Class 2 severe obesity due to excess calories with serious comorbidity and body mass index (BMI) of 39.0 to 39.9 in adult (HCC)    Edema    Anemia    Bradycardia  Vasculogenic erectile dysfunction    Venous stasis ulcer of right lower extremity (HCC)    Venous thrombosis of leg    Chronic venous insufficiency    Hyperbilirubinemia    Moderate episode of recurrent major depressive disorder (HCC)    Renal insufficiency    Anxiety    Essential hypertension    Non-seasonal allergic rhinitis    Mixed hyperlipidemia    Chronic congestive heart failure (HCC)    Chronic pulmonary edema    Coronary artery disease involving native coronary artery of native heart without angina pectoris    Nonrheumatic aortic valve stenosis    Severe sleep apnea    Primary insomnia    Venous stasis ulcer of left calf limited to breakdown of skin with varicose veins (HCC)    Stage 3 chronic kidney disease (HCC)    Renal osteodystrophy    Peripheral edema    Primary osteoarthritis of right hip    Grade III diastolic dysfunction    Paroxysmal atrial fibrillation (HCC)    Simple chronic bronchitis (HCC)    Other specified peripheral vascular diseases (HCC)    Non-pressure chronic ulcer of right calf with fat layer exposed ([address]    Non-pressure chronic ulcer of left calf with fat layer exposed ([address]   Procedure Note    Performed by: Daily Gonzalez DPM    Consent obtained: Yes    Time out taken:  Yes    Pain Control: Anesthetic  Anesthetic: 4% Topical Xylocaine     Debridement:Excisional Debridement    Using curette the wound was sharply debrided    down through and including the removal of epidermis and dermis.         Devitalized Tissue Debrided:  fibrin, slough and exudate    Pre Debridement Measurements:  Are located in the Wound Documentation Flow Sheet    Wound Care Documentation:  Wound 04/14/16 wound #1 Right medial lower leg (onset 3/1/16 trauma) Venous, full thickness (Active)   Number of days: 1839       Wound 04/14/16 wound #2 Right lateral lower leg (onset 2/15/16 trauma) Venous, full thickness healed 4/21/16 (Active)   Number of days: 1839       Wound 04/28/16 Odor None 04/27/21 0857   Mago-wound Assessment Hyperpigmented 04/27/21 0857   Margins Epibole (rolled edges) 04/06/21 1126   Number of days: 35       Wound 03/23/21 #2, right leg, venous, full thickness, onset 2/2021 (Active)   Wound Image   04/20/21 0844   Wound Etiology Venous 04/27/21 0857   Dressing Status New dressing applied 04/20/21 1007   Wound Cleansed Soap and water 04/27/21 0857   Dressing/Treatment Other (comment) 04/20/21 1007   Wound Length (cm) 2.2 cm 04/27/21 0857   Wound Width (cm) 1 cm 04/27/21 0857   Wound Depth (cm) 0.1 cm 04/27/21 0857   Wound Surface Area (cm^2) 2.2 cm^2 04/27/21 0857   Change in Wound Size % (l*w) 2.22 04/27/21 0857   Wound Volume (cm^3) 0.22 cm^3 04/27/21 0857   Wound Healing % 0 04/27/21 0857   Post-Procedure Length (cm) 1 cm 04/27/21 0937   Post-Procedure Width (cm) 0.4 cm 04/27/21 3281   Post-Procedure Depth (cm) 0.1 cm 04/27/21 0937   Post-Procedure Surface Area (cm^2) 0.4 cm^2 04/27/21 0937   Post-Procedure Volume (cm^3) 0.04 cm^3 04/27/21 0937   Distance Tunneling (cm) 0 cm 04/27/21 0857   Tunneling Position ___ O'Clock 0 04/27/21 0857   Undermining Starts ___ O'Clock 0 04/27/21 0857   Undermining Ends___ O'Clock 0 04/27/21 0857   Undermining Maxium Distance (cm) 0 04/27/21 0857   Wound Assessment Pink/red 04/27/21 0857   Drainage Amount Scant 04/27/21 0857   Drainage Description Serosanguinous 04/27/21 0857   Odor None 04/27/21 0857   Mago-wound Assessment Hyperpigmented 04/27/21 0857   Margins Epibole (rolled edges) 04/06/21 1126   Number of days: 35       Total Surface Area Debrided:  1.31 sq cm into the dermis and epidermis bilateral leg    Percentage of wound debrided 100%    Bleeding:  Minimal    Hemostasis Achieved:  by pressure    Procedural Pain:  0  / 10     Post Procedural Pain:  0/ 10     Response to treatment:  Well tolerated by patient.        Plan:   Patient examined and evaluated  Wounds debrided bilaterally without incident  Legs elevated all times and not active  Continue follow-up with vascular surgery  The nature of the patient's condition was explained in depth.  The patient was informed that their compliance to the treatment plan is paramount to successful healing and prevention of further ulceration and/or infection     Discharge Treatment  Leave compression wraps on for the week with collagen and gentamicin cream    Written Patient Discharge Instructions Given            Electronically signed by Clarisa Ordonez DPM on 4/27/2021 at 10:34 AM

## 2021-05-02 DIAGNOSIS — I10 ESSENTIAL HYPERTENSION: Chronic | ICD-10-CM

## 2021-05-03 RX ORDER — ATENOLOL 50 MG/1
25 TABLET ORAL DAILY
Qty: 90 TABLET | Refills: 3 | Status: SHIPPED | OUTPATIENT
Start: 2021-05-03 | End: 2021-07-26 | Stop reason: SDUPTHER

## 2021-05-04 ENCOUNTER — HOSPITAL ENCOUNTER (OUTPATIENT)
Dept: WOUND CARE | Age: 71
Discharge: HOME | End: 2021-05-04
Payer: MEDICARE

## 2021-05-04 VITALS
TEMPERATURE: 97.6 F | RESPIRATION RATE: 21 BRPM | HEART RATE: 51 BPM | BODY MASS INDEX: 37.46 KG/M2 | DIASTOLIC BLOOD PRESSURE: 77 MMHG | SYSTOLIC BLOOD PRESSURE: 130 MMHG | HEIGHT: 73 IN | WEIGHT: 282.6 LBS

## 2021-05-04 PROCEDURE — 29581 APPL MULTLAYER CMPRN SYS LEG: CPT

## 2021-05-04 ASSESSMENT — PAIN SCALES - GENERAL: PAINLEVEL_OUTOF10: 0

## 2021-05-04 NOTE — PROGRESS NOTES
[facility]  Progress Note and Procedure Note      Roberta Rosales  AGE: 65 y.o. GENDER: male  : 1950  TODAY'S DATE:  2021    Subjective:     Chief Complaint   Patient presents with    Wound Check         HISTORY of PRESENT ILLNESS HPI     Roberta Rosales is a 65 y.o. male who presents today for wound evaluation. History of Wound: Patient admits to bilateral leg wounds that have not been healing. States that he is followed up with his primary care and with vascular surgery since his last visit. He is in a proceed with treatment for his venous disease. He did get his compression garments he did not bring them this week. He admits the pain is improving  Wound Pain:  intermittent  Severity:  1 / 10   Wound Type:  venous and diabetic  Modifying Factors:  venous stasis, lymphedema, diabetes, poor glucose control and obesity  Associated Signs/Symptoms:  edema, drainage and pain        PAST MEDICAL HISTORY        Diagnosis Date    Allergic rhinitis     Arthritis     Bladder fistula     resolved    C. difficile diarrhea 2015    +PCR    Cellulitis of right lower extremity 3/23/2016    CHF (congestive heart failure) ([address]     Dental disease     Diabetes ([address]     Diverticulitis     Dizziness     DVT of lower extremity, bilateral ([address] 10/16/2013    GERD (gastroesophageal reflux disease)     Headache     Hearing loss     Hematuria 2014    Hx of blood clots     Hyperlipidemia     Hypertension     Lung disease     MONIQUE (obstructive sleep apnea)     Pancreatitis ([address] 2013    Pulmonary emboli ([address]     after cholecystectomy     Rash     Sleep apnea     Tinnitus        PAST SURGICAL HISTORY    Past Surgical History:   Procedure Laterality Date    ABDOMEN SURGERY  2014    reveseral end peristomal hernia repair.     CARDIOVERSION  2019    Dr. Sharon De Jesus, OPEN N/A 9--    LAPAROSCOPIC CONVERTED TO OPEN CHOLECYSTECTOMY WITH    COLON SURGERY      COLONOSCOPY      COLONOSCOPY  2013    Severe Diverticulosis unable to finish    COLONOSCOPY  14    diverticula-10 year f/u    COLOSTOMY  2014    CYSTOSCOPY  12/10/13    with bladder biopsy    CYSTOSCOPY  14    Cystourethroscopy, left ureteral catheter     EPIDURAL STEROID INJECTION Right 2019    RIGHT LUMBAR TWO THREE EPIDURAL STEROID INJECTION SITE CONFIRMED BY FLUROOSCOPY performed by Yajaira Lopez MD at [facility] Right 2019    RIGHT LUMBAR TWO THREE EPIDURAL STEROID INJECTION SITE CONFIRMED BY FLUOROSCOPY performed by Yajaira Lopez MD at [facility]  2015    [facility], BILATERAL COMPONENT SEPARATION, LYSIS OF ADHESIONS    OTHER SURGICAL HISTORY  14    LeftColectomy with End Colostomy, Splenic Flexure Mobilization, Takedown of Colovesical Fistula    [place] INJECTION HIP [facility] Right 2018    ARTHROGRAM AND CORTISONE INJECTION RIGHT HIP performed by Angeline Rosales MD at [address] Right     Fracture lower leg during chain saw accident. Surgery x 2    VASCULAR SURGERY Left 2021    per Dr. Vogt  History   Problem Relation Age of Onset    Heart Disease Father     Heart Attack Father     Stroke Brother     Heart Disease Brother     High Blood Pressure Brother     Kidney Disease Mother         kidney removed       SOCIAL HISTORY    Social History     Tobacco Use    Smoking status: Never Smoker    Smokeless tobacco: Never Used   Substance Use Topics    Alcohol use:  Yes     Alcohol/week: 0.0 standard drinks     Frequency: Monthly or less     Comment: every several months     Drug use: No       ALLERGIES    Allergies   Allergen Reactions    Norco [Hydrocodone-Acetaminophen]      Makes agitated       MEDICATIONS    Current Outpatient Medications on File Prior to Encounter   Medication Sig Dispense Refill    atenolol (TENORMIN) 50 MG tablet Take 0.5 tablets by mouth daily 90 tablet 3    sertraline (ZOLOFT) 50 MG tablet TAKE 1 TABLET BY MOUTH EVERY DAY 90 tablet 3    glimepiride (AMARYL) 4 MG tablet TAKE 1 TABLET in the morning. 90 tablet 3    spironolactone (ALDACTONE) 25 MG tablet TAKE 1 TABLET BY MOUTH EVERY DAY 90 tablet 1    Lactobacillus (PROBIOTIC ACIDOPHILUS PO) Take 1 tablet by mouth as needed      ELIQUIS 5 MG TABS tablet TAKE 1 TABLET BY MOUTH TWICE A  tablet 3    torsemide (DEMADEX) 20 MG tablet Take 2 tablets by mouth daily 180 tablet 1    hydrALAZINE (APRESOLINE) 50 MG tablet TAKE 1/2 TABLET BY MOUTH EVERY 8 HOURS 135 tablet 7    flecainide (TAMBOCOR) 100 MG tablet TAKE 1 TABLET BY MOUTH TWICE A  tablet 1    isosorbide mononitrate (IMDUR) 30 MG extended release tablet TAKE 1 TABLET BY MOUTH EVERY DAY 90 tablet 3    montelukast (SINGULAIR) 10 MG tablet TAKE 1 TABLET BY MOUTH EVERY DAY 90 tablet 3    Insulin Glargine, 2 Unit Dial, (TOUJEO MAX SOLOSTAR) 300 UNIT/ML SOPN 45 units morning and 40units in the evening.  (Patient taking differently: 45 units morning and 30 units in the evening.) 3 pen 5    atorvastatin (LIPITOR) 40 MG tablet Take 1 tablet by mouth nightly 90 tablet 3    Handicap Placard MISC by Does not apply route Please issue for duration of 5 years 1 each 0    Handicap Placard MISC by Does not apply route Please issue for duration of 5 years 1 each 0    omeprazole (PRILOSEC) 40 MG delayed release capsule Take 1 capsule by mouth daily 90 capsule 3    Insulin Pen Needle 31G X 5 MM MISC 1 each by Does not apply route daily 400 each 5    azelastine (ASTELIN) 0.1 % nasal spray USE 1 SPRAY IN EACH NOSTRIL TWICE DAILY AS DIRECTED 1 Bottle 5    Glucosamine-Chondroitin (GLUCOSAMINE CHONDR COMPLEX PO) Take 1 tablet by mouth 2 times daily      blood glucose monitor kit and supplies by Other route 39.0 to 39.9 in adult [facility])    Edema    Anemia    Bradycardia    Vasculogenic erectile dysfunction    Venous stasis ulcer of right lower extremity (HCC)    Venous thrombosis of leg    Chronic venous insufficiency    Hyperbilirubinemia    Moderate episode of recurrent major depressive disorder (HCC)    Renal insufficiency    Anxiety    Essential hypertension    Non-seasonal allergic rhinitis    Mixed hyperlipidemia    Chronic congestive heart failure (HCC)    Chronic pulmonary edema    Coronary artery disease involving native coronary artery of native heart without angina pectoris    Nonrheumatic aortic valve stenosis    Severe sleep apnea    Primary insomnia    Venous stasis ulcer of left calf limited to breakdown of skin with varicose veins (HCC)    Stage 3 chronic kidney disease (HCC)    Renal osteodystrophy    Peripheral edema    Primary osteoarthritis of right hip    Grade III diastolic dysfunction    Paroxysmal atrial fibrillation (HCC)    Simple chronic bronchitis (HCC)    Other specified peripheral vascular diseases (HCC)    Non-pressure chronic ulcer of right calf with fat layer exposed (HCC)    Non-pressure chronic ulcer of left calf with fat layer exposed ([address]       Plan:   Patient examined and evaluated  Wounds healed with very thin skin coverage   Keep legs elevated all times and not active   Continue follow-up with vascular surgery   the nature of the patient's condition was explained in depth.  The patient was informed that their compliance to the treatment plan is paramount to successful healing and prevention of further ulceration and/or infection     Discharge Treatment  Leave compression wraps on for the week with collagen and gentamicin cream    Written Patient Discharge Instructions Given            Electronically signed by Daily Gonzalez DPM on 5/4/2021 at 10:20 AM

## 2021-05-11 ENCOUNTER — HOSPITAL ENCOUNTER (OUTPATIENT)
Dept: WOUND CARE | Age: 71
Discharge: HOME | End: 2021-05-11
Payer: MEDICARE

## 2021-05-11 VITALS — HEIGHT: 73 IN | WEIGHT: 281.8 LBS | RESPIRATION RATE: 20 BRPM | BODY MASS INDEX: 37.35 KG/M2

## 2021-05-11 PROCEDURE — 99212 OFFICE O/P EST SF 10 MIN: CPT

## 2021-05-11 RX ORDER — BACITRACIN ZINC AND POLYMYXIN B SULFATE 500; 1000 [USP'U]/G; [USP'U]/G
OINTMENT TOPICAL ONCE
Status: CANCELLED | OUTPATIENT
Start: 2021-05-11 | End: 2021-05-11

## 2021-05-11 RX ORDER — LIDOCAINE HYDROCHLORIDE 20 MG/ML
JELLY TOPICAL ONCE
Status: CANCELLED | OUTPATIENT
Start: 2021-05-11 | End: 2021-05-11

## 2021-05-11 RX ORDER — LIDOCAINE 40 MG/G
CREAM TOPICAL ONCE
Status: CANCELLED | OUTPATIENT
Start: 2021-05-11 | End: 2021-05-11

## 2021-05-11 RX ORDER — GENTAMICIN SULFATE 1 MG/G
OINTMENT TOPICAL ONCE
Status: CANCELLED | OUTPATIENT
Start: 2021-05-11 | End: 2021-05-11

## 2021-05-11 RX ORDER — LIDOCAINE HYDROCHLORIDE 40 MG/ML
SOLUTION TOPICAL ONCE
Status: CANCELLED | OUTPATIENT
Start: 2021-05-11 | End: 2021-05-11

## 2021-05-11 RX ORDER — LIDOCAINE 50 MG/G
OINTMENT TOPICAL ONCE
Status: CANCELLED | OUTPATIENT
Start: 2021-05-11 | End: 2021-05-11

## 2021-05-11 RX ORDER — GINSENG 100 MG
CAPSULE ORAL ONCE
Status: CANCELLED | OUTPATIENT
Start: 2021-05-11 | End: 2021-05-11

## 2021-05-11 RX ORDER — BACITRACIN, NEOMYCIN, POLYMYXIN B 400; 3.5; 5 [USP'U]/G; MG/G; [USP'U]/G
OINTMENT TOPICAL ONCE
Status: CANCELLED | OUTPATIENT
Start: 2021-05-11 | End: 2021-05-11

## 2021-05-11 RX ORDER — CLOBETASOL PROPIONATE 0.5 MG/G
OINTMENT TOPICAL ONCE
Status: CANCELLED | OUTPATIENT
Start: 2021-05-11 | End: 2021-05-11

## 2021-05-11 RX ORDER — BETAMETHASONE DIPROPIONATE 0.05 %
OINTMENT (GRAM) TOPICAL ONCE
Status: CANCELLED | OUTPATIENT
Start: 2021-05-11 | End: 2021-05-11

## 2021-05-11 NOTE — PLAN OF CARE
Pt healed - applied long term compression - cont to try to lose weight - reviewed AVS - treatment completed

## 2021-05-11 NOTE — PROGRESS NOTES
[facility] [address]  Progress Note and Procedure Note      Zoila Osuna  AGE: 65 y.o. GENDER: male  : 1950  TODAY'S DATE:  2021    Subjective:     Chief Complaint   Patient presents with    Wound Check         HISTORY of PRESENT ILLNESS HPI     Zoila Osuna is a 65 y.o. male who presents today for wound evaluation. History of Wound: Patient admits to bilateral leg wounds that have not been healing. He states his wounds are healed. He has had no problem since his last visit. Is been following up with vascular surgery for his venous disease. He is receiving treatment and has a couple more procedures to go. He has no other complaints at this time. Wound Pain:  intermittent  Severity:  1 / 10   Wound Type:  venous and diabetic  Modifying Factors:  venous stasis, lymphedema, diabetes, poor glucose control and obesity  Associated Signs/Symptoms:  edema, drainage and pain        PAST MEDICAL HISTORY        Diagnosis Date    Allergic rhinitis     Arthritis     Bladder fistula     resolved    C. difficile diarrhea 2015    +PCR    Cellulitis of right lower extremity 3/23/2016    CHF (congestive heart failure) ([address]     Dental disease     Diabetes ([address]     Diverticulitis     Dizziness     DVT of lower extremity, bilateral ([address] 10/16/2013    GERD (gastroesophageal reflux disease)     Headache     Hearing loss     Hematuria 2014    Hx of blood clots     Hyperlipidemia     Hypertension     Lung disease     MONIQUE (obstructive sleep apnea)     Pancreatitis ([address] 2013    Pulmonary emboli ([address]     after cholecystectomy     Rash     Sleep apnea     Tinnitus        PAST SURGICAL HISTORY    Past Surgical History:   Procedure Laterality Date    ABDOMEN SURGERY  2014    reveseral end peristomal hernia repair.     CARDIOVERSION  2019    Dr. Elie Park, OPEN N/A 9-17-13    LAPAROSCOPIC CONVERTED TO OPEN CHOLECYSTECTOMY WITH    COLON SURGERY      COLONOSCOPY  2008    COLONOSCOPY  12/4/2013    Severe Diverticulosis unable to finish    COLONOSCOPY  4/30/14    diverticula-10 year f/u    COLOSTOMY  Jan 2014    CYSTOSCOPY  12/10/13    with bladder biopsy    CYSTOSCOPY  1-28-14    Cystourethroscopy, left ureteral catheter     EPIDURAL STEROID INJECTION Right 1/21/2019    RIGHT LUMBAR TWO THREE EPIDURAL STEROID INJECTION SITE CONFIRMED BY FLUROOSCOPY performed by Gabbie Rebollar MD at [facility] Right 2/11/2019    RIGHT LUMBAR TWO THREE EPIDURAL STEROID INJECTION SITE CONFIRMED BY FLUOROSCOPY performed by aGbbie Rebollar MD at [facility] 66  08/14/2015    [facility], BILATERAL COMPONENT SEPARATION, LYSIS OF ADHESIONS    OTHER SURGICAL HISTORY  1-28-14    LeftColectomy with End Colostomy, Splenic Flexure Mobilization, Takedown of Colovesical Fistula    [facility] INJECTION HIP [facility] Right 9/19/2018    ARTHROGRAM AND CORTISONE INJECTION RIGHT HIP performed by Aisha Quintanilla MD at [address] Right 2003    Fracture lower leg during chain saw accident. Surgery x 2    VASCULAR SURGERY Left 05/2021    per Dr. Emmy Figueredo Right 05/10/2021    venous procedure RLE-per Dr. Quang Mccormack History   Problem Relation Age of Onset    Heart Disease Father     Heart Attack Father     Stroke Brother     Heart Disease Brother     High Blood Pressure Brother     Kidney Disease Mother         kidney removed       SOCIAL HISTORY    Social History     Tobacco Use    Smoking status: Never Smoker    Smokeless tobacco: Never Used   Substance Use Topics    Alcohol use:  Yes     Alcohol/week: 0.0 standard drinks     Frequency: Monthly or less     Comment: every several months     Drug use: No       ALLERGIES    Allergies   Allergen Reactions    Norco [Hydrocodone-Acetaminophen]      Makes agitated       MEDICATIONS    Current Outpatient Medications on File Prior to Encounter   Medication Sig Dispense Refill    atenolol (TENORMIN) 50 MG tablet Take 0.5 tablets by mouth daily 90 tablet 3    sertraline (ZOLOFT) 50 MG tablet TAKE 1 TABLET BY MOUTH EVERY DAY 90 tablet 3    glimepiride (AMARYL) 4 MG tablet TAKE 1 TABLET in the morning. 90 tablet 3    spironolactone (ALDACTONE) 25 MG tablet TAKE 1 TABLET BY MOUTH EVERY DAY 90 tablet 1    Lactobacillus (PROBIOTIC ACIDOPHILUS PO) Take 1 tablet by mouth as needed      ELIQUIS 5 MG TABS tablet TAKE 1 TABLET BY MOUTH TWICE A  tablet 3    torsemide (DEMADEX) 20 MG tablet Take 2 tablets by mouth daily 180 tablet 1    hydrALAZINE (APRESOLINE) 50 MG tablet TAKE 1/2 TABLET BY MOUTH EVERY 8 HOURS 135 tablet 7    flecainide (TAMBOCOR) 100 MG tablet TAKE 1 TABLET BY MOUTH TWICE A  tablet 1    isosorbide mononitrate (IMDUR) 30 MG extended release tablet TAKE 1 TABLET BY MOUTH EVERY DAY 90 tablet 3    montelukast (SINGULAIR) 10 MG tablet TAKE 1 TABLET BY MOUTH EVERY DAY 90 tablet 3    Insulin Glargine, 2 Unit Dial, (TOUJEO MAX SOLOSTAR) 300 UNIT/ML SOPN 45 units morning and 40units in the evening.  (Patient taking differently: 45 units morning and 30 units in the evening.) 3 pen 5    atorvastatin (LIPITOR) 40 MG tablet Take 1 tablet by mouth nightly 90 tablet 3    Handicap Placard MISC by Does not apply route Please issue for duration of 5 years 1 each 0    Handicap Placard MISC by Does not apply route Please issue for duration of 5 years 1 each 0    omeprazole (PRILOSEC) 40 MG delayed release capsule Take 1 capsule by mouth daily 90 capsule 3    Insulin Pen Needle 31G X 5 MM MISC 1 each by Does not apply route daily 400 each 5    azelastine (ASTELIN) 0.1 % nasal spray USE 1 SPRAY IN EACH NOSTRIL TWICE DAILY AS DIRECTED 1 Bottle 5    Glucosamine-Chondroitin (GLUCOSAMINE CHONDR COMPLEX PO) Take 1 tablet body mass index (BMI) of 39.0 to 39.9 in adult (HCC)    Edema    Anemia    Bradycardia    Vasculogenic erectile dysfunction    Venous stasis ulcer of right lower extremity (HCC)    Venous thrombosis of leg    Chronic venous insufficiency    Hyperbilirubinemia    Moderate episode of recurrent major depressive disorder (HCC)    Renal insufficiency    Anxiety    Essential hypertension    Non-seasonal allergic rhinitis    Mixed hyperlipidemia    Chronic congestive heart failure (HCC)    Chronic pulmonary edema    Coronary artery disease involving native coronary artery of native heart without angina pectoris    Nonrheumatic aortic valve stenosis    Severe sleep apnea    Primary insomnia    Venous stasis ulcer of left calf limited to breakdown of skin with varicose veins (HCC)    Stage 3 chronic kidney disease (HCC)    Renal osteodystrophy    Peripheral edema    Primary osteoarthritis of right hip    Grade III diastolic dysfunction    Paroxysmal atrial fibrillation (HCC)    Simple chronic bronchitis (HCC)    Other specified peripheral vascular diseases (HCC)    Non-pressure chronic ulcer of right calf with fat layer exposed (HCC)    Non-pressure chronic ulcer of left calf with fat layer exposed ([address]       Plan:   Patient examined and evaluated  Wounds remain healed. Keep legs elevated all times and not active   Continue follow-up with vascular surgery   Discharge from the wound care center  Follow-up in my private office for routine diabetic foot care.     Discharge Treatment  Leave compression wraps on for the week with collagen and gentamicin cream    Written Patient Discharge Instructions Given            Electronically signed by Jasmyn Bowers DPM on 5/11/2021 at 10:30 AM

## 2021-05-11 NOTE — PROGRESS NOTES
Patient healed at today's visit. Patient educated on the application of compression garments. Voiced understanding. Instructed patient to call [facility] if wounds reopen or if he notices any substantial changes in edema. Voiced understanding to all.  Anders Palm

## 2021-06-16 ENCOUNTER — OFFICE VISIT (OUTPATIENT)
Dept: FAMILY MEDICINE CLINIC | Age: 71
End: 2021-06-16
Payer: MEDICARE

## 2021-06-16 VITALS
HEART RATE: 63 BPM | OXYGEN SATURATION: 95 % | SYSTOLIC BLOOD PRESSURE: 110 MMHG | WEIGHT: 287 LBS | DIASTOLIC BLOOD PRESSURE: 60 MMHG | RESPIRATION RATE: 22 BRPM | BODY MASS INDEX: 37.87 KG/M2

## 2021-06-16 DIAGNOSIS — I10 ESSENTIAL HYPERTENSION: ICD-10-CM

## 2021-06-16 DIAGNOSIS — E11.65 TYPE 2 DIABETES MELLITUS WITH HYPERGLYCEMIA, WITH LONG-TERM CURRENT USE OF INSULIN (HCC): ICD-10-CM

## 2021-06-16 DIAGNOSIS — E78.2 MIXED HYPERLIPIDEMIA: ICD-10-CM

## 2021-06-16 DIAGNOSIS — Z79.4 TYPE 2 DIABETES MELLITUS WITH HYPERGLYCEMIA, WITH LONG-TERM CURRENT USE OF INSULIN (HCC): ICD-10-CM

## 2021-06-16 DIAGNOSIS — N28.9 RENAL INSUFFICIENCY: Primary | ICD-10-CM

## 2021-06-16 PROCEDURE — 99213 OFFICE O/P EST LOW 20 MIN: CPT | Performed by: NURSE PRACTITIONER

## 2021-06-16 PROCEDURE — 3051F HG A1C>EQUAL 7.0%<8.0%: CPT | Performed by: NURSE PRACTITIONER

## 2021-06-16 NOTE — PROGRESS NOTES
Yarely Phipps (:  1950) is a 65 y.o. male,Established patient, here for evaluation of the following chief complaint(s):  Diabetes         ASSESSMENT/PLAN:  1. Renal insufficiency  2. Essential hypertension  -     Comprehensive Metabolic Panel; Future  3. Type 2 diabetes mellitus with hyperglycemia, with long-term current use of insulin (HCC)  -     Hemoglobin A1C; Future  4. Mixed hyperlipidemia  -     Lipid Panel; Future  -     Comprehensive Metabolic Panel; Future        Encouraged to continue with weight loss    Order for fasting labs to have done after 2021. Will continue insulin and amaryl at current dose given not symptomtic. Hoping to make further reductions as weight decreases. Return in about 3 months (around 2021) for diabetes, hyperlipidemia. Subjective   SUBJECTIVE/OBJECTIVE:  HPI      Trying to follow up no carb diet as best he can. Eating a lot of cheese. Weight down 7 pounds over past 2 months. Has been discharged from wound care. Following with vascular ongoing. Will have a procedure, US right leg. On past visit decreased toujeo to 39 Am and 30 PM and decreased amaryl to 4 mg. Reports FSBS around 100. Most of current health plan is recommended by podiatrist, Dr. Sergio Gordon. Lab Results   Component Value Date    LABA1C 7.0 2021     Lab Results   Component Value Date    .0 10/16/2017         Review of Systems   All other systems reviewed and are negative. Objective   Physical Exam  Constitutional:       General: He is not in acute distress. Appearance: Normal appearance. He is well-developed. He is obese. Cardiovascular:      Rate and Rhythm: Normal rate and regular rhythm. Heart sounds: Normal heart sounds. Pulmonary:      Effort: Pulmonary effort is normal.      Breath sounds: Normal breath sounds. Abdominal:      General: Bowel sounds are normal.      Palpations: Abdomen is soft.    Musculoskeletal: Comments: Limited ROM generalized. Skin:     General: Skin is warm and dry. Neurological:      Mental Status: He is alert and oriented to person, place, and time.                   An electronic signature was used to authenticate this note.    --SHAKIRA LOWERY - CNP

## 2021-06-30 RX ORDER — FLECAINIDE ACETATE 100 MG/1
TABLET ORAL
Qty: 180 TABLET | Refills: 1 | Status: SHIPPED | OUTPATIENT
Start: 2021-06-30 | End: 2021-09-21

## 2021-07-15 DIAGNOSIS — I10 ESSENTIAL HYPERTENSION: ICD-10-CM

## 2021-07-15 DIAGNOSIS — E11.65 TYPE 2 DIABETES MELLITUS WITH HYPERGLYCEMIA, WITH LONG-TERM CURRENT USE OF INSULIN (HCC): ICD-10-CM

## 2021-07-15 DIAGNOSIS — Z79.4 TYPE 2 DIABETES MELLITUS WITH HYPERGLYCEMIA, WITH LONG-TERM CURRENT USE OF INSULIN (HCC): ICD-10-CM

## 2021-07-15 DIAGNOSIS — E78.2 MIXED HYPERLIPIDEMIA: ICD-10-CM

## 2021-07-15 LAB
A/G RATIO: 1.7 (ref 1.1–2.2)
ALBUMIN SERPL-MCNC: 4.2 G/DL (ref 3.4–5)
ALP BLD-CCNC: 93 U/L (ref 40–129)
ALT SERPL-CCNC: 31 U/L (ref 10–40)
ANION GAP SERPL CALCULATED.3IONS-SCNC: 8 MMOL/L (ref 3–16)
AST SERPL-CCNC: 25 U/L (ref 15–37)
BILIRUB SERPL-MCNC: 1.1 MG/DL (ref 0–1)
BUN BLDV-MCNC: 30 MG/DL (ref 7–20)
CALCIUM SERPL-MCNC: 9.5 MG/DL (ref 8.3–10.6)
CHLORIDE BLD-SCNC: 101 MMOL/L (ref 99–110)
CHOLESTEROL, TOTAL: 101 MG/DL (ref 0–199)
CO2: 28 MMOL/L (ref 21–32)
CREAT SERPL-MCNC: 0.8 MG/DL (ref 0.8–1.3)
GFR AFRICAN AMERICAN: >60
GFR NON-AFRICAN AMERICAN: >60
GLOBULIN: 2.5 G/DL
GLUCOSE BLD-MCNC: 109 MG/DL (ref 70–99)
HDLC SERPL-MCNC: 38 MG/DL (ref 40–60)
LDL CHOLESTEROL CALCULATED: 52 MG/DL
POTASSIUM SERPL-SCNC: 4 MMOL/L (ref 3.5–5.1)
SODIUM BLD-SCNC: 137 MMOL/L (ref 136–145)
TOTAL PROTEIN: 6.7 G/DL (ref 6.4–8.2)
TRIGL SERPL-MCNC: 57 MG/DL (ref 0–150)
VLDLC SERPL CALC-MCNC: 11 MG/DL

## 2021-07-16 LAB
ESTIMATED AVERAGE GLUCOSE: 139.9 MG/DL
HBA1C MFR BLD: 6.5 %

## 2021-07-19 ENCOUNTER — OFFICE VISIT (OUTPATIENT)
Dept: CARDIOLOGY CLINIC | Age: 71
End: 2021-07-19
Payer: MEDICARE

## 2021-07-19 VITALS
DIASTOLIC BLOOD PRESSURE: 72 MMHG | HEART RATE: 71 BPM | WEIGHT: 289 LBS | SYSTOLIC BLOOD PRESSURE: 104 MMHG | HEIGHT: 73 IN | OXYGEN SATURATION: 96 % | BODY MASS INDEX: 38.3 KG/M2

## 2021-07-19 DIAGNOSIS — I48.0 PAROXYSMAL ATRIAL FIBRILLATION (HCC): ICD-10-CM

## 2021-07-19 DIAGNOSIS — I50.32 CHRONIC DIASTOLIC CONGESTIVE HEART FAILURE (HCC): Primary | ICD-10-CM

## 2021-07-19 DIAGNOSIS — I10 ESSENTIAL HYPERTENSION: ICD-10-CM

## 2021-07-19 PROCEDURE — 99214 OFFICE O/P EST MOD 30 MIN: CPT | Performed by: NURSE PRACTITIONER

## 2021-07-19 NOTE — LETTER
and Tinnitus. Surgical History:   has a past surgical history that includes Tibia fracture surgery (Right, 2003); Cholecystectomy, open (N/A, 9-17-13); Cystocopy (12/10/13); Cystoscopy (1-28-14); other surgical history (1-28-14); Colonoscopy (2008); Colonoscopy (12/4/2013); Colonoscopy (4/30/14); colostomy (Jan 2014); Colon surgery; Abdomen surgery (05/16/2014); hernia repair (08/14/2015); pr injection hip arthrogram,anesth (Right, 9/19/2018); epidural steroid injection (Right, 1/21/2019); epidural steroid injection (Right, 2/11/2019); Cardioversion (12/04/2019); vascular surgery (Left, 05/2021); and vascular surgery (Right, 05/10/2021). Social History:   reports that he has never smoked. He has never used smokeless tobacco. He reports current alcohol use. He reports that he does not use drugs. Family History:   Family History   Problem Relation Age of Onset    Heart Disease Father     Heart Attack Father     Stroke Brother     Heart Disease Brother     High Blood Pressure Brother     Kidney Disease Mother         kidney removed       Home Medications:  Prior to Admission medications    Medication Sig Start Date End Date Taking? Authorizing Provider   omeprazole (PRILOSEC) 40 MG delayed release capsule TAKE 1 CAPSULE BY MOUTH EVERY DAY 7/7/21   SHAKIRA Lee - CNP   flecainide (TAMBOCOR) 100 MG tablet TAKE 1 TABLET BY MOUTH TWICE A DAY 6/30/21   FREDERICK Aviles MD   atenolol (TENORMIN) 50 MG tablet Take 0.5 tablets by mouth daily 5/3/21   [facility]   sertraline (ZOLOFT) 50 MG tablet TAKE 1 TABLET BY MOUTH EVERY DAY 4/14/21   SHAKIRA Lee CNP   glimepiride (AMARYL) 4 MG tablet TAKE 1 TABLET in the morning.  4/14/21   SHAKIRA Lee CNP   spironolactone (ALDACTONE) 25 MG tablet TAKE 1 TABLET BY MOUTH EVERY DAY 4/6/21   SHAKIRA Magdaleno CNP   Lactobacillus (PROBIOTIC ACIDOPHILUS PO) Take 1 tablet by mouth as needed    Historical Provider, MD ELIQUIS 5 MG TABS tablet TAKE 1 TABLET BY MOUTH TWICE A DAY 3/2/21   SHAKIRA Basurto CNP   torsemide [facility]) 20 MG tablet Take 2 tablets by mouth daily 2/11/21   SHAKIRA Basurto CNP   hydrALAZINE (APRESOLINE) 50 MG tablet TAKE 1/2 TABLET BY MOUTH EVERY 8 HOURS 2/2/21   SHAKIRA Basurto CNP   isosorbide mononitrate (IMDUR) 30 MG extended release tablet TAKE 1 TABLET BY MOUTH EVERY DAY 12/17/20   [facility] SHAKIRA Brantley CNP   montelukast (SINGULAIR) 10 MG tablet TAKE 1 TABLET BY MOUTH EVERY DAY 12/17/20   SHAKIRA Sifuentes CNP   Insulin Glargine, 2 Unit Dial, (TOUJEO MAX SOLOSTAR) 300 UNIT/ML SOPN 45 units morning and 40units in the evening. Patient taking differently: 45 units morning and 30 units in the evening. 12/14/20   [facility] SHAKIRA Brantley CNP   atorvastatin (LIPITOR) 40 MG tablet Take 1 tablet by mouth nightly 12/14/20   [facility] SHAKIRA Brantley CNP   Handicap Placard MISC by Does not apply route Please issue for duration of 5 years 9/24/20   [facility] SHAKIRA Brantley - NGA   Handicap Placard MISC by Does not apply route Please issue for duration of 5 years 9/24/20   [facility] SHAKIRA Brantley CNP   Insulin Pen Needle 31G X 5 MM MISC 1 each by Does not apply route daily 2/24/20   [facility] SHAKIRA Brantley CNP   azelastine (ASTELIN) 0.1 % nasal spray USE 1 SPRAY IN EACH NOSTRIL TWICE DAILY AS DIRECTED 2/4/20   Gilbertr Labrum, APRN - CNP   Glucosamine-Chondroitin (GLUCOSAMINE CHONDR COMPLEX PO) Take 1 tablet by mouth 2 times daily    Historical Provider, MD   blood glucose monitor kit and supplies by Other route daily One Touch Ultra 4/15/19   [facility] SHAKIRA Brantley - CNP   glucose blood VI test strips (ASCENSIA AUTODISC VI;ONE TOUCH ULTRA TEST VI) strip DX: E11.9 FSBS daily.  One Touch ultra 11/29/17   Gilbertr Labrum, APRN - CNP   aspirin 81 MG chewable tablet Take 1 tablet by mouth daily 10/21/17   Kameron Conway MD   Multiple Vitamins-Minerals (THERAPEUTIC MULTIVITAMIN-MINERALS) tablet Take 1 tablet by mouth daily    Historical Provider, MD   ferrous sulfate 325 (65 FE) MG tablet Take 325 mg by mouth daily     Historical Provider, MD      Allergies:  Norco [hydrocodone-acetaminophen]     Physical Examination:    Vitals:    07/19/21 0825   BP: 104/72   Pulse: 71   SpO2: 96%   Weight: 289 lb (131.1 kg)   Height: 6' 1\" (1.854 m)        Constitutional and General Appearance: no apparent distress, obese  HEENT: non-icteric sclera, mask in place  Neck: difficult to assess JVD due to body habitus  Respiratory:  · No use of accessory muscles  · Dim breath sounds throughout, no wheezing, no crackles, no rhonchi  Cardiovascular:  · The apical impulses not displaced  · 2 /6 systolic murmur. no rub/gallop  · Reg rate and regular rhythm, S1,S2 normal  · Radial pulses 2+ and equal bilaterally  · Bilateral lower extremities with compression socks on 1+ BLe edema   Abdomen:   · No masses or tenderness  · Liver: No Abnormalities Noted  Musculoskeletal/Skin:  · Gait is slow walks with cane  · There is no clubbing, cyanosis of the extremities  · Skin is warm and dry  · Moves all extremities well  Neurological/Psychiatric:  · Alert and oriented in all spheres  · No abnormalities of mood, affect, memory, mentation, or behavior are noted    Lab Data:  CBC:   Lab Results   Component Value Date    WBC 6.1 01/15/2021    WBC 7.8 12/04/2019    WBC 8.9 07/15/2019    RBC 5.14 01/15/2021    RBC 5.59 12/04/2019    RBC 4.80 07/15/2019    HGB 14.3 01/15/2021    HGB 15.9 12/04/2019    HGB 13.8 07/15/2019    HCT 43.5 01/15/2021    HCT 48.2 12/04/2019    HCT 41.8 07/15/2019    MCV 84.6 01/15/2021    MCV 86.4 12/04/2019    MCV 87.1 07/15/2019    RDW 17.4 01/15/2021    RDW 15.5 12/04/2019    RDW 16.0 07/15/2019     01/15/2021     12/04/2019     07/15/2019     BMP:   Lab Results   Component Value Date     07/15/2021     01/15/2021     06/15/2020    K 4.0 07/15/2021    K 4.4 01/15/2021    K 4.4 06/15/2020    K 4.5 12/04/2019    K 4.3 03/01/2018     07/15/2021     01/15/2021     06/15/2020    CO2 28 07/15/2021    CO2 28 01/15/2021    CO2 27 06/15/2020    PHOS 3.7 03/01/2018    PHOS 3.7 05/21/2014    PHOS 2.7 05/19/2014    BUN 30 07/15/2021    BUN 22 01/15/2021    BUN 19 06/15/2020    CREATININE 0.8 07/15/2021    CREATININE 0.8 01/15/2021    CREATININE 0.6 06/15/2020     BNP:   Lab Results   Component Value Date    PROBNP 260 05/07/2019    PROBNP 315 01/04/2019    PROBNP 245 12/13/2018       Recent Testing:  ECHO: 10/17/17  Left ventricular systolic function is normal with the ejection fraction estimated at 55%. No regional wall motion abnormalities. Moderate concentric left ventricular hypertrophy. Grade II diastolic dysfunction with elevated filling pressure. Severe bi-atrial enlargement. Right ventricle is moderately enlarged. Mild aortic stenosis. Systolic pulmonary artery pressure (SPAP) is normal and estimated at 26 mmHg   (RA pressure 3 mmHg). Stress test on 10/18/17:  Negative    Echo 6/2019  Summary   Technically difficult examination due to body habitus. IV access was attempted but could not be obtained.   LV systolic function appears normal with a visually estimated EF of 55%.   Endocardium not entirely well visualized but no obvious segmental wall  motion abnormalities.   Mild left ventricular hypertrophy.   Grade III diastolic dysfunction with elevated LV filling pressures.   Individual AV leaflets are not well visualized but appear calcified and thickened with adequate opening c/w AV sclerosis.   The right ventricle is not well visualized but appears mildly dilated in size.   Moderate bi-atrial enlargement.   Frequent PVCs during exam.    Echo 8/11/20  Summary   Technically difficult examination. Normal LV systolic function with an estimated EF of 55%.    No regional wall motion abnormalities are seen.   Type II diastolic dysfunction with elevated filling pressure. Lipomatous hypertrophy of the interatrial septum. Mild bi-atrial enlargement. Mild mitral annular calcification. Maximal transaortic velocity is 2.62m/s which gives peak pressure gradient=27mmHg and mean pressure gradient= 15mmHg c/w mild AS. Trace mitral and tricuspid regurgitation. Definity contrast administered with no definite evidence of LV mass or thrombus noted. Systolic pulmonary artery pressure (SPAP) estimated at 40mmHg (RAP 3mmHg), consistent with mild pulm HTN. Assessment:  ·  Diastolic heart failure from hypertensive heart disease; NYHA class II  · CAD based on coronary calcification on CT chest; negative stress 10/18/17  · Shortness of breath-multifactorial; due diastolic heart failure, morbid obesity, cor pulmonale  · Dilated right ventricle due to cor pulmonale vs heart failure  · Mild aortic stenosis  · Restrictive lung defect  · Severe MONIQUE on CPAP  · Atypical atrial flutter; s/p DCCV 12/4/19; On flecainide   ·  [id number] score 4    Visit Diagnosis:    1. Chronic diastolic congestive heart failure ([address]    2. Essential hypertension    3. Paroxysmal atrial fibrillation (HCC)      Plan:    Check weights every day- continue to do a great job on the weight loss. Continue torsemide 40mg daily   Spironolactone (aldactone) 25mg once a day  Continue imdur 30mg daily   hydralazine to 25mg three times a day  Continue Eliquis, atenolol to 25mg daily; continue flecainide per EP  Stay as active as possible  Follow up with EP as planned   Follow up with CHF team in 6 months     I appreciate the opportunity for caring for this patient.      SHAKIRA Arguello - CNP, CNP, 7/19/2021, 8:58 AM

## 2021-07-19 NOTE — PATIENT INSTRUCTIONS
Check weights every day- continue to do a great job on the weight loss.    Continue torsemide 40mg daily   Spironolactone (aldactone) 25mg once a day  Continue imdur 30mg daily   hydralazine to 25mg three times a day  Continue Eliquis, atenolol to 25mg daily; continue flecainide per EP  Stay as active as possible  Follow up with EP as planned   Follow up with CHF team in 6 months

## 2021-07-19 NOTE — PROGRESS NOTES
[address]   Cardiac Follow-up    Primary Care Doctor:  Anabell Warren, APRN - CNP    Chief Complaint   Patient presents with    6 Month Follow-Up    Congestive Heart Failure        History of Present Illness:   I had the pleasure of seeing Lazaro Carranza in follow up for diastolic heart failure. Hx of CKD, DM, MONIQUE. admission to the [facility] [place] [facility] 10/2017; ED visit on 9/5/18 with shortness of breath. S/p successful  DCCV 12/4/19 for atrial fib/flutter and started on flecainide. Since last visit,  He has been doing well. he continues on the torsemide 40mg daily and Spironolactone (aldactone) daily. Was going to the wound care clinic left leg was healed until he hit his leg on a cement block. He is going to go to the podiatrist soon. Continues with lower extremity edema, which appears to be stable. He denies any chest pain. Breathing is stable. He is compliant with medications. Denies any bleeding. Wearing his compression socks. Able to go to camp over the summer. Most recent lab work completed last week which was reviewed which show stable renal function. Weight was down to 284lbs when he has adjusted his diet, lower carbs. Trying to loose weight  Home weight today 292lb remains off of oxygen, does use a CPAP at night. Lazaro Carranza describes symptoms including edema but denies chest pain, dyspnea, syncope.      NYHA:   II  ACC/ AHA Stage:    C       Past Medical History:   has a past medical history of Allergic rhinitis, Arthritis, Bladder fistula, C. difficile diarrhea, Cellulitis of right lower extremity, CHF (congestive heart failure) ([address] Dental disease, Diabetes ([address] Diverticulitis, Dizziness, DVT of lower extremity, bilateral ([address] GERD (gastroesophageal reflux disease), Headache, Hearing loss, Hematuria, Hx of blood clots, Hyperlipidemia, Hypertension, Lung disease, MONIQUE (obstructive sleep apnea), Pancreatitis ([address] Pulmonary emboli ([address] Rash, Sleep apnea, and Tinnitus. Surgical History:   has a past surgical history that includes Tibia fracture surgery (Right, 2003); Cholecystectomy, open (N/A, 9-17-13); Cystocopy (12/10/13); Cystoscopy (1-28-14); other surgical history (1-28-14); Colonoscopy (2008); Colonoscopy (12/4/2013); Colonoscopy (4/30/14); colostomy (Jan 2014); Colon surgery; Abdomen surgery (05/16/2014); hernia repair (08/14/2015); pr injection hip arthrogram,anesth (Right, 9/19/2018); epidural steroid injection (Right, 1/21/2019); epidural steroid injection (Right, 2/11/2019); Cardioversion (12/04/2019); vascular surgery (Left, 05/2021); and vascular surgery (Right, 05/10/2021). Social History:   reports that he has never smoked. He has never used smokeless tobacco. He reports current alcohol use. He reports that he does not use drugs. Family History:   Family History   Problem Relation Age of Onset    Heart Disease Father     Heart Attack Father     Stroke Brother     Heart Disease Brother     High Blood Pressure Brother     Kidney Disease Mother         kidney removed       Home Medications:  Prior to Admission medications    Medication Sig Start Date End Date Taking? Authorizing Provider   omeprazole (PRILOSEC) 40 MG delayed release capsule TAKE 1 CAPSULE BY MOUTH EVERY DAY 7/7/21   SHAKIRA Saucedo CNP   flecainide (TAMBOCOR) 100 MG tablet TAKE 1 TABLET BY MOUTH TWICE A DAY 6/30/21   FREDERICK Modi MD   atenolol (TENORMIN) 50 MG tablet Take 0.5 tablets by mouth daily 5/3/21   Daryle Reno, APRN - CNP   sertraline (ZOLOFT) 50 MG tablet TAKE 1 TABLET BY MOUTH EVERY DAY 4/14/21   SHAKIRA Saucedo CNP   glimepiride (AMARYL) 4 MG tablet TAKE 1 TABLET in the morning.  4/14/21   SHAKIRA Saucedo CNP   spironolactone (ALDACTONE) 25 MG tablet TAKE 1 TABLET BY MOUTH EVERY DAY 4/6/21   SHAKIRA Acosta CNP   Lactobacillus (PROBIOTIC ACIDOPHILUS PO) Take 1 tablet by mouth as needed    Historical Provider, MD Vitamins-Minerals (THERAPEUTIC MULTIVITAMIN-MINERALS) tablet Take 1 tablet by mouth daily    Historical Provider, MD   ferrous sulfate 325 (65 FE) MG tablet Take 325 mg by mouth daily     Historical Provider, MD      Allergies:  Norco [hydrocodone-acetaminophen]     Physical Examination:    Vitals:    07/19/21 0825   BP: 104/72   Pulse: 71   SpO2: 96%   Weight: 289 lb (131.1 kg)   Height: 6' 1\" (1.854 m)        Constitutional and General Appearance: no apparent distress, obese  HEENT: non-icteric sclera, mask in place  Neck: difficult to assess JVD due to body habitus  Respiratory:  · No use of accessory muscles  · Dim breath sounds throughout, no wheezing, no crackles, no rhonchi  Cardiovascular:  · The apical impulses not displaced  · 2 /6 systolic murmur. no rub/gallop  · Reg rate and regular rhythm, S1,S2 normal  · Radial pulses 2+ and equal bilaterally  · Bilateral lower extremities with compression socks on 1+ BLe edema   Abdomen:   · No masses or tenderness  · Liver: No Abnormalities Noted  Musculoskeletal/Skin:  · Gait is slow walks with cane  · There is no clubbing, cyanosis of the extremities  · Skin is warm and dry  · Moves all extremities well  Neurological/Psychiatric:  · Alert and oriented in all spheres  · No abnormalities of mood, affect, memory, mentation, or behavior are noted    Lab Data:  CBC:   Lab Results   Component Value Date    WBC 6.1 01/15/2021    WBC 7.8 12/04/2019    WBC 8.9 07/15/2019    RBC 5.14 01/15/2021    RBC 5.59 12/04/2019    RBC 4.80 07/15/2019    HGB 14.3 01/15/2021    HGB 15.9 12/04/2019    HGB 13.8 07/15/2019    HCT 43.5 01/15/2021    HCT 48.2 12/04/2019    HCT 41.8 07/15/2019    MCV 84.6 01/15/2021    MCV 86.4 12/04/2019    MCV 87.1 07/15/2019    RDW 17.4 01/15/2021    RDW 15.5 12/04/2019    RDW 16.0 07/15/2019     01/15/2021     12/04/2019     07/15/2019     BMP:   Lab Results   Component Value Date     07/15/2021     01/15/2021     06/15/2020    K 4.0 07/15/2021    K 4.4 01/15/2021    K 4.4 06/15/2020    K 4.5 12/04/2019    K 4.3 03/01/2018     07/15/2021     01/15/2021     06/15/2020    CO2 28 07/15/2021    CO2 28 01/15/2021    CO2 27 06/15/2020    PHOS 3.7 03/01/2018    PHOS 3.7 05/21/2014    PHOS 2.7 05/19/2014    BUN 30 07/15/2021    BUN 22 01/15/2021    BUN 19 06/15/2020    CREATININE 0.8 07/15/2021    CREATININE 0.8 01/15/2021    CREATININE 0.6 06/15/2020     BNP:   Lab Results   Component Value Date    PROBNP 260 05/07/2019    PROBNP 315 01/04/2019    PROBNP 245 12/13/2018       Recent Testing:  ECHO: 10/17/17  Left ventricular systolic function is normal with the ejection fraction estimated at 55%. No regional wall motion abnormalities. Moderate concentric left ventricular hypertrophy. Grade II diastolic dysfunction with elevated filling pressure. Severe bi-atrial enlargement. Right ventricle is moderately enlarged. Mild aortic stenosis. Systolic pulmonary artery pressure (SPAP) is normal and estimated at 26 mmHg   (RA pressure 3 mmHg). Stress test on 10/18/17:  Negative    Echo 6/2019  Summary   Technically difficult examination due to body habitus. IV access was attempted but could not be obtained.   LV systolic function appears normal with a visually estimated EF of 55%.   Endocardium not entirely well visualized but no obvious segmental wall  motion abnormalities.   Mild left ventricular hypertrophy.   Grade III diastolic dysfunction with elevated LV filling pressures.   Individual AV leaflets are not well visualized but appear calcified and thickened with adequate opening c/w AV sclerosis.   The right ventricle is not well visualized but appears mildly dilated in size.   Moderate bi-atrial enlargement.   Frequent PVCs during exam.    Echo 8/11/20  Summary   Technically difficult examination. Normal LV systolic function with an estimated EF of 55%.    No regional wall motion abnormalities are seen.   Type II diastolic dysfunction with elevated filling pressure. Lipomatous hypertrophy of the interatrial septum. Mild bi-atrial enlargement. Mild mitral annular calcification. Maximal transaortic velocity is 2.62m/s which gives peak pressure gradient=27mmHg and mean pressure gradient= 15mmHg c/w mild AS. Trace mitral and tricuspid regurgitation. Definity contrast administered with no definite evidence of LV mass or thrombus noted. Systolic pulmonary artery pressure (SPAP) estimated at 40mmHg (RAP 3mmHg), consistent with mild pulm HTN. Assessment:  ·  Diastolic heart failure from hypertensive heart disease; NYHA class II  · CAD based on coronary calcification on CT chest; negative stress 10/18/17  · Shortness of breath-multifactorial; due diastolic heart failure, morbid obesity, cor pulmonale  · Dilated right ventricle due to cor pulmonale vs heart failure  · Mild aortic stenosis  · Restrictive lung defect  · Severe MONIQUE on CPAP  · Atypical atrial flutter; s/p DCCV 12/4/19; On flecainide   ·  [id number] score 4    Visit Diagnosis:    1. Chronic diastolic congestive heart failure ([address]    2. Essential hypertension    3. Paroxysmal atrial fibrillation (HCC)      Plan:    Check weights every day- continue to do a great job on the weight loss. Continue torsemide 40mg daily   Spironolactone (aldactone) 25mg once a day  Continue imdur 30mg daily   hydralazine to 25mg three times a day  Continue Eliquis, atenolol to 25mg daily; continue flecainide per EP  Stay as active as possible  Follow up with EP as planned   Follow up with CHF team in 6 months     I appreciate the opportunity for caring for this patient.      French Hart, APRN - CNP, CNP, 7/19/2021, 8:58 AM

## 2021-07-26 DIAGNOSIS — I10 ESSENTIAL HYPERTENSION: Chronic | ICD-10-CM

## 2021-07-26 RX ORDER — ATENOLOL 50 MG/1
25 TABLET ORAL DAILY
Qty: 90 TABLET | Refills: 3 | Status: SHIPPED | OUTPATIENT
Start: 2021-07-26 | End: 2022-09-06

## 2021-08-02 ENCOUNTER — TELEPHONE (OUTPATIENT)
Dept: FAMILY MEDICINE CLINIC | Age: 71
End: 2021-08-02

## 2021-08-02 NOTE — TELEPHONE ENCOUNTER
Pt calling asking if Janusz Thompson would be able to write him a letter for jury duty, pt stated that he cannot sit or stand for very long and is hard of hearing.

## 2021-08-12 ENCOUNTER — TELEPHONE (OUTPATIENT)
Dept: FAMILY MEDICINE CLINIC | Age: 71
End: 2021-08-12

## 2021-08-12 NOTE — TELEPHONE ENCOUNTER
Left detailed message informing pt. [phone] Liat Gavin Per [employer]. Will send letter to patient via mail to address on file.

## 2021-08-12 NOTE — TELEPHONE ENCOUNTER
Incoming call from  [address] [place] [address] regarding fax sent for diabetic supplies. No encounter on file, verified fax number & informed that can send & if appropriate orders will be sent however there is no documentation of the request from the patient.

## 2021-08-13 ENCOUNTER — TELEPHONE (OUTPATIENT)
Dept: FAMILY MEDICINE CLINIC | Age: 71
End: 2021-08-13

## 2021-08-13 NOTE — TELEPHONE ENCOUNTER
Called and spoke with Nate Reyes from [facility] and advised him that we have not received anything regarding patient. Forms will be faxed over to us again.

## 2021-08-13 NOTE — TELEPHONE ENCOUNTER
Emily Martinez at [address] [place] [address] to see if request for prescription was received

## 2021-08-13 NOTE — TELEPHONE ENCOUNTER
If alyssa traveling? What pharmacy is this. If I just know what medication is needed we could probably fill it.

## 2021-08-16 NOTE — TELEPHONE ENCOUNTER
Called patient, and he said he received a telephone call regarding diabetic supplies to be shipped to him from [facility]" No such pharmacy in [address] [place] [address] and the only pharmacy that I could find searching the internet was based in [place] Please advise if this is a scam that the patient should be aware of.

## 2021-09-21 ENCOUNTER — OFFICE VISIT (OUTPATIENT)
Dept: CARDIOLOGY CLINIC | Age: 71
End: 2021-09-21
Payer: MEDICARE

## 2021-09-21 VITALS
HEIGHT: 73 IN | DIASTOLIC BLOOD PRESSURE: 60 MMHG | SYSTOLIC BLOOD PRESSURE: 100 MMHG | OXYGEN SATURATION: 98 % | WEIGHT: 296 LBS | BODY MASS INDEX: 39.23 KG/M2 | HEART RATE: 62 BPM

## 2021-09-21 DIAGNOSIS — I48.0 PAROXYSMAL ATRIAL FIBRILLATION (HCC): Primary | ICD-10-CM

## 2021-09-21 DIAGNOSIS — E78.2 MIXED HYPERLIPIDEMIA: ICD-10-CM

## 2021-09-21 DIAGNOSIS — R00.1 BRADYCARDIA: ICD-10-CM

## 2021-09-21 DIAGNOSIS — I10 ESSENTIAL HYPERTENSION: Chronic | ICD-10-CM

## 2021-09-21 DIAGNOSIS — I48.92 ATRIAL FLUTTER, UNSPECIFIED TYPE (HCC): ICD-10-CM

## 2021-09-21 DIAGNOSIS — I50.32 CHRONIC DIASTOLIC CONGESTIVE HEART FAILURE (HCC): ICD-10-CM

## 2021-09-21 PROCEDURE — 99214 OFFICE O/P EST MOD 30 MIN: CPT | Performed by: INTERNAL MEDICINE

## 2021-09-21 PROCEDURE — 93000 ELECTROCARDIOGRAM COMPLETE: CPT | Performed by: INTERNAL MEDICINE

## 2021-09-21 NOTE — LETTER
[address] Cardiology - [address] [facility] [address] [facility]  Phone: [phone]  Fax: [phone]    Uriel Jennings MD    September 22, 2021     Stalin Hernandez, APRN - CNP  [address] [facility] [address]    Patient: Phyllis Kimball   MR Number: [id number]   YOB: 1950   Date of Visit: 9/21/2021       Dear Stalin Hernandez:    Thank you for referring Jaxon Cui to me for evaluation/treatment. Below are the relevant portions of my assessment and plan of care. If you have questions, please do not hesitate to call me. I look forward to following Marge Quintero along with you.     Sincerely,      Uriel Jennings MD

## 2021-09-21 NOTE — PATIENT INSTRUCTIONS
RECOMMENDATIONS:  1. Stop flecainide. 2. Continue all other cardiac medications as prescribed. 3. Encourage activity as tolerated. 4. Get the COVID booster shot as soon as it becomes available. 5. Follow up with EP NP in 1 year.    -Labs prior to visit.

## 2021-09-21 NOTE — PROGRESS NOTES
[facility]   Electrophysiology Note    Date: 9/21/21  Patient Name: Lenin Wallace  YOB: 1950    Primary Care Physician: SHAKIRA Contreras CNP    CHIEF COMPLAINT:   Chief Complaint   Patient presents with    1 Year Follow Up     No new current cardiac symptoms to report at this time.  Congestive Heart Failure    Bradycardia    Atrial Flutter    Atrial Fibrillation    Hypertension    Hyperlipidemia       HISTORY OF PRESENT ILLNESS: Lenin Wallace is a 65 y.o. male who presents for follow up for afib and heart failure. He has a PMH of diastolic heart failure, aortic valve stenosis,  chronic kidney disease, diabetes mellitus, and sleep apnea; CPAP. He had a normal stress test in 10/2017. Echocardiogram from 6/5/19 showed an EF of 55%, with aortic valve stenosis. Left atrial size is 42. He underwent cardioversion 12/4/2019 for atrial flutter. At his office visit 9/2020, EKG showed SB 51 bpm. He reports that he is feeling pretty good. He denies any bleeding issues on the Eliquis. He denies edema in his BLE as long as he takes his diuretics. At the conclusion of 9/2021 visit, patient was instructed to take an additional flecainide PRN for palpitations. AFL RFCA discussed. Interval history since last office visit: ECG today showed AF 65 BPM. He reports that he feels well from a cardiac standpoint. He broke his toe on his right foot 2-3 weeks ago when he fell out of bed. He has a follow up appointment with a podiatrist later today. He walks with a cane. He reports he is taking all medications as prescribed and tolerates them well. Denies recent issues with bleeding or bruising. Patient denies current chest pain, sob, palpitations, dizziness or syncope. He reports he is fully vaccinated for COVID. He plans on getting the booster when it comes available.      Past Medical History:   has a past medical history of Allergic rhinitis, Arthritis, Bladder fistula, Hugo Ann MD   sertraline (ZOLOFT) 50 MG tablet TAKE 1 TABLET BY MOUTH EVERY DAY 4/14/21  Yes SHAKIRA Carter CNP   glimepiride (AMARYL) 4 MG tablet TAKE 1 TABLET in the morning. 4/14/21  Yes SHAKIRA Carter CNP   spironolactone (ALDACTONE) 25 MG tablet TAKE 1 TABLET BY MOUTH EVERY DAY 4/6/21  Yes SHAKIRA Cornelius CNP   Lactobacillus (PROBIOTIC ACIDOPHILUS PO) Take 1 tablet by mouth as needed   Yes Historical Provider, MD YUEN 5 MG TABS tablet TAKE 1 TABLET BY MOUTH TWICE A DAY 3/2/21  Yes Velinda Leyden, APRN - CNP   torsemide (DEMADEX) 20 MG tablet Take 2 tablets by mouth daily 2/11/21  Yes Velinda Leyden, APRN - CNP   hydrALAZINE (APRESOLINE) 50 MG tablet TAKE 1/2 TABLET BY MOUTH EVERY 8 HOURS 2/2/21  Yes Velinda Leyden, APRN - CNP   isosorbide mononitrate (IMDUR) 30 MG extended release tablet TAKE 1 TABLET BY MOUTH EVERY DAY 12/17/20  Yes SHAKIRA Dixon CNP   montelukast (SINGULAIR) 10 MG tablet TAKE 1 TABLET BY MOUTH EVERY DAY 12/17/20  Yes SHAKIRA Dixon CNP   Insulin Glargine, 2 Unit Dial, (TOUJEO MAX SOLOSTAR) 300 UNIT/ML SOPN 45 units morning and 40units in the evening. Patient taking differently: 45 units morning and 30 units in the evening.  12/14/20  Yes SHAKIRA Carter CNP   atorvastatin (LIPITOR) 40 MG tablet Take 1 tablet by mouth nightly 12/14/20  Yes SHAKIRA Carter CNP   Handicap Placard MISC by Does not apply route Please issue for duration of 5 years 9/24/20  Yes SHAKIRA Carter CNP   Handicap Placard MISC by Does not apply route Please issue for duration of 5 years 9/24/20  Yes SHAKIRA Carter CNP   Insulin Pen Needle 31G X 5 MM MISC 1 each by Does not apply route daily 2/24/20  Yes Fanny Yoshi-Michelle, APRN - CNP   Glucosamine-Chondroitin (GLUCOSAMINE CHONDR COMPLEX PO) Take 1 tablet by mouth 2 times daily   Yes Historical Provider, MD   blood glucose monitor kit and supplies by Other route daily One Touch Ultra 4/15/19  Yes SHAKIRA Carter CNP   glucose blood VI test strips (ASCENSIA AUTODISC VI;ONE TOUCH ULTRA TEST VI) strip DX: E11.9 FSBS daily. One Touch ultra 11/29/17  Yes SHAKIRA Carter CNP   aspirin 81 MG chewable tablet Take 1 tablet by mouth daily 10/21/17  Yes Steve Britton MD   Multiple Vitamins-Minerals (THERAPEUTIC MULTIVITAMIN-MINERALS) tablet Take 1 tablet by mouth daily   Yes Historical Provider, MD   ferrous sulfate 325 (65 FE) MG tablet Take 325 mg by mouth daily    Yes Historical Provider, MD       REVIEW OF SYSTEMS:      All 14-point review of systems are completed and pertinent positives are mentioned in the history of present illness. Other systems are reviewed and are negative. Physical Examination:    /60   Pulse 62   Ht 6' 1\" (1.854 m)   Wt 296 lb (134.3 kg)   SpO2 98%   BMI 39.05 kg/m²    Constitutional and General Appearance: alert, cooperative, no distress and appears stated age  [de-identified]: PERRL, no cervical lymphadenopathy. No masses palpable. Normal oral mucosa  Respiratory:  · Normal excursion and expansion without use of accessory muscles  · Resp Auscultation: Normal breath sounds without dullness or wheezing  Cardiovascular:  · The apical impulse is not displaced  · Irregular rate and rhythm w/o M/G/R  Abdomen:  · No masses or tenderness  · Bowel sounds present  Extremities:  ·  No Cyanosis or Clubbing  ·  Lower extremity edema: Trace, right > left  · Skin: Warm and dry  Neurological:  · Alert and oriented. · Moves all extremities well  · No abnormalities of mood, affect, memory, mentation, or behavior are noted    DATA:      ECG 9/21/21: Personally reviewed. ECHO: 8/11/2020   Technically difficult examination. Normal LV systolic function with an estimated EF of 55%. No regional wall motion abnormalities are seen. Type II diastolic dysfunction with elevated filling pressure.    Lipomatous hypertrophy of the interatrial septum. Mild bi-atrial enlargement. Mild mitral annular calcification. Maximal transaortic velocity is 2.62m/s which gives peak pressure gradient=   27mmHg and mean pressure gradient= 15mmHg c/w mild AS. Trace mitral and tricuspid regurgitation. Definity contrast administered with no definite evidence of LV mass or   thrombus noted. Systolic pulmonary artery pressure (SPAP) estimated at 40mmHg (RAP 3mmHg),   consistent with mild pulm HTN. ECHO: 6/5/19  Summary  Technically difficult examination due to body habitus. IV access was  attempted but could not be obtained. LV systolic function appears normal with a visually estimated EF of 55%. Endocardium not entirely well visualized but no obvious segmental wall  motion abnormalities. Mild left ventricular hypertrophy. Grade III diastolic dysfunction with elevated LV filling pressures. Individual AV leaflets are not well visualized but appear calcified and  thickened with adequate opening c/w AV sclerosis. The right ventricle is not well visualized but appears mildly dilated in  [address]. Moderate bi-atrial enlargement.   Frequent PVCs during exam.   LV Diastolic Dimension: 3.9 cm LV Systolic Dimension: 5.21 cm  LV Septum Diastolic: 2.76 cm  LV PW Diastolic: 0.88 cm       AO Root Dimension: 3.6 cm                                  AV Cusp Separation: 1.6 cm                                  LA Dimension: 4.6 cm   LVOT: 2.6 cm                   LA Area: 30.5 cm2                                  LA volume/Index: 110 ml /42 ml/m2   [id number] Score for Atrial Fibrillation Stroke Risk   Risk   Factors  Component Value   C CHF Yes 1   H HTN Yes 1   A2 Age >= 75 No,  (71 y.o.) 0   D DM Yes 1   S2 Prior Stroke/TIA No 0   V Vascular Disease No 0   A Age 74-69 Yes,  (71 y.o.) 1   Sc Sex male 0    [id number]  Score  4   Score last updated 9/25/20 4:15 PM EDT    Click here for a link to the UpToDate guideline \"Atrial Fibrillation: Anticoagulation therapy to prevent embolization    Disclaimer: Risk Score calculation is dependent on accuracy of patient problem list and past encounter diagnosis.     IMPRESSION:    1. Atrial flutter ([id number] 3)  Patient is a pleasant 64-year-old male with a medical history significant for atypical flutter status post cardioversion heart failure preserved ejection fraction, aortic valve stenosis, diabetes mellitus type 2, and obstructive sleep apnea who presents from home for follow-up. Patient is doing well with his flecainide. No palpitations, chest pain, shortness of breath, etc.  We discussed ablation however at this point, given that he is a good and stable place, we will continue medical therapy. Patient will take an additional dose of flecainide if he does have some palpitations. These palpitations last more than 6 hours he was called for assistance. We will follow-up with patient in 1 year. Of note, patient has been tolerating his apixaban without any issues. - Continue apixaban. - Continue flecainide. Ok to take additional dose for pill in pocket strategy. - Continue atenolol. 9/21/2021  Patient presents for follow up. He is in rate controlled atrial fibrillation and is asymptomatic. We will stop his flecainide and have him continue his atenolol and apixaban. - Stop flecainide.  - Continue atenolol.  - Continue apixaban. - Follow up with EP NP in 1 year unless/until procedure/discussion required or PRN. 2. Chronic dCHF  - Follow up with cardiology. 3. HTN    4. Aortic valve stenoisis (mild)  - Follow up with cardiology. RECOMMENDATIONS:  1. Stop flecainide. 2. Continue all other cardiac medications as prescribed. 3. Encourage activity as tolerated. 4. Get the COVID booster shot as soon as it becomes available. 5. Follow up with EP NP in 1 year.    -Labs prior to visit. QUALITY MEASURES  1. Tobacco Cessation Counseling: NA  2. Retake of BP if >140/90:   NA  3.  Documentation to PCP/referring for new patient:  Sent to PCP at close of office visit  4. CAD patient on anti-platelet: Yes  5. CAD patient on STATIN therapy:  Yes  6. Patient with CHF and aFib on anticoagulation:  Yes     This note has been scribed in the presence of Emily Eli MD, by Jhon Alvarado RN. The scribe's documentation has been prepared under my direction and personally reviewed by me in its entirety. I confirm that the note above accurately reflects all work, physical examination, the discussion of treatments and procedures, and medical decision making performed by me. Tee Johnson MD personally performed the services described in this documentation as scribed by nurse in my presence, and is both accurate and complete. Electronically signed by Rocío Snyder MD on 9/21/2021 at 9:09 AM     All questions and concerns were addressed to the patient/family. Alternatives to my treatment were discussed. Dr. Deepti Kim MD  Electrophysiology  [facility]. [address] [place] [address]. [address]. [facility] [address]  Phone: [phone]  Fax: [phone]   9/21/2021     NOTE: This report was transcribed using voice recognition software. Every effort was made to ensure accuracy, however, inadvertent computerized transcription errors may be present.

## 2021-09-30 RX ORDER — SPIRONOLACTONE 25 MG/1
TABLET ORAL
Qty: 90 TABLET | Refills: 1 | Status: SHIPPED | OUTPATIENT
Start: 2021-09-30 | End: 2022-03-22

## 2021-11-05 RX ORDER — TORSEMIDE 20 MG/1
TABLET ORAL
Qty: 180 TABLET | Refills: 1 | Status: SHIPPED | OUTPATIENT
Start: 2021-11-05 | End: 2022-03-30

## 2021-12-03 ENCOUNTER — TELEPHONE (OUTPATIENT)
Dept: FAMILY MEDICINE CLINIC | Age: 71
End: 2021-12-03

## 2021-12-06 ENCOUNTER — HOSPITAL ENCOUNTER (OUTPATIENT)
Age: 71
Discharge: HOME | End: 2021-12-06
Payer: MEDICARE

## 2021-12-06 ENCOUNTER — HOSPITAL ENCOUNTER (OUTPATIENT)
Dept: GENERAL RADIOLOGY | Age: 71
Discharge: HOME | End: 2021-12-06
Payer: MEDICARE

## 2021-12-06 DIAGNOSIS — M54.6 ACUTE MIDLINE THORACIC BACK PAIN: Primary | ICD-10-CM

## 2021-12-06 DIAGNOSIS — M54.6 ACUTE MIDLINE THORACIC BACK PAIN: ICD-10-CM

## 2021-12-06 DIAGNOSIS — S22.050A CLOSED WEDGE COMPRESSION FRACTURE OF T6 VERTEBRA, INITIAL ENCOUNTER (HCC): Primary | ICD-10-CM

## 2021-12-06 PROCEDURE — 72070 X-RAY EXAM THORAC SPINE 2VWS: CPT

## 2021-12-06 RX ORDER — TRAMADOL HYDROCHLORIDE 50 MG/1
50 TABLET ORAL EVERY 6 HOURS PRN
Qty: 12 TABLET | Refills: 0 | Status: SHIPPED | OUTPATIENT
Start: 2021-12-06 | End: 2021-12-08

## 2021-12-07 ENCOUNTER — TELEPHONE (OUTPATIENT)
Dept: ORTHOPEDIC SURGERY | Age: 71
End: 2021-12-07

## 2021-12-07 NOTE — TELEPHONE ENCOUNTER
Spoke with patient after receiving a back and spine referral from Trevon Mazariegos. Patient declined an appointment with a back and spine specialist at this time and wanted to wait until his follow up visit with Yaneth Bolanos on 12/17.  Patient can call [phone] if he would like to be seen by a specialist.

## 2021-12-08 ENCOUNTER — OFFICE VISIT (OUTPATIENT)
Dept: ORTHOPEDIC SURGERY | Age: 71
End: 2021-12-08
Payer: MEDICARE

## 2021-12-08 ENCOUNTER — TELEPHONE (OUTPATIENT)
Dept: ORTHOPEDIC SURGERY | Age: 71
End: 2021-12-08

## 2021-12-08 VITALS — HEIGHT: 73 IN | BODY MASS INDEX: 39.23 KG/M2 | WEIGHT: 296 LBS

## 2021-12-08 DIAGNOSIS — S22.050A CLOSED WEDGE COMPRESSION FRACTURE OF T6 VERTEBRA, INITIAL ENCOUNTER (HCC): Primary | ICD-10-CM

## 2021-12-08 PROCEDURE — 99214 OFFICE O/P EST MOD 30 MIN: CPT | Performed by: PHYSICIAN ASSISTANT

## 2021-12-08 RX ORDER — TIZANIDINE 4 MG/1
4 TABLET ORAL NIGHTLY PRN
Qty: 30 TABLET | Refills: 0 | Status: SHIPPED | OUTPATIENT
Start: 2021-12-08 | End: 2022-01-03

## 2021-12-08 RX ORDER — CYCLOBENZAPRINE HCL 10 MG
10 TABLET ORAL 3 TIMES DAILY PRN
COMMUNITY
Start: 2021-11-22 | End: 2021-12-17

## 2021-12-08 NOTE — PROGRESS NOTES
SPINE: Follow Up  12/8/2021     CHIEF COMPLAINT:    Chief Complaint   Patient presents with    Follow-up     OPNP THORACIC       HISTORY OF PRESENT ILLNESS:                The patient is a 66 y.o. male extensive medical history detailed below, last seen in 2019 for lumbar issues here for thoracic back pain. He reports a 3-week history of aching midline midthoracic pain after twisting while cleaning up his garage. His pain is fairly constant but increased with any activity or laying flat. He is having difficulty getting comfortable at nighttime. He reports relief with ibuprofen. He does feel his symptoms have improved slightly at this time. He currently denies any radiating chest wall or extremity pain. He denies any progressive extremity weakness. Denies any chest pain or new dyspnea. No recent fevers chills or infections. Current/Past Treatment:   · Physical Therapy: YES-hip/spine past  · Chiropractic:  no  · Injection:   · 2/11/19 Right L2-3transforaminal epidural injection--90% IMPROVED                1/21/19 Right L2-3 TX MUSA #1---50%, good DX relief   ·  R IA hip--Dr. Alba Barker (improvement of groin pain after several weeks)  Medications:  · Current: NSAIDS--IBU  · Past: Zanaflex, MDP  · Surgery/Consult: Dr. Wendi Matos recommending spine consultation. States he would not be a good candidate for total hip replacement due to underlying comorbidities     Work Status/Functionality: Retired    Past Medical History: Medical history form was reviewed and scanned into the media tab.   Past Medical History:   Diagnosis Date    Allergic rhinitis     Arthritis     Bladder fistula     resolved    C. difficile diarrhea 8/18/2015    +PCR    Cellulitis of right lower extremity 3/23/2016    [facility]) [facility]     Dental disease     Diabetes ([facility]     Diverticulitis     Dizziness     DVT of lower extremity, bilateral ([address] 10/16/2013    GERD (gastroesophageal reflux disease)     Headache  Hearing loss     Hematuria 1/2/2014    Hx of blood clots     Hyperlipidemia     Hypertension     Lung disease     MONIQUE (obstructive sleep apnea)     Pancreatitis ([facility] 8/24/2013    Pulmonary emboli ([facility] 2013    after cholecystectomy     Rash     Sleep apnea     Tinnitus         REVIEW OF SYSTEMS:   CONSTITUTIONAL: Denies unexplained weight loss, fevers, chills or fatigue  NEUROLOGIC: Denies tremors or seizures         PHYSICAL EXAM:    Vitals: Height 6' 1\" (1.854 m), weight 296 lb (134.3 kg). Pain score 7/10    GENERAL EXAM:  · General Apparence: Patient is adequately groomed with no evidence of malnutrition. Obesity. · Orientation: The patient is oriented to time, place and person. · Mood & Affect:The patient's mood and affect are appropriate   · Lymphatic: Bilateral lower extremity edema, chronic  · Sensation: Sensation is intact without deficit  LUMBAR/SACRAL EXAMINATION:  · Inspection: Local inspection shows no step-off or bruising. Mild kyphosis     · Palpation:   No evidence of tenderness at the midline. He does point to the T6-7 level as to where his pain is located there is no significant tenderness to palpation or percussion  · Range of Motion: Able to sit forward flex extension not assessed due to fracture  · Strength:   Strength testing is 5/5 in all muscle groups tested. · Special Tests:   Straight leg raise and crossed SLR negative. Leg length and pelvis level.  0 out of 5 Carlos's signs. · Skin: There are no rashes, ulcerations or lesions. · Reflexes: Reflexes are symmetrically trace at the patellar and ankle tendons. Clonus absent bilaterally at the feet. · Gait & station: Forward flexed with cane  · Additional Examinations:   ·   · RIGHT LOWER EXTREMITY: Inspection/examination of the right lower extremity does not show any tenderness, deformity or injury. Range of motion is full. There is no gross instability. There are no rashes, ulcerations or lesions.  Strength and tone are normal.  · LEFT LOWER EXTREMITY:  Inspection/examination of the left lower extremity does not show any tenderness, deformity or injury. Range of motion is full. There is no gross instability. There are no rashes, ulcerations or lesions. Strength and tone are normal.    Diagnostic Testing:   Thoracic x-rays films and report independently reviewed from December 2021 showing T6 compression fracture without retropulsion, mild multilevel DDD    Labs reviewed July 2021 BUN 30 last hemoglobin A1c 6.5    Lumbar MRI scan report independently reviewed from 1/3/2019 showing (mod-) severe central stenosis L2-3, disc bulging eccentric towards the right at L3 4     lumbar spine 1/2/2019: Mild scoliosis, multilevel moderate DDD/spondylosis with multilevel facet arthropathy         Impression:  1) 3wks mid thoracic back pain    2) T6 compression fracture, DDD    3) Severe central stenosis L2-3, right disc bulging L3 4     4) CHF, DM, obesity, PE        Plan:  1) We reviewed his recent thoracic x-rays showing a T6 compression fracture. Recommend thoracic MRI for further evaluation.     2) TLSO--this will need to be custom through  due to body habitus    3) no lifting bending twisting    4)   Orders Placed This Encounter   Medications    tiZANidine (ZANAFLEX) 4 MG tablet     Sig: Take 1 tablet by mouth nightly as needed (PAIN)     Dispense:  30 tablet     Refill:  0     5) F/u to review thoracic MRI         Scarlet Rich PA-C, MPAS  Board Certified by the [address] [facility]

## 2021-12-09 ENCOUNTER — TELEPHONE (OUTPATIENT)
Dept: ORTHOPEDIC SURGERY | Age: 71
End: 2021-12-09

## 2021-12-09 NOTE — TELEPHONE ENCOUNTER
Patient contacted regarding Thoracic MRI W/O CONTRAST approval and authorization is valid until 2/6/2022. Patient was notified and instructed to call to schedule their Thoracic MRI W/O CONTRAST at [place] [address] in [facility]    Once completed, patient was instructed on scheduling a follow up appoint to review results. TEST RESULTS WILL NOT BE GIVEN OVER THE PHONE. AN IN-OFFICE APPOINTMENT IS REQUIRED    The patient was provided contact information should the need arise to reschedule any of the appointments.

## 2021-12-17 ENCOUNTER — OFFICE VISIT (OUTPATIENT)
Dept: FAMILY MEDICINE CLINIC | Age: 71
End: 2021-12-17
Payer: MEDICARE

## 2021-12-17 VITALS
WEIGHT: 303.9 LBS | HEART RATE: 63 BPM | HEIGHT: 70 IN | DIASTOLIC BLOOD PRESSURE: 80 MMHG | BODY MASS INDEX: 43.51 KG/M2 | OXYGEN SATURATION: 94 % | SYSTOLIC BLOOD PRESSURE: 150 MMHG

## 2021-12-17 DIAGNOSIS — S22.050D COMPRESSION FRACTURE OF T6 VERTEBRA WITH ROUTINE HEALING, SUBSEQUENT ENCOUNTER: ICD-10-CM

## 2021-12-17 DIAGNOSIS — Z79.4 TYPE 2 DIABETES MELLITUS WITH HYPERGLYCEMIA, WITH LONG-TERM CURRENT USE OF INSULIN (HCC): ICD-10-CM

## 2021-12-17 DIAGNOSIS — D50.9 IRON DEFICIENCY ANEMIA, UNSPECIFIED IRON DEFICIENCY ANEMIA TYPE: ICD-10-CM

## 2021-12-17 DIAGNOSIS — I48.0 PAROXYSMAL ATRIAL FIBRILLATION (HCC): ICD-10-CM

## 2021-12-17 DIAGNOSIS — J30.9 ALLERGIC RHINITIS, UNSPECIFIED SEASONALITY, UNSPECIFIED TRIGGER: ICD-10-CM

## 2021-12-17 DIAGNOSIS — I10 ESSENTIAL HYPERTENSION: ICD-10-CM

## 2021-12-17 DIAGNOSIS — M48.061 SPINAL STENOSIS OF LUMBAR REGION WITHOUT NEUROGENIC CLAUDICATION: ICD-10-CM

## 2021-12-17 DIAGNOSIS — Z00.00 ROUTINE GENERAL MEDICAL EXAMINATION AT A HEALTH CARE FACILITY: Primary | ICD-10-CM

## 2021-12-17 DIAGNOSIS — E11.65 TYPE 2 DIABETES MELLITUS WITH HYPERGLYCEMIA, WITH LONG-TERM CURRENT USE OF INSULIN (HCC): ICD-10-CM

## 2021-12-17 LAB
A/G RATIO: 1.4 (ref 1.1–2.2)
ALBUMIN SERPL-MCNC: 3.9 G/DL (ref 3.4–5)
ALP BLD-CCNC: 125 U/L (ref 40–129)
ALT SERPL-CCNC: 29 U/L (ref 10–40)
ANION GAP SERPL CALCULATED.3IONS-SCNC: 11 MMOL/L (ref 3–16)
AST SERPL-CCNC: 23 U/L (ref 15–37)
BASOPHILS ABSOLUTE: 0.1 K/UL (ref 0–0.2)
BASOPHILS RELATIVE PERCENT: 1.2 %
BILIRUB SERPL-MCNC: 0.9 MG/DL (ref 0–1)
BUN BLDV-MCNC: 22 MG/DL (ref 7–20)
CALCIUM SERPL-MCNC: 8.7 MG/DL (ref 8.3–10.6)
CHLORIDE BLD-SCNC: 109 MMOL/L (ref 99–110)
CO2: 27 MMOL/L (ref 21–32)
CREAT SERPL-MCNC: 0.6 MG/DL (ref 0.8–1.3)
EOSINOPHILS ABSOLUTE: 0.2 K/UL (ref 0–0.6)
EOSINOPHILS RELATIVE PERCENT: 3.3 %
GFR AFRICAN AMERICAN: >60
GFR NON-AFRICAN AMERICAN: >60
GLUCOSE BLD-MCNC: 88 MG/DL (ref 70–99)
HBA1C MFR BLD: 7.2 %
HCT VFR BLD CALC: 45.6 % (ref 40.5–52.5)
HEMOGLOBIN: 14.9 G/DL (ref 13.5–17.5)
LYMPHOCYTES ABSOLUTE: 1.1 K/UL (ref 1–5.1)
LYMPHOCYTES RELATIVE PERCENT: 15.6 %
MCH RBC QN AUTO: 28.5 PG (ref 26–34)
MCHC RBC AUTO-ENTMCNC: 32.7 G/DL (ref 31–36)
MCV RBC AUTO: 87.1 FL (ref 80–100)
MONOCYTES ABSOLUTE: 0.5 K/UL (ref 0–1.3)
MONOCYTES RELATIVE PERCENT: 6.9 %
NEUTROPHILS ABSOLUTE: 5.2 K/UL (ref 1.7–7.7)
NEUTROPHILS RELATIVE PERCENT: 73 %
PDW BLD-RTO: 17.2 % (ref 12.4–15.4)
PLATELET # BLD: 178 K/UL (ref 135–450)
PMV BLD AUTO: 8.3 FL (ref 5–10.5)
POTASSIUM REFLEX MAGNESIUM: 3.7 MMOL/L (ref 3.5–5.1)
RBC # BLD: 5.24 M/UL (ref 4.2–5.9)
SODIUM BLD-SCNC: 147 MMOL/L (ref 136–145)
TOTAL PROTEIN: 6.6 G/DL (ref 6.4–8.2)
WBC # BLD: 7.1 K/UL (ref 4–11)

## 2021-12-17 PROCEDURE — 82044 UR ALBUMIN SEMIQUANTITATIVE: CPT | Performed by: NURSE PRACTITIONER

## 2021-12-17 PROCEDURE — 3051F HG A1C>EQUAL 7.0%<8.0%: CPT | Performed by: NURSE PRACTITIONER

## 2021-12-17 PROCEDURE — G0439 PPPS, SUBSEQ VISIT: HCPCS | Performed by: NURSE PRACTITIONER

## 2021-12-17 PROCEDURE — 83036 HEMOGLOBIN GLYCOSYLATED A1C: CPT | Performed by: NURSE PRACTITIONER

## 2021-12-17 RX ORDER — INSULIN GLARGINE 300 U/ML
45 INJECTION, SOLUTION SUBCUTANEOUS 2 TIMES DAILY
Qty: 3 PEN | Refills: 5 | Status: SHIPPED | OUTPATIENT
Start: 2021-12-17 | End: 2022-03-24 | Stop reason: SDUPTHER

## 2021-12-17 RX ORDER — TRAMADOL HYDROCHLORIDE 50 MG/1
50 TABLET ORAL EVERY 8 HOURS PRN
Qty: 30 TABLET | Refills: 0 | Status: SHIPPED | OUTPATIENT
Start: 2021-12-17 | End: 2021-12-27

## 2021-12-17 RX ORDER — SEMAGLUTIDE 1.34 MG/ML
0.5 INJECTION, SOLUTION SUBCUTANEOUS WEEKLY
Qty: 4 PEN | Refills: 5 | Status: SHIPPED | OUTPATIENT
Start: 2021-12-17 | End: 2022-07-27

## 2021-12-17 RX ORDER — AZELASTINE 1 MG/ML
1 SPRAY, METERED NASAL 2 TIMES DAILY
Qty: 30 ML | Refills: 5 | Status: SHIPPED | OUTPATIENT
Start: 2021-12-17

## 2021-12-17 SDOH — ECONOMIC STABILITY: TRANSPORTATION INSECURITY
IN THE PAST 12 MONTHS, HAS LACK OF TRANSPORTATION KEPT YOU FROM MEETINGS, WORK, OR FROM GETTING THINGS NEEDED FOR DAILY LIVING?: NO

## 2021-12-17 SDOH — ECONOMIC STABILITY: FOOD INSECURITY: WITHIN THE PAST 12 MONTHS, THE FOOD YOU BOUGHT JUST DIDN'T LAST AND YOU DIDN'T HAVE MONEY TO GET MORE.: NEVER TRUE

## 2021-12-17 SDOH — ECONOMIC STABILITY: TRANSPORTATION INSECURITY
IN THE PAST 12 MONTHS, HAS THE LACK OF TRANSPORTATION KEPT YOU FROM MEDICAL APPOINTMENTS OR FROM GETTING MEDICATIONS?: NO

## 2021-12-17 SDOH — ECONOMIC STABILITY: FOOD INSECURITY: WITHIN THE PAST 12 MONTHS, YOU WORRIED THAT YOUR FOOD WOULD RUN OUT BEFORE YOU GOT MONEY TO BUY MORE.: NEVER TRUE

## 2021-12-17 ASSESSMENT — LIFESTYLE VARIABLES
HOW OFTEN DO YOU HAVE SIX OR MORE DRINKS ON ONE OCCASION: 0
AUDIT TOTAL SCORE: 1
HOW OFTEN DURING THE LAST YEAR HAVE YOU HAD A FEELING OF GUILT OR REMORSE AFTER DRINKING: 0
HOW OFTEN DURING THE LAST YEAR HAVE YOU NEEDED AN ALCOHOLIC DRINK FIRST THING IN THE MORNING TO GET YOURSELF GOING AFTER A NIGHT OF HEAVY DRINKING: 0
HOW OFTEN DURING THE LAST YEAR HAVE YOU BEEN UNABLE TO REMEMBER WHAT HAPPENED THE NIGHT BEFORE BECAUSE YOU HAD BEEN DRINKING: 0
HOW OFTEN DURING THE LAST YEAR HAVE YOU FOUND THAT YOU WERE NOT ABLE TO STOP DRINKING ONCE YOU HAD STARTED: 0
HAVE YOU OR SOMEONE ELSE BEEN INJURED AS A RESULT OF YOUR DRINKING: 0
HOW MANY STANDARD DRINKS CONTAINING ALCOHOL DO YOU HAVE ON A TYPICAL DAY: 0
HOW OFTEN DO YOU HAVE A DRINK CONTAINING ALCOHOL: 1
HAS A RELATIVE, FRIEND, DOCTOR, OR ANOTHER HEALTH PROFESSIONAL EXPRESSED CONCERN ABOUT YOUR DRINKING OR SUGGESTED YOU CUT DOWN: 0
AUDIT-C TOTAL SCORE: 1
HOW OFTEN DURING THE LAST YEAR HAVE YOU FAILED TO DO WHAT WAS NORMALLY EXPECTED FROM YOU BECAUSE OF DRINKING: 0

## 2021-12-17 ASSESSMENT — PATIENT HEALTH QUESTIONNAIRE - PHQ9
SUM OF ALL RESPONSES TO PHQ QUESTIONS 1-9: 2
SUM OF ALL RESPONSES TO PHQ QUESTIONS 1-9: 2
2. FEELING DOWN, DEPRESSED OR HOPELESS: 1
SUM OF ALL RESPONSES TO PHQ QUESTIONS 1-9: 2
1. LITTLE INTEREST OR PLEASURE IN DOING THINGS: 1
SUM OF ALL RESPONSES TO PHQ9 QUESTIONS 1 & 2: 2

## 2021-12-17 ASSESSMENT — SOCIAL DETERMINANTS OF HEALTH (SDOH): HOW HARD IS IT FOR YOU TO PAY FOR THE VERY BASICS LIKE FOOD, HOUSING, MEDICAL CARE, AND HEATING?: HARD

## 2021-12-17 NOTE — PROGRESS NOTES
Medicare Annual Wellness Visit  Name: Molly Mcmanus Date: 2021   MRN: [id number] Sex: Male   Age: 66 y.o. Ethnicity: Non- / Non    : 1950 Race: White (non-)      Roslyn Sanchez is here for Medicare AWV    Screenings for behavioral, psychosocial and functional/safety risks, and cognitive dysfunction are all negative except as indicated below. These results, as well as other patient data from the [address] form, are documented in Flowsheets linked to this Encounter. Had some back after cleaning the garage. States slipped over an air hose and twisted. MRI indicated compression fx T6 and severe stenosis L2-3. Referred to spine specialist but declined. Today states he changed his mind and did go to see them. Wearing a brace but not on at this time. States he can not get in and out of his truck with it on. Planning to follow up. Asking for refill on tramadol. Following with Dr Riaz Zamudio for chronic venous ulcers lower legs. Planning to add compression stockings. Weight elevated. States not following diet. Up 20 pounds over the past 6 months. Allergies   Allergen Reactions    Hydrocodone-Acetaminophen Other (See Comments)     jittery         Prior to Visit Medications    Medication Sig Taking? Authorizing Provider   azelastine (ASTELIN) 0.1 % nasal spray 1 spray by Nasal route 2 times daily Use in each nostril as directed Yes SHAKIRA Benitez CNP   Insulin Glargine, 2 Unit Dial, (TOUJECHAI BAH SOLOSTAR) 300 UNIT/ML SOPN Inject 45 Units into the skin 2 times daily 45 units morning and 40units in the evening. Yes SHAKIRA Benitez CNP   Semaglutide,0.25 or 0.5MG/DOS, (OZEMPIC, 0.25 OR 0.5 MG/DOSE,) 2 MG/1.5ML SOPN Inject 0.5 mg into the skin once a week Yes SHAKIRA Benitez CNP   traMADol (ULTRAM) 50 MG tablet Take 1 tablet by mouth every 8 hours as needed for Pain for up to 10 days. Intended supply: 3 days. Take lowest dose possible to manage pain Yes SHAKIRA Brown CNP   tiZANidine (ZANAFLEX) 4 MG tablet Take 1 tablet by mouth nightly as needed (PAIN) Yes Radha Barfield PA-C   atorvastatin (LIPITOR) 40 MG tablet TAKE 1 TABLET BY MOUTH EVERY DAY AT NIGHT Yes SHAKIRA Dixon CNP   isosorbide mononitrate (IMDUR) 30 MG extended release tablet TAKE 1 TABLET BY MOUTH EVERY DAY Yes SHAKIRA Dixon CNP   montelukast (SINGULAIR) 10 MG tablet TAKE 1 TABLET BY MOUTH EVERY DAY Yes SHAKIRA Brown CNP   torsemide (DEMADEX) 20 MG tablet TAKE 2 TABLETS BY MOUTH EVERY DAY Yes SHAKIRA Pineda CNP   glimepiride (AMARYL) 4 MG tablet TAKE 1 TABLET BY MOUTH TWICE A DAY Yes SHAKIRA Brown CNP   spironolactone (ALDACTONE) 25 MG tablet TAKE 1 TABLET BY MOUTH EVERY DAY Yes SHAKIRA Pineda CNP   atenolol (TENORMIN) 50 MG tablet Take 0.5 tablets by mouth daily Yes SHAKIRA Meng CNP   omeprazole (PRILOSEC) 40 MG delayed release capsule TAKE 1 CAPSULE BY MOUTH EVERY DAY Yes SHAKIRA Dixon CNP   sertraline (ZOLOFT) 50 MG tablet TAKE 1 TABLET BY MOUTH EVERY DAY Yes SHAKIRA Dixon CNP   ELIQUIS 5 MG TABS tablet TAKE 1 TABLET BY MOUTH TWICE A DAY Yes SHAKIRA Pineda CNP   hydrALAZINE (APRESOLINE) 50 MG tablet TAKE 1/2 TABLET BY MOUTH EVERY 8 HOURS Yes SHAKIRA Pineda CNP   Handicap Placard MISC by Does not apply route Please issue for duration of 5 years Yes SHAKIRA Brown CNP   Handicap Placard MISC by Does not apply route Please issue for duration of 5 years Yes SHAKIRA Brown CNP   Insulin Pen Needle 31G X 5 MM MISC 1 each by Does not apply route daily Yes SHAKIRA Brown CNP   Glucosamine-Chondroitin (GLUCOSAMINE CHONDR COMPLEX PO) Take 1 tablet by mouth 2 times daily Yes Historical Provider, MD   blood glucose monitor kit and supplies by Other route daily One Touch Ultra Yes SHAKIRA Bowser CNP   glucose blood VI test strips (ASCENSIA AUTODISC VI;ONE TOUCH ULTRA TEST VI) strip DX: E11.9 FSBS daily. One Touch ultra Yes SHAKIRA Bowser CNP   aspirin 81 MG chewable tablet Take 1 tablet by mouth daily Yes Shira Wright MD   Multiple Vitamins-Minerals (THERAPEUTIC MULTIVITAMIN-MINERALS) tablet Take 1 tablet by mouth daily Yes Historical Provider, MD   ferrous sulfate 325 (65 FE) MG tablet Take 325 mg by mouth daily  Yes Historical Provider, MD         Past Medical History:   Diagnosis Date    Allergic rhinitis     Arthritis     Bladder fistula     resolved    C. difficile diarrhea 8/18/2015    +PCR    Cellulitis of right lower extremity 3/23/2016    CHF (congestive heart failure) ([address]     Dental disease     Diabetes ([address]     Diverticulitis     Dizziness     DVT of lower extremity, bilateral ([address] 10/16/2013    GERD (gastroesophageal reflux disease)     Headache     Hearing loss     Hematuria 1/2/2014    Hx of blood clots     Hyperlipidemia     Hypertension     Lung disease     MONIQUE (obstructive sleep apnea)     Pancreatitis ([address] 8/24/2013    Pulmonary emboli ([address] 2013    after cholecystectomy     Rash     Sleep apnea     Tinnitus        Past Surgical History:   Procedure Laterality Date    ABDOMEN SURGERY  05/16/2014    reveseral end peristomal hernia repair.     CARDIOVERSION  12/04/2019    Dr. Marin Pierre, OPEN N/A 9-17-13    LAPAROSCOPIC CONVERTED TO OPEN CHOLECYSTECTOMY WITH    COLON SURGERY      COLONOSCOPY  2008    COLONOSCOPY  12/4/2013    Severe Diverticulosis unable to finish    COLONOSCOPY  4/30/14    diverticula-10 year f/u    COLOSTOMY  Jan 2014    CYSTOSCOPY  12/10/13    with bladder biopsy    CYSTOSCOPY  1-28-14    Cystourethroscopy, left ureteral catheter     EPIDURAL STEROID INJECTION Right 1/21/2019    RIGHT LUMBAR TWO THREE EPIDURAL STEROID INJECTION SITE CONFIRMED BY FLUROOSCOPY (Dermatology)  SHAKIRA Austin - CNP as Nurse Practitioner (Family Nurse Practitioner)    Wt Readings from Last 3 Encounters:   12/17/21 (!) 303 lb 14.4 oz (137.8 kg)   12/08/21 296 lb (134.3 kg)   09/21/21 296 lb (134.3 kg)     Vitals:    12/17/21 0825   BP: (!) 150/80   Site: Left Upper Arm   Position: Sitting   Cuff Size: Large Adult   Pulse: 63   SpO2: 94%   Weight: (!) 303 lb 14.4 oz (137.8 kg)   Height: 5' 10\" (1.778 m)     Body mass index is 43.61 kg/m². Based upon direct observation of the patient, evaluation of cognition reveals recent and remote memory intact. Pulmonary/Chest: clear to auscultation bilaterally- no wheezes, rales or rhonchi, normal air movement, no respiratory distress  Cardiovascular: normal rate, normal S1 and S2, no gallops, intact distal pulses and no carotid bruits  Extremities: no cyanosis and no clubbing  Musculoskeletal: normal range of motion, no joint swelling, deformity or tenderness    Patient's complete Health Risk Assessment and screening values have been reviewed and are found in Flowsheets. The following problems were reviewed today and where indicated follow up appointments were made and/or referrals ordered. Positive Risk Factor Screenings with Interventions:            General Health and ACP:  General  In general, how would you say your health is?: (!) Poor  In the past 7 days, have you experienced any of the following?  New or Increased Pain, New or Increased Fatigue, Loneliness, Social Isolation, Stress or Anger?: (!) New or Increased Pain, New or Increased Fatigue, Loneliness, Social Isolation, Stress, Anger  Do you get the social and emotional support that you need?: (!) No  Do you have a Living Will?: Yes  Advance Directives     Power of  Living Will ACP-Advance Directive ACP-Power of Thu Triplett on 04/27/21 Not on Emilie. Jarad Espinoza Risk Interventions:  · Pain issues: currently have provided tramadol for current back pain. Discussed not planning on chronic use of pain medication. if needed may need to consider pain management. Health Habits/Nutrition:  Health Habits/Nutrition  Do you exercise for at least 20 minutes 2-3 times per week?: (!) No  Have you lost any weight without trying in the past 3 months?: No  Do you eat only one meal per day?: No  Have you seen the dentist within the past year?: (!) No  Body mass index: (!) 43.6  Health Habits/Nutrition Interventions:  · Inadequate physical activity:  encouraged to be as active as possible at home. Prior to back injury limited activity. Hearing/Vision:  No exam data present  Hearing/Vision  Do you or your family notice any trouble with your hearing that hasn't been managed with hearing aids?: (!) Yes  Do you have difficulty driving, watching TV, or doing any of your daily activities because of your eyesight?: No  Have you had an eye exam within the past year?: Yes  Hearing/Vision Interventions:  · Vision concerns:  recommend yearly eye exams. Currently going to Dr. Abraham Dougherty:  Safety  Do you have working smoke detectors?: Yes  Have all throw rugs been removed or fastened?: (!) No  Do you have non-slip mats or surfaces in all bathtubs/showers?: Yes  Do all of your stairways have a railing or banister?: Yes  Are your doorways, halls and stairs free of clutter?: (!) No  Do you always fasten your seatbelt when you are in a car?: Yes  Safety Interventions:  · Home safety tips provided    ADL:  ADLs  In the past 7 days, did you need help from others to perform any of the following everyday activities? Eating, dressing, grooming, bathing, toileting, or walking/balance?: (!) Walking/Balance, Dressing, Grooming, Bathing  In the past 7 days, did you need help from others to take care of any of the following?  Laundry, housekeeping, banking/finances, shopping, telephone use, food preparation, transportation, or taking medications?: None  ADL Interventions:  · Patient declines any further evaluation/treatment for this issue    Personalized Preventive Plan   Current Health Maintenance Status  Immunization History   Administered Date(s) Administered    COVID-19, [employer], PF, 30mcg/0.3mL 02/03/2021, 02/24/2021, 09/28/2021    Influenza 09/20/2012    Influenza A ([id number]) Vaccine PF IM 01/26/2010    Influenza Virus Vaccine 09/18/2013    Influenza, High Dose (Fluzone 65 yrs and older) 10/13/2014, 09/16/2015, 09/26/2016, 10/26/2017, 10/16/2018, 11/22/2021    Influenza, Quadv, adjuvanted, 65 yrs +, IM, PF (Fluad) 09/14/2020    Influenza, Triv, inactivated, subunit, adjuvanted, IM (Fluad 65 yrs and older) 09/16/2019    Pneumococcal Conjugate 13-valent ([id number]) 12/16/2015    Pneumococcal Polysaccharide ([id number]) 08/17/2013, 10/19/2013, 10/13/2014    Td, unspecified formulation 12/08/2014, 12/08/2014    Zoster Live (Zostavax) 06/25/2014        Health Maintenance   Topic Date Due    Shingles Vaccine (2 of 3) 08/20/2014    DTaP/Tdap/Td vaccine (1 - Tdap) 12/09/2014    Diabetic microalbuminuria test  01/16/2021    Annual Wellness Visit (AWV)  12/15/2021    Lipid screen  07/15/2022    Potassium monitoring  07/15/2022    Creatinine monitoring  07/15/2022    Diabetic retinal exam  09/01/2022    Diabetic foot exam  12/17/2022    A1C test (Diabetic or Prediabetic)  12/17/2022    Colon cancer screen colonoscopy  04/30/2024    Flu vaccine  Completed    Pneumococcal 65+ years Vaccine  Completed    COVID-19 Vaccine  Completed    Hepatitis C screen  Completed    Hepatitis A vaccine  Aged Out    Hib vaccine  Aged Out    Meningococcal (ACWY) vaccine  Aged Out     Recommendations for [employer] Due: see orders and patient instructions/AVS.  . Recommended screening schedule for the next 5-10 years is provided to the patient in written form: see Patient Instructions/AVS.    Aliya [place] was seen today for medicare awv.     Diagnoses and all orders for this visit:    Routine general medical examination at a health care facility    Type 2 diabetes mellitus with hyperglycemia, with long-term current use of insulin ([facility])  -     POCT glycosylated hemoglobin (Hb A1C)  -      DIABETES FOOT EXAM  -     POCT microalbumin  -     Insulin Glargine, 2 Unit Dial, (TOUJEO MAX SOLOSTAR) 300 UNIT/ML SOPN; Inject 45 Units into the skin 2 times daily 45 units morning and 40units in the evening.  -     Semaglutide,0.25 or 0.5MG/DOS, (OZEMPIC, 0.25 OR 0.5 MG/DOSE,) 2 MG/1.5ML SOPN; Inject 0.5 mg into the skin once a week    Allergic rhinitis, unspecified seasonality, unspecified trigger  -     azelastine (ASTELIN) 0.1 % nasal spray; 1 spray by Nasal route 2 times daily Use in each nostril as directed    Essential hypertension  -     Comprehensive Metabolic Panel w/ Reflex to MG    Iron deficiency anemia, unspecified iron deficiency anemia type  -     CBC Auto Differential    Compression fracture of T6 vertebra with routine healing, subsequent encounter  -     traMADol (ULTRAM) 50 MG tablet; Take 1 tablet by mouth every 8 hours as needed for Pain for up to 10 days. Intended supply: 3 days. Take lowest dose possible to manage pain    Spinal stenosis of lumbar region without neurogenic claudication  -     traMADol (ULTRAM) 50 MG tablet; Take 1 tablet by mouth every 8 hours as needed for Pain for up to 10 days. Intended supply: 3 days. Take lowest dose possible to manage pain           Controlled Substance Monitoring:    Acute and Chronic Pain Monitoring:   RX Monitoring 12/17/2021   Attestation -   Periodic Controlled Substance Monitoring No signs of potential drug abuse or diversion identified. Lab Results   Component Value Date    LABA1C 7.2 12/17/2021     Lab Results   Component Value Date    .9 07/15/2021     Encouraged to work to decrease weight. Blames wife for the food she cooks. Encouraged to consider smaller portions, not just the type of food.      Recommend shingrix vaccine at the pharmacy. Asking to try weekly non insulin medication. Wife's daughter taking and doing well. Will try to order to the pharmacy.

## 2021-12-17 NOTE — PATIENT INSTRUCTIONS
Personalized Preventive Plan for [facility] - 12/17/2021  Medicare offers a range of preventive health benefits. Some of the tests and screenings are paid in full while other may be subject to a deductible, co-insurance, and/or copay. Some of these benefits include a comprehensive review of your medical history including lifestyle, illnesses that may run in your family, and various assessments and screenings as appropriate. After reviewing your medical record and screening and assessments performed today your provider may have ordered immunizations, labs, imaging, and/or referrals for you. A list of these orders (if applicable) as well as your Preventive Care list are included within your After Visit Summary for your review. Other Preventive Recommendations:    · A preventive eye exam performed by an eye specialist is recommended every 1-2 years to screen for glaucoma; cataracts, macular degeneration, and other eye disorders. · A preventive dental visit is recommended every 6 months. · Try to get at least 150 minutes of exercise per week or 10,000 steps per day on a pedometer . · Order or download the FREE \"Exercise & Physical Activity: Your Everyday Guide\" from [employer]. Call 6-844.503.8808 or search [employer] online. · You need 9723-8140 mg of calcium and 8012-2677 IU of vitamin D per day. It is possible to meet your calcium requirement with diet alone, but a vitamin D supplement is usually necessary to meet this goal.  · When exposed to the sun, use a sunscreen that protects against both UVA and UVB radiation with an SPF of 30 or greater. Reapply every 2 to 3 hours or after sweating, drying off with a towel, or swimming. · Always wear a seat belt when traveling in a car. Always wear a helmet when riding a bicycle or motorcycle.

## 2021-12-31 DIAGNOSIS — S22.050A CLOSED WEDGE COMPRESSION FRACTURE OF T6 VERTEBRA, INITIAL ENCOUNTER (HCC): ICD-10-CM

## 2022-01-03 RX ORDER — TIZANIDINE 4 MG/1
4 TABLET ORAL NIGHTLY PRN
Qty: 30 TABLET | Refills: 0 | Status: SHIPPED | OUTPATIENT
Start: 2022-01-03 | End: 2022-01-31

## 2022-01-03 NOTE — TELEPHONE ENCOUNTER
Patient last seen 12/8/2021 and medication last filled 12/8/2021:    Impression:  1) 3wks mid thoracic back pain     2) T6 compression fracture, DDD     3) Severe central stenosis L2-3, right disc bulging L3 4     4) CHF, DM, obesity, PE           Plan:  1) We reviewed his recent thoracic x-rays showing a T6 compression fracture. Recommend thoracic MRI for further evaluation.      2) TLSO--this will need to be custom through  due to body habitus     3) no lifting bending twisting     4)   Encounter Medications         Orders Placed This Encounter   Medications    tiZANidine (ZANAFLEX) 4 MG tablet       Sig: Take 1 tablet by mouth nightly as needed (PAIN)       Dispense:  30 tablet       Refill:  0         5) F/u to review thoracic MRI            Radha Marin PA-C, MPAS  Board Certified by the [facility]

## 2022-01-29 DIAGNOSIS — S22.050A CLOSED WEDGE COMPRESSION FRACTURE OF T6 VERTEBRA, INITIAL ENCOUNTER (HCC): ICD-10-CM

## 2022-01-31 RX ORDER — TIZANIDINE 4 MG/1
4 TABLET ORAL NIGHTLY PRN
Qty: 30 TABLET | Refills: 0 | Status: SHIPPED | OUTPATIENT
Start: 2022-01-31 | End: 2022-03-01

## 2022-01-31 NOTE — TELEPHONE ENCOUNTER
Patient last seen 12/8/2021 and medication last filled 1/3/2022:      Impression:  1) 3wks mid thoracic back pain     2) T6 compression fracture, DDD     3) Severe central stenosis L2-3, right disc bulging L3 4     4) CHF, DM, obesity, PE           Plan:  1) We reviewed his recent thoracic x-rays showing a T6 compression fracture. Recommend thoracic MRI for further evaluation.      2) TLSO--this will need to be custom through  due to body habitus     3) no lifting bending twisting     4)   Encounter Medications         Orders Placed This Encounter   Medications    tiZANidine (ZANAFLEX) 4 MG tablet       Sig: Take 1 tablet by mouth nightly as needed (PAIN)       Dispense:  30 tablet       Refill:  0         5) F/u to review thoracic MRI            Radha Jamil PA-C, MPAS  Board Certified by the [facility]

## 2022-03-01 DIAGNOSIS — S22.050A CLOSED WEDGE COMPRESSION FRACTURE OF T6 VERTEBRA, INITIAL ENCOUNTER (HCC): ICD-10-CM

## 2022-03-01 RX ORDER — TIZANIDINE 4 MG/1
4 TABLET ORAL NIGHTLY PRN
Qty: 30 TABLET | Refills: 0 | Status: SHIPPED | OUTPATIENT
Start: 2022-03-01

## 2022-03-01 NOTE — TELEPHONE ENCOUNTER
Patient last seen 12/8/2021 and medication last filled 2/2/2022:       Impression:  1) 3wks mid thoracic back pain     2) T6 compression fracture, DDD     3) Severe central stenosis L2-3, right disc bulging L3 4     4) CHF, DM, obesity, PE           Plan:  1) We reviewed his recent thoracic x-rays showing a T6 compression fracture. Recommend thoracic MRI for further evaluation.      2) TLSO--this will need to be custom through  due to body habitus     3) no lifting bending twisting     4)   Encounter Medications         Orders Placed This Encounter   Medications    tiZANidine (ZANAFLEX) 4 MG tablet       Sig: Take 1 tablet by mouth nightly as needed (PAIN)       Dispense:  30 tablet       Refill:  0         5) F/u to review thoracic MRI            Radha Machado PA-C, MPAS  Board Certified by the [facility] [address]

## 2022-03-06 DIAGNOSIS — I48.0 PAROXYSMAL ATRIAL FIBRILLATION (HCC): Primary | ICD-10-CM

## 2022-03-07 RX ORDER — APIXABAN 5 MG/1
TABLET, FILM COATED ORAL
Qty: 180 TABLET | Refills: 2 | Status: SHIPPED | OUTPATIENT
Start: 2022-03-07

## 2022-03-18 ENCOUNTER — OFFICE VISIT (OUTPATIENT)
Dept: FAMILY MEDICINE CLINIC | Age: 72
End: 2022-03-18
Payer: MEDICARE

## 2022-03-18 VITALS
DIASTOLIC BLOOD PRESSURE: 64 MMHG | BODY MASS INDEX: 43.62 KG/M2 | OXYGEN SATURATION: 92 % | SYSTOLIC BLOOD PRESSURE: 118 MMHG | HEART RATE: 63 BPM | RESPIRATION RATE: 18 BRPM | WEIGHT: 304 LBS

## 2022-03-18 DIAGNOSIS — F33.1 MODERATE EPISODE OF RECURRENT MAJOR DEPRESSIVE DISORDER (HCC): ICD-10-CM

## 2022-03-18 DIAGNOSIS — Z79.4 TYPE 2 DIABETES MELLITUS WITH HYPERGLYCEMIA, WITH LONG-TERM CURRENT USE OF INSULIN (HCC): Primary | ICD-10-CM

## 2022-03-18 DIAGNOSIS — E11.65 TYPE 2 DIABETES MELLITUS WITH HYPERGLYCEMIA, WITH LONG-TERM CURRENT USE OF INSULIN (HCC): Primary | ICD-10-CM

## 2022-03-18 LAB — HBA1C MFR BLD: 6.9 %

## 2022-03-18 PROCEDURE — 3044F HG A1C LEVEL LT 7.0%: CPT | Performed by: NURSE PRACTITIONER

## 2022-03-18 PROCEDURE — 83036 HEMOGLOBIN GLYCOSYLATED A1C: CPT | Performed by: NURSE PRACTITIONER

## 2022-03-18 PROCEDURE — 99214 OFFICE O/P EST MOD 30 MIN: CPT | Performed by: NURSE PRACTITIONER

## 2022-03-18 NOTE — PROGRESS NOTES
Sumeet Cooper (:  1950) is a 66 y.o. male,Established patient, here for evaluation of the following chief complaint(s):  Diabetes         ASSESSMENT/PLAN:  1. Type 2 diabetes mellitus with hyperglycemia, with long-term current use of insulin (HCC)  -     POCT glycosylated hemoglobin (Hb A1C)  2. Moderate episode of recurrent major depressive disorder (HCC)  -     sertraline (ZOLOFT) 50 MG tablet; TAKE 1 TABLET BY MOUTH EVERY DAY, Disp-90 tablet, R-3Normal    Lab Results   Component Value Date    LABA1C 6.9 2022     Lab Results   Component Value Date    .9 07/15/2021     Pleased with current HgA1c, Continue medication at current doses. Discussed need to decrease weight by 10 pounds to benefit healing, heart, DM, breathing, etc. Reminded of the success he has had in the past.     Recommend shingrix vaccine. Noted that he is due for fasting lipid. Today non fasting and requested to not check until seen by cardiology. Recommend at least weekly weights, has a scale at home. Reviewed labs from 2021, stable. Return in about 3 months (around 2022) for diabetes, hyperlipidemia, HTN. Subjective   SUBJECTIVE/OBJECTIVE:  HPI     For routine follow up DM. BS at home reported to be usually under 100 when he gets up. Taking ozempic, toujeo at 45 units BID,     Weight stable but not decreasing. Will follow up with cardiology 3/30. Following with Dr. Randy Zacarias for healing ulcer on left lower leg. Using a compression sleeve. Now taking prevagen OTC for memory. Review of Systems   All other systems reviewed and are negative. Objective   Physical Exam  Constitutional:       Appearance: Normal appearance. Cardiovascular:      Rate and Rhythm: Normal rate and regular rhythm. Heart sounds: Normal heart sounds. Pulmonary:      Effort: Pulmonary effort is normal.   Abdominal:      Palpations: Abdomen is soft.    Musculoskeletal:

## 2022-03-22 ENCOUNTER — TELEPHONE (OUTPATIENT)
Dept: FAMILY MEDICINE CLINIC | Age: 72
End: 2022-03-22

## 2022-03-22 DIAGNOSIS — I50.32 CHRONIC DIASTOLIC CONGESTIVE HEART FAILURE (HCC): Primary | ICD-10-CM

## 2022-03-22 RX ORDER — SPIRONOLACTONE 25 MG/1
TABLET ORAL
Qty: 90 TABLET | Refills: 3 | Status: SHIPPED | OUTPATIENT
Start: 2022-03-22 | End: 2022-10-27

## 2022-03-22 NOTE — TELEPHONE ENCOUNTER
----- Message from [address] sent at 3/22/2022  9:15 AM EDT -----  Subject: Medication Problem    QUESTIONS  Name of Medication? Insulin Glargine, 2 Unit Dial, (TOUJEO MAX SOLOSTAR)   300 UNIT/ML SOPN  Patient-reported dosage and instructions? unclear  What question or problem do you have with the medication? Miriam from [employer] called needing clarification on 2 sets of instructions for   Toujeo, in order to fill. 45 units into skin twice daily. 45 in morning   and 40 in eventing. Call [address] @ [phone]  Preferred Pharmacy? [facility] [address] [place] [address], [address] [place] [address] Avenue [address] [facility] [phone] - f [phone]  Pharmacy phone number (if available)? [phone]  Additional Information for Provider?   ---------------------------------------------------------------------------  --------------  CALL BACK INFO  What is the best way for the office to contact you? OK to leave message on   voicemail  Preferred Call Back Phone Number? [phone]  ---------------------------------------------------------------------------  --------------  SCRIPT ANSWERS  Relationship to Patient? Third Party  Representative Name?  Katie Archibald

## 2022-03-24 DIAGNOSIS — E11.65 TYPE 2 DIABETES MELLITUS WITH HYPERGLYCEMIA, WITH LONG-TERM CURRENT USE OF INSULIN (HCC): ICD-10-CM

## 2022-03-24 DIAGNOSIS — S22.050A CLOSED WEDGE COMPRESSION FRACTURE OF T6 VERTEBRA, INITIAL ENCOUNTER (HCC): ICD-10-CM

## 2022-03-24 DIAGNOSIS — Z79.4 TYPE 2 DIABETES MELLITUS WITH HYPERGLYCEMIA, WITH LONG-TERM CURRENT USE OF INSULIN (HCC): ICD-10-CM

## 2022-03-24 PROCEDURE — 3044F HG A1C LEVEL LT 7.0%: CPT | Performed by: NURSE PRACTITIONER

## 2022-03-24 RX ORDER — INSULIN GLARGINE 300 U/ML
45 INJECTION, SOLUTION SUBCUTANEOUS 2 TIMES DAILY
Qty: 3 PEN | Refills: 5 | Status: SHIPPED | OUTPATIENT
Start: 2022-03-24 | End: 2022-05-23 | Stop reason: SDUPTHER

## 2022-03-24 NOTE — TELEPHONE ENCOUNTER
Patient last seen 12/8/2021 and medication last filled 3/1/2022:          Impression:  1) 3wks mid thoracic back pain     2) T6 compression fracture, DDD     3) Severe central stenosis L2-3, right disc bulging L3 4     4) CHF, DM, obesity, PE           Plan:  1) We reviewed his recent thoracic x-rays showing a T6 compression fracture. Recommend thoracic MRI for further evaluation.      2) TLSO--this will need to be custom through  due to body habitus     3) no lifting bending twisting     4)   Encounter Medications         Orders Placed This Encounter   Medications    tiZANidine (ZANAFLEX) 4 MG tablet       Sig: Take 1 tablet by mouth nightly as needed (PAIN)       Dispense:  30 tablet       Refill:  0         5) F/u to review thoracic MRI            Radha Hanson PA-C, MPAS  Board Certified by the [facility]

## 2022-03-25 NOTE — TELEPHONE ENCOUNTER
From: John Doe  Sent: 1/2/2018 11:12 AM EST  Subject: Medication Renewal Request    John Doe would like a refill of the following medications:  omeprazole (PRILOSEC) 40 MG capsule Desean Bonilla CNP]    Preferred pharmacy: [facility] [address] [phone] - F [phone]    Comment: Normal exam  Give ibuprofen every 8 hours as needed for pain and fussiness    Discussed night terrors, hand out provided

## 2022-03-29 ENCOUNTER — TELEPHONE (OUTPATIENT)
Dept: ORTHOPEDIC SURGERY | Age: 72
End: 2022-03-29

## 2022-03-29 NOTE — TELEPHONE ENCOUNTER
Called & spoke with the patient informing him I was calling in regards to his muscle relaxer refill and to see if he had his MRI done on his thoracic spine. Patient states due to medical issues coming up and the weather, he has not gone out to get the MRI, but patient states he is feeling a lot better at this point, He states he is no longer taking the muscle relaxers and is doing fine at this time. I informed him that he can call us if anything comes up, and patient voiced understanding.

## 2022-03-30 ENCOUNTER — OFFICE VISIT (OUTPATIENT)
Dept: CARDIOLOGY CLINIC | Age: 72
End: 2022-03-30
Payer: MEDICARE

## 2022-03-30 VITALS
HEIGHT: 70 IN | BODY MASS INDEX: 43.81 KG/M2 | OXYGEN SATURATION: 95 % | HEART RATE: 63 BPM | WEIGHT: 306 LBS | DIASTOLIC BLOOD PRESSURE: 62 MMHG | SYSTOLIC BLOOD PRESSURE: 108 MMHG

## 2022-03-30 DIAGNOSIS — I48.0 PAROXYSMAL ATRIAL FIBRILLATION (HCC): ICD-10-CM

## 2022-03-30 DIAGNOSIS — I10 ESSENTIAL HYPERTENSION: ICD-10-CM

## 2022-03-30 DIAGNOSIS — E11.65 TYPE 2 DIABETES MELLITUS WITH HYPERGLYCEMIA, WITH LONG-TERM CURRENT USE OF INSULIN (HCC): ICD-10-CM

## 2022-03-30 DIAGNOSIS — Z79.4 TYPE 2 DIABETES MELLITUS WITH HYPERGLYCEMIA, WITH LONG-TERM CURRENT USE OF INSULIN (HCC): ICD-10-CM

## 2022-03-30 DIAGNOSIS — I50.32 CHRONIC DIASTOLIC CONGESTIVE HEART FAILURE (HCC): Primary | ICD-10-CM

## 2022-03-30 PROCEDURE — 99214 OFFICE O/P EST MOD 30 MIN: CPT | Performed by: NURSE PRACTITIONER

## 2022-03-30 PROCEDURE — 3044F HG A1C LEVEL LT 7.0%: CPT | Performed by: NURSE PRACTITIONER

## 2022-03-30 RX ORDER — TORSEMIDE 20 MG/1
20 TABLET ORAL DAILY
Qty: 30 TABLET | Refills: 2
Start: 2022-03-30 | End: 2022-05-04

## 2022-03-30 NOTE — PATIENT INSTRUCTIONS
Plan:  Check CMP, CBC, BNP, lipids, UA   Check weights every day   Continue torsemide 20mg daily   Spironolactone (aldactone) 25mg once a day- will consider increasing to 50mg a day pending labs results.    Continue imdur 30mg daily   hydralazine to 25mg three times a day  Continue Eliquis, atenolol to 25mg daily per EP   Stay as active as possible  Follow up EP this September for 1 year follow up  Follow up with Dr. Donnell Albarran as planned for the leg wound  Follow up with CHF team in 6 months or sooner if needed

## 2022-03-30 NOTE — PROGRESS NOTES
[facility]   Cardiac Follow-up    Primary Care Doctor:  Seema Vigil, APRN - CNP    Chief Complaint   Patient presents with    Follow-up    Shortness of Breath    Edema        History of Present Illness:   I had the pleasure of seeing Ashley Henson in follow up for diastolic heart failure. Hx of CKD, DM, MONIQUE. [place] [facility] [place]; ED visit on 9/5/18 with shortness of breath. S/p successful  DCCV 12/4/19 for atrial fib/flutter and started on flecainide. Since last visit, seen by EP and flecainide was stopped due to ineffectiveness. He hurt his back back November. Heating pad helps the back. He is frustrated with his weight. Gradually increased. He now has sores on the legs. Has a wound on the right leg now, seeing Dr. Shadi Paulson. He is wrapping the legs. Still with a lot of LE edema. Wants to be more active. Still sleeping in the recliner. Using the compression boots for about 1 hour. FSBS was 106 this am.     Ashley Henson describes symptoms including dyspnea, fatigue, edema but denies chest pain, palpitations, orthopnea, syncope. Home weights: 302 lbs yesterday   Appetite: eating too much   Fluid intake: too much     NYHA:   III  ACC/ AHA Stage:    C     Past Medical History:   has a past medical history of Allergic rhinitis, Arthritis, Bladder fistula, C. difficile diarrhea, Cellulitis of right lower extremity, CHF (congestive heart failure) ([address] Dental disease, Diabetes ([address] Diverticulitis, Dizziness, DVT of lower extremity, bilateral ([address] GERD (gastroesophageal reflux disease), Headache, Hearing loss, Hematuria, Hx of blood clots, Hyperlipidemia, Hypertension, Lung disease, MONIQUE (obstructive sleep apnea), Pancreatitis ([address] Pulmonary emboli ([address] Rash, Sleep apnea, and Tinnitus. Surgical History:   has a past surgical history that includes Tibia fracture surgery (Right, 2003); Cholecystectomy, open (N/A, 9-17-13);  Cystocopy (12/10/13); Cystoscopy (1-28-14); other surgical history (1-28-14); Colonoscopy (2008); Colonoscopy (12/4/2013); Colonoscopy (4/30/14); colostomy (Jan 2014); Colon surgery; Abdomen surgery (05/16/2014); hernia repair (08/14/2015); pr injection hip arthrogram,anesth (Right, 9/19/2018); epidural steroid injection (Right, 1/21/2019); epidural steroid injection (Right, 2/11/2019); Cardioversion (12/04/2019); vascular surgery (Left, 05/2021); and vascular surgery (Right, 05/10/2021). Social History:   reports that he has never smoked. He has never used smokeless tobacco. He reports current alcohol use. He reports that he does not use drugs. Family History:   Family History   Problem Relation Age of Onset    Heart Disease Father     Heart Attack Father     Stroke Brother     Heart Disease Brother     High Blood Pressure Brother     Kidney Disease Mother         kidney removed       Home Medications:  Prior to Admission medications    Medication Sig Start Date End Date Taking?  Authorizing Provider   Insulin Glargine, 2 Unit Dial, (TOUJEO MAX SOLOSTAR) 300 UNIT/ML SOPN Inject 45 Units into the skin 2 times daily 3/24/22  Yes SHAKIRA Dixon CNP   spironolactone (ALDACTONE) 25 MG tablet TAKE 1 TABLET BY MOUTH EVERY DAY 3/22/22  Yes SHAKIRA Ann CNP   Zinc Sulfate (ZINC 15 PO) Take by mouth in the morning and at bedtime   Yes Historical Provider, MD   ELDERBERRY PO Take by mouth 2 times daily   Yes Historical Provider, MD   Ascorbic Acid (VITAMIN C PO) Take by mouth in the morning and at bedtime   Yes Historical Provider, MD   Apoaequorin (PREVAGEN) 10 MG CAPS Take 1 capsule by mouth daily   Yes Historical Provider, MD   sertraline (ZOLOFT) 50 MG tablet TAKE 1 TABLET BY MOUTH EVERY DAY 3/18/22  Yes SHAKIRA Dixon CNP   ELIQUIS 5 MG TABS tablet TAKE 1 TABLET BY MOUTH TWICE A DAY 3/7/22  Yes SHAKIRA Ann CNP   tiZANidine (ZANAFLEX) 4 MG tablet TAKE 1 TABLET BY MOUTH NIGHTLY AS NEEDED (PAIN) 3/1/22  Yes Radha Núñez PA-C   azelastine (ASTELIN) 0.1 % nasal spray 1 spray by Nasal route 2 times daily Use in each nostril as directed 12/17/21  Yes SHAKIRA Villanueva CNP   Semaglutide,0.25 or 0.5MG/DOS, (OZEMPIC, 0.25 OR 0.5 MG/DOSE,) 2 MG/1.5ML SOPN Inject 0.5 mg into the skin once a week 12/17/21  Yes SHAKIRA Dixon CNP   atorvastatin (LIPITOR) 40 MG tablet TAKE 1 TABLET BY MOUTH EVERY DAY AT NIGHT 12/7/21  Yes SHAKIRA Dixon CNP   isosorbide mononitrate (IMDUR) 30 MG extended release tablet TAKE 1 TABLET BY MOUTH EVERY DAY 12/6/21  Yes SHAKIRA Dixon CNP   montelukast (SINGULAIR) 10 MG tablet TAKE 1 TABLET BY MOUTH EVERY DAY 12/6/21  Yes SHAKIRA Villanueva CNP   torsemide (DEMADEX) 20 MG tablet TAKE 2 TABLETS BY MOUTH EVERY DAY  Patient taking differently: Take 20 mg by mouth daily  11/5/21  Yes SHAKIRA Henderson CNP   glimepiride (AMARYL) 4 MG tablet TAKE 1 TABLET BY MOUTH TWICE A DAY 10/21/21  Yes SHAKIRA Villanueva CNP   atenolol (TENORMIN) 50 MG tablet Take 0.5 tablets by mouth daily 7/26/21  Yes SHAKIRA Lorenzo CNP   omeprazole (PRILOSEC) 40 MG delayed release capsule TAKE 1 CAPSULE BY MOUTH EVERY DAY 7/7/21  Yes SHAKIRA Villanueva CNP   hydrALAZINE (APRESOLINE) 50 MG tablet TAKE 1/2 TABLET BY MOUTH EVERY 8 HOURS 2/2/21  Yes SHAKIRA Henderson CNP   Glucosamine-Chondroitin (GLUCOSAMINE CHONDR COMPLEX PO) Take 1 tablet by mouth 2 times daily   Yes Historical Provider, MD   aspirin 81 MG chewable tablet Take 1 tablet by mouth daily 10/21/17  Yes Aashish Guerin MD   Multiple Vitamins-Minerals (THERAPEUTIC MULTIVITAMIN-MINERALS) tablet Take 1 tablet by mouth daily   Yes Historical Provider, MD   ferrous sulfate 325 (65 FE) MG tablet Take 325 mg by mouth daily    Yes Historical Provider, MD   Handicap Placard [facility] by Does not apply route Please issue for duration of 5 years 9/24/20 SHAKIRA Bee CNP   Handicap Placard MISC by Does not apply route Please issue for duration of 5 years 9/24/20   [place] SHAKIRA Andersen CNP   Insulin Pen Needle 31G X 5 MM MISC 1 each by Does not apply route daily 2/24/20   SHAKIRA Bee CNP   blood glucose monitor kit and supplies by Other route daily One Touch Ultra 4/15/19   SHAKIRA Bee CNP   glucose blood VI test strips (ASCENSIA AUTODISC VI;ONE TOUCH ULTRA TEST VI) strip DX: E11.9 FSBS daily. One Touch ultra 11/29/17   SHAKIRA Bee CNP      Allergies:  Hydrocodone-acetaminophen     Physical Examination:    Vitals:    03/30/22 0909   BP: 108/62   Pulse: 63   SpO2: 95%   Weight: (!) 306 lb (138.8 kg)   Height: 5' 10\" (1.778 m)        Constitutional and General Appearance: no apparent distress, obese  HEENT: non-icteric sclera, mask in place  Neck: difficult to assess JVD due to body habitus  Respiratory:  · No use of accessory muscles  · Dim breath sounds throughout, no wheezing, no crackles, no rhonchi  Cardiovascular:  · The apical impulses not displaced  · 2 /6 systolic murmur. no rub/gallop  · Reg rate and irregular rhythm, S1,S2 normal  · Radial pulses 2+ and equal bilaterally  · 2+ BLE edema, legs wrapped   Abdomen:   · No masses or tenderness  · Liver: No Abnormalities Noted  Musculoskeletal/Skin:  · Gait is slow walks with cane  · There is no clubbing, cyanosis of the extremities  · Skin is warm and dry  · Moves all extremities well  Neurological/Psychiatric:  · Alert and oriented in all spheres  · No abnormalities of mood, affect, memory, mentation, or behavior are noted    Lab Data:  CBC:   Lab Results   Component Value Date    WBC 7.1 12/17/2021    WBC 6.1 01/15/2021    WBC 7.8 12/04/2019    RBC 5.24 12/17/2021    RBC 5.14 01/15/2021    RBC 5.59 12/04/2019    HGB 14.9 12/17/2021    HGB 14.3 01/15/2021    HGB 15.9 12/04/2019    HCT 45.6 12/17/2021    HCT 43.5 01/15/2021    HCT 48.2 12/04/2019    MCV 87.1 12/17/2021    MCV 84.6 01/15/2021    MCV 86.4 12/04/2019    RDW 17.2 12/17/2021    RDW 17.4 01/15/2021    RDW 15.5 12/04/2019     12/17/2021     01/15/2021     12/04/2019     BMP:   Lab Results   Component Value Date     12/17/2021     07/15/2021     01/15/2021    K 3.7 12/17/2021    K 4.0 07/15/2021    K 4.4 01/15/2021    K 4.4 06/15/2020    K 4.5 12/04/2019    K 4.3 03/01/2018     12/17/2021     07/15/2021     01/15/2021    CO2 27 12/17/2021    CO2 28 07/15/2021    CO2 28 01/15/2021    PHOS 3.7 03/01/2018    PHOS 3.7 05/21/2014    PHOS 2.7 05/19/2014    BUN 22 12/17/2021    BUN 30 07/15/2021    BUN 22 01/15/2021    CREATININE 0.6 12/17/2021    CREATININE 0.8 07/15/2021    CREATININE 0.8 01/15/2021     BNP:   Lab Results   Component Value Date    PROBNP 260 05/07/2019    PROBNP 315 01/04/2019    PROBNP 245 12/13/2018       Recent Testing:  ECHO: 10/17/17  Left ventricular systolic function is normal with the ejection fraction estimated at 55%. No regional wall motion abnormalities. Moderate concentric left ventricular hypertrophy. Grade II diastolic dysfunction with elevated filling pressure. Severe bi-atrial enlargement. Right ventricle is moderately enlarged. Mild aortic stenosis. Systolic pulmonary artery pressure (SPAP) is normal and estimated at 26 mmHg   (RA pressure 3 mmHg). Stress test on 10/18/17:  Negative    Echo 6/2019  Summary   Technically difficult examination due to body habitus.  IV access was attempted but could not be obtained.   LV systolic function appears normal with a visually estimated EF of 55%.   Endocardium not entirely well visualized but no obvious segmental wall  motion abnormalities.   Mild left ventricular hypertrophy.   Grade III diastolic dysfunction with elevated LV filling pressures.   Individual AV leaflets are not well visualized but appear calcified and thickened with adequate opening c/w AV sclerosis.   The right ventricle is not well visualized but appears mildly dilated in size.   Moderate bi-atrial enlargement.   Frequent PVCs during exam.    Echo 8/11/20  Summary   Technically difficult examination. Normal LV systolic function with an estimated EF of 55%. No regional wall motion abnormalities are seen. Type II diastolic dysfunction with elevated filling pressure. Lipomatous hypertrophy of the interatrial septum. Mild bi-atrial enlargement. Mild mitral annular calcification. Maximal transaortic velocity is 2.62m/s which gives peak pressure gradient=27mmHg and mean pressure gradient= 15mmHg c/w mild AS. Trace mitral and tricuspid regurgitation. Definity contrast administered with no definite evidence of LV mass or thrombus noted. Systolic pulmonary artery pressure (SPAP) estimated at 40mmHg (RAP 3mmHg), consistent with mild pulm HTN. Assessment:  ·  Diastolic heart failure from hypertensive heart disease; NYHA class II  · CAD based on coronary calcification on CT chest; negative stress 10/18/17  · Shortness of breath-multifactorial; due diastolic heart failure, morbid obesity, cor pulmonale  · Dilated right ventricle due to cor pulmonale vs heart failure  · Mild aortic stenosis  · Restrictive lung defect  · Severe MONIQUE on CPAP  · Atypical atrial flutter; s/p DCCV 12/4/19;   ·  [id number] score 4    Visit Diagnosis:    1. Chronic diastolic congestive heart failure (HCC)    2. Paroxysmal atrial fibrillation ([address]    3. Essential hypertension      Plan:  Check CMP, CBC, BNP, lipids, UA   Check weights every day   Continue torsemide 20mg daily   Spironolactone (aldactone) 25mg once a day- will consider increasing to 50mg a day pending labs results.    Continue imdur 30mg daily   hydralazine to 25mg three times a day  Continue Eliquis, atenolol to 25mg daily per EP   Stay as active as possible  Follow up EP this September for 1 year follow up  Follow up with Dr. Wilson Angry as planned for the leg wound  Follow up with CHF team in 6 months or sooner if needed     I appreciate the opportunity for caring for this patient.      SHAKIRA Garnett - CNP, CNP, 3/30/2022, 10:27 AM

## 2022-04-04 DIAGNOSIS — Z79.4 TYPE 2 DIABETES MELLITUS WITH HYPERGLYCEMIA, WITH LONG-TERM CURRENT USE OF INSULIN (HCC): ICD-10-CM

## 2022-04-04 DIAGNOSIS — E11.65 TYPE 2 DIABETES MELLITUS WITH HYPERGLYCEMIA, WITH LONG-TERM CURRENT USE OF INSULIN (HCC): ICD-10-CM

## 2022-04-04 DIAGNOSIS — I10 ESSENTIAL HYPERTENSION: ICD-10-CM

## 2022-04-04 DIAGNOSIS — I50.32 CHRONIC DIASTOLIC CONGESTIVE HEART FAILURE (HCC): ICD-10-CM

## 2022-04-04 DIAGNOSIS — I48.0 PAROXYSMAL ATRIAL FIBRILLATION (HCC): ICD-10-CM

## 2022-04-04 LAB
A/G RATIO: 1.6 (ref 1.1–2.2)
ALBUMIN SERPL-MCNC: 4.4 G/DL (ref 3.4–5)
ALP BLD-CCNC: 123 U/L (ref 40–129)
ALT SERPL-CCNC: 32 U/L (ref 10–40)
ANION GAP SERPL CALCULATED.3IONS-SCNC: 19 MMOL/L (ref 3–16)
AST SERPL-CCNC: 27 U/L (ref 15–37)
BILIRUB SERPL-MCNC: 1.9 MG/DL (ref 0–1)
BILIRUBIN URINE: NEGATIVE
BLOOD, URINE: NEGATIVE
BUN BLDV-MCNC: 20 MG/DL (ref 7–20)
CALCIUM SERPL-MCNC: 9.6 MG/DL (ref 8.3–10.6)
CHLORIDE BLD-SCNC: 104 MMOL/L (ref 99–110)
CHOLESTEROL, FASTING: 101 MG/DL (ref 0–199)
CLARITY: ABNORMAL
CO2: 23 MMOL/L (ref 21–32)
COLOR: ABNORMAL
CREAT SERPL-MCNC: 0.7 MG/DL (ref 0.8–1.3)
EPITHELIAL CELLS, UA: 1 /HPF (ref 0–5)
GFR AFRICAN AMERICAN: >60
GFR NON-AFRICAN AMERICAN: >60
GLUCOSE BLD-MCNC: 94 MG/DL (ref 70–99)
GLUCOSE URINE: NEGATIVE MG/DL
HCT VFR BLD CALC: 44.7 % (ref 40.5–52.5)
HDLC SERPL-MCNC: 38 MG/DL (ref 40–60)
HEMOGLOBIN: 15 G/DL (ref 13.5–17.5)
HYALINE CASTS: 2 /LPF (ref 0–8)
KETONES, URINE: NEGATIVE MG/DL
LDL CHOLESTEROL CALCULATED: 51 MG/DL
LEUKOCYTE ESTERASE, URINE: ABNORMAL
MCH RBC QN AUTO: 29 PG (ref 26–34)
MCHC RBC AUTO-ENTMCNC: 33.5 G/DL (ref 31–36)
MCV RBC AUTO: 86.5 FL (ref 80–100)
MICROSCOPIC EXAMINATION: YES
NITRITE, URINE: NEGATIVE
PDW BLD-RTO: 17 % (ref 12.4–15.4)
PH UA: 6 (ref 5–8)
PLATELET # BLD: 146 K/UL (ref 135–450)
PMV BLD AUTO: 8.8 FL (ref 5–10.5)
POTASSIUM SERPL-SCNC: 3.6 MMOL/L (ref 3.5–5.1)
PRO-BNP: 874 PG/ML (ref 0–124)
PROTEIN UA: 100 MG/DL
RBC # BLD: 5.17 M/UL (ref 4.2–5.9)
RBC UA: 3 /HPF (ref 0–4)
SODIUM BLD-SCNC: 146 MMOL/L (ref 136–145)
SPECIFIC GRAVITY UA: >=1.03 (ref 1–1.03)
TOTAL PROTEIN: 7.1 G/DL (ref 6.4–8.2)
TRIGLYCERIDE, FASTING: 62 MG/DL (ref 0–150)
URINE TYPE: ABNORMAL
UROBILINOGEN, URINE: 1 E.U./DL
VLDLC SERPL CALC-MCNC: 12 MG/DL
WBC # BLD: 6.8 K/UL (ref 4–11)
WBC UA: 1 /HPF (ref 0–5)

## 2022-04-08 ENCOUNTER — TELEPHONE (OUTPATIENT)
Dept: CARDIOLOGY CLINIC | Age: 72
End: 2022-04-08

## 2022-04-08 NOTE — TELEPHONE ENCOUNTER
Pt returned call. Message was given. V/U. Pt would like a confirmation call when he bmp orders have been placed in chart.

## 2022-04-08 NOTE — TELEPHONE ENCOUNTER
----- Message from SHAKIRA Galicia - CNP sent at 4/5/2022  8:24 AM EDT -----  Covering for Josef Tapia CNP   Lipids at goal.  Kidney panel and electrolytes stable. Liver panel with elevated bilirubin. BNP \"heart failure number\" mildly elevated compared to prior measurements. Blood counts stable. Urinalysis notable for cloudy, + protein and trace leuk's. Defer elevated bilirubin and abnormal UA to PCP. Otherwise, increase the spironolactone to 50 mg daily as recommended by [facility] at last office visit. Repeat BMP only in 2 weeks.    Thank you, Ashley Rosas

## 2022-04-14 RX ORDER — TIZANIDINE 4 MG/1
4 TABLET ORAL NIGHTLY PRN
Qty: 30 TABLET | Refills: 0 | OUTPATIENT
Start: 2022-04-14

## 2022-05-04 RX ORDER — TORSEMIDE 20 MG/1
TABLET ORAL
Qty: 180 TABLET | Refills: 1 | Status: SHIPPED | OUTPATIENT
Start: 2022-05-04

## 2022-05-23 DIAGNOSIS — E11.65 TYPE 2 DIABETES MELLITUS WITH HYPERGLYCEMIA, WITH LONG-TERM CURRENT USE OF INSULIN (HCC): ICD-10-CM

## 2022-05-23 DIAGNOSIS — Z79.4 TYPE 2 DIABETES MELLITUS WITH HYPERGLYCEMIA, WITH LONG-TERM CURRENT USE OF INSULIN (HCC): ICD-10-CM

## 2022-05-23 RX ORDER — INSULIN GLARGINE 300 U/ML
45 INJECTION, SOLUTION SUBCUTANEOUS 2 TIMES DAILY
Qty: 5 PEN | Refills: 11 | Status: SHIPPED | OUTPATIENT
Start: 2022-05-23 | End: 2022-05-24 | Stop reason: SDUPTHER

## 2022-05-23 NOTE — TELEPHONE ENCOUNTER
Refill Request     CONFIRM preferrred pharmacy with the patient. If Mail Order Rx - Pend for 90 day refill.       Last Seen: Last Seen Department: 3/18/2022  Last Seen by PCP: 3/18/2022    Last Written: 3/24/22    Next Appointment:   Future Appointments   Date Time Provider Joanna Reynolds   6/30/2022 10:00 AM Malissa Flores APRN [facility]ANE[facility]  [place] - [place]   9/28/2022  9:00 AM Dolly Murry APRN - CNP [employer]   9/28/2022  9:30 AM Beverly Cervantes APRN - CNP [employer] [facility]       Future appointment scheduled      Requested Prescriptions     Pending Prescriptions Disp Refills    Insulin Glargine, 2 Unit Dial, (TOUJEO MAX SOLOSTAR) 300 UNIT/ML SOPN 3 pen 5     Sig: Inject 45 Units into the skin 2 times daily

## 2022-05-24 ENCOUNTER — TELEPHONE (OUTPATIENT)
Dept: FAMILY MEDICINE CLINIC | Age: 72
End: 2022-05-24

## 2022-05-24 DIAGNOSIS — Z79.4 TYPE 2 DIABETES MELLITUS WITH HYPERGLYCEMIA, WITH LONG-TERM CURRENT USE OF INSULIN (HCC): ICD-10-CM

## 2022-05-24 DIAGNOSIS — E11.65 TYPE 2 DIABETES MELLITUS WITH HYPERGLYCEMIA, WITH LONG-TERM CURRENT USE OF INSULIN (HCC): ICD-10-CM

## 2022-05-24 RX ORDER — INSULIN GLARGINE 300 U/ML
46 INJECTION, SOLUTION SUBCUTANEOUS 2 TIMES DAILY
Qty: 5 PEN | Refills: 11 | Status: SHIPPED | OUTPATIENT
Start: 2022-05-24 | End: 2022-09-28 | Stop reason: SDUPTHER

## 2022-05-24 RX ORDER — INSULIN GLARGINE 300 U/ML
45 INJECTION, SOLUTION SUBCUTANEOUS 2 TIMES DAILY
Qty: 5 PEN | Refills: 11 | Status: CANCELLED | OUTPATIENT
Start: 2022-05-24

## 2022-05-24 NOTE — TELEPHONE ENCOUNTER
[address] states that this Insulin pen must be Dosed in 2 unit increments, An Even Number. Stating it needs to be dosed at 44 or 46 units into the skin 2 times a day. Writer noted this was filled previously at 45 units and they filled it but Pharmacy states it must have not been caught by the Pharmacist. Please Clarify this order so Patient can get medication. Writer spoke with patient and he states he is currently all out of medication.   Please Advise pend prescription to this just needs dose Clarified in 2 unit increments

## 2022-06-12 ENCOUNTER — HOSPITAL ENCOUNTER (INPATIENT)
Age: 72
LOS: 4 days | Discharge: HOME HEALTH | DRG: 871 | End: 2022-06-16
Attending: EMERGENCY MEDICINE | Admitting: HOSPITALIST
Payer: MEDICARE

## 2022-06-12 ENCOUNTER — APPOINTMENT (OUTPATIENT)
Dept: GENERAL RADIOLOGY | Age: 72
DRG: 871 | End: 2022-06-12
Payer: MEDICARE

## 2022-06-12 ENCOUNTER — APPOINTMENT (OUTPATIENT)
Dept: CT IMAGING | Age: 72
DRG: 871 | End: 2022-06-12
Payer: MEDICARE

## 2022-06-12 DIAGNOSIS — A41.9 SEPSIS, DUE TO UNSPECIFIED ORGANISM, UNSPECIFIED WHETHER ACUTE ORGAN DYSFUNCTION PRESENT (HCC): ICD-10-CM

## 2022-06-12 DIAGNOSIS — R07.9 CHEST PAIN, UNSPECIFIED TYPE: Primary | ICD-10-CM

## 2022-06-12 DIAGNOSIS — L03.90 CELLULITIS, UNSPECIFIED CELLULITIS SITE: ICD-10-CM

## 2022-06-12 DIAGNOSIS — R77.8 ELEVATED TROPONIN: ICD-10-CM

## 2022-06-12 PROBLEM — I50.33 HEART FAILURE, DIASTOLIC, WITH ACUTE DECOMPENSATION (HCC): Status: ACTIVE | Noted: 2022-06-12

## 2022-06-12 PROBLEM — R65.10 SIRS (SYSTEMIC INFLAMMATORY RESPONSE SYNDROME) (HCC): Status: ACTIVE | Noted: 2022-06-12

## 2022-06-12 LAB
A/G RATIO: 1.1 (ref 1.1–2.2)
ALBUMIN SERPL-MCNC: 3.7 G/DL (ref 3.4–5)
ALP BLD-CCNC: 97 U/L (ref 40–129)
ALT SERPL-CCNC: 40 U/L (ref 10–40)
ANION GAP SERPL CALCULATED.3IONS-SCNC: 10 MMOL/L (ref 3–16)
AST SERPL-CCNC: 39 U/L (ref 15–37)
BASE EXCESS VENOUS: 0.2 MMOL/L (ref -3–3)
BASOPHILS ABSOLUTE: 0.1 K/UL (ref 0–0.2)
BASOPHILS RELATIVE PERCENT: 0.3 %
BILIRUB SERPL-MCNC: 1.2 MG/DL (ref 0–1)
BUN BLDV-MCNC: 18 MG/DL (ref 7–20)
C-REACTIVE PROTEIN: 36.5 MG/L (ref 0–5.1)
CALCIUM SERPL-MCNC: 8.6 MG/DL (ref 8.3–10.6)
CARBOXYHEMOGLOBIN: 3.6 % (ref 0–1.5)
CHLORIDE BLD-SCNC: 99 MMOL/L (ref 99–110)
CO2: 29 MMOL/L (ref 21–32)
CREAT SERPL-MCNC: <0.5 MG/DL (ref 0.8–1.3)
EKG ATRIAL RATE: 227 BPM
EKG DIAGNOSIS: NORMAL
EKG Q-T INTERVAL: 374 MS
EKG QRS DURATION: 102 MS
EKG QTC CALCULATION (BAZETT): 445 MS
EKG R AXIS: -27 DEGREES
EKG T AXIS: 38 DEGREES
EKG VENTRICULAR RATE: 85 BPM
EOSINOPHILS ABSOLUTE: 0 K/UL (ref 0–0.6)
EOSINOPHILS RELATIVE PERCENT: 0.1 %
GFR AFRICAN AMERICAN: >60
GFR NON-AFRICAN AMERICAN: >60
GLUCOSE BLD-MCNC: 110 MG/DL (ref 70–99)
GLUCOSE BLD-MCNC: 123 MG/DL (ref 70–99)
GLUCOSE BLD-MCNC: 99 MG/DL (ref 70–99)
HCO3 VENOUS: 25.1 MMOL/L (ref 23–29)
HCT VFR BLD CALC: 42.6 % (ref 40.5–52.5)
HEMOGLOBIN: 13.9 G/DL (ref 13.5–17.5)
LACTIC ACID: 1.4 MMOL/L (ref 0.4–2)
LYMPHOCYTES ABSOLUTE: 0.5 K/UL (ref 1–5.1)
LYMPHOCYTES RELATIVE PERCENT: 2.6 %
MCH RBC QN AUTO: 28.5 PG (ref 26–34)
MCHC RBC AUTO-ENTMCNC: 32.7 G/DL (ref 31–36)
MCV RBC AUTO: 87.3 FL (ref 80–100)
METHEMOGLOBIN VENOUS: 0.3 %
MONOCYTES ABSOLUTE: 0.9 K/UL (ref 0–1.3)
MONOCYTES RELATIVE PERCENT: 4.3 %
NEUTROPHILS ABSOLUTE: 18.6 K/UL (ref 1.7–7.7)
NEUTROPHILS RELATIVE PERCENT: 92.7 %
O2 SAT, VEN: 95 %
O2 THERAPY: ABNORMAL
PCO2, VEN: 41.7 MMHG (ref 40–50)
PDW BLD-RTO: 16.3 % (ref 12.4–15.4)
PERFORMED ON: ABNORMAL
PERFORMED ON: NORMAL
PH VENOUS: 7.4 (ref 7.35–7.45)
PLATELET # BLD: 209 K/UL (ref 135–450)
PMV BLD AUTO: 8.1 FL (ref 5–10.5)
PO2, VEN: 78.8 MMHG (ref 25–40)
POTASSIUM SERPL-SCNC: 4.1 MMOL/L (ref 3.5–5.1)
PRO-BNP: 1621 PG/ML (ref 0–124)
PROCALCITONIN: 0.1 NG/ML (ref 0–0.15)
RBC # BLD: 4.88 M/UL (ref 4.2–5.9)
SEDIMENTATION RATE, ERYTHROCYTE: 13 MM/HR (ref 0–20)
SODIUM BLD-SCNC: 138 MMOL/L (ref 136–145)
TCO2 CALC VENOUS: 26 MMOL/L
TOTAL PROTEIN: 7.1 G/DL (ref 6.4–8.2)
TROPONIN: 0.02 NG/ML
TROPONIN: 0.03 NG/ML
WBC # BLD: 20.1 K/UL (ref 4–11)

## 2022-06-12 PROCEDURE — 6360000002 HC RX W HCPCS: Performed by: HOSPITALIST

## 2022-06-12 PROCEDURE — 82803 BLOOD GASES ANY COMBINATION: CPT

## 2022-06-12 PROCEDURE — 80053 COMPREHEN METABOLIC PANEL: CPT

## 2022-06-12 PROCEDURE — 1200000000 HC SEMI PRIVATE

## 2022-06-12 PROCEDURE — 83036 HEMOGLOBIN GLYCOSYLATED A1C: CPT

## 2022-06-12 PROCEDURE — 99285 EMERGENCY DEPT VISIT HI MDM: CPT

## 2022-06-12 PROCEDURE — 85652 RBC SED RATE AUTOMATED: CPT

## 2022-06-12 PROCEDURE — 94640 AIRWAY INHALATION TREATMENT: CPT

## 2022-06-12 PROCEDURE — 94761 N-INVAS EAR/PLS OXIMETRY MLT: CPT

## 2022-06-12 PROCEDURE — 2700000000 HC OXYGEN THERAPY PER DAY

## 2022-06-12 PROCEDURE — 87040 BLOOD CULTURE FOR BACTERIA: CPT

## 2022-06-12 PROCEDURE — 6370000000 HC RX 637 (ALT 250 FOR IP): Performed by: HOSPITALIST

## 2022-06-12 PROCEDURE — 93010 ELECTROCARDIOGRAM REPORT: CPT | Performed by: INTERNAL MEDICINE

## 2022-06-12 PROCEDURE — 73590 X-RAY EXAM OF LOWER LEG: CPT

## 2022-06-12 PROCEDURE — 6360000004 HC RX CONTRAST MEDICATION: Performed by: HOSPITALIST

## 2022-06-12 PROCEDURE — 84145 PROCALCITONIN (PCT): CPT

## 2022-06-12 PROCEDURE — 6360000002 HC RX W HCPCS: Performed by: PHYSICIAN ASSISTANT

## 2022-06-12 PROCEDURE — 71045 X-RAY EXAM CHEST 1 VIEW: CPT

## 2022-06-12 PROCEDURE — 83605 ASSAY OF LACTIC ACID: CPT

## 2022-06-12 PROCEDURE — 84484 ASSAY OF TROPONIN QUANT: CPT

## 2022-06-12 PROCEDURE — 36415 COLL VENOUS BLD VENIPUNCTURE: CPT

## 2022-06-12 PROCEDURE — 71260 CT THORAX DX C+: CPT

## 2022-06-12 PROCEDURE — 93005 ELECTROCARDIOGRAM TRACING: CPT | Performed by: EMERGENCY MEDICINE

## 2022-06-12 PROCEDURE — 85025 COMPLETE CBC W/AUTO DIFF WBC: CPT

## 2022-06-12 PROCEDURE — 2580000003 HC RX 258: Performed by: HOSPITALIST

## 2022-06-12 PROCEDURE — 6370000000 HC RX 637 (ALT 250 FOR IP): Performed by: PHYSICIAN ASSISTANT

## 2022-06-12 PROCEDURE — 96365 THER/PROPH/DIAG IV INF INIT: CPT

## 2022-06-12 PROCEDURE — 6370000000 HC RX 637 (ALT 250 FOR IP): Performed by: NURSE PRACTITIONER

## 2022-06-12 PROCEDURE — 86140 C-REACTIVE PROTEIN: CPT

## 2022-06-12 PROCEDURE — 2580000003 HC RX 258: Performed by: PHYSICIAN ASSISTANT

## 2022-06-12 PROCEDURE — 83880 ASSAY OF NATRIURETIC PEPTIDE: CPT

## 2022-06-12 RX ORDER — MAGNESIUM SULFATE IN WATER 40 MG/ML
2000 INJECTION, SOLUTION INTRAVENOUS PRN
Status: DISCONTINUED | OUTPATIENT
Start: 2022-06-12 | End: 2022-06-13 | Stop reason: SDUPTHER

## 2022-06-12 RX ORDER — MAGNESIUM SULFATE IN WATER 40 MG/ML
2000 INJECTION, SOLUTION INTRAVENOUS PRN
Status: DISCONTINUED | OUTPATIENT
Start: 2022-06-12 | End: 2022-06-14

## 2022-06-12 RX ORDER — ACETAMINOPHEN 650 MG/1
650 SUPPOSITORY RECTAL EVERY 6 HOURS PRN
Status: DISCONTINUED | OUTPATIENT
Start: 2022-06-12 | End: 2022-06-16 | Stop reason: HOSPADM

## 2022-06-12 RX ORDER — ONDANSETRON 4 MG/1
4 TABLET, ORALLY DISINTEGRATING ORAL EVERY 8 HOURS PRN
Status: DISCONTINUED | OUTPATIENT
Start: 2022-06-12 | End: 2022-06-16 | Stop reason: HOSPADM

## 2022-06-12 RX ORDER — HYDRALAZINE HYDROCHLORIDE 25 MG/1
25 TABLET, FILM COATED ORAL EVERY 8 HOURS SCHEDULED
Status: DISCONTINUED | OUTPATIENT
Start: 2022-06-12 | End: 2022-06-16 | Stop reason: HOSPADM

## 2022-06-12 RX ORDER — SODIUM CHLORIDE 0.9 % (FLUSH) 0.9 %
10 SYRINGE (ML) INJECTION PRN
Status: DISCONTINUED | OUTPATIENT
Start: 2022-06-12 | End: 2022-06-16 | Stop reason: HOSPADM

## 2022-06-12 RX ORDER — IBUPROFEN 600 MG/1
600 TABLET ORAL EVERY 6 HOURS PRN
Status: DISCONTINUED | OUTPATIENT
Start: 2022-06-12 | End: 2022-06-14

## 2022-06-12 RX ORDER — ACETAMINOPHEN 325 MG/1
650 TABLET ORAL EVERY 6 HOURS PRN
Status: DISCONTINUED | OUTPATIENT
Start: 2022-06-12 | End: 2022-06-16 | Stop reason: HOSPADM

## 2022-06-12 RX ORDER — INSULIN LISPRO 100 [IU]/ML
0-12 INJECTION, SOLUTION INTRAVENOUS; SUBCUTANEOUS
Status: DISCONTINUED | OUTPATIENT
Start: 2022-06-12 | End: 2022-06-14

## 2022-06-12 RX ORDER — ATENOLOL 25 MG/1
25 TABLET ORAL DAILY
Status: DISCONTINUED | OUTPATIENT
Start: 2022-06-12 | End: 2022-06-16 | Stop reason: HOSPADM

## 2022-06-12 RX ORDER — M-VIT,TX,IRON,MINS/CALC/FOLIC 27MG-0.4MG
1 TABLET ORAL DAILY
Status: DISCONTINUED | OUTPATIENT
Start: 2022-06-12 | End: 2022-06-16 | Stop reason: HOSPADM

## 2022-06-12 RX ORDER — TORSEMIDE 20 MG/1
40 TABLET ORAL DAILY
Status: DISCONTINUED | OUTPATIENT
Start: 2022-06-13 | End: 2022-06-16 | Stop reason: HOSPADM

## 2022-06-12 RX ORDER — PANTOPRAZOLE SODIUM 40 MG/1
40 TABLET, DELAYED RELEASE ORAL
Status: DISCONTINUED | OUTPATIENT
Start: 2022-06-13 | End: 2022-06-16 | Stop reason: HOSPADM

## 2022-06-12 RX ORDER — SPIRONOLACTONE 25 MG/1
25 TABLET ORAL DAILY
Status: DISCONTINUED | OUTPATIENT
Start: 2022-06-12 | End: 2022-06-16 | Stop reason: HOSPADM

## 2022-06-12 RX ORDER — INSULIN GLARGINE 100 [IU]/ML
46 INJECTION, SOLUTION SUBCUTANEOUS 2 TIMES DAILY
Status: DISCONTINUED | OUTPATIENT
Start: 2022-06-12 | End: 2022-06-14

## 2022-06-12 RX ORDER — IPRATROPIUM BROMIDE AND ALBUTEROL SULFATE 2.5; .5 MG/3ML; MG/3ML
1 SOLUTION RESPIRATORY (INHALATION)
Status: DISCONTINUED | OUTPATIENT
Start: 2022-06-12 | End: 2022-06-12

## 2022-06-12 RX ORDER — SENNA PLUS 8.6 MG/1
1 TABLET ORAL DAILY PRN
Status: DISCONTINUED | OUTPATIENT
Start: 2022-06-12 | End: 2022-06-16 | Stop reason: HOSPADM

## 2022-06-12 RX ORDER — SODIUM CHLORIDE 0.9 % (FLUSH) 0.9 %
10 SYRINGE (ML) INJECTION EVERY 12 HOURS SCHEDULED
Status: DISCONTINUED | OUTPATIENT
Start: 2022-06-12 | End: 2022-06-16 | Stop reason: HOSPADM

## 2022-06-12 RX ORDER — MECOBALAMIN 5000 MCG
5 TABLET,DISINTEGRATING ORAL NIGHTLY
Status: DISCONTINUED | OUTPATIENT
Start: 2022-06-12 | End: 2022-06-16 | Stop reason: HOSPADM

## 2022-06-12 RX ORDER — POTASSIUM CHLORIDE 20 MEQ/1
40 TABLET, EXTENDED RELEASE ORAL PRN
Status: DISCONTINUED | OUTPATIENT
Start: 2022-06-12 | End: 2022-06-14

## 2022-06-12 RX ORDER — SODIUM CHLORIDE 9 MG/ML
INJECTION, SOLUTION INTRAVENOUS PRN
Status: DISCONTINUED | OUTPATIENT
Start: 2022-06-12 | End: 2022-06-16 | Stop reason: HOSPADM

## 2022-06-12 RX ORDER — ONDANSETRON 2 MG/ML
4 INJECTION INTRAMUSCULAR; INTRAVENOUS EVERY 6 HOURS PRN
Status: DISCONTINUED | OUTPATIENT
Start: 2022-06-12 | End: 2022-06-16 | Stop reason: HOSPADM

## 2022-06-12 RX ORDER — ASPIRIN 81 MG/1
81 TABLET ORAL DAILY
Status: DISCONTINUED | OUTPATIENT
Start: 2022-06-13 | End: 2022-06-16 | Stop reason: HOSPADM

## 2022-06-12 RX ORDER — ATORVASTATIN CALCIUM 40 MG/1
40 TABLET, FILM COATED ORAL NIGHTLY
Status: DISCONTINUED | OUTPATIENT
Start: 2022-06-12 | End: 2022-06-16 | Stop reason: HOSPADM

## 2022-06-12 RX ORDER — AZELASTINE 1 MG/ML
1 SPRAY, METERED NASAL 2 TIMES DAILY
Status: DISCONTINUED | OUTPATIENT
Start: 2022-06-12 | End: 2022-06-16 | Stop reason: HOSPADM

## 2022-06-12 RX ORDER — POTASSIUM CHLORIDE 7.45 MG/ML
10 INJECTION INTRAVENOUS PRN
Status: DISCONTINUED | OUTPATIENT
Start: 2022-06-12 | End: 2022-06-14

## 2022-06-12 RX ORDER — DEXTROSE MONOHYDRATE 50 MG/ML
100 INJECTION, SOLUTION INTRAVENOUS PRN
Status: DISCONTINUED | OUTPATIENT
Start: 2022-06-12 | End: 2022-06-16 | Stop reason: HOSPADM

## 2022-06-12 RX ORDER — IPRATROPIUM BROMIDE AND ALBUTEROL SULFATE 2.5; .5 MG/3ML; MG/3ML
1 SOLUTION RESPIRATORY (INHALATION) EVERY 4 HOURS PRN
Status: DISCONTINUED | OUTPATIENT
Start: 2022-06-12 | End: 2022-06-16 | Stop reason: HOSPADM

## 2022-06-12 RX ORDER — POTASSIUM CHLORIDE 20 MEQ/1
40 TABLET, EXTENDED RELEASE ORAL PRN
Status: DISCONTINUED | OUTPATIENT
Start: 2022-06-12 | End: 2022-06-13 | Stop reason: SDUPTHER

## 2022-06-12 RX ORDER — MONTELUKAST SODIUM 10 MG/1
10 TABLET ORAL DAILY
Status: DISCONTINUED | OUTPATIENT
Start: 2022-06-12 | End: 2022-06-16 | Stop reason: HOSPADM

## 2022-06-12 RX ORDER — FERROUS SULFATE 325(65) MG
325 TABLET ORAL DAILY
Status: DISCONTINUED | OUTPATIENT
Start: 2022-06-12 | End: 2022-06-16 | Stop reason: HOSPADM

## 2022-06-12 RX ORDER — INSULIN LISPRO 100 [IU]/ML
0-6 INJECTION, SOLUTION INTRAVENOUS; SUBCUTANEOUS NIGHTLY
Status: DISCONTINUED | OUTPATIENT
Start: 2022-06-12 | End: 2022-06-14

## 2022-06-12 RX ORDER — POTASSIUM CHLORIDE 7.45 MG/ML
10 INJECTION INTRAVENOUS PRN
Status: DISCONTINUED | OUTPATIENT
Start: 2022-06-12 | End: 2022-06-13 | Stop reason: SDUPTHER

## 2022-06-12 RX ORDER — ISOSORBIDE MONONITRATE 30 MG/1
30 TABLET, EXTENDED RELEASE ORAL DAILY
Status: DISCONTINUED | OUTPATIENT
Start: 2022-06-13 | End: 2022-06-16 | Stop reason: HOSPADM

## 2022-06-12 RX ADMIN — NITROGLYCERIN 0.5 INCH: 20 OINTMENT TOPICAL at 13:02

## 2022-06-12 RX ADMIN — HYDRALAZINE HYDROCHLORIDE 25 MG: 25 TABLET, FILM COATED ORAL at 19:18

## 2022-06-12 RX ADMIN — SERTRALINE 50 MG: 50 TABLET, FILM COATED ORAL at 19:17

## 2022-06-12 RX ADMIN — AZELASTINE HYDROCHLORIDE 1 SPRAY: 137 SPRAY, METERED NASAL at 21:47

## 2022-06-12 RX ADMIN — ATORVASTATIN CALCIUM 40 MG: 40 TABLET, FILM COATED ORAL at 21:07

## 2022-06-12 RX ADMIN — Medication 5 MG: at 22:12

## 2022-06-12 RX ADMIN — FUROSEMIDE 3 MG/HR: 10 INJECTION, SOLUTION INTRAMUSCULAR; INTRAVENOUS at 18:01

## 2022-06-12 RX ADMIN — Medication 1 TABLET: at 19:17

## 2022-06-12 RX ADMIN — VANCOMYCIN HYDROCHLORIDE 1250 MG: 10 INJECTION, POWDER, LYOPHILIZED, FOR SOLUTION INTRAVENOUS at 23:33

## 2022-06-12 RX ADMIN — VANCOMYCIN HYDROCHLORIDE 1000 MG: 1 INJECTION, POWDER, LYOPHILIZED, FOR SOLUTION INTRAVENOUS at 13:45

## 2022-06-12 RX ADMIN — IOPAMIDOL 75 ML: 755 INJECTION, SOLUTION INTRAVENOUS at 17:45

## 2022-06-12 RX ADMIN — MONTELUKAST SODIUM 10 MG: 10 TABLET ORAL at 19:19

## 2022-06-12 RX ADMIN — PIPERACILLIN AND TAZOBACTAM 3375 MG: 3; .375 INJECTION, POWDER, LYOPHILIZED, FOR SOLUTION INTRAVENOUS at 13:06

## 2022-06-12 RX ADMIN — FERROUS SULFATE TAB 325 MG (65 MG ELEMENTAL FE) 325 MG: 325 (65 FE) TAB at 19:18

## 2022-06-12 RX ADMIN — APIXABAN 5 MG: 5 TABLET, FILM COATED ORAL at 21:07

## 2022-06-12 RX ADMIN — ACETAMINOPHEN 650 MG: 325 TABLET ORAL at 16:26

## 2022-06-12 RX ADMIN — IPRATROPIUM BROMIDE AND ALBUTEROL SULFATE 1 AMPULE: .5; 2.5 SOLUTION RESPIRATORY (INHALATION) at 20:28

## 2022-06-12 ASSESSMENT — PAIN SCALES - GENERAL
PAINLEVEL_OUTOF10: 0
PAINLEVEL_OUTOF10: 1
PAINLEVEL_OUTOF10: 0
PAINLEVEL_OUTOF10: 4
PAINLEVEL_OUTOF10: 5

## 2022-06-12 ASSESSMENT — PAIN DESCRIPTION - DESCRIPTORS
DESCRIPTORS: DULL
DESCRIPTORS: ACHING

## 2022-06-12 ASSESSMENT — PAIN - FUNCTIONAL ASSESSMENT
PAIN_FUNCTIONAL_ASSESSMENT: PREVENTS OR INTERFERES SOME ACTIVE ACTIVITIES AND ADLS
PAIN_FUNCTIONAL_ASSESSMENT: NONE - DENIES PAIN
PAIN_FUNCTIONAL_ASSESSMENT: NONE - DENIES PAIN

## 2022-06-12 ASSESSMENT — ENCOUNTER SYMPTOMS: TACHYPNEA: 1

## 2022-06-12 ASSESSMENT — PAIN DESCRIPTION - ONSET: ONSET: ON-GOING

## 2022-06-12 ASSESSMENT — PAIN DESCRIPTION - LOCATION
LOCATION: LEG
LOCATION: CHEST

## 2022-06-12 ASSESSMENT — PAIN DESCRIPTION - FREQUENCY: FREQUENCY: CONTINUOUS

## 2022-06-12 ASSESSMENT — PAIN DESCRIPTION - PAIN TYPE: TYPE: CHRONIC PAIN

## 2022-06-12 ASSESSMENT — PAIN DESCRIPTION - ORIENTATION: ORIENTATION: LEFT

## 2022-06-12 NOTE — ED PROVIDER NOTES
[facility] Emergency Department    CHIEF COMPLAINT  Chest Pain (started this morning sts it was more uncomfortable, took 324 asa)      SHARED SERVICE VISIT  I have seen and evaluated this patient with my supervising physician, Dr. Steven Martell. HISTORY OF PRESENT ILLNESS  Alia Valdez is a 66 y.o. male who presents to the ED complaining of episode of left-sided chest pain earlier today. Patient brought in by squad today for evaluation. Chest pain left-sided. Rated as 3-4 out of 10. Took a full aspirin. Reports that pain essentially gone. He denies associated shortness of breath. It did not appear to radiate. He denies headache, lightheadedness, dizziness confusion. No neck, arm, jaw or back pain. Did have some associated nausea without vomiting. No diaphoresis. No pain with deep inspiration. Denies cough congestion. No fevers chills. No other complaints, modifying factors or associated symptoms. Nursing notes reviewed. Past Medical History:   Diagnosis Date    Allergic rhinitis     Arthritis     Bladder fistula     resolved    C. difficile diarrhea 8/18/2015    +PCR    Cellulitis of right lower extremity 3/23/2016    CHF (congestive heart failure) ([address]     Dental disease     Diabetes ([address]     Diverticulitis     Dizziness     DVT of lower extremity, bilateral ([address] 10/16/2013    GERD (gastroesophageal reflux disease)     Headache     Hearing loss     Hematuria 1/2/2014    Hx of blood clots     Hyperlipidemia     Hypertension     Lung disease     MONIQUE (obstructive sleep apnea)     Pancreatitis ([address] 8/24/2013    Pulmonary emboli ([address] 2013    after cholecystectomy     Rash     Sleep apnea     Tinnitus      Past Surgical History:   Procedure Laterality Date    ABDOMEN SURGERY  05/16/2014    reveseral end peristomal hernia repair.     CARDIOVERSION  12/04/2019    Dr. Prieto Mccurdy, OPEN N/A 9-17-13    LAPAROSCOPIC CONVERTED TO OPEN CHOLECYSTECTOMY WITH    COLON SURGERY      COLONOSCOPY  2008    COLONOSCOPY  12/4/2013    Severe Diverticulosis unable to finish    COLONOSCOPY  4/30/14    diverticula-10 year f/u    COLOSTOMY  Jan 2014    CYSTOSCOPY  12/10/13    with bladder biopsy    CYSTOSCOPY  1-28-14    Cystourethroscopy, left ureteral catheter     EPIDURAL STEROID INJECTION Right 1/21/2019    RIGHT LUMBAR TWO THREE EPIDURAL STEROID INJECTION SITE CONFIRMED BY FLUROOSCOPY performed by Genna Yancey MD at [facility] Right 2/11/2019    RIGHT LUMBAR TWO THREE EPIDURAL STEROID INJECTION SITE CONFIRMED BY FLUOROSCOPY performed by Genna Yancey MD at [facility] 66  08/14/2015    [facility], BILATERAL COMPONENT SEPARATION, LYSIS OF ADHESIONS    OTHER SURGICAL HISTORY  1-28-14    LeftColectomy with End Colostomy, Splenic Flexure Mobilization, Takedown of Colovesical Fistula    NH INJECTION HIP [facility] Right 9/19/2018    ARTHROGRAM AND CORTISONE INJECTION RIGHT HIP performed by Ofelia Sauceda MD at [address] Right 2003    Fracture lower leg during chain saw accident.  Surgery x 2    VASCULAR SURGERY Left 05/2021    per Dr. Marcus Orona Right 05/10/2021    venous procedure RLE-per Dr. Chamberlain Saliva      Family History   Problem Relation Age of Onset    Heart Disease Father     Heart Attack Father     Stroke Brother     Heart Disease Brother     High Blood Pressure Brother     Kidney Disease Mother         kidney removed     Social History     Socioeconomic History    Marital status:      Spouse name: Not on file    Number of children: Not on file    Years of education: Not on file    Highest education level: Not on file   Occupational History    Not on file   Tobacco Use    Smoking status: Never Smoker    Smokeless tobacco: Never Used   Vaping Use    Vaping Use: Never used Substance and Sexual Activity    Alcohol use: Yes     Alcohol/week: 0.0 standard drinks     Comment: every several months     Drug use: No    Sexual activity: Yes     Partners: Female   Other Topics Concern    Not on file   Social History Narrative    Not on file     Social Determinants of Health     Financial Resource Strain: High Risk    Difficulty of Paying Living Expenses: Hard   Food Insecurity: No Food Insecurity    Worried About Running Out of Food in the Last Year: Never true    Randal of Food in the Last Year: Never true   Transportation Needs: No Transportation Needs    Lack of Transportation (Medical): No    Lack of Transportation (Non-Medical): No   Physical Activity:     Days of Exercise per Week: Not on file    Minutes of Exercise per Session: Not on file   Stress:     Feeling of Stress : Not on file   Social Connections:     Frequency of Communication with Friends and Family: Not on file    Frequency of Social Gatherings with Friends and Family: Not on file    Attends Protestant Services: Not on file    Active Member of [address] [place] [address] or Organizations: Not on file    Attends Club or Organization Meetings: Not on file    Marital Status: Not on file   Intimate Partner Violence:     Fear of Current or Ex-Partner: Not on file    Emotionally Abused: Not on file    Physically Abused: Not on file    Sexually Abused: Not on file   Housing Stability:     Unable to Pay for Housing in the Last Year: Not on file    Number of [place] in the Last Year: Not on file    Unstable Housing in the Last Year: Not on file     No current facility-administered medications for this encounter.      Current Outpatient Medications   Medication Sig Dispense Refill    Insulin Glargine, 2 Unit Dial, (TOUJEO MAX SOLOSTAR) 300 UNIT/ML SOPN Inject 46 Units into the skin 2 times daily 5 pen 11    torsemide (DEMADEX) 20 MG tablet TAKE 2 TABLETS BY MOUTH EVERY  tablet 1    spironolactone (ALDACTONE) 25 MG tablet TAKE 1 TABLET BY MOUTH EVERY DAY 90 tablet 3    Zinc Sulfate (ZINC 15 PO) Take by mouth in the morning and at bedtime      ELDERBERRY PO Take by mouth 2 times daily      Ascorbic Acid (VITAMIN C PO) Take by mouth in the morning and at bedtime      Apoaequorin (PREVAGEN) 10 MG CAPS Take 1 capsule by mouth daily      sertraline (ZOLOFT) 50 MG tablet TAKE 1 TABLET BY MOUTH EVERY DAY 90 tablet 3    ELIQUIS 5 MG TABS tablet TAKE 1 TABLET BY MOUTH TWICE A  tablet 2    tiZANidine (ZANAFLEX) 4 MG tablet TAKE 1 TABLET BY MOUTH NIGHTLY AS NEEDED (PAIN) 30 tablet 0    azelastine (ASTELIN) 0.1 % nasal spray 1 spray by Nasal route 2 times daily Use in each nostril as directed 30 mL 5    Semaglutide,0.25 or 0.5MG/DOS, (OZEMPIC, 0.25 OR 0.5 MG/DOSE,) 2 MG/1.5ML SOPN Inject 0.5 mg into the skin once a week 4 pen 5    atorvastatin (LIPITOR) 40 MG tablet TAKE 1 TABLET BY MOUTH EVERY DAY AT NIGHT 90 tablet 3    isosorbide mononitrate (IMDUR) 30 MG extended release tablet TAKE 1 TABLET BY MOUTH EVERY DAY 90 tablet 3    montelukast (SINGULAIR) 10 MG tablet TAKE 1 TABLET BY MOUTH EVERY DAY 90 tablet 3    glimepiride (AMARYL) 4 MG tablet TAKE 1 TABLET BY MOUTH TWICE A  tablet 3    atenolol (TENORMIN) 50 MG tablet Take 0.5 tablets by mouth daily 90 tablet 3    omeprazole (PRILOSEC) 40 MG delayed release capsule TAKE 1 CAPSULE BY MOUTH EVERY DAY 90 capsule 3    hydrALAZINE (APRESOLINE) 50 MG tablet TAKE 1/2 TABLET BY MOUTH EVERY 8 HOURS 135 tablet 7    Handicap Placard MISC by Does not apply route Please issue for duration of 5 years 1 each 0    Handicap Placard MISC by Does not apply route Please issue for duration of 5 years 1 each 0    Insulin Pen Needle 31G X 5 MM MISC 1 each by Does not apply route daily 400 each 5    Glucosamine-Chondroitin (GLUCOSAMINE CHONDR COMPLEX PO) Take 1 tablet by mouth 2 times daily      blood glucose monitor kit and supplies by Other route daily One Touch Ultra 1 kit 0    glucose blood VI test strips (ASCENSIA AUTODISC VI;ONE TOUCH ULTRA TEST VI) strip DX: E11.9 FSBS daily. One Touch ultra 100 strip 5    aspirin 81 MG chewable tablet Take 1 tablet by mouth daily 30 tablet 0    Multiple Vitamins-Minerals (THERAPEUTIC MULTIVITAMIN-MINERALS) tablet Take 1 tablet by mouth daily      ferrous sulfate 325 (65 FE) MG tablet Take 325 mg by mouth daily        Allergies   Allergen Reactions    Hydrocodone-Acetaminophen Other (See Comments)     jittery       REVIEW OF SYSTEMS  10 systems reviewed, pertinent positives per HPI otherwise noted to be negative    PHYSICAL EXAM  /77   Pulse 73   Temp 99.1 °F (37.3 °C) (Oral)   Resp 18   SpO2 92%   GENERAL APPEARANCE: Awake and alert. Cooperative. No acute distress. HEAD: Normocephalic. Atraumatic. EYES: PERRL. EOM's grossly intact. ENT: Mucous membranes are moist.   NECK: Supple. No JVD. No tracheal tenderness or deviation. No crepitus. HEART: RRR. No murmurs. LUNGS: Respirations unlabored. CTAB. Good air exchange. Patient does exhibit some conversational dyspnea. .  No wheezing, rhonchi, rales. ABDOMEN: Soft. Non-distended. Non-tender. No guarding or rebound. EXTREMITIES: No peripheral edema. No unilateral leg pain or swelling. The right lower extremity with findings appear consistent with cellulitis. Patient states that he has been seeing wound care. Erythematous associated with purulent drainage to bandages as well as a very's foul odor. Moves all extremities equally. All extremities neurovascularly intact. SKIN: Warm and dry. No acute rashes. NEUROLOGICAL: Alert and oriented. CN's 2-12 intact. No gross facial drooping. Strength 5/5, sensation intact. PSYCHIATRIC: Normal mood and affect.     RADIOLOGY  XR CHEST PORTABLE    Result Date: 6/12/2022  EXAMINATION: ONE XRAY VIEW OF THE CHEST 6/12/2022 10:55 am COMPARISON: 09/05/2018 HISTORY: ORDERING SYSTEM PROVIDED HISTORY: chest pain TECHNOLOGIST PROVIDED HISTORY: Reason for exam:->chest pain FINDINGS: There is left basilar scarring. No change in the mild pulmonary vascular congestion. The cardiac silhouette is stable. There is no pneumothorax or pleural effusion. No change in the moderate hiatal hernia. 1.  No acute abnormality. Is this patient to be included in the SEP-1 Core Measure due to severe sepsis or septic shock? No   Exclusion criteria - the patient is NOT to be included for SEP-1 Core Measure due to:  May have criteria for sepsis, but does not meet criteria for severe sepsis or septic shock    ED COURSE  Pain control was not required while here in the emergency department. Triage vitals stable although patient did develop temperature of 100.5. CBC with a leukocytosis of 20.1. No bandemia. Lactate negative. ESR 13. VBG without acidosis. CMP was unremarkable. Troponin recorded at 0.02. . Right tib-fib showing soft tissue swelling without evidence for fractures, subcutaneous gas, or periosteal reaction. Stable portable chest x-ray. EKG consistent with A. fib with PVCs. No evidence for acute ischemic change. Patient was initiated on vancomycin and Zosyn for lower extremity cellulitis. Does meet sepsis criteria although not in severe sepsis or septic shock. Was hesitant with fluid administration given history of congestive heart failure. Discussed case with hospital medicine. Admission orders placed. A discussion was had with Mr. Carson Banuelos regarding chest pain, lower extremity infections, ED findings and recommendations for admission. Risk management discussed and shared decision making had with patient and/or surrogate. All questions were answered. Is in agreement. CRITICAL CARE TIME  40 Minutes of critical care time spent not including separately billable procedures.     MDM  Results for orders placed or performed during the hospital encounter of 06/12/22   CBC with Auto Differential   Result Value Ref Range WBC 20.1 (H) 4.0 - 11.0 K/uL    RBC 4.88 4.20 - 5.90 M/uL    Hemoglobin 13.9 13.5 - 17.5 g/dL    Hematocrit 42.6 40.5 - 52.5 %    MCV 87.3 80.0 - 100.0 fL    MCH 28.5 26.0 - 34.0 pg    MCHC 32.7 31.0 - 36.0 g/dL    RDW 16.3 (H) 12.4 - 15.4 %    Platelets 838 612 - 874 K/uL    MPV 8.1 5.0 - 10.5 fL    Neutrophils % 92.7 %    Lymphocytes % 2.6 %    Monocytes % 4.3 %    Eosinophils % 0.1 %    Basophils % 0.3 %    Neutrophils Absolute 18.6 (H) 1.7 - 7.7 K/uL    Lymphocytes Absolute 0.5 (L) 1.0 - 5.1 K/uL    Monocytes Absolute 0.9 0.0 - 1.3 K/uL    Eosinophils Absolute 0.0 0.0 - 0.6 K/uL    Basophils Absolute 0.1 0.0 - 0.2 K/uL   Comprehensive Metabolic Panel   Result Value Ref Range    Sodium 138 136 - 145 mmol/L    Potassium 4.1 3.5 - 5.1 mmol/L    Chloride 99 99 - 110 mmol/L    CO2 29 21 - 32 mmol/L    Anion Gap 10 3 - 16    Glucose 123 (H) 70 - 99 mg/dL    BUN 18 7 - 20 mg/dL    CREATININE <0.5 (L) 0.8 - 1.3 mg/dL    GFR Non-African American >60 >60    GFR African American >60 >60    Calcium 8.6 8.3 - 10.6 mg/dL    Total Protein 7.1 6.4 - 8.2 g/dL    Albumin 3.7 3.4 - 5.0 g/dL    Albumin/Globulin Ratio 1.1 1.1 - 2.2    Total Bilirubin 1.2 (H) 0.0 - 1.0 mg/dL    Alkaline Phosphatase 97 40 - 129 U/L    ALT 40 10 - 40 U/L    AST 39 (H) 15 - 37 U/L   Troponin   Result Value Ref Range    Troponin 0.02 (H) <0.01 ng/mL   Lactic Acid   Result Value Ref Range    Lactic Acid 1.4 0.4 - 2.0 mmol/L   Sedimentation Rate   Result Value Ref Range    Sed Rate 13 0 - 20 mm/Hr   C-Reactive Protein   Result Value Ref Range    CRP 36.5 (H) 0.0 - 5.1 mg/L   Brain Natriuretic Peptide   Result Value Ref Range    Pro-BNP 1,621 (H) 0 - 124 pg/mL   Blood gas, venous   Result Value Ref Range    pH, Chavez 7.398 7.350 - 7.450    pCO2, Chavez 41.7 40.0 - 50.0 mmHg    pO2, Chavez 78.8 (H) 25.0 - 40.0 mmHg    HCO3, Venous 25.1 23.0 - 29.0 mmol/L    Base Excess, Chavez 0.2 -3.0 - 3.0 mmol/L    O2 Sat, Chavez 95 Not Established %    Carboxyhemoglobin 3.6 (H) 0.0 - 1.5 %    MetHgb, Chavez 0.3 <1.5 %    TC02 (Calc), Chavez 26 Not Established mmol/L    O2 Therapy Unknown    POCT Glucose   Result Value Ref Range    POC Glucose 110 (H) 70 - 99 mg/dl    Performed on ACCU-CHEK    EKG 12 Lead   Result Value Ref Range    Ventricular Rate 85 BPM    Atrial Rate 227 BPM    QRS Duration 102 ms    Q-T Interval 374 ms    QTc Calculation (Bazett) 445 ms    R Axis -27 degrees    T Axis 38 degrees    Diagnosis       Atrial fibrillation with premature ventricular or aberrantly conducted complexesLow voltage QRSAbnormal ECGWhen compared with ECG of 04-DEC-2019 10:24,Atrial fibrillation has replaced Sinus rhythmConfirmed by Uma Richards MD, Merary Dnog ([id number]) on 6/12/2022 3:06:21 PM     I spoke with Ashley Torres and Jamil Mckeon. We thoroughly discussed the history, physical exam, laboratory and imaging studies, as well as, emergency department course. Based upon that discussion, we've decided to admit Perlita Glass to the hospital for further observation, evaluation and treatment. Final Impression  1. Chest pain, unspecified type    2. Cellulitis, unspecified cellulitis site    3. Elevated troponin    4. Sepsis, due to unspecified organism, unspecified whether acute organ dysfunction present ([facility]      Blood pressure (!) 146/84, pulse 61, temperature (!) 100.5 °F (38.1 °C), temperature source Oral, resp. rate (!) 40, height 6' 1\" (1.854 m), weight 292 lb 1.8 oz (132.5 kg), SpO2 95 %. DISPOSITION  Patient was admitted to the hospital in stable condition.           [place], [place]  06/12/22 [address] [place], [place]  06/12/22 0056

## 2022-06-12 NOTE — CONSULTS
Consult placed    Jones Howard  Date:6/12/2022,  Time:4:40 PM        Electronically signed by Gladis Chris on 6/12/2022 at 4:40 PM

## 2022-06-12 NOTE — ED NOTES
Denies chest pain currently. HR irregular with pvcs. Expiratory audible wheezes. Right leg with open wounds on upper lateral right calf and right anklewith purulent drainage and foul odor. Pt states he is receiving wound care weekly on Tuesdays and leg is \"looking better. \"     Dimitrios West RN  06/12/22 [id number]

## 2022-06-12 NOTE — CONSULTS
Pharmacy Note  Vancomycin Consult    Louie Lea is a 66 y.o. male started on Vancomycin for skin and soft tissue infection; consult received from Dr. Roland Campos to manage therapy. Also receiving the following antibiotics: Cefepime. Allergies:  Hydrocodone-acetaminophen     Tmax: 100.5    Recent Labs     06/12/22  1053   CREATININE <0.5*       Recent Labs     06/12/22  1053   WBC 20.1*       Estimated Creatinine Clearance: 188 mL/min (based on SCr of 0.5 mg/dL). No intake or output data in the 24 hours ending 06/12/22 1747    Wt Readings from Last 1 Encounters:   06/12/22 292 lb 1.8 oz (132.5 kg)         Body mass index is 38.54 kg/m². Loading dose (critically ill or in ICU, require dialysis or renal replacement therapy): Vancomycin 25 mg/kg IVPB x 1 (maximum 2500 mg). Maintenance dose: 15 mg/kg (maximum: 2000 mg/dose and 4500 mg/day) starting at the next dosing interval determined by renal function  Pulse dose: fluctuating renal function, NOEL, ESRD   Goal Vancomycin trough: 10-15 mcg/mL or 15-20 mcg/mL   Goal Vancomycin AUC: 400-600     Assessment/Plan:  Received Vancomycin one timedose of 1000 mg x1, followed by 1250 mg IV every 12 hours. Calculated . Vancomycin trough ordered for 6/13 0600. Timing of trough level will be determined based on culture results, renal function, and clinical response. Thank you for the consult.   Andrea HenaoD  6/12/2022 at 5:54 PM    6/13  Day # 2  SCr: 0.6  NGTD  Vanc level = 7.9 ~8:24am  Continue 1,250mg q12h as ordered  Predicted AUC and trough  AUC24,ss 437 mg/L.hr  Ctrough,ss 14.7 mg/L  Katherine Vinson/Jacinto. 6/13/22 12:12 PM EDT

## 2022-06-12 NOTE — PROGRESS NOTES
Patient arrived to room 0354 from ED. Patient A&Ox4. Patient tachypneic, O2 90-96% on 2 L NC. Patient denies chest pain. Patient oriented to room and instructed to call out for needs. Fall precautions in place. Will monitor.

## 2022-06-12 NOTE — ED PROVIDER NOTES
I independently performed a history and physical on Lazaro Carranza. All diagnostic, treatment, and disposition decisions were made by myself in conjunction with the advanced practice provider. I personally saw the patient and performed a substantive portion of the visit including all aspects of the medical decision making. For further details of Lazaro Carranza emergency department encounter, please see ZAYRA Wright's documentation. Patient is a 75-year-old male who developed some chest discomfort associated with a pressure this morning that overall has improved but also having worsening bilateral leg discomfort with known chronic leg wounds with the right leg being worse than the left. He denies any recent falls or trauma. He denies any abdominal pain. He denies any recent cardiac evaluation. Due to worsening chest discomfort, he came by EMS to the ED for further evaluation. Physical:   Gen: No acute distress. AOx3. Psych: Anxious mood and affect  HEENT: NCAT, MMM  Neck: supple  Cardiac: Regular rate, irregularly irregular rhythm, pulses 2+ in all 4 extremities including bilateral DPs and radial pulses, 3+ pitting edema to bilateral lower extremities with erythema noted to bilateral lower legs  Lungs: C2AB, no R/R/W  Abdomen: soft and nontender with no R/D/G  Neuro: no focal neuro deficits with strength and sensation 5/5 in all 4 extremities including ankle dorsiflexion/plantarflexion and  strength bilaterally  MSK: Normal range of motion of bilateral hips, knees, ankles with mild tenderness to palpation to lower legs from the knee down bilateral    The Ekg interpreted by me shows  atrial fibrillation with a rate of 85 with occasional PVCs noted with different morphologies back to back   Axis is   Normal  QTc is  within an acceptable range  Intervals and Durations are unremarkable.       ST Segments: no acute change and nonspecific changes  No significant change from prior EKG dated - 9/21/21  No STEMI       MDM: Patient was evaluated a concern for chest pain along with bilateral leg discomfort. Leg exam was concerning for cellulitis and therefore he was started on antibiotics. His troponin was mildly elevated although his BNP was also elevated which could be the cause of the troponin. This will need to be trended. No sign of STEMI on EKG. He will need further evaluation in the hospital with wound care for the legs along with IV antibiotics but also cardiac evaluation for his chest pain. He was stable for the floor with telemetry and agreed with this plan. He does take Eliquis and at this time story is not suggestive of pulmonary embolism. If chest discomfort persists although he denies any significant pain currently, he may need a CT of the chest while hospitalized.       Stephani Slade MD  06/12/22 [id number]

## 2022-06-12 NOTE — H&P
Technically difficult examination. Normal LV systolic function with an estimated EF of 55%. No regional wall motion abnormalities are seen. Type II diastolic dysfunction with elevated filling pressure. Lipomatous hypertrophy of the interatrial septum. Mild bi-atrial enlargement. Mild mitral annular calcification. Maximal transaortic velocity is 2.62m/s which gives peak pressure gradient=   27mmHg and mean pressure gradient= 15mmHg c/w mild AS. Trace mitral and tricuspid regurgitation. Definity contrast administered with no definite evidence of LV mass or   thrombus noted. Systolic pulmonary artery pressure (SPAP) estimated at 40mmHg (RAP 3mmHg),   consistent with mild pulm HTN. REVIEW OF SYSTEMS:   Constitutional: Positive for fever,chills, and generalized weakness  ENT: Negative for headache, rhinorrhea, and sore throat.   Respiratory: Positive acutely decompensated dyspnea with cough and wheezing  Cardiovascular: Positive acute substernal chest pain with palpitations; acute on chronic BLE edema; positive orthopnea or PND with known MONIQUE  Gastrointestinal: Negative for N/V/D and abdominal pain; no hematemesis, hematochezia, or melena; no anorexia  Genitourinary: Negative for dysuria, frequency, retention; no incontinence  Hematologic/Lymphatic: Negative for bleeding tendency/excessive bruising  Musculoskeletal:  for myalgias and arthalgias; able to ambulate without difficulty  Neurologic: Negative for LOC, seizure activity, paresthesias, dysarthria, vertigo, and gait disturbance  Skin: BLE venous stasis dermatitis/ulcerations receiving wound care at home  Psychiatric: Negative for depression,anxiety, and agitation; no hallucinations; denies SI/HI  Endocrine: Type II DM, insulin-dependent, with BMI 43.9    Past Medical History:   has a past medical history of Allergic rhinitis, Arthritis, Bladder fistula, C. difficile diarrhea, Cellulitis of right lower extremity, CHF (congestive heart failure) TAKE 1 TABLET BY MOUTH NIGHTLY AS NEEDED (PAIN) 30 tablet 0    azelastine (ASTELIN) 0.1 % nasal spray 1 spray by Nasal route 2 times daily Use in each nostril as directed 30 mL 5    Semaglutide,0.25 or 0.5MG/DOS, (OZEMPIC, 0.25 OR 0.5 MG/DOSE,) 2 MG/1.5ML SOPN Inject 0.5 mg into the skin once a week 4 pen 5    atorvastatin (LIPITOR) 40 MG tablet TAKE 1 TABLET BY MOUTH EVERY DAY AT NIGHT 90 tablet 3    isosorbide mononitrate (IMDUR) 30 MG extended release tablet TAKE 1 TABLET BY MOUTH EVERY DAY 90 tablet 3    montelukast (SINGULAIR) 10 MG tablet TAKE 1 TABLET BY MOUTH EVERY DAY 90 tablet 3    glimepiride (AMARYL) 4 MG tablet TAKE 1 TABLET BY MOUTH TWICE A  tablet 3    atenolol (TENORMIN) 50 MG tablet Take 0.5 tablets by mouth daily 90 tablet 3    omeprazole (PRILOSEC) 40 MG delayed release capsule TAKE 1 CAPSULE BY MOUTH EVERY DAY 90 capsule 3    hydrALAZINE (APRESOLINE) 50 MG tablet TAKE 1/2 TABLET BY MOUTH EVERY 8 HOURS 135 tablet 7    Handicap Placard MISC by Does not apply route Please issue for duration of 5 years 1 each 0    Handicap Placard MISC by Does not apply route Please issue for duration of 5 years 1 each 0    Insulin Pen Needle 31G X 5 MM MISC 1 each by Does not apply route daily 400 each 5    Glucosamine-Chondroitin (GLUCOSAMINE CHONDR COMPLEX PO) Take 1 tablet by mouth 2 times daily      blood glucose monitor kit and supplies by Other route daily One Touch Ultra 1 kit 0    glucose blood VI test strips (ASCENSIA AUTODISC VI;ONE TOUCH ULTRA TEST VI) strip DX: E11.9 FSBS daily. One Touch ultra 100 strip 5    aspirin 81 MG chewable tablet Take 1 tablet by mouth daily 30 tablet 0    Multiple Vitamins-Minerals (THERAPEUTIC MULTIVITAMIN-MINERALS) tablet Take 1 tablet by mouth daily      ferrous sulfate 325 (65 FE) MG tablet Take 325 mg by mouth daily          Allergies:   Allergies   Allergen Reactions    Hydrocodone-Acetaminophen Other (See Comments)     jittery        Social History:   reports that he has never smoked. He has never used smokeless tobacco. He reports current alcohol use. He reports that he does not use drugs. Family History:  family history includes Heart Attack in his father; Heart Disease in his brother and father; High Blood Pressure in his brother; Kidney Disease in his mother; Stroke in his brother. Physical Exam:  /71   Pulse 71   Temp 99.1 °F (37.3 °C) (Oral)   Resp (!) 37   SpO2 98%     General appearance: Disheveled male with dirty BLE bandages from his \"campsite\"  Eyes: Sclera clear without conjunctival injection; PERRLA; EOMI  ENT: Mucous membranes moist without thrush; normal dentition  Neck: Supple without meningismus; no goiter; no carotid bruit bilaterally  Cardiovascular: Irregularly irregular rhythm; normal S1-S2 with no murmurs; 2+ pitting BLE peripheral edema; mild JVD  Respiratory:  Moderate tachypnea; CTAB with diminished air exchange and end expiratory wheeze  Gastrointestinal: Abdomen obese and distended; bowel sounds normal; no masses/organomegaly appreciated  Musculoskeletal: FROM spine and extremities x4  Neurology: A&O x3; cranial nerves 2-12 grossly intact; motor 5/5  BUE/BLE; no seizure activity  Psychiatry: Patient resistant to lifestyle modification and agitated when these issues are discussed; refuses to consider STR at time of discharge  Skin: Chronic venous stasis changes with brawny edema BLE; stage III ulcerations anterior tibial region R>L weeping mucopurulent foul-smelling drainage  PV: 1/4 radial and dorsalis pedis bilaterally; brisk capillary refill    Labs:  CBC:   Lab Results   Component Value Date    WBC 20.1 06/12/2022    RBC 4.88 06/12/2022    HGB 13.9 06/12/2022    HCT 42.6 06/12/2022    MCV 87.3 06/12/2022    MCH 28.5 06/12/2022    MCHC 32.7 06/12/2022    RDW 16.3 06/12/2022     06/12/2022    MPV 8.1 06/12/2022     BMP:    Lab Results   Component Value Date     06/12/2022    K 4.1 06/12/2022 K 3.7 12/17/2021    CL 99 06/12/2022    CO2 29 06/12/2022    BUN 18 06/12/2022    CREATININE <0.5 06/12/2022    CALCIUM 8.6 06/12/2022    GFRAA >60 06/12/2022    GFRAA >60 01/02/2012    LABGLOM >60 06/12/2022    GLUCOSE 123 06/12/2022     XR TIBIA FIBULA RIGHT (2 VIEWS)   Final Result   Soft tissue swelling without acute osseous abnormality         XR CHEST PORTABLE   Final Result   1. No acute abnormality. EKG: Ventricular Rate 85 P BPM QTc Calculation (Bazett) 445 P ms   Atrial Rate 227 P BPM R Axis -27 P degrees   QRS Duration 102 P ms T Axis 38 P degrees   Q-T Interval 374 P ms Diagnosis Atrial fibrillation with premature ventricular or aberrantly conducted complexesLow voltage QRSAb. .. P       I visualized CXR images and EKG strips personally and agree with documented interpretation    Discussed case  with ED provider    Problem List:  Principal Problem:    Acute chest pain  Active Problems:    Non-pressure chronic ulcer of right calf with fat layer exposed (HCC)    SIRS (systemic inflammatory response syndrome) (HCC)    Heart failure, diastolic, with acute decompensation (HCC)    Morbid obesity with BMI of 40.0-44.9, adult (HCC)    Venous stasis dermatitis of both lower extremities    Chronic pulmonary edema    Severe sleep apnea    Paroxysmal atrial fibrillation (HCC)  Resolved Problems:    * No resolved hospital problems.  *        Consults:  PHARMACY TO DOSE MEDICATION  IP CONSULT TO HEART FAILURE NURSE/COORDINATOR  IP CONSULT TO DIETITIAN  IP CONSULT TO CARDIOLOGY      Assessment/Plan:     Acute chest pain with elevated HEART score  -Admit to telemetry floor with serial cardiac enzymes to be obtained overnight  -ASA administered in ED and to be continued at 81 mg PO QAM  -High dose statin therapy initiated overnight; obtain FLP in a.m.  -Nitropaste applied to chest wall (BP permitting)  -Chest CT with PE protocol ordered by admitting physician with results pending at time of dictation  -ECHO scheduled for a.m.  - Nuclear stress test scheduling deferred to cardiologist due to concomitant SIRS and CHF    Acute decompensated CHF  -Admit to floor for continuous telemetry monitoring and strict I's & O's  -IV Lasix 40 mg bolus x1 followed by initiation of continuous infusion at 3 mg/H x18 hours overnight  -Continue home doses of Tenormin, Bumex, Aldactone, and Imdur  -ECHO scheduled to further assess cardiac structure and function  -2G Na and fluid restriction dietary modifications in place  -Consult placed to Cardiology for further recommendations    SIRS with BLE dermatitis/cellulitis  -Patient admitted to telemetry floor with appropriate isolation measures in place  - Sepsis fluid bundle NOT ADMINISTERED due to concomitant acute CHF  -Blood, urine cultures ordered STAT  -Patient initiated on IV vancomycin and cefepime empirically pending pathogen identification; pharmacy consulted for dosage assistance  -Serial CBC, CMP, lactate, procalcitonin to monitor treatment progression    PAF  -Patient currently rate controlled on beta-blocker  -Continue Eliquis anticoagulation    IDDM with BMI 43.91  -A1c ordered with results pending at time of dictation  -Home oral hypoglycemic medications placed on temporary hold  -Continue current Lantus regimen of 46 units twice daily  -PRN Humalog medium dose SSI scheduled before meals and at bedtime based on POC glucose  -Carbohydrate restriction placed on diet    DVT prophylaxis-continue Eliquis twice daily  Code status-full code  Diet-cardiac 2 g sodium with carb and fluid restriction  IV access-PIV established in ED      Admit as inpatient. I anticipate hospitalization spanning more than two midnights for investigation and treatment of the above medically necessary diagnoses.       Comment: Please note this report has been produced using speech recognition software and may contain errors related to that system including errors in grammar, punctuation, and spelling, as well as words and phrases that may be inappropriate. If there are any questions or concerns please feel free to contact the dictating provider for clarification.          Noé Acosta MD    6/12/2022 2:49 PM

## 2022-06-13 LAB
A/G RATIO: 1.7 (ref 1.1–2.2)
ALBUMIN SERPL-MCNC: 3.6 G/DL (ref 3.4–5)
ALBUMIN SERPL-MCNC: 3.7 G/DL (ref 3.4–5)
ALBUMIN SERPL-MCNC: 3.7 G/DL (ref 3.4–5)
ALBUMIN SERPL-MCNC: 4 G/DL (ref 3.4–5)
ALP BLD-CCNC: 81 U/L (ref 40–129)
ALT SERPL-CCNC: 27 U/L (ref 10–40)
ANION GAP SERPL CALCULATED.3IONS-SCNC: 11 MMOL/L (ref 3–16)
ANION GAP SERPL CALCULATED.3IONS-SCNC: 12 MMOL/L (ref 3–16)
ANION GAP SERPL CALCULATED.3IONS-SCNC: 9 MMOL/L (ref 3–16)
ANION GAP SERPL CALCULATED.3IONS-SCNC: 9 MMOL/L (ref 3–16)
AST SERPL-CCNC: 22 U/L (ref 15–37)
BASOPHILS ABSOLUTE: 0 K/UL (ref 0–0.2)
BASOPHILS RELATIVE PERCENT: 0.2 %
BILIRUB SERPL-MCNC: 1.5 MG/DL (ref 0–1)
BUN BLDV-MCNC: 15 MG/DL (ref 7–20)
BUN BLDV-MCNC: 16 MG/DL (ref 7–20)
CALCIUM SERPL-MCNC: 8.2 MG/DL (ref 8.3–10.6)
CALCIUM SERPL-MCNC: 8.4 MG/DL (ref 8.3–10.6)
CALCIUM SERPL-MCNC: 8.6 MG/DL (ref 8.3–10.6)
CALCIUM SERPL-MCNC: 9.1 MG/DL (ref 8.3–10.6)
CHLORIDE BLD-SCNC: 101 MMOL/L (ref 99–110)
CHLORIDE BLD-SCNC: 97 MMOL/L (ref 99–110)
CHLORIDE BLD-SCNC: 98 MMOL/L (ref 99–110)
CHLORIDE BLD-SCNC: 99 MMOL/L (ref 99–110)
CHOLESTEROL, TOTAL: 89 MG/DL (ref 0–199)
CO2: 30 MMOL/L (ref 21–32)
CO2: 31 MMOL/L (ref 21–32)
CO2: 32 MMOL/L (ref 21–32)
CO2: 33 MMOL/L (ref 21–32)
CREAT SERPL-MCNC: 0.6 MG/DL (ref 0.8–1.3)
CREAT SERPL-MCNC: 0.6 MG/DL (ref 0.8–1.3)
CREAT SERPL-MCNC: 0.7 MG/DL (ref 0.8–1.3)
CREAT SERPL-MCNC: <0.5 MG/DL (ref 0.8–1.3)
EOSINOPHILS ABSOLUTE: 0.1 K/UL (ref 0–0.6)
EOSINOPHILS RELATIVE PERCENT: 0.9 %
ESTIMATED AVERAGE GLUCOSE: 165.7 MG/DL
GFR AFRICAN AMERICAN: >60
GFR NON-AFRICAN AMERICAN: >60
GLUCOSE BLD-MCNC: 127 MG/DL (ref 70–99)
GLUCOSE BLD-MCNC: 135 MG/DL (ref 70–99)
GLUCOSE BLD-MCNC: 144 MG/DL (ref 70–99)
GLUCOSE BLD-MCNC: 153 MG/DL (ref 70–99)
GLUCOSE BLD-MCNC: 189 MG/DL (ref 70–99)
GLUCOSE BLD-MCNC: 62 MG/DL (ref 70–99)
GLUCOSE BLD-MCNC: 70 MG/DL (ref 70–99)
GLUCOSE BLD-MCNC: 73 MG/DL (ref 70–99)
GLUCOSE BLD-MCNC: 73 MG/DL (ref 70–99)
HBA1C MFR BLD: 7.4 %
HCT VFR BLD CALC: 42.5 % (ref 40.5–52.5)
HDLC SERPL-MCNC: 40 MG/DL (ref 40–60)
HEMOGLOBIN: 13.8 G/DL (ref 13.5–17.5)
LDL CHOLESTEROL CALCULATED: 38 MG/DL
LYMPHOCYTES ABSOLUTE: 0.9 K/UL (ref 1–5.1)
LYMPHOCYTES RELATIVE PERCENT: 7.9 %
MAGNESIUM: 1.7 MG/DL (ref 1.8–2.4)
MCH RBC QN AUTO: 28.7 PG (ref 26–34)
MCHC RBC AUTO-ENTMCNC: 32.4 G/DL (ref 31–36)
MCV RBC AUTO: 88.5 FL (ref 80–100)
MONOCYTES ABSOLUTE: 0.7 K/UL (ref 0–1.3)
MONOCYTES RELATIVE PERCENT: 5.8 %
NEUTROPHILS ABSOLUTE: 9.6 K/UL (ref 1.7–7.7)
NEUTROPHILS RELATIVE PERCENT: 85.2 %
PDW BLD-RTO: 16.2 % (ref 12.4–15.4)
PERFORMED ON: ABNORMAL
PERFORMED ON: NORMAL
PHOSPHORUS: 2.6 MG/DL (ref 2.5–4.9)
PHOSPHORUS: 2.7 MG/DL (ref 2.5–4.9)
PHOSPHORUS: 2.9 MG/DL (ref 2.5–4.9)
PLATELET # BLD: 188 K/UL (ref 135–450)
PMV BLD AUTO: 8.2 FL (ref 5–10.5)
POTASSIUM REFLEX MAGNESIUM: 3.3 MMOL/L (ref 3.5–5.1)
POTASSIUM SERPL-SCNC: 3.3 MMOL/L (ref 3.5–5.1)
POTASSIUM SERPL-SCNC: 3.4 MMOL/L (ref 3.5–5.1)
POTASSIUM SERPL-SCNC: 3.4 MMOL/L (ref 3.5–5.1)
POTASSIUM SERPL-SCNC: 4.2 MMOL/L (ref 3.5–5.1)
RBC # BLD: 4.81 M/UL (ref 4.2–5.9)
SODIUM BLD-SCNC: 139 MMOL/L (ref 136–145)
SODIUM BLD-SCNC: 140 MMOL/L (ref 136–145)
SODIUM BLD-SCNC: 141 MMOL/L (ref 136–145)
SODIUM BLD-SCNC: 142 MMOL/L (ref 136–145)
TOTAL PROTEIN: 6.3 G/DL (ref 6.4–8.2)
TRIGL SERPL-MCNC: 53 MG/DL (ref 0–150)
VANCOMYCIN TROUGH: 7.9 UG/ML (ref 10–20)
VLDLC SERPL CALC-MCNC: 11 MG/DL
WBC # BLD: 11.2 K/UL (ref 4–11)

## 2022-06-13 PROCEDURE — 6360000002 HC RX W HCPCS: Performed by: HOSPITALIST

## 2022-06-13 PROCEDURE — 36415 COLL VENOUS BLD VENIPUNCTURE: CPT

## 2022-06-13 PROCEDURE — 99222 1ST HOSP IP/OBS MODERATE 55: CPT | Performed by: INTERNAL MEDICINE

## 2022-06-13 PROCEDURE — 80202 ASSAY OF VANCOMYCIN: CPT

## 2022-06-13 PROCEDURE — 1200000000 HC SEMI PRIVATE

## 2022-06-13 PROCEDURE — 2580000003 HC RX 258: Performed by: INTERNAL MEDICINE

## 2022-06-13 PROCEDURE — 83735 ASSAY OF MAGNESIUM: CPT

## 2022-06-13 PROCEDURE — 80061 LIPID PANEL: CPT

## 2022-06-13 PROCEDURE — 6370000000 HC RX 637 (ALT 250 FOR IP): Performed by: HOSPITALIST

## 2022-06-13 PROCEDURE — 80053 COMPREHEN METABOLIC PANEL: CPT

## 2022-06-13 PROCEDURE — 2580000003 HC RX 258: Performed by: HOSPITALIST

## 2022-06-13 PROCEDURE — 85025 COMPLETE CBC W/AUTO DIFF WBC: CPT

## 2022-06-13 PROCEDURE — 94761 N-INVAS EAR/PLS OXIMETRY MLT: CPT

## 2022-06-13 PROCEDURE — 6370000000 HC RX 637 (ALT 250 FOR IP): Performed by: NURSE PRACTITIONER

## 2022-06-13 PROCEDURE — 2700000000 HC OXYGEN THERAPY PER DAY

## 2022-06-13 PROCEDURE — 6360000002 HC RX W HCPCS: Performed by: INTERNAL MEDICINE

## 2022-06-13 RX ORDER — LORAZEPAM 0.5 MG/1
0.5 TABLET ORAL ONCE
Status: COMPLETED | OUTPATIENT
Start: 2022-06-13 | End: 2022-06-13

## 2022-06-13 RX ORDER — ACETAMINOPHEN 650 MG
TABLET, EXTENDED RELEASE ORAL DAILY
Status: DISCONTINUED | OUTPATIENT
Start: 2022-06-14 | End: 2022-06-14

## 2022-06-13 RX ADMIN — CEFEPIME HYDROCHLORIDE 2000 MG: 2 INJECTION, POWDER, FOR SOLUTION INTRAVENOUS at 01:19

## 2022-06-13 RX ADMIN — CEFEPIME HYDROCHLORIDE 2000 MG: 2 INJECTION, POWDER, FOR SOLUTION INTRAVENOUS at 13:12

## 2022-06-13 RX ADMIN — VANCOMYCIN HYDROCHLORIDE 1250 MG: 10 INJECTION, POWDER, LYOPHILIZED, FOR SOLUTION INTRAVENOUS at 23:20

## 2022-06-13 RX ADMIN — SODIUM CHLORIDE, PRESERVATIVE FREE 10 ML: 5 INJECTION INTRAVENOUS at 09:44

## 2022-06-13 RX ADMIN — LORAZEPAM 0.5 MG: 0.5 TABLET ORAL at 21:26

## 2022-06-13 RX ADMIN — ATORVASTATIN CALCIUM 40 MG: 40 TABLET, FILM COATED ORAL at 21:26

## 2022-06-13 RX ADMIN — HYDRALAZINE HYDROCHLORIDE 25 MG: 25 TABLET, FILM COATED ORAL at 21:26

## 2022-06-13 RX ADMIN — INSULIN GLARGINE 46 UNITS: 100 INJECTION, SOLUTION SUBCUTANEOUS at 21:27

## 2022-06-13 RX ADMIN — AZELASTINE HYDROCHLORIDE 1 SPRAY: 137 SPRAY, METERED NASAL at 09:44

## 2022-06-13 RX ADMIN — FERROUS SULFATE TAB 325 MG (65 MG ELEMENTAL FE) 325 MG: 325 (65 FE) TAB at 09:42

## 2022-06-13 RX ADMIN — SODIUM CHLORIDE: 9 INJECTION, SOLUTION INTRAVENOUS at 23:20

## 2022-06-13 RX ADMIN — Medication 1 TABLET: at 09:42

## 2022-06-13 RX ADMIN — HYDRALAZINE HYDROCHLORIDE 25 MG: 25 TABLET, FILM COATED ORAL at 06:41

## 2022-06-13 RX ADMIN — FUROSEMIDE 3 MG/HR: 10 INJECTION, SOLUTION INTRAMUSCULAR; INTRAVENOUS at 11:27

## 2022-06-13 RX ADMIN — ISOSORBIDE MONONITRATE 30 MG: 30 TABLET, EXTENDED RELEASE ORAL at 09:42

## 2022-06-13 RX ADMIN — CEFEPIME HYDROCHLORIDE 2000 MG: 2 INJECTION, POWDER, FOR SOLUTION INTRAVENOUS at 23:21

## 2022-06-13 RX ADMIN — SODIUM CHLORIDE: 9 INJECTION, SOLUTION INTRAVENOUS at 01:17

## 2022-06-13 RX ADMIN — INSULIN GLARGINE 45 UNITS: 100 INJECTION, SOLUTION SUBCUTANEOUS at 09:45

## 2022-06-13 RX ADMIN — VANCOMYCIN HYDROCHLORIDE 1250 MG: 10 INJECTION, POWDER, LYOPHILIZED, FOR SOLUTION INTRAVENOUS at 11:32

## 2022-06-13 RX ADMIN — SODIUM CHLORIDE, PRESERVATIVE FREE 10 ML: 5 INJECTION INTRAVENOUS at 21:27

## 2022-06-13 RX ADMIN — ATENOLOL 25 MG: 25 TABLET ORAL at 09:43

## 2022-06-13 RX ADMIN — AZELASTINE HYDROCHLORIDE 1 SPRAY: 137 SPRAY, METERED NASAL at 21:39

## 2022-06-13 RX ADMIN — IBUPROFEN 600 MG: 600 TABLET ORAL at 23:01

## 2022-06-13 RX ADMIN — APIXABAN 5 MG: 5 TABLET, FILM COATED ORAL at 09:43

## 2022-06-13 RX ADMIN — SPIRONOLACTONE 25 MG: 25 TABLET ORAL at 09:42

## 2022-06-13 RX ADMIN — SERTRALINE 50 MG: 50 TABLET, FILM COATED ORAL at 09:43

## 2022-06-13 RX ADMIN — TORSEMIDE 40 MG: 20 TABLET ORAL at 11:27

## 2022-06-13 RX ADMIN — ASPIRIN 81 MG: 81 TABLET, COATED ORAL at 09:43

## 2022-06-13 RX ADMIN — FUROSEMIDE 3 MG/HR: 10 INJECTION, SOLUTION INTRAMUSCULAR; INTRAVENOUS at 22:58

## 2022-06-13 RX ADMIN — Medication 5 MG: at 21:26

## 2022-06-13 RX ADMIN — PANTOPRAZOLE SODIUM 40 MG: 40 TABLET, DELAYED RELEASE ORAL at 06:41

## 2022-06-13 RX ADMIN — MONTELUKAST SODIUM 10 MG: 10 TABLET ORAL at 09:41

## 2022-06-13 RX ADMIN — APIXABAN 5 MG: 5 TABLET, FILM COATED ORAL at 21:26

## 2022-06-13 RX ADMIN — HYDRALAZINE HYDROCHLORIDE 25 MG: 25 TABLET, FILM COATED ORAL at 16:36

## 2022-06-13 NOTE — PROGRESS NOTES
Patient A&Ox4. VSS, O2 95% on 3L NC. Patient currently denies chest pain. Lasix gtt infusing without complications Patient tolerating diet well. Patient and spouse instructed to call out for needs. Fall precautions in place, will continue to monitor.

## 2022-06-13 NOTE — CARE COORDINATION
CASE MANAGEMENT INITIAL ASSESSMENT      Reviewed chart and completed assessment with patient:yes  Family present: no  Explained Case Management role/services. Primary contact [facility] [place], Spouse [phone]    Health Care Decision Maker :   Primary Decision Maker: Krystal Rodriguez - Spouse - [phone]          Can this person be reached and be able to respond quickly, such as within a few minutes or hours? Yes      Admit date/status:06/12/22  Diagnosis:Chest Pain   Is this a Readmission?:  No      Insurance:BCBS medicare   Precert required for SNF: No       3 night stay required: No    Living arrangements, Adls, care needs, prior to admission:two story house with spouse. Resides on main floor. Durable Medical Equipment at home:  Walker__Cane__RTS__ BSC__Shower Chair__  02__ HHN__ CPAP_X- hasnt been using  BiPap__  Hospital Bed__ W/C___ Other_____    Services in the home and/or outpatient, prior to admission:none    Current [address] [place] [address] Prescription coverage? Yes Will pt require financial assistance with medications No     Transportation needs: drives     Dialysis Facility (if applicable)   · Name:  · Address:  · Dialysis Schedule:  · Phone:  · Fax:    PT/OT recs:n/a at this time    Katie Okfiordaliza 45 Notification (HEN):n/a at this time    Barriers to discharge:none identified    Plan/comments:open to [facility] or SNF if needed, however prefers [facility]. Wife to p/u at d/c. Will follow.

## 2022-06-13 NOTE — CONSULTS
[address] [facility]   Cardiovascular Evaluation    PATIENT: Maricruz Barker  DATE: 2022  MRN: [id number]  CSN: [id number]  : 1950    Primary Care Doctor/Referring provider: Marlen Shah CNP, Cecelia Bustamante MD     Reason for evaluation/Chief complaint:   Chest Pain (started this morning sts it was more uncomfortable, took 324 asa)      Subjective:    History of present illness on initial date of evaluation:   Maricruz Barker is a 66 y.o. patient who presents to the hospital for the evaluation of shortness of breath. Patient states that his main complaint was difficulty breathing for the past couple of days. This was associated with a mild degree of anterior chest tightness, however, the most concerning finding for him was difficulty with his breath. Patient is well-known to our cardiac practice and has a history of heart failure and atrial fibrillation. Patient follows extensively with our heart failure team and EP team in the outpatient setting. Patient was diagnosed with possible acute chest pain and admitted to the hospital.  However, his most relevant clinical finding is progression in his fluid status. Patient was started on IV Lasix therapy. Cardiology is asked to the patient for further recommendations and management. Patient continues to have moderate shortness of breath at rest this morning. He denies any chest pain. He states that his chest pain resolved completely prior to arrival in the emergency room and has not returned. Patient has extensive venous stasis and lower extremity edema. His legs are currently wrapped. He states that he has wound on his right leg that has not been healing well.       Patient Active Problem List   Diagnosis    Type 2 diabetes mellitus with hyperglycemia ([address]    Osteoarthritis    Morbid obesity with BMI of 40.0-44.9, adult ([address]    Edema    Anemia    Bradycardia    Venous stasis ulcer of right lower extremity ([address]    Venous thrombosis of leg    Venous stasis dermatitis of both lower extremities    Hyperbilirubinemia    Moderate episode of recurrent major depressive disorder (HCC)    Renal insufficiency    Anxiety    Essential hypertension    Non-seasonal allergic rhinitis    Mixed hyperlipidemia    Chronic congestive heart failure (HCC)    Chronic pulmonary edema    Coronary artery disease involving native coronary artery of native heart without angina pectoris    Nonrheumatic aortic valve stenosis    Severe sleep apnea    Primary insomnia    Venous stasis ulcer of left calf limited to breakdown of skin with varicose veins (HCC)    Stage 3 chronic kidney disease (HCC)    Renal osteodystrophy    Peripheral edema    Primary osteoarthritis of right hip    Grade III diastolic dysfunction    Paroxysmal atrial fibrillation (HCC)    Simple chronic bronchitis (HCC)    Other specified peripheral vascular diseases (HCC)    Non-pressure chronic ulcer of right calf with fat layer exposed (HCC)    Non-pressure chronic ulcer of left calf with fat layer exposed (HCC)    Atrial flutter (HCC)    Closed wedge compression fracture of sixth thoracic vertebra (HCC)    Acute chest pain    SIRS (systemic inflammatory response syndrome) (HCC)    Heart failure, diastolic, with acute decompensation ([facility])         Cardiac Testing: I have reviewed the findings below.   EKG:  ECHO:   STRESS TEST:  CATH:  BYPASS:  VASCULAR:    Past Medical History:   has a past medical history of Allergic rhinitis, Arthritis, Bladder fistula, C. difficile diarrhea, Cellulitis of right lower extremity, CHF (congestive heart failure) ([address] Dental disease, Diabetes ([address] Diverticulitis, Dizziness, DVT of lower extremity, bilateral ([address] GERD (gastroesophageal reflux disease), Headache, Hearing loss, Hematuria, Hx of blood clots, Hyperlipidemia, Hypertension, Lung disease, MONIQUE (obstructive sleep apnea), Pancreatitis ([address] Pulmonary emboli ([facility] Rash, Sleep apnea, and Tinnitus. Surgical History:   has a past surgical history that includes Tibia fracture surgery (Right, 2003); Cholecystectomy, open (N/A, 9-17-13); Cystocopy (12/10/13); Cystoscopy (1-28-14); other surgical history (1-28-14); Colonoscopy (2008); Colonoscopy (12/4/2013); Colonoscopy (4/30/14); colostomy (Jan 2014); Colon surgery; Abdomen surgery (05/16/2014); hernia repair (08/14/2015); pr injection hip arthrogram,anesth (Right, 9/19/2018); epidural steroid injection (Right, 1/21/2019); epidural steroid injection (Right, 2/11/2019); Cardioversion (12/04/2019); vascular surgery (Left, 05/2021); and vascular surgery (Right, 05/10/2021). Social History:   reports that he has never smoked. He has never used smokeless tobacco. He reports current alcohol use. He reports that he does not use drugs. Family History:  No evidence for sudden cardiac death or premature CAD    Medications:  Reviewed and are listed in nursing record. and/or listed below  Outpatient Medications:  Prior to Admission medications    Medication Sig Start Date End Date Taking?  Authorizing Provider   Insulin Glargine, 2 Unit Dial, (TOUJEO MAX SOLOSTAR) 300 UNIT/ML SOPN Inject 46 Units into the skin 2 times daily 5/24/22   SHAKIRA Saldana CNP   torsemide (DEMADEX) 20 MG tablet TAKE 2 TABLETS BY MOUTH EVERY DAY 5/4/22   SHAKIRA Winters CNP   spironolactone (ALDACTONE) 25 MG tablet TAKE 1 TABLET BY MOUTH EVERY DAY 3/22/22   SHAKIRA Winters CNP   Zinc Sulfate (ZINC 15 PO) Take by mouth in the morning and at bedtime    Historical Provider, MD   ELDERBERRY PO Take by mouth 2 times daily    Historical Provider, MD   Ascorbic Acid (VITAMIN C PO) Take by mouth in the morning and at bedtime    Historical Provider, MD   Apoaequorin (PREVAGEN) 10 MG CAPS Take 1 capsule by mouth daily    Historical Provider, MD   sertraline (ZOLOFT) 50 MG tablet TAKE 1 TABLET BY MOUTH EVERY DAY 3/18/22   SHAKIRA Carter CNP   ELIQUIS 5 MG TABS tablet TAKE 1 TABLET BY MOUTH TWICE A DAY 3/7/22   Velinda Leyden, APRN - CNP   tiZANidine (ZANAFLEX) 4 MG tablet TAKE 1 TABLET BY MOUTH NIGHTLY AS NEEDED (PAIN) 3/1/22   Radha Pop PA-C   azelastine (ASTELIN) 0.1 % nasal spray 1 spray by Nasal route 2 times daily Use in each nostril as directed 12/17/21   SHAKIRA Carter CNP   Semaglutide,0.25 or 0.5MG/DOS, (OZEMPIC, 0.25 OR 0.5 MG/DOSE,) 2 MG/1.5ML SOPN Inject 0.5 mg into the skin once a week 12/17/21   SHAKIRA Carter CNP   atorvastatin (LIPITOR) 40 MG tablet TAKE 1 TABLET BY MOUTH EVERY DAY AT NIGHT 12/7/21   SHAKIRA Carter CNP   isosorbide mononitrate (IMDUR) 30 MG extended release tablet TAKE 1 TABLET BY MOUTH EVERY DAY 12/6/21   SHAKIRA Carter CNP   montelukast (SINGULAIR) 10 MG tablet TAKE 1 TABLET BY MOUTH EVERY DAY 12/6/21   SHAKIRA Carter CNP   glimepiride (AMARYL) 4 MG tablet TAKE 1 TABLET BY MOUTH TWICE A DAY 10/21/21   SHAKIRA Carter CNP   atenolol (TENORMIN) 50 MG tablet Take 0.5 tablets by mouth daily 7/26/21   SHAKIRA Cornelius CNP   omeprazole (PRILOSEC) 40 MG delayed release capsule TAKE 1 CAPSULE BY MOUTH EVERY DAY 7/7/21   SHAKIRA Carter CNP   hydrALAZINE (APRESOLINE) 50 MG tablet TAKE 1/2 TABLET BY MOUTH EVERY 8 HOURS 2/2/21   Velinda Leyden, APRN - CNP   Handicap Placard MISC by Does not apply route Please issue for duration of 5 years 9/24/20   SHAKIRA Carter CNP   Handicap Placard [id number] by Does not apply route Please issue for duration of 5 years 9/24/20   SHAKIRA Carter CNP   Insulin Pen Needle 31G X 5 MM MISC 1 each by Does not apply route daily 2/24/20   SHAKIRA Carter CNP   Glucosamine-Chondroitin (GLUCOSAMINE CHONDR COMPLEX PO) Take 1 tablet by mouth 2 times daily    Historical Provider, MD   blood glucose monitor kit and supplies by Other route daily One Touch Ultra 4/15/19   SHAKIRA Robledo CNP   glucose blood VI test strips (ASCENSIA AUTODISC VI;ONE TOUCH ULTRA TEST VI) strip DX: E11.9 FSBS daily. One Touch ultra 11/29/17   SHAKIRA Robledo CNP   aspirin 81 MG chewable tablet Take 1 tablet by mouth daily 10/21/17   Naveed Cool MD   Multiple Vitamins-Minerals (THERAPEUTIC MULTIVITAMIN-MINERALS) tablet Take 1 tablet by mouth daily    Historical Provider, MD   ferrous sulfate 325 (65 FE) MG tablet Take 325 mg by mouth daily     Historical Provider, MD       In-patient schedule medications:   aspirin  81 mg Oral Daily    atenolol  25 mg Oral Daily    atorvastatin  40 mg Oral Nightly    azelastine  1 spray Each Nostril BID    apixaban  5 mg Oral BID    ferrous sulfate  325 mg Oral Daily    insulin glargine  46 Units SubCUTAneous BID    montelukast  10 mg Oral Daily    sertraline  50 mg Oral Daily    spironolactone  25 mg Oral Daily    torsemide  40 mg Oral Daily    pantoprazole  40 mg Oral QAM AC    therapeutic multivitamin-minerals  1 tablet Oral Daily    isosorbide mononitrate  30 mg Oral Daily    hydrALAZINE  25 mg Oral 3 times per day    insulin lispro  0-12 Units SubCUTAneous TID WC    insulin lispro  0-6 Units SubCUTAneous Nightly    sodium chloride flush  10 mL IntraVENous 2 times per day    cefepime  2,000 mg IntraVENous Q12H    vancomycin  1,250 mg IntraVENous Q12H    melatonin  5 mg Oral Nightly         Infusion Medications:   dextrose      furosemide 3 mg/hr (06/13/22 0628)    sodium chloride Stopped (06/13/22 0547)         Allergies:  Hydrocodone-acetaminophen     Review of Systems:   14 point review of systems unable to be completed due to patient condition/cooperation. All available positives mentioned in history of present illness.          Physical Examination:    [unfilled]  /75   Pulse 66   Temp 98.4 °F (36.9 °C) (Axillary)   Resp (!) 32   Ht 6' 1\" (1.854 m)   Wt 291 lb 14.4 oz (132.4 kg)   SpO2 95%   BMI 38.51 kg/m²    Weight: 291 lb 14.4 oz (132.4 kg)     Wt Readings from Last 3 Encounters:   06/13/22 291 lb 14.4 oz (132.4 kg)   03/30/22 (!) 306 lb (138.8 kg)   03/18/22 (!) 304 lb (137.9 kg)       Intake/Output Summary (Last 24 hours) at 6/13/2022 0941  Last data filed at 6/13/2022 4260  Gross per 24 hour   Intake 605.65 ml   Output 760 ml   Net -154.35 ml     Physical Examination:    [unfilled]  /75   Pulse 66   Temp 98.4 °F (36.9 °C) (Axillary)   Resp (!) 32   Ht 6' 1\" (1.854 m)   Wt 291 lb 14.4 oz (132.4 kg)   SpO2 95%   BMI 38.51 kg/m²    Weight: 291 lb 14.4 oz (132.4 kg)     Wt Readings from Last 3 Encounters:   06/13/22 291 lb 14.4 oz (132.4 kg)   03/30/22 (!) 306 lb (138.8 kg)   03/18/22 (!) 304 lb (137.9 kg)       Intake/Output Summary (Last 24 hours) at 6/13/2022 0942  Last data filed at 6/13/2022 4754  Gross per 24 hour   Intake 605.65 ml   Output 760 ml   Net -154.35 ml       General Appearance:  Alert, cooperative, mild distress, appears stated age   Head:  Normocephalic, without obvious abnormality, atraumatic   Eyes:  PERRL, conjunctiva/corneas clear       Nose: Nares normal, no drainage or sinus tenderness   Throat: Lips, mucosa, and tongue normal   Neck: Supple, symmetrical, trachea midline, no adenopathy, thyroid: not enlarged, symmetric, no tenderness/mass/nodules, no carotid bruit. + JVD       Lungs:   Reduced to auscultation with rales bilaterally, respirations mildly labored   Chest Wall:  No tenderness or deformity   Heart:  irregular rhythm and normal rate; Abdomen:   Soft, non-tender, bowel sounds active all four quadrants,  no masses, no organomegaly           Extremities: Extremities atraumatic, no cyanosis. + +edema   Pulses: LE not palpable. Upper symmetric   Skin:  Chronic venous stasis and edematous changes of the lower extremities bilaterally.    Pysch: Normal mood and affect   Neurologic: Normal gross motor and sensory exam. Labs  Recent Labs     06/12/22  1053 06/13/22  0824   WBC 20.1* 11.2*   HGB 13.9 13.8   HCT 42.6 42.5   MCV 87.3 88.5    188     Recent Labs     06/12/22  1053 06/13/22  0824   CREATININE <0.5* <0.5*   BUN 18 15    142   K 4.1 3.4*   CL 99 101   CO2 29 32     No results for input(s): INR, PROTIME in the last 72 hours. Recent Labs     06/12/22  1053 06/12/22  1914   TROPONINI 0.02* 0.03*     Invalid input(s): PRO-BNP  No results for input(s): CHOL, LDL, HDL in the last 72 hours. Invalid input(s): TG      Imaging:  I have reviewed the below testing personally and my interpretation is below. EKG:  Atrial fibrillation with premature ventricular or aberrantly conducted complexesLow voltage QRSAbnormal ECGWhen compared with ECG of 04-DEC-2019 10:24,Atrial fibrillation has replaced Sinus rhythmConfirmed by Krysta Huizar MD, [facility] ([id number]) on 6/12/2022 3:06:21 PM    CXR:      Assessment:  66 y.o. patient with:  Acute on chronic diastolic CHF      Plan:  The patient's extensive history of heart failure is more relevant at this time. Currently there are no clinical concerns for acute coronary syndrome. I agree with ongoing IV Lasix infusion therapy. The patient does have an extensive history of obesity hypoventilation and cor pulmonale. Clinical findings of right heart failure are very evident in this patient and would benefit from Lasix therapy. Our advanced heart failure team will follow along on the hospital.  CHF education reinforced. ~salt restriction   ~fluid restriction   ~medication compliance   ~daily weights and notify of any significant weight gain/loss   ~establish with CHF nurse   ~outpatient follow-up with our CHF team          Medical Decision Making:   The following items were considered in medical decision making:  Independent review of images  Review / order clinical lab tests  Review / order radiology tests  Decision to obtain old records  Review and summation of old records as accessed through [facility] (a summary of my findings in these old records)            All questions and concerns were addressed to the patient/family. Alternatives to my treatment were discussed. The note was completed using EMR. Every effort was made to ensure accuracy; however, inadvertent computerized transcription errors may be present.     Melany Evans MD, [address], [place], [place]  [phone] [facility] office  [phone] [facility]  6/13/2022  9:41 AM

## 2022-06-13 NOTE — PLAN OF CARE
Problem: Pain  Goal: Verbalizes/displays adequate comfort level or baseline comfort level  6/13/2022 1219 by Tutu Larsen RN  Outcome: Progressing     Problem: Safety - Adult  Goal: Free from fall injury  6/13/2022 1219 by Tutu Larsen RN  Outcome: Progressing     Problem: ABCDS Injury Assessment  Goal: Absence of physical injury  6/13/2022 1219 by Tutu Larsen RN  Outcome: Progressing     Problem: Skin/Tissue Integrity  Goal: Absence of new skin breakdown  Description: 1. Monitor for areas of redness and/or skin breakdown  2. Assess vascular access sites hourly  3. Every 4-6 hours minimum:  Change oxygen saturation probe site  4. Every 4-6 hours:  If on nasal continuous positive airway pressure, respiratory therapy assess nares and determine need for appliance change or resting period.   6/13/2022 1219 by Tutu Larsen RN  Outcome: Progressing

## 2022-06-13 NOTE — CONSULTS
[address] [place] [address] [address] Continence Nurse  Consult Note       NAME:  Asha Abbasi  MEDICAL RECORD NUMBER:  [id number]  AGE: 66 y.o. GENDER: male  : 1950  TODAY'S DATE:  2022    Subjective  I follow Dr Vicente Wilson podiatrist.   Reason for WOCN Evaluation and Assessment: Venous statsis dermatitis, ulcer, cellulitis      Asha Abbasi is a 66 y.o. male referred by:   [] Physician  [] Nursing  [] Other:     Wound Identification:  Wound Type: Venous ulcers right and left lower legs. Contributing Factors: edema, diabetes, chronic pressure, decreased mobility, shear force and obesity    Wound History: Asha Abbasi is a 66 y.o. male who presents to the ED complaining of episode of left-sided chest pain. He has a history of diastolic CHF with chronic venous stasis dermatitis/ulceration. Patient reports that he is currently receiving wound care once weekly by Dr Vicente Wilson (podiaatrist). His wife wraps the legs at home but he is not sure what she is using. He thinks some silver dressing    Current Wound Care Treatment:  Legs wrapped.    Patient Goal of Care:  [x] Wound Healing   [] Odor Control  [] Palliative Care  [] Pain Control   [] Other:         PAST MEDICAL HISTORY        Diagnosis Date    Allergic rhinitis     Arthritis     Bladder fistula     resolved    C. difficile diarrhea 2015    +PCR    Cellulitis of right lower extremity 3/23/2016    [facility]) [facility]     Dental disease     Diabetes ([facility]     Diverticulitis     Dizziness     DVT of lower extremity, bilateral ([address] 10/16/2013    GERD (gastroesophageal reflux disease)     Headache     Hearing loss     Hematuria 2014    Hx of blood clots     Hyperlipidemia     Hypertension     Lung disease     MONIQUE (obstructive sleep apnea)     Pancreatitis ([address] 2013    Pulmonary emboli ([facility] [address] [facility]     after cholecystectomy     Rash     Sleep apnea     Tinnitus        PAST SURGICAL HISTORY    Past Surgical History:   Procedure Laterality Date    ABDOMEN SURGERY  05/16/2014    reveseral end peristomal hernia repair.  CARDIOVERSION  12/04/2019    Dr. Cristofer Culver, OPEN N/A 9-17-13    LAPAROSCOPIC CONVERTED TO OPEN CHOLECYSTECTOMY WITH    COLON SURGERY      COLONOSCOPY  2008    COLONOSCOPY  12/4/2013    Severe Diverticulosis unable to finish    COLONOSCOPY  4/30/14    diverticula-10 year f/u    COLOSTOMY  Jan 2014    CYSTOSCOPY  12/10/13    with bladder biopsy    CYSTOSCOPY  1-28-14    Cystourethroscopy, left ureteral catheter     EPIDURAL STEROID INJECTION Right 1/21/2019    RIGHT LUMBAR TWO THREE EPIDURAL STEROID INJECTION SITE CONFIRMED BY FLUROOSCOPY performed by Issac Ohara MD at [facility] Right 2/11/2019    RIGHT LUMBAR TWO THREE EPIDURAL STEROID INJECTION SITE CONFIRMED BY FLUOROSCOPY performed by Issac Ohara MD at [place] [address]  08/14/2015    [facility], BILATERAL COMPONENT SEPARATION, LYSIS OF ADHESIONS    OTHER SURGICAL HISTORY  1-28-14    LeftColectomy with End Colostomy, Splenic Flexure Mobilization, Takedown of Colovesical Fistula    [place] INJECTION HIP [facility] Right 9/19/2018    ARTHROGRAM AND CORTISONE INJECTION RIGHT HIP performed by Alysha Brown MD at [address] Right 2003    Fracture lower leg during chain saw accident.  Surgery x 2    VASCULAR SURGERY Left 05/2021    per Dr. Mayda Iglesias Right 05/10/2021    venous procedure RLE-per Dr. Carroll Waldron History   Problem Relation Age of Onset    Heart Disease Father     Heart Attack Father     Stroke Brother     Heart Disease Brother     High Blood Pressure Brother     Kidney Disease Mother         kidney removed       SOCIAL HISTORY    Social History     Tobacco Use    Smoking status: Never Smoker    Smokeless tobacco: Never Used Vaping Use    Vaping Use: Never used   Substance Use Topics    Alcohol use: Yes     Alcohol/week: 0.0 standard drinks     Comment: every several months     Drug use: No       ALLERGIES    Allergies   Allergen Reactions    Hydrocodone-Acetaminophen Other (See Comments)     jittery       MEDICATIONS    No current facility-administered medications on file prior to encounter.      Current Outpatient Medications on File Prior to Encounter   Medication Sig Dispense Refill    Insulin Glargine, 2 Unit Dial, (TOUJEO MAX SOLOSTAR) 300 UNIT/ML SOPN Inject 46 Units into the skin 2 times daily 5 pen 11    torsemide (DEMADEX) 20 MG tablet TAKE 2 TABLETS BY MOUTH EVERY  tablet 1    spironolactone (ALDACTONE) 25 MG tablet TAKE 1 TABLET BY MOUTH EVERY DAY 90 tablet 3    Zinc Sulfate (ZINC 15 PO) Take by mouth in the morning and at bedtime      ELDERBERRY PO Take by mouth 2 times daily      Ascorbic Acid (VITAMIN C PO) Take by mouth in the morning and at bedtime      Apoaequorin (PREVAGEN) 10 MG CAPS Take 1 capsule by mouth daily      sertraline (ZOLOFT) 50 MG tablet TAKE 1 TABLET BY MOUTH EVERY DAY 90 tablet 3    ELIQUIS 5 MG TABS tablet TAKE 1 TABLET BY MOUTH TWICE A  tablet 2    tiZANidine (ZANAFLEX) 4 MG tablet TAKE 1 TABLET BY MOUTH NIGHTLY AS NEEDED (PAIN) 30 tablet 0    azelastine (ASTELIN) 0.1 % nasal spray 1 spray by Nasal route 2 times daily Use in each nostril as directed 30 mL 5    Semaglutide,0.25 or 0.5MG/DOS, (OZEMPIC, 0.25 OR 0.5 MG/DOSE,) 2 MG/1.5ML SOPN Inject 0.5 mg into the skin once a week 4 pen 5    atorvastatin (LIPITOR) 40 MG tablet TAKE 1 TABLET BY MOUTH EVERY DAY AT NIGHT 90 tablet 3    isosorbide mononitrate (IMDUR) 30 MG extended release tablet TAKE 1 TABLET BY MOUTH EVERY DAY 90 tablet 3    montelukast (SINGULAIR) 10 MG tablet TAKE 1 TABLET BY MOUTH EVERY DAY 90 tablet 3    glimepiride (AMARYL) 4 MG tablet TAKE 1 TABLET BY MOUTH TWICE A  tablet 3    atenolol (TENORMIN) 50 MG tablet Take 0.5 tablets by mouth daily 90 tablet 3    omeprazole (PRILOSEC) 40 MG delayed release capsule TAKE 1 CAPSULE BY MOUTH EVERY DAY 90 capsule 3    hydrALAZINE (APRESOLINE) 50 MG tablet TAKE 1/2 TABLET BY MOUTH EVERY 8 HOURS 135 tablet 7    Handicap Placard MISC by Does not apply route Please issue for duration of 5 years 1 each 0    Handicap Placard MISC by Does not apply route Please issue for duration of 5 years 1 each 0    Insulin Pen Needle 31G X 5 MM MISC 1 each by Does not apply route daily 400 each 5    Glucosamine-Chondroitin (GLUCOSAMINE CHONDR COMPLEX PO) Take 1 tablet by mouth 2 times daily      blood glucose monitor kit and supplies by Other route daily One Touch Ultra 1 kit 0    glucose blood VI test strips (ASCENSIA AUTODISC VI;ONE TOUCH ULTRA TEST VI) strip DX: E11.9 FSBS daily. One Touch ultra 100 strip 5    aspirin 81 MG chewable tablet Take 1 tablet by mouth daily 30 tablet 0    Multiple Vitamins-Minerals (THERAPEUTIC MULTIVITAMIN-MINERALS) tablet Take 1 tablet by mouth daily      ferrous sulfate 325 (65 FE) MG tablet Take 325 mg by mouth daily          Objective  Lying in bed. Voiding per urinal small amounts 250 ml while wound care RN in room over 1 hours, clear yellow urine. On lasix. /74   Pulse 78   Temp 98.4 °F (36.9 °C) (Axillary)   Resp 30   Ht 6' 1\" (1.854 m)   Wt 291 lb 14.4 oz (132.4 kg)   SpO2 95%   BMI 38.51 kg/m²     LABS:  WBC:    Lab Results   Component Value Date    WBC 11.2 06/13/2022     H/H:    Lab Results   Component Value Date    HGB 13.8 06/13/2022    HCT 42.5 06/13/2022     PTT:    Lab Results   Component Value Date    APTT 49.0 10/18/2017   [APTT}  PT/INR:    Lab Results   Component Value Date    PROTIME 14.9 12/04/2019    INR 1.28 12/04/2019     HgBA1c:    Lab Results   Component Value Date    LABA1C 7.4 06/12/2022       Assessment  Open ulcers right and left lower legs. Legs red and swollen.   See photo's   Germán Risk Score:  Germán Scale Score: 18    Patient Active Problem List   Diagnosis    Type 2 diabetes mellitus with hyperglycemia ([facility]    Osteoarthritis    Morbid obesity with BMI of 40.0-44.9, adult (HCC)    Edema    Anemia    Bradycardia    Venous stasis ulcer of right lower extremity (HCC)    Venous thrombosis of leg    Venous stasis dermatitis of both lower extremities    Hyperbilirubinemia    Moderate episode of recurrent major depressive disorder (HCC)    Renal insufficiency    Anxiety    Essential hypertension    Non-seasonal allergic rhinitis    Mixed hyperlipidemia    Chronic congestive heart failure (HCC)    Chronic pulmonary edema    Coronary artery disease involving native coronary artery of native heart without angina pectoris    Nonrheumatic aortic valve stenosis    Severe sleep apnea    Primary insomnia    Venous stasis ulcer of left calf limited to breakdown of skin with varicose veins (HCC)    Stage 3 chronic kidney disease (HCC)    Renal osteodystrophy    Peripheral edema    Primary osteoarthritis of right hip    Grade III diastolic dysfunction    Paroxysmal atrial fibrillation (HCC)    Simple chronic bronchitis (HCC)    Other specified peripheral vascular diseases (HCC)    Non-pressure chronic ulcer of right calf with fat layer exposed (HCC)    Non-pressure chronic ulcer of left calf with fat layer exposed (HCC)    Atrial flutter ([facility]    Closed wedge compression fracture of sixth thoracic vertebra (HCC)    Acute chest pain    SIRS (systemic inflammatory response syndrome) ([facility])    Heart failure, diastolic, with acute decompensation ([facility])       Measurements:          Wound 06/12/22 Pretibial Distal;Right (Active)   Wound Image     06/13/22 1529   Wound Etiology Venous 06/13/22 1529   Dressing Status New dressing applied 06/13/22 1529   Wound Cleansed Cleansed with saline 06/13/22 1529   Dressing/Treatment Xeroform 06/13/22 1529   Offloading for Diabetic Foot Ulcers Drainage Description Serosanguinous 06/13/22 1529   Odor None 06/13/22 1529   Mago-wound Assessment Hemosiderin staining (brown yellow); Blanchable erythema;Edematous 06/13/22 1529   Margins Attached edges; Defined edges 06/13/22 1529   Wound Thickness Description not for Pressure Injury Partial thickness 06/13/22 1529   Number of days: 0     Left inner lower leg:               Wound 06/13/22 Leg Right; Lower; Outer (Active)   Wound Image   06/13/22 1529   Wound Etiology Venous 06/13/22 1529   Dressing Status New dressing applied 06/13/22 1529   Wound Cleansed Cleansed with saline 06/13/22 1529   Dressing/Treatment Xeroform;Dry dressing;ABD;Roll gauze; Ace wrap 06/13/22 1529   Offloading for Diabetic Foot Ulcers Offloading ordered 06/13/22 1529   Dressing Change Due 06/14/22 06/13/22 1529   Wound Length (cm) 6 cm 06/13/22 1529   Wound Width (cm) 8 cm 06/13/22 1529   Wound Depth (cm) 0.1 cm 06/13/22 1529   Wound Surface Area (cm^2) 48 cm^2 06/13/22 1529   Wound Volume (cm^3) 4.8 cm^3 06/13/22 1529   Distance Tunneling (cm) 0 cm 06/13/22 1529   Tunneling Position ___ O'Clock 0 06/13/22 1529   Undermining Starts ___ O'Clock 0 06/13/22 1529   Undermining Ends___ O'Clock 0 06/13/22 1529   Undermining Maxium Distance (cm) 0 06/13/22 1529   Wound Assessment Pink/red 06/13/22 1529   Drainage Amount Small 06/13/22 1529   Drainage Description Serosanguinous 06/13/22 1529   Odor None 06/13/22 1529   Mago-wound Assessment Dry/flaky; Intact 06/13/22 1529   Margins Attached edges; Defined edges 06/13/22 1529   Wound Thickness Description not for Pressure Injury Partial thickness 06/13/22 1529   Number of days: 0     Right outer lower upper leg:        Left 3rd toe:        Response to treatment:  Well tolerated by patient.      Pain Assessment:  Severity:  0 / 10  Quality of pain: N/A  Wound Pain Timing/Severity: none  Premedicated: No    Plan   Plan of Care: Wound 06/12/22 Pretibial Distal;Right-Dressing/Treatment: Xeroform  Wound 06/12/22 Leg Left; Inner-Dressing/Treatment: Elwood Galas gauze,Ace wrap  Wound 06/13/22 Leg Right; Lower; Outer-Dressing/Treatment: Aidee Galas gauze,Ace wrap     Dr Umesh Balderas notified of wound care results. Recommend:   Wash lower legs daily around wounds with soap and water or bath wipes. Cleanse wounds with normal saline. Pat dry. Apply xeroform to open areas on right inner and outer lower leg and left inner lower leg. Cover with dry dressing, abd pad, roll guaze and 2 ace wraps from toes to knees. Change daily  Apply betadine to left 3rd toe distal tip daily. Wound care to follow. Call wound care for deterioration [phone] or call [phone] and leave message. Specialty Bed Required : Yes   [] Low Air Loss   [x] Pressure Redistribution  [] Fluid Immersion  [] Bariatric  [] Total Pressure Relief  [] Other:     Current Diet: ADULT DIET; Regular; 4 carb choices (60 gm/meal); Low Sodium (2 gm); 2000 ml  Dietician consult:  Yes    Discharge Plan:  Placement for patient upon discharge: skilled nursing    Patient appropriate for Outpatient [address]: Yes    Referrals:  []  / discharge planner  following  [] [address] need for dressing change and monitor wounds  [] Supplies  [] Other    Patient/Caregiver Teaching: updated on treatment.   Level of patient/caregiver understanding able to:   [x] Indicates understanding       [] Needs reinforcement  [] Unsuccessful      [] Verbal Understanding  [] Demonstrated understanding       [] No evidence of learning  [] Refused teaching         [] N/A       Electronically signed by Ez Burgos RN, MSN, Sohail Narvaez on 6/13/2022 at 3:40 PM

## 2022-06-13 NOTE — PROGRESS NOTES
Patient's EF (Ejection Fraction) is greater than 40%    Heart Failure Medications:   Diuretics[de-identified] Furosemide, Torsemide, Spironolactone, Metalozone, Other and None     (One of the following REQUIRED for EF </= 40%/SYSTOLIC FAILURE but MAY be used in EF% >40%/DIASTOLIC FAILURE)        ACE[de-identified] None        ARB[de-identified] None         ARNI[de-identified] None    (Beta Blockers)   NON- Evidenced Based Beta Blocker (for EF% >40%/DIASTOLIC FAILURE): Atenolol- Tenormin     Evidenced Based Beta Blocker::(REQUIRED for EF% <40%/SYSTOLIC FAILURE) None  . .................................................................................................................................................. Patient's weights and intake/output reviewed: Yes    Patient's Last Weight: 132.4 kg obtained by standing scale. Difference of 0.1 kg less than last documented weight. Intake/Output Summary (Last 24 hours) at 6/13/2022 2866  Last data filed at 6/13/2022 3964  Gross per 24 hour   Intake 605.65 ml   Output 660 ml   Net -54.35 ml       Comorbidities Reviewed Yes    Patient has a past medical history of Allergic rhinitis, Arthritis, Bladder fistula, C. difficile diarrhea, Cellulitis of right lower extremity, CHF (congestive heart failure) ([address] Dental disease, Diabetes ([address] Diverticulitis, Dizziness, DVT of lower extremity, bilateral ([address] GERD (gastroesophageal reflux disease), Headache, Hearing loss, Hematuria, Hx of blood clots, Hyperlipidemia, Hypertension, Lung disease, MONIQUE (obstructive sleep apnea), Pancreatitis ([address] Pulmonary emboli ([facility] Rash, Sleep apnea, and Tinnitus.      >>For CHF and Comorbidity documentation on Education Time and Topics, please see Education Tab    Progressive Mobility Assessment:  What is this patient's Current Level of Mobility?: Ambulatory- with Assistance  How was this patient Mobilized today?: Edge of Bed, Up to Chair, Bedside Commode,  Up to Toilet/Shower, Up in Room, Up in [place], Unable to [place] and Patient Refuses to Mobilize, ambulated 5 ft                 With Whom? Nurse, PCA, PT, OT and Self                 Level of Difficulty/Assistance: 1x Assist     Pt resting in bed at this time on 3 L O2. Pt denies shortness of breath. Pt with nonpitting lower extremity edema.      Patient and/or Family's stated Goal of Care this Admission: reduce shortness of breath, increase activity tolerance, better understand heart failure and disease management, be more comfortable and reduce lower extremity edema prior to discharge        :

## 2022-06-13 NOTE — PROGRESS NOTES
06/12/22 2033   RT Protocol   History Pulmonary Disease 0   Respiratory pattern 0   Breath sounds 2   Cough 0   Indications for Bronchodilator Therapy None   Bronchodilator Assessment Score 2

## 2022-06-13 NOTE — CONSULTS
Nutrition Education  Consult received for CHF diet education. Provided pt with written and verbal instruction on HF nutrition therapy. Pt does not use salt to season foods at all and has limited fluid/ soda intake. Pt reports that he \"knows what to do but doesn't do it. \" Pt does seem willing to make some changes when he leaves to promote long lasting health. Encouraged pt to make small practical changes such as cooking at home, adding in more vegetables and limiting soda. Pt reports that his wife cooks at home and she does not always make low sodium options. She does not add salt to foods that she cooks. Pt reports he can drink less than 2000 ml of fluid per day. Pt does not weight self daily, encouraged pt to start. Pt voiced understanding, could benefit from reinforcement. Time spent: 10 minutes    · Educated on CHF diet education  · Learners: Patient  · Readiness: Acceptance  · Method: Explanation and Handout  · Response: Verbalizes Understanding  · Contact name and number provided.     Jennie Palacios MS, RD, LD  Contact Number: Office: [phone]; [address]: [address]

## 2022-06-13 NOTE — PROGRESS NOTES
Patient refused to have his wound dressings unwrapped for assessment , stated it was just done at the ED  And he doesn't want to go through that this night.

## 2022-06-13 NOTE — DISCHARGE INSTR - DIET

## 2022-06-14 LAB
ALBUMIN SERPL-MCNC: 3.5 G/DL (ref 3.4–5)
ANION GAP SERPL CALCULATED.3IONS-SCNC: 9 MMOL/L (ref 3–16)
ANION GAP SERPL CALCULATED.3IONS-SCNC: 9 MMOL/L (ref 3–16)
BUN BLDV-MCNC: 16 MG/DL (ref 7–20)
BUN BLDV-MCNC: 16 MG/DL (ref 7–20)
CALCIUM SERPL-MCNC: 8.7 MG/DL (ref 8.3–10.6)
CALCIUM SERPL-MCNC: 8.7 MG/DL (ref 8.3–10.6)
CHLORIDE BLD-SCNC: 96 MMOL/L (ref 99–110)
CHLORIDE BLD-SCNC: 97 MMOL/L (ref 99–110)
CO2: 34 MMOL/L (ref 21–32)
CO2: 34 MMOL/L (ref 21–32)
CREAT SERPL-MCNC: 0.6 MG/DL (ref 0.8–1.3)
CREAT SERPL-MCNC: 0.6 MG/DL (ref 0.8–1.3)
GFR AFRICAN AMERICAN: >60
GFR AFRICAN AMERICAN: >60
GFR NON-AFRICAN AMERICAN: >60
GFR NON-AFRICAN AMERICAN: >60
GLUCOSE BLD-MCNC: 115 MG/DL (ref 70–99)
GLUCOSE BLD-MCNC: 116 MG/DL (ref 70–99)
GLUCOSE BLD-MCNC: 198 MG/DL (ref 70–99)
GLUCOSE BLD-MCNC: 206 MG/DL (ref 70–99)
GLUCOSE BLD-MCNC: 214 MG/DL (ref 70–99)
GLUCOSE BLD-MCNC: 68 MG/DL (ref 70–99)
MAGNESIUM: 1.7 MG/DL (ref 1.8–2.4)
PERFORMED ON: ABNORMAL
PHOSPHORUS: 3.2 MG/DL (ref 2.5–4.9)
POTASSIUM SERPL-SCNC: 3 MMOL/L (ref 3.5–5.1)
POTASSIUM SERPL-SCNC: 3 MMOL/L (ref 3.5–5.1)
SODIUM BLD-SCNC: 139 MMOL/L (ref 136–145)
SODIUM BLD-SCNC: 140 MMOL/L (ref 136–145)

## 2022-06-14 PROCEDURE — 6370000000 HC RX 637 (ALT 250 FOR IP): Performed by: HOSPITALIST

## 2022-06-14 PROCEDURE — 97535 SELF CARE MNGMENT TRAINING: CPT

## 2022-06-14 PROCEDURE — 94761 N-INVAS EAR/PLS OXIMETRY MLT: CPT

## 2022-06-14 PROCEDURE — 36415 COLL VENOUS BLD VENIPUNCTURE: CPT

## 2022-06-14 PROCEDURE — 97530 THERAPEUTIC ACTIVITIES: CPT

## 2022-06-14 PROCEDURE — 97166 OT EVAL MOD COMPLEX 45 MIN: CPT

## 2022-06-14 PROCEDURE — 97110 THERAPEUTIC EXERCISES: CPT

## 2022-06-14 PROCEDURE — 6370000000 HC RX 637 (ALT 250 FOR IP): Performed by: NURSE PRACTITIONER

## 2022-06-14 PROCEDURE — 2580000003 HC RX 258: Performed by: HOSPITALIST

## 2022-06-14 PROCEDURE — 99233 SBSQ HOSP IP/OBS HIGH 50: CPT | Performed by: NURSE PRACTITIONER

## 2022-06-14 PROCEDURE — 83735 ASSAY OF MAGNESIUM: CPT

## 2022-06-14 PROCEDURE — 6360000002 HC RX W HCPCS: Performed by: INTERNAL MEDICINE

## 2022-06-14 PROCEDURE — 6360000002 HC RX W HCPCS: Performed by: HOSPITALIST

## 2022-06-14 PROCEDURE — 97162 PT EVAL MOD COMPLEX 30 MIN: CPT

## 2022-06-14 PROCEDURE — 80069 RENAL FUNCTION PANEL: CPT

## 2022-06-14 PROCEDURE — 2700000000 HC OXYGEN THERAPY PER DAY

## 2022-06-14 PROCEDURE — 1200000000 HC SEMI PRIVATE

## 2022-06-14 PROCEDURE — 6370000000 HC RX 637 (ALT 250 FOR IP): Performed by: INTERNAL MEDICINE

## 2022-06-14 RX ORDER — INSULIN LISPRO 100 [IU]/ML
0-6 INJECTION, SOLUTION INTRAVENOUS; SUBCUTANEOUS
Status: DISCONTINUED | OUTPATIENT
Start: 2022-06-14 | End: 2022-06-16 | Stop reason: HOSPADM

## 2022-06-14 RX ORDER — INSULIN LISPRO 100 [IU]/ML
0-3 INJECTION, SOLUTION INTRAVENOUS; SUBCUTANEOUS NIGHTLY
Status: DISCONTINUED | OUTPATIENT
Start: 2022-06-14 | End: 2022-06-16 | Stop reason: HOSPADM

## 2022-06-14 RX ORDER — MAGNESIUM SULFATE IN WATER 40 MG/ML
2000 INJECTION, SOLUTION INTRAVENOUS ONCE
Status: COMPLETED | OUTPATIENT
Start: 2022-06-14 | End: 2022-06-14

## 2022-06-14 RX ORDER — POTASSIUM CHLORIDE 20 MEQ/1
20 TABLET, EXTENDED RELEASE ORAL 2 TIMES DAILY
Status: COMPLETED | OUTPATIENT
Start: 2022-06-14 | End: 2022-06-15

## 2022-06-14 RX ORDER — INSULIN GLARGINE 100 [IU]/ML
40 INJECTION, SOLUTION SUBCUTANEOUS 2 TIMES DAILY
Status: DISCONTINUED | OUTPATIENT
Start: 2022-06-14 | End: 2022-06-16 | Stop reason: HOSPADM

## 2022-06-14 RX ORDER — ACETAMINOPHEN 650 MG
TABLET, EXTENDED RELEASE ORAL DAILY
Status: DISCONTINUED | OUTPATIENT
Start: 2022-06-14 | End: 2022-06-16 | Stop reason: HOSPADM

## 2022-06-14 RX ADMIN — SODIUM CHLORIDE: 9 INJECTION, SOLUTION INTRAVENOUS at 23:23

## 2022-06-14 RX ADMIN — Medication 5 MG: at 20:32

## 2022-06-14 RX ADMIN — INSULIN LISPRO 2 UNITS: 100 INJECTION, SOLUTION INTRAVENOUS; SUBCUTANEOUS at 12:38

## 2022-06-14 RX ADMIN — POTASSIUM CHLORIDE 20 MEQ: 20 TABLET, EXTENDED RELEASE ORAL at 20:32

## 2022-06-14 RX ADMIN — ONDANSETRON 4 MG: 4 TABLET, ORALLY DISINTEGRATING ORAL at 12:34

## 2022-06-14 RX ADMIN — TORSEMIDE 40 MG: 20 TABLET ORAL at 08:43

## 2022-06-14 RX ADMIN — ATENOLOL 25 MG: 25 TABLET ORAL at 08:44

## 2022-06-14 RX ADMIN — INSULIN LISPRO 1 UNITS: 100 INJECTION, SOLUTION INTRAVENOUS; SUBCUTANEOUS at 20:33

## 2022-06-14 RX ADMIN — APIXABAN 5 MG: 5 TABLET, FILM COATED ORAL at 20:32

## 2022-06-14 RX ADMIN — INSULIN GLARGINE 40 UNITS: 100 INJECTION, SOLUTION SUBCUTANEOUS at 20:32

## 2022-06-14 RX ADMIN — VANCOMYCIN HYDROCHLORIDE 1250 MG: 10 INJECTION, POWDER, LYOPHILIZED, FOR SOLUTION INTRAVENOUS at 12:11

## 2022-06-14 RX ADMIN — INSULIN LISPRO 2 UNITS: 100 INJECTION, SOLUTION INTRAVENOUS; SUBCUTANEOUS at 17:51

## 2022-06-14 RX ADMIN — ISOSORBIDE MONONITRATE 30 MG: 30 TABLET, EXTENDED RELEASE ORAL at 08:43

## 2022-06-14 RX ADMIN — AZELASTINE HYDROCHLORIDE 1 SPRAY: 137 SPRAY, METERED NASAL at 08:44

## 2022-06-14 RX ADMIN — FERROUS SULFATE TAB 325 MG (65 MG ELEMENTAL FE) 325 MG: 325 (65 FE) TAB at 08:43

## 2022-06-14 RX ADMIN — VANCOMYCIN HYDROCHLORIDE 1250 MG: 10 INJECTION, POWDER, LYOPHILIZED, FOR SOLUTION INTRAVENOUS at 23:21

## 2022-06-14 RX ADMIN — POTASSIUM CHLORIDE 20 MEQ: 20 TABLET, EXTENDED RELEASE ORAL at 08:53

## 2022-06-14 RX ADMIN — Medication 1 TABLET: at 08:43

## 2022-06-14 RX ADMIN — SODIUM CHLORIDE: 9 INJECTION, SOLUTION INTRAVENOUS at 23:20

## 2022-06-14 RX ADMIN — ATORVASTATIN CALCIUM 40 MG: 40 TABLET, FILM COATED ORAL at 20:32

## 2022-06-14 RX ADMIN — SERTRALINE 50 MG: 50 TABLET, FILM COATED ORAL at 08:44

## 2022-06-14 RX ADMIN — INSULIN GLARGINE 40 UNITS: 100 INJECTION, SOLUTION SUBCUTANEOUS at 08:45

## 2022-06-14 RX ADMIN — SPIRONOLACTONE 25 MG: 25 TABLET ORAL at 08:44

## 2022-06-14 RX ADMIN — CEFEPIME HYDROCHLORIDE 2000 MG: 2 INJECTION, POWDER, FOR SOLUTION INTRAVENOUS at 12:26

## 2022-06-14 RX ADMIN — AZELASTINE HYDROCHLORIDE 1 SPRAY: 137 SPRAY, METERED NASAL at 20:38

## 2022-06-14 RX ADMIN — APIXABAN 5 MG: 5 TABLET, FILM COATED ORAL at 08:44

## 2022-06-14 RX ADMIN — CEFEPIME HYDROCHLORIDE 2000 MG: 2 INJECTION, POWDER, FOR SOLUTION INTRAVENOUS at 23:23

## 2022-06-14 RX ADMIN — Medication: at 20:39

## 2022-06-14 RX ADMIN — SODIUM CHLORIDE, PRESERVATIVE FREE 10 ML: 5 INJECTION INTRAVENOUS at 08:50

## 2022-06-14 RX ADMIN — HYDRALAZINE HYDROCHLORIDE 25 MG: 25 TABLET, FILM COATED ORAL at 06:20

## 2022-06-14 RX ADMIN — SODIUM CHLORIDE, PRESERVATIVE FREE 10 ML: 5 INJECTION INTRAVENOUS at 20:39

## 2022-06-14 RX ADMIN — HYDRALAZINE HYDROCHLORIDE 25 MG: 25 TABLET, FILM COATED ORAL at 20:32

## 2022-06-14 RX ADMIN — MAGNESIUM SULFATE HEPTAHYDRATE 2000 MG: 40 INJECTION, SOLUTION INTRAVENOUS at 08:57

## 2022-06-14 RX ADMIN — ASPIRIN 81 MG: 81 TABLET, COATED ORAL at 08:43

## 2022-06-14 RX ADMIN — PANTOPRAZOLE SODIUM 40 MG: 40 TABLET, DELAYED RELEASE ORAL at 06:20

## 2022-06-14 RX ADMIN — MONTELUKAST SODIUM 10 MG: 10 TABLET ORAL at 08:53

## 2022-06-14 NOTE — PROGRESS NOTES
Hospitalist Progress Note      PCP: SHAKIRA Montero - CNP    Date of Admission: 6/12/2022    Chief Complaint: Chest pain, shortness of breath, bilateral lower extremity cellulitis    Hospital Course: Reviewed H&P    Subjective: Patient seen and examined this morning. Evaluated by cardiology with recommendations to continue IV Lasix gtt. for his CHF. Patient reports slightly feeling better. Reports he has CPAP at home but has not been compliant with it and reports that he would be compliant in future after this discharge. Does not use oxygen at baseline. Currently on 2 L/min oxygen by nasal cannula. RN to wean oxygen as able. - Echocardiogram ordered and pending. We will get PT OT evaluation to assess discharge needs.     Medications:  Reviewed    Infusion Medications    dextrose      sodium chloride 15 mL/hr at 06/13/22 2320     Scheduled Medications    magnesium sulfate  2,000 mg IntraVENous Once    potassium chloride  20 mEq Oral BID    insulin glargine  40 Units SubCUTAneous BID    insulin lispro  0-6 Units SubCUTAneous TID WC    insulin lispro  0-3 Units SubCUTAneous Nightly    povidone-iodine   Topical Daily    aspirin  81 mg Oral Daily    atenolol  25 mg Oral Daily    atorvastatin  40 mg Oral Nightly    azelastine  1 spray Each Nostril BID    apixaban  5 mg Oral BID    ferrous sulfate  325 mg Oral Daily    montelukast  10 mg Oral Daily    sertraline  50 mg Oral Daily    spironolactone  25 mg Oral Daily    torsemide  40 mg Oral Daily    pantoprazole  40 mg Oral QAM AC    therapeutic multivitamin-minerals  1 tablet Oral Daily    isosorbide mononitrate  30 mg Oral Daily    hydrALAZINE  25 mg Oral 3 times per day    sodium chloride flush  10 mL IntraVENous 2 times per day    cefepime  2,000 mg IntraVENous Q12H    vancomycin  1,250 mg IntraVENous Q12H    melatonin  5 mg Oral Nightly     PRN Meds: glucose, dextrose bolus **OR** dextrose bolus, glucagon (rDNA), dextrose, perflutren lipid microspheres, sodium chloride flush, sodium chloride, senna, acetaminophen **OR** acetaminophen, ondansetron **OR** ondansetron, ipratropium-albuterol      Intake/Output Summary (Last 24 hours) at 6/14/2022 0840  Last data filed at 6/13/2022 1847  Gross per 24 hour   Intake 240 ml   Output 1475 ml   Net -1235 ml       Physical Exam Performed:    /85   Pulse 83   Temp 98.6 °F (37 °C) (Oral)   Resp 20   Ht 6' 1\" (1.854 m)   Wt 291 lb (132 kg)   SpO2 94%   BMI 38.39 kg/m²     General appearance: No apparent distress, appears stated age and cooperative. Oxygen by nasal cannula 2 L/min  HEENT: Pupils equal, round, and reactive to light. Conjunctivae/corneas clear. Neck: Supple, with full range of motion. No jugular venous distention. Trachea midline. Respiratory:  Normal respiratory effort. Clear to auscultation, bilaterally without Rales/Wheezes/Rhonchi. Cardiovascular: Regular rate and rhythm with normal S1/S2 without murmurs, rubs or gallops. Abdomen: Soft, non-tender, non-distended with normal bowel sounds. Musculoskeletal: No clubbing, cyanosis or edema bilaterally. Full range of motion without deformity. Skin: Chronic venous stasis changes with brawny edema BLE; stage III ulcerations anterior tibial region R>L weeping mucopurulent foul-smelling drainage; currently on creape bandages  Neurologic:  Neurovascularly intact without any focal sensory/motor deficits.  Cranial nerves: II-XII intact, grossly non-focal.  Psychiatric: Alert and oriented, thought content appropriate, normal insight  Capillary Refill: Brisk,< 3 seconds   Peripheral Pulses: +2 palpable, equal bilaterally       Labs:   Recent Labs     06/12/22  1053 06/13/22  0824   WBC 20.1* 11.2*   HGB 13.9 13.8   HCT 42.6 42.5    188     Recent Labs     06/13/22  1441 06/13/22  2057 06/14/22  0437    139 140  139   K 3.4* 4.2 3.0*  3.0*   CL 98* 97* 97*  96*   CO2 33* 30 34*  34*   BUN 15 15 16 16   CREATININE 0.6* 0.7* 0.6*  0.6*   CALCIUM 8.2* 8.4 8.7  8.7   PHOS 2.6 2.7 3.2     Recent Labs     06/12/22  1053 06/13/22  0824   AST 39* 22   ALT 40 27   BILITOT 1.2* 1.5*   ALKPHOS 97 81     No results for input(s): INR in the last 72 hours. Recent Labs     06/12/22  1053 06/12/22  1914   TROPONINI 0.02* 0.03*     Radiology:  CT CHEST PULMONARY EMBOLISM W CONTRAST   Final Result   No evidence of a pulmonary embolus. Mildly dilated and atherosclerotic thoracic aorta with no aneurysm or   dissection. 12 mm bilobed nodule or infiltrate along the right lower lobe posteriorly. Recommend short-term follow-up or PET scan correlation      Mild left basilar atelectasis vs early infiltrates or scarring   posterolaterally on which is less prominent. Moderately severe wedge compression fracture of T6 which is more apparent,   the etiology and age of which is uncertain. Suggest PET scan correlation, if   indicated. Moderate coronary artery calcifications. Moderate hiatal hernia         XR TIBIA FIBULA RIGHT (2 VIEWS)   Final Result   Soft tissue swelling without acute osseous abnormality         XR CHEST PORTABLE   Final Result   1. No acute abnormality.            Active Hospital Problems    Diagnosis Date Noted    Severe sleep apnea [G47.30] 11/14/2017     Priority: High    Acute chest pain [R07.9] 06/12/2022     Priority: Medium    SIRS (systemic inflammatory response syndrome) (HCC) [R65.10] 06/12/2022     Priority: Medium    Heart failure, diastolic, with acute decompensation ([facility] [I50.33] 06/12/2022     Priority: Medium    Venous stasis dermatitis of both lower extremities [I87.2] 06/03/2016     Priority: Medium    Morbid obesity with BMI of 40.0-44.9, adult ([facility] [E66.01, Z68.41] 08/16/2013     Priority: Medium    Non-pressure chronic ulcer of right calf with fat layer exposed ([facility] [facility] [L97.212] 03/23/2021    Paroxysmal atrial fibrillation ([facility] [I48.0] 10/22/2019    Chronic    DVT Prophylaxis: Eliquis twice daily   diet: ADULT DIET; Regular; 4 carb choices (60 gm/meal); Low Sodium (2 gm); 2000 ml  Code Status: Full Code    PT/OT Eval Status: Requested    Dispo -likely 2 to 3 days pending clinical improvement. The note was completed using Dragon -speech recognition software & EMR  . Every effort was made to ensure accuracy; however, inadvertent computerized transcription errors may be present.     Valentín Buckley MD

## 2022-06-14 NOTE — PROGRESS NOTES
Physician Progress Note      Lance Roy  CSN #:                  [id number]  :                       1950  ADMIT DATE:       2022 10:31 AM  DISCH DATE:  RESPONDING  PROVIDER #:        Zenia Tejeda MD          QUERY TEXT:    Pt admitted with CHF and possible cellulitis/dermatitis to BLE. Noted   documentation of Sepsis per ED provider and SIRS per attending provider. If   possible, please document in progress notes and discharge summary:    The medical record reflects the following:  Risk Factors: CHF, BLE cellulitis/dermatitis, DM2, obesity    Clinical Indicators: per ED provider- \"does meet sepsis criteria. .. Was   hesitant with fluid administration given history of congestive heart failure. \"   Per H&P and PN  of attending- \"SIRS with BLE dermatitis/cellulitis\"  on arrival to ED- Temp 99.1 to 100.5 after 6 hours, WBC 20.1, CRP 36.5, BC-   NGTD, trop 0.02, in acute on chronic HFpEF on arrival    Treatment: Cefepime, Zosynx1, Vancomycin, labs, Lasix gtt, Wound RN consult,   dressing changes daily, supportive care/monitoring    Thank you,  Jessica Laguna RN [email or web address]  Options provided:  -- Sepsis treated for possible cellulitis/dermatitis BLE confirmed POA  -- SIRS confirmed w/ organ dysfunction d/t ## please specify  -- Other - I will add my own diagnosis  -- Disagree - Not applicable / Not valid  -- Disagree - Clinically unable to determine / Unknown  -- Refer to Clinical Documentation Reviewer    PROVIDER RESPONSE TEXT:    The diagnosis of sepsis treated for possible cellulitis/dermatitis BLE was   confirmed POA.     Query created by: Kati Olivarez on 2022 12:44 PM      Electronically signed by:  Zenia Tejeda MD 2022 5:02 PM

## 2022-06-14 NOTE — PROGRESS NOTES
Physical Therapy  Facility/Department: [place] [id number] - [facility]  Physical Therapy Initial Assessment/Treatment    Name: Judith Sparrow  : 1950  MRN: [id number]  Date of Service: 2022    Discharge Recommendations:  Subacute/Skilled Nursing Facility   PT Equipment Recommendations  Equipment Needed: No  Other: defer      Patient Diagnosis(es): The primary encounter diagnosis was Chest pain, unspecified type. Diagnoses of Cellulitis, unspecified cellulitis site, Elevated troponin, and Sepsis, due to unspecified organism, unspecified whether acute organ dysfunction present [facility]) were also pertinent to this visit. Past Medical History:  has a past medical history of Allergic rhinitis, Arthritis, Bladder fistula, C. difficile diarrhea, Cellulitis of right lower extremity, CHF (congestive heart failure) ([address] Dental disease, Diabetes ([address] Diverticulitis, Dizziness, DVT of lower extremity, bilateral ([address] GERD (gastroesophageal reflux disease), Headache, Hearing loss, Hematuria, Hx of blood clots, Hyperlipidemia, Hypertension, Lung disease, MONIQUE (obstructive sleep apnea), Pancreatitis ([address] Pulmonary emboli ([address] Rash, Sleep apnea, and Tinnitus. Past Surgical History:  has a past surgical history that includes Tibia fracture surgery (Right, ); Cholecystectomy, open (N/A, 13); Cystocopy (12/10/13); Cystoscopy (14); other surgical history (14); Colonoscopy (); Colonoscopy (2013); Colonoscopy (14); colostomy (2014); Colon surgery; Abdomen surgery (2014); hernia repair (2015); pr injection hip arthrogram,anesth (Right, 2018); epidural steroid injection (Right, 2019); epidural steroid injection (Right, 2019); Cardioversion (2019); vascular surgery (Left, 2021); and vascular surgery (Right, 05/10/2021).     Assessment   Body Structures, Functions, Activity Limitations Requiring Skilled Therapeutic Intervention: Decreased functional mobility ;Decreased strength;Decreased safe awareness;Decreased endurance;Decreased balance;Decreased sensation; Increased pain  Assessment: Pt presents to [address] [place] [address] with acute chest pain. PTA, pt lives with spouse and performs functional mobility IND with [facility] in [facility]. Pt currently functioning below baseline level requiring mod(A) for bed mobility and mod(A)x2 for transfers. Pt becomes very anxious during transfers and requires verbal cues for safety. Pt would benefit from continued skilled PT to address current deficits. Recommend SNF upon d/c  Treatment Diagnosis: impaired functional mobility  Therapy Prognosis: Good  Decision Making: Medium Complexity  Requires PT Follow-Up: Yes     Plan   Plan  Plan: 3-5 times per week  Current Treatment Recommendations: Strengthening,Balance training,Functional mobility training,Transfer training,Endurance training,Neuromuscular re-education,Stair training,Gait training,Pain management,Home exercise program,Safety education & training,Patient/Caregiver education & training,Therapeutic activities  Safety Devices  Type of Devices: Nurse notified,Gait belt,Call light within reach,Left in chair,Chair alarm in place     Restrictions  Restrictions/Precautions  Restrictions/Precautions: Contact Precautions  Position Activity Restriction  Other position/activity restrictions: IV, tele     Subjective   General  Chart Reviewed: Yes  Patient assessed for rehabilitation services?: Yes  Response To Previous Treatment: Not applicable  Referring Practitioner: Koki Milton MD  Referral Date : 06/14/22  Diagnosis: Acute Chest Pain  Follows Commands: Impaired  General Comment  Comments: RN cleared pt for PT evaluation  Subjective  Subjective: Pt supine in bed upon arrival, agreeable to PT eval. Pt appears intermittently anxious throughout session, requiring cues for controlled breathing and relaxation.  Pt reports he has been using urinal frequently, but upon mobility bed and chux pad saturated with urine         Social/Functional History  Social/Functional History  Lives With: Spouse (Wife is at home 24/7 but cannot perform heavy assistance.)  Type of Home: House  Home Layout: Two level,Laundry in basement,Performs ADL's on one level  Home Access: Stairs to enter with rails  Entrance Stairs - Number of Steps: 5  Entrance Stairs - Rails: Both  Bathroom Shower/Tub: Walk-in shower  Bathroom Toilet: Handicap height  Bathroom Equipment: Grab bars around toilet,Grab bars in shower  Home Equipment: Jim Tran  Has the patient had two or more falls in the past year or any fall with injury in the past year?: No  ADL Assistance: Independent  Homemaking Responsibilities: Yes  Meal Prep Responsibility: Secondary (wife and daughter perform)  Laundry Responsibility: No  Cleaning Responsibility: No (daughter performs)  Shopping Responsibility: Primary  Ambulation Assistance: Independent (Pt uses quad cane.)  Transfer Assistance: Independent  Active : Yes     Vision/Hearing  Vision  Vision: Impaired  Vision Exceptions: Wears glasses at all times  Hearing  Hearing: Exceptions to [facility]  Hearing Exceptions: Hard of hearing/hearing concerns;Bilateral hearing aid      Cognition   Orientation  Overall Orientation Status: Within Functional Limits  Orientation Level: Oriented X4  Cognition  Overall Cognitive Status: Exceptions  Arousal/Alertness: Appropriate responses to stimuli  Following Commands: Follows one step commands with repetition  Attention Span: Attends with cues to redirect  Safety Judgement: Decreased awareness of need for assistance;Decreased awareness of need for safety  Problem Solving: Decreased awareness of errors  Insights: Decreased awareness of deficits  Initiation: Requires cues for some  Sequencing: Requires cues for some  Cognition Comment: anxiety in stance about falling.      Objective   Heart Rate: 62  Heart Rate Source: Monitor  BP: 117/67  BP Location: Left upper arm  BP Method: supervision  Short term goal 2: Pt will perform transfers with LRAD and min(A)  Short term goal 3: Pt will ambulate 25 ft with LRAD and min(A)  Short term goal 4: Pt will perform 12-15 reps of BLE exercises to improve strength and mobility by 6/17/22  Patient Goals   Patient goals : \"to get stronger and move out of bed\"       Education  Patient Education  Education Given To: Patient  Education Provided: Role of Therapy;Plan of Care;Transfer Training; Fall Prevention Strategies  Education Provided Comments: Pt educated on safety with transfers and technique, importance of continued mobility and risks of prolonged bedrest  Education Method: Demonstration;Verbal  Barriers to Learning: None  Education Outcome: Verbalized understanding;Demonstrated understanding;Continued education needed      Therapy Time   Individual Concurrent Group Co-treatment   Time In 1114         Time Out 1147         Minutes 33         Timed Code Treatment Minutes: 23 Minutes (10 min eval)     If pt is unable to be seen after this session, please let this note serve as discharge summary. Please see case management note for discharge disposition. Thank you.     Keith Chapin, PT

## 2022-06-14 NOTE — PROGRESS NOTES
[facility]   Daily Progress Note    Admit Date:  6/12/2022  HPI:    Chief Complaint   Patient presents with    Chest Pain     started this morning sts it was more uncomfortable, took 324 asa        Interval history: Endy White is being followed for chest pain and shortness of breath. Subjective:  Mr. Bard Martinez denies much shortness of breath. No further chest pain. Concerned about his infected leg wounds.    Worried about getting home, has things to do/and things to take care of.     Objective:   /85   Pulse 83   Temp 98.6 °F (37 °C) (Oral)   Resp 20   Ht 6' 1\" (1.854 m)   Wt 291 lb (132 kg)   SpO2 94%   BMI 38.39 kg/m²       Intake/Output Summary (Last 24 hours) at 6/14/2022 1103  Last data filed at 6/14/2022 1029  Gross per 24 hour   Intake 480 ml   Output 1575 ml   Net -1095 ml       NYHA: IV    Physical Exam:  General:  Awake, alert, NAD  Skin:  Warm and dry  Neck:  JVD difficult to assess due to body habitus   Chest:  Clear to auscultation, no wheezes/rhonchi/rales  Telemetry: NSR   Cardiovascular:  RRR S1S2, faint murmur, no r/g   Abdomen:  Soft, nontender, +bowel sounds  Extremities:  Legs wrapped +  bilateral lower extremity edema    Medications:    potassium chloride  20 mEq Oral BID    insulin glargine  40 Units SubCUTAneous BID    insulin lispro  0-6 Units SubCUTAneous TID WC    insulin lispro  0-3 Units SubCUTAneous Nightly    povidone-iodine   Topical Daily    aspirin  81 mg Oral Daily    atenolol  25 mg Oral Daily    atorvastatin  40 mg Oral Nightly    azelastine  1 spray Each Nostril BID    apixaban  5 mg Oral BID    ferrous sulfate  325 mg Oral Daily    montelukast  10 mg Oral Daily    sertraline  50 mg Oral Daily    spironolactone  25 mg Oral Daily    torsemide  40 mg Oral Daily    pantoprazole  40 mg Oral QAM AC    therapeutic multivitamin-minerals  1 tablet Oral Daily    isosorbide mononitrate  30 mg Oral Daily    hydrALAZINE  25 mg Oral 3 times per day    sodium chloride flush  10 mL IntraVENous 2 times per day    cefepime  2,000 mg IntraVENous Q12H    vancomycin  1,250 mg IntraVENous Q12H    melatonin  5 mg Oral Nightly      dextrose      sodium chloride 15 mL/hr at 06/13/22 2320       Lab Data:  CBC:   Recent Labs     06/12/22  1053 06/13/22  0824   WBC 20.1* 11.2*   HGB 13.9 13.8    188     BMP:    Recent Labs     06/13/22  1441 06/13/22  2057 06/14/22  0437    139 140  139   K 3.4* 4.2 3.0*  3.0*   CO2 33* 30 34*  34*   BUN 15 15 16  16   CREATININE 0.6* 0.7* 0.6*  0.6*     INR:  No results for input(s): INR in the last 72 hours. BNP:    Recent Labs     06/12/22  1053   PROBNP 1,621*     Lab Results   Component Value Date    LVEF 55 08/11/2020       Testing:    Echo 8/11/20  Summary   Technically difficult examination.   Normal LV systolic function with an estimated EF of 55%.   No regional wall motion abnormalities are seen.   Type II diastolic dysfunction with elevated filling pressure.   Lipomatous hypertrophy of the interatrial septum.   Mild bi-atrial enlargement.   Mild mitral annular calcification.   Maximal transaortic velocity is 2.62m/s which gives peak pressure gradient=27mmHg and mean pressure gradient= 15mmHg c/w mild AS.   Trace mitral and tricuspid regurgitation.   Definity contrast administered with no definite evidence of LV mass or thrombus noted.   Systolic pulmonary artery pressure (SPAP) estimated at 40mmHg (RAP 3mmHg), consistent with mild pulm HTN.       Chest CTPA 6/12/22       FINDINGS:   Pulmonary Arteries: Pulmonary arteries are adequately opacified for   evaluation.  No evidence of intraluminal filling defect to suggest pulmonary   embolism.  Main pulmonary artery is normal in caliber.       Mediastinum: No evidence of mediastinal lymphadenopathy.  The heart and   pericardium demonstrate no acute abnormality.  There is no acute abnormality   of the thoracic aorta.  There is a moderate hiatal hernia along the lower   mediastinum.  There are moderate coronary artery calcifications.       Lungs/pleura: T there is a bilobed nodule along the right lung base   posteriorly measuring 12 mm which was not seen previously some questionable   central lucency in the nodule.  There is subsegmental left basilar opacity   posterolaterally which is less prominent no effusion is seen.       Upper Abdomen: Limited images of the upper abdomen are unremarkable.       Soft Tissues/Bones: There is a moderately severe wedge compression fracture   of T6 is more apparent there is no retropulsed fragment. Wally Mckee is vague   sclerosis throughout rest vertebra.           No evidence of a pulmonary embolus.       Mildly dilated and atherosclerotic thoracic aorta with no aneurysm or   dissection.       12 mm bilobed nodule or infiltrate along the right lower lobe posteriorly. Recommend short-term follow-up or PET scan correlation       Mild left basilar atelectasis vs early infiltrates or scarring   posterolaterally on which is less prominent.       Moderately severe wedge compression fracture of T6 which is more apparent,   the etiology and age of which is uncertain.  Suggest PET scan correlation, if   indicated.       Moderate coronary artery calcifications.       Moderate hiatal hernia     06/14/22 0434 291 lb (132 kg) --      06/13/22 0615 291 lb 14.4 oz (132.4 kg) Standing scale     06/12/22 1617 292 lb 1.8 oz (132.5 kg) --       Principal Problem:    Acute chest pain  Active Problems:    Severe sleep apnea    Morbid obesity with BMI of 40.0-44.9, adult (HCC)    Venous stasis dermatitis of both lower extremities    SIRS (systemic inflammatory response syndrome) ([facility])    Heart failure, diastolic, with acute decompensation ([facility])    Chronic pulmonary edema    Paroxysmal atrial fibrillation ([facility])    Non-pressure chronic ulcer of right calf with fat layer exposed ([address]  Resolved Problems:    * No resolved hospital problems. *      Assessment:  Chest pain/shortness of breath   Acute on chronic diastolic heart failure  CAD  PAF/flutter   Mild AS   Obese  Severe MONIQUE  Hypokalemia   Elevated troponin   Leg cellulitis  Abnormal chest CT- RLL nodule vs infiltrate     Plan:  Continue daily weights; hospital weight is lower than his baseline weight at last OV with me in march. D/C lasix infusion and restart torsemide 40mg daily   Restart Spironolactone (aldactone) 25 mg daily   Continue hydralazine 25mg TID  Continue atenolol and imdur   Cellulitis - per IM   Echocardiogram pending     Dispo: 1-2 days    Education provided today Disease process and medications discussed. Questions answered fully. Encouraged regular exercise.  encouraged activity, CPAP use   Total Time spent educating today 25minutes     SHAKIRA Basurto - CNP,  6/14/2022, 3:07 PM

## 2022-06-14 NOTE — PROGRESS NOTES
Occupational Therapy  Facility/Department: [place] [id number] - [facility] SURG  Occupational Therapy Initial Assessment/Treatment    Name: Johnson Cohen  : 1950  MRN: [id number]  Date of Service: 2022    Discharge Recommendations:  Subacute/Skilled Nursing Facility  OT Equipment Recommendations  Equipment Needed: Yes  Mobility Devices: ADL Assistive Devices  ADL Assistive Devices: Shower Chair with back       Patient Diagnosis(es): The primary encounter diagnosis was Chest pain, unspecified type. Diagnoses of Cellulitis, unspecified cellulitis site, Elevated troponin, and Sepsis, due to unspecified organism, unspecified whether acute organ dysfunction present [facility]) were also pertinent to this visit. Past Medical History:  has a past medical history of Allergic rhinitis, Arthritis, Bladder fistula, C. difficile diarrhea, Cellulitis of right lower extremity, CHF (congestive heart failure) ([address] Dental disease, Diabetes ([address] Diverticulitis, Dizziness, DVT of lower extremity, bilateral ([address] GERD (gastroesophageal reflux disease), Headache, Hearing loss, Hematuria, Hx of blood clots, Hyperlipidemia, Hypertension, Lung disease, MONIQUE (obstructive sleep apnea), Pancreatitis ([address] Pulmonary emboli ([address] Rash, Sleep apnea, and Tinnitus. Past Surgical History:  has a past surgical history that includes Tibia fracture surgery (Right, ); Cholecystectomy, open (N/A, 13); Cystocopy (12/10/13); Cystoscopy (14); other surgical history (14); Colonoscopy (); Colonoscopy (2013); Colonoscopy (14); colostomy (2014); Colon surgery; Abdomen surgery (2014); hernia repair (2015); pr injection hip arthrogram,anesth (Right, 2018); epidural steroid injection (Right, 2019); epidural steroid injection (Right, 2019); Cardioversion (2019); vascular surgery (Left, 2021); and vascular surgery (Right, 05/10/2021).            Assessment   Performance deficits / Impairments: Decreased functional mobility ; Decreased safe awareness;Decreased cognition;Decreased ADL status; Decreased high-level IADLs;Decreased endurance;Decreased ROM; Decreased strength;Decreased balance    Assessment: Pt is a 75yo male with deficits in the areas listed above that [facility] to [facility] with chest pain and chronic B/L venous wounds in his LE. Pt is typically (I) with ADLS and functional mobility with a cane. Today, pt requiring mod-max Ax2 from functional t/fs and total A for LB ADLS. Pt with increased fear of falling during stand-pivot t/f and reporting L knee pain throughout movement of LLE. Pt repositioned for comfort at end of session. Pt would continue to benefit from skilled OT services to increase safety, balance, endurance and independence in ADLS and functional mobility. Recommend SNF at d/c d/t need for Ax2 for safe t/fs and total A with LB ADLS at this time. Prognosis: Good  Decision Making: Medium Complexity  REQUIRES OT FOLLOW-UP: Yes  Activity Tolerance  Activity Tolerance: Treatment limited secondary to medical complications (free text)  Activity Tolerance Comments: BP decreasing to 80/52 up in chair. Pt with no complaint of dizziness. Pt BP increasing to 97/62 with ther ex seated in chair. RN approved pt continuing to sit up in chair. Pt educated on safety with sitting up in chair. Pt verbalized understanding.         Plan   Plan  Times per Week: 3-5x/week  Current Treatment Recommendations: Strengthening,Functional mobility training,Endurance training,Balance training,Patient/Caregiver education & training,Self-Care / ADL,Home management training,Safety education & training,Equipment evaluation, education, & procurement     Restrictions  Restrictions/Precautions  Restrictions/Precautions: Contact Precautions  Position Activity Restriction  Other position/activity restrictions: IV    Subjective   General  Chart Reviewed: Yes  Patient assessed for rehabilitation services?: Yes  Additional Pertinent Hx: SIRS with BLE dermatitis/cellulitis  Response to previous treatment: Patient with no complaints from previous session  Family / Caregiver Present: No  Referring Practitioner: Tomeka Krishnan MD  Diagnosis: Chest discomfort with exertional shortness of breath POA  Subjective  Subjective: Pt in bed, expressing discomfort and not feeling well. Pt agreeable to therapy. General Comment  Comments: RN approved therapy. Social/Functional History  Social/Functional History  Lives With: Spouse (Wife is at home 24/7 but cannot perform heavy assistance.)  Type of Home: House  Home Layout: Two level,Laundry in basement,Performs ADL's on one level  Home Access: Stairs to enter with rails  Entrance Stairs - Number of Steps: 5  Entrance Stairs - Rails: Both  Bathroom Shower/Tub: Walk-in shower  Bathroom Toilet: Handicap height  Bathroom Equipment: Grab bars around toilet,Grab bars in shower  Home Equipment: [place]  Has the patient had two or more falls in the past year or any fall with injury in the past year?: No  ADL Assistance: Independent  Homemaking Responsibilities: Yes  Meal Prep Responsibility: Secondary (wife and daughter perform)  Laundry Responsibility: No  Cleaning Responsibility: No (daughter performs)  Shopping Responsibility: Primary  Ambulation Assistance: Independent (Pt uses quad cane.)  Transfer Assistance: Independent  Active : Yes       Objective   Heart Rate: 62  Heart Rate Source: Monitor  BP: 117/67  BP Location: Left upper arm  BP Method: Automatic  Patient Position: Semi fowlers  MAP (Calculated): 83.67  Resp: 20  SpO2: 94 %  O2 Device: Nasal cannula  Vision Exceptions: Wears glasses at all times  Hearing: Exceptions to [facility]  Hearing Exceptions: Hard of hearing/hearing concerns;Bilateral hearing aid (Pt does not use hearing aids.)          Safety Devices  Type of Devices: Nurse notified;Gait belt;Call light within reach; Left in chair;Chair alarm in place     Bed Mobility Training  Bed Mobility Training: Yes  Interventions: Verbal cues  Supine to Sit: Moderate assistance; Additional time; Adaptive equipment (HOB elevated, use of BR)    Balance  Sitting: Intact  Standing: Impaired  Standing - Static: Constant support; Fair  Standing - Dynamic: Constant support;Poor    Transfer Training  Transfer Training: Yes  Interventions: Safety awareness training;Verbal cues; Tactile cues (cues for safe t/fs)  Sit to Stand: Assist X2;Moderate assistance; Adaptive equipment; Additional time (use fo cane in L hand, from elevated EOB, HHA R hand.)  Stand to Sit: Assist X2;Moderate assistance; Adaptive equipment; Additional time (B/L HHA.)  Stand Pivot Transfers: Assist X2;Maximum assistance; Adaptive equipment; Additional time (b/l HHA d/t decreased safety with cane. Cues to decrease anxiety.)  Bed to Chair: Assist X2;Maximum assistance; Adaptive equipment; Additional time (b/l HHA d/t decreased safety with cane. Cues to decrease anxiety.)     AROM: Generally decreased, functional (L shoulder ROM limited to ~85*)  PROM: Generally decreased, functional  Strength: Generally decreased, functional  Coordination: Within functional limits  Tone: Normal  Sensation: Intact     ADL  Feeding: Setup  LE Dressing: Dependent/Total  LE Dressing Skilled Clinical Factors: to don/doff socks. Toileting: Dependent/Total  Toileting Skilled Clinical Factors: incontinence of bladder. Cognition  Overall Cognitive Status: Exceptions  Arousal/Alertness: Appropriate responses to stimuli  Following Commands:  Follows one step commands with repetition  Attention Span: Attends with cues to redirect  Safety Judgement: Decreased awareness of need for assistance;Decreased awareness of need for safety  Problem Solving: Decreased awareness of errors  Insights: Decreased awareness of deficits  Initiation: Requires cues for some  Sequencing: Requires cues for some  Cognition Comment: anxiety in stance about falling. A/AROM Exercises: BUE/LE ther ex seated in chair: grasp/release, wrist flexion/extension, elbow flexion/extension, ankle pumps. Education Given To: Patient  Education Provided: Role of Therapy;Transfer Training;Plan of Care;Equipment  Education Provided Comments: disease specific: benefits of sock aid, d/c recommendations and reasonning, safe t/fs, use of call light, safety with sitting up in chair, ther ex, importance of OOB mobility and protecting skin. Education Method: Demonstration;Verbal  Barriers to Learning: None  Education Outcome: Verbalized understanding;Continued education needed;Demonstrated understanding     AM-PAC Score        AM-[facility] Inpatient Daily Activity Raw Score: 13 (06/14/22 1245)  AM-PAC Inpatient ADL T-Scale Score : 32.03 (06/14/22 1245)  ADL Inpatient CMS 0-100% Score: 63.03 (06/14/22 1245)  ADL Inpatient CMS G-Code Modifier : CL (06/14/22 1245)    Goals  Short Term Goals  Time Frame for Short term goals: 1 week (6/21) unless stated otherwise. Short Term Goal 1: Pt will LB dress with mod A and AD PRN. Short Term Goal 2: Pt will perform functional t/fs with min A and AD PRN. Short Term Goal 3: Pt will toilet with mod Ax2 and AD PRN. Short Term Goal 4: Pt will perform BUE ther ex x20 to increase endurance for functional activities (6/19)  Patient Goals   Patient goals : \"to go home\"       Therapy Time   Individual Concurrent Group Co-treatment   Time In 1115         Time Out 1216         Minutes 61         Timed Code Treatment Minutes: 46 Minutes (10minute evaluation)       Yenifer Odor, OTR/L  If pt discharges prior to next session, this note will serve as discharge summary. See case management note for discharge disposition.

## 2022-06-14 NOTE — PROGRESS NOTES
Hospitalist Progress Note      PCP: SHAKIRA Rizvi - CNP    Date of Admission: 6/12/2022    Chief Complaint: Chest pain, shortness of breath, bilateral lower extremity cellulitis    Hospital Course: Reviewed H&P    Subjective: Patient seen and examined this morning. Evaluated by cardiology this morning-discontinued Lasix infusion and restarted home dose of torsemide 40 mg daily along with Aldactone 25 mg daily. Noted patient hypoglycemic this morning-decrease home dose of Lantus to 40 twice daily. On empiric vancomycin/cefepime for bilateral lower extremity cellulitis. Labs suggestive of hypokalemia along with hypomagnesemia being replaced . Patient reports  feeling better. Reports he has CPAP at home but has not been compliant with it and reports that he would be compliant in future after this discharge. Does not use oxygen at baseline. Currently on 2 L/min oxygen by nasal cannula. RN to wean oxygen as able. - Echocardiogram ordered and pending. We will get PT OT evaluation to assess discharge needs.     Medications:  Reviewed    Infusion Medications    dextrose      sodium chloride 15 mL/hr at 06/13/22 6340     Scheduled Medications    potassium chloride  20 mEq Oral BID    insulin glargine  40 Units SubCUTAneous BID    insulin lispro  0-6 Units SubCUTAneous TID WC    insulin lispro  0-3 Units SubCUTAneous Nightly    povidone-iodine   Topical Daily    aspirin  81 mg Oral Daily    atenolol  25 mg Oral Daily    atorvastatin  40 mg Oral Nightly    azelastine  1 spray Each Nostril BID    apixaban  5 mg Oral BID    ferrous sulfate  325 mg Oral Daily    montelukast  10 mg Oral Daily    sertraline  50 mg Oral Daily    spironolactone  25 mg Oral Daily    torsemide  40 mg Oral Daily    pantoprazole  40 mg Oral QAM AC    therapeutic multivitamin-minerals  1 tablet Oral Daily    isosorbide mononitrate  30 mg Oral Daily    hydrALAZINE  25 mg Oral 3 times per day    sodium chloride flush  10 mL IntraVENous 2 times per day    cefepime  2,000 mg IntraVENous Q12H    vancomycin  1,250 mg IntraVENous Q12H    melatonin  5 mg Oral Nightly     PRN Meds: glucose, dextrose bolus **OR** dextrose bolus, glucagon (rDNA), dextrose, perflutren lipid microspheres, sodium chloride flush, sodium chloride, senna, acetaminophen **OR** acetaminophen, ondansetron **OR** ondansetron, ipratropium-albuterol      Intake/Output Summary (Last 24 hours) at 6/14/2022 1655  Last data filed at 6/14/2022 1211  Gross per 24 hour   Intake 480 ml   Output 1400 ml   Net -920 ml       Physical Exam Performed:    /68   Pulse 74   Temp 98.4 °F (36.9 °C) (Oral)   Resp 20   Ht 6' 1\" (1.854 m)   Wt 291 lb (132 kg)   SpO2 95%   BMI 38.39 kg/m²     General appearance: No apparent distress, appears stated age and cooperative. Oxygen by nasal cannula 2 L/min  HEENT: Pupils equal, round, and reactive to light. Conjunctivae/corneas clear. Neck: Supple, with full range of motion. No jugular venous distention. Trachea midline. Respiratory:  Normal respiratory effort. Clear to auscultation, bilaterally without Rales/Wheezes/Rhonchi. Cardiovascular: Regular rate and rhythm with normal S1/S2 without murmurs, rubs or gallops. Abdomen: Soft, non-tender, non-distended with normal bowel sounds. Musculoskeletal: No clubbing, cyanosis or edema bilaterally. Full range of motion without deformity. Skin: Chronic venous stasis changes with brawny edema BLE; stage III ulcerations anterior tibial region R>L weeping mucopurulent foul-smelling drainage; currently on creape bandages  Neurologic:  Neurovascularly intact without any focal sensory/motor deficits.  Cranial nerves: II-XII intact, grossly non-focal.  Psychiatric: Alert and oriented, thought content appropriate, normal insight  Capillary Refill: Brisk,< 3 seconds   Peripheral Pulses: +2 palpable, equal bilaterally       Labs:   Recent Labs     06/12/22  1053 06/13/22  0824   WBC 20.1* 11.2*   HGB 13.9 13.8   HCT 42.6 42.5    188     Recent Labs     06/13/22  1441 06/13/22 2057 06/14/22  0437    139 140  139   K 3.4* 4.2 3.0*  3.0*   CL 98* 97* 97*  96*   CO2 33* 30 34*  34*   BUN 15 15 16  16   CREATININE 0.6* 0.7* 0.6*  0.6*   CALCIUM 8.2* 8.4 8.7  8.7   PHOS 2.6 2.7 3.2     Recent Labs     06/12/22  1053 06/13/22  0824   AST 39* 22   ALT 40 27   BILITOT 1.2* 1.5*   ALKPHOS 97 81     No results for input(s): INR in the last 72 hours. Recent Labs     06/12/22  1053 06/12/22  1914   TROPONINI 0.02* 0.03*     Radiology:  CT CHEST PULMONARY EMBOLISM W CONTRAST   Final Result   No evidence of a pulmonary embolus. Mildly dilated and atherosclerotic thoracic aorta with no aneurysm or   dissection. 12 mm bilobed nodule or infiltrate along the right lower lobe posteriorly. Recommend short-term follow-up or PET scan correlation      Mild left basilar atelectasis vs early infiltrates or scarring   posterolaterally on which is less prominent. Moderately severe wedge compression fracture of T6 which is more apparent,   the etiology and age of which is uncertain. Suggest PET scan correlation, if   indicated. Moderate coronary artery calcifications. Moderate hiatal hernia         XR TIBIA FIBULA RIGHT (2 VIEWS)   Final Result   Soft tissue swelling without acute osseous abnormality         XR CHEST PORTABLE   Final Result   1. No acute abnormality.            Active Hospital Problems    Diagnosis Date Noted    Severe sleep apnea [G47.30] 11/14/2017     Priority: High    Acute chest pain [R07.9] 06/12/2022     Priority: Medium    SIRS (systemic inflammatory response syndrome) (HCC) [R65.10] 06/12/2022     Priority: Medium    Heart failure, diastolic, with acute decompensation ([facility] [I50.33] 06/12/2022     Priority: Medium    Venous stasis dermatitis of both lower extremities [I87.2] 06/03/2016     Priority: Medium    Morbid obesity with BMI of 40.0-44.9, adult ([facility] [E66.01, Z68.41] 08/16/2013     Priority: Medium    Non-pressure chronic ulcer of right calf with fat layer exposed [facility]) [L97.212] 03/23/2021    Paroxysmal atrial fibrillation ([facility] [I48.0] 10/22/2019    Chronic pulmonary edema [J81.1] 11/03/2017     Assessment/Plan:  Chest discomfort with exertional shortness of breath POA  -Admitted to telemetry monitor and serial cardiac enzymes monitored (0.02--> 0.03)  -Continue aspirin, high-dose statin, nitro as needed  -Chest CT with PE protocol ordered by admitting physician with results pending at time of dictation  -ECHO pending  -  Cardiology evaluated and felt chest discomfort not consistent with ACS; further recommendations pending echocardiogram     Acute on chronic diastolic CHF POA  -Received IV Lasix gtt. initially; cardiology evaluated and discontinued Lasix infusion this morning and transition to home dose of torsemide, Aldactone.  -Continue home doses of Tenormin,  Imdur; continue I/O monitoring and daily weights  -ECHO scheduled to further assess cardiac structure and function  -2G Na and fluid restriction dietary modifications in place     Sepsis due to BLE dermatitis/cellulitis POA  -  Sepsis fluid bundle NOT ADMINISTERED due to concomitant acute CHF; clinically sepsis syndrome resolved  -Blood, urine cultures ordered -no growth to date  -Patient initiated on IV vancomycin and cefepime empirically pending pathogen identification; pharmacy consulted for dosage assistance  -Serial CBC, CMP, lactate, procalcitonin to monitor treatment progression  -Wound care following and assisting with management    PAF  -Patient currently rate controlled on beta-blocker  -Continue Eliquis anticoagulation     IDDM with BMI 43.91  -A1c 7.4% this admit  -Home oral hypoglycemic medications placed on temporary hold  -Noted hypoglycemia on 6/14/2022.   Decreased home dose of Lantus from 46 units to 40 units twice daily   -PRN Humalog medium dose SSI scheduled before meals and at bedtime based on POC glucose  -Carbohydrate restriction placed on diet    Morbid Obesity -  With Body mass index is 38.39 kg/m². Complicating assessment and treatment. Placing patient at risk for multiple co-morbidities as well as early death and contributing to the patient's presentation. Counseled on weight loss.      DVT Prophylaxis: Eliquis twice daily   diet: ADULT DIET; Regular; 4 carb choices (60 gm/meal); Low Sodium (2 gm); 2000 ml  Code Status: Full Code    PT/OT Eval Status: Requested    Dispo -likely 2 days pending clinical improvement. The note was completed using Dragon -speech recognition software & EMR  . Every effort was made to ensure accuracy; however, inadvertent computerized transcription errors may be present.     Sheryle Dell, MD

## 2022-06-15 LAB
ANION GAP SERPL CALCULATED.3IONS-SCNC: 7 MMOL/L (ref 3–16)
BUN BLDV-MCNC: 19 MG/DL (ref 7–20)
CALCIUM SERPL-MCNC: 8.9 MG/DL (ref 8.3–10.6)
CHLORIDE BLD-SCNC: 98 MMOL/L (ref 99–110)
CO2: 36 MMOL/L (ref 21–32)
CREAT SERPL-MCNC: 0.6 MG/DL (ref 0.8–1.3)
GFR AFRICAN AMERICAN: >60
GFR NON-AFRICAN AMERICAN: >60
GLUCOSE BLD-MCNC: 125 MG/DL (ref 70–99)
GLUCOSE BLD-MCNC: 133 MG/DL (ref 70–99)
GLUCOSE BLD-MCNC: 217 MG/DL (ref 70–99)
GLUCOSE BLD-MCNC: 81 MG/DL (ref 70–99)
GLUCOSE BLD-MCNC: 86 MG/DL (ref 70–99)
HCT VFR BLD CALC: 42.7 % (ref 40.5–52.5)
HEMOGLOBIN: 14.2 G/DL (ref 13.5–17.5)
LV EF: 50 %
LVEF MODALITY: NORMAL
MAGNESIUM: 1.8 MG/DL (ref 1.8–2.4)
MCH RBC QN AUTO: 28.6 PG (ref 26–34)
MCHC RBC AUTO-ENTMCNC: 33.2 G/DL (ref 31–36)
MCV RBC AUTO: 86.1 FL (ref 80–100)
PDW BLD-RTO: 16.3 % (ref 12.4–15.4)
PERFORMED ON: ABNORMAL
PERFORMED ON: NORMAL
PLATELET # BLD: 212 K/UL (ref 135–450)
PMV BLD AUTO: 8.4 FL (ref 5–10.5)
POTASSIUM SERPL-SCNC: 3.5 MMOL/L (ref 3.5–5.1)
PRO-BNP: 961 PG/ML (ref 0–124)
RBC # BLD: 4.96 M/UL (ref 4.2–5.9)
SODIUM BLD-SCNC: 141 MMOL/L (ref 136–145)
WBC # BLD: 8.9 K/UL (ref 4–11)

## 2022-06-15 PROCEDURE — 6360000002 HC RX W HCPCS: Performed by: HOSPITALIST

## 2022-06-15 PROCEDURE — 36415 COLL VENOUS BLD VENIPUNCTURE: CPT

## 2022-06-15 PROCEDURE — 6370000000 HC RX 637 (ALT 250 FOR IP): Performed by: NURSE PRACTITIONER

## 2022-06-15 PROCEDURE — 83880 ASSAY OF NATRIURETIC PEPTIDE: CPT

## 2022-06-15 PROCEDURE — 1200000000 HC SEMI PRIVATE

## 2022-06-15 PROCEDURE — 6370000000 HC RX 637 (ALT 250 FOR IP): Performed by: HOSPITALIST

## 2022-06-15 PROCEDURE — 6370000000 HC RX 637 (ALT 250 FOR IP): Performed by: INTERNAL MEDICINE

## 2022-06-15 PROCEDURE — 2580000003 HC RX 258: Performed by: HOSPITALIST

## 2022-06-15 PROCEDURE — 97530 THERAPEUTIC ACTIVITIES: CPT

## 2022-06-15 PROCEDURE — 85027 COMPLETE CBC AUTOMATED: CPT

## 2022-06-15 PROCEDURE — 2700000000 HC OXYGEN THERAPY PER DAY

## 2022-06-15 PROCEDURE — 97110 THERAPEUTIC EXERCISES: CPT

## 2022-06-15 PROCEDURE — 94761 N-INVAS EAR/PLS OXIMETRY MLT: CPT

## 2022-06-15 PROCEDURE — 80048 BASIC METABOLIC PNL TOTAL CA: CPT

## 2022-06-15 PROCEDURE — 99232 SBSQ HOSP IP/OBS MODERATE 35: CPT | Performed by: INTERNAL MEDICINE

## 2022-06-15 PROCEDURE — C8929 TTE W OR WO FOL WCON,DOPPLER: HCPCS

## 2022-06-15 PROCEDURE — 83735 ASSAY OF MAGNESIUM: CPT

## 2022-06-15 RX ORDER — HYDROXYZINE HYDROCHLORIDE 10 MG/1
10 TABLET, FILM COATED ORAL 3 TIMES DAILY PRN
Status: DISCONTINUED | OUTPATIENT
Start: 2022-06-15 | End: 2022-06-16 | Stop reason: HOSPADM

## 2022-06-15 RX ADMIN — TORSEMIDE 40 MG: 20 TABLET ORAL at 08:39

## 2022-06-15 RX ADMIN — VANCOMYCIN HYDROCHLORIDE 1250 MG: 10 INJECTION, POWDER, LYOPHILIZED, FOR SOLUTION INTRAVENOUS at 22:33

## 2022-06-15 RX ADMIN — HYDRALAZINE HYDROCHLORIDE 25 MG: 25 TABLET, FILM COATED ORAL at 13:41

## 2022-06-15 RX ADMIN — AZELASTINE HYDROCHLORIDE 1 SPRAY: 137 SPRAY, METERED NASAL at 20:08

## 2022-06-15 RX ADMIN — ATORVASTATIN CALCIUM 40 MG: 40 TABLET, FILM COATED ORAL at 20:08

## 2022-06-15 RX ADMIN — HYDRALAZINE HYDROCHLORIDE 25 MG: 25 TABLET, FILM COATED ORAL at 06:33

## 2022-06-15 RX ADMIN — HYDROXYZINE HYDROCHLORIDE 10 MG: 10 TABLET ORAL at 22:29

## 2022-06-15 RX ADMIN — HYDRALAZINE HYDROCHLORIDE 25 MG: 25 TABLET, FILM COATED ORAL at 22:29

## 2022-06-15 RX ADMIN — APIXABAN 5 MG: 5 TABLET, FILM COATED ORAL at 20:08

## 2022-06-15 RX ADMIN — SERTRALINE 50 MG: 50 TABLET, FILM COATED ORAL at 08:39

## 2022-06-15 RX ADMIN — ACETAMINOPHEN 650 MG: 325 TABLET ORAL at 03:07

## 2022-06-15 RX ADMIN — POTASSIUM CHLORIDE 20 MEQ: 20 TABLET, EXTENDED RELEASE ORAL at 20:08

## 2022-06-15 RX ADMIN — MONTELUKAST SODIUM 10 MG: 10 TABLET ORAL at 08:39

## 2022-06-15 RX ADMIN — SPIRONOLACTONE 25 MG: 25 TABLET ORAL at 08:39

## 2022-06-15 RX ADMIN — FERROUS SULFATE TAB 325 MG (65 MG ELEMENTAL FE) 325 MG: 325 (65 FE) TAB at 08:39

## 2022-06-15 RX ADMIN — ISOSORBIDE MONONITRATE 30 MG: 30 TABLET, EXTENDED RELEASE ORAL at 08:51

## 2022-06-15 RX ADMIN — Medication 5 MG: at 20:08

## 2022-06-15 RX ADMIN — ATENOLOL 25 MG: 25 TABLET ORAL at 08:39

## 2022-06-15 RX ADMIN — APIXABAN 5 MG: 5 TABLET, FILM COATED ORAL at 08:39

## 2022-06-15 RX ADMIN — SODIUM CHLORIDE, PRESERVATIVE FREE 10 ML: 5 INJECTION INTRAVENOUS at 20:09

## 2022-06-15 RX ADMIN — INSULIN LISPRO 2 UNITS: 100 INJECTION, SOLUTION INTRAVENOUS; SUBCUTANEOUS at 11:58

## 2022-06-15 RX ADMIN — INSULIN GLARGINE 40 UNITS: 100 INJECTION, SOLUTION SUBCUTANEOUS at 20:09

## 2022-06-15 RX ADMIN — VANCOMYCIN HYDROCHLORIDE 1250 MG: 10 INJECTION, POWDER, LYOPHILIZED, FOR SOLUTION INTRAVENOUS at 11:47

## 2022-06-15 RX ADMIN — POTASSIUM CHLORIDE 20 MEQ: 20 TABLET, EXTENDED RELEASE ORAL at 08:39

## 2022-06-15 RX ADMIN — INSULIN GLARGINE 40 UNITS: 100 INJECTION, SOLUTION SUBCUTANEOUS at 08:53

## 2022-06-15 RX ADMIN — AZELASTINE HYDROCHLORIDE 1 SPRAY: 137 SPRAY, METERED NASAL at 08:50

## 2022-06-15 RX ADMIN — ASPIRIN 81 MG: 81 TABLET, COATED ORAL at 08:39

## 2022-06-15 RX ADMIN — SODIUM CHLORIDE, PRESERVATIVE FREE 10 ML: 5 INJECTION INTRAVENOUS at 08:52

## 2022-06-15 RX ADMIN — Medication 1 TABLET: at 08:39

## 2022-06-15 RX ADMIN — PANTOPRAZOLE SODIUM 40 MG: 40 TABLET, DELAYED RELEASE ORAL at 06:33

## 2022-06-15 RX ADMIN — SODIUM CHLORIDE: 9 INJECTION, SOLUTION INTRAVENOUS at 22:33

## 2022-06-15 RX ADMIN — Medication: at 08:52

## 2022-06-15 RX ADMIN — CEFEPIME HYDROCHLORIDE 2000 MG: 2 INJECTION, POWDER, FOR SOLUTION INTRAVENOUS at 13:46

## 2022-06-15 ASSESSMENT — PAIN SCALES - GENERAL
PAINLEVEL_OUTOF10: 3
PAINLEVEL_OUTOF10: 1

## 2022-06-15 NOTE — PROGRESS NOTES
[address]   Daily Cardiovascular Progress Note    Admit Date: 6/12/2022    Chief complaint:  Chest pain  HPI:     Pt denies CP, SOB, dizziness or syncope. Feels legs are better, less swollen. Interested in going home today w/ home care, not interested in care facility.      Medications/Labs all Reviewed:  Patient Active Problem List   Diagnosis    Type 2 diabetes mellitus with hyperglycemia ([facility] [facility]    Osteoarthritis    Morbid obesity with BMI of 40.0-44.9, adult ([facility] [address] [facility]    Edema    Anemia    Bradycardia    Venous stasis ulcer of right lower extremity (HCC)    Venous thrombosis of leg    Venous stasis dermatitis of both lower extremities    Hyperbilirubinemia    Moderate episode of recurrent major depressive disorder (HCC)    Renal insufficiency    Anxiety    Essential hypertension    Non-seasonal allergic rhinitis    Mixed hyperlipidemia    Chronic congestive heart failure (HCC)    Chronic pulmonary edema    Coronary artery disease involving native coronary artery of native heart without angina pectoris    Nonrheumatic aortic valve stenosis    Severe sleep apnea    Primary insomnia    Venous stasis ulcer of left calf limited to breakdown of skin with varicose veins (HCC)    Stage 3 chronic kidney disease (HCC)    Renal osteodystrophy    Peripheral edema    Primary osteoarthritis of right hip    Grade III diastolic dysfunction    Paroxysmal atrial fibrillation (HCC)    Simple chronic bronchitis (HCC)    Other specified peripheral vascular diseases (HCC)    Non-pressure chronic ulcer of right calf with fat layer exposed (HCC)    Non-pressure chronic ulcer of left calf with fat layer exposed (HCC)    Atrial flutter (HCC)    Closed wedge compression fracture of sixth thoracic vertebra (HCC)    Acute chest pain    SIRS (systemic inflammatory response syndrome) (HCC)    Heart failure, diastolic, with acute decompensation (HCC)       Medications:  potassium chloride (KLOR-CON M) extended release tablet 20 mEq, BID  insulin glargine (LANTUS) injection vial 40 Units, BID  insulin lispro (HUMALOG) injection vial 0-6 Units, TID WC  insulin lispro (HUMALOG) injection vial 0-3 Units, Nightly  povidone-iodine (BETADINE) 10 % external solution, Daily  aspirin EC tablet 81 mg, Daily  atenolol (TENORMIN) tablet 25 mg, Daily  atorvastatin (LIPITOR) tablet 40 mg, Nightly  azelastine (ASTELIN) 0.1 % nasal spray 1 spray, BID  apixaban (ELIQUIS) tablet 5 mg, BID  ferrous sulfate (IRON 325) tablet 325 mg, Daily  montelukast (SINGULAIR) tablet 10 mg, Daily  sertraline (ZOLOFT) tablet 50 mg, Daily  spironolactone (ALDACTONE) tablet 25 mg, Daily  torsemide (DEMADEX) tablet 40 mg, Daily  pantoprazole (PROTONIX) tablet 40 mg, QAM AC  therapeutic multivitamin-minerals 1 tablet, Daily  isosorbide mononitrate (IMDUR) extended release tablet 30 mg, Daily  hydrALAZINE (APRESOLINE) tablet 25 mg, 3 times per day  glucose chewable tablet 16 g, PRN  dextrose bolus 10% 125 mL, PRN   Or  dextrose bolus 10% 250 mL, PRN  glucagon (rDNA) injection 1 mg, PRN  dextrose 5 % solution, PRN  perflutren lipid microspheres (DEFINITY) injection 1.65 mg, ONCE PRN  sodium chloride flush 0.9 % injection 10 mL, 2 times per day  sodium chloride flush 0.9 % injection 10 mL, PRN  0.9 % sodium chloride infusion, PRN  senna (SENOKOT) tablet 8.6 mg, Daily PRN  acetaminophen (TYLENOL) tablet 650 mg, Q6H PRN   Or  acetaminophen (TYLENOL) suppository 650 mg, Q6H PRN  ondansetron (ZOFRAN-ODT) disintegrating tablet 4 mg, Q8H PRN   Or  ondansetron (ZOFRAN) injection 4 mg, Q6H PRN  cefepime (MAXIPIME) 2000 mg IVPB minibag, Q12H  vancomycin (VANCOCIN) 1,250 mg in dextrose 5 % 250 mL IVPB, Q12H  ipratropium-albuterol (DUONEB) nebulizer solution 1 ampule, Q4H PRN  melatonin disintegrating tablet 5 mg, Nightly           PHYSICAL EXAM   /82   Pulse 80   Temp 97.9 °F (36.6 °C) (Oral)   Resp 20   Ht 6' 1\" (1.854 m)   Wt 286 lb 9.6 oz (130 kg)   SpO2 95%   BMI 37.81 kg/m²    Vitals:    06/15/22 0220 06/15/22 0309 06/15/22 0748 06/15/22 0845   BP:  132/87  133/82   Pulse:  68  80   Resp:  22  20   Temp:  97.3 °F (36.3 °C)  97.9 °F (36.6 °C)   TempSrc:    Oral   SpO2:  93% 93% 95%   Weight: 286 lb 9.6 oz (130 kg)      Height:             Intake/Output Summary (Last 24 hours) at 6/15/2022 1044  Last data filed at 6/15/2022 0019  Gross per 24 hour   Intake --   Output 675 ml   Net -675 ml     Wt Readings from Last 3 Encounters:   06/15/22 286 lb 9.6 oz (130 kg)   03/30/22 (!) 306 lb (138.8 kg)   03/18/22 (!) 304 lb (137.9 kg)         Gen: Patient in NAD, resting comfortably, sitting in wheelchair   Neck: No JVD or bruits  Respiratory: decrs bs   Chest: normal without deformity  Cardiovascular:irreg rate/rhythm, S1S2, dsnt ht snds   Abdomen: Soft, distended   Extremities: +2 bilat le edema   Neurological/Psychiatric: AxO x4, No gross motors/sensory deficits  Skin:  Wounds on legs, wrapped       Labs:  CBC:   Recent Labs     06/12/22  1053 06/13/22  0824 06/15/22  0629   WBC 20.1* 11.2* 8.9   HGB 13.9 13.8 14.2   HCT 42.6 42.5 42.7   MCV 87.3 88.5 86.1    188 212     BMP:   Recent Labs     06/13/22  0824 06/13/22  0824 06/13/22  1441 06/13/22  2057 06/14/22  0437 06/15/22  0629     142   < > 140 139 140  139 141   K 3.3*  3.4*  3.3*   < > 3.4* 4.2 3.0*  3.0* 3.5   CL 99  101   < > 98* 97* 97*  96* 98*   CO2 31  32   < > 33* 30 34*  34* 36*   PHOS 2.9  --  2.6 2.7 3.2  --    BUN 16  15   < > 15 15 16  16 19   CREATININE 0.6*  <0.5*   < > 0.6* 0.7* 0.6*  0.6* 0.6*    < > = values in this interval not displayed. MG:    Recent Labs     06/13/22  0824 06/14/22  0437 06/15/22  0629   MG 1.70* 1.70* 1.80      PT/INR: No results for input(s): PROTIME, INR in the last 72 hours. APTT: No results for input(s): APTT in the last 72 hours.   Cardiac Enzymes:   Recent Labs     06/12/22  1053 06/12/22  1914   TROPONINI 0.02* 0.03* Cardiac Studies:    ekg afib     I have reviewed labs and imaging/xray/diagnostic testing in this note. Assessment and Plan       Patient Active Problem List   Diagnosis    Type 2 diabetes mellitus with hyperglycemia ([facility] [address]    Osteoarthritis    Morbid obesity with BMI of 40.0-44.9, adult ([facility] [address]    Edema    Anemia    Bradycardia    Venous stasis ulcer of right lower extremity (HCC)    Venous thrombosis of leg    Venous stasis dermatitis of both lower extremities    Hyperbilirubinemia    Moderate episode of recurrent major depressive disorder (HCC)    Renal insufficiency    Anxiety    Essential hypertension    Non-seasonal allergic rhinitis    Mixed hyperlipidemia    Chronic congestive heart failure (HCC)    Chronic pulmonary edema    Coronary artery disease involving native coronary artery of native heart without angina pectoris    Nonrheumatic aortic valve stenosis    Severe sleep apnea    Primary insomnia    Venous stasis ulcer of left calf limited to breakdown of skin with varicose veins (HCC)    Stage 3 chronic kidney disease (HCC)    Renal osteodystrophy    Peripheral edema    Primary osteoarthritis of right hip    Grade III diastolic dysfunction    Paroxysmal atrial fibrillation (HCC)    Simple chronic bronchitis (HCC)    Other specified peripheral vascular diseases (HCC)    Non-pressure chronic ulcer of right calf with fat layer exposed (HCC)    Non-pressure chronic ulcer of left calf with fat layer exposed (HCC)    Atrial flutter (HCC)    Closed wedge compression fracture of sixth thoracic vertebra (HCC)    Acute chest pain    SIRS (systemic inflammatory response syndrome) (HCC)    Heart failure, diastolic, with acute decompensation (HCC)     Acute on chronic diast hf, hfpef, continue diuretics, echo today for elev troponin, likely supply/demand mismatch, consider lexiscan nuc stress test as outpt.      Htn, bblocker, hydrdalazine/imdur, aldactone    Hchol, statin    Afib, eliquis    Leg wounds as per primary svc    If echo shows nml ef, no further inpt cv w/u or tx planned, and will sign off. Thank you for allowing me to participate in the care of your patient. Please call me with any questions [phone].       Pattie Breaux MD, [facility]   Interventional Cardiologist  [facility]  [phone] [facility]  [phone] [address] [place] [address]  6/15/2022 10:44 AM

## 2022-06-15 NOTE — CARE COORDINATION
CM following. Pt from home with wife. 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 PT/OT rec SNF. Will continue to follow.

## 2022-06-15 NOTE — PROGRESS NOTES
Occupational Therapy  Facility/Department: [facility] - MED SURG  Daily Treatment Note  NAME: Lani Blum  : 1950  MRN: [id number]    Date of Service: 6/15/2022    Discharge Recommendations:  Subacute/Skilled Nursing Facility  OT Equipment Recommendations  Equipment Needed: Yes  Mobility Devices: ADL Assistive Devices  ADL Assistive Devices: Shower Chair with back      Patient Diagnosis(es): The primary encounter diagnosis was Chest pain, unspecified type. Diagnoses of Cellulitis, unspecified cellulitis site, Elevated troponin, and Sepsis, due to unspecified organism, unspecified whether acute organ dysfunction present [facility]) were also pertinent to this visit. Assessment    Assessment: Pt in chair with wife and sister-in-law present. Pt pleasant and agreeable and reports improved pain and mobility. Pt demo poor insight for safety and mobility this day. Pt demo poor awareness of need for assistance and Req Max-Total A for Sit<>stand with poor safety, positioning (wide base of support, placing cane infront of body, and leaning on bed at 90* flexion at waist). When pt was educated on his position him and wife stated this is how he ambulates at home. Both pt and wife demo poor ability to understand safety education and were frustrated that therapist could not ambulate pt safely. Pt and wife continually educated on safety with ambulation and requirements for Max-total A. Pt left in chair, alarmed with RN present. Cont per POC  Discharge Recommendations: Subacute/Skilled Nursing Facility  Equipment Needed: Yes  Mobility Devices: ADL Assistive Devices      Plan   Plan  Times per Week: 3-5x/week  Current Treatment Recommendations: Strengthening; Functional mobility training; Endurance training;Balance training;Patient/Caregiver education & training;Self-Care / ADL; Home management training; Safety education & training;Equipment evaluation, education, & procurement Restrictions  Restrictions/Precautions  Restrictions/Precautions: Contact Precautions  Position Activity Restriction  Other position/activity restrictions: IV, tele    Subjective   Subjective  Subjective: Pt pleasant and agreeable to tx, up in chair on arrival. Pt reports no pain this day and states his LLE pain has improved  Orientation  Overall Orientation Status: Within Functional Limits  Orientation Level: Oriented X4  Cognition  Overall Cognitive Status: Exceptions  Arousal/Alertness: Appropriate responses to stimuli  Following Commands: Follows one step commands with repetition  Attention Span: Attends with cues to redirect  Memory: Appears intact  Safety Judgement: Decreased awareness of need for assistance;Decreased awareness of need for safety  Problem Solving: Decreased awareness of errors  Insights: Not aware of deficits  Initiation: Requires cues for some  Sequencing: Requires cues for some  Cognition Comment: Pt with poor insight into safety and difficulty understanding education about unsafe mobility practices        Objective    Vitals  Vitals  Heart Rate: 65  BP: 107/65  BP Location: Left upper arm  MAP (Calculated): 79  SpO2: 95 %  O2 Device: Nasal cannula  Bed Mobility Training  Bed Mobility Training: No (pt in chair on arrival and exit)  Balance  Sitting: Intact  Standing: Impaired  Standing - Static: Constant support;Poor  Standing - Dynamic: Constant support;Poor  Transfer Training  Transfer Training: Yes  Overall Level of Assistance: Maximum assistance  Interventions: Safety awareness training;Verbal cues; Tactile cues (poor awareness of education and VCs)  Sit to Stand: Maximum assistance (poor safety awareness and techniques for sit<>stand, poor erect posture)  Stand to Sit: Maximum assistance     ADL  Additional Comments: declines ADLs this day  OT Exercises  A/AROM Exercises: BUE/LE ther ex seated in chair: grasp/release, wrist flexion/extension, elbow flexion/extension, ankle pumps.

## 2022-06-15 NOTE — WOUND CARE
Preformed wound care as instructed by wound RN Washed  lower legs daily around wounds with soap and water or bath wipes. Cleanse wounds with normal saline. Pat dry. Apply xeroform to open areas on right inner and outer lower leg and left inner lower leg. Cover with dry dressing, abd pad, roll guaze and 2 ace wraps from toes to knees. Change daily  Apply betadine to left 3rd toe distal tip daily. ,    Pt tolerated well. No additional needs at this time.

## 2022-06-15 NOTE — PROGRESS NOTES
Hospitalist Progress Note      PCP: SHAKIRA Krishnan - CNP    Date of Admission: 6/12/2022    Chief Complaint: Chest pain, shortness of breath, bilateral lower extremity cellulitis    Hospital Course: Reviewed H&P    Subjective: Patient seen and examined this morning. Echo pending. Evaluated by PT OT and was recommended for SNF. Patient declining SNF and wishes to go home with home care. Currently on oxygen by nasal cannula 2 L/min. Patient uses CPAP at home nightly. Requested RN to wean oxygen as able and patient would need home oxygen evaluation prior to discharge. Continue current diuretics and patient net negative balance of 2 L thus far.      Medications:  Reviewed    Infusion Medications    dextrose      sodium chloride 15 mL/hr at 06/14/22 1824     Scheduled Medications    potassium chloride  20 mEq Oral BID    insulin glargine  40 Units SubCUTAneous BID    insulin lispro  0-6 Units SubCUTAneous TID WC    insulin lispro  0-3 Units SubCUTAneous Nightly    povidone-iodine   Topical Daily    aspirin  81 mg Oral Daily    atenolol  25 mg Oral Daily    atorvastatin  40 mg Oral Nightly    azelastine  1 spray Each Nostril BID    apixaban  5 mg Oral BID    ferrous sulfate  325 mg Oral Daily    montelukast  10 mg Oral Daily    sertraline  50 mg Oral Daily    spironolactone  25 mg Oral Daily    torsemide  40 mg Oral Daily    pantoprazole  40 mg Oral QAM AC    therapeutic multivitamin-minerals  1 tablet Oral Daily    isosorbide mononitrate  30 mg Oral Daily    hydrALAZINE  25 mg Oral 3 times per day    sodium chloride flush  10 mL IntraVENous 2 times per day    cefepime  2,000 mg IntraVENous Q12H    vancomycin  1,250 mg IntraVENous Q12H    melatonin  5 mg Oral Nightly     PRN Meds: glucose, dextrose bolus **OR** dextrose bolus, glucagon (rDNA), dextrose, perflutren lipid microspheres, sodium chloride flush, sodium chloride, senna, acetaminophen **OR** acetaminophen, ondansetron **OR** ondansetron, ipratropium-albuterol      Intake/Output Summary (Last 24 hours) at 6/15/2022 1652  Last data filed at 6/15/2022 0019  Gross per 24 hour   Intake --   Output 175 ml   Net -175 ml       Physical Exam Performed:    /84   Pulse 69   Temp 98.3 °F (36.8 °C) (Oral)   Resp 22   Ht 6' 1\" (1.854 m)   Wt 286 lb 9.6 oz (130 kg)   SpO2 94%   BMI 37.81 kg/m²     General appearance: No apparent distress, appears stated age and cooperative. Oxygen by nasal cannula 2 L/min  HEENT: Pupils equal, round, and reactive to light. Conjunctivae/corneas clear. Neck: Supple, with full range of motion. No jugular venous distention. Trachea midline. Respiratory:  Normal respiratory effort. Clear to auscultation, bilaterally without Rales/Wheezes/Rhonchi. Cardiovascular: Regular rate and rhythm with normal S1/S2 without murmurs, rubs or gallops. Abdomen: Soft, non-tender, non-distended with normal bowel sounds. Musculoskeletal: No clubbing, cyanosis or edema bilaterally. Full range of motion without deformity. Skin: Chronic venous stasis changes with brawny edema BLE; stage III ulcerations anterior tibial region R>L weeping mucopurulent foul-smelling drainage; currently on creape bandages  Neurologic:  Neurovascularly intact without any focal sensory/motor deficits.  Cranial nerves: II-XII intact, grossly non-focal.  Psychiatric: Alert and oriented, thought content appropriate, normal insight  Capillary Refill: Brisk,< 3 seconds   Peripheral Pulses: +2 palpable, equal bilaterally       Labs:   Recent Labs     06/13/22  0824 06/15/22  0629   WBC 11.2* 8.9   HGB 13.8 14.2   HCT 42.5 42.7    212     Recent Labs     06/13/22  1441 06/13/22  1441 06/13/22 2057 06/14/22  0437 06/15/22  0629      < > 139 140  139 141   K 3.4*   < > 4.2 3.0*  3.0* 3.5   CL 98*   < > 97* 97*  96* 98*   CO2 33*   < > 30 34*  34* 36*   BUN 15   < > 15 16  16 19   CREATININE 0.6*   < > 0.7* 0.6*  0.6* 0.6*   CALCIUM 8.2*   < > 8.4 8.7  8.7 8.9   PHOS 2.6  --  2.7 3.2  --     < > = values in this interval not displayed. Recent Labs     06/13/22  0824   AST 22   ALT 27   BILITOT 1.5*   ALKPHOS 81     No results for input(s): INR in the last 72 hours. Recent Labs     06/12/22  1914   TROPONINI 0.03*     Radiology:  CT CHEST PULMONARY EMBOLISM W CONTRAST   Final Result   No evidence of a pulmonary embolus. Mildly dilated and atherosclerotic thoracic aorta with no aneurysm or   dissection. 12 mm bilobed nodule or infiltrate along the right lower lobe posteriorly. Recommend short-term follow-up or PET scan correlation      Mild left basilar atelectasis vs early infiltrates or scarring   posterolaterally on which is less prominent. Moderately severe wedge compression fracture of T6 which is more apparent,   the etiology and age of which is uncertain. Suggest PET scan correlation, if   indicated. Moderate coronary artery calcifications. Moderate hiatal hernia         XR TIBIA FIBULA RIGHT (2 VIEWS)   Final Result   Soft tissue swelling without acute osseous abnormality         XR CHEST PORTABLE   Final Result   1. No acute abnormality.            Active Hospital Problems    Diagnosis Date Noted    Severe sleep apnea [G47.30] 11/14/2017     Priority: High    Acute chest pain [R07.9] 06/12/2022     Priority: Medium    SIRS (systemic inflammatory response syndrome) (HCC) [R65.10] 06/12/2022     Priority: Medium    Heart failure, diastolic, with acute decompensation ([facility] [address] [I50.33] 06/12/2022     Priority: Medium    Venous stasis dermatitis of both lower extremities [I87.2] 06/03/2016     Priority: Medium    Morbid obesity with BMI of 40.0-44.9, adult ([facility] [address] [E66.01, Z68.41] 08/16/2013     Priority: Medium    Non-pressure chronic ulcer of right calf with fat layer exposed ([facility] [address] [L97.212] 03/23/2021    Paroxysmal atrial fibrillation ([facility] [address] [I48.0] 10/22/2019    Chronic pulmonary edema [J81.1] 11/03/2017     Assessment/Plan:  Chest discomfort with exertional shortness of breath POA  -Admitted to telemetry monitor and serial cardiac enzymes monitored (0.02--> 0.03)  -Continue aspirin, high-dose statin, nitro as needed  -Chest CT with PE protocol ordered by admitting physician with results pending at time of dictation  -ECHO pending  -  Cardiology evaluated and felt chest discomfort not consistent with ACS; further recommendations pending echocardiogram     Acute on chronic diastolic CHF POA -improved  -Received IV Lasix gtt. initially; cardiology evaluated and discontinued Lasix infusion this morning and transition to home dose of torsemide, Aldactone.  -Continue home doses of Tenormin,  Imdur; continue I/O monitoring and daily weights  -ECHO scheduled to further assess cardiac structure and function -pending  -2G Na and fluid restriction dietary modifications in place; net negative balance of 2 L thus far     Sepsis due to BLE dermatitis/cellulitis POA  -  Sepsis fluid bundle NOT ADMINISTERED due to concomitant acute CHF; clinically sepsis syndrome resolved  -Blood, urine cultures ordered -no growth to date  -Patient initiated on IV vancomycin and cefepime empirically pending pathogen identification; pharmacy consulted for dosage assistance  -Serial CBC, CMP, lactate, procalcitonin to monitor treatment progression  -Wound care following and assisting with management    PAF  -Patient currently rate controlled on beta-blocker  -Continue Eliquis anticoagulation     IDDM with BMI 43.91  -A1c 7.4% this admit  -Home oral hypoglycemic medications placed on temporary hold  -Noted hypoglycemia on 6/14/2022. Decreased home dose of Lantus from 46 units to 40 units twice daily   -PRN Humalog medium dose SSI scheduled before meals and at bedtime based on POC glucose  -Carbohydrate restriction placed on diet    Morbid Obesity -  With Body mass index is 37.81 kg/m². Complicating assessment and treatment. Placing patient at risk for multiple co-morbidities as well as early death and contributing to the patient's presentation. Counseled on weight loss.      DVT Prophylaxis: Eliquis twice daily   diet: ADULT DIET; Regular; 4 carb choices (60 gm/meal); Low Sodium (2 gm); 2000 ml  Code Status: Full Code    PT/OT Eval Status: Evaluated and recommended SNF. Patient declining and wishes to go home with home care    Dispo -likely tomorrow pending clinical improvement. The note was completed using Dragon -speech recognition software & EMR  . Every effort was made to ensure accuracy; however, inadvertent computerized transcription errors may be present.     Skylar Larsen MD

## 2022-06-16 ENCOUNTER — TELEPHONE (OUTPATIENT)
Dept: FAMILY MEDICINE CLINIC | Age: 72
End: 2022-06-16

## 2022-06-16 VITALS
OXYGEN SATURATION: 93 % | WEIGHT: 292 LBS | HEIGHT: 73 IN | SYSTOLIC BLOOD PRESSURE: 160 MMHG | TEMPERATURE: 98.4 F | RESPIRATION RATE: 20 BRPM | BODY MASS INDEX: 38.7 KG/M2 | DIASTOLIC BLOOD PRESSURE: 93 MMHG | HEART RATE: 88 BPM

## 2022-06-16 LAB
ANION GAP SERPL CALCULATED.3IONS-SCNC: 10 MMOL/L (ref 3–16)
BLOOD CULTURE, ROUTINE: NORMAL
BUN BLDV-MCNC: 21 MG/DL (ref 7–20)
CALCIUM SERPL-MCNC: 9.1 MG/DL (ref 8.3–10.6)
CHLORIDE BLD-SCNC: 97 MMOL/L (ref 99–110)
CO2: 33 MMOL/L (ref 21–32)
CREAT SERPL-MCNC: 0.6 MG/DL (ref 0.8–1.3)
CULTURE, BLOOD 2: NORMAL
GFR AFRICAN AMERICAN: >60
GFR NON-AFRICAN AMERICAN: >60
GLUCOSE BLD-MCNC: 109 MG/DL (ref 70–99)
GLUCOSE BLD-MCNC: 115 MG/DL (ref 70–99)
MAGNESIUM: 1.6 MG/DL (ref 1.8–2.4)
PERFORMED ON: ABNORMAL
POTASSIUM SERPL-SCNC: 3.5 MMOL/L (ref 3.5–5.1)
SODIUM BLD-SCNC: 140 MMOL/L (ref 136–145)
VANCOMYCIN RANDOM: 14.7 UG/ML

## 2022-06-16 PROCEDURE — 2580000003 HC RX 258: Performed by: HOSPITALIST

## 2022-06-16 PROCEDURE — 80202 ASSAY OF VANCOMYCIN: CPT

## 2022-06-16 PROCEDURE — 6360000002 HC RX W HCPCS: Performed by: HOSPITALIST

## 2022-06-16 PROCEDURE — 6370000000 HC RX 637 (ALT 250 FOR IP): Performed by: HOSPITALIST

## 2022-06-16 PROCEDURE — 6370000000 HC RX 637 (ALT 250 FOR IP): Performed by: INTERNAL MEDICINE

## 2022-06-16 PROCEDURE — 6370000000 HC RX 637 (ALT 250 FOR IP)

## 2022-06-16 PROCEDURE — 36415 COLL VENOUS BLD VENIPUNCTURE: CPT

## 2022-06-16 PROCEDURE — 80048 BASIC METABOLIC PNL TOTAL CA: CPT

## 2022-06-16 PROCEDURE — 83735 ASSAY OF MAGNESIUM: CPT

## 2022-06-16 RX ORDER — DOXYCYCLINE HYCLATE 100 MG
100 TABLET ORAL EVERY 12 HOURS SCHEDULED
Status: DISCONTINUED | OUTPATIENT
Start: 2022-06-16 | End: 2022-06-16 | Stop reason: HOSPADM

## 2022-06-16 RX ORDER — DOXYCYCLINE HYCLATE 100 MG
TABLET ORAL
Status: COMPLETED
Start: 2022-06-16 | End: 2022-06-16

## 2022-06-16 RX ORDER — DOXYCYCLINE HYCLATE 100 MG
100 TABLET ORAL EVERY 12 HOURS SCHEDULED
Qty: 12 TABLET | Refills: 0 | Status: SHIPPED | OUTPATIENT
Start: 2022-06-16 | End: 2022-09-02 | Stop reason: SDUPTHER

## 2022-06-16 RX ADMIN — MONTELUKAST SODIUM 10 MG: 10 TABLET ORAL at 09:06

## 2022-06-16 RX ADMIN — AZELASTINE HYDROCHLORIDE 1 SPRAY: 137 SPRAY, METERED NASAL at 09:08

## 2022-06-16 RX ADMIN — SPIRONOLACTONE 25 MG: 25 TABLET ORAL at 09:07

## 2022-06-16 RX ADMIN — CEFEPIME HYDROCHLORIDE 2000 MG: 2 INJECTION, POWDER, FOR SOLUTION INTRAVENOUS at 00:35

## 2022-06-16 RX ADMIN — HYDRALAZINE HYDROCHLORIDE 25 MG: 25 TABLET, FILM COATED ORAL at 06:00

## 2022-06-16 RX ADMIN — PANTOPRAZOLE SODIUM 40 MG: 40 TABLET, DELAYED RELEASE ORAL at 06:00

## 2022-06-16 RX ADMIN — ACETAMINOPHEN 650 MG: 325 TABLET ORAL at 06:00

## 2022-06-16 RX ADMIN — FERROUS SULFATE TAB 325 MG (65 MG ELEMENTAL FE) 325 MG: 325 (65 FE) TAB at 09:06

## 2022-06-16 RX ADMIN — ATENOLOL 25 MG: 25 TABLET ORAL at 09:07

## 2022-06-16 RX ADMIN — SODIUM CHLORIDE, PRESERVATIVE FREE 10 ML: 5 INJECTION INTRAVENOUS at 09:08

## 2022-06-16 RX ADMIN — ISOSORBIDE MONONITRATE 30 MG: 30 TABLET, EXTENDED RELEASE ORAL at 09:07

## 2022-06-16 RX ADMIN — TORSEMIDE 40 MG: 20 TABLET ORAL at 09:06

## 2022-06-16 RX ADMIN — SERTRALINE 50 MG: 50 TABLET, FILM COATED ORAL at 09:07

## 2022-06-16 RX ADMIN — ASPIRIN 81 MG: 81 TABLET, COATED ORAL at 09:06

## 2022-06-16 RX ADMIN — SODIUM CHLORIDE: 9 INJECTION, SOLUTION INTRAVENOUS at 00:34

## 2022-06-16 RX ADMIN — Medication: at 09:07

## 2022-06-16 RX ADMIN — APIXABAN 5 MG: 5 TABLET, FILM COATED ORAL at 09:07

## 2022-06-16 RX ADMIN — Medication 100 MG: at 10:34

## 2022-06-16 RX ADMIN — Medication 1 TABLET: at 09:06

## 2022-06-16 RX ADMIN — DOXYCYCLINE HYCLATE 100 MG: 100 TABLET, COATED ORAL at 10:34

## 2022-06-16 RX ADMIN — INSULIN GLARGINE 40 UNITS: 100 INJECTION, SOLUTION SUBCUTANEOUS at 09:15

## 2022-06-16 NOTE — DISCHARGE SUMMARY
Hospital Medicine Discharge Summary    Patient ID: Harjinder Mack      Patient's PCP: SHAKIRA Moreno CNP    Admit Date: 6/12/2022     Discharge Date:  6/16/2022    Admitting Provider: Tanner Justice MD     Discharge Provider: Yobani De Paz MD     Discharge Diagnoses: Active Hospital Problems    Diagnosis     Severe sleep apnea [G47.30]      Priority: High    Acute chest pain [R07.9]      Priority: Medium    SIRS (systemic inflammatory response syndrome) (HCC) [R65.10]      Priority: Medium    Heart failure, diastolic, with acute decompensation (HCC) [I50.33]      Priority: Medium    Venous stasis dermatitis of both lower extremities [I87.2]      Priority: Medium    Morbid obesity with BMI of 40.0-44.9, adult ([facility]) [E66.01, Z68.41]      Priority: Medium    Non-pressure chronic ulcer of right calf with fat layer exposed ([facility] [address] [L97.212]     Paroxysmal atrial fibrillation (HCC) [I48.0]     Chronic pulmonary edema [J81.1]        The patient was seen and examined on day of discharge and this discharge summary is in conjunction with any daily progress note from day of discharge. Hospital Course: By problem list  Chest discomfort with exertional shortness of breath POA  -Admitted to telemetry monitor and serial cardiac enzymes monitored (0.02--> 0.03)  -Continue aspirin, high-dose statin, nitro as needed  -Chest CT with PE protocol ordered by admitting physician with results pending at time of dictation  -ECHO done and reported below   -  Cardiology evaluated and felt chest discomfort not consistent with ACS; no further work-up needed at this time     Acute on chronic diastolic CHF POA -improved  -Received IV Lasix gtt.  initially; cardiology evaluated and discontinued Lasix infusion this morning and transition to home dose of torsemide, Aldactone.  -Continue home doses of Tenormin,  Imdur; monitored I/O and daily weights -  net negative balance of 2 L thus far  -ECHO on 6/15/2022 showed low normal LVEF 50%; biatrial enlargement; moderate aortic stenosis; severe pulmonary hypertension with SPAP 69 mmHg  -2G Na and fluid restriction dietary modifications in place    Sepsis due to BLE dermatitis/cellulitis POA  -  Sepsis fluid bundle NOT ADMINISTERED due to concomitant acute CHF; clinically sepsis syndrome resolved  -Blood, urine cultures ordered -no growth to date  -Patient initiated on IV vancomycin and cefepime empirically pending pathogen identification; pharmacy consulted for dosage assistance  -Serial CBC, CMP, lactate, procalcitonin to monitor treatment progression  -Wound care following and assisting with management     PAF  -Patient currently rate controlled on beta-blocker  -Continue Eliquis anticoagulation     IDDM with BMI 38.52  -A1c 7.4% this admit  -Home oral hypoglycemic medications placed on temporary hold. -Noted hypoglycemia on 6/14/2022. Decreased home dose of Lantus from 46 units to 40 units twice daily and to be continued at this dose at discharge  -PRN Humalog medium dose SSI scheduled before meals and at bedtime based on POC glucose  -Carbohydrate restriction placed on diet     Morbid Obesity -  With Body mass index is 38.52 kg/m². Complicating assessment and treatment. Placing patient at risk for multiple co-morbidities as well as early death and contributing to the patient's presentation. Counseled on weight loss. MONIQUE on CPAP -counseled about CPAP compliance especially given severe pulmonary hypertension on echocardiogram and cor pulmonale . Patient reports that he will be compliant with CPAP    Patient evaluated by PT OT during hospital stay and was recommended for SNF. Patient declined and wished to go home with home care. Social work assisted with disposition.       Physical Exam Performed:   BP (!) 160/93   Pulse 88   Temp 98.4 °F (36.9 °C) (Oral)   Resp 20   Ht 6' 1\" (1.854 m)   Wt 292 lb (132.5 kg)   SpO2 93%   BMI 38.52 kg/m² General appearance: No apparent distress, appears stated age and cooperative. Oxygen by nasal cannula 2 L/min  HEENT: Pupils equal, round, and reactive to light. Conjunctivae/corneas clear. Neck: Supple, with full range of motion. No jugular venous distention. Trachea midline. Respiratory:  Normal respiratory effort. Clear to auscultation, bilaterally without Rales/Wheezes/Rhonchi. Cardiovascular: Regular rate and rhythm with normal S1/S2 without murmurs, rubs or gallops. Abdomen: Soft, non-tender, non-distended with normal bowel sounds. Musculoskeletal: No clubbing, cyanosis or edema bilaterally. Full range of motion without deformity. Skin: Chronic venous stasis changes with brawny edema BLE; stage III ulcerations anterior tibial region R>L weeping mucopurulent foul-smelling drainage; currently on creape bandages  Neurologic:  Neurovascularly intact without any focal sensory/motor deficits. Cranial nerves: II-XII intact, grossly non-focal.  Psychiatric: Alert and oriented, thought content appropriate, normal insight  Capillary Refill: Brisk,< 3 seconds   Peripheral Pulses: +2 palpable, equal bilaterally       Labs: For convenience and continuity at follow-up the following most recent labs are provided:      CBC:    Lab Results   Component Value Date    WBC 8.9 06/15/2022    HGB 14.2 06/15/2022    HCT 42.7 06/15/2022     06/15/2022       Renal:    Lab Results   Component Value Date     06/16/2022    K 3.5 06/16/2022    K 3.3 06/13/2022    CL 97 06/16/2022    CO2 33 06/16/2022    BUN 21 06/16/2022    CREATININE 0.6 06/16/2022    CALCIUM 9.1 06/16/2022    PHOS 3.2 06/14/2022         Significant Diagnostic Studies    Radiology:   CT CHEST PULMONARY EMBOLISM W CONTRAST   Final Result   No evidence of a pulmonary embolus. Mildly dilated and atherosclerotic thoracic aorta with no aneurysm or   dissection. 12 mm bilobed nodule or infiltrate along the right lower lobe posteriorly. Recommend short-term follow-up or PET scan correlation      Mild left basilar atelectasis vs early infiltrates or scarring   posterolaterally on which is less prominent. Moderately severe wedge compression fracture of T6 which is more apparent,   the etiology and age of which is uncertain. Suggest PET scan correlation, if   indicated. Moderate coronary artery calcifications. Moderate hiatal hernia         XR TIBIA FIBULA RIGHT (2 VIEWS)   Final Result   Soft tissue swelling without acute osseous abnormality         XR CHEST PORTABLE   Final Result   1. No acute abnormality.            Consults:   PHARMACY TO DOSE MEDICATION  IP CONSULT TO HEART FAILURE NURSE/COORDINATOR  IP CONSULT TO DIETITIAN  IP CONSULT TO CARDIOLOGY    Disposition: Home with home care     Condition at Discharge: Stable    Discharge Instructions/Follow-up: Follow-up with PCP, cardiology as instructed    Code Status:  Full Code     Activity: activity as tolerated    Diet: cardiac diet and diabetic diet      Discharge Medications:   Current Discharge Medication List           Details   doxycycline hyclate (VIBRA-TABS) 100 MG tablet Take 1 tablet by mouth every 12 hours for 12 doses  Qty: 12 tablet, Refills: 0              Details   Insulin Glargine, 2 Unit Dial, (TOUJEO MAX SOLOSTAR) 300 UNIT/ML SOPN Inject 46 Units into the skin 2 times daily  Qty: 5 pen, Refills: 11    Associated Diagnoses: Type 2 diabetes mellitus with hyperglycemia, with long-term current use of insulin (HCC)      torsemide (DEMADEX) 20 MG tablet TAKE 2 TABLETS BY MOUTH EVERY DAY  Qty: 180 tablet, Refills: 1      spironolactone (ALDACTONE) 25 MG tablet TAKE 1 TABLET BY MOUTH EVERY DAY  Qty: 90 tablet, Refills: 3    Associated Diagnoses: Chronic diastolic congestive heart failure (HCC)      Zinc Sulfate (ZINC 15 PO) Take by mouth in the morning and at bedtime      ELDERBERRY PO Take by mouth 2 times daily      Ascorbic Acid (VITAMIN C PO) Take by mouth in the morning and at bedtime      Apoaequorin (PREVAGEN) 10 MG CAPS Take 1 capsule by mouth daily      sertraline (ZOLOFT) 50 MG tablet TAKE 1 TABLET BY MOUTH EVERY DAY  Qty: 90 tablet, Refills: 3    Associated Diagnoses: Moderate episode of recurrent major depressive disorder ([facility])      ELIQUIS 5 MG TABS tablet TAKE 1 TABLET BY MOUTH TWICE A DAY  Qty: 180 tablet, Refills: 2    Associated Diagnoses: Paroxysmal atrial fibrillation (HCC)      tiZANidine (ZANAFLEX) 4 MG tablet TAKE 1 TABLET BY MOUTH NIGHTLY AS NEEDED (PAIN)  Qty: 30 tablet, Refills: 0    Associated Diagnoses: Closed wedge compression fracture of T6 vertebra, initial encounter ([facility])      azelastine (ASTELIN) 0.1 % nasal spray 1 spray by Nasal route 2 times daily Use in each nostril as directed  Qty: 30 mL, Refills: 5    Associated Diagnoses:  Allergic rhinitis, unspecified seasonality, unspecified trigger      Semaglutide,0.25 or 0.5MG/DOS, (OZEMPIC, 0.25 OR 0.5 MG/DOSE,) 2 MG/1.5ML SOPN Inject 0.5 mg into the skin once a week  Qty: 4 pen, Refills: 5    Associated Diagnoses: Type 2 diabetes mellitus with hyperglycemia, with long-term current use of insulin ([facility])      atorvastatin (LIPITOR) 40 MG tablet TAKE 1 TABLET BY MOUTH EVERY DAY AT NIGHT  Qty: 90 tablet, Refills: 3    Associated Diagnoses: Mixed hyperlipidemia      isosorbide mononitrate (IMDUR) 30 MG extended release tablet TAKE 1 TABLET BY MOUTH EVERY DAY  Qty: 90 tablet, Refills: 3      montelukast (SINGULAIR) 10 MG tablet TAKE 1 TABLET BY MOUTH EVERY DAY  Qty: 90 tablet, Refills: 3      glimepiride (AMARYL) 4 MG tablet TAKE 1 TABLET BY MOUTH TWICE A DAY  Qty: 180 tablet, Refills: 3    Associated Diagnoses: Type 2 diabetes mellitus with hyperglycemia, with long-term current use of insulin ([facility])      atenolol (TENORMIN) 50 MG tablet Take 0.5 tablets by mouth daily  Qty: 90 tablet, Refills: 3    Associated Diagnoses: Essential hypertension      omeprazole (PRILOSEC) 40 MG delayed release capsule TAKE 1 CAPSULE BY MOUTH EVERY DAY  Qty: 90 capsule, Refills: 3    Associated Diagnoses: Gastroesophageal reflux disease without esophagitis      hydrALAZINE (APRESOLINE) 50 MG tablet TAKE 1/2 TABLET BY MOUTH EVERY 8 HOURS  Qty: 135 tablet, Refills: 7      !! Handicap Chris MISC by Does not apply route Please issue for duration of 5 years  Qty: 1 each, Refills: 0    Associated Diagnoses: Nonrheumatic aortic valve stenosis; Primary osteoarthritis involving multiple joints; Coronary artery disease involving native coronary artery of native heart without angina pectoris      !! Handicap Chris MISC by Does not apply route Please issue for duration of 5 years  Qty: 1 each, Refills: 0    Associated Diagnoses: Nonrheumatic aortic valve stenosis; Primary osteoarthritis involving multiple joints; Coronary artery disease involving native coronary artery of native heart without angina pectoris      Insulin Pen Needle 31G X 5 MM MISC 1 each by Does not apply route daily  Qty: 400 each, Refills: 5    Associated Diagnoses: Type 2 diabetes mellitus with hyperglycemia, with long-term current use of insulin ([facility])      Glucosamine-Chondroitin (GLUCOSAMINE CHONDR COMPLEX PO) Take 1 tablet by mouth 2 times daily      blood glucose monitor kit and supplies by Other route daily One Touch Ultra  Qty: 1 kit, Refills: 0      glucose blood VI test strips (ASCENSIA AUTODISC VI;ONE TOUCH ULTRA TEST VI) strip DX: E11.9 FSBS daily. One Touch ultra  Qty: 100 strip, Refills: 5    Associated Diagnoses: Type 2 diabetes mellitus with hyperglycemia, with long-term current use of insulin (HCC)      aspirin 81 MG chewable tablet Take 1 tablet by mouth daily  Qty: 30 tablet, Refills: 0      Multiple Vitamins-Minerals (THERAPEUTIC MULTIVITAMIN-MINERALS) tablet Take 1 tablet by mouth daily      ferrous sulfate 325 (65 FE) MG tablet Take 325 mg by mouth daily        ! ! - Potential duplicate medications found. Please discuss with provider.           Time Spent on discharge is more than 30 minutes in the examination, evaluation, counseling and review of medications and discharge plan. Signed:    Bela Yun MD   6/16/2022      Thank you SHAKIRA LOWERY CNP for the opportunity to be involved in this patient's care. If you have any questions or concerns, please feel free to contact me at [phone].

## 2022-06-16 NOTE — CARE COORDINATION
CASE MANAGEMENT DISCHARGE SUMMARY      Discharge to: home with [facility]    IMM given: (date) 6/16         Transportation:    Family/car:        Confirmed discharge plan with:     Patient: yes      Family:  No; pt has already called wife     Facility/Agency, name: [facility]- pulled from [employer], name:  Zainab Rocío  Note: Discharging nurse to complete RONNY, reconcile AVS, and place final copy with patient's discharge packet. RN to ensure that written prescriptions for  Level II medications are sent with patient to the facility as per protocol.

## 2022-06-16 NOTE — PLAN OF CARE
Problem: Pain  Goal: Verbalizes/displays adequate comfort level or baseline comfort level  Outcome: Completed     Problem: Safety - Adult  Goal: Free from fall injury  Outcome: Completed     Problem: ABCDS Injury Assessment  Goal: Absence of physical injury  Outcome: Completed     Problem: Skin/Tissue Integrity  Goal: Absence of new skin breakdown  Description: 1. Monitor for areas of redness and/or skin breakdown  2. Assess vascular access sites hourly  3. Every 4-6 hours minimum:  Change oxygen saturation probe site  4. Every 4-6 hours:  If on nasal continuous positive airway pressure, respiratory therapy assess nares and determine need for appliance change or resting period.   Outcome: Completed     Problem: Chronic Conditions and Co-morbidities  Goal: Patient's chronic conditions and co-morbidity symptoms are monitored and maintained or improved  Outcome: Completed

## 2022-06-16 NOTE — CARE COORDINATION
[facility]    Referral received from  to follow for home care services.    [facility] unable to staff    Patient has no preference in agency    Referral sent to [facility] accepted referral spoke w [address] Josefa Julien RN, BSN CTN  [facility] [phone]

## 2022-06-16 NOTE — DISCHARGE INSTR - COC
Continuity of Care Form    Patient Name: Jason Donaldson   :  1950  MRN:  [id number]    Admit date:  2022  Discharge date:  ***    Code Status Order: Full Code   Advance Directives:      Admitting Physician:  Jamar Morel MD  PCP: Clara Vital, APRN - CNP    Discharging Nurse: [facility] Unit/Room#: 5330/0407-77  Discharging Unit Phone Number: ***    Emergency Contact:   Extended Emergency Contact Information  Primary Emergency Contact: Dayan Hansen  Address: [address] [place] [address], [address] [facility],[address] Phone: [phone]  Mobile Phone: [phone]  Relation: Spouse    Past Surgical History:  Past Surgical History:   Procedure Laterality Date    ABDOMEN SURGERY  2014    reveseral end peristomal hernia repair.     CARDIOVERSION  2019    Dr. Ora Fabry, OPEN N/A 13    LAPAROSCOPIC CONVERTED TO OPEN CHOLECYSTECTOMY WITH    COLON SURGERY      COLONOSCOPY      COLONOSCOPY  2013    Severe Diverticulosis unable to finish    COLONOSCOPY  14    diverticula-10 year f/u    COLOSTOMY  2014    CYSTOSCOPY  12/10/13    with bladder biopsy    CYSTOSCOPY  14    Cystourethroscopy, left ureteral catheter     EPIDURAL STEROID INJECTION Right 2019    RIGHT LUMBAR TWO THREE EPIDURAL STEROID INJECTION SITE CONFIRMED BY FLUROOSCOPY performed by Aby Leon MD at [address] Right 2019    RIGHT LUMBAR TWO THREE EPIDURAL STEROID INJECTION SITE CONFIRMED BY FLUOROSCOPY performed by Aby Leon MD at [facility]  2015    OPEN INCISIONAL HERNIA REPAIR WITH MESH, BILATERAL COMPONENT SEPARATION, LYSIS OF ADHESIONS    OTHER SURGICAL HISTORY  14    LeftColectomy with End Colostomy, Splenic Flexure Mobilization, Takedown of Colovesical Fistula    [place] INJECTION [facility] Right 2018    ARTHROGRAM AND CORTISONE INJECTION RIGHT HIP performed by Marian Barker MD at [address] [place] [address] Right 2003    Fracture lower leg during chain saw accident.  Surgery x 2    VASCULAR SURGERY Left 05/2021    per Dr. Maylin Craig Right 05/10/2021    venous procedure RLE-per Dr. Lilliam Tarango        Immunization History:   Immunization History   Administered Date(s) Administered    COVID-19, Pfizer Gray top, DO NOT Dilute, Yash-Sucrose, 12+ yrs, PF, 30 mcg/0.3 mL dose 05/04/2022    COVID-19, Pfizer Purple top, DILUTE for use, 12+ yrs, 30mcg/0.3mL dose 02/03/2021, 02/24/2021, 09/28/2021    Influenza 09/20/2012    Influenza A (G6E5-61) Vaccine PF IM 01/26/2010    Influenza Virus Vaccine 09/18/2013    Influenza, High Dose (Fluzone 65 yrs and older) 10/13/2014, 09/16/2015, 09/26/2016, 10/26/2017, 10/16/2018, 11/22/2021    Influenza, Harlen Kilts, adjuvanted, 65 yrs +, IM, PF (Fluad) 09/14/2020    Influenza, Triv, inactivated, subunit, adjuvanted, IM (Fluad 65 yrs and older) 09/16/2019    Pneumococcal Conjugate 13-valent (Xtpzhbd79) 12/16/2015    Pneumococcal Polysaccharide (Ljcvmicdj21) 08/17/2013, 10/19/2013, 10/13/2014    Td, unspecified formulation 12/08/2014, 12/08/2014    Zoster Live (Zostavax) 06/25/2014    Zoster Recombinant (Shingrix) 03/18/2022, 06/01/2022       Active Problems:  Patient Active Problem List   Diagnosis Code    Type 2 diabetes mellitus with hyperglycemia ([facility] E11.65    Osteoarthritis M19.90    Morbid obesity with BMI of 40.0-44.9, adult ([facility] E66.01, Z68.41    Edema R60.9    Anemia D64.9    Bradycardia R00.1    Venous stasis ulcer of right lower extremity (HCC) I83.019, L97.919    Venous thrombosis of leg I82.90    Venous stasis dermatitis of both lower extremities I87.2    Hyperbilirubinemia E80.6    Moderate episode of recurrent major depressive disorder (HCC) F33.1    Renal insufficiency N28.9    Anxiety F41.9    Essential hypertension I10    Non-seasonal allergic rhinitis J30.89    Mixed hyperlipidemia E78.2    Chronic congestive heart failure (HCC) I50.9    Chronic pulmonary edema J81.1    Coronary artery disease involving native coronary artery of native heart without angina pectoris I25.10    Nonrheumatic aortic valve stenosis I35.0    Severe sleep apnea G47.30    Primary insomnia F51.01    Venous stasis ulcer of left calf limited to breakdown of skin with varicose veins ([facility]) I83.022, L97.221    Stage 3 chronic kidney disease ([facility]) N18.30    Renal osteodystrophy N25.0    Peripheral edema R60.9    Primary osteoarthritis of right hip M16.11    Grade III diastolic dysfunction J96.79    Paroxysmal atrial fibrillation ([facility]) I48.0    Simple chronic bronchitis ([facility]) J41.0    Other specified peripheral vascular diseases ([facility]) I73.89    Non-pressure chronic ulcer of right calf with fat layer exposed ([facility]) L97.212    Non-pressure chronic ulcer of left calf with fat layer exposed ([facility]) L97.222    Atrial flutter ([facility]) I48.92    Closed wedge compression fracture of sixth thoracic vertebra ([facility]) S22.050A    Acute chest pain R07.9    SIRS (systemic inflammatory response syndrome) ([facility]) R65.10    Heart failure, diastolic, with acute decompensation ([facility]) I50.33       Isolation/Infection:   Isolation            Contact          Patient Infection Status       None to display            Nurse Assessment:  Last Vital Signs: BP (!) 160/93   Pulse 88   Temp 98.4 °F (36.9 °C) (Oral)   Resp 20   Ht 6' 1\" (1.854 m)   Wt 292 lb (132.5 kg)   SpO2 93%   BMI 38.52 kg/m²     Last documented pain score (0-10 scale): Pain Level: 1  Last Weight:   Wt Readings from Last 1 Encounters:   06/16/22 292 lb (132.5 kg)     Mental Status:  {IP PT MENTAL STATUS:20030}    IV Access:  {[facility] ACCESS:438523857}    Nursing Mobility/ADLs:  Walking   {[facility] DME LYCY:686161615}  Transfer  {[facility] DME FYQC:332644692}  Bathing  {[facility] DME TWPQ:544192390}  Dressing  {[facility] DME JCVX:509464173}  Toileting  {[facility] DME ZZUU:284095471}  Feeding  {[facility] DME WUXJ:421649534}  Med Admin  {[facility] DME QFCN:645873458}  Med Delivery   { [facility] MED Delivery:674551241}    Wound Care Documentation and Therapy:  Wound 04/14/16 wound #1 Right medial lower leg (onset 3/1/16 trauma) Venous, full thickness (Active)   Number of days: 6546       Wound 04/14/16 wound #2 Right lateral lower leg (onset 2/15/16 trauma) Venous, full thickness healed 4/21/16 (Active)   Number of days: 4882       Wound 04/28/16 wound #2 left pretib wound, venous, due to trauma bumped leg 4/26/16 full thicvkness  healed 5/26/16 (Active)   Number of days: 2240       Wound 05/05/16 Wound #3 Left Medial Leg,trauma,venous ulcer,partial thickness,onset 5/3/16 healed 5/19/16 (Active)   Number of days: 9859       Wound 10/16/17 Abrasion(s) Leg Left; Lower 2.8 cm x 3 cm (Active)   Number of days: 1703       Incision 05/16/14 Abdomen (Active)   Number of days: 2027       Incision 08/14/15 Abdomen (Active)   Number of days: 2497       Incision 09/19/18 Hip Right (Active)   Number of days: 8354       Wound 06/12/22 Pretibial Distal;Right (Active)   Wound Image     06/13/22 1529   Wound Etiology Venous 06/13/22 1529   Dressing Status Clean;Dry; Intact 06/15/22 1900   Wound Cleansed Cleansed with saline 06/13/22 1529   Dressing/Treatment Xeroform 06/13/22 1529   Offloading for Diabetic Foot Ulcers Offloading ordered 06/13/22 1529   Dressing Change Due 06/14/22 06/13/22 1529   Wound Length (cm) 14 cm 06/13/22 1529   Wound Width (cm) 14 cm 06/13/22 1529   Wound Depth (cm) 0.1 cm 06/13/22 1529   Wound Surface Area (cm^2) 196 cm^2 06/13/22 1529   Wound Volume (cm^3) 19.6 cm^3 06/13/22 1529   Distance Tunneling (cm) 0 cm 06/13/22 1529   Tunneling Position ___ O'Clock 0 06/13/22 1529   Undermining Starts ___ O'Clock 0 06/13/22 1529   Undermining Ends___ O'Clock 0 06/13/22 1529   Undermining Maxium Distance (cm) 0 06/13/22 1529   Wound Assessment Pink/red;Denuded 06/13/22 1529   Drainage Amount Moderate 06/13/22 1529   Drainage Description Yellow;Serosanguinous 06/13/22 1529   Odor Mild 06/13/22 1529   Mago-wound Assessment Dry/flaky; Hemosiderin staining (brown yellow) 06/13/22 1529   Margins Attached edges; Defined edges 06/13/22 1529   Wound Thickness Description not for Pressure Injury Partial thickness 06/13/22 1529   Number of days: 3       Wound 06/12/22 Leg Left; Inner (Active)   Wound Image    06/13/22 1529   Wound Etiology Venous 06/13/22 1529   Dressing Status Clean;Dry; Intact 06/15/22 1900   Wound Cleansed Cleansed with saline 06/13/22 1529   Dressing/Treatment Xeroform;Dry dressing;ABD;Roll gauze; Ace wrap 06/13/22 1529   Offloading for Diabetic Foot Ulcers Offloading ordered 06/13/22 1529   Dressing Change Due 06/14/22 06/13/22 1529   Wound Length (cm) 2 cm 06/13/22 1529   Wound Width (cm) 2 cm 06/13/22 1529   Wound Depth (cm) 0.1 cm 06/13/22 1529   Wound Surface Area (cm^2) 4 cm^2 06/13/22 1529   Wound Volume (cm^3) 0.4 cm^3 06/13/22 1529   Distance Tunneling (cm) 0 cm 06/13/22 1529   Tunneling Position ___ O'Clock 0 06/13/22 1529   Undermining Starts ___ O'Clock 0 06/13/22 1529   Undermining Ends___ O'Clock 0 06/13/22 1529   Undermining Maxium Distance (cm) 0 06/13/22 1529   Wound Assessment Pink/red 06/13/22 1529   Drainage Amount Small 06/13/22 1529   Drainage Description Serosanguinous 06/13/22 1529   Odor None 06/13/22 1529   Mago-wound Assessment Hemosiderin staining (brown yellow); Blanchable erythema;Edematous 06/13/22 1529   Margins Attached edges; Defined edges 06/13/22 1529   Wound Thickness Description not for Pressure Injury Partial thickness 06/13/22 1529   Number of days: 3       Wound 06/13/22 Leg Right; Lower; Outer (Active)   Wound Image   06/13/22 1529   Wound Etiology Venous 06/13/22 1529   Dressing Status Clean;Dry; Intact 06/15/22 1900   Wound Cleansed Cleansed with saline 06/13/22 1529   Dressing/Treatment Xeroform;Dry dressing;ABD;Roll gauze; Ace wrap 06/13/22 1529   Offloading for Diabetic Foot Ulcers Offloading ordered 06/13/22 1529   Dressing Change Due 06/14/22 06/13/22 1529   Wound Length (cm) 6 cm 06/13/22 1529   Wound Width (cm) 8 cm 06/13/22 1529   Wound Depth (cm) 0.1 cm 06/13/22 1529   Wound Surface Area (cm^2) 48 cm^2 06/13/22 1529   Wound Volume (cm^3) 4.8 cm^3 06/13/22 1529   Distance Tunneling (cm) 0 cm 06/13/22 1529   Tunneling Position ___ O'Clock 0 06/13/22 1529   Undermining Starts ___ O'Clock 0 06/13/22 1529   Undermining Ends___ O'Clock 0 06/13/22 1529   Undermining Maxium Distance (cm) 0 06/13/22 1529   Wound Assessment Pink/red 06/13/22 1529   Drainage Amount Small 06/13/22 1529   Drainage Description Serosanguinous 06/13/22 1529   Odor None 06/13/22 1529   Mago-wound Assessment Dry/flaky; Intact 06/13/22 1529   Margins Attached edges; Defined edges 06/13/22 1529   Wound Thickness Description not for Pressure Injury Partial thickness 06/13/22 1529   Number of days: 2       Incision 02/11/19 Back Right (Active)   Number of days: 2803        Elimination:  Continence:    Bowel: {YES / NI:33325}  Bladder: {YES / AL:23615}  Urinary Catheter: {Urinary Catheter:718603803}   Colostomy/Ileostomy/Ileal Conduit: {YES / LQ:47766}       Date of Last BM: ***    Intake/Output Summary (Last 24 hours) at 6/16/2022 0931  Last data filed at 6/16/2022 0800  Gross per 24 hour   Intake --   Output 50 ml   Net -50 ml     I/O last 3 completed shifts:  In: -   Out: 175 [Urine:175]    Safety Concerns:     [employer] Safety Concerns:272296324}    Impairments/Disabilities:      [employer] Impairments/Disabilities:664396919}    Nutrition Therapy:  Current Nutrition Therapy:   [employer] Diet List:416692913}    Routes of Feeding: {[facility] DME Other Feedings:936966083}  Liquids: {Slp liquid thickness:37799}  Daily Fluid Restriction: {[facility] DME Yes amt example:891787012}  Last Modified Barium Swallow with Video (Video Swallowing Test): {Done Not Done XUXR:303134857}    Treatments at the Time of Hospital Discharge:   Respiratory Treatments: ***  Oxygen Therapy:  {Therapy; copd oxygen:63606}  Ventilator:    {[facility] CC Vent MUAL:514276146}    Rehab Therapies: {THERAPEUTIC INTERVENTION:2513270774}  Weight Bearing Status/Restrictions: [address] CC Weight Bearin}  Other Medical Equipment (for information only, NOT a DME order):  {EQUIPMENT:639899760}  Other Treatments: ***    Patient's personal belongings (please select all that are sent with patient):  {[facility] DME Belongings:722762587}    RN SIGNATURE:  {Esignature:111464835}    CASE MANAGEMENT/SOCIAL WORK SECTION    Inpatient Status Date: ***    Readmission Risk Assessment Score:  Readmission Risk              Risk of Unplanned Readmission:  19           Discharging to Facility/ Agency   Name:   Address:  Phone:  Fax:    Dialysis Facility (if applicable)   Name:  Address:  Dialysis Schedule:  Phone:  Fax:    / signature: {Esignature:557880129}    PHYSICIAN SECTION    Prognosis: Fair    Condition at Discharge: Stable    Rehab Potential (if transferring to Rehab): Fair    Recommended Labs or Other Treatments After Discharge: Home with Home care     Physician Certification: I certify the above information and transfer of Ayala Stone  is necessary for the continuing treatment of the diagnosis listed and that he requires [address] for less 30 days.      Update Admission H&P: No change in H&P    PHYSICIAN SIGNATURE:  Electronically signed by Roxi Landeros MD on 22 at 9:31 AM EDT

## 2022-06-17 ENCOUNTER — TELEPHONE (OUTPATIENT)
Dept: FAMILY MEDICINE CLINIC | Age: 72
End: 2022-06-17

## 2022-06-17 NOTE — TELEPHONE ENCOUNTER
Shana [facility] Transitions Initial Follow Up Call    Outreach made within 2 business days of discharge: Yes    Patient: Merline Londono Patient : 1950   MRN: [id number]  Reason for Admission: There are no discharge diagnoses documented for the most recent discharge. Discharge Date: 22       Spoke with: called, no answer, lvm for return call to schedule TCM. Current OV for  is too far out from DC date and needs to be moved to sooner appt    Discharge department/facility: [facility]    Non-face-to-face services provided:  Obtained and reviewed discharge summary and/or continuity of care documents    TCM Interactive Patient Contact:  Was patient able to fill all prescriptions: Yes  Was patient instructed to bring all medications to the follow-up visit: Yes  Is patient taking all medications as directed in the discharge summary?  Yes  Does patient understand their discharge instructions: Yes  Does patient have questions or concerns that need addressed prior to 7-14 day follow up office visit: no    Scheduled appointment with PCP within 7-14 days    Follow Up  Future Appointments   Date Time Provider Joanna Reynolds   2022 10:00 AM Leanne Pamella, APRN - CNP [facility]   2022 11:30 AM SHAKIRA John CNP [facility]   2022  9:00 AM SHAKIRA John CNP [facility]   2022  9:30 AM SHAKIRA Zhu CNP [facility] Duarte RN

## 2022-06-20 NOTE — TELEPHONE ENCOUNTER
Eusebio Saldana [facility] Transitions Initial Follow Up Call    Outreach made within 2 business days of discharge: Yes    Patient: Aunsha Gay Patient : 1950   MRN: [id number]  Reason for Admission: There are no discharge diagnoses documented for the most recent discharge. Discharge Date: 22       Spoke with: Patient    Discharge department/facility: [facility]     TCM Interactive Patient Contact:  Was patient able to fill all prescriptions: Yes  Was patient instructed to bring all medications to the follow-up visit: Yes  Is patient taking all medications as directed in the discharge summary?  Yes  Does patient understand their discharge instructions: Yes  Does patient have questions or concerns that need addressed prior to 7-14 day follow up office visit: no    Scheduled appointment with PCP within 7-14 days    Follow Up  Future Appointments   Date Time Provider Joanna Reynolds   2022  1:00 PM Westwood Gaudy, APRN - CNP [facility]   2022 11:30 AM SHAKIRA Agee CNP   2022  9:00 AM SHAKIRA Agee CNP   2022  9:30 AM SHAKIRA Delgadillo CNP

## 2022-06-22 ENCOUNTER — TELEPHONE (OUTPATIENT)
Dept: CASE MANAGEMENT | Age: 72
End: 2022-06-22

## 2022-06-22 NOTE — TELEPHONE ENCOUNTER
Per imaging report CT CHEST PULM EMB 6/12/2022:    12 mm bilobed nodule or infiltrate along the right lower lobe posteriorly. Recommend short-term follow-up or PET scan correlation       Mild left basilar atelectasis vs early infiltrates or scarring   posterolaterally on which is less prominent.       Moderately severe wedge compression fracture of T6 which is more apparent,   the etiology and age of which is uncertain.  Suggest PET scan correlation, if   indicated. May also consider Pulmonology Referral.    Pt has appt with you tomorrow.     Thank you,  Inge Morales RN  [facility] Lung Navigator  [phone]

## 2022-06-23 ENCOUNTER — OFFICE VISIT (OUTPATIENT)
Dept: FAMILY MEDICINE CLINIC | Age: 72
End: 2022-06-23
Payer: MEDICARE

## 2022-06-23 VITALS
RESPIRATION RATE: 18 BRPM | BODY MASS INDEX: 37.47 KG/M2 | WEIGHT: 284 LBS | OXYGEN SATURATION: 94 % | SYSTOLIC BLOOD PRESSURE: 90 MMHG | HEART RATE: 75 BPM | DIASTOLIC BLOOD PRESSURE: 62 MMHG

## 2022-06-23 DIAGNOSIS — N28.9 RENAL INSUFFICIENCY: ICD-10-CM

## 2022-06-23 DIAGNOSIS — E83.42 HYPOMAGNESEMIA: ICD-10-CM

## 2022-06-23 DIAGNOSIS — Z09 HOSPITAL DISCHARGE FOLLOW-UP: ICD-10-CM

## 2022-06-23 DIAGNOSIS — L97.212 NON-PRESSURE CHRONIC ULCER OF RIGHT CALF WITH FAT LAYER EXPOSED (HCC): Primary | ICD-10-CM

## 2022-06-23 DIAGNOSIS — R91.1 LUNG NODULE: ICD-10-CM

## 2022-06-23 DIAGNOSIS — J41.0 SIMPLE CHRONIC BRONCHITIS (HCC): ICD-10-CM

## 2022-06-23 DIAGNOSIS — I50.32 CHRONIC DIASTOLIC CONGESTIVE HEART FAILURE (HCC): ICD-10-CM

## 2022-06-23 LAB
ANION GAP SERPL CALCULATED.3IONS-SCNC: 15 MMOL/L (ref 3–16)
BUN BLDV-MCNC: 35 MG/DL (ref 7–20)
CALCIUM SERPL-MCNC: 9.4 MG/DL (ref 8.3–10.6)
CHLORIDE BLD-SCNC: 100 MMOL/L (ref 99–110)
CO2: 28 MMOL/L (ref 21–32)
CREAT SERPL-MCNC: 1.2 MG/DL (ref 0.8–1.3)
GFR AFRICAN AMERICAN: >60
GFR NON-AFRICAN AMERICAN: 59
GLUCOSE BLD-MCNC: 112 MG/DL (ref 70–99)
MAGNESIUM: 1.6 MG/DL (ref 1.8–2.4)
POTASSIUM SERPL-SCNC: 3.8 MMOL/L (ref 3.5–5.1)
PRO-BNP: 1103 PG/ML (ref 0–124)
SODIUM BLD-SCNC: 143 MMOL/L (ref 136–145)

## 2022-06-23 PROCEDURE — 99495 TRANSJ CARE MGMT MOD F2F 14D: CPT | Performed by: NURSE PRACTITIONER

## 2022-06-23 PROCEDURE — 1111F DSCHRG MED/CURRENT MED MERGE: CPT | Performed by: NURSE PRACTITIONER

## 2022-06-23 ASSESSMENT — PATIENT HEALTH QUESTIONNAIRE - PHQ9
SUM OF ALL RESPONSES TO PHQ QUESTIONS 1-9: 0
5. POOR APPETITE OR OVEREATING: 0
SUM OF ALL RESPONSES TO PHQ QUESTIONS 1-9: 0
2. FEELING DOWN, DEPRESSED OR HOPELESS: 0
7. TROUBLE CONCENTRATING ON THINGS, SUCH AS READING THE NEWSPAPER OR WATCHING TELEVISION: 0
10. IF YOU CHECKED OFF ANY PROBLEMS, HOW DIFFICULT HAVE THESE PROBLEMS MADE IT FOR YOU TO DO YOUR WORK, TAKE CARE OF THINGS AT HOME, OR GET ALONG WITH OTHER PEOPLE: 0
1. LITTLE INTEREST OR PLEASURE IN DOING THINGS: 0
SUM OF ALL RESPONSES TO PHQ9 QUESTIONS 1 & 2: 0
4. FEELING TIRED OR HAVING LITTLE ENERGY: 0
SUM OF ALL RESPONSES TO PHQ QUESTIONS 1-9: 0
6. FEELING BAD ABOUT YOURSELF - OR THAT YOU ARE A FAILURE OR HAVE LET YOURSELF OR YOUR FAMILY DOWN: 0
8. MOVING OR SPEAKING SO SLOWLY THAT OTHER PEOPLE COULD HAVE NOTICED. OR THE OPPOSITE, BEING SO FIGETY OR RESTLESS THAT YOU HAVE BEEN MOVING AROUND A LOT MORE THAN USUAL: 0
SUM OF ALL RESPONSES TO PHQ QUESTIONS 1-9: 0
9. THOUGHTS THAT YOU WOULD BE BETTER OFF DEAD, OR OF HURTING YOURSELF: 0
3. TROUBLE FALLING OR STAYING ASLEEP: 0

## 2022-06-23 NOTE — PROGRESS NOTES
Post-Discharge Transitional Care Follow Up      Beatriz Davies   YOB: 1950    Date of Office Visit:  6/23/2022  Date of Hospital Admission: 6/12/22  Date of Hospital Discharge: 6/16/22  Readmission Risk Score (high >=14%. Medium >=10%):Readmission Risk Score: 14.1 ( )      Care management risk score Rising risk (score 2-5) and Complex Care (Scores >=6): 7     Non face to face  following discharge, date last encounter closed (first attempt may have been earlier): 6/20/2022 10:16 AM     Call initiated 2 business days of discharge: Yes     Non-pressure chronic ulcer of right calf with fat layer exposed ([address]  -     AR DISCHARGE MEDS RECONCILED W/ CURRENT OUTPATIENT MED LIST  Chronic diastolic congestive heart failure ([address]  -     [facility] discharge follow-up  -     AR DISCHARGE MEDS RECONCILED W/ CURRENT OUTPATIENT MED LIST  Simple chronic bronchitis ([facility]  -     Torri Pride MD, PulmonaryJohn  Lung nodule  -     Torri Pride MD, Pulmonary, [place]  Hypomagnesemia  -     Magnesium  Renal insufficiency  -     Basic Metabolic Panel        Was seen by podiatry Tuesday for follow up ulcers bilateral legs    Has wound nurse from [employer] coming out 4 days weekly for dressing changes. Removing dead skin. Had appointment with cardiology for 7/5 but canceled because he is going on vacation, going to the [employer] locally. Rescheduled for August.     Recommend avoid going camping while health is fragile and legs are healing. Discussed the need to be compliant with medication care. [place] but not agreeable to this. Noted to have compression fx T6 on xray 12/2021. MRI ordered but he did not do this    CT chest neg for PE however noted nodule right lower lobe. Follow up recommended.        Medical Decision Making: moderate complexity     Return in about 3 months (around 9/23/2022) for diabetes, hyperlipidemia, Chronic CHF. Subjective:   HPI    Inpatient course: Discharge summary reviewed- see chart. 1 week ago was camping and felt discomfort in mid chest. Someone near by was a big shot with the [employer] department and called the [employer] squad. While in the ER someone smelled the wound on his leg. States was not taking water pills while [place] and water build up. Not weighing self routinely. Also states has not been using c pap for the past few months. States he is aware of the need to follow through to avoid decline in health. Interval history/Current status:     Now feels he is down 18 pounds. Home weight this morning 282. Last weight in office 3/18 was 304, down 16 pounds over the last 3 months. Mostly since resumed lasix. Lab Results   Component Value Date    LABA1C 7.4 06/12/2022     Lab Results   Component Value Date    .7 06/12/2022     Higher than in the past.       Completed antibiotic this morning.      Patient Active Problem List   Diagnosis    Type 2 diabetes mellitus with hyperglycemia ([address]    Osteoarthritis    Morbid obesity with BMI of 40.0-44.9, adult ([address]    Edema    Anemia    Bradycardia    Venous stasis ulcer of right lower extremity (HCC)    Venous thrombosis of leg    Venous stasis dermatitis of both lower extremities    Hyperbilirubinemia    Moderate episode of recurrent major depressive disorder (HCC)    Renal insufficiency    Anxiety    Essential hypertension    Non-seasonal allergic rhinitis    Mixed hyperlipidemia    Chronic congestive heart failure (HCC)    Chronic pulmonary edema    Coronary artery disease involving native coronary artery of native heart without angina pectoris    Nonrheumatic aortic valve stenosis    Severe sleep apnea    Primary insomnia    Venous stasis ulcer of left calf limited to breakdown of skin with varicose veins ([facility]    Stage 3 chronic kidney disease ([facility]    Renal osteodystrophy    Peripheral edema    Primary osteoarthritis of right hip    Grade III diastolic dysfunction    Paroxysmal atrial fibrillation (HCC)    Simple chronic bronchitis (HCC)    Other specified peripheral vascular diseases (HCC)    Non-pressure chronic ulcer of right calf with fat layer exposed (HCC)    Non-pressure chronic ulcer of left calf with fat layer exposed (HCC)    Atrial flutter (HCC)    Closed wedge compression fracture of sixth thoracic vertebra (HCC)    Acute chest pain    SIRS (systemic inflammatory response syndrome) (HCC)    Heart failure, diastolic, with acute decompensation (HCC)       Medications listed as ordered at the time of discharge from hospital     Medication List          Accurate as of June 23, 2022  1:52 PM. If you have any questions, ask your nurse or doctor. CONTINUE taking these medications    aspirin 81 MG chewable tablet  Take 1 tablet by mouth daily     atenolol 50 MG tablet  Commonly known as: TENORMIN  Take 0.5 tablets by mouth daily     atorvastatin 40 MG tablet  Commonly known as: LIPITOR  TAKE 1 TABLET BY MOUTH EVERY DAY AT NIGHT     azelastine 0.1 % nasal spray  Commonly known as: ASTELIN  1 spray by Nasal route 2 times daily Use in each nostril as directed     blood glucose monitor kit and supplies  by Other route daily One Touch Ultra     blood glucose test strips strip  Commonly known as: ASCENSIA AUTODISC VI;ONE TOUCH ULTRA TEST VI  DX: E11.9 FSBS daily.  One Touch ultra     ELDERBERRY PO     Eliquis 5 MG Tabs tablet  Generic drug: apixaban  TAKE 1 TABLET BY MOUTH TWICE A DAY     ferrous sulfate 325 (65 Fe) MG tablet  Commonly known as: IRON 325     glimepiride 4 MG tablet  Commonly known as: AMARYL  TAKE 1 TABLET BY MOUTH TWICE A DAY     GLUCOSAMINE CHONDR COMPLEX PO     * Handicap Placard Misc  by Does not apply route Please issue for duration of 5 years     * Handicap Placard Misc  by Does not apply route Please issue for duration of 5 years     hydrALAZINE 50 MG tablet  Commonly known as: APRESOLINE  TAKE 1/2 TABLET BY MOUTH EVERY 8 HOURS     Insulin Pen Needle 31G X 5 MM Misc  1 each by Does not apply route daily     isosorbide mononitrate 30 MG extended release tablet  Commonly known as: IMDUR  TAKE 1 TABLET BY MOUTH EVERY DAY     montelukast 10 MG tablet  Commonly known as: SINGULAIR  TAKE 1 TABLET BY MOUTH EVERY DAY     omeprazole 40 MG delayed release capsule  Commonly known as: PRILOSEC  TAKE 1 CAPSULE BY MOUTH EVERY DAY     Ozempic (0.25 or 0.5 MG/DOSE) 2 MG/1.5ML Sopn  Generic drug: Semaglutide(0.25 or 0.5MG/DOS)  Inject 0.5 mg into the skin once a week     Prevagen 10 MG Caps  Generic drug: Apoaequorin     sertraline 50 MG tablet  Commonly known as: ZOLOFT  TAKE 1 TABLET BY MOUTH EVERY DAY     spironolactone 25 MG tablet  Commonly known as: ALDACTONE  TAKE 1 TABLET BY MOUTH EVERY DAY     therapeutic multivitamin-minerals tablet     tiZANidine 4 MG tablet  Commonly known as: ZANAFLEX  TAKE 1 TABLET BY MOUTH NIGHTLY AS NEEDED (PAIN)     torsemide 20 MG tablet  Commonly known as: DEMADEX  TAKE 2 TABLETS BY MOUTH EVERY DAY     Toujeo Max SoloStar 300 UNIT/ML Sopn  Generic drug: Insulin Glargine (2 Unit Dial)  Inject 46 Units into the skin 2 times daily     VITAMIN C PO     ZINC 15 PO         * This list has 2 medication(s) that are the same as other medications prescribed for you. Read the directions carefully, and ask your doctor or other care provider to review them with you.                  Medications marked \"taking\" at this time  Outpatient Medications Marked as Taking for the 6/23/22 encounter (Office Visit) with SHAKIRA Devine - CNP   Medication Sig Dispense Refill    Insulin Glargine, 2 Unit Dial, (TOUJEO MAX SOLOSTAR) 300 UNIT/ML SOPN Inject 46 Units into the skin 2 times daily 5 pen 11    torsemide (DEMADEX) 20 MG tablet TAKE 2 TABLETS BY MOUTH EVERY  tablet 1    spironolactone (ALDACTONE) 25 MG tablet TAKE 1 TABLET BY MOUTH EVERY DAY 90 tablet 3    Zinc Sulfate (ZINC 15 PO) Take by mouth in the morning and at bedtime      ELDERBERRY PO Take by mouth 2 times daily      Ascorbic Acid (VITAMIN C PO) Take by mouth in the morning and at bedtime      Apoaequorin (PREVAGEN) 10 MG CAPS Take 1 capsule by mouth daily      sertraline (ZOLOFT) 50 MG tablet TAKE 1 TABLET BY MOUTH EVERY DAY 90 tablet 3    ELIQUIS 5 MG TABS tablet TAKE 1 TABLET BY MOUTH TWICE A  tablet 2    tiZANidine (ZANAFLEX) 4 MG tablet TAKE 1 TABLET BY MOUTH NIGHTLY AS NEEDED (PAIN) 30 tablet 0    azelastine (ASTELIN) 0.1 % nasal spray 1 spray by Nasal route 2 times daily Use in each nostril as directed 30 mL 5    Semaglutide,0.25 or 0.5MG/DOS, (OZEMPIC, 0.25 OR 0.5 MG/DOSE,) 2 MG/1.5ML SOPN Inject 0.5 mg into the skin once a week 4 pen 5    atorvastatin (LIPITOR) 40 MG tablet TAKE 1 TABLET BY MOUTH EVERY DAY AT NIGHT 90 tablet 3    isosorbide mononitrate (IMDUR) 30 MG extended release tablet TAKE 1 TABLET BY MOUTH EVERY DAY 90 tablet 3    montelukast (SINGULAIR) 10 MG tablet TAKE 1 TABLET BY MOUTH EVERY DAY 90 tablet 3    glimepiride (AMARYL) 4 MG tablet TAKE 1 TABLET BY MOUTH TWICE A  tablet 3    atenolol (TENORMIN) 50 MG tablet Take 0.5 tablets by mouth daily 90 tablet 3    omeprazole (PRILOSEC) 40 MG delayed release capsule TAKE 1 CAPSULE BY MOUTH EVERY DAY 90 capsule 3    hydrALAZINE (APRESOLINE) 50 MG tablet TAKE 1/2 TABLET BY MOUTH EVERY 8 HOURS 135 tablet 7    Handicap Placard MISC by Does not apply route Please issue for duration of 5 years 1 each 0    Handicap Placard MISC by Does not apply route Please issue for duration of 5 years 1 each 0    Insulin Pen Needle 31G X 5 MM MISC 1 each by Does not apply route daily 400 each 5    Glucosamine-Chondroitin (GLUCOSAMINE CHONDR COMPLEX PO) Take 1 tablet by mouth 2 times daily      blood glucose monitor kit and supplies by Other route daily One Touch Ultra 1 kit 0    glucose blood VI test strips (ASCENSIA AUTODISC VI;ONE TOUCH ULTRA TEST VI) strip DX: E11.9 FSBS daily. One Touch ultra 100 strip 5    aspirin 81 MG chewable tablet Take 1 tablet by mouth daily 30 tablet 0    Multiple Vitamins-Minerals (THERAPEUTIC MULTIVITAMIN-MINERALS) tablet Take 1 tablet by mouth daily      ferrous sulfate 325 (65 FE) MG tablet Take 325 mg by mouth daily           Medications patient taking as of now reconciled against medications ordered at time of hospital discharge: Yes    Review of Systems   All other systems reviewed and are negative. Objective:    BP 90/62 (Site: Left Upper Arm, Position: Sitting, Cuff Size: Large Adult)   Pulse 75   Resp 18   Wt 284 lb (128.8 kg)   SpO2 94%   BMI 37.47 kg/m²   Physical Exam  Constitutional:       Appearance: Normal appearance. He is obese. Cardiovascular:      Rate and Rhythm: Normal rate. Pulmonary:      Effort: Pulmonary effort is normal.      Breath sounds: Normal breath sounds. Musculoskeletal:      Comments: Limited ROM generalized related to body size. Skin:     Comments: Bilateral lower legs wrapped and stockings in place. Neurological:      Mental Status: He is alert.          An electronic signature was used to authenticate this note.  --FRANK CMKEON, APRN - CNP

## 2022-06-24 DIAGNOSIS — I50.32 CHRONIC DIASTOLIC CONGESTIVE HEART FAILURE (HCC): ICD-10-CM

## 2022-06-24 DIAGNOSIS — N28.9 ABNORMAL RENAL FUNCTION: ICD-10-CM

## 2022-06-24 DIAGNOSIS — E83.42 HYPOMAGNESEMIA: Primary | ICD-10-CM

## 2022-06-24 RX ORDER — CALCIUM CARBONATE 300MG(750)
1 TABLET,CHEWABLE ORAL DAILY
Qty: 30 TABLET | Refills: 5 | Status: SHIPPED | OUTPATIENT
Start: 2022-06-24 | End: 2022-07-17

## 2022-06-29 DIAGNOSIS — Z79.4 TYPE 2 DIABETES MELLITUS WITH HYPERGLYCEMIA, WITH LONG-TERM CURRENT USE OF INSULIN (HCC): ICD-10-CM

## 2022-06-29 DIAGNOSIS — E11.65 TYPE 2 DIABETES MELLITUS WITH HYPERGLYCEMIA, WITH LONG-TERM CURRENT USE OF INSULIN (HCC): ICD-10-CM

## 2022-06-29 DIAGNOSIS — K21.9 GASTROESOPHAGEAL REFLUX DISEASE WITHOUT ESOPHAGITIS: ICD-10-CM

## 2022-06-29 RX ORDER — GLIMEPIRIDE 4 MG/1
TABLET ORAL
Qty: 180 TABLET | Refills: 3 | Status: SHIPPED | OUTPATIENT
Start: 2022-06-29

## 2022-06-29 RX ORDER — OMEPRAZOLE 40 MG/1
CAPSULE, DELAYED RELEASE ORAL
Qty: 90 CAPSULE | Refills: 3 | Status: SHIPPED | OUTPATIENT
Start: 2022-06-29

## 2022-06-29 NOTE — TELEPHONE ENCOUNTER
Refill Request     CONFIRM preferrred pharmacy with the patient. If Mail Order Rx - Pend for 90 day refill.       Last Seen: Last Seen Department: 6/23/2022  Last Seen by PCP: 6/23/2022    Last Written: 10/21/2021 Glimepiride  7/7/2021 Omeprazole    Next Appointment:   Future Appointments   Date Time Provider Joanna Reynolds   8/8/2022  8:30 AM Tahir Flores APRN - [place] [facility]   9/28/2022  9:00 AM Tahir Flores APRN - CNP [place] [facility]   9/28/2022  9:30 AM Beverly Slaughter APRN - N[place] [facility]   9/28/2022 10:00 AM Collin Pickens APRN - NGA CHAN             Requested Prescriptions     Pending Prescriptions Disp Refills    glimepiride (AMARYL) 4 MG tablet [Pharmacy Med Name: GLIMEPIRIDE 4 MG TABLET] 180 tablet 3     Sig: TAKE 1 TABLET BY MOUTH EVERY DAY IN THE MORNING    omeprazole (PRILOSEC) 40 MG delayed release capsule [Pharmacy Med Name: OMEPRAZOLE DR 40 MG CAPSULE] 90 capsule 3     Sig: TAKE 1 CAPSULE BY MOUTH EVERY DAY

## 2022-07-11 ENCOUNTER — TELEPHONE (OUTPATIENT)
Dept: CASE MANAGEMENT | Age: 72
End: 2022-07-11

## 2022-07-11 NOTE — TELEPHONE ENCOUNTER
Plan for lung nodule noted on CT CHEST PULM EMB 6/12/2022?     Thank you,  Meagan Alva RN  [facility] Lung Navigator  [phone]

## 2022-07-16 DIAGNOSIS — E83.42 HYPOMAGNESEMIA: ICD-10-CM

## 2022-07-16 NOTE — LETTER
[address]   Cardiac Follow-up    Primary Care Doctor:  Cassie Guevara APRN - CNP    Chief Complaint   Patient presents with    Follow-up    Shortness of Breath        History of Present Illness:   I had the pleasure of seeing Karen Marroquin in follow up for diastolic heart failure. Hx of CKD, DM, MONIQUE. admission to the [facility] 10/2017; Last ED visit on 9/5/18 with shortness of breath. he underwent successful  DCCV 12/4/19 for atrial fib/flutter and started on flecainide. Since last visit, he has been active at the [employer]. Continues on the torsemide daily, doesn't take the 2nd dose as it keeps him running to the bathroom. He hasn't been using the cpap machine at times, he is taking cpap with him to the [employer] and using 4-5 hours a night when he does use it. Sleeps in the recliner for years. Continues on the flecainide. Breathing seems to be better when he uses his cpap machine. No palpitations. No chest pain. He has edema of the legs, wears his compression socks daily. And elevates his legs when he can. He injured the left leg recently and has 2 small sores on the left leg- no infection. Using the cane to help with walking. Karen Marroquin describes symptoms including dyspnea, fatigue, edema but denies chest pain, palpitations, syncope. NYHA:   II-III  ACC/ AHA Stage:    C    Past Medical History:   has a past medical history of Allergic rhinitis, Arthritis, Bladder fistula, C. difficile diarrhea, Cellulitis of right lower extremity, Dental disease, Diabetes ([address] Diverticulitis, Dizziness, DVT of lower extremity, bilateral ([address] GERD (gastroesophageal reflux disease), Headache, Hearing loss, Hematuria, Hypertension, Lung disease, MONIQUE (obstructive sleep apnea), Pancreatitis ([address] Pulmonary emboli ([address] Rash, Sleep apnea, and Tinnitus.   Surgical History:   has a past surgical history that includes Tibia fracture surgery (Right, 2003); Cholecystectomy, open (N/A, 9-17-13); Cystocopy (12/10/13); Cystoscopy (1-28-14); other surgical history (1-28-14); Colonoscopy (2008); Colonoscopy (12/4/2013); Colonoscopy (4/30/14); colostomy (Jan 2014); Colon surgery; Abdomen surgery (05/16/2014); hernia repair (08/14/2015); pr injection hip arthrogram,anesth (Right, 9/19/2018); epidural steroid injection (Right, 1/21/2019); epidural steroid injection (Right, 2/11/2019); and Cardioversion (12/04/2019). Social History:   reports that he is a non-smoker but has been exposed to tobacco smoke. He has never used smokeless tobacco. He reports current alcohol use. He reports that he does not use drugs. Family History:   Family History   Problem Relation Age of Onset    Heart Disease Father     Heart Attack Father     Stroke Brother     Heart Disease Brother     High Blood Pressure Brother     Kidney Disease Mother         kidney removed       Home Medications:  Prior to Admission medications    Medication Sig Start Date End Date Taking? Authorizing Provider   gabapentin (NEURONTIN) 300 MG capsule Take 1 capsule by mouth 2 times daily for 30 days. Intended supply: 90 days 6/15/20 7/15/20 Yes SHAKIRA Ross CNP   torsemide (DEMADEX) 20 MG tablet TAKE 2 TABLETS BY MOUTH EVERY DAY 6/11/20  Yes SHAKIRA Prasad CNP   Insulin Glargine, 2 Unit Dial, (TOUJEO MAX SOLOSTAR) 300 UNIT/ML SOPN Inject 40 Units into the skin 2 times daily  Patient taking differently: Inject 45 Units into the skin 2 times daily  5/14/20  Yes SHAKIRA Ross CNP   silver sulfADIAZINE (SILVADENE) 1 % cream Apply topically daily.  5/7/20  Yes SHAKIRA Ross CNP   atenolol (TENORMIN) 50 MG tablet Take 0.5 tablets by mouth daily 5/4/20  Yes SHAKIRA Orellana CNP   spironolactone (ALDACTONE) 25 MG tablet Take 1 tablet by mouth daily 5/4/20  Yes SHAKIRA Orellana CNP 5 . Elevate your legs as much as possible, continue compression socks- monitor left leg for infection   6. atenolol to 25mg daily; continue flecainide per EP  Continue Eliquis 1 tablet twice a day for blood thinner for the irregular heart rhythm  Follow up with EP in 3 months   Follow up with heart failure team in 6 months     [id number] Score for Atrial Fibrillation Stroke Risk   Risk   Factors  Component Value   C CHF Yes 1   H HTN Yes 1   A2 Age >= 76 No,  (73 y.o.) 0   D DM Yes 1   S2 Prior Stroke/TIA No 0   V Vascular Disease No 0   A Age 74-69 Yes,  (73 y.o.) 1   Sc Sex male 0    [id number]  Score  4   Score last updated 66/73/99 7:78 PM    Click here for a link to the UpToDate guideline \"Atrial Fibrillation: Anticoagulation therapy to prevent embolization    Disclaimer: Risk Score calculation is dependent on accuracy of patient problem list and past encounter diagnosis. QUALITY MEASURES  1. Tobacco Cessation Counseling: NA  2. Retake of BP if >140/90:   NA  3. Documentation to PCP/referring for new patient:  Sent to PCP at close of office visit  4. CAD patient on anti-platelet: Yes  5. CAD patient on STATIN therapy:  Yes  6. Patient with CHF and aFib on anticoagulation:  YES    I appreciate the opportunity for caring for this patient.      Brandan Michaels CNP, 6/24/2020, 9:49 AM normal for race

## 2022-07-17 RX ORDER — LANOLIN ALCOHOL/MO/W.PET/CERES
CREAM (GRAM) TOPICAL
Qty: 90 TABLET | Refills: 1 | Status: SHIPPED | OUTPATIENT
Start: 2022-07-17

## 2022-07-17 NOTE — TELEPHONE ENCOUNTER
.Refill Request     CONFIRM preferrred pharmacy with the patient. If Mail Order Rx - Pend for 90 day refill.       Last Seen: Last Seen Department: 6/23/2022  Last Seen by PCP: 6/23/2022    Last Written: 6/24/22 30 with 5     Next Appointment:   Future Appointments   Date Time Provider Joanna Reynolds   7/19/2022  9:00 AM Sol Angulo MD [facility]   8/8/2022  8:30 AM SHAKIRA Aguero - [place] [facility]   9/28/2022  9:00 AM Christo Dela Cruz APRN - CNP [place] [facility]   9/28/2022  9:30 AM SHAKIRA Santiago - CNP [facility]   9/28/2022 10:00 AM SHAKIRA Seaman - NGA [place]       Future appointment scheduled      Requested Prescriptions     Pending Prescriptions Disp Refills    magnesium oxide (MAG-OX) 400 (240 Mg) MG tablet [Pharmacy Med Name: MAGNESIUM OXIDE 400 MG TABLET] 90 tablet 1     Sig: TAKE 1 TABLET BY MOUTH EVERY DAY

## 2022-07-19 ENCOUNTER — OFFICE VISIT (OUTPATIENT)
Dept: PULMONOLOGY | Age: 72
End: 2022-07-19
Payer: MEDICARE

## 2022-07-19 ENCOUNTER — HOSPITAL ENCOUNTER (OUTPATIENT)
Age: 72
Discharge: HOME | End: 2022-07-19
Payer: MEDICARE

## 2022-07-19 VITALS
RESPIRATION RATE: 18 BRPM | OXYGEN SATURATION: 94 % | TEMPERATURE: 97.9 F | WEIGHT: 281 LBS | HEART RATE: 75 BPM | DIASTOLIC BLOOD PRESSURE: 76 MMHG | HEIGHT: 73 IN | BODY MASS INDEX: 37.24 KG/M2 | SYSTOLIC BLOOD PRESSURE: 118 MMHG

## 2022-07-19 DIAGNOSIS — I48.0 PAF (PAROXYSMAL ATRIAL FIBRILLATION) (HCC): ICD-10-CM

## 2022-07-19 DIAGNOSIS — N28.9 ABNORMAL RENAL FUNCTION: ICD-10-CM

## 2022-07-19 DIAGNOSIS — I50.32 CHRONIC DIASTOLIC CONGESTIVE HEART FAILURE (HCC): ICD-10-CM

## 2022-07-19 DIAGNOSIS — Z99.89 OSA ON CPAP: ICD-10-CM

## 2022-07-19 DIAGNOSIS — J30.89 PERENNIAL ALLERGIC RHINITIS: ICD-10-CM

## 2022-07-19 DIAGNOSIS — E66.01 MORBID OBESITY (HCC): ICD-10-CM

## 2022-07-19 DIAGNOSIS — G47.33 OSA ON CPAP: ICD-10-CM

## 2022-07-19 DIAGNOSIS — R91.1 LUNG NODULE: Primary | ICD-10-CM

## 2022-07-19 DIAGNOSIS — I50.32 CHRONIC HEART FAILURE WITH PRESERVED EJECTION FRACTION (HCC): ICD-10-CM

## 2022-07-19 DIAGNOSIS — Z72.821 POOR SLEEP HYGIENE: ICD-10-CM

## 2022-07-19 DIAGNOSIS — J98.4 RESTRICTIVE LUNG DISEASE: ICD-10-CM

## 2022-07-19 LAB
ANION GAP SERPL CALCULATED.3IONS-SCNC: 13 MMOL/L (ref 3–16)
BUN BLDV-MCNC: 28 MG/DL (ref 7–20)
CALCIUM SERPL-MCNC: 9.1 MG/DL (ref 8.3–10.6)
CHLORIDE BLD-SCNC: 102 MMOL/L (ref 99–110)
CO2: 28 MMOL/L (ref 21–32)
CREAT SERPL-MCNC: 0.9 MG/DL (ref 0.8–1.3)
GFR AFRICAN AMERICAN: >60
GFR NON-AFRICAN AMERICAN: >60
GLUCOSE BLD-MCNC: 100 MG/DL (ref 70–99)
POTASSIUM SERPL-SCNC: 4.7 MMOL/L (ref 3.5–5.1)
PRO-BNP: 971 PG/ML (ref 0–124)
SODIUM BLD-SCNC: 143 MMOL/L (ref 136–145)

## 2022-07-19 PROCEDURE — 99214 OFFICE O/P EST MOD 30 MIN: CPT | Performed by: INTERNAL MEDICINE

## 2022-07-19 PROCEDURE — 36415 COLL VENOUS BLD VENIPUNCTURE: CPT

## 2022-07-19 PROCEDURE — 1123F ACP DISCUSS/DSCN MKR DOCD: CPT | Performed by: INTERNAL MEDICINE

## 2022-07-19 PROCEDURE — 83880 ASSAY OF NATRIURETIC PEPTIDE: CPT

## 2022-07-19 PROCEDURE — 80048 BASIC METABOLIC PNL TOTAL CA: CPT

## 2022-07-19 ASSESSMENT — ENCOUNTER SYMPTOMS
APNEA: 0
NAUSEA: 0
WHEEZING: 0
CONSTIPATION: 0
DIARRHEA: 0
BACK PAIN: 1
VOICE CHANGE: 0
CHEST TIGHTNESS: 0
SINUS PRESSURE: 0
SHORTNESS OF BREATH: 0
STRIDOR: 0
EYE ITCHING: 0
RHINORRHEA: 0
CHOKING: 0
ABDOMINAL PAIN: 0
BLOOD IN STOOL: 0
EYE DISCHARGE: 0
VOMITING: 0
TROUBLE SWALLOWING: 0
SORE THROAT: 0
EYE REDNESS: 0
COUGH: 1
ABDOMINAL DISTENTION: 0

## 2022-07-19 ASSESSMENT — SLEEP AND FATIGUE QUESTIONNAIRES
HOW LIKELY ARE YOU TO NOD OFF OR FALL ASLEEP WHILE SITTING AND READING: 0
HOW LIKELY ARE YOU TO NOD OFF OR FALL ASLEEP WHILE SITTING AND TALKING TO SOMEONE: 0
HOW LIKELY ARE YOU TO NOD OFF OR FALL ASLEEP IN A CAR, WHILE STOPPED FOR A FEW MINUTES IN TRAFFIC: 0
HOW LIKELY ARE YOU TO NOD OFF OR FALL ASLEEP WHEN YOU ARE A PASSENGER IN A CAR FOR AN HOUR WITHOUT A BREAK: 0
ESS TOTAL SCORE: 6
NECK CIRCUMFERENCE (INCHES): 20.75
HOW LIKELY ARE YOU TO NOD OFF OR FALL ASLEEP WHILE WATCHING TV: 3
HOW LIKELY ARE YOU TO NOD OFF OR FALL ASLEEP WHILE SITTING QUIETLY AFTER LUNCH WITHOUT ALCOHOL: 1
HOW LIKELY ARE YOU TO NOD OFF OR FALL ASLEEP WHILE SITTING INACTIVE IN A PUBLIC PLACE: 0
HOW LIKELY ARE YOU TO NOD OFF OR FALL ASLEEP WHILE LYING DOWN TO REST IN THE AFTERNOON WHEN CIRCUMSTANCES PERMIT: 2

## 2022-07-19 NOTE — PATIENT INSTRUCTIONS
Remember to bring a list of pulmonary medications and any CPAP or BiPAP machines to your next appointment with the office. Please keep all of your future appointments scheduled by [address], [facility] Pulmonary office. Out of respect for other patients and providers, you may be asked to reschedule your appointment if you arrive later than your scheduled appointment time. Appointments cancelled less than 24hrs in advance will be considered a no show. Patients with three missed appointments within 1 year or four missed appointments within 2 years can be dismissed from the practice. Please be aware that our physicians are required to work in the Intensive Care Unit at [facility].  Your appointment may need to be rescheduled if they are designated to work during your appointment time. You may receive a survey regarding the care you received during your visit. Your input is valuable to us. We encourage you to complete and return your survey. We hope you will choose us in the future for your healthcare needs. Pt instructed of all future appointment dates & times, including radiology, labs, procedures & referrals. If procedures were scheduled preparation instructions provided. Instructions on future appointments with [facility] Pulmonary were given.

## 2022-07-19 NOTE — PROGRESS NOTES
MA Communication:   The following orders are received by verbal communication from Mary Eduardo MD    Orders include:  CT Chest 6 mo       Cpap supplies to [facility]       FU visit for compliance

## 2022-07-19 NOTE — PROGRESS NOTES
Pulmonary Outpatient Note   Yuliet Palacios MD       7/19/2022    1. Lung nodule    2. Restrictive lung disease    3. Perennial allergic rhinitis    4. MONIQUE on CPAP    5. Poor sleep hygiene    6. Chronic heart failure with preserved ejection fraction (HCC)    7. PAF (paroxysmal atrial fibrillation) ([facility]    8. Morbid obesity ([facility]          ASSESSMENT/PLAN:  Lung nodule. Nonspecific appearing, patient is a non-smoker. We will repeat CT chest in 6 months. Restrictive lung disease. Probably due to obesity. No CT evidence of ILD  Perennial allergic rhinitis. Giving rise to intermittent cough, his PFT does not support a diagnosis of COPD  MONIQUE on CPAP. The patient was compliant, but had Cheyne-Allison respiration. He had recently resumed CPAP, will see him for compliance. He may need a different device if he has residual Cheyne-Allison respiration  Poor sleep hygiene. Will discuss with the patient after reviewing his compliance data  CHF, PAF. The importance of treating MONIQUE due to these problems was discussed with the patient  Morbid obesity. He has lost weight due to his current illness, should make dietary changes to lose weight, as exercise would not be possible  Prophylaxis. He has received Prevnar, Pneumovax, 2 doses and 2 booster doses of the COVID-19 vaccine. Continue with flu shot annually      RTC for CPAP compliance and with CT chest.        Orders Placed This Encounter   Procedures    CT CHEST WO CONTRAST    CPAP    CPAP    CPAP    CPAP    CPAP       No follow-ups on file. Chief Complaint:   New Patient (R/B Kristy ROSENBERG Chronic Bronchitis)       HPI: Gordon Campa" is a 66y.o. year old male here for lung nodule, likely not chronic bronchitis. His PCP is [facility]. He is followed by Britta Cooper and Dr. Mark Hamilton from cardiology. Carroll Munoz is a pleasant 70-year-old male living with his wife in [facility].   The patient has been a non-smoker all his life.  He was seen by my partner Dr. Liss Salter by initially for COPD and MONIQUE. He was last seen in the office on 12/3/2019. He had been compliant with his auto CPAP, had slightly elevated AHI, although much improved compared to his baseline sleep study. He did have Cheyne-Allison respiration. He was referred to Dr. Trent Mena, canceled his appointment and did not reschedule. His DME is [facility]. He does not have any CPAP supplies as he was not using his CPAP machine for several years. The patient lives in his home, [place] a [place]. The patient recently had a \"funny sensation\" in his chest, the squad was called, he was admitted to [facility]. He was septic from a wound infection, this has been managed by his podiatrist Dr. Elver Lopez. He required IV antibiotics, now has a home visiting nurse with regular dressing changes. He refused to go to an ECF. The patient restarted his CPAP recently. He has been sleeping in a recliner due to the fact that he has to elevate his right leg. The patient wears his CPAP through the night and also in the daytime when he takes a nap. The patient has a TV on to help him fall asleep. He sleeps early, but wakes up to check his checkbook daily. He then goes back to sleep, awakens around 4 AM on his own. He thinks he does not sleep again, but says that his wife wakes up around 8 AM and finds him to be asleep. He does feel refreshed after using his CPAP, does not take a nap in the daytime. 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 The patient has been a lifelong non-smoker, drinks rum about twice a week. The patient has perennial allergic rhinitis, has been on Astelin and Singulair. He has a tickle in his throat that leads to cough, but he does not cough very regularly. He denies wheezing or chest pain.   The patient had a DVT a few years ago, is not certain that he had pulmonary embolism. The patient eats well, denies GI or  complaints. The patient has been obese, weight more than 300 pounds, dropped to 281 pounds after his hospitalization. The patient has multiple other medical problems including severe MONIQUE, perennial allergic rhinitis, DVT, venous stasis and leg ulcers, DM 2, CKD 3, renal osteodystrophy, DJD of the right hip, PAF, aortic stenosis, hyperlipidemia, chronic diastolic CHF, grade 3 diastolic dysfunction, hypertension, T6 compression fracture, CAD, hearing loss, possible PE in 2013. He had a colostomy with reversal.    Auto CPAP compliance 11/3 through 12/2/2019  Use 800%, average >6 hours. AHI 7.2    NPSG 11/2/2017  Severe MONIQUE with AHI 43.5/h. Low SaO2 85%. Cheyne-Allison respiration present    PFT and 6-minute walk test 11/6/2017  FVC 3.27 L, 66% predicted, FEV1 2.38 L, 65% predicted, with ratio of 73%. There was no response to bronchodilator. TLC 75% predicted, DLCO 68% predicted. On the 6-minute walk test, the patient walked 800 feet. Baseline oxygen saturation was 95%, Yue dyspnea scale was 1, fatigue scale 2. He had marked increase in respiratory rate to 40/min with ambulation. He did desaturate to 85%. CT chest pulmonary embolism protocol 6/12/2022  Pulmonary Arteries: Pulmonary arteries are adequately opacified for   evaluation. No evidence of intraluminal filling defect to suggest pulmonary   embolism. Main pulmonary artery is normal in caliber. Mediastinum: No evidence of mediastinal lymphadenopathy. The heart and   pericardium demonstrate no acute abnormality. There is no acute abnormality   of the thoracic aorta. There is a moderate hiatal hernia along the lower   mediastinum. There are moderate coronary artery calcifications. Lungs/pleura: T there is a bilobed nodule along the right lung base   posteriorly measuring 12 mm which was not seen previously some questionable   central lucency in the nodule.   There is subsegmental left basilar opacity   posterolaterally which is less prominent no effusion is seen. Upper Abdomen: Limited images of the upper abdomen are unremarkable. Soft Tissues/Bones: There is a moderately severe wedge compression fracture   of T6 is more apparent there is no retropulsed fragment. There is vague   sclerosis throughout rest vertebra. Impression   No evidence of a pulmonary embolus. Mildly dilated and atherosclerotic thoracic aorta with no aneurysm or   dissection. 12 mm bilobed nodule or infiltrate along the right lower lobe posteriorly. Recommend short-term follow-up or PET scan correlation       Mild left basilar atelectasis vs early infiltrates or scarring   posterolaterally on which is less prominent. Moderately severe wedge compression fracture of T6 which is more apparent,   the etiology and age of which is uncertain. Suggest PET scan correlation, if   indicated. Moderate coronary artery calcifications. Moderate hiatal hernia       Past Medical History:   Diagnosis Date    Allergic rhinitis     Arthritis     Bladder fistula     resolved    C. difficile diarrhea 8/18/2015    +PCR    Cellulitis of right lower extremity 3/23/2016    CHF (congestive heart failure) ([address]     Dental disease     Diabetes ([address]     Diverticulitis     Dizziness     DVT of lower extremity, bilateral ([address] 10/16/2013    GERD (gastroesophageal reflux disease)     Headache     Hearing loss     Hematuria 1/2/2014    Hx of blood clots     Hyperlipidemia     Hypertension     Lung disease     MONIQUE (obstructive sleep apnea)     Pancreatitis ([address] 8/24/2013    Pulmonary emboli ([address] 2013    after cholecystectomy     Rash     Sleep apnea     Tinnitus        Past Surgical History:   Procedure Laterality Date    ABDOMEN SURGERY  05/16/2014    reveseral end peristomal hernia repair.     CARDIOVERSION  12/04/2019    Dr. Jason Mckeon, OPEN N/A 9-17-13    LAPAROSCOPIC MG tablet, TAKE 1 TABLET BY MOUTH EVERY DAY IN THE MORNING, Disp: 180 tablet, Rfl: 3    omeprazole (PRILOSEC) 40 MG delayed release capsule, TAKE 1 CAPSULE BY MOUTH EVERY DAY, Disp: 90 capsule, Rfl: 3    Insulin Glargine, 2 Unit Dial, (TOUJEO MAX SOLOSTAR) 300 UNIT/ML SOPN, Inject 46 Units into the skin 2 times daily, Disp: 5 pen, Rfl: 11    torsemide (DEMADEX) 20 MG tablet, TAKE 2 TABLETS BY MOUTH EVERY DAY, Disp: 180 tablet, Rfl: 1    spironolactone (ALDACTONE) 25 MG tablet, TAKE 1 TABLET BY MOUTH EVERY DAY, Disp: 90 tablet, Rfl: 3    Zinc Sulfate (ZINC 15 PO), Take by mouth in the morning and at bedtime, Disp: , Rfl:     ELDERBERRY PO, Take by mouth 2 times daily, Disp: , Rfl:     Ascorbic Acid (VITAMIN C PO), Take by mouth in the morning and at bedtime, Disp: , Rfl:     Apoaequorin (PREVAGEN) 10 MG CAPS, Take 1 capsule by mouth daily, Disp: , Rfl:     sertraline (ZOLOFT) 50 MG tablet, TAKE 1 TABLET BY MOUTH EVERY DAY, Disp: 90 tablet, Rfl: 3    ELIQUIS 5 MG TABS tablet, TAKE 1 TABLET BY MOUTH TWICE A DAY, Disp: 180 tablet, Rfl: 2    tiZANidine (ZANAFLEX) 4 MG tablet, TAKE 1 TABLET BY MOUTH NIGHTLY AS NEEDED (PAIN), Disp: 30 tablet, Rfl: 0    azelastine (ASTELIN) 0.1 % nasal spray, 1 spray by Nasal route 2 times daily Use in each nostril as directed, Disp: 30 mL, Rfl: 5    Semaglutide,0.25 or 0.5MG/DOS, (OZEMPIC, 0.25 OR 0.5 MG/DOSE,) 2 MG/1.5ML SOPN, Inject 0.5 mg into the skin once a week, Disp: 4 pen, Rfl: 5    atorvastatin (LIPITOR) 40 MG tablet, TAKE 1 TABLET BY MOUTH EVERY DAY AT NIGHT, Disp: 90 tablet, Rfl: 3    isosorbide mononitrate (IMDUR) 30 MG extended release tablet, TAKE 1 TABLET BY MOUTH EVERY DAY, Disp: 90 tablet, Rfl: 3    montelukast (SINGULAIR) 10 MG tablet, TAKE 1 TABLET BY MOUTH EVERY DAY, Disp: 90 tablet, Rfl: 3    atenolol (TENORMIN) 50 MG tablet, Take 0.5 tablets by mouth daily, Disp: 90 tablet, Rfl: 3    hydrALAZINE (APRESOLINE) 50 MG tablet, TAKE 1/2 TABLET BY MOUTH EVERY 8 HOURS, Disp: 135 tablet, Rfl: 7    Glucosamine-Chondroitin (GLUCOSAMINE CHONDR COMPLEX PO), Take 1 tablet by mouth 2 times daily, Disp: , Rfl:     aspirin 81 MG chewable tablet, Take 1 tablet by mouth daily, Disp: 30 tablet, Rfl: 0    Multiple Vitamins-Minerals (THERAPEUTIC MULTIVITAMIN-MINERALS) tablet, Take 1 tablet by mouth daily, Disp: , Rfl:     ferrous sulfate 325 (65 FE) MG tablet, Take 325 mg by mouth daily , Disp: , Rfl:     Handicap Placard MISC, by Does not apply route Please issue for duration of 5 years, Disp: 1 each, Rfl: 0    Handicap Placard MISC, by Does not apply route Please issue for duration of 5 years, Disp: 1 each, Rfl: 0    Insulin Pen Needle 31G X 5 MM MISC, 1 each by Does not apply route daily, Disp: 400 each, Rfl: 5    blood glucose monitor kit and supplies, by Other route daily One Touch Ultra, Disp: 1 kit, Rfl: 0    glucose blood VI test strips (ASCENSIA AUTODISC VI;ONE TOUCH ULTRA TEST VI) strip, DX: E11.9 FSBS daily. One Touch ultra, Disp: 100 strip, Rfl: 5    Hydrocodone-acetaminophen    Vitals:    07/19/22 0847   BP: 118/76   Site: Left Upper Arm   Position: Sitting   Cuff Size: Large Adult   Pulse: 75   Resp: 18   Temp: 97.9 °F (36.6 °C)   TempSrc: Temporal   SpO2: 94%   Weight: 281 lb (127.5 kg)   Height: 6' 1\" (1.854 m)       Review of Systems   Constitutional:  Positive for appetite change and unexpected weight change. Negative for chills, diaphoresis, fatigue and fever. HENT:  Positive for postnasal drip. Negative for congestion, nosebleeds, rhinorrhea, sinus pressure, sneezing, sore throat, trouble swallowing and voice change. Eyes:  Negative for discharge, redness, itching and visual disturbance. Respiratory:  Positive for cough. Negative for apnea, choking, chest tightness, shortness of breath, wheezing and stridor. Cardiovascular:  Negative for chest pain, palpitations and leg swelling.    Gastrointestinal:  Negative for abdominal distention, abdominal pain, blood in stool, Abdominal:      Palpations: Abdomen is soft. Tenderness: There is no abdominal tenderness. There is no guarding or rebound. Comments: Large protuberant abdomen   Musculoskeletal:      Right lower leg: Edema (1+ pitting edema, support hose) present. Left lower leg: Edema (1+ pitting edema, support hose) present. Lymphadenopathy:      Cervical: No cervical adenopathy. Skin:     General: Skin is warm and dry. Coloration: Skin is not jaundiced or pale. Findings: Lesion present. No bruising, erythema or rash. Comments: Dressing on foot   Neurological:      Mental Status: He is alert and oriented to person, place, and time.       Gait: Gait abnormal.   Psychiatric:         Mood and Affect: Mood normal.         Behavior: Behavior normal.

## 2022-07-27 DIAGNOSIS — Z79.4 TYPE 2 DIABETES MELLITUS WITH HYPERGLYCEMIA, WITH LONG-TERM CURRENT USE OF INSULIN (HCC): ICD-10-CM

## 2022-07-27 DIAGNOSIS — E11.65 TYPE 2 DIABETES MELLITUS WITH HYPERGLYCEMIA, WITH LONG-TERM CURRENT USE OF INSULIN (HCC): ICD-10-CM

## 2022-07-27 RX ORDER — SEMAGLUTIDE 1.34 MG/ML
INJECTION, SOLUTION SUBCUTANEOUS
Qty: 12 PEN | Refills: 1 | Status: SHIPPED | OUTPATIENT
Start: 2022-07-27

## 2022-07-27 NOTE — TELEPHONE ENCOUNTER
Refill Request     CONFIRM preferrred pharmacy with the patient. If Mail Order Rx - Pend for 90 day refill.       Last Seen: Last Seen Department: 6/23/2022  Last Seen by PCP: 6/23/2022    Last Written: 12/17/2021 4 pen 5 Refills    Next Appointment:   Future Appointments   Date Time Provider Joanna Gail   8/8/2022  8:30 AM SHAKIRA Heller CNP   8/30/2022 10:20 AM Jeo Stevenson MD AND [facility] SERRANO   9/28/2022  9:00 AM Barak Letcher, APRN - CNP Michelet Car [facility]   9/28/2022  9:30 AM SHAKIRA Herrera CNP [facility]   9/28/2022 10:00 AM Kinder Coupe, APRN - CNP [facility]   1/19/2023  9:30 AM [facility] CT [facility] CT Maicol Gonzalez   1/19/2023 10:00 AM Joe Stevenson MD AND [facility] SERRANO       Future appointment scheduled      Requested Prescriptions     Pending Prescriptions Disp Refills    OZEMPIC, 0.25 OR 0.5 MG/DOSE, 2 MG/1.5ML SOPN [Pharmacy Med Name: OZEMPIC 0.25-0.5 MG/DOSE PEN]  1     Sig: DIAL AND INJECT 0.5MG WEEKLY

## 2022-08-05 ENCOUNTER — TELEPHONE (OUTPATIENT)
Dept: FAMILY MEDICINE CLINIC | Age: 72
End: 2022-08-05

## 2022-08-05 DIAGNOSIS — L03.119 CELLULITIS OF LOWER EXTREMITY, UNSPECIFIED LATERALITY: Primary | ICD-10-CM

## 2022-08-05 RX ORDER — CEPHALEXIN 500 MG/1
500 CAPSULE ORAL 3 TIMES DAILY
Qty: 21 CAPSULE | Refills: 0 | Status: SHIPPED | OUTPATIENT
Start: 2022-08-05 | End: 2022-08-12

## 2022-08-05 NOTE — TELEPHONE ENCOUNTER
Quita saw the patient this morning for wound care and believes he is getting cellulitis. Right leg is Red, warm, swollen, and painful to touch. The wound looks great so she doesn't think the wound has an infection. She is asking if they can start him on an abx.   OK TO LEAVE VM IT IS CONFIDENTIAL

## 2022-08-30 ENCOUNTER — TELEPHONE (OUTPATIENT)
Dept: PULMONOLOGY | Age: 72
End: 2022-08-30

## 2022-08-31 DIAGNOSIS — S22.050A CLOSED WEDGE COMPRESSION FRACTURE OF T6 VERTEBRA, INITIAL ENCOUNTER (HCC): ICD-10-CM

## 2022-08-31 DIAGNOSIS — I10 ESSENTIAL HYPERTENSION: Chronic | ICD-10-CM

## 2022-09-02 ENCOUNTER — TELEPHONE (OUTPATIENT)
Dept: FAMILY MEDICINE CLINIC | Age: 72
End: 2022-09-02

## 2022-09-02 DIAGNOSIS — L08.9 SKIN INFECTION: Primary | ICD-10-CM

## 2022-09-02 RX ORDER — TIZANIDINE 4 MG/1
4 TABLET ORAL NIGHTLY PRN
Qty: 30 TABLET | Refills: 0 | OUTPATIENT
Start: 2022-09-02

## 2022-09-02 RX ORDER — DOXYCYCLINE HYCLATE 100 MG
100 TABLET ORAL EVERY 12 HOURS SCHEDULED
Qty: 28 TABLET | Refills: 0 | Status: SHIPPED | OUTPATIENT
Start: 2022-09-02 | End: 2022-09-30

## 2022-09-02 NOTE — TELEPHONE ENCOUNTER
Quita, Wound Care Nurse with [facility] called, states that at visit Patients Wound on RLE is much worse then before, completed last Course of ATB therapy but seemed Patient was healing much Better on the Doxycycline. Nurse Quita requesting a Order for a Wound Culture on RLE. Also requesting if Patient can be given Doxycycline again but maybe a little stronger of a dose then the 100mg once daily as previous. Please advise Quita at [phone] Voicemail box is secured.  Please advise thank you

## 2022-09-06 RX ORDER — ATENOLOL 50 MG/1
TABLET ORAL
Qty: 45 TABLET | Refills: 3 | Status: SHIPPED | OUTPATIENT
Start: 2022-09-06

## 2022-09-09 ENCOUNTER — HOSPITAL ENCOUNTER (OUTPATIENT)
Age: 72
Setting detail: SPECIMEN
Discharge: HOME | End: 2022-09-09
Payer: MEDICARE

## 2022-09-09 PROCEDURE — 87077 CULTURE AEROBIC IDENTIFY: CPT

## 2022-09-09 PROCEDURE — 87070 CULTURE OTHR SPECIMN AEROBIC: CPT

## 2022-09-09 PROCEDURE — 87205 SMEAR GRAM STAIN: CPT

## 2022-09-09 PROCEDURE — 87186 SC STD MICRODIL/AGAR DIL: CPT

## 2022-09-11 LAB
GRAM STAIN RESULT: ABNORMAL
GRAM STAIN RESULT: ABNORMAL
ORGANISM: ABNORMAL
WOUND/ABSCESS: ABNORMAL

## 2022-09-13 DIAGNOSIS — L08.9 SKIN INFECTION: Primary | ICD-10-CM

## 2022-09-13 RX ORDER — SULFAMETHOXAZOLE AND TRIMETHOPRIM 800; 160 MG/1; MG/1
1 TABLET ORAL 2 TIMES DAILY
Qty: 28 TABLET | Refills: 0 | Status: SHIPPED | OUTPATIENT
Start: 2022-09-13 | End: 2022-09-27

## 2022-09-20 ENCOUNTER — TELEPHONE (OUTPATIENT)
Dept: FAMILY MEDICINE CLINIC | Age: 72
End: 2022-09-20

## 2022-09-20 DIAGNOSIS — M79.604 PAIN IN BOTH LOWER EXTREMITIES: Primary | ICD-10-CM

## 2022-09-20 DIAGNOSIS — M79.605 PAIN IN BOTH LOWER EXTREMITIES: Primary | ICD-10-CM

## 2022-09-20 RX ORDER — TRAMADOL HYDROCHLORIDE 50 MG/1
50 TABLET ORAL EVERY 12 HOURS PRN
Qty: 14 TABLET | Refills: 0 | Status: SHIPPED | OUTPATIENT
Start: 2022-09-20 | End: 2022-09-27

## 2022-09-20 NOTE — TELEPHONE ENCOUNTER
Patient called in and is wanting to know about getting something for the leg wound pain. He states that his home nurse was supposed to call yesterday 9/19/2022 to request this. Patient states that he is still taking the antibiotic and it is helping.

## 2022-09-20 NOTE — TELEPHONE ENCOUNTER
Pain medication is a controlled medication and can not be requested by his home health nurse. I ordered a few tramadol tablets to his pharmacy but this not a medication that he can take on an ongoing basis.

## 2022-09-28 ENCOUNTER — TELEPHONE (OUTPATIENT)
Dept: FAMILY MEDICINE CLINIC | Age: 72
End: 2022-09-28

## 2022-09-28 DIAGNOSIS — I87.2 VENOUS STASIS DERMATITIS OF BOTH LOWER EXTREMITIES: ICD-10-CM

## 2022-09-28 DIAGNOSIS — E11.65 TYPE 2 DIABETES MELLITUS WITH HYPERGLYCEMIA, WITH LONG-TERM CURRENT USE OF INSULIN (HCC): ICD-10-CM

## 2022-09-28 DIAGNOSIS — E83.42 HYPOMAGNESEMIA: ICD-10-CM

## 2022-09-28 DIAGNOSIS — I83.019 VENOUS STASIS ULCER OF RIGHT LOWER EXTREMITY (HCC): Primary | ICD-10-CM

## 2022-09-28 DIAGNOSIS — Z79.4 TYPE 2 DIABETES MELLITUS WITH HYPERGLYCEMIA, WITH LONG-TERM CURRENT USE OF INSULIN (HCC): ICD-10-CM

## 2022-09-28 DIAGNOSIS — I10 ESSENTIAL HYPERTENSION: ICD-10-CM

## 2022-09-28 DIAGNOSIS — N28.9 RENAL INSUFFICIENCY: ICD-10-CM

## 2022-09-28 DIAGNOSIS — L97.919 VENOUS STASIS ULCER OF RIGHT LOWER EXTREMITY (HCC): Primary | ICD-10-CM

## 2022-09-28 LAB
A/G RATIO: 0.9 (ref 1.1–2.2)
ALBUMIN SERPL-MCNC: 3.4 G/DL (ref 3.4–5)
ALP BLD-CCNC: 115 U/L (ref 40–129)
ALT SERPL-CCNC: 27 U/L (ref 10–40)
ANION GAP SERPL CALCULATED.3IONS-SCNC: 14 MMOL/L (ref 3–16)
AST SERPL-CCNC: 29 U/L (ref 15–37)
BASOPHILS ABSOLUTE: 0.1 K/UL (ref 0–0.2)
BASOPHILS RELATIVE PERCENT: 0.8 %
BILIRUB SERPL-MCNC: 0.4 MG/DL (ref 0–1)
BUN BLDV-MCNC: 30 MG/DL (ref 7–20)
CALCIUM SERPL-MCNC: 9.3 MG/DL (ref 8.3–10.6)
CHLORIDE BLD-SCNC: 103 MMOL/L (ref 99–110)
CO2: 23 MMOL/L (ref 21–32)
CREAT SERPL-MCNC: 1.1 MG/DL (ref 0.8–1.3)
EOSINOPHILS ABSOLUTE: 0.2 K/UL (ref 0–0.6)
EOSINOPHILS RELATIVE PERCENT: 2 %
GFR AFRICAN AMERICAN: >60
GFR NON-AFRICAN AMERICAN: >60
GLUCOSE BLD-MCNC: 140 MG/DL (ref 70–99)
HCT VFR BLD CALC: 40 % (ref 40.5–52.5)
HEMOGLOBIN: 13.3 G/DL (ref 13.5–17.5)
LYMPHOCYTES ABSOLUTE: 1.3 K/UL (ref 1–5.1)
LYMPHOCYTES RELATIVE PERCENT: 12.7 %
MAGNESIUM: 1.9 MG/DL (ref 1.8–2.4)
MCH RBC QN AUTO: 28.5 PG (ref 26–34)
MCHC RBC AUTO-ENTMCNC: 33.2 G/DL (ref 31–36)
MCV RBC AUTO: 85.7 FL (ref 80–100)
MONOCYTES ABSOLUTE: 0.6 K/UL (ref 0–1.3)
MONOCYTES RELATIVE PERCENT: 5.6 %
NEUTROPHILS ABSOLUTE: 7.9 K/UL (ref 1.7–7.7)
NEUTROPHILS RELATIVE PERCENT: 78.9 %
PDW BLD-RTO: 16 % (ref 12.4–15.4)
PLATELET # BLD: 251 K/UL (ref 135–450)
PMV BLD AUTO: 7.6 FL (ref 5–10.5)
POTASSIUM REFLEX MAGNESIUM: 4.2 MMOL/L (ref 3.5–5.1)
RBC # BLD: 4.67 M/UL (ref 4.2–5.9)
SODIUM BLD-SCNC: 140 MMOL/L (ref 136–145)
TOTAL PROTEIN: 7 G/DL (ref 6.4–8.2)
WBC # BLD: 10.1 K/UL (ref 4–11)

## 2022-09-28 RX ORDER — INSULIN GLARGINE 300 U/ML
50 INJECTION, SOLUTION SUBCUTANEOUS 2 TIMES DAILY
Qty: 5 ADJUSTABLE DOSE PRE-FILLED PEN SYRINGE | Refills: 5 | Status: SHIPPED | OUTPATIENT
Start: 2022-09-28

## 2022-09-28 NOTE — PROGRESS NOTES
Today states is not following with other provides for chronic ulcers bilateral lower legs. Was in the hospital in June and was referred to home health. Since then is only following with \"her\". Today states he needs medication for pain. Declining to follow up with Dr. Christy Alonso or Dr. Dillon Rosenberg whom he has seen in the past. Has gone to the wound center at [facility] but not willing to return there. States the Home Nurse is indicating he needs another round of antibiotic, pic line and IV antibiotics. Discussed in depth that he must contact the wound center today to arrange an appointment for further evaluation.      Diagnoses and all orders for this visit:    Venous stasis ulcer of right lower extremity ([facility] [address] [address]  -     [address] [place] with Auto Differential    Venous stasis dermatitis of both lower extremities  -     [address] [place] with Auto Differential    Essential hypertension  -     Comprehensive Metabolic Panel w/ Reflex to MG    Type 2 diabetes mellitus with hyperglycemia, with long-term current use of insulin ([facility])  -     Hemoglobin A1C  -     Insulin Glargine, 2 Unit Dial, (TOUJEO MAX SOLOSTAR) 300 UNIT/ML SOPN; Inject 50 Units into the skin 2 times daily    Renal insufficiency    Hypomagnesemia  -     Magnesium

## 2022-09-28 NOTE — TELEPHONE ENCOUNTER
Quita from [facility] called regarding pt, states he just finished antibiotics for a wound infection. States the medicine worked for about a week, wound was looking good, but is going [address] again. Wondering if another 2 weeks of antibiotics can be sent in again. Please send to [facility] in [place] on [address] or call Quita from [facility] and advise.

## 2022-09-28 NOTE — TELEPHONE ENCOUNTER
Please let Quita know I strongly advised Chapo Degroot today that he has to follow up with the [address] for management of the leg ulcers. He should contact them today to see when he can be seen.

## 2022-09-29 LAB
ESTIMATED AVERAGE GLUCOSE: 188.6 MG/DL
HBA1C MFR BLD: 8.2 %

## 2022-09-30 ENCOUNTER — TELEPHONE (OUTPATIENT)
Dept: WOUND CARE | Age: 72
End: 2022-09-30

## 2022-09-30 ENCOUNTER — HOSPITAL ENCOUNTER (INPATIENT)
Age: 72
LOS: 5 days | Discharge: NURSING, REHAB OR LONG-TERM CARE | DRG: 638 | End: 2022-10-05
Attending: STUDENT IN AN ORGANIZED HEALTH CARE EDUCATION/TRAINING PROGRAM | Admitting: HOSPITALIST
Payer: MEDICARE

## 2022-09-30 ENCOUNTER — APPOINTMENT (OUTPATIENT)
Dept: GENERAL RADIOLOGY | Age: 72
DRG: 638 | End: 2022-09-30
Payer: MEDICARE

## 2022-09-30 ENCOUNTER — HOSPITAL ENCOUNTER (OUTPATIENT)
Dept: WOUND CARE | Age: 72
Discharge: HOME | End: 2022-09-30
Payer: MEDICARE

## 2022-09-30 VITALS
DIASTOLIC BLOOD PRESSURE: 75 MMHG | BODY MASS INDEX: 38.83 KG/M2 | RESPIRATION RATE: 24 BRPM | SYSTOLIC BLOOD PRESSURE: 134 MMHG | TEMPERATURE: 96 F | HEART RATE: 72 BPM | WEIGHT: 294.31 LBS

## 2022-09-30 DIAGNOSIS — L03.119 CELLULITIS OF LOWER EXTREMITY, UNSPECIFIED LATERALITY: ICD-10-CM

## 2022-09-30 DIAGNOSIS — I96 GANGRENE (HCC): ICD-10-CM

## 2022-09-30 DIAGNOSIS — I87.333 CHRONIC VENOUS HYPERTENSION (IDIOPATHIC) WITH ULCER AND INFLAMMATION OF BILATERAL LOWER EXTREMITY (HCC): ICD-10-CM

## 2022-09-30 DIAGNOSIS — L97.929 IDIOPATHIC CHRONIC VENOUS HYPERTENSION OF BOTH LOWER EXTREMITIES WITH ULCER (HCC): ICD-10-CM

## 2022-09-30 DIAGNOSIS — I87.313 IDIOPATHIC CHRONIC VENOUS HYPERTENSION OF BOTH LOWER EXTREMITIES WITH ULCER (HCC): ICD-10-CM

## 2022-09-30 DIAGNOSIS — L97.919 CHRONIC VENOUS HYPERTENSION (IDIOPATHIC) WITH ULCER AND INFLAMMATION OF BILATERAL LOWER EXTREMITY (HCC): ICD-10-CM

## 2022-09-30 DIAGNOSIS — L97.919 IDIOPATHIC CHRONIC VENOUS HYPERTENSION OF BOTH LOWER EXTREMITIES WITH ULCER (HCC): ICD-10-CM

## 2022-09-30 DIAGNOSIS — M79.89 LEG SWELLING: ICD-10-CM

## 2022-09-30 DIAGNOSIS — I87.2 VENOUS STASIS DERMATITIS OF BOTH LOWER EXTREMITIES: ICD-10-CM

## 2022-09-30 DIAGNOSIS — I83.019 VENOUS STASIS ULCER OF RIGHT LOWER EXTREMITY (HCC): Primary | ICD-10-CM

## 2022-09-30 DIAGNOSIS — L97.212 NON-PRESSURE CHRONIC ULCER OF RIGHT CALF WITH FAT LAYER EXPOSED (HCC): ICD-10-CM

## 2022-09-30 DIAGNOSIS — L97.929 CHRONIC VENOUS HYPERTENSION (IDIOPATHIC) WITH ULCER AND INFLAMMATION OF BILATERAL LOWER EXTREMITY (HCC): ICD-10-CM

## 2022-09-30 DIAGNOSIS — L97.222 NON-PRESSURE CHRONIC ULCER OF LEFT CALF WITH FAT LAYER EXPOSED (HCC): ICD-10-CM

## 2022-09-30 DIAGNOSIS — L03.115 CELLULITIS OF RIGHT LEG WITHOUT FOOT: Primary | ICD-10-CM

## 2022-09-30 DIAGNOSIS — L97.919 VENOUS STASIS ULCER OF RIGHT LOWER EXTREMITY (HCC): Primary | ICD-10-CM

## 2022-09-30 LAB
A/G RATIO: 1 (ref 1.1–2.2)
ALBUMIN SERPL-MCNC: 3.6 G/DL (ref 3.4–5)
ALP BLD-CCNC: 101 U/L (ref 40–129)
ALT SERPL-CCNC: 23 U/L (ref 10–40)
ANION GAP SERPL CALCULATED.3IONS-SCNC: 8 MMOL/L (ref 3–16)
AST SERPL-CCNC: 21 U/L (ref 15–37)
BASOPHILS ABSOLUTE: 0.1 K/UL (ref 0–0.2)
BASOPHILS RELATIVE PERCENT: 0.7 %
BILIRUB SERPL-MCNC: 0.6 MG/DL (ref 0–1)
BUN BLDV-MCNC: 26 MG/DL (ref 7–20)
C-REACTIVE PROTEIN: 63.6 MG/L (ref 0–5.1)
CALCIUM SERPL-MCNC: 9.3 MG/DL (ref 8.3–10.6)
CHLORIDE BLD-SCNC: 101 MMOL/L (ref 99–110)
CO2: 27 MMOL/L (ref 21–32)
CREAT SERPL-MCNC: 0.8 MG/DL (ref 0.8–1.3)
EOSINOPHILS ABSOLUTE: 0.1 K/UL (ref 0–0.6)
EOSINOPHILS RELATIVE PERCENT: 0.9 %
GFR AFRICAN AMERICAN: >60
GFR NON-AFRICAN AMERICAN: >60
GLUCOSE BLD-MCNC: 114 MG/DL (ref 70–99)
GLUCOSE BLD-MCNC: 170 MG/DL (ref 70–99)
HCT VFR BLD CALC: 38.7 % (ref 40.5–52.5)
HEMOGLOBIN: 12.8 G/DL (ref 13.5–17.5)
LACTIC ACID: 1.1 MMOL/L (ref 0.4–2)
LYMPHOCYTES ABSOLUTE: 1 K/UL (ref 1–5.1)
LYMPHOCYTES RELATIVE PERCENT: 9.7 %
MCH RBC QN AUTO: 28.4 PG (ref 26–34)
MCHC RBC AUTO-ENTMCNC: 33.1 G/DL (ref 31–36)
MCV RBC AUTO: 85.8 FL (ref 80–100)
MONOCYTES ABSOLUTE: 0.5 K/UL (ref 0–1.3)
MONOCYTES RELATIVE PERCENT: 5.2 %
NEUTROPHILS ABSOLUTE: 8.3 K/UL (ref 1.7–7.7)
NEUTROPHILS RELATIVE PERCENT: 83.5 %
PDW BLD-RTO: 16.1 % (ref 12.4–15.4)
PERFORMED ON: ABNORMAL
PLATELET # BLD: 242 K/UL (ref 135–450)
PMV BLD AUTO: 7.2 FL (ref 5–10.5)
POTASSIUM SERPL-SCNC: 4.3 MMOL/L (ref 3.5–5.1)
PRO-BNP: 1285 PG/ML (ref 0–124)
RBC # BLD: 4.51 M/UL (ref 4.2–5.9)
SEDIMENTATION RATE, ERYTHROCYTE: 30 MM/HR (ref 0–20)
SODIUM BLD-SCNC: 136 MMOL/L (ref 136–145)
TOTAL PROTEIN: 7.1 G/DL (ref 6.4–8.2)
WBC # BLD: 9.9 K/UL (ref 4–11)

## 2022-09-30 PROCEDURE — 85025 COMPLETE CBC W/AUTO DIFF WBC: CPT

## 2022-09-30 PROCEDURE — 83605 ASSAY OF LACTIC ACID: CPT

## 2022-09-30 PROCEDURE — 29581 APPL MULTLAYER CMPRN SYS LEG: CPT

## 2022-09-30 PROCEDURE — 73600 X-RAY EXAM OF ANKLE: CPT

## 2022-09-30 PROCEDURE — 2580000003 HC RX 258: Performed by: STUDENT IN AN ORGANIZED HEALTH CARE EDUCATION/TRAINING PROGRAM

## 2022-09-30 PROCEDURE — 1200000000 HC SEMI PRIVATE

## 2022-09-30 PROCEDURE — 96367 TX/PROPH/DG ADDL SEQ IV INF: CPT

## 2022-09-30 PROCEDURE — 99202 OFFICE O/P NEW SF 15 MIN: CPT | Performed by: SPECIALIST

## 2022-09-30 PROCEDURE — 94761 N-INVAS EAR/PLS OXIMETRY MLT: CPT

## 2022-09-30 PROCEDURE — 73610 X-RAY EXAM OF ANKLE: CPT

## 2022-09-30 PROCEDURE — 2700000000 HC OXYGEN THERAPY PER DAY

## 2022-09-30 PROCEDURE — 85652 RBC SED RATE AUTOMATED: CPT

## 2022-09-30 PROCEDURE — 87186 SC STD MICRODIL/AGAR DIL: CPT

## 2022-09-30 PROCEDURE — 87040 BLOOD CULTURE FOR BACTERIA: CPT

## 2022-09-30 PROCEDURE — 87070 CULTURE OTHR SPECIMN AEROBIC: CPT

## 2022-09-30 PROCEDURE — 71045 X-RAY EXAM CHEST 1 VIEW: CPT

## 2022-09-30 PROCEDURE — 96365 THER/PROPH/DIAG IV INF INIT: CPT

## 2022-09-30 PROCEDURE — 87077 CULTURE AEROBIC IDENTIFY: CPT

## 2022-09-30 PROCEDURE — 6360000002 HC RX W HCPCS: Performed by: STUDENT IN AN ORGANIZED HEALTH CARE EDUCATION/TRAINING PROGRAM

## 2022-09-30 PROCEDURE — 99214 OFFICE O/P EST MOD 30 MIN: CPT

## 2022-09-30 PROCEDURE — 96375 TX/PRO/DX INJ NEW DRUG ADDON: CPT

## 2022-09-30 PROCEDURE — 94660 CPAP INITIATION&MGMT: CPT

## 2022-09-30 PROCEDURE — 99285 EMERGENCY DEPT VISIT HI MDM: CPT

## 2022-09-30 PROCEDURE — 83880 ASSAY OF NATRIURETIC PEPTIDE: CPT

## 2022-09-30 PROCEDURE — 6370000000 HC RX 637 (ALT 250 FOR IP): Performed by: HOSPITALIST

## 2022-09-30 PROCEDURE — 96376 TX/PRO/DX INJ SAME DRUG ADON: CPT

## 2022-09-30 PROCEDURE — 80053 COMPREHEN METABOLIC PANEL: CPT

## 2022-09-30 PROCEDURE — 93005 ELECTROCARDIOGRAM TRACING: CPT | Performed by: HOSPITALIST

## 2022-09-30 PROCEDURE — 87205 SMEAR GRAM STAIN: CPT

## 2022-09-30 PROCEDURE — 86140 C-REACTIVE PROTEIN: CPT

## 2022-09-30 RX ORDER — POTASSIUM CHLORIDE 20 MEQ/1
40 TABLET, EXTENDED RELEASE ORAL PRN
Status: DISCONTINUED | OUTPATIENT
Start: 2022-09-30 | End: 2022-10-05 | Stop reason: HOSPADM

## 2022-09-30 RX ORDER — LANOLIN ALCOHOL/MO/W.PET/CERES
400 CREAM (GRAM) TOPICAL DAILY
Status: DISCONTINUED | OUTPATIENT
Start: 2022-09-30 | End: 2022-10-05 | Stop reason: HOSPADM

## 2022-09-30 RX ORDER — TIZANIDINE 4 MG/1
4 TABLET ORAL NIGHTLY PRN
Status: DISCONTINUED | OUTPATIENT
Start: 2022-09-30 | End: 2022-10-05 | Stop reason: HOSPADM

## 2022-09-30 RX ORDER — SODIUM CHLORIDE 9 MG/ML
INJECTION, SOLUTION INTRAVENOUS CONTINUOUS
Status: ACTIVE | OUTPATIENT
Start: 2022-09-30 | End: 2022-10-01

## 2022-09-30 RX ORDER — AZELASTINE 1 MG/ML
1 SPRAY, METERED NASAL 2 TIMES DAILY
Status: DISCONTINUED | OUTPATIENT
Start: 2022-09-30 | End: 2022-10-05 | Stop reason: HOSPADM

## 2022-09-30 RX ORDER — BETAMETHASONE DIPROPIONATE 0.05 %
OINTMENT (GRAM) TOPICAL ONCE
OUTPATIENT
Start: 2022-09-30 | End: 2022-09-30

## 2022-09-30 RX ORDER — LIDOCAINE HYDROCHLORIDE 20 MG/ML
JELLY TOPICAL ONCE
OUTPATIENT
Start: 2022-09-30 | End: 2022-09-30

## 2022-09-30 RX ORDER — SODIUM CHLORIDE 0.9 % (FLUSH) 0.9 %
10 SYRINGE (ML) INJECTION PRN
Status: DISCONTINUED | OUTPATIENT
Start: 2022-09-30 | End: 2022-10-05 | Stop reason: HOSPADM

## 2022-09-30 RX ORDER — CLOBETASOL PROPIONATE 0.5 MG/G
OINTMENT TOPICAL ONCE
OUTPATIENT
Start: 2022-09-30 | End: 2022-09-30

## 2022-09-30 RX ORDER — DEXTROSE MONOHYDRATE 100 MG/ML
INJECTION, SOLUTION INTRAVENOUS CONTINUOUS PRN
Status: DISCONTINUED | OUTPATIENT
Start: 2022-09-30 | End: 2022-10-05 | Stop reason: HOSPADM

## 2022-09-30 RX ORDER — CIPROFLOXACIN 500 MG/1
500 TABLET, FILM COATED ORAL 2 TIMES DAILY
Qty: 20 TABLET | Refills: 0 | Status: ON HOLD | OUTPATIENT
Start: 2022-09-30 | End: 2022-10-04 | Stop reason: HOSPADM

## 2022-09-30 RX ORDER — LIDOCAINE 40 MG/G
CREAM TOPICAL ONCE
OUTPATIENT
Start: 2022-09-30 | End: 2022-09-30

## 2022-09-30 RX ORDER — FERROUS SULFATE 325(65) MG
325 TABLET ORAL DAILY
Status: DISCONTINUED | OUTPATIENT
Start: 2022-09-30 | End: 2022-10-05 | Stop reason: HOSPADM

## 2022-09-30 RX ORDER — ONDANSETRON 2 MG/ML
4 INJECTION INTRAMUSCULAR; INTRAVENOUS EVERY 6 HOURS PRN
Status: DISCONTINUED | OUTPATIENT
Start: 2022-09-30 | End: 2022-10-05 | Stop reason: HOSPADM

## 2022-09-30 RX ORDER — SODIUM CHLORIDE 9 MG/ML
INJECTION, SOLUTION INTRAVENOUS PRN
Status: DISCONTINUED | OUTPATIENT
Start: 2022-09-30 | End: 2022-10-05 | Stop reason: HOSPADM

## 2022-09-30 RX ORDER — INSULIN LISPRO 100 [IU]/ML
0-8 INJECTION, SOLUTION INTRAVENOUS; SUBCUTANEOUS
Status: DISCONTINUED | OUTPATIENT
Start: 2022-09-30 | End: 2022-10-05 | Stop reason: HOSPADM

## 2022-09-30 RX ORDER — POTASSIUM CHLORIDE 7.45 MG/ML
10 INJECTION INTRAVENOUS PRN
Status: DISCONTINUED | OUTPATIENT
Start: 2022-09-30 | End: 2022-10-05 | Stop reason: HOSPADM

## 2022-09-30 RX ORDER — BACITRACIN ZINC AND POLYMYXIN B SULFATE 500; 1000 [USP'U]/G; [USP'U]/G
OINTMENT TOPICAL ONCE
OUTPATIENT
Start: 2022-09-30 | End: 2022-09-30

## 2022-09-30 RX ORDER — SPIRONOLACTONE 25 MG/1
25 TABLET ORAL DAILY
Status: DISCONTINUED | OUTPATIENT
Start: 2022-10-01 | End: 2022-10-05 | Stop reason: HOSPADM

## 2022-09-30 RX ORDER — PROMETHAZINE HYDROCHLORIDE 25 MG/1
12.5 TABLET ORAL EVERY 6 HOURS PRN
Status: DISCONTINUED | OUTPATIENT
Start: 2022-09-30 | End: 2022-10-05 | Stop reason: HOSPADM

## 2022-09-30 RX ORDER — ACETAMINOPHEN 650 MG/1
650 SUPPOSITORY RECTAL EVERY 6 HOURS PRN
Status: DISCONTINUED | OUTPATIENT
Start: 2022-09-30 | End: 2022-10-05 | Stop reason: HOSPADM

## 2022-09-30 RX ORDER — ASPIRIN 81 MG/1
81 TABLET ORAL DAILY
Status: DISCONTINUED | OUTPATIENT
Start: 2022-10-01 | End: 2022-10-05 | Stop reason: HOSPADM

## 2022-09-30 RX ORDER — GENTAMICIN SULFATE 1 MG/G
OINTMENT TOPICAL ONCE
OUTPATIENT
Start: 2022-09-30 | End: 2022-09-30

## 2022-09-30 RX ORDER — LIDOCAINE HYDROCHLORIDE 40 MG/ML
SOLUTION TOPICAL ONCE
Status: DISCONTINUED | OUTPATIENT
Start: 2022-09-30 | End: 2022-10-01 | Stop reason: HOSPADM

## 2022-09-30 RX ORDER — SENNA PLUS 8.6 MG/1
1 TABLET ORAL DAILY PRN
Status: DISCONTINUED | OUTPATIENT
Start: 2022-09-30 | End: 2022-10-05 | Stop reason: HOSPADM

## 2022-09-30 RX ORDER — ACETAMINOPHEN 325 MG/1
650 TABLET ORAL EVERY 6 HOURS PRN
Status: DISCONTINUED | OUTPATIENT
Start: 2022-09-30 | End: 2022-10-05 | Stop reason: HOSPADM

## 2022-09-30 RX ORDER — TRAMADOL HYDROCHLORIDE 50 MG/1
50 TABLET ORAL EVERY 6 HOURS PRN
Status: ON HOLD | COMMUNITY
End: 2022-10-05 | Stop reason: SDUPTHER

## 2022-09-30 RX ORDER — INSULIN GLARGINE 100 [IU]/ML
40 INJECTION, SOLUTION SUBCUTANEOUS 2 TIMES DAILY
Status: DISCONTINUED | OUTPATIENT
Start: 2022-09-30 | End: 2022-10-05 | Stop reason: HOSPADM

## 2022-09-30 RX ORDER — ATENOLOL 25 MG/1
25 TABLET ORAL DAILY
Status: DISCONTINUED | OUTPATIENT
Start: 2022-10-01 | End: 2022-10-05 | Stop reason: HOSPADM

## 2022-09-30 RX ORDER — M-VIT,TX,IRON,MINS/CALC/FOLIC 27MG-0.4MG
1 TABLET ORAL DAILY
Status: DISCONTINUED | OUTPATIENT
Start: 2022-09-30 | End: 2022-10-05 | Stop reason: HOSPADM

## 2022-09-30 RX ORDER — TRAMADOL HYDROCHLORIDE 50 MG/1
50 TABLET ORAL EVERY 6 HOURS PRN
Qty: 12 TABLET | Refills: 0 | Status: ON HOLD | OUTPATIENT
Start: 2022-09-30 | End: 2022-10-04 | Stop reason: HOSPADM

## 2022-09-30 RX ORDER — FENTANYL CITRATE 50 UG/ML
25 INJECTION, SOLUTION INTRAMUSCULAR; INTRAVENOUS ONCE
Status: COMPLETED | OUTPATIENT
Start: 2022-09-30 | End: 2022-09-30

## 2022-09-30 RX ORDER — ATORVASTATIN CALCIUM 40 MG/1
40 TABLET, FILM COATED ORAL NIGHTLY
Status: DISCONTINUED | OUTPATIENT
Start: 2022-09-30 | End: 2022-10-05 | Stop reason: HOSPADM

## 2022-09-30 RX ORDER — TRAMADOL HYDROCHLORIDE 50 MG/1
50 TABLET ORAL EVERY 6 HOURS PRN
Status: DISCONTINUED | OUTPATIENT
Start: 2022-09-30 | End: 2022-10-01

## 2022-09-30 RX ORDER — PANTOPRAZOLE SODIUM 40 MG/1
40 TABLET, DELAYED RELEASE ORAL
Status: DISCONTINUED | OUTPATIENT
Start: 2022-10-01 | End: 2022-10-05 | Stop reason: HOSPADM

## 2022-09-30 RX ORDER — MECOBALAMIN 5000 MCG
5 TABLET,DISINTEGRATING ORAL NIGHTLY
Status: DISCONTINUED | OUTPATIENT
Start: 2022-09-30 | End: 2022-10-05 | Stop reason: HOSPADM

## 2022-09-30 RX ORDER — INSULIN LISPRO 100 [IU]/ML
0-4 INJECTION, SOLUTION INTRAVENOUS; SUBCUTANEOUS NIGHTLY
Status: DISCONTINUED | OUTPATIENT
Start: 2022-09-30 | End: 2022-10-05 | Stop reason: HOSPADM

## 2022-09-30 RX ORDER — MAGNESIUM SULFATE IN WATER 40 MG/ML
2000 INJECTION, SOLUTION INTRAVENOUS PRN
Status: DISCONTINUED | OUTPATIENT
Start: 2022-09-30 | End: 2022-10-05 | Stop reason: HOSPADM

## 2022-09-30 RX ORDER — SODIUM CHLORIDE 0.9 % (FLUSH) 0.9 %
10 SYRINGE (ML) INJECTION EVERY 12 HOURS SCHEDULED
Status: DISCONTINUED | OUTPATIENT
Start: 2022-09-30 | End: 2022-10-05 | Stop reason: HOSPADM

## 2022-09-30 RX ORDER — LIDOCAINE HYDROCHLORIDE 40 MG/ML
SOLUTION TOPICAL ONCE
OUTPATIENT
Start: 2022-09-30 | End: 2022-09-30

## 2022-09-30 RX ORDER — GINSENG 100 MG
CAPSULE ORAL ONCE
OUTPATIENT
Start: 2022-09-30 | End: 2022-09-30

## 2022-09-30 RX ORDER — ISOSORBIDE MONONITRATE 30 MG/1
30 TABLET, EXTENDED RELEASE ORAL DAILY
Status: DISCONTINUED | OUTPATIENT
Start: 2022-10-01 | End: 2022-10-05 | Stop reason: HOSPADM

## 2022-09-30 RX ORDER — BACITRACIN, NEOMYCIN, POLYMYXIN B 400; 3.5; 5 [USP'U]/G; MG/G; [USP'U]/G
OINTMENT TOPICAL ONCE
OUTPATIENT
Start: 2022-09-30 | End: 2022-09-30

## 2022-09-30 RX ORDER — LIDOCAINE 50 MG/G
OINTMENT TOPICAL ONCE
OUTPATIENT
Start: 2022-09-30 | End: 2022-09-30

## 2022-09-30 RX ORDER — TORSEMIDE 20 MG/1
40 TABLET ORAL DAILY
Status: DISCONTINUED | OUTPATIENT
Start: 2022-10-01 | End: 2022-10-05 | Stop reason: HOSPADM

## 2022-09-30 RX ORDER — MONTELUKAST SODIUM 10 MG/1
10 TABLET ORAL DAILY
Status: DISCONTINUED | OUTPATIENT
Start: 2022-09-30 | End: 2022-10-05 | Stop reason: HOSPADM

## 2022-09-30 RX ADMIN — TRAMADOL HYDROCHLORIDE 50 MG: 50 TABLET, COATED ORAL at 16:52

## 2022-09-30 RX ADMIN — INSULIN GLARGINE 40 UNITS: 100 INJECTION, SOLUTION SUBCUTANEOUS at 20:15

## 2022-09-30 RX ADMIN — VANCOMYCIN HYDROCHLORIDE 2000 MG: 5 INJECTION, POWDER, LYOPHILIZED, FOR SOLUTION INTRAVENOUS at 15:29

## 2022-09-30 RX ADMIN — FENTANYL CITRATE 25 MCG: 50 INJECTION, SOLUTION INTRAMUSCULAR; INTRAVENOUS at 13:30

## 2022-09-30 RX ADMIN — ATORVASTATIN CALCIUM 40 MG: 40 TABLET, FILM COATED ORAL at 19:47

## 2022-09-30 RX ADMIN — PIPERACILLIN AND TAZOBACTAM 3375 MG: 3; .375 INJECTION, POWDER, LYOPHILIZED, FOR SOLUTION INTRAVENOUS at 14:54

## 2022-09-30 RX ADMIN — TIZANIDINE 4 MG: 4 TABLET ORAL at 19:47

## 2022-09-30 RX ADMIN — MONTELUKAST SODIUM 10 MG: 10 TABLET ORAL at 19:47

## 2022-09-30 RX ADMIN — Medication 5 MG: at 21:14

## 2022-09-30 RX ADMIN — APIXABAN 5 MG: 5 TABLET, FILM COATED ORAL at 19:48

## 2022-09-30 RX ADMIN — FENTANYL CITRATE 25 MCG: 0.05 INJECTION, SOLUTION INTRAMUSCULAR; INTRAVENOUS at 14:49

## 2022-09-30 RX ADMIN — FERROUS SULFATE TAB 325 MG (65 MG ELEMENTAL FE) 325 MG: 325 (65 FE) TAB at 21:10

## 2022-09-30 RX ADMIN — Medication 400 MG: at 21:09

## 2022-09-30 RX ADMIN — Medication 1 TABLET: at 21:10

## 2022-09-30 RX ADMIN — TRAMADOL HYDROCHLORIDE 50 MG: 50 TABLET, COATED ORAL at 21:10

## 2022-09-30 ASSESSMENT — PAIN DESCRIPTION - ORIENTATION
ORIENTATION: LEFT;RIGHT
ORIENTATION: LEFT
ORIENTATION: RIGHT;LEFT

## 2022-09-30 ASSESSMENT — PAIN DESCRIPTION - DESCRIPTORS
DESCRIPTORS: ACHING
DESCRIPTORS: BURNING
DESCRIPTORS: THROBBING
DESCRIPTORS: BURNING
DESCRIPTORS: ACHING

## 2022-09-30 ASSESSMENT — PAIN SCALES - GENERAL
PAINLEVEL_OUTOF10: 5
PAINLEVEL_OUTOF10: 0
PAINLEVEL_OUTOF10: 4
PAINLEVEL_OUTOF10: 10
PAINLEVEL_OUTOF10: 6
PAINLEVEL_OUTOF10: 10
PAINLEVEL_OUTOF10: 5
PAINLEVEL_OUTOF10: 10
PAINLEVEL_OUTOF10: 7

## 2022-09-30 ASSESSMENT — PAIN DESCRIPTION - PAIN TYPE
TYPE: CHRONIC PAIN
TYPE: ACUTE PAIN;CHRONIC PAIN
TYPE: CHRONIC PAIN

## 2022-09-30 ASSESSMENT — PAIN DESCRIPTION - LOCATION
LOCATION: LEG

## 2022-09-30 ASSESSMENT — PAIN DESCRIPTION - FREQUENCY: FREQUENCY: CONTINUOUS

## 2022-09-30 ASSESSMENT — PAIN - FUNCTIONAL ASSESSMENT
PAIN_FUNCTIONAL_ASSESSMENT: 0-10
PAIN_FUNCTIONAL_ASSESSMENT: ACTIVITIES ARE NOT PREVENTED

## 2022-09-30 ASSESSMENT — PAIN DESCRIPTION - ONSET: ONSET: ON-GOING

## 2022-09-30 NOTE — TELEPHONE ENCOUNTER
Can not get a hold of Quita at [facility] to let her know this. Patient did get schedule though, and had his appointment this AM 9/30/2022 at 8:30 for this.

## 2022-09-30 NOTE — PROGRESS NOTES
Multilayer Compression Wrap   (Not Unna) Below the Knee    NAME:  Len Halsted  YOB: 1950  MEDICAL RECORD NUMBER:  [id number]  DATE:  9/30/2022       Removed old Multilayer wrap if present and washed leg with mild soap/water. Applied moisturizing agent to dry skin as needed. Applied primary and secondary dressing as ordered    Applied multilayered dressing below the knee to Bilateral lower leg(s)  (2 Layer compression wrap ) . Instructed patient/caregiver not to remove dressing and to keep it clean and dry. Instructed patient/caregiver on complications to report to provider, such as pain, numbness in toes, heavy drainage, and slippage of dressing. Instructed patient on purpose of compression dressing and on activity and exercise recommendations.    Applied per   Guidelines    Electronically signed by Mame Mahoney RN on 9/30/2022 at 9:52 AM

## 2022-09-30 NOTE — ED PROVIDER NOTES
ATTENDING PHYSICIAN NOTE       Date of evaluation: 9/30/2022    Chief Complaint     Leg Swelling (Patient with chronic bilateral leg swelling, acute pain/weeping/extra fluid retention, seen at wound clinic today and told to come to ER. Patient c/o leg pain, denies chest pain or shortness of breath. Hx of Afib and CHF on diuretic.)      History of Present Illness     Andreas Valdes is a 66 y.o. male who presents with leg swelling. Patient has history of chronic bilateral leg swelling however this is acutely worsened. He was in wound clinic today and was told to come to the ER. Reports bilateral leg pain, weeping and fluid retention. Patient has history of atrial fibrillation and CHF on a diuretic. States that he has been compliant on this medication. Pain is mostly in the right leg however he does have pain in the left. Reports that he does have at home wound care and his wife does take care of it as well. States that she nicked his right foot while trying to change his dressing yesterday. Since then have ongoing redness, pain and difficulty ambulating. Denies any fever, vomiting, chest pain, shortness of breath, abdominal pain, changes in bowel or bladder. He does report some weight gain over the past week. He is unsure of how much but he does feel bloated. He has not taken anything for the pain. Review of Systems     Review of Systems   All other systems reviewed and are negative.     Past Medical, Surgical, Family, and Social History     He has a past medical history of Allergic rhinitis, Arthritis, Bladder fistula, C. difficile diarrhea, Cellulitis of right lower extremity, CHF (congestive heart failure) ([address] Dental disease, Diabetes ([address] Diverticulitis, Dizziness, DVT of lower extremity, bilateral ([address] GERD (gastroesophageal reflux disease), Headache, Hearing loss, Hematuria, Hx of blood clots, Hyperlipidemia, Hypertension, Lung disease, MONIQUE (obstructive sleep apnea), Pancreatitis [facility]), Pulmonary emboli ([facility] Rash, Sleep apnea, and Tinnitus. He has a past surgical history that includes Tibia fracture surgery (Right, 2003); Cholecystectomy, open (N/A, 9-17-13); Cystocopy (12/10/13); Cystoscopy (1-28-14); other surgical history (1-28-14); Colonoscopy (2008); Colonoscopy (12/4/2013); Colonoscopy (4/30/14); colostomy (Jan 2014); Colon surgery; Abdomen surgery (05/16/2014); hernia repair (08/14/2015); pr injection hip arthrogram,anesth (Right, 9/19/2018); epidural steroid injection (Right, 1/21/2019); epidural steroid injection (Right, 2/11/2019); Cardioversion (12/04/2019); vascular surgery (Left, 05/2021); and vascular surgery (Right, 05/10/2021). His family history includes Heart Attack in his father; Heart Disease in his brother and father; High Blood Pressure in his brother; Kidney Disease in his mother; Stroke in his brother. He reports that he has never smoked. He has never used smokeless tobacco. He reports current alcohol use. He reports that he does not use drugs. Medications     Current Discharge Medication List        CONTINUE these medications which have NOT CHANGED    Details   !! traMADol (ULTRAM) 50 MG tablet Take 50 mg by mouth every 6 hours as needed for Pain. !! traMADol (ULTRAM) 50 MG tablet Take 1 tablet by mouth every 6 hours as needed for Pain for up to 3 days. Intended supply: 3 days. Take lowest dose possible to manage pain  Qty: 12 tablet, Refills: 0    Comments: Reduce doses taken as pain becomes manageable  Associated Diagnoses: Venous stasis ulcer of right lower extremity ([facility]  Chronic venous hypertension (idiopathic) with ulcer and inflammation of bilateral lower extremity (HCC)      ciprofloxacin (CIPRO) 500 MG tablet Take 1 tablet by mouth 2 times daily for 10 days  Qty: 20 tablet, Refills: 0      Insulin Glargine, 2 Unit Dial, (TOUJEO MAX SOLOSTAR) 300 UNIT/ML SOPN Inject 50 Units into the skin 2 times daily  Qty: 5 Adjustable Dose Pre-filled Pen Syringe, Refills: 5    Associated Diagnoses: Type 2 diabetes mellitus with hyperglycemia, with long-term current use of insulin ([facility])      atenolol (TENORMIN) 50 MG tablet TAKE 1/2 TABLET BY MOUTH EVERY DAY  Qty: 45 tablet, Refills: 3    Associated Diagnoses: Essential hypertension      OZEMPIC, 0.25 OR 0.5 MG/DOSE, 2 MG/1.5ML SOPN DIAL AND INJECT 0.5MG WEEKLY  Qty: 12 pen, Refills: 1    Associated Diagnoses: Type 2 diabetes mellitus with hyperglycemia, with long-term current use of insulin ([facility])      magnesium oxide (MAG-OX) 400 (240 Mg) MG tablet TAKE 1 TABLET BY MOUTH EVERY DAY  Qty: 90 tablet, Refills: 1    Associated Diagnoses: Hypomagnesemia      glimepiride (AMARYL) 4 MG tablet TAKE 1 TABLET BY MOUTH EVERY DAY IN THE MORNING  Qty: 180 tablet, Refills: 3    Associated Diagnoses: Type 2 diabetes mellitus with hyperglycemia, with long-term current use of insulin ([facility])      omeprazole (PRILOSEC) 40 MG delayed release capsule TAKE 1 CAPSULE BY MOUTH EVERY DAY  Qty: 90 capsule, Refills: 3    Associated Diagnoses: Gastroesophageal reflux disease without esophagitis      torsemide (DEMADEX) 20 MG tablet TAKE 2 TABLETS BY MOUTH EVERY DAY  Qty: 180 tablet, Refills: 1      spironolactone (ALDACTONE) 25 MG tablet TAKE 1 TABLET BY MOUTH EVERY DAY  Qty: 90 tablet, Refills: 3    Associated Diagnoses: Chronic diastolic congestive heart failure ([facility])      Zinc Sulfate (ZINC 15 PO) Take by mouth in the morning and at bedtime      ELDERBERRY PO Take by mouth 2 times daily      Ascorbic Acid (VITAMIN C PO) Take by mouth in the morning and at bedtime      Apoaequorin (PREVAGEN) 10 MG CAPS Take 1 capsule by mouth daily      sertraline (ZOLOFT) 50 MG tablet TAKE 1 TABLET BY MOUTH EVERY DAY  Qty: 90 tablet, Refills: 3    Associated Diagnoses:  Moderate episode of recurrent major depressive disorder (HCC)      ELIQUIS 5 MG TABS tablet TAKE 1 TABLET BY MOUTH TWICE A DAY  Qty: 180 tablet, Refills: 2    Associated Diagnoses: Paroxysmal atrial fibrillation (HCC)      tiZANidine (ZANAFLEX) 4 MG tablet TAKE 1 TABLET BY MOUTH NIGHTLY AS NEEDED (PAIN)  Qty: 30 tablet, Refills: 0    Associated Diagnoses: Closed wedge compression fracture of T6 vertebra, initial encounter ([facility])      azelastine (ASTELIN) 0.1 % nasal spray 1 spray by Nasal route 2 times daily Use in each nostril as directed  Qty: 30 mL, Refills: 5    Associated Diagnoses: Allergic rhinitis, unspecified seasonality, unspecified trigger      atorvastatin (LIPITOR) 40 MG tablet TAKE 1 TABLET BY MOUTH EVERY DAY AT NIGHT  Qty: 90 tablet, Refills: 3    Associated Diagnoses: Mixed hyperlipidemia      isosorbide mononitrate (IMDUR) 30 MG extended release tablet TAKE 1 TABLET BY MOUTH EVERY DAY  Qty: 90 tablet, Refills: 3      montelukast (SINGULAIR) 10 MG tablet TAKE 1 TABLET BY MOUTH EVERY DAY  Qty: 90 tablet, Refills: 3      hydrALAZINE (APRESOLINE) 50 MG tablet TAKE 1/2 TABLET BY MOUTH EVERY 8 HOURS  Qty: 135 tablet, Refills: 7      !!  Handicap Placard MISC by Does not apply route Please issue for duration of 5 years  Qty: 1 each, Refills: 0    Associated Diagnoses: Nonrheumatic aortic valve stenosis; Primary osteoarthritis involving multiple joints; Coronary artery disease involving native coronary artery of native heart without angina pectoris      !! Handicap Placard MISC by Does not apply route Please issue for duration of 5 years  Qty: 1 each, Refills: 0    Associated Diagnoses: Nonrheumatic aortic valve stenosis; Primary osteoarthritis involving multiple joints; Coronary artery disease involving native coronary artery of native heart without angina pectoris      Insulin Pen Needle 31G X 5 MM MISC 1 each by Does not apply route daily  Qty: 400 each, Refills: 5    Associated Diagnoses: Type 2 diabetes mellitus with hyperglycemia, with long-term current use of insulin ([facility])      Glucosamine-Chondroitin (GLUCOSAMINE CHONDR COMPLEX PO) Take 1 tablet by mouth 2 times daily      blood glucose monitor kit and supplies by Other route daily One Touch Ultra  Qty: 1 kit, Refills: 0      glucose blood VI test strips (ASCENSIA AUTODISC VI;ONE TOUCH ULTRA TEST VI) strip DX: E11.9 FSBS daily. One Touch ultra  Qty: 100 strip, Refills: 5    Associated Diagnoses: Type 2 diabetes mellitus with hyperglycemia, with long-term current use of insulin (HCC)      aspirin 81 MG chewable tablet Take 1 tablet by mouth daily  Qty: 30 tablet, Refills: 0      Multiple Vitamins-Minerals (THERAPEUTIC MULTIVITAMIN-MINERALS) tablet Take 1 tablet by mouth daily      ferrous sulfate 325 (65 FE) MG tablet Take 325 mg by mouth daily        ! ! - Potential duplicate medications found. Please discuss with provider. Allergies     He is allergic to hydrocodone-acetaminophen. Physical Exam     INITIAL VITALS: BP: 127/76, Temp: 98.4 °F (36.9 °C), Heart Rate: 81, Resp: 26, SpO2: 96 %   Physical Exam  Vitals and nursing note reviewed. Constitutional:       General: He is not in acute distress. HENT:      Head: Normocephalic and atraumatic. Right Ear: External ear normal.      Left Ear: External ear normal.      Nose: Nose normal.      Mouth/Throat:      Pharynx: Oropharynx is clear. No oropharyngeal exudate or posterior oropharyngeal erythema. Eyes:      Extraocular Movements: Extraocular movements intact. Conjunctiva/sclera: Conjunctivae normal.      Pupils: Pupils are equal, round, and reactive to light. Cardiovascular:      Rate and Rhythm: Normal rate. Rhythm irregular. Pulses: Normal pulses. Heart sounds: Normal heart sounds. Pulmonary:      Effort: Pulmonary effort is normal.      Breath sounds: Normal breath sounds. Abdominal:      General: There is no distension. Palpations: Abdomen is soft. Tenderness: There is no abdominal tenderness. There is no right CVA tenderness, left CVA tenderness, guarding or rebound. Musculoskeletal:         General: Normal range of motion.       Cervical back: Normal range of motion and neck supple. No rigidity. Right lower leg: Edema (erythema and warmth) present. Left lower leg: Edema (TTP, significant erythema and warmth) present. Skin:     Capillary Refill: Capillary refill takes less than 2 seconds. Neurological:      General: No focal deficit present. Mental Status: He is alert and oriented to person, place, and time. Cranial Nerves: No cranial nerve deficit. Sensory: No sensory deficit. Motor: No weakness. Coordination: Coordination normal.   Psychiatric:         Mood and Affect: Mood normal.         Behavior: Behavior normal.               Diagnostic Results     EKG   I interpreted the EKG and notes atrial fibrillation with PVCs, ventricular rate of 80, normal QTC of 445, no STEMI criteria. No significant changes when compared to prior on June 12, 2022    RADIOLOGY:  XR ANKLE RIGHT (MIN 3 VIEWS)   Final Result   Nonspecific diffuse subcutaneous edema with no other acute findings. There   is no radiographic evidence of osteomyelitis. XR ANKLE LEFT (2 VIEWS)   Final Result   No acute fracture dislocation. Nonspecific diffuse subcutaneous edema. Mild to moderate plantar calcaneal spurring. XR CHEST PORTABLE   Final Result   1. Mild cardiomegaly with pulmonary vascular congestion. No evidence of   overt edema. 2.  Moderate hiatal hernia.              LABS:   Results for orders placed or performed during the hospital encounter of 09/30/22   CBC with Auto Differential   Result Value Ref Range    WBC 9.9 4.0 - 11.0 K/uL    RBC 4.51 4.20 - 5.90 M/uL    Hemoglobin 12.8 (L) 13.5 - 17.5 g/dL    Hematocrit 38.7 (L) 40.5 - 52.5 %    MCV 85.8 80.0 - 100.0 fL    MCH 28.4 26.0 - 34.0 pg    MCHC 33.1 31.0 - 36.0 g/dL    RDW 16.1 (H) 12.4 - 15.4 %    Platelets 926 186 - 482 K/uL    MPV 7.2 5.0 - 10.5 fL    Neutrophils % 83.5 %    Lymphocytes % 9.7 %    Monocytes % 5.2 %    Eosinophils % 0.9 % Basophils % 0.7 %    Neutrophils Absolute 8.3 (H) 1.7 - 7.7 K/uL    Lymphocytes Absolute 1.0 1.0 - 5.1 K/uL    Monocytes Absolute 0.5 0.0 - 1.3 K/uL    Eosinophils Absolute 0.1 0.0 - 0.6 K/uL    Basophils Absolute 0.1 0.0 - 0.2 K/uL   Comprehensive Metabolic Panel   Result Value Ref Range    Sodium 136 136 - 145 mmol/L    Potassium 4.3 3.5 - 5.1 mmol/L    Chloride 101 99 - 110 mmol/L    CO2 27 21 - 32 mmol/L    Anion Gap 8 3 - 16    Glucose 170 (H) 70 - 99 mg/dL    BUN 26 (H) 7 - 20 mg/dL    Creatinine 0.8 0.8 - 1.3 mg/dL    GFR Non-African American >60 >60    GFR African American >60 >60    Calcium 9.3 8.3 - 10.6 mg/dL    Total Protein 7.1 6.4 - 8.2 g/dL    Albumin 3.6 3.4 - 5.0 g/dL    Albumin/Globulin Ratio 1.0 (L) 1.1 - 2.2    Total Bilirubin 0.6 0.0 - 1.0 mg/dL    Alkaline Phosphatase 101 40 - 129 U/L    ALT 23 10 - 40 U/L    AST 21 15 - 37 U/L   Brain Natriuretic Peptide   Result Value Ref Range    Pro-BNP 1,285 (H) 0 - 124 pg/mL   Lactic Acid   Result Value Ref Range    Lactic Acid 1.1 0.4 - 2.0 mmol/L   Sedimentation Rate   Result Value Ref Range    Sed Rate 30 (H) 0 - 20 mm/Hr   C-Reactive Protein   Result Value Ref Range    CRP 63.6 (H) 0.0 - 5.1 mg/L   POCT Glucose   Result Value Ref Range    POC Glucose 114 (H) 70 - 99 mg/dl    Performed on ACCU-CHEK    EKG 12 lead   Result Value Ref Range    Ventricular Rate 66 BPM    Atrial Rate 357 BPM    QRS Duration 92 ms    Q-T Interval 400 ms    QTc Calculation (Bazett) 419 ms    R Axis 1 degrees    T Axis 63 degrees    Diagnosis       Atrial fibrillation with premature ventricular or aberrantly conducted complexesAbnormal ECGWhen compared with ECG of 12-JUN-2022 10:34,No significant change was found       ED BEDSIDE ULTRASOUND:  No results found.     RECENT VITALS:  BP: (!) 151/86,Temp: 98 °F (36.7 °C), Heart Rate: 95, Resp: 22, SpO2: 98 %     Procedures         ED Course     Nursing Notes, Past Medical Hx, Past Surgical Hx, Social Hx,Allergies, and Family Hx were reviewed.          patient was given the following medications:  Orders Placed This Encounter   Medications    fentaNYL (SUBLIMAZE) injection 25 mcg    fentaNYL (SUBLIMAZE) injection 25 mcg    vancomycin (VANCOCIN) 2,000 mg in dextrose 5 % 500 mL IVPB     Order Specific Question:   Antimicrobial Indications     Answer:   Skin and Soft Tissue Infection    piperacillin-tazobactam (ZOSYN) 3,375 mg in dextrose 5 % 50 mL IVPB (mini-bag)     Order Specific Question:   Antimicrobial Indications     Answer:   Skin and Soft Tissue Infection    DISCONTD: Apoaequorin CAPS 1 capsule    aspirin EC tablet 81 mg    atenolol (TENORMIN) tablet 25 mg    atorvastatin (LIPITOR) tablet 40 mg    apixaban (ELIQUIS) tablet 5 mg     Order Specific Question:   Indication of Use     Answer:   A Fib/A Flutter    ferrous sulfate (IRON 325) tablet 325 mg    azelastine (ASTELIN) 0.1 % nasal spray 1 spray    isosorbide mononitrate (IMDUR) extended release tablet 30 mg    montelukast (SINGULAIR) tablet 10 mg    magnesium oxide (MAG-OX) tablet 400 mg    therapeutic multivitamin-minerals 1 tablet    pantoprazole (PROTONIX) tablet 40 mg    sertraline (ZOLOFT) tablet 50 mg    spironolactone (ALDACTONE) tablet 25 mg    traMADol (ULTRAM) tablet 50 mg    torsemide (DEMADEX) tablet 40 mg    tiZANidine (ZANAFLEX) tablet 4 mg    insulin glargine (LANTUS) injection vial 40 Units    glucose chewable tablet 16 g    OR Linked Order Group     dextrose bolus 10% 125 mL     dextrose bolus 10% 250 mL    glucagon (rDNA) injection 1 mg    dextrose 10 % infusion    0.9 % sodium chloride infusion    sodium chloride flush 0.9 % injection 10 mL    sodium chloride flush 0.9 % injection 10 mL    0.9 % sodium chloride infusion    OR Linked Order Group     potassium chloride (KLOR-CON M) extended release tablet 40 mEq     potassium bicarb-citric acid (EFFER-K) effervescent tablet 40 mEq     potassium chloride 10 mEq/100 mL IVPB (Peripheral Line)    magnesium sulfate 2000 mg in 50 mL IVPB premix    OR Linked Order Group     promethazine (PHENERGAN) tablet 12.5 mg     ondansetron (ZOFRAN) injection 4 mg    senna (SENOKOT) tablet 8.6 mg    OR Linked Order Group     acetaminophen (TYLENOL) tablet 650 mg     acetaminophen (TYLENOL) suppository 650 mg    cefepime (MAXIPIME) 2000 mg IVPB minibag     Order Specific Question:   Antimicrobial Indications     Answer:   Skin and Soft Tissue Infection     Order Specific Question:   Skin duration of therapy     Answer:   7 days    insulin lispro (HUMALOG) injection vial 0-8 Units    insulin lispro (HUMALOG) injection vial 0-4 Units    melatonin disintegrating tablet 5 mg    vancomycin (VANCOCIN) 1,250 mg in dextrose 5 % 250 mL IVPB     Order Specific Question:   Antimicrobial Indications     Answer:   Skin and Soft Tissue Infection     Order Specific Question:   Skin duration of therapy     Answer:   7 days       CONSULTS:  IP CONSULT TO [address] [place] [address] DECISIONMAKING / ASSESSMENT / Casandra Alberto is a 66 y.o. male who presents with bilateral leg swelling. Patient presented normotensive, afebrile, heart rate of 81, respiratory rate of 26 and satting at 96%. Please see exam above. Given history and exam my differential diagnosis includes but is not limited to gangrene, cellulitis, osteomyelitis, worsening chronic venous stasis. Pulses are palpable therefore doubt underlying ischemic process. He has no asymmetric swelling to suggest DVT.   He denies any chest pain or shortness of breath however CHF in differential. I obtained labs and imaging studies as noted below    I interpreted the labs and note  CBC with no leukocytosis, relatively stable normocytic anemia with a hemoglobin of 12.8 and hematocrit of 38.7, normal platelets  Elevated sed rate to 38  CMP with normal electrolytes, elevated BUN to 26 with normal creatinine and GFR, hyperglycemic to 170, normal LFTs and T bili  Elevated proBNP to 1285 which he has had in the past.  Blood cultures pending    I interpreted the imaging and note  Chest x-ray with moderate pulmonary vascular congestion, no consolidation concerning for pneumonia  X-ray of bilateral ankles with no osseous abnormality    Given the above findings we will plan on admitting patient for ongoing IV antibiotics. Patient is amenable with plan. Patient was admitted to medicine service for further work-up and monitoring. Critical Care: I spent 15 minutes providing critical care. This time excludes time spent performing procedures but includes time spent on direct patient care, history retrieval, review of the chart, and discussions with patient, family, and consultant(s). Clinical Impression     1. Leg swelling    2. Gangrene ([address]    3. Cellulitis of lower extremity, unspecified laterality        Disposition     PATIENT REFERRED TO:  No follow-up provider specified.     DISCHARGE MEDICATIONS:  Current Discharge Medication List          DISPOSITION Admitted 09/30/2022 04:23:00 PM          Linda Koehler MD  09/30/22 [id number]

## 2022-09-30 NOTE — H&P
HOSPITALISTS HISTORY AND PHYSICAL    9/30/2022 4:23 PM    Patient Information:  Neida Lagos is a 66 y.o. male [id number]  PCP:  SHAKIRA Ferguson CNP (Tel: [phone] )    Chief complaint:    Chief Complaint   Patient presents with    Leg Swelling     Patient with chronic bilateral leg swelling, acute pain/weeping/extra fluid retention, seen at wound clinic today and told to come to ER. Patient c/o leg pain, denies chest pain or shortness of breath. Hx of Afib and CHF on diuretic. History of Present Illness:  Shanelle Flores is a 66 y.o. male who presented to the ED per recommendations of his wound care provider to be evaluated for suspected RLE cellulitis superimposed on chronic venous stasis dermatitis. Patient has a longstanding history of diastolic CHF with BLE edema, but he reports increased fluid retention with blistering/weeping ongoing for 1 week PTA. Patient reports that he has been compliant with all of his cardiac medications, including Bumex and Aldactone. He denies systemic symptoms of fever, rigors, N/V/D. Patient is also a type 2 insulin-dependent diabetic with blood glucose levels averaging greater than 200. Upon arrival to the ED CXR was obtained revealing chronic cardiomegaly with pulmonary vascular congestion; no overt edema appreciated. Right ankle notable for diffuse subcu edema without evidence of osteomyelitis. Notable labs include: Acute hyperglycemia 170, BNP 1285, CRP 63.6, and ESR 30. Following collection of blood cultures, patient received a one-time dosage of vancomycin and Zosyn per ED provider. History obtained from patient and review of [facility] chart    Old medical records show patient's most recent echo was obtained on 6/15/2022 with the following results:      Summary   Technically difficult examination.    Low normal left ventricular systolic function with ejection fraction of 50%. Left ventricular function/wall motion is difficult to estimate due to poor   endocardial visualization. Normal size left ventricle and wall thickness. Left ventricular diastolic filling pressure is elevated septal E/e\" is 12 . Mild mitral regurgitation. The left atrium is moderately dilated. Bi-atrial enlargement. Moderate aortic stenosis. The right ventricle is mild to moderately enlarged. Right ventricular systolic function is moderately reduced . Moderate tricuspid regurgitation. Systolic pulmonic artery pressure (SPAP) is estimated at 69 mmHg consistent   with severe pulmonary hypertension (Right atrial pressure of 8 mmHg). The right atrium is moderately dilated. REVIEW OF SYSTEMS:   Constitutional: Negative for fever,chills; positive generalized weakness  ENT: Negative for headache, rhinorrhea, and sore throat.   Respiratory: Positive chronic dyspnea, worsened with exertion; positive COPD, restrictive lung disease, and MONIQUE; noncompliant with CPAP  Cardiovascular: Positive intermittent chest pain with palpitations; chronic BLE peripheral edema; positive orthopnea  Gastrointestinal: Negative for N/V/D and abdominal pain; no hematemesis, hematochezia, or melena; no anorexia  Genitourinary: Negative for dysuria, frequency, retention; no incontinence  Hematologic/Lymphatic: Negative for bleeding tendency/excessive bruising  Musculoskeletal: Positive chronic myalgias and arthalgias; able to ambulate without difficulty  Neurologic: Negative for LOC, seizure activity, paresthesias, dysarthria, vertigo, and gait disturbance  Skin: Chronic BLE venous stasis dermatitis with superimposed vascular insufficiency ulcers  Psychiatric: Negative for depression,anxiety, and agitation; no hallucinations; denies SI/HI  Endocrine: Positive type II DM inadequately controlled    Past Medical History:   has a past medical history of Allergic rhinitis, Arthritis, Bladder fistula, C. difficile diarrhea, Cellulitis of right lower extremity, CHF (congestive heart failure) ([address] Dental disease, Diabetes ([address] Diverticulitis, Dizziness, DVT of lower extremity, bilateral ([address] GERD (gastroesophageal reflux disease), Headache, Hearing loss, Hematuria, Hx of blood clots, Hyperlipidemia, Hypertension, Lung disease, MONIQUE (obstructive sleep apnea), Pancreatitis ([address] Pulmonary emboli ([address] Rash, Sleep apnea, and Tinnitus. Past Surgical History:   has a past surgical history that includes Tibia fracture surgery (Right, 2003); Cholecystectomy, open (N/A, 9-17-13); Cystocopy (12/10/13); Cystoscopy (1-28-14); other surgical history (1-28-14); Colonoscopy (2008); Colonoscopy (12/4/2013); Colonoscopy (4/30/14); colostomy (Jan 2014); Colon surgery; Abdomen surgery (05/16/2014); hernia repair (08/14/2015); pr injection hip arthrogram,anesth (Right, 9/19/2018); epidural steroid injection (Right, 1/21/2019); epidural steroid injection (Right, 2/11/2019); Cardioversion (12/04/2019); vascular surgery (Left, 05/2021); and vascular surgery (Right, 05/10/2021). Medications:  Current Facility-Administered Medications on File Prior to Encounter   Medication Dose Route Frequency Provider Last Rate Last Admin    lidocaine (XYLOCAINE) 4 % external solution   Topical Once Peggy Christensen MD         Current Outpatient Medications on File Prior to Encounter   Medication Sig Dispense Refill    traMADol (ULTRAM) 50 MG tablet Take 50 mg by mouth every 6 hours as needed for Pain.      traMADol (ULTRAM) 50 MG tablet Take 1 tablet by mouth every 6 hours as needed for Pain for up to 3 days. Intended supply: 3 days.  Take lowest dose possible to manage pain 12 tablet 0    ciprofloxacin (CIPRO) 500 MG tablet Take 1 tablet by mouth 2 times daily for 10 days 20 tablet 0    Insulin Glargine, 2 Unit Dial, (TOUJEO MAX SOLOSTAR) 300 UNIT/ML SOPN Inject 50 Units into the skin 2 times daily 5 Adjustable Dose Pre-filled Pen Syringe 5    atenolol (TENORMIN) 50 MG tablet TAKE 1/2 TABLET BY MOUTH EVERY DAY 45 tablet 3    OZEMPIC, 0.25 OR 0.5 MG/DOSE, 2 MG/1.5ML SOPN DIAL AND INJECT 0.5MG WEEKLY 12 pen 1    magnesium oxide (MAG-OX) 400 (240 Mg) MG tablet TAKE 1 TABLET BY MOUTH EVERY DAY 90 tablet 1    glimepiride (AMARYL) 4 MG tablet TAKE 1 TABLET BY MOUTH EVERY DAY IN THE MORNING 180 tablet 3    omeprazole (PRILOSEC) 40 MG delayed release capsule TAKE 1 CAPSULE BY MOUTH EVERY DAY 90 capsule 3    torsemide (DEMADEX) 20 MG tablet TAKE 2 TABLETS BY MOUTH EVERY  tablet 1    spironolactone (ALDACTONE) 25 MG tablet TAKE 1 TABLET BY MOUTH EVERY DAY 90 tablet 3    Zinc Sulfate (ZINC 15 PO) Take by mouth in the morning and at bedtime      ELDERBERRY PO Take by mouth 2 times daily      Ascorbic Acid (VITAMIN C PO) Take by mouth in the morning and at bedtime      Apoaequorin (PREVAGEN) 10 MG CAPS Take 1 capsule by mouth daily      sertraline (ZOLOFT) 50 MG tablet TAKE 1 TABLET BY MOUTH EVERY DAY 90 tablet 3    ELIQUIS 5 MG TABS tablet TAKE 1 TABLET BY MOUTH TWICE A  tablet 2    tiZANidine (ZANAFLEX) 4 MG tablet TAKE 1 TABLET BY MOUTH NIGHTLY AS NEEDED (PAIN) 30 tablet 0    azelastine (ASTELIN) 0.1 % nasal spray 1 spray by Nasal route 2 times daily Use in each nostril as directed 30 mL 5    atorvastatin (LIPITOR) 40 MG tablet TAKE 1 TABLET BY MOUTH EVERY DAY AT NIGHT 90 tablet 3    isosorbide mononitrate (IMDUR) 30 MG extended release tablet TAKE 1 TABLET BY MOUTH EVERY DAY 90 tablet 3    montelukast (SINGULAIR) 10 MG tablet TAKE 1 TABLET BY MOUTH EVERY DAY 90 tablet 3    hydrALAZINE (APRESOLINE) 50 MG tablet TAKE 1/2 TABLET BY MOUTH EVERY 8 HOURS 135 tablet 7    Handicap Placard MISC by Does not apply route Please issue for duration of 5 years 1 each 0    Handicap Placard MISC by Does not apply route Please issue for duration of 5 years 1 each 0    Insulin Pen Needle 31G X 5 MM MISC 1 each by Does not apply route daily 400 each 5 Glucosamine-Chondroitin (GLUCOSAMINE CHONDR COMPLEX PO) Take 1 tablet by mouth 2 times daily      blood glucose monitor kit and supplies by Other route daily One Touch Ultra 1 kit 0    glucose blood VI test strips (ASCENSIA AUTODISC VI;ONE TOUCH ULTRA TEST VI) strip DX: E11.9 FSBS daily. One Touch ultra 100 strip 5    aspirin 81 MG chewable tablet Take 1 tablet by mouth daily 30 tablet 0    Multiple Vitamins-Minerals (THERAPEUTIC MULTIVITAMIN-MINERALS) tablet Take 1 tablet by mouth daily      ferrous sulfate 325 (65 FE) MG tablet Take 325 mg by mouth daily          Allergies: Allergies   Allergen Reactions    Hydrocodone-Acetaminophen Other (See Comments)     jittery        Social History:   reports that he has never smoked. He has never used smokeless tobacco. He reports current alcohol use. He reports that he does not use drugs. Family History:  family history includes Heart Attack in his father; Heart Disease in his brother and father; High Blood Pressure in his brother; Kidney Disease in his mother; Stroke in his brother.      Physical Exam:  /75   Pulse 80   Temp 98.4 °F (36.9 °C) (Oral)   Resp 28   SpO2 97%     General appearance: Pleasant overweight adult male who appears chronically ill  Eyes: Sclera clear without conjunctival injection; PERRLA; EOMI  ENT: Mucous membranes moist without thrush; normal dentition  Neck: Supple without meningismus; no goiter; no carotid bruit bilaterally  Cardiovascular: Regular rhythm without ectopy; normal S1-S2 with no murmurs; no peripheral edema; no JVD  Respiratory: No tachypnea; CTAB with adequate air exchange, no wheeze, rhonchi or rales; I:E intact  Gastrointestinal: Abdomen soft, non-tender, not distended; bowel sounds normal; no masses/organomegaly appreciated  Musculoskeletal: FROM spine and extremities x4; no gross deformity  Neurology: A&O x3; cranial nerves 2-12 grossly intact; motor 5/5  BUE/BLE; no seizure activity; finger-to-nose/heel-to-shin intact; no pronator drift  Psychiatry: Well-groomed with good eye contact; appropriate affect; no visual/auditory hallucination  Skin: R LE with punched-out ulcers with sharp margination extending in the lateral malleolar region; positive calor, dolor, rubor; no fluctuance or purulent discharge appreciated   PV: 2/4 radial and dorsalis pedis bilaterally; brisk capillary refill    Labs:  CBC:   Lab Results   Component Value Date/Time    WBC 9.9 09/30/2022 12:50 PM    RBC 4.51 09/30/2022 12:50 PM    HGB 12.8 09/30/2022 12:50 PM    HCT 38.7 09/30/2022 12:50 PM    MCV 85.8 09/30/2022 12:50 PM    MCH 28.4 09/30/2022 12:50 PM    MCHC 33.1 09/30/2022 12:50 PM    RDW 16.1 09/30/2022 12:50 PM     09/30/2022 12:50 PM    MPV 7.2 09/30/2022 12:50 PM     BMP:    Lab Results   Component Value Date/Time     09/30/2022 12:50 PM    K 4.3 09/30/2022 12:50 PM    K 4.2 09/28/2022 12:47 PM     09/30/2022 12:50 PM    CO2 27 09/30/2022 12:50 PM    BUN 26 09/30/2022 12:50 PM    CREATININE 0.8 09/30/2022 12:50 PM    CALCIUM 9.3 09/30/2022 12:50 PM    GFRAA >60 09/30/2022 12:50 PM    GFRAA >60 01/02/2012 06:35 AM    LABGLOM >60 09/30/2022 12:50 PM    GLUCOSE 170 09/30/2022 12:50 PM     XR ANKLE RIGHT (MIN 3 VIEWS)   Final Result   Nonspecific diffuse subcutaneous edema with no other acute findings. There   is no radiographic evidence of osteomyelitis. XR ANKLE LEFT (2 VIEWS)   Final Result   No acute fracture dislocation. Nonspecific diffuse subcutaneous edema. Mild to moderate plantar calcaneal spurring. XR CHEST PORTABLE   Final Result   1. Mild cardiomegaly with pulmonary vascular congestion. No evidence of   overt edema. 2.  Moderate hiatal hernia. EKG:    Ventricular Rate 66 P BPM QTc Calculation (Bazett) 419 P ms   Atrial Rate 357 P BPM R Axis 1 P degrees   QRS Duration 92 P ms T Axis 63 P degrees   Q-T Interval 400 P ms Diagnosis Atrial fibrillation with premature ventricular or aberrantly conducted complexesAbnormal         I visualized CXR images and EKG strips personally and agree with documented interpretation    Discussed case  with ED provider    Problem List:  Principal Problem:    Cellulitis of right lower extremity  Active Problems:    Grade III diastolic dysfunction    Type 2 diabetes mellitus with hyperglycemia (HCC)    Chronic pulmonary edema    Severe sleep apnea    Paroxysmal atrial fibrillation (HCC)    Idiopathic chronic venous hypertension of both lower extremities with ulcer ([address]    Cellulitis of right leg without foot  Resolved Problems:    * No resolved hospital problems.  *        Consults:  IP CONSULT TO PHARMACY      Assessment/Plan:     RLE cellulitis with chronic venous stasis ulceration  -Patient admitted to telemetry floor with appropriate isolation measures in place  -Blood and urine cultures collected prior to antibiotic initiation  -Patient initiated on IV vancomycin and cefepime empirically pending pathogen identification; pharmacy consulted for dosage assistance  -Serial CBC, CMP, lactate, procalcitonin, CRP to monitor treatment progression   -Consult placed to wound care for assistance with dressing changes during stay    Chronic CHF with grade 3 diastolic dysfunction  -Admit to floor for continuous telemetry monitoring and strict I's & O's  - Continue current diuretic regimen with Bumex and Aldactone therapy  -Continue current dosage of Tenormin, Lipitor, and EC ASA  -ECHO results from several months earlier reviewed and documented above  -2G Na and 1800 cc fluid restriction dietary modifications in place    PAF  -Continuous telemetry monitoring during stay  -Patient currently rate controlled on chronic beta-blocker therapy  -Continue twice daily Eliquis anticoagulation    Type II IDDM with acute hyperglycemia  -A1c ordered with results pending at time of dictation  -Home oral hypoglycemic agents placed on hold  -Home dosage of Lantus adjusted to 40 units twice daily during stay  -PRN Humalog medium dose SSI scheduled before meals and at bedtime based on POC glucose  -Carbohydrate restriction placed on diet    Severe MONIQUE  -Continuous pulse oximetry monitoring initiated with PRN supplemental O2   -Encourage aggressive pulmonary toilet including incentive spirometry every 4H while awake  -Continue home dosage of Singulair  -Patient educated on the importance of CPAP; RT consulted for resumption of NIV PSV during stay     DVT prophylaxis-continue home Eliquis 5 mg twice daily  Code status-full code  Diet-cardiac 2 g sodium with carb and 1800 cc fluid restriction  IV access-PIV established in ED      Admit as inpatient. I anticipate hospitalization spanning more than two midnights for investigation and treatment of the above medically necessary diagnoses. Comment: Please note this report has been produced using speech recognition software and may contain errors related to that system including errors in grammar, punctuation, and spelling, as well as words and phrases that may be inappropriate. If there are any questions or concerns please feel free to contact the dictating provider for clarification.          Sabrina Blackwood MD    9/30/2022 4:23 PM

## 2022-09-30 NOTE — DISCHARGE INSTRUCTIONS
[facility] Physician Orders and Discharge Instructions  [address] [address] [address] [address] [facility]. [place]. [place]  Telephone: [phone]  NAME:  iMriam Deutsch  YOB: 1950  MEDICAL RECORD NUMBER:  [id number]  DATE: 9/30/22     Return Appointment:  Return Appointment: With Dr. Tye Alonso  in  [facility])  [] Return Appointment for a Wound Assessment with the nurse on:     Future Appointments   Date Time Provider Joanna Reynolds   1/19/2023  9:30 AM [facility] CT VCT [facility] CT Rylie Jasmin Rad   1/19/2023 10:00 AM Shaun Cannon MD AND PULM [address] [place]: [facility]: phone: [phone], fax [phone]   Medically necessary services: [x] Skilled Nursing [] PT (Eval & Treat) [] OT (Eval & Treat) [] Social Work [] Dietician  [] Other:    Wound care instructions: If you smoke we ask that you refrain from smoking. Smoking inhibits wounds from healing. When taking antibiotics take the entire prescription as ordered. Do not stop taking until medication is all gone unless otherwise instructed. Exercise as tolerated. Keep weight off wounds and reposition every 2 hours if applicable. Do not get wounds wet in the bath or shower unless otherwise instructed by your physician. If your wound is on your foot or leg, you may purchase a cast bag. Please ask at the pharmacy. Wash hands with soap and water prior to and after every dressing change. [x]Wash wounds with: Vashe wash - Apply enough Vashe to soak a piece of gauze and place on wound bed for 5-10 minutes. No need to rinse after soaking.   [x]Mago wound Topical Treatments: Do not apply lotions, creams, or ointments to the skin around the wound bed unless directed as followed:   Apply around the wound: Nothing         [x]Wound Location: bilateral lower legs   Apply Primary Dressing to wound: Alginate with silver pad  Secondary Dressing: Extra absorbant pad   Avoid contact of tape with skin if possible. When to change Dressing: 3 times per week:Monday/Wednesday/Friday- home care to change Monday and wednesday      [x] Multilayer Compression Wrap:  Type: Applied on Bilateral lower leg(s)  2 Layer Compression Wrap-  home care nurse to change Monday and wednesday    Do not get leg(s) with wrap wet. If wraps become too tight call the center or completely remove the wrap. Elevate leg(s) above the level of the heart when sitting. Avoid prolonged standing in one place. Applied in Clinic on 9/30/2022  The Goal of this therapy is to reduce edema and get into long term compression garments to control venous insufficiency, lymphedema and reduce occurrence of venous ulcers      Dietary:  Important dietary reminders:  1. Increase Protein intake (i.e. Lean meats, fish, eggs, legumes, and yogurt)  2. No added salt  3. If diabetic, follow a diabetic diet and check glucose prior to meals or as instructed by your physician. Dietary Supplements(Take twice a day unless instructed otherwise):  [] Delilah Reasoner  [] 30ml ProStat [] Dallis Hands [] Ensure Max/Premier [] Other:    Your nurse  is:  Phares Snellen     Electronically signed by Brandon Child RN on 9/30/2022 at 9:30 R Kellee Mulligan 8 Information: Should you experience any significant changes in your wound(s) or have questions about your wound care, please contact the [address] [place] [address] at [phone]. We are open from 8:00am - 4:30p Monday, Thursday and Friday; 11:00am - 5pm on Tuesday and CLOSED Wednesday. Please give us 24-48 business hours to return your call. Call your doctor now or seek immediate medical care if:    You have symptoms of infection, such as: Increased pain, swelling, warmth, or redness. Red streaks leading from the area. Pus draining from the area. A fever.          [] Patient unable to sign Discharge Instructions given to ECF/Transportation/POA

## 2022-09-30 NOTE — PROGRESS NOTES
[facility]  Progress Note and Procedure Note      Cedric Burger  MEDICAL RECORD NUMBER:  [id number]  AGE: 66 y.o. GENDER: male  : 1950  EPISODE DATE:  2022      Subjective:     Chief Complaint   Patient presents with    Wound Check     initial         HISTORY of PRESENT ILLNESS HPI     Cedric Burger is a 66 y.o. male who presents today for wound evaluation. History of Wound Context: This patient was apparently referred by visiting nurse evaluation of bilateral lower extremity edema and venous ulcerations. He is a poor historian but apparently he was recently hospitalized for sepsis emanating from his lower extremity cellulitis as well as exacerbation of his congestive heart failure and shortness of breath. Previously been seen at [facility] wound care for his legs and was discharged a year ago. Since discharge from the hospital he has been seen by a home care nurse. It is unclear how these wounds have been addressed. An order had been given for oral antibiotics which patient states he is no longer on. However patient states they are getting worse, more painful, increased drainage. States he had received a prescription from a primary care physician for tramadol which helps control the pain. It was apparently suggested to patient that he return to wound care at [facility] but is seen here today at [facility]..  Patient has a long history of poorly controlled diabetes renal insufficiency.   Wound Pain Timing/Severity: constant, moderate  Quality of pain: burning, throbbing  Severity:  6 / 10   Modifying Factors: None  Associated Signs/Symptoms: edema, erythema, drainage, and pain    Wound Identification:  Wound Type: venous  Contributing Factors: edema, venous stasis, diabetes, poor glucose control, obesity, and non-adherence    Acute Wound: N/A not an acute wound        PAST MEDICAL HISTORY        Diagnosis Date    Allergic rhinitis     Arthritis Bladder fistula     resolved    C. difficile diarrhea 8/18/2015    +PCR    Cellulitis of right lower extremity 3/23/2016    CHF (congestive heart failure) ([address]     Dental disease     Diabetes ([address]     Diverticulitis     Dizziness     DVT of lower extremity, bilateral ([address] 10/16/2013    GERD (gastroesophageal reflux disease)     Headache     Hearing loss     Hematuria 1/2/2014    Hx of blood clots     Hyperlipidemia     Hypertension     Lung disease     MONIQUE (obstructive sleep apnea)     Pancreatitis ([address] 8/24/2013    Pulmonary emboli ([address] 2013    after cholecystectomy     Rash     Sleep apnea     Tinnitus        PAST SURGICAL HISTORY    Past Surgical History:   Procedure Laterality Date    ABDOMEN SURGERY  05/16/2014    reveseral end peristomal hernia repair.     CARDIOVERSION  12/04/2019    Dr. Enedelia Larson, OPEN N/A 9-17-13    LAPAROSCOPIC CONVERTED TO OPEN CHOLECYSTECTOMY WITH    COLON SURGERY      COLONOSCOPY  2008    COLONOSCOPY  12/4/2013    Severe Diverticulosis unable to finish    COLONOSCOPY  4/30/14    diverticula-10 year f/u    COLOSTOMY  Jan 2014    CYSTOSCOPY  12/10/13    with bladder biopsy    CYSTOSCOPY  1-28-14    Cystourethroscopy, left ureteral catheter     EPIDURAL STEROID INJECTION Right 1/21/2019    RIGHT LUMBAR TWO THREE EPIDURAL STEROID INJECTION SITE CONFIRMED BY FLUROOSCOPY performed by Margaret Gorman MD at [address] Right 2/11/2019    RIGHT LUMBAR TWO THREE EPIDURAL STEROID INJECTION SITE CONFIRMED BY FLUOROSCOPY performed by Margaret Gorman MD at [facility]  08/14/2015    OPEN [address] [place], BILATERAL COMPONENT SEPARATION, LYSIS OF ADHESIONS    OTHER SURGICAL HISTORY  1-28-14    LeftColectomy with End Colostomy, Splenic Flexure Mobilization, Takedown of Colovesical Fistula    NE INJECTION HIP [facility] Right 9/19/2018    ARTHROGRAM AND CORTISONE INJECTION RIGHT HIP performed by Waylon Munoz MD at [address] Right 2003    Fracture lower leg during chain saw accident. Surgery x 2    VASCULAR SURGERY Left 05/2021    per Dr. Dieter Chaney Right 05/10/2021    venous procedure RLE-per Dr. Fátima Mercedes History   Problem Relation Age of Onset    Heart Disease Father     Heart Attack Father     Stroke Brother     Heart Disease Brother     High Blood Pressure Brother     Kidney Disease Mother         kidney removed       SOCIAL HISTORY    Social History     Tobacco Use    Smoking status: Never    Smokeless tobacco: Never   Vaping Use    Vaping Use: Never used   Substance Use Topics    Alcohol use: Yes     Alcohol/week: 0.0 standard drinks     Comment: every several months     Drug use: No       ALLERGIES    Allergies   Allergen Reactions    Hydrocodone-Acetaminophen Other (See Comments)     jittery       MEDICATIONS    Current Outpatient Medications on File Prior to Encounter   Medication Sig Dispense Refill    traMADol (ULTRAM) 50 MG tablet Take 50 mg by mouth every 6 hours as needed for Pain.       Insulin Glargine, 2 Unit Dial, (TOUJEO MAX SOLOSTAR) 300 UNIT/ML SOPN Inject 50 Units into the skin 2 times daily 5 Adjustable Dose Pre-filled Pen Syringe 5    atenolol (TENORMIN) 50 MG tablet TAKE 1/2 TABLET BY MOUTH EVERY DAY 45 tablet 3    OZEMPIC, 0.25 OR 0.5 MG/DOSE, 2 MG/1.5ML SOPN DIAL AND INJECT 0.5MG WEEKLY 12 pen 1    magnesium oxide (MAG-OX) 400 (240 Mg) MG tablet TAKE 1 TABLET BY MOUTH EVERY DAY 90 tablet 1    glimepiride (AMARYL) 4 MG tablet TAKE 1 TABLET BY MOUTH EVERY DAY IN THE MORNING 180 tablet 3    omeprazole (PRILOSEC) 40 MG delayed release capsule TAKE 1 CAPSULE BY MOUTH EVERY DAY 90 capsule 3    torsemide (DEMADEX) 20 MG tablet TAKE 2 TABLETS BY MOUTH EVERY  tablet 1    spironolactone (ALDACTONE) 25 MG tablet TAKE 1 TABLET BY MOUTH EVERY DAY 90 tablet 3    Zinc Sulfate (ZINC 15 PO) Take by mouth in the morning and at bedtime      ELDERBERRY PO Take by mouth 2 times daily      Ascorbic Acid (VITAMIN C PO) Take by mouth in the morning and at bedtime      Apoaequorin (PREVAGEN) 10 MG CAPS Take 1 capsule by mouth daily      sertraline (ZOLOFT) 50 MG tablet TAKE 1 TABLET BY MOUTH EVERY DAY 90 tablet 3    ELIQUIS 5 MG TABS tablet TAKE 1 TABLET BY MOUTH TWICE A  tablet 2    tiZANidine (ZANAFLEX) 4 MG tablet TAKE 1 TABLET BY MOUTH NIGHTLY AS NEEDED (PAIN) 30 tablet 0    azelastine (ASTELIN) 0.1 % nasal spray 1 spray by Nasal route 2 times daily Use in each nostril as directed 30 mL 5    atorvastatin (LIPITOR) 40 MG tablet TAKE 1 TABLET BY MOUTH EVERY DAY AT NIGHT 90 tablet 3    isosorbide mononitrate (IMDUR) 30 MG extended release tablet TAKE 1 TABLET BY MOUTH EVERY DAY 90 tablet 3    montelukast (SINGULAIR) 10 MG tablet TAKE 1 TABLET BY MOUTH EVERY DAY 90 tablet 3    hydrALAZINE (APRESOLINE) 50 MG tablet TAKE 1/2 TABLET BY MOUTH EVERY 8 HOURS 135 tablet 7    Handicap Placard MISC by Does not apply route Please issue for duration of 5 years 1 each 0    Handicap Placard MISC by Does not apply route Please issue for duration of 5 years 1 each 0    Insulin Pen Needle 31G X 5 MM MISC 1 each by Does not apply route daily 400 each 5    Glucosamine-Chondroitin (GLUCOSAMINE CHONDR COMPLEX PO) Take 1 tablet by mouth 2 times daily      blood glucose monitor kit and supplies by Other route daily One Touch Ultra 1 kit 0    glucose blood VI test strips (ASCENSIA AUTODISC VI;ONE TOUCH ULTRA TEST VI) strip DX: E11.9 FSBS daily. One Touch ultra 100 strip 5    aspirin 81 MG chewable tablet Take 1 tablet by mouth daily 30 tablet 0    Multiple Vitamins-Minerals (THERAPEUTIC MULTIVITAMIN-MINERALS) tablet Take 1 tablet by mouth daily      ferrous sulfate 325 (65 FE) MG tablet Take 325 mg by mouth daily        No current facility-administered medications on file prior to encounter.        REVIEW OF SYSTEMS    Constitutional: negative for chills and fevers  Ears, nose, mouth, throat, and face: negative  Respiratory: negative except for dyspnea on exertion  Cardiovascular: negative except for lower extremity edema  Gastrointestinal: negative for abdominal pain  Musculoskeletal:negative for muscle weakness      Last O8C if applicable:   Hemoglobin A1C   Date Value Ref Range Status   09/28/2022 8.2 See comment % Final     Comment:     Comment:  Diagnosis of Diabetes: > or = 6.5%  Increased risk of diabetes (Prediabetes): 5.7-6.4%  Glycemic Control: Nonpregnant Adults: <7.0%                    Pregnant: <6.0%           Objective:      /75   Pulse 72   Temp (!) 96 °F (35.6 °C) (Temporal)   Resp 24   Wt 294 lb 5 oz (133.5 kg)   BMI 38.83 kg/m²     Wt Readings from Last 3 Encounters:   09/30/22 294 lb 5 oz (133.5 kg)   07/19/22 281 lb (127.5 kg)   06/23/22 284 lb (128.8 kg)       PHYSICAL EXAM    General Appearance: alert and oriented to person, place and time and in mild distress  Head: normocephalic and atraumatic  Pulmonary/Chest: clear to auscultation bilaterally- no wheezes, rales or rhonchi, normal air movement, no respiratory distress  Cardiovascular: normal S1 and S2, no gallops, and no carotid bruits  Abdomen: soft, non-tender, non-distended, normal bowel sounds, no masses or organomegaly  Extremities: no cyanosis, no clubbing, 3 + edema-  bilateral lower extremities, and bilateral circumferential full-thickness wounds of left and right knee gaining fibrin, slough.   Bilateral 2+ dorsalis pedis pulses; biphasic posterior tibial artery bilateral.  Decreased epicritic sensation bilateral                Assessment:     Patient Active Problem List   Diagnosis    Type 2 diabetes mellitus with hyperglycemia ([address]    Osteoarthritis    Morbid obesity with BMI of 40.0-44.9, adult (HCC)    Edema    Anemia    Bradycardia    Venous stasis ulcer of right lower extremity (HCC)    Venous thrombosis of leg    Venous stasis dermatitis of both lower extremities    Hyperbilirubinemia    Moderate episode of recurrent major depressive disorder (HCC)    Renal insufficiency    Anxiety    Essential hypertension    Non-seasonal allergic rhinitis    Mixed hyperlipidemia    Chronic congestive heart failure (HCC)    Chronic pulmonary edema    Coronary artery disease involving native coronary artery of native heart without angina pectoris    Nonrheumatic aortic valve stenosis    Severe sleep apnea    Primary insomnia    Venous stasis ulcer of left calf limited to breakdown of skin with varicose veins (HCC)    Stage 3 chronic kidney disease (HCC)    Renal osteodystrophy    Peripheral edema    Primary osteoarthritis of right hip    Grade III diastolic dysfunction    Paroxysmal atrial fibrillation (HCC)    Simple chronic bronchitis (HCC)    Other specified peripheral vascular diseases (HCC)    Non-pressure chronic ulcer of right calf with fat layer exposed (HCC)    Non-pressure chronic ulcer of left calf with fat layer exposed (HCC)    Atrial flutter (HCC)    Closed wedge compression fracture of sixth thoracic vertebra (HCC)    Acute chest pain    SIRS (systemic inflammatory response syndrome) (HCC)    Heart failure, diastolic, with acute decompensation ([address]    Hypomagnesemia       Wound 09/30/22 Leg Lower;Right #1 (Active)   Wound Image   09/30/22 0857   Wound Etiology Venous 09/30/22 0857   Wound Cleansed Cleansed with saline 09/30/22 0857   Dressing/Treatment Alginate with Ag 09/30/22 0951   Wound Length (cm) 17 cm 09/30/22 0857   Wound Width (cm) 27.5 cm 09/30/22 0857   Wound Depth (cm) 0.1 cm 09/30/22 0857   Wound Surface Area (cm^2) 467.5 cm^2 09/30/22 0857   Wound Volume (cm^3) 46.75 cm^3 09/30/22 0857   Post-Procedure Length (cm) 17 cm 09/30/22 0925   Post-Procedure Width (cm) 27.5 cm 09/30/22 0925   Post-Procedure Depth (cm) 0.1 cm 09/30/22 0925   Post-Procedure Surface Area (cm^2) 467.5 cm^2 09/30/22 0925   Post-Procedure Volume (cm^3) 46.75 cm^3 09/30/22 9465 Distance Tunneling (cm) 0 cm 09/30/22 0857   Undermining Maxium Distance (cm) 0 09/30/22 0857   Wound Assessment Fibrin;Pink/red 09/30/22 0857   Drainage Amount Copious 09/30/22 0857   Drainage Description Brown 09/30/22 0857   Odor Mild 09/30/22 0857   Mago-wound Assessment Edematous 09/30/22 0857   Margins Defined edges 09/30/22 0857   Wound Thickness Description not for Pressure Injury Full thickness 09/30/22 0857   Number of days: 0       Wound 09/30/22 Leg Left; Lower #2 (Active)   Wound Image   09/30/22 0857   Wound Etiology Venous 09/30/22 0857   Wound Cleansed Cleansed with saline 09/30/22 0857   Dressing/Treatment Alginate with Ag 09/30/22 0951   Wound Length (cm) 14.5 cm 09/30/22 0857   Wound Width (cm) 19 cm 09/30/22 0857   Wound Depth (cm) 0.1 cm 09/30/22 0857   Wound Surface Area (cm^2) 275.5 cm^2 09/30/22 0857   Wound Volume (cm^3) 27.55 cm^3 09/30/22 0857   Post-Procedure Length (cm) 14.5 cm 09/30/22 0925   Post-Procedure Width (cm) 19 cm 09/30/22 0925   Post-Procedure Depth (cm) 0.1 cm 09/30/22 0925   Post-Procedure Surface Area (cm^2) 275.5 cm^2 09/30/22 0925   Post-Procedure Volume (cm^3) 27.55 cm^3 09/30/22 0925   Distance Tunneling (cm) 0 cm 09/30/22 0857   Undermining Maxium Distance (cm) 0 09/30/22 0857   Wound Assessment Fibrin;Pink/red 09/30/22 0857   Drainage Amount Copious 09/30/22 0857   Drainage Description Brown 09/30/22 0857   Odor Mild 09/30/22 0857   Mago-wound Assessment Maceration 09/30/22 0857   Margins Undefined edges 09/30/22 0857   Wound Thickness Description not for Pressure Injury Full thickness 09/30/22 0857   Number of days: 0       Wound 09/30/22 Leg Right;Proximal #3 (Active)   Wound Image   09/30/22 0857   Wound Etiology Venous 09/30/22 0857   Wound Cleansed Cleansed with saline 09/30/22 0857   Dressing/Treatment Alginate with Ag 09/30/22 0951   Wound Length (cm) 2.5 cm 09/30/22 0857   Wound Width (cm) 1.9 cm 09/30/22 0857   Wound Depth (cm) 0.1 cm 09/30/22 0857   Wound Surface Area (cm^2) 4.75 cm^2 09/30/22 0857   Wound Volume (cm^3) 0.475 cm^3 09/30/22 0857   Post-Procedure Length (cm) 2.5 cm 09/30/22 0925   Post-Procedure Width (cm) 1.9 cm 09/30/22 0925   Post-Procedure Depth (cm) 0.1 cm 09/30/22 0925   Post-Procedure Surface Area (cm^2) 4.75 cm^2 09/30/22 0925   Post-Procedure Volume (cm^3) 0.475 cm^3 09/30/22 0925   Wound Assessment Fibrin;Pink/red 09/30/22 0857   Drainage Amount Copious 09/30/22 0857   Drainage Description Alondra Salaam 09/30/22 0857   Odor Mild 09/30/22 0857   Mago-wound Assessment Edematous 09/30/22 0857   Margins Defined edges 09/30/22 0857   Wound Thickness Description not for Pressure Injury Full thickness 09/30/22 0857   Number of days: 0          Plan:   I have strongly suggested to the patient that he be admitted to the hospital for treatment of his extensive wounds involving both lower extremities including placement of a PICC line for IV antibiotics. He is reluctant to go as he states he cares for his wife. For this reason we will wrap the patient in a Coban compression and begin administering oral antibiotics. A tissue culture was obtained today in wound care    Treatment Note please see attached Discharge Instructions    New Medication(s) at this visit:   New Prescriptions    CIPROFLOXACIN (CIPRO) 500 MG TABLET    Take 1 tablet by mouth 2 times daily for 10 days    TRAMADOL (ULTRAM) 50 MG TABLET    Take 1 tablet by mouth every 6 hours as needed for Pain for up to 3 days. Intended supply: 3 days. Take lowest dose possible to manage pain       Other orders at this visit:   Orders Placed This Encounter   Procedures    Culture, Tissue       Weight Management: Yes but patient was not interested in Weight Management referral at this time.     Smoking Cessation: Counseling given: Not Answered      Written Patient Dismissal Instructions Given       Discharge [facility] Physician Orders and Discharge [address] Anders Basurto  Telephone: [phone]  NAME:  Peter Silva  YOB: 1950  MEDICAL RECORD NUMBER:  [id number]  DATE: 9/30/22     Return Appointment:  Return Appointment: With Dr. Pamela Martines  in  [facility])  [] Return Appointment for a Wound Assessment with the nurse on:     Future Appointments   Date Time Provider Joanna Reynolds   1/19/2023  9:30 AM [facility] CT VCT [facility] CT Catherene Barley Rad   1/19/2023 10:00 AM Yaquelin Perkins MD AND PULM [facility]: [facility]: phone: [phone], fax [phone]   Medically necessary services: [x] Skilled Nursing [] PT (Eval & Treat) [] OT (Eval & Treat) [] Social Work [] Dietician  [] Other:    Wound care instructions: If you smoke we ask that you refrain from smoking. Smoking inhibits wounds from healing. When taking antibiotics take the entire prescription as ordered. Do not stop taking until medication is all gone unless otherwise instructed. Exercise as tolerated. Keep weight off wounds and reposition every 2 hours if applicable. Do not get wounds wet in the bath or shower unless otherwise instructed by your physician. If your wound is on your foot or leg, you may purchase a cast bag. Please ask at the pharmacy. Wash hands with soap and water prior to and after every dressing change. [x]Wash wounds with: Vashe wash - Apply enough Vashe to soak a piece of gauze and place on wound bed for 5-10 minutes. No need to rinse after soaking. [x]Mago wound Topical Treatments: Do not apply lotions, creams, or ointments to the skin around the wound bed unless directed as followed:   Apply around the wound: Nothing         [x]Wound Location: bilateral lower legs   Apply Primary Dressing to wound: Alginate with silver pad  Secondary Dressing: Extra absorbant pad   Avoid contact of tape with skin if possible.   When to change Dressing: 3 times per week:Monday/Wednesday/Friday- home care to change Monday and wednesday      [x] Multilayer Compression Wrap:  Type: Applied on Bilateral lower leg(s)  2 Layer Compression Wrap-  home care nurse to change Monday and wednesday    Do not get leg(s) with wrap wet. If wraps become too tight call the center or completely remove the wrap. Elevate leg(s) above the level of the heart when sitting. Avoid prolonged standing in one place. Applied in Clinic on 9/30/2022  The Goal of this therapy is to reduce edema and get into long term compression garments to control venous insufficiency, lymphedema and reduce occurrence of venous ulcers      Dietary:  Important dietary reminders:  1. Increase Protein intake (i.e. Lean meats, fish, eggs, legumes, and yogurt)  2. No added salt  3. If diabetic, follow a diabetic diet and check glucose prior to meals or as instructed by your physician. Dietary Supplements(Take twice a day unless instructed otherwise):  [] Gordon Kelley  [] 30ml ProStat [] Leonce Delude [] Ensure Max/Premier [] Other:    Your nurse  is:  Chika Nix     Electronically signed by Vita Martinez RN on 9/30/2022 at 9:30 R Kellee Mulligan 8 Information: Should you experience any significant changes in your wound(s) or have questions about your wound care, please contact the [address] [place] [address] at [phone]. We are open from 8:00am - 4:30p Monday, Thursday and Friday; 11:00am - 5pm on Tuesday and CLOSED Wednesday. Please give us 24-48 business hours to return your call. Call your doctor now or seek immediate medical care if:    You have symptoms of infection, such as: Increased pain, swelling, warmth, or redness. Red streaks leading from the area. Pus draining from the area. A fever.          [] Patient unable to sign Discharge Instructions given to ECF/Transportation/POA         Electronically signed by Nettie Post MD on 9/30/2022 at 12:15 PM

## 2022-10-01 LAB
A/G RATIO: 1.3 (ref 1.1–2.2)
ALBUMIN SERPL-MCNC: 3.5 G/DL (ref 3.4–5)
ALP BLD-CCNC: 76 U/L (ref 40–129)
ALT SERPL-CCNC: 19 U/L (ref 10–40)
ANION GAP SERPL CALCULATED.3IONS-SCNC: 12 MMOL/L (ref 3–16)
AST SERPL-CCNC: 16 U/L (ref 15–37)
BASOPHILS ABSOLUTE: 0 K/UL (ref 0–0.2)
BASOPHILS RELATIVE PERCENT: 0.6 %
BILIRUB SERPL-MCNC: 0.8 MG/DL (ref 0–1)
BUN BLDV-MCNC: 20 MG/DL (ref 7–20)
CALCIUM SERPL-MCNC: 9.3 MG/DL (ref 8.3–10.6)
CHLORIDE BLD-SCNC: 103 MMOL/L (ref 99–110)
CO2: 23 MMOL/L (ref 21–32)
CREAT SERPL-MCNC: 0.6 MG/DL (ref 0.8–1.3)
EKG ATRIAL RATE: 357 BPM
EKG DIAGNOSIS: NORMAL
EKG Q-T INTERVAL: 400 MS
EKG QRS DURATION: 92 MS
EKG QTC CALCULATION (BAZETT): 419 MS
EKG R AXIS: 1 DEGREES
EKG T AXIS: 63 DEGREES
EKG VENTRICULAR RATE: 66 BPM
EOSINOPHILS ABSOLUTE: 0.2 K/UL (ref 0–0.6)
EOSINOPHILS RELATIVE PERCENT: 2.5 %
GFR AFRICAN AMERICAN: >60
GFR NON-AFRICAN AMERICAN: >60
GLUCOSE BLD-MCNC: 100 MG/DL (ref 70–99)
GLUCOSE BLD-MCNC: 102 MG/DL (ref 70–99)
GLUCOSE BLD-MCNC: 103 MG/DL (ref 70–99)
GLUCOSE BLD-MCNC: 123 MG/DL (ref 70–99)
GLUCOSE BLD-MCNC: 124 MG/DL (ref 70–99)
GLUCOSE BLD-MCNC: 162 MG/DL (ref 70–99)
HCT VFR BLD CALC: 36.1 % (ref 40.5–52.5)
HEMOGLOBIN: 11.7 G/DL (ref 13.5–17.5)
LYMPHOCYTES ABSOLUTE: 1.2 K/UL (ref 1–5.1)
LYMPHOCYTES RELATIVE PERCENT: 15.9 %
MCH RBC QN AUTO: 28 PG (ref 26–34)
MCHC RBC AUTO-ENTMCNC: 32.3 G/DL (ref 31–36)
MCV RBC AUTO: 86.6 FL (ref 80–100)
MONOCYTES ABSOLUTE: 0.5 K/UL (ref 0–1.3)
MONOCYTES RELATIVE PERCENT: 7.1 %
NEUTROPHILS ABSOLUTE: 5.6 K/UL (ref 1.7–7.7)
NEUTROPHILS RELATIVE PERCENT: 73.9 %
PDW BLD-RTO: 16.6 % (ref 12.4–15.4)
PERFORMED ON: ABNORMAL
PLATELET # BLD: 231 K/UL (ref 135–450)
PMV BLD AUTO: 7.1 FL (ref 5–10.5)
POTASSIUM REFLEX MAGNESIUM: 4.2 MMOL/L (ref 3.5–5.1)
RBC # BLD: 4.17 M/UL (ref 4.2–5.9)
SODIUM BLD-SCNC: 138 MMOL/L (ref 136–145)
TOTAL PROTEIN: 6.1 G/DL (ref 6.4–8.2)
WBC # BLD: 7.6 K/UL (ref 4–11)

## 2022-10-01 PROCEDURE — 6360000002 HC RX W HCPCS: Performed by: INTERNAL MEDICINE

## 2022-10-01 PROCEDURE — 80053 COMPREHEN METABOLIC PANEL: CPT

## 2022-10-01 PROCEDURE — 93010 ELECTROCARDIOGRAM REPORT: CPT | Performed by: INTERNAL MEDICINE

## 2022-10-01 PROCEDURE — 6360000002 HC RX W HCPCS: Performed by: HOSPITALIST

## 2022-10-01 PROCEDURE — 36415 COLL VENOUS BLD VENIPUNCTURE: CPT

## 2022-10-01 PROCEDURE — 6370000000 HC RX 637 (ALT 250 FOR IP): Performed by: HOSPITALIST

## 2022-10-01 PROCEDURE — 94660 CPAP INITIATION&MGMT: CPT

## 2022-10-01 PROCEDURE — 83036 HEMOGLOBIN GLYCOSYLATED A1C: CPT

## 2022-10-01 PROCEDURE — 85025 COMPLETE CBC W/AUTO DIFF WBC: CPT

## 2022-10-01 PROCEDURE — 6370000000 HC RX 637 (ALT 250 FOR IP): Performed by: INTERNAL MEDICINE

## 2022-10-01 PROCEDURE — 2580000003 HC RX 258: Performed by: HOSPITALIST

## 2022-10-01 PROCEDURE — 2700000000 HC OXYGEN THERAPY PER DAY

## 2022-10-01 PROCEDURE — 1200000000 HC SEMI PRIVATE

## 2022-10-01 RX ORDER — MORPHINE SULFATE 2 MG/ML
2 INJECTION, SOLUTION INTRAMUSCULAR; INTRAVENOUS
Status: DISCONTINUED | OUTPATIENT
Start: 2022-10-01 | End: 2022-10-05

## 2022-10-01 RX ORDER — TRAMADOL HYDROCHLORIDE 50 MG/1
100 TABLET ORAL EVERY 6 HOURS PRN
Status: DISCONTINUED | OUTPATIENT
Start: 2022-10-01 | End: 2022-10-05 | Stop reason: HOSPADM

## 2022-10-01 RX ORDER — TRAMADOL HYDROCHLORIDE 50 MG/1
50 TABLET ORAL EVERY 6 HOURS PRN
Status: DISCONTINUED | OUTPATIENT
Start: 2022-10-01 | End: 2022-10-05 | Stop reason: HOSPADM

## 2022-10-01 RX ADMIN — VANCOMYCIN HYDROCHLORIDE 1250 MG: 10 INJECTION, POWDER, LYOPHILIZED, FOR SOLUTION INTRAVENOUS at 05:12

## 2022-10-01 RX ADMIN — VANCOMYCIN HYDROCHLORIDE 1250 MG: 10 INJECTION, POWDER, LYOPHILIZED, FOR SOLUTION INTRAVENOUS at 18:27

## 2022-10-01 RX ADMIN — Medication 5 MG: at 20:13

## 2022-10-01 RX ADMIN — TIZANIDINE 4 MG: 4 TABLET ORAL at 08:53

## 2022-10-01 RX ADMIN — FERROUS SULFATE TAB 325 MG (65 MG ELEMENTAL FE) 325 MG: 325 (65 FE) TAB at 11:36

## 2022-10-01 RX ADMIN — ATORVASTATIN CALCIUM 40 MG: 40 TABLET, FILM COATED ORAL at 20:13

## 2022-10-01 RX ADMIN — TORSEMIDE 40 MG: 20 TABLET ORAL at 08:53

## 2022-10-01 RX ADMIN — APIXABAN 5 MG: 5 TABLET, FILM COATED ORAL at 08:53

## 2022-10-01 RX ADMIN — Medication 400 MG: at 08:53

## 2022-10-01 RX ADMIN — AZELASTINE HYDROCHLORIDE 1 SPRAY: 137 SPRAY, METERED NASAL at 08:58

## 2022-10-01 RX ADMIN — ATENOLOL 25 MG: 25 TABLET ORAL at 08:53

## 2022-10-01 RX ADMIN — CEFEPIME 2000 MG: 2 INJECTION, POWDER, FOR SOLUTION INTRAVENOUS at 01:54

## 2022-10-01 RX ADMIN — CEFEPIME 2000 MG: 2 INJECTION, POWDER, FOR SOLUTION INTRAVENOUS at 11:45

## 2022-10-01 RX ADMIN — PANTOPRAZOLE SODIUM 40 MG: 40 TABLET, DELAYED RELEASE ORAL at 08:53

## 2022-10-01 RX ADMIN — Medication 1 TABLET: at 08:53

## 2022-10-01 RX ADMIN — INSULIN GLARGINE 40 UNITS: 100 INJECTION, SOLUTION SUBCUTANEOUS at 08:55

## 2022-10-01 RX ADMIN — INSULIN GLARGINE 40 UNITS: 100 INJECTION, SOLUTION SUBCUTANEOUS at 20:48

## 2022-10-01 RX ADMIN — ISOSORBIDE MONONITRATE 30 MG: 30 TABLET, EXTENDED RELEASE ORAL at 08:53

## 2022-10-01 RX ADMIN — ASPIRIN 81 MG: 81 TABLET, COATED ORAL at 08:53

## 2022-10-01 RX ADMIN — MORPHINE SULFATE 2 MG: 2 INJECTION, SOLUTION INTRAMUSCULAR; INTRAVENOUS at 23:20

## 2022-10-01 RX ADMIN — TRAMADOL HYDROCHLORIDE 100 MG: 50 TABLET, COATED ORAL at 11:36

## 2022-10-01 RX ADMIN — AZELASTINE HYDROCHLORIDE 1 SPRAY: 137 SPRAY, METERED NASAL at 20:13

## 2022-10-01 RX ADMIN — SODIUM CHLORIDE, PRESERVATIVE FREE 10 ML: 5 INJECTION INTRAVENOUS at 05:12

## 2022-10-01 RX ADMIN — CEFEPIME 2000 MG: 2 INJECTION, POWDER, FOR SOLUTION INTRAVENOUS at 23:32

## 2022-10-01 RX ADMIN — SODIUM CHLORIDE, PRESERVATIVE FREE 10 ML: 5 INJECTION INTRAVENOUS at 08:54

## 2022-10-01 RX ADMIN — MORPHINE SULFATE 2 MG: 2 INJECTION, SOLUTION INTRAMUSCULAR; INTRAVENOUS at 15:14

## 2022-10-01 RX ADMIN — TRAMADOL HYDROCHLORIDE 100 MG: 50 TABLET, COATED ORAL at 20:48

## 2022-10-01 RX ADMIN — SERTRALINE HYDROCHLORIDE 50 MG: 50 TABLET ORAL at 08:53

## 2022-10-01 RX ADMIN — TRAMADOL HYDROCHLORIDE 50 MG: 50 TABLET, COATED ORAL at 05:13

## 2022-10-01 RX ADMIN — SPIRONOLACTONE 25 MG: 25 TABLET ORAL at 08:53

## 2022-10-01 RX ADMIN — APIXABAN 5 MG: 5 TABLET, FILM COATED ORAL at 20:48

## 2022-10-01 RX ADMIN — MONTELUKAST SODIUM 10 MG: 10 TABLET ORAL at 20:13

## 2022-10-01 ASSESSMENT — PAIN SCALES - GENERAL
PAINLEVEL_OUTOF10: 5
PAINLEVEL_OUTOF10: 7
PAINLEVEL_OUTOF10: 6
PAINLEVEL_OUTOF10: 5
PAINLEVEL_OUTOF10: 8
PAINLEVEL_OUTOF10: 9

## 2022-10-01 ASSESSMENT — PAIN DESCRIPTION - ORIENTATION
ORIENTATION: RIGHT;LEFT

## 2022-10-01 ASSESSMENT — PAIN DESCRIPTION - DESCRIPTORS
DESCRIPTORS: ACHING
DESCRIPTORS: ACHING;BURNING
DESCRIPTORS: ACHING

## 2022-10-01 ASSESSMENT — PAIN DESCRIPTION - LOCATION
LOCATION: LEG

## 2022-10-01 NOTE — PROGRESS NOTES
09/30/22 3510   NIV Type   $NIV $Daily Charge   Skin Assessment Clean, dry, & intact   Skin Protection for O2 Device Yes   Location Nose   Equipment Type V60   Mode CPAP   Mask Type Nasal mask   Mask Size Large   Settings/Measurements   CPAP/EPAP 7 cmH2O   Vt (Measured) 425 mL   Resp 16   FiO2  35 %   Minute Volume (L/min) 8 Liters   Mask Leak (lpm) 23 lpm   Comfort Level Good   Using Accessory Muscles No   SpO2 94   Patient's Home Machine No

## 2022-10-01 NOTE — PROGRESS NOTES
Hospitalist Progress Note      PCP: Татьяна Franz, APRN - CNP    Date of Admission: 9/30/2022    Chief Complaint: Leg swelling    Hospital Course: Presents with swollen and weeping legs. Known to have chronic edema. This time appears to be infected.     Subjective: No chest pain, no shortness of breath, no nausea, no vomiting      Medications:  Reviewed    Infusion Medications    dextrose      sodium chloride       Scheduled Medications    aspirin  81 mg Oral Daily    atenolol  25 mg Oral Daily    atorvastatin  40 mg Oral Nightly    apixaban  5 mg Oral BID    ferrous sulfate  325 mg Oral Daily    azelastine  1 spray Each Nostril BID    isosorbide mononitrate  30 mg Oral Daily    montelukast  10 mg Oral Daily    magnesium oxide  400 mg Oral Daily    therapeutic multivitamin-minerals  1 tablet Oral Daily    pantoprazole  40 mg Oral QAM AC    sertraline  50 mg Oral Daily    spironolactone  25 mg Oral Daily    torsemide  40 mg Oral Daily    insulin glargine  40 Units SubCUTAneous BID    sodium chloride flush  10 mL IntraVENous 2 times per day    cefepime  2,000 mg IntraVENous Q12H    insulin lispro  0-8 Units SubCUTAneous TID WC    insulin lispro  0-4 Units SubCUTAneous Nightly    melatonin  5 mg Oral Nightly    vancomycin  1,250 mg IntraVENous Q12H     PRN Meds: traMADol **OR** traMADol, morphine, tiZANidine, glucose, dextrose bolus **OR** dextrose bolus, glucagon (rDNA), dextrose, sodium chloride flush, sodium chloride, potassium chloride **OR** potassium alternative oral replacement **OR** potassium chloride, magnesium sulfate, promethazine **OR** ondansetron, senna, acetaminophen **OR** acetaminophen      Intake/Output Summary (Last 24 hours) at 10/1/2022 1328  Last data filed at 10/1/2022 1328  Gross per 24 hour   Intake 790 ml   Output 600 ml   Net 190 ml       Physical Exam Performed:    /81   Pulse 62   Temp 98 °F (36.7 °C) (Oral)   Resp 20   Ht 6' 1\" (1.854 m)   Wt 297 lb 2.9 oz (134.8 kg)   SpO2 92%   BMI 39.21 kg/m²     General appearance: Pleasant overweight adult male who appears chronically ill  Eyes: Sclera clear without conjunctival injection; PERRLA; EOMI  ENT: Mucous membranes moist without thrush; normal dentition  Neck: Supple without meningismus; no goiter; no carotid bruit bilaterally. Neck is short and thick. Cardiovascular: Regular rhythm without ectopy; normal S1-S2 with no murmurs; has peripheral edema consistent with previously well known venous insufficiency. No JVD  Respiratory: No tachypnea; CTAB with adequate air exchange, no wheeze, rhonchi or rales;  Gastrointestinal: Abdomen soft, non-tender, not distended; bowel sounds normal; no masses/organomegaly appreciated  Musculoskeletal: FROM spine and extremities x4; no gross deformity  Neurology: A&O x3; cranial nerves 2-12 grossly intact; motor 5/5  BUE/BLE; no seizure activity; finger-to-nose/heel-to-shin intact; no pronator drift  Psychiatry: Well-groomed with good eye contact; appropriate affect; no visual/auditory hallucination  Skin: Right lower extremity with punched-out ulcers with sharp margination extending in the lateral malleolar region; positive calor, dolor, rubor; no fluctuance or purulent discharge appreciated   PV: 2/4 radial and dorsalis pedis bilaterally; brisk capillary refill      Labs:   Recent Labs     09/30/22  1250 10/01/22  0809   WBC 9.9 7.6   HGB 12.8* 11.7*   HCT 38.7* 36.1*    231     Recent Labs     09/30/22  1250 10/01/22  0809    138   K 4.3 4.2    103   CO2 27 23   BUN 26* 20   CREATININE 0.8 0.6*   CALCIUM 9.3 9.3     Recent Labs     09/30/22  1250 10/01/22  0809   AST 21 16   ALT 23 19   BILITOT 0.6 0.8   ALKPHOS 101 76     No results for input(s): INR in the last 72 hours. No results for input(s): Gordon Herb in the last 72 hours.     Urinalysis:      Lab Results   Component Value Date/Time    NITRU Negative 04/04/2022 07:52 AM    WBCUA 1 04/04/2022 07:52 AM BACTERIA 1+ 09/05/2018 09:10 PM    RBCUA 3 04/04/2022 07:52 AM    BLOODU Negative 04/04/2022 07:52 AM    SPECGRAV >=1.030 04/04/2022 07:52 AM    GLUCOSEU Negative 04/04/2022 07:52 AM       Radiology:  XR ANKLE RIGHT (MIN 3 VIEWS)   Final Result   Nonspecific diffuse subcutaneous edema with no other acute findings. There   is no radiographic evidence of osteomyelitis. XR ANKLE LEFT (2 VIEWS)   Final Result   No acute fracture dislocation. Nonspecific diffuse subcutaneous edema. Mild to moderate plantar calcaneal spurring. XR CHEST PORTABLE   Final Result   1. Mild cardiomegaly with pulmonary vascular congestion. No evidence of   overt edema. 2.  Moderate hiatal hernia. Assessment/Plan:    Active Hospital Problems    Diagnosis     Severe sleep apnea [G47.30]      Priority: High    Type 2 diabetes mellitus with hyperglycemia (HCC) [E11.65]      Priority: High    Idiopathic chronic venous hypertension of both lower extremities with ulcer ([address] [I87.313, L97.919, L97.929]      Priority: Medium    Cellulitis of right leg without foot [U67.100]      Priority: Medium    Paroxysmal atrial fibrillation ([address] [I48.0]     Grade III diastolic dysfunction [M10.82]     Chronic pulmonary edema [J81.1]     Cellulitis of right lower extremity [F88.721]      PLAN:       Right lower extremity cellulitis with chronic venous stasis ulceration  Wound care consulted. Cultures pending. IV antibiotics started. Chronic diastolic congestive heart failure  Continue home medications. No evidence of acute CHF. Paroxysmal atrial fibrillation  On telemetry. On Eliquis for CVA prevention. Diabetes mellitus type 2 with  Continue basal bolus insulin protocol  Hemoglobin A1c ordered and is currently pending. Obstructive sleep  Continue home settings with positive pressure ventilation    Discussed with the patient. Questions asked  DVT Prophylaxis: Eliquis  Diet: ADULT DIET;  Regular; 4 carb choices (60 gm/meal); Low Sodium (2 gm); 1800 ml  Code Status: Full Code  PT/OT Eval Status: Ordered    Dispo -inpatient stay, probably 2 more days.     Appropriate for A1 Discharge Unit: Kristal Pradhan MD

## 2022-10-01 NOTE — PLAN OF CARE
Problem: Pain  Goal: Verbalizes/displays adequate comfort level or baseline comfort level  Outcome: Progressing  Flowsheets (Taken 10/1/2022 1218)  Verbalizes/displays adequate comfort level or baseline comfort level:   Encourage patient to monitor pain and request assistance   Administer analgesics based on type and severity of pain and evaluate response

## 2022-10-01 NOTE — PROGRESS NOTES
10/01/22 0011   NIV Type   $NIV $Daily Charge   Skin Protection for O2 Device Yes   Location Nose   Equipment Type V60   Mode CPAP   Mask Type Nasal mask   Mask Size Large   Settings/Measurements   CPAP/EPAP 7 cmH2O   Vt (Measured) 255 mL   Resp 19   FiO2  35 %   Minute Volume (L/min) 5 Liters   Mask Leak (lpm) 52 lpm   Comfort Level Good   Using Accessory Muscles No   Patient's Home Machine No

## 2022-10-01 NOTE — CONSULTS
Pharmacy Note  Vancomycin Consult    Dre Bey is a 66 y.o. male started on Vancomycin for SSTI X 8 days ; consult received from Dr. Micaela Parra  to manage therapy. Also receiving the following antibiotics:   - Received Zosyn 3.375gm X1 in ER  - Cefepime 2gm q12h X7 days . Allergies:  Hydrocodone-acetaminophen     Tmax: 98    Recent Labs     09/28/22  1247 09/30/22  1250   CREATININE 1.1 0.8       Recent Labs     09/28/22  1247 09/30/22  1250   WBC 10.1 9.9       Estimated Creatinine Clearance: 118 mL/min (based on SCr of 0.8 mg/dL). Intake/Output Summary (Last 24 hours) at 9/30/2022 2032  Last data filed at 9/30/2022 1740  Gross per 24 hour   Intake 550 ml   Output --   Net 550 ml       Wt Readings from Last 1 Encounters:   09/30/22 296 lb 4.8 oz (134.4 kg)         Body mass index is 39.09 kg/m². Culture Date      Source                       Results  9/30                  Tissue culture                N/A    Loading dose (critically ill or in ICU, require dialysis or renal replacement therapy): Vancomycin 25 mg/kg IVPB x 1 (maximum 2500 mg). Maintenance dose: 15 mg/kg (maximum: 2000 mg/dose and 4500 mg/day) starting at the next dosing interval determined by renal function  Pulse dose: fluctuating renal function, NOEL, ESRD   Goal Vancomycin trough: 10-15 mcg/mL or 15-20 mcg/mL   Goal Vancomycin AUC: 400-600     Assessment/Plan:    Regimen details  -  Loading dose :  2,000mg X1 ~15:29  -  Regimen 1250 mg IV every 12 hours. Start time 06:00 on 10/01/2022  Exposure targets AUC24 (range) 400-600 mg/L.hr  Predictions  -  AUC24,ss 457 mg/L.hr  -  Probability of AUC24 > 400 64 %  -  Ctrough,ss 15.0 mg/L  Will check level on 10/2 ~5am.  Thank you for the consult. Day 2  1250 mg q12h  Calc   Estimated Creatinine Clearance: 119 mL/min (based on SCr of 0.8 mg/dL).   Vanc trough 10/2 @ [address], PharmD 10/1/2022 9:27 AM    Vancomycin Level, Trough [[id number]] (Abnormal)    Collected: 10/02/22 0442    Updated: 10/02/22 0528    Specimen Type: Blood     Vancomycin Tr 9.9 Low  ug/mL     Recent Labs     09/30/22  1250 10/01/22  0809 10/02/22  0442   CREATININE 0.8 0.6* 0.7*       Estimated Creatinine Clearance: 133 mL/min (A) (based on SCr of 0.7 mg/dL (L)). Regimen: 1250 mg IV every 12 hours.   Start time: 17:49 on 10/02/2022  Exposure target: AUC24 (range)400-600 mg/L.hr   AUC24,ss: 403 mg/L.hr  Probability of AUC24 > 400: 51 %  Ctrough,ss: 12.9 mg/L  Probability of Ctrough,ss > 20: 9 %  Probability of nephrotoxicity (Lodise ALEXEY 2009): 8 %  Vanc monitor  Andrea Agosto D.10/2/2022 6:05 AM

## 2022-10-02 LAB
A/G RATIO: 0.8 (ref 1.1–2.2)
ALBUMIN SERPL-MCNC: 3.1 G/DL (ref 3.4–5)
ALP BLD-CCNC: 78 U/L (ref 40–129)
ALT SERPL-CCNC: 19 U/L (ref 10–40)
ANION GAP SERPL CALCULATED.3IONS-SCNC: 11 MMOL/L (ref 3–16)
AST SERPL-CCNC: 19 U/L (ref 15–37)
BASOPHILS ABSOLUTE: 0.1 K/UL (ref 0–0.2)
BASOPHILS RELATIVE PERCENT: 0.9 %
BILIRUB SERPL-MCNC: 0.9 MG/DL (ref 0–1)
BUN BLDV-MCNC: 23 MG/DL (ref 7–20)
CALCIUM SERPL-MCNC: 9.1 MG/DL (ref 8.3–10.6)
CHLORIDE BLD-SCNC: 103 MMOL/L (ref 99–110)
CO2: 25 MMOL/L (ref 21–32)
CREAT SERPL-MCNC: 0.7 MG/DL (ref 0.8–1.3)
EOSINOPHILS ABSOLUTE: 0.2 K/UL (ref 0–0.6)
EOSINOPHILS RELATIVE PERCENT: 2.6 %
ESTIMATED AVERAGE GLUCOSE: 185.8 MG/DL
GFR AFRICAN AMERICAN: >60
GFR NON-AFRICAN AMERICAN: >60
GLUCOSE BLD-MCNC: 111 MG/DL (ref 70–99)
GLUCOSE BLD-MCNC: 139 MG/DL (ref 70–99)
GLUCOSE BLD-MCNC: 143 MG/DL (ref 70–99)
GLUCOSE BLD-MCNC: 155 MG/DL (ref 70–99)
GLUCOSE BLD-MCNC: 96 MG/DL (ref 70–99)
HBA1C MFR BLD: 8.1 %
HCT VFR BLD CALC: 38.1 % (ref 40.5–52.5)
HEMOGLOBIN: 12.4 G/DL (ref 13.5–17.5)
LYMPHOCYTES ABSOLUTE: 1.1 K/UL (ref 1–5.1)
LYMPHOCYTES RELATIVE PERCENT: 13.6 %
MCH RBC QN AUTO: 28.4 PG (ref 26–34)
MCHC RBC AUTO-ENTMCNC: 32.6 G/DL (ref 31–36)
MCV RBC AUTO: 87 FL (ref 80–100)
MONOCYTES ABSOLUTE: 0.7 K/UL (ref 0–1.3)
MONOCYTES RELATIVE PERCENT: 8.9 %
NEUTROPHILS ABSOLUTE: 6.1 K/UL (ref 1.7–7.7)
NEUTROPHILS RELATIVE PERCENT: 74 %
PDW BLD-RTO: 16.2 % (ref 12.4–15.4)
PERFORMED ON: ABNORMAL
PLATELET # BLD: 228 K/UL (ref 135–450)
PMV BLD AUTO: 7.2 FL (ref 5–10.5)
POTASSIUM SERPL-SCNC: 4 MMOL/L (ref 3.5–5.1)
RBC # BLD: 4.38 M/UL (ref 4.2–5.9)
SODIUM BLD-SCNC: 139 MMOL/L (ref 136–145)
TOTAL PROTEIN: 6.9 G/DL (ref 6.4–8.2)
VANCOMYCIN TROUGH: 9.9 UG/ML (ref 10–20)
WBC # BLD: 8.2 K/UL (ref 4–11)

## 2022-10-02 PROCEDURE — 1200000000 HC SEMI PRIVATE

## 2022-10-02 PROCEDURE — 36415 COLL VENOUS BLD VENIPUNCTURE: CPT

## 2022-10-02 PROCEDURE — 6370000000 HC RX 637 (ALT 250 FOR IP): Performed by: HOSPITALIST

## 2022-10-02 PROCEDURE — 80202 ASSAY OF VANCOMYCIN: CPT

## 2022-10-02 PROCEDURE — 6360000002 HC RX W HCPCS: Performed by: INTERNAL MEDICINE

## 2022-10-02 PROCEDURE — 80053 COMPREHEN METABOLIC PANEL: CPT

## 2022-10-02 PROCEDURE — 6360000002 HC RX W HCPCS: Performed by: HOSPITALIST

## 2022-10-02 PROCEDURE — 2580000003 HC RX 258: Performed by: HOSPITALIST

## 2022-10-02 PROCEDURE — 2700000000 HC OXYGEN THERAPY PER DAY

## 2022-10-02 PROCEDURE — 85025 COMPLETE CBC W/AUTO DIFF WBC: CPT

## 2022-10-02 RX ADMIN — SODIUM CHLORIDE, PRESERVATIVE FREE 10 ML: 5 INJECTION INTRAVENOUS at 20:48

## 2022-10-02 RX ADMIN — MORPHINE SULFATE 2 MG: 2 INJECTION, SOLUTION INTRAMUSCULAR; INTRAVENOUS at 05:56

## 2022-10-02 RX ADMIN — FERROUS SULFATE TAB 325 MG (65 MG ELEMENTAL FE) 325 MG: 325 (65 FE) TAB at 11:55

## 2022-10-02 RX ADMIN — Medication 1 TABLET: at 08:45

## 2022-10-02 RX ADMIN — INSULIN GLARGINE 40 UNITS: 100 INJECTION, SOLUTION SUBCUTANEOUS at 20:48

## 2022-10-02 RX ADMIN — VANCOMYCIN HYDROCHLORIDE 1250 MG: 10 INJECTION, POWDER, LYOPHILIZED, FOR SOLUTION INTRAVENOUS at 17:59

## 2022-10-02 RX ADMIN — TORSEMIDE 40 MG: 20 TABLET ORAL at 08:45

## 2022-10-02 RX ADMIN — Medication 5 MG: at 20:47

## 2022-10-02 RX ADMIN — MORPHINE SULFATE 2 MG: 2 INJECTION, SOLUTION INTRAMUSCULAR; INTRAVENOUS at 20:48

## 2022-10-02 RX ADMIN — SODIUM CHLORIDE, PRESERVATIVE FREE 10 ML: 5 INJECTION INTRAVENOUS at 08:49

## 2022-10-02 RX ADMIN — ISOSORBIDE MONONITRATE 30 MG: 30 TABLET, EXTENDED RELEASE ORAL at 08:46

## 2022-10-02 RX ADMIN — ATENOLOL 25 MG: 25 TABLET ORAL at 08:45

## 2022-10-02 RX ADMIN — MORPHINE SULFATE 2 MG: 2 INJECTION, SOLUTION INTRAMUSCULAR; INTRAVENOUS at 16:48

## 2022-10-02 RX ADMIN — PANTOPRAZOLE SODIUM 40 MG: 40 TABLET, DELAYED RELEASE ORAL at 08:45

## 2022-10-02 RX ADMIN — CEFEPIME 2000 MG: 2 INJECTION, POWDER, FOR SOLUTION INTRAVENOUS at 23:53

## 2022-10-02 RX ADMIN — MORPHINE SULFATE 2 MG: 2 INJECTION, SOLUTION INTRAMUSCULAR; INTRAVENOUS at 12:35

## 2022-10-02 RX ADMIN — CEFEPIME 2000 MG: 2 INJECTION, POWDER, FOR SOLUTION INTRAVENOUS at 11:59

## 2022-10-02 RX ADMIN — ATORVASTATIN CALCIUM 40 MG: 40 TABLET, FILM COATED ORAL at 20:47

## 2022-10-02 RX ADMIN — MORPHINE SULFATE 2 MG: 2 INJECTION, SOLUTION INTRAMUSCULAR; INTRAVENOUS at 02:24

## 2022-10-02 RX ADMIN — SENNOSIDES 8.6 MG: 8.6 TABLET, COATED ORAL at 06:01

## 2022-10-02 RX ADMIN — APIXABAN 5 MG: 5 TABLET, FILM COATED ORAL at 08:45

## 2022-10-02 RX ADMIN — Medication 400 MG: at 08:45

## 2022-10-02 RX ADMIN — APIXABAN 5 MG: 5 TABLET, FILM COATED ORAL at 20:47

## 2022-10-02 RX ADMIN — SERTRALINE HYDROCHLORIDE 50 MG: 50 TABLET ORAL at 08:46

## 2022-10-02 RX ADMIN — AZELASTINE HYDROCHLORIDE 1 SPRAY: 137 SPRAY, METERED NASAL at 08:49

## 2022-10-02 RX ADMIN — SPIRONOLACTONE 25 MG: 25 TABLET ORAL at 08:45

## 2022-10-02 RX ADMIN — AZELASTINE HYDROCHLORIDE 1 SPRAY: 137 SPRAY, METERED NASAL at 20:47

## 2022-10-02 RX ADMIN — ASPIRIN 81 MG: 81 TABLET, COATED ORAL at 08:45

## 2022-10-02 RX ADMIN — MONTELUKAST SODIUM 10 MG: 10 TABLET ORAL at 20:47

## 2022-10-02 RX ADMIN — INSULIN GLARGINE 40 UNITS: 100 INJECTION, SOLUTION SUBCUTANEOUS at 08:46

## 2022-10-02 RX ADMIN — VANCOMYCIN HYDROCHLORIDE 1250 MG: 10 INJECTION, POWDER, LYOPHILIZED, FOR SOLUTION INTRAVENOUS at 05:49

## 2022-10-02 RX ADMIN — ONDANSETRON 4 MG: 2 INJECTION INTRAMUSCULAR; INTRAVENOUS at 11:51

## 2022-10-02 RX ADMIN — MORPHINE SULFATE 2 MG: 2 INJECTION, SOLUTION INTRAMUSCULAR; INTRAVENOUS at 09:12

## 2022-10-02 ASSESSMENT — PAIN DESCRIPTION - FREQUENCY
FREQUENCY: CONTINUOUS

## 2022-10-02 ASSESSMENT — PAIN DESCRIPTION - LOCATION
LOCATION: LEG

## 2022-10-02 ASSESSMENT — PAIN SCALES - GENERAL
PAINLEVEL_OUTOF10: 7
PAINLEVEL_OUTOF10: 4
PAINLEVEL_OUTOF10: 7
PAINLEVEL_OUTOF10: 5
PAINLEVEL_OUTOF10: 8
PAINLEVEL_OUTOF10: 2
PAINLEVEL_OUTOF10: 7
PAINLEVEL_OUTOF10: 4
PAINLEVEL_OUTOF10: 7
PAINLEVEL_OUTOF10: 5

## 2022-10-02 ASSESSMENT — PAIN SCALES - WONG BAKER
WONGBAKER_NUMERICALRESPONSE: 0
WONGBAKER_NUMERICALRESPONSE: 0

## 2022-10-02 ASSESSMENT — PAIN DESCRIPTION - DESCRIPTORS
DESCRIPTORS: ACHING
DESCRIPTORS: BURNING;ACHING
DESCRIPTORS: BURNING;ACHING
DESCRIPTORS: ACHING;BURNING
DESCRIPTORS: ACHING;BURNING
DESCRIPTORS: BURNING;ACHING

## 2022-10-02 ASSESSMENT — PAIN DESCRIPTION - ORIENTATION
ORIENTATION: RIGHT;LEFT

## 2022-10-02 ASSESSMENT — PAIN DESCRIPTION - PAIN TYPE
TYPE: CHRONIC PAIN

## 2022-10-02 ASSESSMENT — PAIN DESCRIPTION - ONSET
ONSET: ON-GOING

## 2022-10-02 ASSESSMENT — PAIN - FUNCTIONAL ASSESSMENT
PAIN_FUNCTIONAL_ASSESSMENT: ACTIVITIES ARE NOT PREVENTED

## 2022-10-02 NOTE — PLAN OF CARE
Patient's EF (Ejection Fraction) is greater than 40%    Heart Failure Medications:  Diuretics[de-identified] Furosemide and Spironolactone, Torsemide    (One of the following REQUIRED for EF </= 40%/SYSTOLIC FAILURE but MAY be used in EF% >40%/DIASTOLIC FAILURE)        ACE[de-identified] None        ARB[de-identified] None         ARNI[de-identified] None    (Beta Blockers)  NON- Evidenced Based Beta Blocker (for EF% >40%/DIASTOLIC FAILURE): Atenolol- Tenormin    Evidenced Based Beta Blocker::(REQUIRED for EF% <40%/SYSTOLIC FAILURE) None  . .................................................................................................................................................. Patient's weights and intake/output reviewed: Yes    Patient's Last Weight: 185 lbs obtained by bed scale. Difference of 12 lbs less than last documented weight. Intake/Output Summary (Last 24 hours) at 10/2/2022 1042  Last data filed at 10/2/2022 1016  Gross per 24 hour   Intake 600 ml   Output 1375 ml   Net -775 ml       Education Booklet Provided: yes    Comorbidities Reviewed Yes    Patient has a past medical history of Allergic rhinitis, Arthritis, Bladder fistula, C. difficile diarrhea, Cellulitis of right lower extremity, CHF (congestive heart failure) ([address] Dental disease, Diabetes ([address] Diverticulitis, Dizziness, DVT of lower extremity, bilateral ([address] GERD (gastroesophageal reflux disease), Headache, Hearing loss, Hematuria, Hx of blood clots, Hyperlipidemia, Hypertension, Lung disease, MONIQUE (obstructive sleep apnea), Pancreatitis ([address] Pulmonary emboli ([address] Rash, Sleep apnea, and Tinnitus. >>For CHF and Comorbidity documentation on Education Time and Topics, please see Education Tab    Progressive Mobility Assessment:  What is this patient's Current Level of Mobility?: Requires Bed Rest  How was this patient Mobilized today?: Edge of Bed, Up in Room, Up in [place], Unable to Mobilize, and Patient Refuses to Mobilize, ambulated  ft                 With Whom? Nurse, PCA, PT, and OT                 Level of Difficulty/Assistance: 2x Assist     Pt resting in bed at this time on  2 L O2. Pt denies shortness of breath. Pt without lower extremity edema.      Patient and/or Family's stated Goal of Care this Admission: reduce shortness of breath, increase activity tolerance, better understand heart failure and disease management, be more comfortable, and reduce lower extremity edema prior to discharge        :

## 2022-10-02 NOTE — PLAN OF CARE
Problem: Discharge Planning  Goal: Discharge to home or other facility with appropriate resources  10/2/2022 1040 by Mitchell Hill RN  Outcome: Progressing  10/2/2022 1040 by Mitchell Hill RN  Outcome: Progressing     Problem: Pain  Goal: Verbalizes/displays adequate comfort level or baseline comfort level  10/2/2022 1040 by Mitchell Hill RN  Outcome: Progressing  10/2/2022 1040 by Mitchell Hill RN  Outcome: Progressing  10/2/2022 0248 by Nohelia Franks RN  Outcome: Progressing  Note: Pt has been given pain medication to help control pain.      Problem: Safety - Adult  Goal: Free from fall injury  10/2/2022 1040 by Mitchell Hill RN  Outcome: Progressing  10/2/2022 1040 by Mitchell Hill RN  Outcome: Progressing     Problem: Respiratory - Adult  Goal: Achieves optimal ventilation and oxygenation  10/2/2022 1040 by Mitchell Hill RN  Outcome: Progressing  10/2/2022 1040 by Mitchell Hill RN  Outcome: Progressing     Problem: Cardiovascular - Adult  Goal: Maintains optimal cardiac output and hemodynamic stability  10/2/2022 1040 by Mitchell Hill RN  Outcome: Progressing  10/2/2022 1040 by Mitchell Hill RN  Outcome: Progressing  Goal: Absence of cardiac dysrhythmias or at baseline  10/2/2022 1040 by Mitchell Hill RN  Outcome: Progressing  10/2/2022 1040 by Mitchell Hill RN  Outcome: Progressing     Problem: Skin/Tissue Integrity - Adult  Goal: Skin integrity remains intact  10/2/2022 1040 by Mitchell Hill RN  Outcome: Progressing  Flowsheets  Taken 10/2/2022 0838 by Mitchell Hill RN  Skin Integrity Remains Intact: Monitor for areas of redness and/or skin breakdown  Taken 10/2/2022 0250 by Nohelia Franks RN  Skin Integrity Remains Intact: Monitor for areas of redness and/or skin breakdown  10/2/2022 1040 by Mitchell Hill RN  Outcome: Progressing  Flowsheets  Taken 10/2/2022 0838 by Mitchell Hill RN  Skin Integrity Remains Intact: Monitor for areas of redness and/or skin breakdown  Taken 10/2/2022 0250 by Salina Martinez RN  Skin Integrity Remains Intact: Monitor for areas of redness and/or skin breakdown  Goal: Incisions, wounds, or drain sites healing without S/S of infection  10/2/2022 1040 by Vicenta Nguyen RN  Outcome: Progressing  10/2/2022 1040 by Vicenta Nguyen RN  Outcome: Progressing  Goal: Oral mucous membranes remain intact  10/2/2022 1040 by Vicenta Nguyen RN  Outcome: Progressing  10/2/2022 1040 by Vicenta Nguyen RN  Outcome: Progressing     Problem: Musculoskeletal - Adult  Goal: Return mobility to safest level of function  10/2/2022 1040 by Vicenta Nguyen RN  Outcome: Progressing  10/2/2022 1040 by Vicenta Nguyen RN  Outcome: Progressing  Goal: Maintain proper alignment of affected body part  10/2/2022 1040 by Vicenta Nguyen RN  Outcome: Progressing  10/2/2022 1040 by Vicenta Nguyen RN  Outcome: Progressing  Goal: Return ADL status to a safe level of function  10/2/2022 1040 by Vicenta Nguyen RN  Outcome: Progressing  10/2/2022 1040 by Vicenta Nguyen RN  Outcome: Progressing     Problem: Metabolic/Fluid and Electrolytes - Adult  Goal: Electrolytes maintained within normal limits  10/2/2022 1040 by Vicenta Nguyen RN  Outcome: Progressing  10/2/2022 1040 by Vicenta Nguyen RN  Outcome: Progressing  Goal: Glucose maintained within prescribed range  10/2/2022 1040 by Vicenta Nguyen RN  Outcome: Progressing  10/2/2022 1040 by Vicenta Nguyen RN  Outcome: Progressing  10/2/2022 0248 by Salina Martinez RN  Outcome: Progressing  Note: Blood sugar has been checked per protocol and diabetic medication has been given per orders. Problem: Hematologic - Adult  Goal: Maintains hematologic stability  10/2/2022 1040 by Vicenta Nguyen RN  Outcome: Progressing  10/2/2022 1040 by Vicenta Nguyen RN  Outcome: Progressing     Problem: Skin/Tissue Integrity  Goal: Absence of new skin breakdown  Description: 1. Monitor for areas of redness and/or skin breakdown  2.   Assess vascular access sites hourly  3. Every 4-6 hours minimum:  Change oxygen saturation probe site  4. Every 4-6 hours:  If on nasal continuous positive airway pressure, respiratory therapy assess nares and determine need for appliance change or resting period.   10/2/2022 1040 by Dagoberto Cool RN  Outcome: Progressing  10/2/2022 1040 by Dagoberto Cool RN  Outcome: Progressing

## 2022-10-02 NOTE — PROGRESS NOTES
Hospitalist Progress Note      PCP: SHAKIRA Borja - CNP    Date of Admission: 9/30/2022    Chief Complaint: Leg swelling    Hospital Course: Presents with swollen and weeping legs. Known to have chronic edema. This time appears to be infected. Tolerating antibiotics well. No new complaints. Subjective: No chest pain, no shortness of breath, no nausea, no vomiting. No new issues.       Medications:  Reviewed    Infusion Medications    dextrose      sodium chloride       Scheduled Medications    aspirin  81 mg Oral Daily    atenolol  25 mg Oral Daily    atorvastatin  40 mg Oral Nightly    apixaban  5 mg Oral BID    ferrous sulfate  325 mg Oral Daily    azelastine  1 spray Each Nostril BID    isosorbide mononitrate  30 mg Oral Daily    montelukast  10 mg Oral Daily    magnesium oxide  400 mg Oral Daily    therapeutic multivitamin-minerals  1 tablet Oral Daily    pantoprazole  40 mg Oral QAM AC    sertraline  50 mg Oral Daily    spironolactone  25 mg Oral Daily    torsemide  40 mg Oral Daily    insulin glargine  40 Units SubCUTAneous BID    sodium chloride flush  10 mL IntraVENous 2 times per day    cefepime  2,000 mg IntraVENous Q12H    insulin lispro  0-8 Units SubCUTAneous TID WC    insulin lispro  0-4 Units SubCUTAneous Nightly    melatonin  5 mg Oral Nightly    vancomycin  1,250 mg IntraVENous Q12H     PRN Meds: traMADol **OR** traMADol, morphine, tiZANidine, glucose, dextrose bolus **OR** dextrose bolus, glucagon (rDNA), dextrose, sodium chloride flush, sodium chloride, potassium chloride **OR** potassium alternative oral replacement **OR** potassium chloride, magnesium sulfate, promethazine **OR** ondansetron, senna, acetaminophen **OR** acetaminophen      Intake/Output Summary (Last 24 hours) at 10/2/2022 1150  Last data filed at 10/2/2022 1130  Gross per 24 hour   Intake 600 ml   Output 1100 ml   Net -500 ml         Physical Exam Performed:    /63   Pulse 68   Temp 99.1 °F (37.3 °C) (Oral)   Resp 17   Ht 6' 1\" (1.854 m)   Wt 285 lb 0.9 oz (129.3 kg)   SpO2 95%   BMI 37.61 kg/m²     General appearance: Pleasant overweight adult male who appears chronically ill  Eyes: Sclera clear without conjunctival injection; PERRLA; EOMI  ENT: Mucous membranes moist without thrush; normal dentition  Neck: Supple without meningismus; no goiter; no carotid bruit bilaterally. Neck is short and thick. Cardiovascular: Regular rhythm without ectopy; normal S1-S2 with no murmurs; has peripheral edema consistent with previously well known venous insufficiency. No JVD  Respiratory: No tachypnea; CTAB with adequate air exchange, no wheeze, rhonchi or rales;  Gastrointestinal: Abdomen soft, non-tender, not distended; bowel sounds normal; no masses/organomegaly appreciated  Musculoskeletal: FROM spine and extremities x4; no gross deformity  Neurology: A&O x3; cranial nerves 2-12 grossly intact; motor 5/5  BUE/BLE; no seizure activity; finger-to-nose/heel-to-shin intact; no pronator drift  Psychiatry: Well-groomed with good eye contact; appropriate affect; no visual/auditory hallucination  Skin: Right lower extremity with punched-out ulcers with sharp margination extending in the lateral malleolar region; positive calor, dolor, rubor; no fluctuance or purulent discharge appreciated   PV: 2/4 radial and dorsalis pedis bilaterally; brisk capillary refill    I examined the patient today (10/02/22). Physical exam is not significantly different compared to yesterday (10/01).     Labs:   Recent Labs     09/30/22  1250 10/01/22  0809 10/02/22  0442   WBC 9.9 7.6 8.2   HGB 12.8* 11.7* 12.4*   HCT 38.7* 36.1* 38.1*    231 228       Recent Labs     09/30/22  1250 10/01/22  0809 10/02/22  0442    138 139   K 4.3 4.2 4.0    103 103   CO2 27 23 25   BUN 26* 20 23*   CREATININE 0.8 0.6* 0.7*   CALCIUM 9.3 9.3 9.1       Recent Labs     09/30/22  1250 10/01/22  0809 10/02/22  0442   AST 21 16 19   ALT 23 19 19   BILITOT 0.6 0.8 0.9   ALKPHOS 101 76 78       No results for input(s): INR in the last 72 hours. No results for input(s): Alanis Fuse in the last 72 hours. Urinalysis:      Lab Results   Component Value Date/Time    NITRU Negative 04/04/2022 07:52 AM    WBCUA 1 04/04/2022 07:52 AM    BACTERIA 1+ 09/05/2018 09:10 PM    RBCUA 3 04/04/2022 07:52 AM    BLOODU Negative 04/04/2022 07:52 AM    SPECGRAV >=1.030 04/04/2022 07:52 AM    GLUCOSEU Negative 04/04/2022 07:52 AM       Radiology:  XR ANKLE RIGHT (MIN 3 VIEWS)   Final Result   Nonspecific diffuse subcutaneous edema with no other acute findings. There   is no radiographic evidence of osteomyelitis. XR ANKLE LEFT (2 VIEWS)   Final Result   No acute fracture dislocation. Nonspecific diffuse subcutaneous edema. Mild to moderate plantar calcaneal spurring. XR CHEST PORTABLE   Final Result   1. Mild cardiomegaly with pulmonary vascular congestion. No evidence of   overt edema. 2.  Moderate hiatal hernia. Assessment/Plan:    Active Hospital Problems    Diagnosis     Severe sleep apnea [G47.30]      Priority: High    Type 2 diabetes mellitus with hyperglycemia (HCC) [E11.65]      Priority: High    Idiopathic chronic venous hypertension of both lower extremities with ulcer ([facility] [I87.313, L97.919, L97.929]      Priority: Medium    Cellulitis of right leg without foot [W59.985]      Priority: Medium    Paroxysmal atrial fibrillation ([address] [I48.0]     Grade III diastolic dysfunction [U08.87]     Chronic pulmonary edema [J81.1]     Cellulitis of right lower extremity [J69.267]      PLAN:       Right lower extremity cellulitis with chronic venous stasis ulceration  Wound care consulted. Cultures pending. IV antibiotics started. No new issues overnight     Chronic diastolic congestive heart failure  Continue home medications. No evidence of acute CHF. Patient appears euvolemic.      Paroxysmal atrial fibrillation  On telemetry. On Eliquis for CVA prevention. Overall stable. Diabetes mellitus type 2 with  Continue basal bolus insulin protocol  Hemoglobin A1c shows poor control with hemoglobin A1c of 8.1%. Obstructive sleep apnea  Continue home settings with positive pressure ventilation        DVT Prophylaxis: Eliquis  Diet: ADULT DIET; Regular; 4 carb choices (60 gm/meal); Low Sodium (2 gm); 1800 ml  Code Status: Full Code  PT/OT Eval Status: Ordered    Dispo -inpatient stay, probably 1-2 more days.     Appropriate for A1 Discharge Unit: Kristal Adams MD

## 2022-10-02 NOTE — PROGRESS NOTES
10/02/22 0021   NIV Type   Equipment Type V60   Mode CPAP   Mask Type Nasal mask   Mask Size Large   Settings/Measurements   CPAP/EPAP 6 cmH2O   Vt (Measured) 214 mL   Resp 20   FiO2  35 %   Minute Volume (L/min) 4.4 Liters   Mask Leak (lpm) 38 lpm   Comfort Level Good   Using Accessory Muscles No   SpO2 95   Patient's Home Machine No   Breath Sounds   Right Upper Lobe Diminished   Right Middle Lobe Diminished   Right Lower Lobe Diminished   Left Upper Lobe Diminished   Left Lower Lobe Diminished   Alarm Settings   Alarms On Y

## 2022-10-02 NOTE — PLAN OF CARE
Problem: Pain  Goal: Verbalizes/displays adequate comfort level or baseline comfort level  Outcome: Progressing  Note: Pt has been given pain medication to help control pain. Problem: Metabolic/Fluid and Electrolytes - Adult  Goal: Glucose maintained within prescribed range  Outcome: Progressing  Note: Blood sugar has been checked per protocol and diabetic medication has been given per orders.

## 2022-10-02 NOTE — PROGRESS NOTES
10/01/22 2158   NIV Type   Skin Assessment Clean, dry, & intact   Skin Protection for O2 Device Yes   Location Nose   Equipment Type V60   Mode CPAP   Mask Type Nasal mask   Mask Size Large   Settings/Measurements   CPAP/EPAP 6 cmH2O   Vt (Measured) 240 mL   Resp 22   FiO2  35 %   Minute Volume (L/min) 5 Liters   Mask Leak (lpm) 24 lpm   Comfort Level Good   Using Accessory Muscles No   SpO2 94   Patient's Home Machine No

## 2022-10-02 NOTE — PLAN OF CARE
Patient's EF (Ejection Fraction) is greater than 40%    Heart Failure Medications:  Diuretics[de-identified] Torsemide and Spironolactone    (One of the following REQUIRED for EF </= 40%/SYSTOLIC FAILURE but MAY be used in EF% >40%/DIASTOLIC FAILURE)        ACE[de-identified] None        ARB[de-identified] None         ARNI[de-identified] None    (Beta Blockers)  NON- Evidenced Based Beta Blocker (for EF% >40%/DIASTOLIC FAILURE): Atenolol- Tenormin    Evidenced Based Beta Blocker::(REQUIRED for EF% <40%/SYSTOLIC FAILURE) None  . .................................................................................................................................................. Patient's weights and intake/output reviewed: Yes    Patient's Last Weight: 285 lbs obtained by bed scale. Difference of 12 lbs less than last documented weight. Intake/Output Summary (Last 24 hours) at 10/2/2022 0326  Last data filed at 10/1/2022 2302  Gross per 24 hour   Intake 480 ml   Output 1150 ml   Net -670 ml       Education Booklet Provided: yes    Comorbidities Reviewed Yes    Patient has a past medical history of Allergic rhinitis, Arthritis, Bladder fistula, C. difficile diarrhea, Cellulitis of right lower extremity, CHF (congestive heart failure) ([address] Dental disease, Diabetes ([address] Diverticulitis, Dizziness, DVT of lower extremity, bilateral ([address] GERD (gastroesophageal reflux disease), Headache, Hearing loss, Hematuria, Hx of blood clots, Hyperlipidemia, Hypertension, Lung disease, MONIQUE (obstructive sleep apnea), Pancreatitis ([address] Pulmonary emboli ([address] Rash, Sleep apnea, and Tinnitus. >>For CHF and Comorbidity documentation on Education Time and Topics, please see Education Tab    Progressive Mobility Assessment:  What is this patient's Current Level of Mobility?: Requires Bed Rest  How was this patient Mobilized today?: Unable to Mobilize, ambulated 0 ft                 With Whom?  Nurse, PCA, PT, OT, and Self                 Level of Difficulty/Assistance:  bedrest Pt resting in bed at this time on BiPAP. Pt denies shortness of breath. Pt without lower extremity edema.      Patient and/or Family's stated Goal of Care this Admission: reduce shortness of breath, increase activity tolerance, better understand heart failure and disease management, be more comfortable, and reduce lower extremity edema prior to discharge        :

## 2022-10-03 LAB
GLUCOSE BLD-MCNC: 140 MG/DL (ref 70–99)
GLUCOSE BLD-MCNC: 145 MG/DL (ref 70–99)
GLUCOSE BLD-MCNC: 145 MG/DL (ref 70–99)
GLUCOSE BLD-MCNC: 157 MG/DL (ref 70–99)
PERFORMED ON: ABNORMAL

## 2022-10-03 PROCEDURE — 6360000002 HC RX W HCPCS: Performed by: INTERNAL MEDICINE

## 2022-10-03 PROCEDURE — 97530 THERAPEUTIC ACTIVITIES: CPT

## 2022-10-03 PROCEDURE — 97162 PT EVAL MOD COMPLEX 30 MIN: CPT

## 2022-10-03 PROCEDURE — 97110 THERAPEUTIC EXERCISES: CPT

## 2022-10-03 PROCEDURE — 6370000000 HC RX 637 (ALT 250 FOR IP): Performed by: HOSPITALIST

## 2022-10-03 PROCEDURE — 6370000000 HC RX 637 (ALT 250 FOR IP): Performed by: INTERNAL MEDICINE

## 2022-10-03 PROCEDURE — 6360000002 HC RX W HCPCS: Performed by: HOSPITALIST

## 2022-10-03 PROCEDURE — 94761 N-INVAS EAR/PLS OXIMETRY MLT: CPT

## 2022-10-03 PROCEDURE — 1200000000 HC SEMI PRIVATE

## 2022-10-03 PROCEDURE — 97166 OT EVAL MOD COMPLEX 45 MIN: CPT

## 2022-10-03 PROCEDURE — 2580000003 HC RX 258: Performed by: HOSPITALIST

## 2022-10-03 PROCEDURE — 2700000000 HC OXYGEN THERAPY PER DAY

## 2022-10-03 PROCEDURE — 97535 SELF CARE MNGMENT TRAINING: CPT

## 2022-10-03 RX ADMIN — ATORVASTATIN CALCIUM 40 MG: 40 TABLET, FILM COATED ORAL at 20:30

## 2022-10-03 RX ADMIN — SODIUM CHLORIDE, PRESERVATIVE FREE 10 ML: 5 INJECTION INTRAVENOUS at 20:30

## 2022-10-03 RX ADMIN — AZELASTINE HYDROCHLORIDE 1 SPRAY: 137 SPRAY, METERED NASAL at 21:13

## 2022-10-03 RX ADMIN — INSULIN GLARGINE 40 UNITS: 100 INJECTION, SOLUTION SUBCUTANEOUS at 20:30

## 2022-10-03 RX ADMIN — Medication 400 MG: at 08:53

## 2022-10-03 RX ADMIN — TRAMADOL HYDROCHLORIDE 100 MG: 50 TABLET, COATED ORAL at 15:55

## 2022-10-03 RX ADMIN — VANCOMYCIN HYDROCHLORIDE 1250 MG: 10 INJECTION, POWDER, LYOPHILIZED, FOR SOLUTION INTRAVENOUS at 06:19

## 2022-10-03 RX ADMIN — Medication 5 MG: at 20:30

## 2022-10-03 RX ADMIN — MORPHINE SULFATE 2 MG: 2 INJECTION, SOLUTION INTRAMUSCULAR; INTRAVENOUS at 06:16

## 2022-10-03 RX ADMIN — MONTELUKAST SODIUM 10 MG: 10 TABLET ORAL at 20:30

## 2022-10-03 RX ADMIN — PANTOPRAZOLE SODIUM 40 MG: 40 TABLET, DELAYED RELEASE ORAL at 08:53

## 2022-10-03 RX ADMIN — ISOSORBIDE MONONITRATE 30 MG: 30 TABLET, EXTENDED RELEASE ORAL at 08:54

## 2022-10-03 RX ADMIN — MORPHINE SULFATE 2 MG: 2 INJECTION, SOLUTION INTRAMUSCULAR; INTRAVENOUS at 13:57

## 2022-10-03 RX ADMIN — MORPHINE SULFATE 2 MG: 2 INJECTION, SOLUTION INTRAMUSCULAR; INTRAVENOUS at 09:28

## 2022-10-03 RX ADMIN — MORPHINE SULFATE 2 MG: 2 INJECTION, SOLUTION INTRAMUSCULAR; INTRAVENOUS at 17:04

## 2022-10-03 RX ADMIN — SERTRALINE HYDROCHLORIDE 50 MG: 50 TABLET ORAL at 08:53

## 2022-10-03 RX ADMIN — INSULIN GLARGINE 40 UNITS: 100 INJECTION, SOLUTION SUBCUTANEOUS at 08:55

## 2022-10-03 RX ADMIN — MORPHINE SULFATE 2 MG: 2 INJECTION, SOLUTION INTRAMUSCULAR; INTRAVENOUS at 01:35

## 2022-10-03 RX ADMIN — VANCOMYCIN HYDROCHLORIDE 1250 MG: 10 INJECTION, POWDER, LYOPHILIZED, FOR SOLUTION INTRAVENOUS at 17:11

## 2022-10-03 RX ADMIN — ATENOLOL 25 MG: 25 TABLET ORAL at 08:54

## 2022-10-03 RX ADMIN — ASPIRIN 81 MG: 81 TABLET, COATED ORAL at 08:53

## 2022-10-03 RX ADMIN — Medication 1 TABLET: at 08:53

## 2022-10-03 RX ADMIN — AZELASTINE HYDROCHLORIDE 1 SPRAY: 137 SPRAY, METERED NASAL at 08:54

## 2022-10-03 RX ADMIN — SODIUM CHLORIDE, PRESERVATIVE FREE 10 ML: 5 INJECTION INTRAVENOUS at 08:54

## 2022-10-03 RX ADMIN — MORPHINE SULFATE 2 MG: 2 INJECTION, SOLUTION INTRAMUSCULAR; INTRAVENOUS at 20:30

## 2022-10-03 RX ADMIN — MORPHINE SULFATE 2 MG: 2 INJECTION, SOLUTION INTRAMUSCULAR; INTRAVENOUS at 23:50

## 2022-10-03 RX ADMIN — FERROUS SULFATE TAB 325 MG (65 MG ELEMENTAL FE) 325 MG: 325 (65 FE) TAB at 12:12

## 2022-10-03 RX ADMIN — TORSEMIDE 40 MG: 20 TABLET ORAL at 08:53

## 2022-10-03 RX ADMIN — CEFEPIME 2000 MG: 2 INJECTION, POWDER, FOR SOLUTION INTRAVENOUS at 12:17

## 2022-10-03 RX ADMIN — CEFEPIME 2000 MG: 2 INJECTION, POWDER, FOR SOLUTION INTRAVENOUS at 23:55

## 2022-10-03 RX ADMIN — SPIRONOLACTONE 25 MG: 25 TABLET ORAL at 08:53

## 2022-10-03 RX ADMIN — APIXABAN 5 MG: 5 TABLET, FILM COATED ORAL at 08:54

## 2022-10-03 RX ADMIN — APIXABAN 5 MG: 5 TABLET, FILM COATED ORAL at 20:30

## 2022-10-03 ASSESSMENT — PAIN DESCRIPTION - LOCATION
LOCATION: LEG

## 2022-10-03 ASSESSMENT — PAIN DESCRIPTION - ORIENTATION
ORIENTATION: RIGHT;LEFT

## 2022-10-03 ASSESSMENT — PAIN SCALES - GENERAL
PAINLEVEL_OUTOF10: 5
PAINLEVEL_OUTOF10: 9
PAINLEVEL_OUTOF10: 9
PAINLEVEL_OUTOF10: 7
PAINLEVEL_OUTOF10: 8
PAINLEVEL_OUTOF10: 10
PAINLEVEL_OUTOF10: 5
PAINLEVEL_OUTOF10: 9
PAINLEVEL_OUTOF10: 9

## 2022-10-03 ASSESSMENT — PAIN DESCRIPTION - DESCRIPTORS
DESCRIPTORS: ACHING;BURNING
DESCRIPTORS: ACHING
DESCRIPTORS: ACHING
DESCRIPTORS: ACHING;BURNING

## 2022-10-03 NOTE — PROGRESS NOTES
10/03/22 0018   NIV Type   Skin Protection for O2 Device Yes   Location Nose   NIV Started/Stopped On   Equipment Type v60   Mode CPAP   Mask Type Full face mask   Mask Size Large   Settings/Measurements   PIP Observed 8 cm H20   CPAP/EPAP 7 cmH2O   Vt (Measured) 343 mL   Resp 24   FiO2  35 %   Minute Volume (L/min) 8.2 Liters   Mask Leak (lpm) 40 lpm   Comfort Level Good   Using Accessory Muscles No   SpO2 95   Patient's Home Machine No   Patient Observation   Observations pt resting comfortably   Alarm Settings   Alarms On Y   Low Pressure (cmH2O) 4 cmH2O   High Pressure (cmH2O) 35 cmH2O   Delay Alarm 20 sec(s)   RR Low (bpm) 6   RR High (bpm) 40 br/min

## 2022-10-03 NOTE — CONSULTS
[address] [id number] Continence Nurse  Consult Note       NAME:  Reinier Dietz  MEDICAL RECORD NUMBER:  [id number]  AGE: 66 y.o. GENDER: male  : 1950  TODAY'S DATE:  10/3/2022    Subjective; I've seen couple [address] Was supposed to go back to [place] but I hurt and couldn't get in. Went over [facility]. Reason for WOCN Evaluation and Assessment: Cellulitis RLE superimposed on chronic venous stasis dermatitis      Reinier Dietz is a 66 y.o. male referred by:   [] Physician  [x] Nursing  [] Other:     Wound Identification:Venous Ulcers  Wound Type: venous  Contributing Factors: edema, diabetes, poor glucose control, chronic pressure, and obesity    Wound History: 66 y.o. male who presented to the ED per recommendations of his wound care provider to be evaluated for suspected RLE cellulitis superimposed on chronic venous stasis dermatitis. Patient has a longstanding history of diastolic CHF with BLE edema, but he reports increased fluid retention with blistering/weeping ongoing for 1 week PTA.    2022 saw Dr. Rossi Serrato at [facility] wound clinic.     Current Wound Care Treatment:  alginate Ag dry dressings, gauze wrap , ace wrap    Patient Goal of Care:  [x] Wound Healing  [] Odor Control  [] Palliative Care  [x] Pain Control   [] Other:         PAST MEDICAL HISTORY        Diagnosis Date    Allergic rhinitis     Arthritis     Bladder fistula     resolved    C. difficile diarrhea 2015    +PCR    Cellulitis of right lower extremity 3/23/2016    CHF (congestive heart failure) ([address]     Dental disease     Diabetes ([facility]     Diverticulitis     Dizziness     DVT of lower extremity, bilateral ([address] 10/16/2013    GERD (gastroesophageal reflux disease)     Headache     Hearing loss     Hematuria 2014    Hx of blood clots     Hyperlipidemia     Hypertension     Lung disease     MONIQUE (obstructive sleep apnea)     Pancreatitis ([address] 2013    Pulmonary emboli ([address]     after cholecystectomy     Rash     Sleep apnea     Tinnitus        PAST SURGICAL HISTORY    Past Surgical History:   Procedure Laterality Date    ABDOMEN SURGERY  05/16/2014    reveseral end peristomal hernia repair. CARDIOVERSION  12/04/2019    Dr. Darnell Brice, OPEN N/A 9-17-13    LAPAROSCOPIC CONVERTED TO OPEN CHOLECYSTECTOMY WITH    COLON SURGERY      COLONOSCOPY  2008    COLONOSCOPY  12/4/2013    Severe Diverticulosis unable to finish    COLONOSCOPY  4/30/14    diverticula-10 year f/u    COLOSTOMY  Jan 2014    CYSTOSCOPY  12/10/13    with bladder biopsy    CYSTOSCOPY  1-28-14    Cystourethroscopy, left ureteral catheter     EPIDURAL STEROID INJECTION Right 1/21/2019    RIGHT LUMBAR TWO THREE EPIDURAL STEROID INJECTION SITE CONFIRMED BY FLUROOSCOPY performed by Lawyer Rohit MD at [address] [facility] Right 2/11/2019    RIGHT LUMBAR TWO THREE EPIDURAL STEROID INJECTION SITE CONFIRMED BY FLUOROSCOPY performed by Lawyer Rohit MD at [facility]  08/14/2015    [facility], BILATERAL COMPONENT SEPARATION, LYSIS OF ADHESIONS    OTHER SURGICAL HISTORY  1-28-14    LeftColectomy with End Colostomy, Splenic Flexure Mobilization, Takedown of Colovesical Fistula    CA INJECTION HIP [facility] Right 9/19/2018    ARTHROGRAM AND CORTISONE INJECTION RIGHT HIP performed by Candi Lester MD at [facility] Right 2003    Fracture lower leg during chain saw accident.  Surgery x 2    VASCULAR SURGERY Left 05/2021    per Dr. Marsha Bass Right 05/10/2021    venous procedure RLE-per Dr. Ruth Schmidt History   Problem Relation Age of Onset    Heart Disease Father     Heart Attack Father     Stroke Brother     Heart Disease Brother     High Blood Pressure Brother     Kidney Disease Mother         kidney removed       SOCIAL HISTORY    Social History     Tobacco Use Smoking status: Never    Smokeless tobacco: Never   Vaping Use    Vaping Use: Never used   Substance Use Topics    Alcohol use: Yes     Alcohol/week: 0.0 standard drinks     Comment: every several months     Drug use: No       ALLERGIES    Allergies   Allergen Reactions    Hydrocodone-Acetaminophen Other (See Comments)     jittery       MEDICATIONS    No current facility-administered medications on file prior to encounter. Current Outpatient Medications on File Prior to Encounter   Medication Sig Dispense Refill    traMADol (ULTRAM) 50 MG tablet Take 50 mg by mouth every 6 hours as needed for Pain. (Patient not taking: Reported on 9/30/2022)      traMADol (ULTRAM) 50 MG tablet Take 1 tablet by mouth every 6 hours as needed for Pain for up to 3 days. Intended supply: 3 days.  Take lowest dose possible to manage pain 12 tablet 0    ciprofloxacin (CIPRO) 500 MG tablet Take 1 tablet by mouth 2 times daily for 10 days (Patient not taking: Reported on 9/30/2022) 20 tablet 0    Insulin Glargine, 2 Unit Dial, (TOUJEO MAX SOLOSTAR) 300 UNIT/ML SOPN Inject 50 Units into the skin 2 times daily 5 Adjustable Dose Pre-filled Pen Syringe 5    atenolol (TENORMIN) 50 MG tablet TAKE 1/2 TABLET BY MOUTH EVERY DAY 45 tablet 3    OZEMPIC, 0.25 OR 0.5 MG/DOSE, 2 MG/1.5ML SOPN DIAL AND INJECT 0.5MG WEEKLY 12 pen 1    magnesium oxide (MAG-OX) 400 (240 Mg) MG tablet TAKE 1 TABLET BY MOUTH EVERY DAY (Patient not taking: Reported on 9/30/2022) 90 tablet 1    glimepiride (AMARYL) 4 MG tablet TAKE 1 TABLET BY MOUTH EVERY DAY IN THE MORNING 180 tablet 3    omeprazole (PRILOSEC) 40 MG delayed release capsule TAKE 1 CAPSULE BY MOUTH EVERY DAY 90 capsule 3    torsemide (DEMADEX) 20 MG tablet TAKE 2 TABLETS BY MOUTH EVERY  tablet 1    spironolactone (ALDACTONE) 25 MG tablet TAKE 1 TABLET BY MOUTH EVERY DAY 90 tablet 3    Zinc Sulfate (ZINC 15 PO) Take by mouth in the morning and at bedtime (Patient not taking: Reported on 9/30/2022) ELDERBERRY PO Take by mouth 2 times daily      Ascorbic Acid (VITAMIN C PO) Take by mouth in the morning and at bedtime      Apoaequorin (PREVAGEN) 10 MG CAPS Take 1 capsule by mouth daily (Patient not taking: Reported on 9/30/2022)      sertraline (ZOLOFT) 50 MG tablet TAKE 1 TABLET BY MOUTH EVERY DAY 90 tablet 3    ELIQUIS 5 MG TABS tablet TAKE 1 TABLET BY MOUTH TWICE A  tablet 2    tiZANidine (ZANAFLEX) 4 MG tablet TAKE 1 TABLET BY MOUTH NIGHTLY AS NEEDED (PAIN) (Patient not taking: Reported on 9/30/2022) 30 tablet 0    azelastine (ASTELIN) 0.1 % nasal spray 1 spray by Nasal route 2 times daily Use in each nostril as directed 30 mL 5    atorvastatin (LIPITOR) 40 MG tablet TAKE 1 TABLET BY MOUTH EVERY DAY AT NIGHT 90 tablet 3    isosorbide mononitrate (IMDUR) 30 MG extended release tablet TAKE 1 TABLET BY MOUTH EVERY DAY 90 tablet 3    montelukast (SINGULAIR) 10 MG tablet TAKE 1 TABLET BY MOUTH EVERY DAY 90 tablet 3    hydrALAZINE (APRESOLINE) 50 MG tablet TAKE 1/2 TABLET BY MOUTH EVERY 8 HOURS 135 tablet 7    Handicap Placard MISC by Does not apply route Please issue for duration of 5 years 1 each 0    Handicap Placard MISC by Does not apply route Please issue for duration of 5 years 1 each 0    Insulin Pen Needle 31G X 5 MM MISC 1 each by Does not apply route daily 400 each 5    Glucosamine-Chondroitin (GLUCOSAMINE CHONDR COMPLEX PO) Take 1 tablet by mouth 2 times daily      blood glucose monitor kit and supplies by Other route daily One Touch Ultra 1 kit 0    glucose blood VI test strips (ASCENSIA AUTODISC VI;ONE TOUCH ULTRA TEST VI) strip DX: E11.9 FSBS daily.  One Touch ultra 100 strip 5    aspirin 81 MG chewable tablet Take 1 tablet by mouth daily 30 tablet 0    Multiple Vitamins-Minerals (THERAPEUTIC MULTIVITAMIN-MINERALS) tablet Take 1 tablet by mouth daily      ferrous sulfate 325 (65 FE) MG tablet Take 325 mg by mouth daily          Objective; lying in bed    /65   Pulse 67   Temp 98 °F (36.7 °C) (Oral)   Resp 16   Ht 6' 1\" (1.854 m)   Wt 281 lb 15.5 oz (127.9 kg)   SpO2 94%   BMI 37.20 kg/m²     LABS:  WBC:    Lab Results   Component Value Date/Time    WBC 8.2 10/02/2022 04:42 AM     H/H:    Lab Results   Component Value Date/Time    HGB 12.4 10/02/2022 04:42 AM    HCT 38.1 10/02/2022 04:42 AM     PTT:    Lab Results   Component Value Date/Time    APTT 49.0 10/18/2017 07:04 AM   [APTT}  PT/INR:    Lab Results   Component Value Date/Time    PROTIME 14.9 12/04/2019 09:50 AM    INR 1.28 12/04/2019 09:50 AM     HgBA1c:    Lab Results   Component Value Date/Time    LABA1C 8.1 10/01/2022 08:09 AM       Assessment; bi lateral legs edema, red, moist, seeping serosanguinous fluid.    Germán Risk Score: Germán Scale Score: 15    Patient Active Problem List   Diagnosis    Type 2 diabetes mellitus with hyperglycemia (HCC)    Osteoarthritis    Morbid obesity with BMI of 40.0-44.9, adult (HCC)    Edema    Anemia    Bradycardia    Cellulitis of right lower extremity    Venous stasis ulcer of right lower extremity (HCC)    Venous thrombosis of leg    Venous stasis dermatitis of both lower extremities    Hyperbilirubinemia    Moderate episode of recurrent major depressive disorder (HCC)    Renal insufficiency    Anxiety    Essential hypertension    Non-seasonal allergic rhinitis    Mixed hyperlipidemia    Chronic congestive heart failure (HCC)    Chronic pulmonary edema    Coronary artery disease involving native coronary artery of native heart without angina pectoris    Nonrheumatic aortic valve stenosis    Severe sleep apnea    Primary insomnia    Venous stasis ulcer of left calf limited to breakdown of skin with varicose veins (HCC)    Stage 3 chronic kidney disease (HCC)    Renal osteodystrophy    Peripheral edema    Primary osteoarthritis of right hip    Grade III diastolic dysfunction    Paroxysmal atrial fibrillation (HCC)    Simple chronic bronchitis ([address]    Other specified peripheral vascular diseases ([facility]    Non-pressure chronic ulcer of right calf with fat layer exposed ([address]    Non-pressure chronic ulcer of left calf with fat layer exposed ([address]    Atrial flutter (HCC)    Closed wedge compression fracture of sixth thoracic vertebra (HCC)    Acute chest pain    SIRS (systemic inflammatory response syndrome) (HCC)    Heart failure, diastolic, with acute decompensation ([address]    Hypomagnesemia    Idiopathic chronic venous hypertension of both lower extremities with ulcer ([address]    Cellulitis of right leg without foot       Measurements:  Wound 09/30/22 Leg Lower;Right #1 (Active)   Wound Image    10/03/22 1458   Wound Etiology Venous 10/03/22 1458   Dressing Status New dressing applied 10/03/22 1458   Wound Cleansed Cleansed with saline 10/03/22 1458   Dressing/Treatment Alginate with Ag; Other (comment); Roll gauze; Ace wrap 10/03/22 1458   Offloading for Diabetic Foot Ulcers Offloading ordered 10/03/22 1458   Dressing Change Due 10/05/22 10/03/22 1458   Wound Length (cm) 17 cm 09/30/22 0857   Wound Width (cm) 27.5 cm 09/30/22 0857   Wound Depth (cm) 0.1 cm 09/30/22 0857   Wound Surface Area (cm^2) 467.5 cm^2 09/30/22 0857   Wound Volume (cm^3) 46.75 cm^3 09/30/22 0857   Post-Procedure Length (cm) 17 cm 09/30/22 0925   Post-Procedure Width (cm) 27.5 cm 09/30/22 0925   Post-Procedure Depth (cm) 0.1 cm 09/30/22 0925   Post-Procedure Surface Area (cm^2) 467.5 cm^2 09/30/22 0925   Post-Procedure Volume (cm^3) 46.75 cm^3 09/30/22 0925   Distance Tunneling (cm) 0 cm 09/30/22 0857   Undermining Maxium Distance (cm) 0 09/30/22 0857   Wound Assessment Pink/red;Slough 10/03/22 1458   Drainage Amount Moderate 10/03/22 1458   Drainage Description Serosanguinous 10/03/22 1458   Odor Mild 10/03/22 1458   Mago-wound Assessment Edematous 10/03/22 1458   Margins Undefined edges 10/03/22 1458   Wound Thickness Description not for Pressure Injury Full thickness 10/03/22 1458   Number of days: 3    Right leg           Wound 09/30/22 Leg Left; Lower #2 (Active)   Wound Image   10/03/22 1458   Wound Etiology Venous 10/03/22 1458   Dressing Status New dressing applied 10/03/22 1458   Wound Cleansed Cleansed with saline 10/03/22 1458   Dressing/Treatment Alginate with Ag;Gauze dressing/dressing sponge;Roll gauze;ABD; Ace wrap 10/03/22 1458   Offloading for Diabetic Foot Ulcers Offloading ordered 10/03/22 1458   Dressing Change Due 10/05/22 10/03/22 1458   Wound Length (cm) 14.5 cm 09/30/22 0857   Wound Width (cm) 19 cm 09/30/22 0857   Wound Depth (cm) 0.1 cm 09/30/22 0857   Wound Surface Area (cm^2) 275.5 cm^2 09/30/22 0857   Wound Volume (cm^3) 27.55 cm^3 09/30/22 0857   Post-Procedure Length (cm) 14.5 cm 09/30/22 0925   Post-Procedure Width (cm) 19 cm 09/30/22 0925   Post-Procedure Depth (cm) 0.1 cm 09/30/22 0925   Post-Procedure Surface Area (cm^2) 275.5 cm^2 09/30/22 0925   Post-Procedure Volume (cm^3) 27.55 cm^3 09/30/22 0925   Distance Tunneling (cm) 0 cm 09/30/22 0857   Undermining Maxium Distance (cm) 0 09/30/22 0857   Wound Assessment Pink/red 10/03/22 1458   Drainage Amount Moderate 10/03/22 1458   Drainage Description Sanguinous 10/03/22 1458   Odor Mild 10/03/22 1458   Mago-wound Assessment Erosion 10/03/22 1458   Margins Undefined edges 10/03/22 1458   Wound Thickness Description not for Pressure Injury Partial thickness 10/03/22 1458   Number of days: 3    Left anterior leg       Wound 09/30/22 Leg Right;Proximal #3 (Active)   Wound Image   10/03/22 1458   Wound Etiology Venous 10/03/22 1458   Dressing Status New dressing applied 10/03/22 1458   Wound Cleansed Cleansed with saline 10/03/22 1458   Dressing/Treatment Alginate with Ag;Gauze dressing/dressing sponge;ABD;Roll gauze; Ace wrap 10/03/22 1458   Offloading for Diabetic Foot Ulcers Offloading ordered 10/03/22 1458   Dressing Change Due 10/05/22 10/03/22 1458   Wound Length (cm) 2.5 cm 09/30/22 0857   Wound Width (cm) 1.9 cm 09/30/22 0857   Wound Depth (cm) 0.1 cm 09/30/22 0857   Wound Surface Area (cm^2) 4.75 cm^2 09/30/22 0857   Wound Volume (cm^3) 0.475 cm^3 09/30/22 0857   Post-Procedure Length (cm) 2.5 cm 09/30/22 0925   Post-Procedure Width (cm) 1.9 cm 09/30/22 0925   Post-Procedure Depth (cm) 0.1 cm 09/30/22 0925   Post-Procedure Surface Area (cm^2) 4.75 cm^2 09/30/22 0925   Post-Procedure Volume (cm^3) 0.475 cm^3 09/30/22 0925   Wound Assessment Pink/red 10/03/22 1458   Drainage Amount Moderate 10/03/22 1458   Drainage Description Sanguinous 10/03/22 1458   Odor Mild 10/03/22 1458   Mago-wound Assessment Edematous 10/03/22 1458   Margins Undefined edges 10/03/22 1458   Wound Thickness Description not for Pressure Injury Partial thickness 10/03/22 1458   Number of days: 3   Post left leg             Response to treatment:  With complaints of pain. Pain Assessment:  Severity:  9 / 10  Quality of pain: sharp, aching, burning, squeezing, throbbing, shooting  Wound Pain Timing/Severity: intermittent  Premedicated: Yes    Plan   Plan of Care: Wound 09/30/22 Leg Lower;Right #1-Dressing/Treatment: Alginate with Ag, Other (comment), Roll gauze, Ace wrap (Q wick pads)  Wound 09/30/22 Leg Left; Lower #2-Dressing/Treatment: Alginate with Ag, Gauze dressing/dressing sponge, Roll gauze, ABD, Ace wrap  Wound 09/30/22 Leg Right;Proximal #3-Dressing/Treatment: Alginate with Ag, Gauze dressing/dressing sponge, ABD, Roll gauze, Ace wrap    Recommend;  [id number]-Ucymkriph-Fymqfc (provide pain medication before treatment). Cleanse bi lateral legs with normal saline, pat dry. Apply Alginate Ag sheets cut to fit over open areas  & areas with slough. Apply 4 x 4's then ABD pad, cover with double layer of roll guaze and then ace wrap. Call wound care for deterioration [phone] or call [phone] and leave message.       Specialty Bed Required : Yes   [x] Low Air Loss   [x] Pressure Redistribution  [] Fluid Immersion  [] Bariatric  [] Total Pressure Relief  [] Other:     Current Diet: ADULT DIET; Regular; 4 carb choices (60 gm/meal); Low Sodium (2 gm); 1800 ml  Dietician consult:  Yes    Discharge Plan:  Placement for patient upon discharge: home with support    Patient appropriate for Outpatient [address]: Yes    Referrals:  [x]  / discharge planner following  [] [address] [employer] [facility]  [] Supplies  [] Other    Patient/Caregiver Teaching:Patient & family, spouse and step daughter understand will try to keep dressing on till Wednesday unless becomes saturated.   Level of patient/caregiver understanding able to:   [] Indicates understanding       [] Needs reinforcement  [] Unsuccessful      [x] Verbal Understanding  [] Demonstrated understanding       [] No evidence of learning  [] Refused teaching         [] N/A       Electronically signed by Michael Padilla RN, BSN on 10/3/2022 at 3:07 PM

## 2022-10-03 NOTE — PROGRESS NOTES
Hospitalist Progress Note      PCP: Keagan Rothman APRN - CNP    Date of Admission: 9/30/2022    Chief Complaint: Leg swelling    Hospital Course: Presents with swollen and weeping legs. Known to have chronic edema. This time appears to be infected. Tolerating antibiotics well. No new complaints. Subjective:   Pt feels well. Pt is having b/l lower leg pain. Pt is requiring iv morphine.      Medications:  Reviewed    Infusion Medications    dextrose      sodium chloride       Scheduled Medications    aspirin  81 mg Oral Daily    atenolol  25 mg Oral Daily    atorvastatin  40 mg Oral Nightly    apixaban  5 mg Oral BID    ferrous sulfate  325 mg Oral Daily    azelastine  1 spray Each Nostril BID    isosorbide mononitrate  30 mg Oral Daily    montelukast  10 mg Oral Daily    magnesium oxide  400 mg Oral Daily    therapeutic multivitamin-minerals  1 tablet Oral Daily    pantoprazole  40 mg Oral QAM AC    sertraline  50 mg Oral Daily    spironolactone  25 mg Oral Daily    torsemide  40 mg Oral Daily    insulin glargine  40 Units SubCUTAneous BID    sodium chloride flush  10 mL IntraVENous 2 times per day    cefepime  2,000 mg IntraVENous Q12H    insulin lispro  0-8 Units SubCUTAneous TID WC    insulin lispro  0-4 Units SubCUTAneous Nightly    melatonin  5 mg Oral Nightly    vancomycin  1,250 mg IntraVENous Q12H     PRN Meds: traMADol **OR** traMADol, morphine, tiZANidine, glucose, dextrose bolus **OR** dextrose bolus, glucagon (rDNA), dextrose, sodium chloride flush, sodium chloride, potassium chloride **OR** potassium alternative oral replacement **OR** potassium chloride, magnesium sulfate, promethazine **OR** ondansetron, senna, acetaminophen **OR** acetaminophen      Intake/Output Summary (Last 24 hours) at 10/3/2022 1140  Last data filed at 10/3/2022 0809  Gross per 24 hour   Intake 300 ml   Output 1025 ml   Net -725 ml       Physical Exam Performed:    BP (!) 154/86   Pulse 92   Temp 98.3 °F (36.8 °C) (Oral)   Resp 16   Ht 6' 1\" (1.854 m)   Wt 281 lb 15.5 oz (127.9 kg)   SpO2 96%   BMI 37.20 kg/m²     General appearance: Pleasant overweight adult male who appears chronically ill  Eyes: Sclera clear without conjunctival injection; PERRLA; EOMI  ENT: Mucous membranes moist without thrush; normal dentition  Neck: Supple without meningismus; no goiter; no carotid bruit bilaterally. Neck is short and thick. Cardiovascular: Regular rhythm without ectopy; normal S1-S2 with no murmurs; has peripheral edema consistent with previously well known venous insufficiency. No JVD  Respiratory: No tachypnea; CTAB with adequate air exchange, no wheeze, rhonchi or rales;  Gastrointestinal: Abdomen soft, non-tender, not distended; bowel sounds normal; no masses/organomegaly appreciated  Musculoskeletal: FROM spine and extremities x4; no gross deformity  Neurology: A&O x3; cranial nerves 2-12 grossly intact; motor 5/5  BUE/BLE; no seizure activity; finger-to-nose/heel-to-shin intact; no pronator drift  Psychiatry: Well-groomed with good eye contact; appropriate affect; no visual/auditory hallucination  Skin: Right lower extremity with punched-out ulcers with sharp margination extending in the lateral malleolar region; positive calor, dolor, rubor; no fluctuance or purulent discharge appreciated   PV: 2/4 radial and dorsalis pedis bilaterally; brisk capillary refill    I examined the patient today (10/03/22). Physical exam is not significantly different compared to yesterday (10/01).     Labs:   Recent Labs     09/30/22  1250 10/01/22  0809 10/02/22  0442   WBC 9.9 7.6 8.2   HGB 12.8* 11.7* 12.4*   HCT 38.7* 36.1* 38.1*    231 228     Recent Labs     09/30/22  1250 10/01/22  0809 10/02/22  0442    138 139   K 4.3 4.2 4.0    103 103   CO2 27 23 25   BUN 26* 20 23*   CREATININE 0.8 0.6* 0.7*   CALCIUM 9.3 9.3 9.1     Recent Labs     09/30/22  1250 10/01/22  0809 10/02/22  0442   AST 21 16 19   ALT 23 19 19   BILITOT 0.6 0.8 0.9   ALKPHOS 101 76 78     No results for input(s): INR in the last 72 hours. No results for input(s): Royer Tompkins in the last 72 hours. Urinalysis:      Lab Results   Component Value Date/Time    NITRU Negative 04/04/2022 07:52 AM    WBCUA 1 04/04/2022 07:52 AM    BACTERIA 1+ 09/05/2018 09:10 PM    RBCUA 3 04/04/2022 07:52 AM    BLOODU Negative 04/04/2022 07:52 AM    SPECGRAV >=1.030 04/04/2022 07:52 AM    GLUCOSEU Negative 04/04/2022 07:52 AM       Radiology:  XR ANKLE RIGHT (MIN 3 VIEWS)   Final Result   Nonspecific diffuse subcutaneous edema with no other acute findings. There   is no radiographic evidence of osteomyelitis. XR ANKLE LEFT (2 VIEWS)   Final Result   No acute fracture dislocation. Nonspecific diffuse subcutaneous edema. Mild to moderate plantar calcaneal spurring. XR CHEST PORTABLE   Final Result   1. Mild cardiomegaly with pulmonary vascular congestion. No evidence of   overt edema. 2.  Moderate hiatal hernia. Assessment/Plan:    Active Hospital Problems    Diagnosis     Severe sleep apnea [G47.30]      Priority: High    Type 2 diabetes mellitus with hyperglycemia (HCC) [E11.65]      Priority: High    Idiopathic chronic venous hypertension of both lower extremities with ulcer ([address] [I87.313, L97.919, L97.929]      Priority: Medium    Cellulitis of right leg without foot [R01.025]      Priority: Medium    Paroxysmal atrial fibrillation ([address] [I48.0]     Grade III diastolic dysfunction [X88.92]     Chronic pulmonary edema [J81.1]     Cellulitis of right lower extremity [O80.449]      PLAN:       Right lower extremity cellulitis with chronic venous stasis ulceration  Wound care consulted. Cultures pending. IV antibiotics started. No new issues overnight     Chronic diastolic congestive heart failure  Continue home medications. No evidence of acute CHF. Patient appears euvolemic.      Paroxysmal atrial fibrillation  On telemetry. On Eliquis for CVA prevention. Overall stable. Diabetes mellitus type 2 with  Continue basal bolus insulin protocol  Hemoglobin A1c shows poor control with hemoglobin A1c of 8.1%. Obstructive sleep apnea  Continue home settings with positive pressure ventilation        DVT Prophylaxis: Eliquis  Diet: ADULT DIET; Regular; 4 carb choices (60 gm/meal); Low Sodium (2 gm); 1800 ml  Code Status: Full Code  PT/OT Eval Status: Ordered    Dispo -inpatient stay, probably 1-2 more days.     Appropriate for A1 Discharge Unit: Kristal Evans MD

## 2022-10-03 NOTE — CARE COORDINATION
CASE MANAGEMENT INITIAL ASSESSMENT      Reviewed chart and completed assessment with patient:bedside  Family present: none  Explained Case Management role/services. Primary contact information:Dayan/wife    Health Care Decision Maker :   Primary Decision Maker: Shaniqua Gilbert - Spouse - [phone]          Can this person be reached and be able to respond quickly, such as within a few minutes or hours? Yes    Admit date/status:9/30/22 INPT  Diagnosis:cellulitis RLE   Is this a Readmission?:  No      Insurance:BCBS medicare   Precert required for SNF: Yes       3 night stay required: No    Living arrangements, Adls, care needs, prior to admission:pt lives in a 2 story house with a 1st floor bedroom and bathroom. 5 JOHN. [address] at home:  Walker__Cane_x quad cane_RTS__ BSC__Shower Chair__  02__ HHN__ CPAP__  BiPap__  Hospital Bed__ W/C___ Other_____    Services in the home and/or outpatient, prior to [place]                                Medications: Prescription coverage? Yes Will pt require financial assistance with medications No     Transportation needs: none/private. Pt is an active        PT/OT recs:needed    [address] Notification (HEN):needed for SNF    Barriers to discharge:none    Plan/comments:Cellulitis RLE. Management per IM. Pt on IV ABX. Pt from home with wife. IPTA. Need PT/OT notes. Pt active with [facility], skilled. Currently on 2 liters of oxygen, none at baseline. CM will continue to follow for O2 needs.  Tania Frazier RN      ECOC on chart for MD signature

## 2022-10-03 NOTE — PLAN OF CARE
Problem: Pain  Goal: Verbalizes/displays adequate comfort level or baseline comfort level  Outcome: Progressing  Note: Pt has been given medication to help maintain a comfortable pain level. Problem: Respiratory - Adult  Goal: Achieves optimal ventilation and oxygenation  Outcome: Progressing  Note: Pt has worn his oxygen and maintained a good saturation level this shift.

## 2022-10-03 NOTE — PLAN OF CARE
Patient's EF (Ejection Fraction) is greater than 40%    Heart Failure Medications:  Diuretics[de-identified] Furosemide and Spironolactone    (One of the following REQUIRED for EF </= 40%/SYSTOLIC FAILURE but MAY be used in EF% >40%/DIASTOLIC FAILURE)        ACE[de-identified] None        ARB[de-identified] None         ARNI[de-identified] None    (Beta Blockers)  NON- Evidenced Based Beta Blocker (for EF% >40%/DIASTOLIC FAILURE): Atenolol- Tenormin    Evidenced Based Beta Blocker::(REQUIRED for EF% <40%/SYSTOLIC FAILURE) None  . .................................................................................................................................................. Patient's weights and intake/output reviewed: Yes    Patient's Last Weight: 281.15 lbs obtained by bed scale. Difference of 4 lbs less than last documented weight. Bed scale is used due to PT having venous wounds on both legs and is unable to stand on them. Pt is not of appropriate size to use lift weight. Intake/Output Summary (Last 24 hours) at 10/3/2022 0292  Last data filed at 10/2/2022 2251  Gross per 24 hour   Intake 480 ml   Output 1325 ml   Net -845 ml       Education Booklet Provided: yes    Comorbidities Reviewed Yes    Patient has a past medical history of Allergic rhinitis, Arthritis, Bladder fistula, C. difficile diarrhea, Cellulitis of right lower extremity, CHF (congestive heart failure) ([address] Dental disease, Diabetes ([address] Diverticulitis, Dizziness, DVT of lower extremity, bilateral ([address] GERD (gastroesophageal reflux disease), Headache, Hearing loss, Hematuria, Hx of blood clots, Hyperlipidemia, Hypertension, Lung disease, MONIQUE (obstructive sleep apnea), Pancreatitis ([address] Pulmonary emboli ([address] Rash, Sleep apnea, and Tinnitus.      >>For CHF and Comorbidity documentation on Education Time and Topics, please see Education Tab    Progressive Mobility Assessment:  What is this patient's Current Level of Mobility?: Requires Bed Rest  How was this patient Mobilized today?: Unable to [facility] and Patient Refuses to Mobilize, ambulated 0 ft                 With Whom? Nurse                 Level of Difficulty/Assistance:  bedrest       Pt resting in bed at this time on  2 L O2. Pt denies shortness of breath. Pt without lower extremity edema.      Patient and/or Family's stated Goal of Care this Admission: reduce shortness of breath, increase activity tolerance, better understand heart failure and disease management, be more comfortable, and reduce lower extremity edema prior to discharge        :

## 2022-10-03 NOTE — PROGRESS NOTES
10/02/22 2206   NIV Type   Skin Protection for O2 Device Yes   Location Nose   NIV Started/Stopped On   Equipment Type v60   Mode CPAP   Mask Type Full face mask   Mask Size Large   Settings/Measurements   CPAP/EPAP 7 cmH2O   Vt (Measured) 264 mL   Resp 23   FiO2  35 %   Mask Leak (lpm) 40 lpm  (pt has beard)   Comfort Level Good   Using Accessory Muscles No   SpO2 93   Patient's Home Machine No   Alarm Settings   Alarms On Y   Low Pressure (cmH2O) 4 cmH2O   High Pressure (cmH2O) 35 cmH2O   Delay Alarm 20 sec(s)   RR Low (bpm) 6   RR High (bpm) 50 br/min

## 2022-10-03 NOTE — PROGRESS NOTES
Physical Therapy  Facility/Department: [place] [id number] - MED SURG  Physical Therapy Initial Assessment    Name: Shanelle Flores  : 1950  MRN: [id number]  Date of Service: 10/3/2022    Discharge Recommendations:  Subacute/Skilled Nursing Facility   PT Equipment Recommendations  Equipment Needed: No (Of pt. declines SNF assess for possible WC reccomendation and will need ramp to enter home)      Patient Diagnosis(es): The primary encounter diagnosis was Leg swelling. Diagnoses of Gangrene ([address] and Cellulitis of lower extremity, unspecified laterality were also pertinent to this visit. Past Medical History:  has a past medical history of Allergic rhinitis, Arthritis, Bladder fistula, C. difficile diarrhea, Cellulitis of right lower extremity, CHF (congestive heart failure) ([address] Dental disease, Diabetes ([address] Diverticulitis, Dizziness, DVT of lower extremity, bilateral ([address] GERD (gastroesophageal reflux disease), Headache, Hearing loss, Hematuria, Hx of blood clots, Hyperlipidemia, Hypertension, Lung disease, MONIQUE (obstructive sleep apnea), Pancreatitis ([address] Pulmonary emboli ([address] Rash, Sleep apnea, and Tinnitus. Past Surgical History:  has a past surgical history that includes Tibia fracture surgery (Right, ); Cholecystectomy, open (N/A, 13); Cystocopy (12/10/13); Cystoscopy (14); other surgical history (14); Colonoscopy (); Colonoscopy (2013); Colonoscopy (14); colostomy (2014); Colon surgery; Abdomen surgery (2014); hernia repair (2015); pr injection hip arthrogram,anesth (Right, 2018); epidural steroid injection (Right, 2019); epidural steroid injection (Right, 2019); Cardioversion (2019); vascular surgery (Left, 2021); and vascular surgery (Right, 05/10/2021). Assessment   Assessment: Pt. admitted to [facility] diagnosis of cellulitis of R LE. Pt in a lot of pain at time of eval and RN notified.  Able to complete bed mobility with SBA. Pt required Max Ax2 for sit to stand transfers and mod Ax2 for bed to chair transfer with VC for sequencing and walker managment. Pt. presents below his baseline function and would beneift from skilled PT services. At this time pt. is not safe to return home and is recommend he DC to SNF. If pt. refuses WC reccommendation may be appropriate with HHPT and 24/7 assistance. Therapy Prognosis: Fair  Decision Making: Medium Complexity  Requires PT Follow-Up: Yes  Activity Tolerance  Activity Tolerance: Patient limited by pain     Plan   Physcial Therapy Plan  General Plan: 3-5 times per week  Current Treatment Recommendations: Strengthening, Balance training, ROM, Gait training, Stair training, Transfer training, Functional mobility training, Endurance training, Safety education & training, Patient/Caregiver education & training, Equipment evaluation, education, & procurement, Pain management, Home exercise program, Therapeutic activities  Safety Devices  Type of Devices: Left in chair, Call light within reach, Chair alarm in place, Gait belt, Patient at risk for falls  Restraints  Restraints Initially in Place: No     Restrictions  Restrictions/Precautions  Restrictions/Precautions: Contact Precautions, Fall Risk  Required Braces or Orthoses?: No     Subjective   Pain: reports 8-9/10 pain in legs, states he has had pain meds recently  General  Chart Reviewed: Yes  Patient assessed for rehabilitation services?: Yes  Response To Previous Treatment: Not applicable  Family / Caregiver Present: No  Referring Practitioner:  Lesia Banuelos MD  Referral Date : 10/03/22  Diagnosis: Cellulitis of R LE  Follows Commands: Within Functional Limits  General Comment  Comments: Pt. laying in bed on phone upon arrival.  Subjective  Subjective: Pt. pleasant and agreeable to PT/OT eval.         Social/Functional History  Social/Functional History  Lives With:  (Wife is sick and may be terminal and he helps take care of her)  Type of Home: House  Home Layout: Two level, Able to Live on Main level with bedroom/bathroom, Laundry in basement (Does not go up or downstairs)  Home Access: Stairs to enter with rails  Entrance Stairs - Number of Steps: 5 JOHN  Entrance Stairs - Rails: Both  Bathroom Shower/Tub: Walk-in shower  Bathroom Toilet: Handicap height  Bathroom Equipment: Grab bars in shower, Grab bars around toilet  Home Equipment: Hospital bed, Walker, rolling, Budd Ohms, quad, Grab bars  Has the patient had two or more falls in the past year or any fall with injury in the past year?: No  Receives Help From: Home health, Family, Neighbor ([place] nurse 3 days/week, Neighbor mows grass, step daughter does laundry)  ADL Assistance: Independent (Able to do it but was in a lot of pain)  Homemaking Assistance: Independent (Can cook and clean if he needs to)  [employer] Responsibilities: No  Ambulation Assistance: Independent (Uses cane in community)  Transfer Assistance: Independent (Has hospital bed)  Active : Yes  Mode of Transportation: Truck  Vision/Hearing  Vision  Vision: Impaired  Vision Exceptions: Wears glasses at all times  Hearing  Hearing: Exceptions to [facility]  Hearing Exceptions: Hard of hearing/hearing concerns (Has hearing aids but they don't work well)    Cognition   Orientation  Overall Orientation Status: Within Normal Limits  Orientation Level: Oriented X4  Cognition  Overall Cognitive Status: WFL     Objective   Heart Rate: 69  Heart Rate Source: Monitor  BP: (!) 134/94  BP Location: Right upper arm  BP Method: Other (Comment)  Patient Position: Semi fowlers  MAP (Calculated): 107.33  Resp: 16  SpO2: 95 %  O2 Device: Nasal cannula  Comment: Vitals: O2 98% on 2L O2, HR 76, /86        Gross Assessment  AROM: Grossly decreased, non-functional  PROM: Grossly decreased, non-functional  Strength: Grossly decreased, non-functional  Sensation: Impaired (Foot neuropathy)       Bed Mobility Training  Bed Mobility Training: Yes  Overall Level of Assistance: Stand-by assistance  Interventions: Verbal cues  Supine to Sit: Stand-by assistance; Additional time; Adaptive equipment (HOB elevated)  Sit to Supine: Other (comment) (Left up in chair)  Scooting: Stand-by assistance  Balance  Sitting: Intact  Standing: Impaired  Standing - Static: Constant support;Poor  Standing - Dynamic: Constant support;Poor  Transfer Training  Transfer Training: Yes  Sit to Stand: Maximum assistance; Additional time;Assist X2 (To RW)  Stand to Sit: Maximum assistance;Assist X2;Additional time  Bed to Chair: Moderate assistance;Assist X2;Additional time (Assist for RW managment)  Gait Training  Gait Training: No  Wheelchair Management  Wheelchair Management: No    Exercise Treatment: Reviewed seated ankle pump, LAQ, and marches        OutComes Score    AM-PAC Score  AM-PAC Inpatient Mobility Raw Score : 13 (10/03/22 1644)  AM-PAC Inpatient T-Scale Score : 36.74 (10/03/22 1644)  Mobility Inpatient CMS 0-100% Score: 64.91 (10/03/22 1644)  Mobility Inpatient CMS G-Code Modifier : CL (10/03/22 1644)     Goals  Short Term Goals  Time Frame for Short Term Goals: 10/10/22  Short Term Goal 1: Pt. will complete bed mobility with mod I  Short Term Goal 2: Pt. will complete transfers with mod A x1  Short Term Goal 3: Pt. will mabulate 50ft with LRAD and min A  Short Term Goal 4: Pt will navigate up<>down 5 stairs with B handrail and min A  Short Term Goal 5: Pt. will particiapte in 12-15 reps of BLE exercises by 10/6/22  Additional Goals?: No  Patient Goals   Patient Goals :  \"To go home nad take care of wife\"     Education  Patient Education  Education Given To: Patient  Education Provided: Role of Therapy;Plan of Care;Home Exercise Program;Transfer Training  Education Method: Verbal  Barriers to Learning: None  Education Outcome: Verbalized understanding      Therapy Time   Individual Concurrent Group Co-treatment   Time In 1600         Time Out 1643         Minutes 43         Timed Code Treatment Minutes: 107 [facility], PT, DPT     If pt is unable to be seen after this session, please let this note serve as discharge summary. Please see case management note for discharge disposition. Thank you.

## 2022-10-04 LAB
BASOPHILS ABSOLUTE: 0.1 K/UL (ref 0–0.2)
BASOPHILS RELATIVE PERCENT: 1 %
BLOOD CULTURE, ROUTINE: NORMAL
CULTURE, BLOOD 2: NORMAL
EOSINOPHILS ABSOLUTE: 0.2 K/UL (ref 0–0.6)
EOSINOPHILS RELATIVE PERCENT: 3 %
GLUCOSE BLD-MCNC: 126 MG/DL (ref 70–99)
GLUCOSE BLD-MCNC: 132 MG/DL (ref 70–99)
GLUCOSE BLD-MCNC: 164 MG/DL (ref 70–99)
GLUCOSE BLD-MCNC: 86 MG/DL (ref 70–99)
HCT VFR BLD CALC: 37.8 % (ref 40.5–52.5)
HEMOGLOBIN: 12.1 G/DL (ref 13.5–17.5)
LYMPHOCYTES ABSOLUTE: 1.2 K/UL (ref 1–5.1)
LYMPHOCYTES RELATIVE PERCENT: 14.6 %
MCH RBC QN AUTO: 27.9 PG (ref 26–34)
MCHC RBC AUTO-ENTMCNC: 32 G/DL (ref 31–36)
MCV RBC AUTO: 87.1 FL (ref 80–100)
MONOCYTES ABSOLUTE: 0.7 K/UL (ref 0–1.3)
MONOCYTES RELATIVE PERCENT: 8.5 %
NEUTROPHILS ABSOLUTE: 5.9 K/UL (ref 1.7–7.7)
NEUTROPHILS RELATIVE PERCENT: 72.9 %
PDW BLD-RTO: 16.1 % (ref 12.4–15.4)
PERFORMED ON: ABNORMAL
PERFORMED ON: NORMAL
PLATELET # BLD: 247 K/UL (ref 135–450)
PMV BLD AUTO: 7.3 FL (ref 5–10.5)
RBC # BLD: 4.34 M/UL (ref 4.2–5.9)
WBC # BLD: 8.1 K/UL (ref 4–11)

## 2022-10-04 PROCEDURE — 1200000000 HC SEMI PRIVATE

## 2022-10-04 PROCEDURE — 6370000000 HC RX 637 (ALT 250 FOR IP): Performed by: HOSPITALIST

## 2022-10-04 PROCEDURE — 94660 CPAP INITIATION&MGMT: CPT

## 2022-10-04 PROCEDURE — 2580000003 HC RX 258: Performed by: HOSPITALIST

## 2022-10-04 PROCEDURE — 94761 N-INVAS EAR/PLS OXIMETRY MLT: CPT

## 2022-10-04 PROCEDURE — 6360000002 HC RX W HCPCS: Performed by: INTERNAL MEDICINE

## 2022-10-04 PROCEDURE — 97116 GAIT TRAINING THERAPY: CPT

## 2022-10-04 PROCEDURE — 2700000000 HC OXYGEN THERAPY PER DAY

## 2022-10-04 PROCEDURE — 97530 THERAPEUTIC ACTIVITIES: CPT

## 2022-10-04 PROCEDURE — 85025 COMPLETE CBC W/AUTO DIFF WBC: CPT

## 2022-10-04 PROCEDURE — 36415 COLL VENOUS BLD VENIPUNCTURE: CPT

## 2022-10-04 PROCEDURE — 6360000002 HC RX W HCPCS: Performed by: HOSPITALIST

## 2022-10-04 RX ORDER — DOXYCYCLINE HYCLATE 100 MG
100 TABLET ORAL 2 TIMES DAILY
Qty: 14 TABLET | Refills: 0 | Status: SHIPPED | OUTPATIENT
Start: 2022-10-04 | End: 2022-10-11

## 2022-10-04 RX ADMIN — MORPHINE SULFATE 2 MG: 2 INJECTION, SOLUTION INTRAMUSCULAR; INTRAVENOUS at 19:32

## 2022-10-04 RX ADMIN — SERTRALINE HYDROCHLORIDE 50 MG: 50 TABLET ORAL at 08:19

## 2022-10-04 RX ADMIN — ATENOLOL 25 MG: 25 TABLET ORAL at 08:18

## 2022-10-04 RX ADMIN — MORPHINE SULFATE 2 MG: 2 INJECTION, SOLUTION INTRAMUSCULAR; INTRAVENOUS at 16:02

## 2022-10-04 RX ADMIN — Medication 400 MG: at 08:19

## 2022-10-04 RX ADMIN — MORPHINE SULFATE 2 MG: 2 INJECTION, SOLUTION INTRAMUSCULAR; INTRAVENOUS at 12:39

## 2022-10-04 RX ADMIN — ATORVASTATIN CALCIUM 40 MG: 40 TABLET, FILM COATED ORAL at 20:48

## 2022-10-04 RX ADMIN — ISOSORBIDE MONONITRATE 30 MG: 30 TABLET, EXTENDED RELEASE ORAL at 08:19

## 2022-10-04 RX ADMIN — VANCOMYCIN HYDROCHLORIDE 1250 MG: 10 INJECTION, POWDER, LYOPHILIZED, FOR SOLUTION INTRAVENOUS at 18:09

## 2022-10-04 RX ADMIN — SODIUM CHLORIDE, PRESERVATIVE FREE 10 ML: 5 INJECTION INTRAVENOUS at 08:20

## 2022-10-04 RX ADMIN — SPIRONOLACTONE 25 MG: 25 TABLET ORAL at 08:19

## 2022-10-04 RX ADMIN — MONTELUKAST SODIUM 10 MG: 10 TABLET ORAL at 20:48

## 2022-10-04 RX ADMIN — MORPHINE SULFATE 2 MG: 2 INJECTION, SOLUTION INTRAMUSCULAR; INTRAVENOUS at 03:04

## 2022-10-04 RX ADMIN — APIXABAN 5 MG: 5 TABLET, FILM COATED ORAL at 08:19

## 2022-10-04 RX ADMIN — PANTOPRAZOLE SODIUM 40 MG: 40 TABLET, DELAYED RELEASE ORAL at 08:19

## 2022-10-04 RX ADMIN — FERROUS SULFATE TAB 325 MG (65 MG ELEMENTAL FE) 325 MG: 325 (65 FE) TAB at 12:05

## 2022-10-04 RX ADMIN — INSULIN GLARGINE 40 UNITS: 100 INJECTION, SOLUTION SUBCUTANEOUS at 09:00

## 2022-10-04 RX ADMIN — Medication 5 MG: at 20:48

## 2022-10-04 RX ADMIN — SENNOSIDES 8.6 MG: 8.6 TABLET, COATED ORAL at 08:18

## 2022-10-04 RX ADMIN — VANCOMYCIN HYDROCHLORIDE 1250 MG: 10 INJECTION, POWDER, LYOPHILIZED, FOR SOLUTION INTRAVENOUS at 05:49

## 2022-10-04 RX ADMIN — MORPHINE SULFATE 2 MG: 2 INJECTION, SOLUTION INTRAMUSCULAR; INTRAVENOUS at 09:30

## 2022-10-04 RX ADMIN — ASPIRIN 81 MG: 81 TABLET, COATED ORAL at 08:18

## 2022-10-04 RX ADMIN — SODIUM CHLORIDE, PRESERVATIVE FREE 10 ML: 5 INJECTION INTRAVENOUS at 20:06

## 2022-10-04 RX ADMIN — MORPHINE SULFATE 2 MG: 2 INJECTION, SOLUTION INTRAMUSCULAR; INTRAVENOUS at 06:16

## 2022-10-04 RX ADMIN — CEFEPIME 2000 MG: 2 INJECTION, POWDER, FOR SOLUTION INTRAVENOUS at 12:06

## 2022-10-04 RX ADMIN — INSULIN GLARGINE 40 UNITS: 100 INJECTION, SOLUTION SUBCUTANEOUS at 20:52

## 2022-10-04 RX ADMIN — AZELASTINE HYDROCHLORIDE 1 SPRAY: 137 SPRAY, METERED NASAL at 20:50

## 2022-10-04 RX ADMIN — APIXABAN 5 MG: 5 TABLET, FILM COATED ORAL at 20:48

## 2022-10-04 RX ADMIN — AZELASTINE HYDROCHLORIDE 1 SPRAY: 137 SPRAY, METERED NASAL at 08:19

## 2022-10-04 RX ADMIN — Medication 1 TABLET: at 08:19

## 2022-10-04 RX ADMIN — TORSEMIDE 40 MG: 20 TABLET ORAL at 08:19

## 2022-10-04 ASSESSMENT — PAIN DESCRIPTION - DESCRIPTORS
DESCRIPTORS: ACHING;BURNING

## 2022-10-04 ASSESSMENT — PAIN SCALES - GENERAL
PAINLEVEL_OUTOF10: 9
PAINLEVEL_OUTOF10: 5
PAINLEVEL_OUTOF10: 6
PAINLEVEL_OUTOF10: 3
PAINLEVEL_OUTOF10: 10
PAINLEVEL_OUTOF10: 9
PAINLEVEL_OUTOF10: 6
PAINLEVEL_OUTOF10: 0

## 2022-10-04 ASSESSMENT — PAIN DESCRIPTION - ORIENTATION
ORIENTATION: RIGHT;LEFT

## 2022-10-04 ASSESSMENT — PAIN DESCRIPTION - LOCATION
LOCATION: LEG

## 2022-10-04 NOTE — PROGRESS NOTES
Physical Therapy  Facility/Department: [facility] B3 - MED SURG  Daily Treatment Note  NAME: Dre Bey  : 1950  MRN: [id number]    Date of Service: 10/4/2022    Discharge Recommendations:  Subacute/Skilled Nursing Facility   PT Equipment Recommendations  Equipment Needed: No  Other: defer    Patient Diagnosis(es): The primary encounter diagnosis was Leg swelling. Diagnoses of Gangrene ([address] and Cellulitis of lower extremity, unspecified laterality were also pertinent to this visit. Assessment   Assessment: Pt tolerated therapy well this date. Pt seen as co-treat with OT to safely progress functional mobility and maximize outcomes. Progressing to mod A x2 for standing activities this date. Remains limited d/t BLE pain. Pt would benefit from continued skilled therapy to address deficits. Continue to recommend SNF at d/c. Activity Tolerance: Patient limited by pain  Equipment Needed: No  Other: defer     Plan    Physcial Therapy Plan  General Plan: 3-5 times per week  Current Treatment Recommendations: Strengthening;Balance training;ROM;Gait training;Stair training;Transfer training;Functional mobility training; Endurance training; Safety education & training;Patient/Caregiver education & training;Equipment evaluation, education, & procurement;Pain management;Home exercise program;Therapeutic activities     Restrictions  Restrictions/Precautions  Restrictions/Precautions: Contact Precautions, Fall Risk  Required Braces or Orthoses?: No     Subjective    Subjective  Subjective: Pt agreeable to therapy.  Family present  Pain: Reports 9/10 pain in legs  Orientation  Overall Orientation Status: Within Normal Limits  Orientation Level: Oriented X4  Cognition  Overall Cognitive Status: WFL     Objective   Vitals /73, HR 83; SpO2 94% on RA       Bed Mobility Training  Bed Mobility Training: No (pt out of bed at start and end of session)    Balance  Sitting: Intact  Standing: Impaired  Standing - Static: Constant support;Poor  Standing - Dynamic: Constant support;Poor    Transfer Training  Transfer Training: Yes  Interventions: Safety awareness training;Verbal cues; Tactile cues (tactile cues at sternum for upright posturing)  Sit to Stand: Moderate assistance;Assist X2;Adaptive equipment; Additional time (with cues for hand placement, from bed and from recliner)  Stand to Sit: Assist X2;Adaptive equipment; Additional time; Moderate assistance (progressing to Min A x1 with RW and extensive cueing for eccentric control)      Gait Training  Gait Training: Yes  Gait  Overall Level of Assistance: Moderate assistance;Assist X2;Additional time; Adaptive equipment  Interventions: Safety awareness training; Tactile cues; Verbal cues  Base of Support: Narrowed  Speed/Taylor: Delayed  Step Length: Left shortened;Right shortened  Gait Abnormalities: Antalgic; Shuffling gait (Max cues for posture, sequencing, and technique. Assist balance and to negotiate RW)  Distance (ft): 3 Feet  Assistive Device: Walker, rolling;Gait belt             Safety Devices  Type of Devices: Left in chair;Call light within reach; Chair alarm in place;Gait belt;Patient at risk for falls;Nurse notified       Goals  Short Term Goals  Time Frame for Short Term Goals: 10/10/22  Short Term Goal 1: Pt. will complete bed mobility with mod I  Short Term Goal 2: Pt. will complete transfers with mod A x1  Short Term Goal 3: Pt. will mabulate 50ft with LRAD and min A  Short Term Goal 4: Pt will navigate up<>down 5 stairs with B handrail and min A  Short Term Goal 5: Pt. will particiapte in 12-15 reps of BLE exercises by 10/6/22  Additional Goals?: No  Patient Goals   Patient Goals : \"To go home nad take care of wife\"    Education  Patient Education  Education Given To: Patient; Family  Education Provided: Role of Therapy;Plan of Care;Home Exercise Program;Transfer Training  Education Method: Verbal  Barriers to Learning: None  Education Outcome: Verbalized understanding    [facility] 6 Clicks Inpatient Mobility:  AM-PAC Mobility Inpatient   How much difficulty turning over in bed?: A Little  How much difficulty sitting down on / standing up from a chair with arms?: A Lot  How much difficulty moving from lying on back to sitting on side of bed?: A Little  How much help from another person moving to and from a bed to a chair?: A Lot  How much help from another person needed to walk in hospital room?: A Lot  How much help from another person for climbing 3-5 steps with a railing?: Total  AM-PAC Inpatient Mobility Raw Score : 13  AM-PAC Inpatient T-Scale Score : 36.74  Mobility Inpatient CMS 0-100% Score: 64.91  Mobility Inpatient CMS G-Code Modifier : CL      Therapy Time   Individual Concurrent Group Co-treatment   Time In 1006         Time Out 1030         Minutes 24         Timed Code Treatment Minutes: 24 Minutes       Ethan Landaverde, PT, DPT  If pt is unable to be seen after this session, please let this note serve as discharge summary. Please see case management note for discharge disposition. Thank you.

## 2022-10-04 NOTE — PROGRESS NOTES
Occupational Therapy  Facility/Department: [facility] B3 - MED SURG  Daily Treatment Note  Co-tx collaboration this date to safely meet goals and will have better occupational performance outcomes with in a co-treatment than 1:1 session. NAME: Peter Silva  : 1950  MRN: [id number]    Date of Service: 10/4/2022    Discharge Recommendations:  Subacute/Skilled Nursing Facility  OT Equipment Recommendations  Equipment Needed: No  Other: defer to d/c facility      Patient Diagnosis(es): The primary encounter diagnosis was Leg swelling. Diagnoses of Gangrene ([address] and Cellulitis of lower extremity, unspecified laterality were also pertinent to this visit. Assessment    Assessment: Peter Silva is progressing in therapy slowly, currently limited by anxiety regarding d/c planning and RLE pain and associated weakness. Demo'd fair safety awareness, requiring moderate verbal and tactile cues for STS hand placement and posturing in stance. Functional Mobility: grossly Mod Ax2 STS progressing to Min A x1 for return to sit with extensive cueing for eccentric control, Min A x2 for bed to chair, per RN report Max A x1 for toilet transfer with Praful Guerrero  ADL: dep for qamar care after BM  Activity was tolerated fairly well, pt required rest breaks after activity. Continue OT POC to address performance deficits in ADLs and functional mobility.      AM-PAC Score  AM-[facility] Inpatient Daily Activity Raw Score: 15 (10/04/22 1038)  AM-PAC Inpatient ADL T-Scale Score : 34.69 (10/04/22 1038)  ADL Inpatient CMS 0-100% Score: 56.46 (10/04/22 1038)  ADL Inpatient CMS G-Code Modifier : CK (10/04/22 1038)    Activity Tolerance: Patient limited by pain  Discharge Recommendations: Subacute/Skilled Nursing Facility  Equipment Needed: No  Other: defer to d/c facility      Plan   Occupational Therapy Plan  Times Per Week: 3-5x  Current Treatment Recommendations: Strengthening;Balance training;Gait training;Functional mobility training; Endurance training;Stair training; Safety education & training;Patient/Caregiver education & training;Pain management;Equipment evaluation, education, & procurement;Positioning;Self-Care / ADL; Home management training     Restrictions  Restrictions/Precautions  Restrictions/Precautions: Contact Precautions; Fall Risk  Required Braces or Orthoses?: No    Subjective   Subjective  Subjective: pt received from RN on Mabel Mcqueen after toileting, agreeable to therapy; reporting 5/10 pain in RLE  Orientation  Overall Orientation Status: Within Normal Limits  Orientation Level: Oriented X4  Cognition  Overall Cognitive Status: WFL        Objective    Vitals  Vitals  Heart Rate: 83  Heart Rate Source: Monitor  BP: 102/73  BP Location: Right upper arm  BP Method: Automatic  Patient Position: Up in chair  MAP (Calculated): 82.67  SpO2: 94 %  O2 Device: None (Room air)    Bed Mobility Training  Bed Mobility Training: No  Transfer Training  Transfer Training: Yes  Interventions: Safety awareness training;Verbal cues; Tactile cues (tactile cues at sternum for upright posturing)  Sit to Stand: Moderate assistance;Assist X2;Adaptive equipment; Additional time (with cues for hand placement, from bed and from recliner)  Stand to Sit: Assist X2;Adaptive equipment; Additional time; Moderate assistance (progressing to Min A x1 with RW and extensive cueing for eccentric control)  Bed to Chair: Assist X2;Additional time;Minimum assistance; Adaptive equipment (with RW, several steps to L)  Toilet Transfer:  Total assistance (per RN, Max Ax1 with Mabel Mcqueen)     ADL  Grooming Skilled Clinical Factors: declined  Toileting: Dependent/Total  Toileting Skilled Clinical Factors: no brief to manage, total A for qamar care after BM while on kaylynnmick carrizalesdy  OT Exercises  Static Standing Balance Exercises: x40 seconds in stance at RW with taking L hand off walker for drinking from cup with straw with Min Ax1-2; required cues for upright posturing  Postural Correction Exercises: verbal anc tacile cues, pt reports he's a \"slumper\"     Safety Devices  Type of Devices: Left in chair;Call light within reach; Chair alarm in place;Gait belt;Patient at risk for falls;Nurse notified     Patient Education  Education Given To: Patient; Family  Education Provided: Role of Therapy;Plan of Care;Transfer Training;IADL Safety;Equipment;Precautions; Fall Prevention Strategies  Education Provided Comments: disease specific: extensive conversation regarding rec for SNF and that it is unsafe for pt and family for pt to d/c home d/t heavy A level  Education Method: Verbal;Demonstration  Barriers to Learning: Other (Comment) (anxiety)  Education Outcome: Verbalized understanding;Continued education needed    Goals  Short Term Goals  Time Frame for Short Term Goals: 1 week (10/10) unless noted  Short Term Goal 1: Pt will complete toilet/BSC transfer with min A- ongoing 10/4  Short Term Goal 2: Pt will tolerate >3 min stand with min A and use of LRAD in preparation for standing level ADLs- progressing 10/4, 40 s in stance with Min A x2 and RW  Short Term Goal 3: Pt will complete LB dressing with min A and use of AE PRN- ongoing 10/4  Short Term Goal 4: Pt will complete x15 reps of BUE exercises for strength and endurance by 10/8- not address 10/4  Patient Goals   Patient goals : \"I need to get back home to my wife\"       Therapy Time   Individual Concurrent Group Co-treatment   Time In 1005         Time Out 1029         Minutes 24         Timed Code Treatment Minutes: 24 Minutes     If pt is unable to be seen after this session, please let this note serve as discharge summary. Please see case management note for discharge disposition. Thank you.    Richelle Gonzalez, OT

## 2022-10-04 NOTE — PLAN OF CARE
Patient's EF (Ejection Fraction) is greater than 40%    Heart Failure Medications:  Diuretics[de-identified] Torsemide and Spironolactone    (One of the following REQUIRED for EF </= 40%/SYSTOLIC FAILURE but MAY be used in EF% >40%/DIASTOLIC FAILURE)        ACE[de-identified] None        ARB[de-identified] None         ARNI[de-identified] None    (Beta Blockers)  NON- Evidenced Based Beta Blocker (for EF% >40%/DIASTOLIC FAILURE): Atenolol- Tenormin    Evidenced Based Beta Blocker::(REQUIRED for EF% <40%/SYSTOLIC FAILURE) None  . .................................................................................................................................................. Patient's weights and intake/output reviewed: Yes    Patient's Last Weight: 284.13 lbs obtained by bed scale. Difference of 3 lbs more than last documented weight. Bed scale is used due to PT having venous wounds on both legs and is unable to stand on them. Pt is not of appropriate size to use lift weight. PT did not want to move his legs so I could remove the pillows from under them. Intake/Output Summary (Last 24 hours) at 10/4/2022 0024  Last data filed at 10/3/2022 2200  Gross per 24 hour   Intake 180 ml   Output 175 ml   Net 5 ml       Education Booklet Provided: yes    Comorbidities Reviewed Yes    Patient has a past medical history of Allergic rhinitis, Arthritis, Bladder fistula, C. difficile diarrhea, Cellulitis of right lower extremity, CHF (congestive heart failure) ([address] Dental disease, Diabetes ([address] Diverticulitis, Dizziness, DVT of lower extremity, bilateral ([address] GERD (gastroesophageal reflux disease), Headache, Hearing loss, Hematuria, Hx of blood clots, Hyperlipidemia, Hypertension, Lung disease, MONIQUE (obstructive sleep apnea), Pancreatitis ([address] Pulmonary emboli ([address] Rash, Sleep apnea, and Tinnitus.      >>For CHF and Comorbidity documentation on Education Time and Topics, please see Education Tab    Progressive Mobility Assessment:  What is this patient's Current Level of Mobility?: Requires Bed Rest  How was this patient Mobilized today?: Patient Refuses to Mobilize, ambulated 0 ft                 With Whom? Nurse                 Level of Difficulty/Assistance: 2x Assist     Pt resting in bed at this time on BiPAP. Pt denies shortness of breath. Pt without lower extremity edema.      Patient and/or Family's stated Goal of Care this Admission: reduce shortness of breath, increase activity tolerance, better understand heart failure and disease management, be more comfortable, and reduce lower extremity edema prior to discharge        :

## 2022-10-04 NOTE — DISCHARGE INSTR - COC
Continuity of Care Form    Patient Name: Claudio Banda   :  1950  MRN:  [id number]    Admit date:  2022  Discharge date:  10/5/2022    Code Status Order: Full Code   Advance Directives:     Admitting Physician:  Gaye Landrum MD  PCP: SHAKIRA Reyes - CNP    Discharging Nurse: Hanh Schwab Unit/Room#: 8807/5779-59  Discharging Unit Phone Number: [phone]    Emergency Contact:   Extended Emergency Contact Information  Primary Emergency Contact: Dayan Hansen  Address: [address] [place] [address], [address] [facility],[address] Phone: [phone]  Mobile Phone: [phone]  Relation: Spouse    Past Surgical History:  Past Surgical History:   Procedure Laterality Date    ABDOMEN SURGERY  2014    reveseral end peristomal hernia repair.     CARDIOVERSION  2019    Dr. Judi Rodriguez, OPEN N/A 13    LAPAROSCOPIC CONVERTED TO OPEN CHOLECYSTECTOMY WITH    COLON SURGERY      COLONOSCOPY      COLONOSCOPY  2013    Severe Diverticulosis unable to finish    COLONOSCOPY  14    diverticula-10 year f/u    COLOSTOMY  2014    CYSTOSCOPY  12/10/13    with bladder biopsy    CYSTOSCOPY  14    Cystourethroscopy, left ureteral catheter     EPIDURAL STEROID INJECTION Right 2019    RIGHT LUMBAR TWO THREE EPIDURAL STEROID INJECTION SITE CONFIRMED BY FLUROOSCOPY performed by Delvin Farias MD at [address] Right 2019    RIGHT LUMBAR TWO THREE EPIDURAL STEROID INJECTION SITE CONFIRMED BY FLUOROSCOPY performed by Delvin Farias MD at [facility]  2015    OPEN [address] [place], BILATERAL COMPONENT SEPARATION, LYSIS OF ADHESIONS    OTHER SURGICAL HISTORY  14    LeftColectomy with End Colostomy, Splenic Flexure Mobilization, Takedown of Colovesical Fistula    [place] INJECTION [facility] Right 2018 ARTHROGRAM AND CORTISONE INJECTION RIGHT HIP performed by Soni Cordova MD at [address] [place] [address] Right 2003    Fracture lower leg during chain saw accident. Surgery x 2    VASCULAR SURGERY Left 05/2021    per Dr. Valdo Griffith Right 05/10/2021    venous procedure RLE-per Dr. Saurav Price        Immunization History:   Immunization History   Administered Date(s) Administered    COVID-19, PFIZER GRAY top, DO NOT Dilute, (age 15 y+), IM, 30 mcg/0.3 mL 05/04/2022    COVID-19, PFIZER PURPLE top, DILUTE for use, (age 15 y+), 30mcg/0.3mL 02/03/2021, 02/24/2021, 09/28/2021    Influenza 09/20/2012    Influenza A ([id number]) Vaccine PF IM 01/26/2010    Influenza Virus Vaccine 09/18/2013    Influenza, FLUAD, (age 72 y+), Adjuvanted, 0.5mL 09/14/2020    Influenza, High Dose (Fluzone 65 yrs and older) 10/13/2014, 09/16/2015, 09/26/2016, 10/26/2017, 10/16/2018, 11/22/2021    Influenza, Triv, inactivated, subunit, adjuvanted, IM (Fluad 65 yrs and older) 09/16/2019    Pneumococcal Conjugate 13-valent ([id number]) 12/16/2015    Pneumococcal Polysaccharide ([id number]) 08/17/2013, 10/19/2013, 10/13/2014    Td, unspecified formulation 12/08/2014, 12/08/2014    Zoster Live (Zostavax) 06/25/2014    Zoster Recombinant (Shingrix) 03/18/2022, 06/01/2022       Active Problems:  Patient Active Problem List   Diagnosis Code    Type 2 diabetes mellitus with hyperglycemia ([facility] E11.65    Osteoarthritis M19.90    Morbid obesity with BMI of 40.0-44.9, adult ([facility] E66.01, Z68.41    Edema R60.9    Anemia D64.9    Bradycardia R00.1    Cellulitis of right lower extremity L03. 115    Venous stasis ulcer of right lower extremity (HCC) I83.019, L97.919    Venous thrombosis of leg I82.90    Venous stasis dermatitis of both lower extremities I87.2    Hyperbilirubinemia E80.6    Moderate episode of recurrent major depressive disorder (HCC) F33.1    Renal insufficiency N28.9    Anxiety F41.9    Essential hypertension I10 Non-seasonal allergic rhinitis J30.89    Mixed hyperlipidemia E78.2    Chronic congestive heart failure (HCC) I50.9    Chronic pulmonary edema J81.1    Coronary artery disease involving native coronary artery of native heart without angina pectoris I25.10    Nonrheumatic aortic valve stenosis I35.0    Severe sleep apnea G47.30    Primary insomnia F51.01    Venous stasis ulcer of left calf limited to breakdown of skin with varicose veins ([facility]) I83.022, L97.221    Stage 3 chronic kidney disease ([facility]) N18.30    Renal osteodystrophy N25.0    Peripheral edema R60.9    Primary osteoarthritis of right hip M16.11    Grade III diastolic dysfunction O96.33    Paroxysmal atrial fibrillation ([facility]) I48.0    Simple chronic bronchitis ([facility]) J41.0    Other specified peripheral vascular diseases ([facility]) I73.89    Non-pressure chronic ulcer of right calf with fat layer exposed ([facility]) L97.212    Non-pressure chronic ulcer of left calf with fat layer exposed ([facility]) L97.222    Atrial flutter ([facility]) I48.92    Closed wedge compression fracture of sixth thoracic vertebra ([facility]) S22.050A    Acute chest pain R07.9    SIRS (systemic inflammatory response syndrome) ([facility]) R65.10    Heart failure, diastolic, with acute decompensation ([facility]) I50.33    Hypomagnesemia E83.42    Idiopathic chronic venous hypertension of both lower extremities with ulcer ([facility]) I87.313, L97.919, L97.929    Cellulitis of right leg without foot L03.115       Isolation/Infection:   Isolation            Contact          Patient Infection Status       Infection Onset Added Last Indicated Last Indicated By Review Planned Expiration Resolved Resolved By    ESBL (Extended Spectrum Beta Lactamase) 09/30/22 10/02/22 09/30/22 Culture, Tissue                Nurse Assessment:  Last Vital Signs: /73   Pulse 83   Temp 98.4 °F (36.9 °C) (Oral)   Resp 18   Ht 6' 1\" (1.854 m)   Wt 284 lb 13.4 oz (129.2 kg)   SpO2 94%   BMI 37.58 kg/m²     Last documented pain score (0-10 scale): Pain Level: 5  Last Weight:   Wt Readings from Last 1 Encounters:   10/04/22 284 lb 13.4 oz (129.2 kg)     Mental Status:  oriented and alert    IV Access:  - None    Nursing Mobility/ADLs:  Walking   Assisted  Transfer  Assisted  Bathing  Assisted  Dressing  Assisted  Toileting  Assisted  Feeding  Assisted  Med Admin  Assisted  Med Delivery   whole    Wound Care Documentation and Therapy:  Wound 09/30/22 Leg Lower;Right #1 (Active)   Wound Image    10/03/22 1458   Wound Etiology Venous 10/03/22 1458   Dressing Status Clean;Dry; Intact 10/04/22 0943   Wound Cleansed Cleansed with saline 10/03/22 1458   Dressing/Treatment Alginate with Ag; Other (comment); Roll gauze; Ace wrap 10/03/22 1458   Offloading for Diabetic Foot Ulcers Offloading ordered 10/03/22 1458   Dressing Change Due 10/05/22 10/03/22 1458   Wound Length (cm) 17 cm 09/30/22 0857   Wound Width (cm) 27.5 cm 09/30/22 0857   Wound Depth (cm) 0.1 cm 09/30/22 0857   Wound Surface Area (cm^2) 467.5 cm^2 09/30/22 0857   Wound Volume (cm^3) 46.75 cm^3 09/30/22 0857   Post-Procedure Length (cm) 17 cm 09/30/22 0925   Post-Procedure Width (cm) 27.5 cm 09/30/22 0925   Post-Procedure Depth (cm) 0.1 cm 09/30/22 0925   Post-Procedure Surface Area (cm^2) 467.5 cm^2 09/30/22 0925   Post-Procedure Volume (cm^3) 46.75 cm^3 09/30/22 0925   Distance Tunneling (cm) 0 cm 09/30/22 0857   Undermining Maxium Distance (cm) 0 09/30/22 0857   Wound Assessment Pink/red;Slough 10/03/22 1458   Drainage Amount Moderate 10/03/22 1458   Drainage Description Serosanguinous 10/03/22 1458   Odor Mild 10/03/22 1458   Mago-wound Assessment Edematous 10/03/22 1458   Margins Undefined edges 10/03/22 1458   Wound Thickness Description not for Pressure Injury Full thickness 10/03/22 1458   Number of days: 4       Wound 09/30/22 Leg Left; Lower #2 (Active)   Wound Image   10/03/22 1458   Wound Etiology Venous 10/03/22 1458   Dressing Status Clean;Dry; Intact 10/04/22 0943   Wound Cleansed Cleansed with saline 10/03/22 1458   Dressing/Treatment Alginate with Ag;Gauze dressing/dressing sponge;Roll gauze;ABD; Ace wrap 10/03/22 1458   Offloading for Diabetic Foot Ulcers Offloading ordered 10/03/22 1458   Dressing Change Due 10/05/22 10/03/22 1458   Wound Length (cm) 14.5 cm 09/30/22 0857   Wound Width (cm) 19 cm 09/30/22 0857   Wound Depth (cm) 0.1 cm 09/30/22 0857   Wound Surface Area (cm^2) 275.5 cm^2 09/30/22 0857   Wound Volume (cm^3) 27.55 cm^3 09/30/22 0857   Post-Procedure Length (cm) 14.5 cm 09/30/22 0925   Post-Procedure Width (cm) 19 cm 09/30/22 0925   Post-Procedure Depth (cm) 0.1 cm 09/30/22 0925   Post-Procedure Surface Area (cm^2) 275.5 cm^2 09/30/22 0925   Post-Procedure Volume (cm^3) 27.55 cm^3 09/30/22 0925   Distance Tunneling (cm) 0 cm 09/30/22 0857   Undermining Maxium Distance (cm) 0 09/30/22 0857   Wound Assessment Pink/red 10/03/22 1458   Drainage Amount Moderate 10/03/22 1458   Drainage Description Sanguinous 10/03/22 1458   Odor Mild 10/03/22 1458   Mago-wound Assessment Erosion 10/03/22 1458   Margins Undefined edges 10/03/22 1458   Wound Thickness Description not for Pressure Injury Partial thickness 10/03/22 1458   Number of days: 4       Wound 09/30/22 Leg Right;Proximal #3 (Active)   Wound Image   10/03/22 1458   Wound Etiology Venous 10/03/22 1458   Dressing Status Clean;Dry; Intact 10/04/22 0943   Wound Cleansed Cleansed with saline 10/03/22 1458   Dressing/Treatment Alginate with Ag;Gauze dressing/dressing sponge;ABD;Roll gauze; Ace wrap 10/03/22 1458   Offloading for Diabetic Foot Ulcers Offloading ordered 10/03/22 1458   Dressing Change Due 10/05/22 10/03/22 1458   Wound Length (cm) 2.5 cm 09/30/22 0857   Wound Width (cm) 1.9 cm 09/30/22 0857   Wound Depth (cm) 0.1 cm 09/30/22 0857   Wound Surface Area (cm^2) 4.75 cm^2 09/30/22 0857   Wound Volume (cm^3) 0.475 cm^3 09/30/22 0857   Post-Procedure Length (cm) 2.5 cm 09/30/22 0925   Post-Procedure Width (cm) 1.9 cm 09/30/22 0925 Post-Procedure Depth (cm) 0.1 cm 09/30/22 0925   Post-Procedure Surface Area (cm^2) 4.75 cm^2 09/30/22 0925   Post-Procedure Volume (cm^3) 0.475 cm^3 09/30/22 0925   Wound Assessment Pink/red 10/03/22 1458   Drainage Amount Moderate 10/03/22 1458   Drainage Description Sanguinous 10/03/22 1458   Odor Mild 10/03/22 1458   Mago-wound Assessment Edematous 10/03/22 1458   Margins Undefined edges 10/03/22 1458   Wound Thickness Description not for Pressure Injury Partial thickness 10/03/22 1458   Number of days: 4        Elimination:  Continence: Bowel: No  Bladder: No  Urinary Catheter: None   Colostomy/Ileostomy/Ileal Conduit: No       Date of Last BM: 10/5/2022    Intake/Output Summary (Last 24 hours) at 10/4/2022 1133  Last data filed at 10/4/2022 1031  Gross per 24 hour   Intake 600 ml   Output 200 ml   Net 400 ml     I/O last 3 completed shifts: In: 65 [P.O.:660]  Out: 450 [Urine:450]    Safety Concerns: At Risk for Falls    Impairments/Disabilities:      None    Nutrition Therapy:  Current Nutrition Therapy:   - Oral Diet:  Carb Control 4 carbs/meal (1800kcals/day)    Routes of Feeding: Oral  Liquids: Thin Liquids  Daily Fluid Restriction: no  Last Modified Barium Swallow with Video (Video Swallowing Test): not done    Treatments at the Time of Hospital Discharge:   Respiratory Treatments: None  Oxygen Therapy:  is not on home oxygen therapy.   Ventilator:    - No ventilator support    Rehab Therapies: Physical Therapy and Occupational Therapy  Weight Bearing Status/Restrictions: No weight bearing restrictions  Other Medical Equipment (for information only, NOT a DME order):  wheelchair and walker  Other Treatments: none    Patient's personal belongings (please select all that are sent with patient):  None    RN SIGNATURE:  Electronically signed by Dhruv Marques RN on 10/5/22 at 4:52 PM EDT    CASE MANAGEMENT/SOCIAL WORK SECTION    Inpatient Status Date: 9/30/22    Readmission Risk Assessment Score:  Readmission Risk              Risk of Unplanned Readmission:  26           Dis[facility] Facility[facility]   [facility] [id number]   [phone]     / signature: Electronically signed by Morgan Wang RN on 10/5/22 at 11:03 AM EDT    PHYSICIAN SECTION    Prognosis: Good    Condition at Discharge: Stable    Rehab Potential (if transferring to Rehab): Good    Recommended Labs or Other Treatments After Discharge:     Physician Certification: I certify the above information and transfer of Dariel Betancur  is necessary for the continuing treatment of the diagnosis listed and that he requires SNF for less 30 days.      Update Admission H&P: No change in H&P    PHYSICIAN SIGNATURE:  Electronically signed by Devon Mercado MD on 10/4/22 at 11:33 AM EDT

## 2022-10-04 NOTE — PLAN OF CARE
Problem: Pain  Goal: Verbalizes/displays adequate comfort level or baseline comfort level  Outcome: Progressing     Problem: Safety - Adult  Goal: Free from fall injury  Outcome: Progressing     Problem: Chronic Conditions and Co-morbidities  Goal: Patient's chronic conditions and co-morbidity symptoms are monitored and maintained or improved  Outcome: Progressing   PT has been given pain medication this shift to help him reach a level of comfort. PT has remained free from falls by calling out when he has needs. Pts oxygen saturation and blood glucose have also been monitored this shift.

## 2022-10-04 NOTE — CARE COORDINATION
Chart reviewed. Cellulitis BLE. IV ABX for 14 days, starting 10/1/22. Pt agreeable to SNF. Would like to go to [facility] called liaison Maria Isabel Recinos to see if [facility] [address] [facility] is in network. Awaiting return call. Received return call Winter Sullivan does not have contract with [place]. Pt's second choice is [facility] [place]. Referral placed. Eric Sargent reviewing. Awaiting return call. Tania Warner RN     Addendum 15:28  [facility] able to accept. Precert started through [employer] portal. [facility] will have bed available either tomorrow or Thursday.  Tania Warner RN

## 2022-10-04 NOTE — PROGRESS NOTES
10/03/22 2301   NIV Type   Equipment Type V60   Mode CPAP   Mask Type Full face mask   Mask Size Large   Settings/Measurements   CPAP/EPAP 7 cmH2O   Vt (Measured) 422 mL   Resp 18   FiO2  35 %   Minute Volume (L/min) 9.4 Liters   Mask Leak (lpm) 41 lpm   Comfort Level Good   Using Accessory Muscles No   Alarm Settings   Alarms On Y

## 2022-10-04 NOTE — PLAN OF CARE
Problem: Pain  Goal: Verbalizes/displays adequate comfort level or baseline comfort level  10/4/2022 0850 by Micaela Arteaga RN  Outcome: Progressing  10/4/2022 0040 by Niall Rodriguez RN  Outcome: Progressing     Problem: Skin/Tissue Integrity - Adult  Goal: Skin integrity remains intact  Outcome: Progressing

## 2022-10-04 NOTE — DISCHARGE SUMMARY
Hospital Medicine Discharge Summary    Patient ID: Tammy Saleem      Patient's PCP: SHAKIRA Mitchell CNP    Admit Date: 9/30/2022     Discharge Date:   10/4/22    Admitting Provider: Deysi Wooten MD     Discharge Provider: Jayce Chang MD     Discharge Diagnoses: Active Hospital Problems    Diagnosis     Severe sleep apnea [G47.30]      Priority: High    Type 2 diabetes mellitus with hyperglycemia (HCC) [E11.65]      Priority: High    Idiopathic chronic venous hypertension of both lower extremities with ulcer ([address] [I87.313, L97.919, L97.929]      Priority: Medium    Cellulitis of right leg without foot [N37.552]      Priority: Medium    Paroxysmal atrial fibrillation (HCC) [I48.0]     Grade III diastolic dysfunction [Q05.14]     Chronic pulmonary edema [J81.1]     Cellulitis of right lower extremity [L03.115]        The patient was seen and examined on day of discharge and this discharge summary is in conjunction with any daily progress note from day of discharge. Hospital Course:   66 y.o. male who presented to the ED per recommendations of his wound care provider to be evaluated for suspected RLE cellulitis superimposed on chronic venous stasis dermatitis. Patient has a longstanding history of diastolic CHF with BLE edema, but he reports increased fluid retention with blistering/weeping ongoing for 1 week PTA. Patient reports that he has been compliant with all of his cardiac medications, including Bumex and Aldactone. He denies systemic symptoms of fever, rigors, N/V/D. Patient is also a type 2 insulin-dependent diabetic with blood glucose levels averaging greater than 200. Upon arrival to the ED CXR was obtained revealing chronic cardiomegaly with pulmonary vascular congestion; no overt edema appreciated. Right ankle notable for diffuse subcu edema without evidence of osteomyelitis. Notable labs include: Acute hyperglycemia 170, BNP 1285, CRP 63.6, and ESR 30. Following collection of blood cultures, patient received a one-time dosage of vancomycin and Zosyn per ED provider. RLE cellulitis with chronic venous stasis ulceration  -Patient admitted to telemetry floor with appropriate isolation measures in place  -Blood and urine cultures collected prior to antibiotic initiation  -Patient initiated on IV vancomycin and cefepime empirically pending pathogen identification; pharmacy consulted for dosage assistance  -Serial CBC, CMP, lactate, procalcitonin, CRP to monitor treatment progression   -Consult placed to wound care for assistance with dressing changes during stay     Chronic CHF with grade 3 diastolic dysfunction  - Continue current diuretic regimen with Bumex and Aldactone therapy  -Continue current dosage of Tenormin, Lipitor, and EC ASA  -ECHO results from several months earlier reviewed and documented above  -2G Na and 1800 cc fluid restriction dietary modifications in place     PAF  -Continuous telemetry monitoring during stay  -Patient currently rate controlled on chronic beta-blocker therapy  -Continue twice daily Eliquis anticoagulation     Type II IDDM with acute hyperglycemia  -A1c ordered with results pending at time of dictation  -Home oral hypoglycemic agents placed on hold  -Home dosage of Lantus adjusted to 40 units twice daily during stay  -PRN Humalog medium dose SSI scheduled before meals and at bedtime based on POC glucose  -Carbohydrate restriction placed on diet     Severe MONIQUE  -Continuous pulse oximetry monitoring initiated with PRN supplemental O2   -Encourage aggressive pulmonary toilet including incentive spirometry every 4H while awake  -Continue home dosage of Singulair  -Patient educated on the importance of CPAP; RT consulted for resumption of NIV PSV during stay      Today patient is feeling well. Blood cx -ve. Patient will be dced with home wound care. PO doxycycline given.        Physical Exam Performed:     /73   Pulse 83 Temp 98.4 °F (36.9 °C) (Oral)   Resp 18   Ht 6' 1\" (1.854 m)   Wt 284 lb 13.4 oz (129.2 kg)   SpO2 94%   BMI 37.58 kg/m²       General appearance:  No apparent distress, appears stated age and cooperative. HEENT:  Normal cephalic, atraumatic without obvious deformity. Pupils equal, round, and reactive to light. Extra ocular muscles intact. Conjunctivae/corneas clear. Neck: Supple, with full range of motion. No jugular venous distention. Trachea midline. Respiratory:  Normal respiratory effort. Clear to auscultation, bilaterally without Rales/Wheezes/Rhonchi. Cardiovascular:  Regular rate and rhythm with normal S1/S2 without murmurs, rubs or gallops. Abdomen: Soft, non-tender, non-distended with normal bowel sounds. Musculoskeletal:  No clubbing, cyanosis or edema bilaterally. Full range of motion without deformity. Skin: Skin color, texture, turgor normal.  No rashes or lesions. Neurologic:  Neurovascularly intact without any focal sensory/motor deficits. Cranial nerves: II-XII intact, grossly non-focal.  Psychiatric:  Alert and oriented, thought content appropriate, normal insight  Capillary Refill: Brisk,< 3 seconds   Peripheral Pulses: +2 palpable, equal bilaterally                     Labs:  For convenience and continuity at follow-up the following most recent labs are provided:      CBC:    Lab Results   Component Value Date/Time    WBC 8.1 10/04/2022 07:06 AM    HGB 12.1 10/04/2022 07:06 AM    HCT 37.8 10/04/2022 07:06 AM     10/04/2022 07:06 AM       Renal:    Lab Results   Component Value Date/Time     10/02/2022 04:42 AM    K 4.0 10/02/2022 04:42 AM    K 4.2 10/01/2022 08:09 AM     10/02/2022 04:42 AM    CO2 25 10/02/2022 04:42 AM    BUN 23 10/02/2022 04:42 AM    CREATININE 0.7 10/02/2022 04:42 AM    CALCIUM 9.1 10/02/2022 04:42 AM    PHOS 3.2 06/14/2022 04:37 AM         Significant Diagnostic Studies    Radiology:   XR ANKLE RIGHT (MIN 3 VIEWS)   Final Result Nonspecific diffuse subcutaneous edema with no other acute findings. There   is no radiographic evidence of osteomyelitis. XR ANKLE LEFT (2 VIEWS)   Final Result   No acute fracture dislocation. Nonspecific diffuse subcutaneous edema. Mild to moderate plantar calcaneal spurring. XR CHEST PORTABLE   Final Result   1. Mild cardiomegaly with pulmonary vascular congestion. No evidence of   overt edema. 2.  Moderate hiatal hernia. Consults:     IP CONSULT TO PHARMACY  IP CONSULT TO HOME CARE NEEDS    Disposition:   HHA    Condition at Discharge: Stable    Discharge Instructions/Follow-up:      Code Status:  Full Code     Activity: activity as tolerated    Diet: cardiac diet      Discharge Medications:     Current Discharge Medication List             Details   doxycycline hyclate (VIBRA-TABS) 100 MG tablet Take 1 tablet by mouth 2 times daily for 7 days  Qty: 14 tablet, Refills: 0                Details   traMADol (ULTRAM) 50 MG tablet Take 50 mg by mouth every 6 hours as needed for Pain.       Insulin Glargine, 2 Unit Dial, (TOUJEO MAX SOLOSTAR) 300 UNIT/ML SOPN Inject 50 Units into the skin 2 times daily  Qty: 5 Adjustable Dose Pre-filled Pen Syringe, Refills: 5    Associated Diagnoses: Type 2 diabetes mellitus with hyperglycemia, with long-term current use of insulin ([facility])      atenolol (TENORMIN) 50 MG tablet TAKE 1/2 TABLET BY MOUTH EVERY DAY  Qty: 45 tablet, Refills: 3    Associated Diagnoses: Essential hypertension      OZEMPIC, 0.25 OR 0.5 MG/DOSE, 2 MG/1.5ML SOPN DIAL AND INJECT 0.5MG WEEKLY  Qty: 12 pen, Refills: 1    Associated Diagnoses: Type 2 diabetes mellitus with hyperglycemia, with long-term current use of insulin ([facility])      magnesium oxide (MAG-OX) 400 (240 Mg) MG tablet TAKE 1 TABLET BY MOUTH EVERY DAY  Qty: 90 tablet, Refills: 1    Associated Diagnoses: Hypomagnesemia      glimepiride (AMARYL) 4 MG tablet TAKE 1 TABLET BY MOUTH EVERY DAY IN THE MORNING  Qty: 180 tablet, Refills: 3    Associated Diagnoses: Type 2 diabetes mellitus with hyperglycemia, with long-term current use of insulin ([facility])      omeprazole (PRILOSEC) 40 MG delayed release capsule TAKE 1 CAPSULE BY MOUTH EVERY DAY  Qty: 90 capsule, Refills: 3    Associated Diagnoses: Gastroesophageal reflux disease without esophagitis      torsemide (DEMADEX) 20 MG tablet TAKE 2 TABLETS BY MOUTH EVERY DAY  Qty: 180 tablet, Refills: 1      spironolactone (ALDACTONE) 25 MG tablet TAKE 1 TABLET BY MOUTH EVERY DAY  Qty: 90 tablet, Refills: 3    Associated Diagnoses: Chronic diastolic congestive heart failure (HCC)      Zinc Sulfate (ZINC 15 PO) Take by mouth in the morning and at bedtime      ELDERBERRY PO Take by mouth 2 times daily      Ascorbic Acid (VITAMIN C PO) Take by mouth in the morning and at bedtime      Apoaequorin (PREVAGEN) 10 MG CAPS Take 1 capsule by mouth daily      sertraline (ZOLOFT) 50 MG tablet TAKE 1 TABLET BY MOUTH EVERY DAY  Qty: 90 tablet, Refills: 3    Associated Diagnoses: Moderate episode of recurrent major depressive disorder ([facility])      ELIQUIS 5 MG TABS tablet TAKE 1 TABLET BY MOUTH TWICE A DAY  Qty: 180 tablet, Refills: 2    Associated Diagnoses: Paroxysmal atrial fibrillation ([facility])      tiZANidine (ZANAFLEX) 4 MG tablet TAKE 1 TABLET BY MOUTH NIGHTLY AS NEEDED (PAIN)  Qty: 30 tablet, Refills: 0    Associated Diagnoses: Closed wedge compression fracture of T6 vertebra, initial encounter ([facility])      azelastine (ASTELIN) 0.1 % nasal spray 1 spray by Nasal route 2 times daily Use in each nostril as directed  Qty: 30 mL, Refills: 5    Associated Diagnoses:  Allergic rhinitis, unspecified seasonality, unspecified trigger      atorvastatin (LIPITOR) 40 MG tablet TAKE 1 TABLET BY MOUTH EVERY DAY AT NIGHT  Qty: 90 tablet, Refills: 3    Associated Diagnoses: Mixed hyperlipidemia      isosorbide mononitrate (IMDUR) 30 MG extended release tablet TAKE 1 TABLET BY MOUTH EVERY DAY  Qty: 90 tablet, Refills: 3      montelukast (SINGULAIR) 10 MG tablet TAKE 1 TABLET BY MOUTH EVERY DAY  Qty: 90 tablet, Refills: 3      hydrALAZINE (APRESOLINE) 50 MG tablet TAKE 1/2 TABLET BY MOUTH EVERY 8 HOURS  Qty: 135 tablet, Refills: 7      !! Handicap Chris MISC by Does not apply route Please issue for duration of 5 years  Qty: 1 each, Refills: 0    Associated Diagnoses: Nonrheumatic aortic valve stenosis; Primary osteoarthritis involving multiple joints; Coronary artery disease involving native coronary artery of native heart without angina pectoris      !! Handicap Chris MISC by Does not apply route Please issue for duration of 5 years  Qty: 1 each, Refills: 0    Associated Diagnoses: Nonrheumatic aortic valve stenosis; Primary osteoarthritis involving multiple joints; Coronary artery disease involving native coronary artery of native heart without angina pectoris      Insulin Pen Needle 31G X 5 MM MISC 1 each by Does not apply route daily  Qty: 400 each, Refills: 5    Associated Diagnoses: Type 2 diabetes mellitus with hyperglycemia, with long-term current use of insulin ([facility])      Glucosamine-Chondroitin (GLUCOSAMINE CHONDR COMPLEX PO) Take 1 tablet by mouth 2 times daily      blood glucose monitor kit and supplies by Other route daily One Touch Ultra  Qty: 1 kit, Refills: 0      glucose blood VI test strips (ASCENSIA AUTODISC VI;ONE TOUCH ULTRA TEST VI) strip DX: E11.9 FSBS daily. One Touch ultra  Qty: 100 strip, Refills: 5    Associated Diagnoses: Type 2 diabetes mellitus with hyperglycemia, with long-term current use of insulin (HCC)      aspirin 81 MG chewable tablet Take 1 tablet by mouth daily  Qty: 30 tablet, Refills: 0      Multiple Vitamins-Minerals (THERAPEUTIC MULTIVITAMIN-MINERALS) tablet Take 1 tablet by mouth daily      ferrous sulfate 325 (65 FE) MG tablet Take 325 mg by mouth daily        ! ! - Potential duplicate medications found. Please discuss with provider.           Time Spent on discharge is more than 30 minutes in the examination, evaluation, counseling and review of medications and discharge plan. Signed: Sagrario Kolb MD   10/4/2022      Thank you SHAKIRA LOWERY CNP for the opportunity to be involved in this patient's care. If you have any questions or concerns, please feel free to contact me at [phone].

## 2022-10-05 VITALS
RESPIRATION RATE: 18 BRPM | OXYGEN SATURATION: 95 % | TEMPERATURE: 97.7 F | DIASTOLIC BLOOD PRESSURE: 68 MMHG | HEIGHT: 73 IN | SYSTOLIC BLOOD PRESSURE: 109 MMHG | WEIGHT: 281.97 LBS | BODY MASS INDEX: 37.37 KG/M2 | HEART RATE: 65 BPM

## 2022-10-05 LAB
ANAEROBIC CULTURE: ABNORMAL
BASOPHILS ABSOLUTE: 0.1 K/UL (ref 0–0.2)
BASOPHILS RELATIVE PERCENT: 1.5 %
EOSINOPHILS ABSOLUTE: 0.4 K/UL (ref 0–0.6)
EOSINOPHILS RELATIVE PERCENT: 5.3 %
GLUCOSE BLD-MCNC: 99 MG/DL (ref 70–99)
GRAM STAIN RESULT: ABNORMAL
HCT VFR BLD CALC: 38 % (ref 40.5–52.5)
HEMOGLOBIN: 12.4 G/DL (ref 13.5–17.5)
LYMPHOCYTES ABSOLUTE: 1.3 K/UL (ref 1–5.1)
LYMPHOCYTES RELATIVE PERCENT: 16.6 %
MCH RBC QN AUTO: 28 PG (ref 26–34)
MCHC RBC AUTO-ENTMCNC: 32.6 G/DL (ref 31–36)
MCV RBC AUTO: 85.8 FL (ref 80–100)
MONOCYTES ABSOLUTE: 0.7 K/UL (ref 0–1.3)
MONOCYTES RELATIVE PERCENT: 8.4 %
NEUTROPHILS ABSOLUTE: 5.5 K/UL (ref 1.7–7.7)
NEUTROPHILS RELATIVE PERCENT: 68.2 %
ORGANISM: ABNORMAL
PDW BLD-RTO: 15.9 % (ref 12.4–15.4)
PERFORMED ON: NORMAL
PLATELET # BLD: 255 K/UL (ref 135–450)
PMV BLD AUTO: 7.3 FL (ref 5–10.5)
RBC # BLD: 4.42 M/UL (ref 4.2–5.9)
SARS-COV-2, NAAT: NOT DETECTED
WBC # BLD: 8.1 K/UL (ref 4–11)
WOUND/ABSCESS: ABNORMAL

## 2022-10-05 PROCEDURE — 6360000002 HC RX W HCPCS: Performed by: INTERNAL MEDICINE

## 2022-10-05 PROCEDURE — 6360000002 HC RX W HCPCS: Performed by: HOSPITALIST

## 2022-10-05 PROCEDURE — 87635 SARS-COV-2 COVID-19 AMP PRB: CPT

## 2022-10-05 PROCEDURE — 6370000000 HC RX 637 (ALT 250 FOR IP): Performed by: HOSPITALIST

## 2022-10-05 PROCEDURE — 2580000003 HC RX 258: Performed by: HOSPITALIST

## 2022-10-05 PROCEDURE — 94660 CPAP INITIATION&MGMT: CPT

## 2022-10-05 PROCEDURE — 6370000000 HC RX 637 (ALT 250 FOR IP): Performed by: INTERNAL MEDICINE

## 2022-10-05 PROCEDURE — 85025 COMPLETE CBC W/AUTO DIFF WBC: CPT

## 2022-10-05 PROCEDURE — 36415 COLL VENOUS BLD VENIPUNCTURE: CPT

## 2022-10-05 PROCEDURE — 97535 SELF CARE MNGMENT TRAINING: CPT

## 2022-10-05 PROCEDURE — 97530 THERAPEUTIC ACTIVITIES: CPT

## 2022-10-05 PROCEDURE — 97116 GAIT TRAINING THERAPY: CPT

## 2022-10-05 RX ORDER — TRAMADOL HYDROCHLORIDE 50 MG/1
50 TABLET ORAL EVERY 6 HOURS PRN
Qty: 12 TABLET | Refills: 0 | Status: SHIPPED | OUTPATIENT
Start: 2022-10-05 | End: 2022-10-10

## 2022-10-05 RX ADMIN — SPIRONOLACTONE 25 MG: 25 TABLET ORAL at 08:46

## 2022-10-05 RX ADMIN — VANCOMYCIN HYDROCHLORIDE 1250 MG: 10 INJECTION, POWDER, LYOPHILIZED, FOR SOLUTION INTRAVENOUS at 05:10

## 2022-10-05 RX ADMIN — SODIUM CHLORIDE, PRESERVATIVE FREE 10 ML: 5 INJECTION INTRAVENOUS at 08:52

## 2022-10-05 RX ADMIN — ISOSORBIDE MONONITRATE 30 MG: 30 TABLET, EXTENDED RELEASE ORAL at 08:46

## 2022-10-05 RX ADMIN — AZELASTINE HYDROCHLORIDE 1 SPRAY: 137 SPRAY, METERED NASAL at 08:51

## 2022-10-05 RX ADMIN — FERROUS SULFATE TAB 325 MG (65 MG ELEMENTAL FE) 325 MG: 325 (65 FE) TAB at 11:52

## 2022-10-05 RX ADMIN — MORPHINE SULFATE 2 MG: 2 INJECTION, SOLUTION INTRAMUSCULAR; INTRAVENOUS at 03:05

## 2022-10-05 RX ADMIN — MORPHINE SULFATE 2 MG: 2 INJECTION, SOLUTION INTRAMUSCULAR; INTRAVENOUS at 12:20

## 2022-10-05 RX ADMIN — ASPIRIN 81 MG: 81 TABLET, COATED ORAL at 08:45

## 2022-10-05 RX ADMIN — MORPHINE SULFATE 2 MG: 2 INJECTION, SOLUTION INTRAMUSCULAR; INTRAVENOUS at 06:07

## 2022-10-05 RX ADMIN — SERTRALINE HYDROCHLORIDE 50 MG: 50 TABLET ORAL at 08:45

## 2022-10-05 RX ADMIN — TORSEMIDE 40 MG: 20 TABLET ORAL at 08:45

## 2022-10-05 RX ADMIN — Medication 400 MG: at 08:46

## 2022-10-05 RX ADMIN — ATENOLOL 25 MG: 25 TABLET ORAL at 08:46

## 2022-10-05 RX ADMIN — CEFEPIME 2000 MG: 2 INJECTION, POWDER, FOR SOLUTION INTRAVENOUS at 11:51

## 2022-10-05 RX ADMIN — INSULIN GLARGINE 40 UNITS: 100 INJECTION, SOLUTION SUBCUTANEOUS at 08:46

## 2022-10-05 RX ADMIN — MORPHINE SULFATE 2 MG: 2 INJECTION, SOLUTION INTRAMUSCULAR; INTRAVENOUS at 09:22

## 2022-10-05 RX ADMIN — APIXABAN 5 MG: 5 TABLET, FILM COATED ORAL at 08:46

## 2022-10-05 RX ADMIN — TRAMADOL HYDROCHLORIDE 100 MG: 50 TABLET, COATED ORAL at 16:37

## 2022-10-05 RX ADMIN — CEFEPIME 2000 MG: 2 INJECTION, POWDER, FOR SOLUTION INTRAVENOUS at 00:41

## 2022-10-05 RX ADMIN — PANTOPRAZOLE SODIUM 40 MG: 40 TABLET, DELAYED RELEASE ORAL at 08:46

## 2022-10-05 RX ADMIN — Medication 1 TABLET: at 08:46

## 2022-10-05 ASSESSMENT — PAIN SCALES - GENERAL
PAINLEVEL_OUTOF10: 8
PAINLEVEL_OUTOF10: 7
PAINLEVEL_OUTOF10: 8
PAINLEVEL_OUTOF10: 5
PAINLEVEL_OUTOF10: 7

## 2022-10-05 ASSESSMENT — PAIN DESCRIPTION - ORIENTATION
ORIENTATION: RIGHT;LEFT

## 2022-10-05 ASSESSMENT — PAIN DESCRIPTION - LOCATION
LOCATION: LEG

## 2022-10-05 ASSESSMENT — PAIN - FUNCTIONAL ASSESSMENT
PAIN_FUNCTIONAL_ASSESSMENT: ACTIVITIES ARE NOT PREVENTED

## 2022-10-05 ASSESSMENT — PAIN DESCRIPTION - DESCRIPTORS
DESCRIPTORS: ACHING
DESCRIPTORS: ACHING;BURNING
DESCRIPTORS: ACHING

## 2022-10-05 ASSESSMENT — PAIN DESCRIPTION - ONSET: ONSET: ON-GOING

## 2022-10-05 ASSESSMENT — PAIN DESCRIPTION - PAIN TYPE
TYPE: ACUTE PAIN

## 2022-10-05 ASSESSMENT — PAIN DESCRIPTION - FREQUENCY
FREQUENCY: CONTINUOUS

## 2022-10-05 NOTE — CARE COORDINATION
[facility] approved through [facility]. Authorization number [id number] approved from 10/4/22 - 10/12/22. CM called and left voice message for [place] at [address] [place] [address]. Transport tentatively set up for 4 PM with [facility]. CM will continue to follow. Tania Rust RN     CM spoke with Bubba Aden at [facility]. Will be able to take pt today. Notified that transport set up for 4 PM. Rapid COVID ordered. Tania Rust RN     Addendum 11:39  Pt needs to discharge with working PIV for IV ABX at facility. Tania Rust RN     Addendum 13:11  CM called [facility] to notify that pt was moved to discharge unit room 112.  Tania Rust, RN 1

## 2022-10-05 NOTE — PROGRESS NOTES
Occupational Therapy  Facility/Department: [facility] [address] [facility]  Daily Treatment Note  NAME: Ada Calles  : 1950  MRN: [id number]    Date of Service: 10/5/2022    Discharge Recommendations:  [address] scored a 13/24 on the AM-PAC ADL Inpatient form. Current research shows that an AM-PAC score of 17 or less is typically not associated with a discharge to the patient's home setting. Based on the patient's AM-PAC score and their current ADL deficits, it is recommended that the patient have 3-5 sessions per week of Occupational Therapy at d/c to increase the patient's independence. Please see assessment section for further patient specific details. If patient discharges prior to next session this note will serve as a discharge summary. Please see below for the latest assessment towards goals. Patient Diagnosis(es): The primary encounter diagnosis was Cellulitis of right leg without foot. Diagnoses of Leg swelling, Gangrene ([address] and Cellulitis of lower extremity, unspecified laterality were also pertinent to this visit. Assessment    Assessment: Pt progressing well with OT/PT cotx. Pt continues to require assistance x2 for sit<>stands transfers, but able to tolerate standing and mobility into bathroom with min A x1 using RW. Pt functioning way below baseline and would benefit from ongoing OT in order to increase functional status prior to returning home. Activity Tolerance: Patient limited by pain; Patient limited by fatigue  Discharge Recommendations: Tone Yost   Occupational Therapy Plan  Times Per Week: 3-5x  Current Treatment Recommendations: Strengthening;Balance training;Functional mobility training; Endurance training; Safety education & training;Patient/Caregiver education & training;Pain management;Equipment evaluation, education, & procurement;Positioning;Self-Care / ADL; Home management training     Restrictions  Restrictions/Precautions  Restrictions/Precautions: Contact Precautions; Fall Risk  Required Braces or Orthoses?: No    Subjective   Subjective  Subjective: Pt in bed, pleasant and motivated to work with therapy. Orientation  Overall Orientation Status: Within Normal Limits  Orientation Level: Oriented X4  Pain: No number given, states his pain medication is helping his pain  Cognition  Overall Cognitive Status: Exceptions  Arousal/Alertness: Appropriate responses to stimuli  Following Commands: Follows multistep commands with increased time  Memory: Decreased short term memory  Problem Solving: Assistance required to correct errors made;Assistance required to generate solutions  Insights: Decreased awareness of deficits        Objective     Pt agreeable to PT/OT co-tx with encouragement. Pt requires co-tx secondary to complexity of condition, decreased activity tolerance and increased assistance for ADL/mobility. Pt benefits from x 2 skilled hands to provide increased cues/feedback and promote improved safety and performance with ADLs. Bed Mobility Training  Bed Mobility Training: Yes  Overall Level of Assistance: Stand-by assistance  Interventions: Verbal cues  Supine to Sit: Stand-by assistance; Adaptive equipment  Sit to Supine: Other (comment) (Left up in chair)  Scooting: Stand-by assistance  Balance  Sitting: Intact  Standing: Impaired  Standing - Static: Constant support;Poor  Standing - Dynamic: Constant support;Poor  Transfer Training  Transfer Training: Yes  Overall Level of Assistance: Moderate assistance; Adaptive equipment  Interventions: Safety awareness training;Verbal cues; Tactile cues  Sit to Stand: Moderate assistance; Additional time;Assist X2 (increased assist needed from sit to stand from bed, min assist needed from stading from cammode)  Stand to Sit: Minimum assistance    Bed to Chair: Assist X2;Additional time;Minimum assistance; Adaptive equipment (with RW, several steps to L)    Toilet Transfer: Min A x2 in RW  Gait  Overall Level of Assistance: Minimum assistance; Adaptive equipment  Interventions: Safety awareness training; Tactile cues; Verbal cues  Base of Support: Narrowed  Speed/Taylor: Delayed  Step Length: Left shortened  Swing Pattern: Left asymmetrical  Stance: Left decreased  Gait Abnormalities: Antalgic; Shuffling gait  Distance (ft): 10 Feet  Assistive Device: Walker, rolling;Gait belt  Wheelchair Management  Wheelchair Management: No     ADL  Feeding: Independent  Grooming: Contact guard assistance  Grooming Skilled Clinical Factors: hand hygiene standing at sink  LE Dressing: Dependent/Total  Toileting: Dependent/Total  Toileting Skilled Clinical Factors: total A for qamar-care after BM        Safety Devices  Type of Devices: Left in chair;Call light within reach; Chair alarm in place;Gait belt;Patient at risk for falls;Nurse notified     Patient Education  Education Given To: Patient  Education Provided: Role of Therapy;Plan of Care;Transfer Training;IADL Safety;Equipment;Precautions; Fall Prevention Strategies  Education Provided Comments: disease specific: extensive conversation regarding rec for SNF and that it is unsafe for pt and family for pt to d/c home d/t heavy A level  Education Method: Verbal;Demonstration  Barriers to Learning: Cognition  Education Outcome: Verbalized understanding;Continued education needed    Goals  Short Term Goals  Time Frame for Short Term Goals: 1 week (10/10) unless noted  Short Term Goal 1: Pt will complete toilet/BSC transfer with min A- ongoing 10/4  Short Term Goal 2: Pt will tolerate >3 min stand with min A and use of LRAD in preparation for standing level ADLs- progressing 10/4, 40 s in stance with Min A x2 and RW  Short Term Goal 3: Pt will complete LB dressing with min A and use of AE PRN- ongoing 10/4  Short Term Goal 4: Pt will complete x15 reps of BUE exercises for strength and endurance by 10/8- not address 10/4  Patient Goals   Patient goals : \"I need to get back home to my wife\"       Therapy Time   Individual Concurrent Group Co-treatment   Time In       0950   Time Out       1031   Minutes       41   Timed Code Treatment Minutes: [address], OTR/L

## 2022-10-05 NOTE — CARE COORDINATION
CASE MANAGEMENT DISCHARGE SUMMARY      Discharge to: [facility]    Precertification completed: Yes  Hospital Exemption Notification (HENS) completed: Yes, Document ID : [id number]    IMM given: (date) 10/5/22    New Durable Medical Equipment ordered/agency: NA    Transportation:       Medical Transport explained to pt/family. Pt/family voice no agency preference. Agency used: [facility] [address] up time: 16:00   Ambulance form completed: Yes    Confirmed discharge plan with:     Patient: yes     Family:  yes, pt will notify wife     Facility/Agency, name:  [facility] faxed   Phone number for report to facility:  [phone]  Pt needs to discharge with peripheral IV for IV ABX at facility     RN, name: Viri Gaona    Note: Discharging nurse to complete [facility], reconcile [facility], and place final copy with patient's discharge packet. RN to ensure that written prescriptions for  Level II medications are sent with patient to the facility as per protocol.

## 2022-10-05 NOTE — PROGRESS NOTES
10/04/22 2200   NIV Type   Skin Assessment Clean, dry, & intact   Skin Protection for O2 Device Yes   Location Nose   NIV Started/Stopped On   Equipment Type v60   Mode CPAP   Mask Type Full face mask   Mask Size Large   Settings/Measurements   CPAP/EPAP 7 cmH2O   Vt (Measured) 477 mL   Resp 25   FiO2  35 %   Minute Volume (L/min) 10.7 Liters   Mask Leak (lpm) 27 lpm   Comfort Level Good   Using Accessory Muscles No   SpO2 95   Patient's Home Machine No

## 2022-10-05 NOTE — PROGRESS NOTES
Physical Therapy  Facility/Department: [facility] - MED SURG  Daily Treatment Note  NAME: Peter Silva  : 1950  MRN: [id number]    Date of Service: 10/5/2022    Discharge Recommendations:  Subacute/Skilled Nursing Facility   PT Equipment Recommendations  Equipment Needed: No    [facility] 6 Clicks Inpatient Mobility:  AM-PAC Mobility Inpatient   How much difficulty turning over in bed?: A Little  How much difficulty sitting down on / standing up from a chair with arms?: A Lot  How much difficulty moving from lying on back to sitting on side of bed?: A Little  How much help from another person moving to and from a bed to a chair?: A Lot  How much help from another person needed to walk in hospital room?: A Little  How much help from another person for climbing 3-5 steps with a railing?: Total  AM-PAC Inpatient Mobility Raw Score : 14  AM-PAC Inpatient T-Scale Score : 38.1  Mobility Inpatient CMS 0-100% Score: 61.29  Mobility Inpatient CMS G-Code Modifier : CL       Patient Diagnosis(es): The primary encounter diagnosis was Leg swelling. Diagnoses of Gangrene ([facility] [address] and Cellulitis of lower extremity, unspecified laterality were also pertinent to this visit. Assessment   Assessment: Pt tolerated session well this date, seen as Co-treat with OT for safety and to maximize functional mobility outcomes. Progressed to AMB with Min assist x1, pt needed Mod Assist X2 to stand from bed. Notes pain is better controled with medication and is motivated to progress. Pt will continue to benifit from skilled therepeutic intervention to improve functional independnece. Continue to recommend SNF at d/c. Activity Tolerance: Patient limited by pain  Equipment Needed: No     Plan    Physcial Therapy Plan  General Plan: 3-5 times per week  Current Treatment Recommendations: Strengthening;Balance training;ROM;Gait training;Stair training;Transfer training;Functional mobility training; Endurance training; Safety education & training;Patient/Caregiver education & training;Equipment evaluation, education, & procurement;Pain management;Home exercise program;Therapeutic activities     Restrictions  Restrictions/Precautions  Restrictions/Precautions: Contact Precautions, Fall Risk  Required Braces or Orthoses?: No     Subjective    Subjective  Subjective: Pt agreeable to therapy, states he is excited to get the process going to get stronger  Pain: No number given, states his pain medication is helping his pain  Orientation  Overall Orientation Status: Within Normal Limits  Orientation Level: Oriented X4     Objective   Vitals  Heart Rate: 72  BP: 104/71  BP Location: Left upper arm  Patient Position: Sitting  MAP (Calculated): 82  SpO2: 97 %  O2 Device: None (Room air)  Bed Mobility Training  Bed Mobility Training: Yes  Overall Level of Assistance: Stand-by assistance  Interventions: Verbal cues  Supine to Sit: Stand-by assistance; Adaptive equipment  Balance  Sitting: Intact  Standing: Impaired  Standing - Static: Constant support;Poor  Transfer Training  Sit to Stand: Moderate assistance; Additional time;Assist X2 (increased assist needed from sit to stand from bed, min assist needed from stading from cammode)  Stand to Sit: Minimum assistance  Gait  Overall Level of Assistance: Minimum assistance; Adaptive equipment  Interventions: Safety awareness training; Tactile cues; Verbal cues  Base of Support: Narrowed  Speed/Taylor: Delayed  Step Length: Left shortened  Swing Pattern: Left asymmetrical  Stance: Left decreased  Gait Abnormalities: Antalgic; Shuffling gait  Distance (ft): 10 Feet x2  Assistive Device: Walker, rolling;Gait belt           Safety Devices  Type of Devices: Left in chair;Call light within reach; Chair alarm in place;Gait belt;Patient at risk for falls;Nurse notified       Goals  Short Term Goals  Time Frame for Short Term Goals: 10/10/22  - 10/05 All goals progressing  Short Term Goal 1: Pt. will complete bed mobility with mod I  Short Term Goal 2: Pt. will complete transfers with mod A x1  Short Term Goal 3: Pt. will mabulate 50ft with LRAD and min A  Short Term Goal 4: Pt will navigate up<>down 5 stairs with B handrail and min A  Short Term Goal 5: Pt. will particiapte in 12-15 reps of BLE exercises by 10/6/22  Additional Goals?: No  Patient Goals   Patient Goals : \"To go home nad take care of wife\"    Education  Patient Education  Education Given To: Patient  Education Provided: Role of Therapy;Plan of Care;Home Exercise Program;Transfer Training; Fall Prevention Strategies  Education Method: Verbal  Barriers to Learning: None  Education Outcome: Verbalized understanding    Therapy Time   Individual Concurrent Group Co-treatment   Time In [phone]         Time Out 5034         Minutes 39               If pt is unable to be seen after this session, please let this note serve as discharge summary. Please see case management note for discharge disposition. Thank you.    Willey Lombard, PT

## 2022-10-05 NOTE — PROGRESS NOTES
10/05/22 0004   NIV Type   Skin Assessment Clean, dry, & intact   Skin Protection for O2 Device Yes   Location Nose   Equipment Type v60   Mode CPAP   Mask Type Full face mask   Mask Size Large   Settings/Measurements   CPAP/EPAP 7 cmH2O   Vt (Measured) 436 mL   Resp 26   FiO2  35 %   Minute Volume (L/min) 10.2 Liters   Mask Leak (lpm) 38 lpm   Comfort Level Good   Using Accessory Muscles No

## 2022-10-06 ENCOUNTER — TELEPHONE (OUTPATIENT)
Dept: FAMILY MEDICINE CLINIC | Age: 72
End: 2022-10-06

## 2022-10-06 ENCOUNTER — TELEPHONE (OUTPATIENT)
Dept: WOUND CARE | Age: 72
End: 2022-10-06

## 2022-10-06 NOTE — TELEPHONE ENCOUNTER
Returned Socorro's call from [facility] and stated that Dr. Moo Loza is fine with wound care physician in the facility to treat the wounds. We can see patient when he goes home.

## 2022-10-06 NOTE — TELEPHONE ENCOUNTER
[facility] Transitions Initial Follow Up Call    Outreach made within 2 business days of discharge: Yes    Patient: Antonina Zazueta Patient : 1950   MRN: [id number]  Reason for Admission: There are no discharge diagnoses documented for the most recent discharge. Discharge Date: 10/5/22       I spoke with the patient via phone for initial post hospital follow up call. The patient stated he is in SNL the [facility] for 2 weeks and will make TCM visit when he is discharged. Discharge department/facility: A    Non-face-to-face services provided:  Obtained and reviewed discharge summary and/or continuity of care documents    TCM Interactive Patient Contact:  Was patient able to fill all prescriptions: Yes  Was patient instructed to bring all medications to the follow-up visit: Yes  Is patient taking all medications as directed in the discharge summary?  Yes  Does patient understand their discharge instructions: Yes  Does patient have questions or concerns that need addressed prior to 7-14 day follow up office visit: no    Scheduled appointment with PCP within 7-14 days    Follow Up  Future Appointments   Date Time Provider Joanna Reynolds   10/7/2022  8:30 AM Guido Phipps  [facility]   2023  9:30 AM MHA CT VCT MHAZ [facility]   2023 10:00 AM Shraad Cardenas MD AND ERIK Dominguez LPN

## 2022-10-07 ENCOUNTER — HOSPITAL ENCOUNTER (OUTPATIENT)
Dept: WOUND CARE | Age: 72
Discharge: HOME | End: 2022-10-07

## 2022-10-21 ENCOUNTER — TELEPHONE (OUTPATIENT)
Dept: FAMILY MEDICINE CLINIC | Age: 72
End: 2022-10-21

## 2022-10-21 NOTE — TELEPHONE ENCOUNTER
[facility] home care calling to see if Alejandro Bernheim will continue to follow patients home care orders.    Please advise

## 2022-10-27 DIAGNOSIS — I50.32 CHRONIC DIASTOLIC CONGESTIVE HEART FAILURE (HCC): ICD-10-CM

## 2022-10-27 RX ORDER — SPIRONOLACTONE 25 MG/1
TABLET ORAL
Qty: 90 TABLET | Refills: 1 | Status: SHIPPED | OUTPATIENT
Start: 2022-10-27

## 2022-11-30 ENCOUNTER — TELEPHONE (OUTPATIENT)
Dept: FAMILY MEDICINE CLINIC | Age: 72
End: 2022-11-30

## 2022-11-30 DIAGNOSIS — I48.0 PAROXYSMAL ATRIAL FIBRILLATION (HCC): ICD-10-CM

## 2022-11-30 DIAGNOSIS — E78.2 MIXED HYPERLIPIDEMIA: ICD-10-CM

## 2022-11-30 RX ORDER — APIXABAN 5 MG/1
TABLET, FILM COATED ORAL
Qty: 180 TABLET | Refills: 2 | Status: SHIPPED | OUTPATIENT
Start: 2022-11-30

## 2022-11-30 RX ORDER — ISOSORBIDE MONONITRATE 30 MG/1
TABLET, EXTENDED RELEASE ORAL
Qty: 90 TABLET | Refills: 3 | Status: SHIPPED | OUTPATIENT
Start: 2022-11-30

## 2022-11-30 RX ORDER — ATORVASTATIN CALCIUM 40 MG/1
TABLET, FILM COATED ORAL
Qty: 90 TABLET | Refills: 3 | Status: SHIPPED | OUTPATIENT
Start: 2022-11-30

## 2022-11-30 RX ORDER — MONTELUKAST SODIUM 10 MG/1
TABLET ORAL
Qty: 90 TABLET | Refills: 3 | Status: SHIPPED | OUTPATIENT
Start: 2022-11-30

## 2022-11-30 NOTE — TELEPHONE ENCOUNTER
Refill Request     CONFIRM preferrred pharmacy with the patient. If Mail Order Rx - Pend for 90 day refill. Last Seen: Last Seen Department: 6/23/2022  Last Seen by PCP: 6/23/2022    Last Written: 12/6/2021 #90 with 3 refills for all three    If no future appointment scheduled, route STAFF MESSAGE with patient name to the [facility] for scheduling. Next Appointment:   Future Appointments   Date Time Provider Joanna Reynolds   1/19/2023  9:30 AM MHA CT T MHAZ CT [place] uSh   1/19/2023 10:00 AM Grzegorz Maldonado MD AND ERIK MMA       Message sent to [address] [place] [address] to schedule appt with patient?  YES      Requested Prescriptions     Pending Prescriptions Disp Refills    montelukast (SINGULAIR) 10 MG tablet [Pharmacy Med Name: MONTELUKAST SOD 10 MG TABLET] 90 tablet 3     Sig: TAKE 1 TABLET BY MOUTH EVERY DAY    isosorbide mononitrate (IMDUR) 30 MG extended release tablet [Pharmacy Med Name: ISOSORBIDE MONONIT ER 30 MG TB] 90 tablet 3     Sig: TAKE 1 TABLET BY MOUTH EVERY DAY    atorvastatin (LIPITOR) 40 MG tablet [Pharmacy Med Name: ATORVASTATIN 40 MG TABLET] 90 tablet 3     Sig: TAKE 1 TABLET BY MOUTH EVERY DAY AT NIGHT

## 2022-11-30 NOTE — TELEPHONE ENCOUNTER
Please help schedule. Return in about 3 months (around 9/23/2022) for diabetes, hyperlipidemia, Chronic CHF.

## 2022-12-09 DIAGNOSIS — I50.32 CHRONIC DIASTOLIC CONGESTIVE HEART FAILURE (HCC): Primary | ICD-10-CM

## 2022-12-14 RX ORDER — TORSEMIDE 20 MG/1
TABLET ORAL
Qty: 180 TABLET | Refills: 3 | Status: SHIPPED | OUTPATIENT
Start: 2022-12-14

## 2022-12-15 DIAGNOSIS — Z79.4 TYPE 2 DIABETES MELLITUS WITH HYPERGLYCEMIA, WITH LONG-TERM CURRENT USE OF INSULIN (HCC): ICD-10-CM

## 2022-12-15 DIAGNOSIS — E11.65 TYPE 2 DIABETES MELLITUS WITH HYPERGLYCEMIA, WITH LONG-TERM CURRENT USE OF INSULIN (HCC): ICD-10-CM

## 2022-12-15 RX ORDER — INSULIN GLARGINE 300 U/ML
50 INJECTION, SOLUTION SUBCUTANEOUS 2 TIMES DAILY
Qty: 5 ADJUSTABLE DOSE PRE-FILLED PEN SYRINGE | Refills: 5 | Status: SHIPPED | OUTPATIENT
Start: 2022-12-15

## 2022-12-15 NOTE — TELEPHONE ENCOUNTER
.Refill Request     CONFIRM preferrred pharmacy with the patient. If Mail Order Rx - Pend for 90 day refill. Last Seen: Last Seen Department: 6/23/2022  Last Seen by PCP: 6/23/2022    Last Written: 9-28-22 5 with 5     If no future appointment scheduled, route STAFF MESSAGE with patient name to the [facility] for scheduling. Next Appointment:   Future Appointments   Date Time Provider Joanna Reynolds   1/11/2023  2:00 PM John Ferrell, APRN - CNP [facility]  [place] - D[facility]D   1/19/2023  9:30 AM MHA CT VCT MHAZ CT Grisel Herrera Rad   1/19/2023 10:00 AM Juanito Benjamin MD AND PULM MMA       Message sent to [address] [place] [address] to schedule appt with patient?   N/A      Requested Prescriptions     Pending Prescriptions Disp Refills    Insulin Glargine, 2 Unit Dial, (TOUJEO MAX SOLOSTAR) 300 UNIT/ML SOPN 5 Adjustable Dose Pre-filled Pen Syringe 5     Sig: Inject 50 Units into the skin 2 times daily

## 2022-12-15 NOTE — TELEPHONE ENCOUNTER
----- Message from Comfort Reeveselsen sent at 12/15/2022 10:43 AM EST -----  Subject: Refill Request    QUESTIONS  Name of Medication? Insulin Glargine, 2 Unit Dial, (TOUJEO MAX SOLOSTAR)   300 UNIT/ML SOPN  Patient-reported dosage and instructions? Inject 50 Units into the skin 2   times daily  How many days do you have left? 4  Preferred Pharmacy? [id number]  Pharmacy phone number (if available)? [phone]  ---------------------------------------------------------------------------  --------------  Rocio Pierre INFO  What is the best way for the office to contact you? OK to leave message on   voicemail  Preferred Call Back Phone Number? [phone]  ---------------------------------------------------------------------------  --------------  SCRIPT ANSWERS  Relationship to Patient?  Self

## 2023-01-18 ENCOUNTER — TELEPHONE (OUTPATIENT)
Dept: PULMONOLOGY | Age: 73
End: 2023-01-18

## 2023-01-18 NOTE — TELEPHONE ENCOUNTER
Patient rescheduled his appointment for 1/19/23 to 2/7/2023 due to the severe weather. He did however tell staff that his CPAP machine is not working as it should. Patient has the machine wired together in order for it to work. Patient would like a new machine but understands that since he rescheduled it may have to wait. Just wondering if we could start the process of a new machine now so on his appointment it will be in. Please advise.

## 2023-01-20 NOTE — TELEPHONE ENCOUNTER
ROSHNI pt.  He said he will not do an inlab study. It did not work well the last time.   He will keep his appt with you in February

## 2023-01-23 ENCOUNTER — TELEPHONE (OUTPATIENT)
Dept: CARDIOLOGY CLINIC | Age: 73
End: 2023-01-23

## 2023-01-23 RX ORDER — HYDRALAZINE HYDROCHLORIDE 25 MG/1
25 TABLET, FILM COATED ORAL 3 TIMES DAILY
Qty: 270 TABLET | Refills: 3 | Status: SHIPPED | OUTPATIENT
Start: 2023-01-23

## 2023-01-23 NOTE — TELEPHONE ENCOUNTER
Please let patient know, I am sending in the 25 mg tablets so that he doesn't have to cut them in 1/2 anymore.    Take 1 tab three times a day

## 2023-01-23 NOTE — TELEPHONE ENCOUNTER
Pt is requesting refill of Hydralazine 50mg. Preferred pharmacy is [facility] in [address] [place] [address],[address]. Last ov 03/30/2022 nprb. Upcoming ov 02/23/2023 nprb. Pt is out of this medication.

## 2023-01-28 ENCOUNTER — TELEPHONE (OUTPATIENT)
Dept: FAMILY MEDICINE CLINIC | Age: 73
End: 2023-01-28

## 2023-01-28 DIAGNOSIS — J30.9 ALLERGIC RHINITIS, UNSPECIFIED SEASONALITY, UNSPECIFIED TRIGGER: ICD-10-CM

## 2023-01-28 NOTE — TELEPHONE ENCOUNTER
Refill Request     CONFIRM preferrred pharmacy with the patient. If Mail Order Rx - Pend for 90 day refill. Last Seen: Last Seen Department: 2022  Last Seen by PCP: 2022    Last Written: 2021    If no future appointment scheduled, route STAFF MESSAGE with patient name to the [facility] for scheduling. Next Appointment:   Future Appointments   Date Time Provider Joanna Reynolds   2023 11:40 AM Dean Seymour MD AND ERIK SERRANO   2023  1:00 PM SHAKIRA Sanchez - NGA SERRANO       Message sent to [facility] to schedule appt with patient?   YES      Requested Prescriptions     Pending Prescriptions Disp Refills    Azelastine HCl 137 MCG/SPRAY SOLN [Pharmacy Med Name: AZELASTINE 0.1% (137 MCG) SPRY] 30 mL 5     Si SPRAY BY NASAL ROUTE 2 TIMES DAILY USE IN EACH NOSTRIL AS DIRECTED

## 2023-01-30 RX ORDER — AZELASTINE HYDROCHLORIDE 137 UG/1
SPRAY, METERED NASAL
Qty: 30 ML | Refills: 5 | Status: SHIPPED | OUTPATIENT
Start: 2023-01-30

## 2023-02-03 DIAGNOSIS — F33.1 MODERATE EPISODE OF RECURRENT MAJOR DEPRESSIVE DISORDER (HCC): ICD-10-CM

## 2023-02-03 NOTE — TELEPHONE ENCOUNTER
.Refill Request     CONFIRM preferrred pharmacy with the patient. If Mail Order Rx - Pend for 90 day refill. Last Seen: Last Seen Department: 6/23/2022  Last Seen by PCP: 6/23/2022    Last Written: 3-18-22 90 with 3     If no future appointment scheduled, route STAFF MESSAGE with patient name to the [facility] for scheduling. Next Appointment:   Future Appointments   Date Time Provider Joanna Reynolds   2/7/2023 11:40 AM Umesh Franz MD AND PULM [facility]   2/16/2023  2:00 PM SHAKIRA Browning - CNP [facility] [place] - [facility]   2/23/2023  1:00 PM SHAKIRA Baker - [facility] SERR[facility]O       Message sent to [address] [place] [address] to schedule appt with patient?   N/A      Requested Prescriptions     Pending Prescriptions Disp Refills    sertraline (ZOLOFT) 50 MG tablet [Pharmacy Med Name: SERTRALINE HCL 50 MG TABLET] 90 tablet 1     Sig: TAKE 1 TABLET BY MOUTH EVERY DAY

## 2023-02-09 ENCOUNTER — OFFICE VISIT (OUTPATIENT)
Dept: PULMONOLOGY | Age: 73
End: 2023-02-09
Payer: COMMERCIAL

## 2023-02-09 VITALS
BODY MASS INDEX: 37.2 KG/M2 | SYSTOLIC BLOOD PRESSURE: 125 MMHG | HEART RATE: 79 BPM | RESPIRATION RATE: 24 BRPM | OXYGEN SATURATION: 91 % | TEMPERATURE: 97 F | HEIGHT: 73 IN | DIASTOLIC BLOOD PRESSURE: 82 MMHG

## 2023-02-09 DIAGNOSIS — E66.01 SEVERE OBESITY (BMI 35.0-39.9) WITH COMORBIDITY (HCC): ICD-10-CM

## 2023-02-09 DIAGNOSIS — R91.1 LUNG NODULE: ICD-10-CM

## 2023-02-09 DIAGNOSIS — I48.0 PAF (PAROXYSMAL ATRIAL FIBRILLATION) (HCC): ICD-10-CM

## 2023-02-09 DIAGNOSIS — G47.33 OSA (OBSTRUCTIVE SLEEP APNEA): Primary | ICD-10-CM

## 2023-02-09 DIAGNOSIS — J41.0 SIMPLE CHRONIC BRONCHITIS (HCC): ICD-10-CM

## 2023-02-09 PROCEDURE — 1123F ACP DISCUSS/DSCN MKR DOCD: CPT | Performed by: INTERNAL MEDICINE

## 2023-02-09 PROCEDURE — 3074F SYST BP LT 130 MM HG: CPT | Performed by: INTERNAL MEDICINE

## 2023-02-09 PROCEDURE — 3078F DIAST BP <80 MM HG: CPT | Performed by: INTERNAL MEDICINE

## 2023-02-09 PROCEDURE — 99213 OFFICE O/P EST LOW 20 MIN: CPT | Performed by: INTERNAL MEDICINE

## 2023-02-09 RX ORDER — FLUTICASONE FUROATE AND VILANTEROL 200; 25 UG/1; UG/1
1 POWDER RESPIRATORY (INHALATION) DAILY
Qty: 1 EACH | Refills: 5 | Status: SHIPPED | OUTPATIENT
Start: 2023-02-09 | End: 2023-03-11

## 2023-02-09 RX ORDER — ALBUTEROL SULFATE 90 UG/1
2 AEROSOL, METERED RESPIRATORY (INHALATION) 4 TIMES DAILY PRN
Qty: 54 G | Refills: 1 | Status: SHIPPED | OUTPATIENT
Start: 2023-02-09

## 2023-02-09 ASSESSMENT — ENCOUNTER SYMPTOMS
TROUBLE SWALLOWING: 0
NAUSEA: 0
BACK PAIN: 1
STRIDOR: 0
DIARRHEA: 0
EYE ITCHING: 0
SINUS PRESSURE: 0
VOMITING: 0
EYE DISCHARGE: 0
VOICE CHANGE: 0
ABDOMINAL PAIN: 0
CHEST TIGHTNESS: 0
APNEA: 0
SHORTNESS OF BREATH: 0
EYE REDNESS: 0
COUGH: 1
RHINORRHEA: 0
CONSTIPATION: 0
CHOKING: 0
BLOOD IN STOOL: 0
ABDOMINAL DISTENTION: 0
SORE THROAT: 0

## 2023-02-09 NOTE — PATIENT INSTRUCTIONS
Remember to bring a list of pulmonary medications and any CPAP or BiPAP machines to your next appointment with the office. Please keep all of your future appointments scheduled by [facility] office. Out of respect for other patients and providers, you may be asked to reschedule your appointment if you arrive later than your scheduled appointment time. Appointments cancelled less than 24hrs in advance will be considered a no show. Patients with three missed appointments within 1 year or four missed appointments within 2 years can be dismissed from the practice. Please be aware that our physicians are required to work in the Intensive Care Unit at [facility].  Your appointment may need to be rescheduled if they are designated to work during your appointment time. You may receive a survey regarding the care you received during your visit. Your input is valuable to us. We encourage you to complete and return your survey. We hope you will choose us in the future for your healthcare needs. Pt instructed of all future appointment dates & times, including radiology, labs, procedures & referrals. If procedures were scheduled preparation instructions provided. Instructions on future appointments with [facility] were given.

## 2023-02-09 NOTE — PROGRESS NOTES
Pulmonary Outpatient Note   Naya Huynh MD       2/9/2023    1. MONIQUE (obstructive sleep apnea)    2. Simple chronic bronchitis ([address]    3. Severe obesity (BMI 35.0-39. 9) with comorbidity (HCC)    4. Lung nodule    5. PAF (paroxysmal atrial fibrillation) ([facility])          ASSESSMENT/PLAN:  Lung nodule. Nonspecific appearing, patient is a non-smoker. We will reschedule CT chest in 6 months  Restrictive lung disease. Probably due to obesity. No CT evidence of ILD  Perennial allergic rhinitis. Giving rise to intermittent cough, his PFT does not support a diagnosis of COPD  MONIQUE on CPAP. Sleep apnea is not completely controlled, no central events. He has a very old machine. He needs to increase the number of hours of usage. Prescribed a new CPAP machine with supplies. His AHI will be assessed after he gets a new machine, he needs to talk to the DME company about the leak. Poor sleep hygiene. Will discuss with the patient after reviewing his compliance data  CHF, PAF. The importance of treating MONIQUE due to these problems was discussed with the patient  Morbid obesity. He has lost weight due to his current illness, should make dietary changes to lose weight, as exercise would not be possible  Prophylaxis. He has received Prevnar, Pneumovax, 2 doses and 2 booster doses of the COVID-19 vaccine and the new divalent COVID-19 vaccine he has received his flu shot. Continue with flu shot annually      RTC 6 months, we will call him with results of CT chest.  Call earlier if symptomatic        Orders Placed This Encounter   Procedures    CPAP    CPAP       No follow-ups on file. Chief Complaint:   Follow-up (Pt was to get CT for lung nodule but did not) and Sleep Apnea       HPI: Juan Carlos Sanchez" is a 74y.o. year old male here for lung nodule, chronic bronchitis, allergic rhinitis, MONIQUE on CPAP, poor sleep hygiene. He has a history of CHF and PAF. His PCP is SHAKIRA Jacob-NGA.   He is followed by Ron Melgar and Dr. Claudell Sine from cardiology. The patient is on CPAP 10-20 cmH2O, DME [facility]. The patient is a non-smoker, had a right lower lobe lung nodule on CT chest in June 2022. He was recommended a follow-up CT chest, said he has forgotten to do it. He is dealing with his wife's illness. The patient recently saw his primary care physician on 1/18/2023 with 3 days of wheezing. He was given prednisone and Mucinex. He had a similar virtual visit on 2/2/2023 for cough that had been going on for 10 days. He was given prednisone 40 mg for 5 days, doxycycline and amoxicillin. He was told it would be treated as a pneumonia. He had cough with yellowish phlegm, is clearing his throat, was slightly hoarse. The patient does have wheezing high in his throat, increasing in the supine position. He has 1 more day of antibiotics. Patient says he is always wheezing. Presently he is not able to cough up any phlegm. The patient also underwent colonoscopy, was told to return in 3 years as he had benign polyps. The patient has a good appetite, has lost about 4 pounds. He is trying to control his diet. His sleep is much better, he is feeling more refreshed. He s using his CPAP machine for more than 5 hours. Wilmer Rodriguez is considering a PPM. His leg ulcers are better, legs bandaged. He has no GI or  complaints. The rest of his ROS was negative. CPAP compliance 12/20 through 1/18/2023  Usage 27/30 days, >4 hours 8 days, 27%. 95th percentile pressure 10.9 cm H2O, maximum 11.0 cm H2O. Apnea index 10/h, hypopnea index 0.4/h. No central events. He does have a significant leak. Previous notes reviewed and edited as necessary. Ze Mccollum is a pleasant 68-year-old male living with his wife in [facility]. The patient has been a non-smoker all his life. He was seen by my partner Dr. Leticia Guidry by initially for COPD and MONIQUE. He was last seen in the office on 12/3/2019.   He had been compliant with his auto CPAP, had slightly elevated AHI, although much improved compared to his baseline sleep study. He did have Cheyne-Allison respiration. He was referred to Dr. Mario Gregg, canceled his appointment and did not reschedule. His DME is [facility]. He does not have any CPAP supplies as he was not using his CPAP machine for several years. The patient lives in his home, [place] have a [place]. The patient recently had a \"funny sensation\" in his chest, the squad was called, he was admitted to [facility]. He was septic from a wound infection, this has been managed by his podiatrist Dr. Cherry Harp. He required IV antibiotics, now has a home visiting nurse with regular dressing changes. He refused to go to an ECF. The patient restarted his CPAP recently. He has been sleeping in a recliner due to the fact that he has to elevate his right leg. The patient wears his CPAP through the night and also in the daytime when he takes a nap. The patient has a TV on to help him fall asleep. He sleeps early, but wakes up to check his checkbook daily. He then goes back to sleep, awakens around 4 AM on his own. He thinks he does not sleep again, but says that his wife wakes up around 8 AM and finds him to be asleep. He does feel refreshed after using his CPAP, does not take a nap in the daytime. 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 The patient has been a lifelong non-smoker, drinks rum about twice a week. The patient has perennial allergic rhinitis, has been on Astelin and Singulair. He has a tickle in his throat that leads to cough, but he does not cough very regularly. He denies wheezing or chest pain. The patient had a DVT a few years ago, is not certain that he had pulmonary embolism. The patient eats well, denies GI or  complaints.   The patient has been obese, weight more than 300 pounds, dropped to 281 pounds after his hospitalization. The patient has multiple other medical problems including severe MONIQUE, perennial allergic rhinitis, DVT, venous stasis and leg ulcers, DM 2, CKD 3, renal osteodystrophy, DJD of the right hip, PAF, aortic stenosis, hyperlipidemia, chronic diastolic CHF, grade 3 diastolic dysfunction, hypertension, T6 compression fracture, CAD, hearing loss, possible PE in 2013. He had a colostomy with reversal.    Auto CPAP compliance 11/3 through 12/2/2019  Use 800%, average >6 hours. AHI 7.2    NPSG 11/2/2017  Severe MONIQUE with AHI 43.5/h. Low SaO2 85%. Cheyne-Allison respiration present    PFT and 6-minute walk test 11/6/2017  FVC 3.27 L, 66% predicted, FEV1 2.38 L, 65% predicted, with ratio of 73%. There was no response to bronchodilator. TLC 75% predicted, DLCO 68% predicted. On the 6-minute walk test, the patient walked 800 feet. Baseline oxygen saturation was 95%, Yue dyspnea scale was 1, fatigue scale 2. He had marked increase in respiratory rate to 40/min with ambulation. He did desaturate to 85%. CT chest pulmonary embolism protocol 6/12/2022  Pulmonary Arteries: Pulmonary arteries are adequately opacified for   evaluation. No evidence of intraluminal filling defect to suggest pulmonary   embolism. Main pulmonary artery is normal in caliber. Mediastinum: No evidence of mediastinal lymphadenopathy. The heart and   pericardium demonstrate no acute abnormality. There is no acute abnormality   of the thoracic aorta. There is a moderate hiatal hernia along the lower   mediastinum. There are moderate coronary artery calcifications. Lungs/pleura: T there is a bilobed nodule along the right lung base   posteriorly measuring 12 mm which was not seen previously some questionable   central lucency in the nodule. There is subsegmental left basilar opacity   posterolaterally which is less prominent no effusion is seen.        Upper Abdomen: Limited images of the upper abdomen are unremarkable. Soft Tissues/Bones: There is a moderately severe wedge compression fracture   of T6 is more apparent there is no retropulsed fragment. There is vague   sclerosis throughout rest vertebra. Impression   No evidence of a pulmonary embolus. Mildly dilated and atherosclerotic thoracic aorta with no aneurysm or   dissection. 12 mm bilobed nodule or infiltrate along the right lower lobe posteriorly. Recommend short-term follow-up or PET scan correlation       Mild left basilar atelectasis vs early infiltrates or scarring   posterolaterally on which is less prominent. Moderately severe wedge compression fracture of T6 which is more apparent,   the etiology and age of which is uncertain. Suggest PET scan correlation, if   indicated. Moderate coronary artery calcifications. Moderate hiatal hernia       Past Medical History:   Diagnosis Date    Allergic rhinitis     Arthritis     Bladder fistula     resolved    C. difficile diarrhea 8/18/2015    +PCR    Cellulitis of right lower extremity 3/23/2016    CHF (congestive heart failure) ([address]     Dental disease     Diabetes ([address]     Diverticulitis     Dizziness     DVT of lower extremity, bilateral ([address] 10/16/2013    GERD (gastroesophageal reflux disease)     Headache     Hearing loss     Hematuria 1/2/2014    Hx of blood clots     Hyperlipidemia     Hypertension     Lung disease     MONIQUE (obstructive sleep apnea)     Pancreatitis ([address] 8/24/2013    Pulmonary emboli ([address] 2013    after cholecystectomy     Rash     Sleep apnea     Tinnitus        Past Surgical History:   Procedure Laterality Date    ABDOMEN SURGERY  05/16/2014    reveseral end peristomal hernia repair.     CARDIOVERSION  12/04/2019    Dr. Ana María Raphael, OPEN N/A 9-17-13    LAPAROSCOPIC CONVERTED TO OPEN CHOLECYSTECTOMY WITH    COLON SURGERY      COLONOSCOPY  2008    COLONOSCOPY  12/4/2013    Severe Diverticulosis unable to finish COLONOSCOPY  4/30/14    diverticula-10 year f/u    COLOSTOMY  Jan 2014    CYSTOSCOPY  12/10/13    with bladder biopsy    CYSTOSCOPY  1-28-14    Cystourethroscopy, left ureteral catheter     EPIDURAL STEROID INJECTION Right 1/21/2019    RIGHT LUMBAR TWO THREE EPIDURAL STEROID INJECTION SITE CONFIRMED BY FLUROOSCOPY performed by Sahara Patel MD at [address] Right 2/11/2019    RIGHT LUMBAR TWO THREE EPIDURAL STEROID INJECTION SITE CONFIRMED BY FLUOROSCOPY performed by Sahara Patel MD at [facility]  08/14/2015    [facility], BILATERAL COMPONENT SEPARATION, LYSIS OF ADHESIONS    OTHER SURGICAL HISTORY  1-28-14    LeftColectomy with End Colostomy, Splenic Flexure Mobilization, Takedown of Colovesical Fistula    NC INJECTION HIP ARTHROGRAPHY W/ANESTHESIA Right 9/19/2018    ARTHROGRAM AND CORTISONE INJECTION RIGHT HIP performed by Shanelle Baker MD at [facility] Right 2003    Fracture lower leg during chain saw accident. Surgery x 2    VASCULAR SURGERY Left 05/2021    per Dr. Cabrera Daily Right 05/10/2021    venous procedure RLE-per Dr. Fidencio Mae History     Tobacco Use    Smoking status: Never    Smokeless tobacco: Never   Substance Use Topics    Alcohol use:  Yes     Alcohol/week: 0.0 standard drinks     Comment: every several months        Family History   Problem Relation Age of Onset    Heart Disease Father     Heart Attack Father     Stroke Brother     Heart Disease Brother     High Blood Pressure Brother     Kidney Disease Mother         kidney removed         Current Outpatient Medications:     fluticasone furoate-vilanterol (BREO ELLIPTA) 200-25 MCG/ACT AEPB inhaler, Inhale 1 puff into the lungs daily, Disp: 1 each, Rfl: 5    albuterol sulfate HFA (VENTOLIN HFA) 108 (90 Base) MCG/ACT inhaler, Inhale 2 puffs into the lungs 4 times daily as needed for Wheezing, Disp: 54 g, Rfl: 1    sertraline (ZOLOFT) 50 MG tablet, TAKE 1 TABLET BY MOUTH EVERY DAY, Disp: 30 tablet, Rfl: 0    Azelastine HCl 137 MCG/SPRAY SOLN, 1 SPRAY BY NASAL ROUTE 2 TIMES DAILY USE IN EACH NOSTRIL AS DIRECTED, Disp: 30 mL, Rfl: 5    hydrALAZINE (APRESOLINE) 25 MG tablet, Take 1 tablet by mouth 3 times daily, Disp: 270 tablet, Rfl: 3    Insulin Glargine, 2 Unit Dial, (TOUJEO MAX SOLOSTAR) 300 UNIT/ML SOPN, Inject 50 Units into the skin 2 times daily, Disp: 5 Adjustable Dose Pre-filled Pen Syringe, Rfl: 5    torsemide (DEMADEX) 20 MG tablet, TAKE 2 TABLETS BY MOUTH EVERY DAY, Disp: 180 tablet, Rfl: 3    montelukast (SINGULAIR) 10 MG tablet, TAKE 1 TABLET BY MOUTH EVERY DAY, Disp: 90 tablet, Rfl: 3    isosorbide mononitrate (IMDUR) 30 MG extended release tablet, TAKE 1 TABLET BY MOUTH EVERY DAY, Disp: 90 tablet, Rfl: 3    atorvastatin (LIPITOR) 40 MG tablet, TAKE 1 TABLET BY MOUTH EVERY DAY AT NIGHT, Disp: 90 tablet, Rfl: 3    ELIQUIS 5 MG TABS tablet, TAKE 1 TABLET BY MOUTH TWICE A DAY, Disp: 180 tablet, Rfl: 2    spironolactone (ALDACTONE) 25 MG tablet, TAKE 1 TABLET BY MOUTH EVERY DAY, Disp: 90 tablet, Rfl: 1    atenolol (TENORMIN) 50 MG tablet, TAKE 1/2 TABLET BY MOUTH EVERY DAY, Disp: 45 tablet, Rfl: 3    OZEMPIC, 0.25 OR 0.5 MG/DOSE, 2 MG/1.5ML SOPN, DIAL AND INJECT 0.5MG WEEKLY, Disp: 12 pen, Rfl: 1    glimepiride (AMARYL) 4 MG tablet, TAKE 1 TABLET BY MOUTH EVERY DAY IN THE MORNING, Disp: 180 tablet, Rfl: 3    omeprazole (PRILOSEC) 40 MG delayed release capsule, TAKE 1 CAPSULE BY MOUTH EVERY DAY, Disp: 90 capsule, Rfl: 3    ELDERBERRY PO, Take by mouth 2 times daily, Disp: , Rfl:     Ascorbic Acid (VITAMIN C PO), Take by mouth in the morning and at bedtime, Disp: , Rfl:     Glucosamine-Chondroitin (GLUCOSAMINE CHONDR COMPLEX PO), Take 1 tablet by mouth 2 times daily, Disp: , Rfl:     aspirin 81 MG chewable tablet, Take 1 tablet by mouth daily, Disp: 30 tablet, Rfl: 0    Multiple Vitamins-Minerals (THERAPEUTIC MULTIVITAMIN-MINERALS) tablet, Take 1 tablet by mouth daily, Disp: , Rfl:     ferrous sulfate 325 (65 FE) MG tablet, Take 325 mg by mouth daily , Disp: , Rfl:     Handicap Placard MISC, by Does not apply route Please issue for duration of 5 years, Disp: 1 each, Rfl: 0    Insulin Pen Needle 31G X 5 MM MISC, 1 each by Does not apply route daily, Disp: 400 each, Rfl: 5    blood glucose monitor kit and supplies, by Other route daily One Touch Ultra, Disp: 1 kit, Rfl: 0    glucose blood VI test strips (ASCENSIA AUTODISC VI;ONE TOUCH ULTRA TEST VI) strip, DX: E11.9 FSBS daily. One Touch ultra, Disp: 100 strip, Rfl: 5    Hydrocodone-acetaminophen    Vitals:    02/09/23 0911   BP: 125/82   Pulse: 79   Resp: 24   Temp: 97 °F (36.1 °C)   TempSrc: Temporal   SpO2: 91%   Height: 6' 1\" (1.854 m)       Review of Systems   Constitutional:  Positive for appetite change and unexpected weight change. Negative for chills, diaphoresis, fatigue and fever. HENT:  Positive for postnasal drip. Negative for congestion, nosebleeds, rhinorrhea, sinus pressure, sneezing, sore throat, trouble swallowing and voice change. Eyes:  Negative for discharge, redness, itching and visual disturbance. Respiratory:  Positive for cough and wheezing. Negative for apnea, choking, chest tightness, shortness of breath and stridor. Cardiovascular:  Negative for chest pain, palpitations and leg swelling. Gastrointestinal:  Negative for abdominal distention, abdominal pain, blood in stool, constipation, diarrhea, nausea and vomiting. Endocrine: Negative for polyuria. Genitourinary:  Negative for decreased urine volume, difficulty urinating, dysuria, enuresis, frequency, hematuria and urgency. Musculoskeletal:  Positive for arthralgias and back pain. Negative for gait problem, joint swelling and myalgias. Skin:  Positive for wound. Negative for rash.    Allergic/Immunologic: Negative for environmental allergies and immunocompromised state.   Neurological:  Negative for dizziness, tremors, seizures, weakness, light-headedness and headaches. Hematological:  Does not bruise/bleed easily. Psychiatric/Behavioral:  Positive for sleep disturbance. Negative for agitation, behavioral problems, confusion and hallucinations. All other systems reviewed and are negative. Physical Exam  Vitals reviewed. Constitutional:       General: He is not in acute distress. Appearance: He is well-developed. He is obese. He is not ill-appearing, toxic-appearing or diaphoretic. Comments: Pleasant, morbidly obese, in no distress   HENT:      Head: Normocephalic and atraumatic. Nose: Nose normal. No congestion or rhinorrhea. Mouth/Throat:      Mouth: Mucous membranes are moist.      Pharynx: Oropharynx is clear. No oropharyngeal exudate. Comments: Class III/IV airway  Eyes:      General: No scleral icterus. Right eye: No discharge. Left eye: No discharge. Conjunctiva/sclera: Conjunctivae normal.      Pupils: Pupils are equal, round, and reactive to light. Comments: Wearing glasses   Neck:      Thyroid: No thyromegaly. Vascular: No JVD. Trachea: No tracheal deviation. Comments: Short and large neck  Cardiovascular:      Rate and Rhythm: Normal rate and regular rhythm. Heart sounds: No murmur heard. No friction rub. No gallop. Comments: Distant heart sounds  Pulmonary:      Effort: Pulmonary effort is normal. No respiratory distress. Breath sounds: Normal breath sounds. No stridor. No wheezing, rhonchi or rales. Comments: Slightly heavy breathing  Abdominal:      Palpations: Abdomen is soft. Tenderness: There is no abdominal tenderness. There is no guarding or rebound. Comments: Large protuberant abdomen   Musculoskeletal:      Right lower leg: Edema (1+ pitting edema, support hose) present. Left lower leg: Edema (1+ pitting edema, support hose) present. Comments: Possible fungal infection of nails   Lymphadenopathy:      Cervical: No cervical adenopathy. Skin:     General: Skin is warm and dry. Coloration: Skin is not jaundiced or pale. Findings: No bruising, erythema, lesion or rash. Comments: Dressing on foot   Neurological:      General: No focal deficit present. Mental Status: He is alert and oriented to person, place, and time.       Gait: Gait abnormal.      Comments: Detailed exam not performed   Psychiatric:         Mood and Affect: Mood normal.         Behavior: Behavior normal.

## 2023-02-09 NOTE — PROGRESS NOTES
MA Communication:   The following orders are received by verbal communication from Pillo aBca MD    Orders include:  FU 6 mo CT now       New auto Pap

## 2023-02-10 ENCOUNTER — TELEPHONE (OUTPATIENT)
Dept: PULMONOLOGY | Age: 73
End: 2023-02-10

## 2023-02-10 NOTE — TELEPHONE ENCOUNTER
Patient states that one of the inhalers has a 100 copayment and he wants to know if he really needs it because if he does he will come up with the money for it. He also says he sees his pcp on the 12 and can get a flu shot but he thinks he has already received one and wants to know if he gets it will it hurt him.  Had to reschedule CT to the 21st. Thanks

## 2023-02-11 ASSESSMENT — ENCOUNTER SYMPTOMS: WHEEZING: 1

## 2023-02-13 RX ORDER — FLUTICASONE FUROATE, UMECLIDINIUM BROMIDE AND VILANTEROL TRIFENATATE 100; 62.5; 25 UG/1; UG/1; UG/1
1 POWDER RESPIRATORY (INHALATION) DAILY
Qty: 4 EACH | Refills: 0
Start: 2023-02-13

## 2023-03-07 DIAGNOSIS — F33.1 MODERATE EPISODE OF RECURRENT MAJOR DEPRESSIVE DISORDER (HCC): ICD-10-CM

## 2023-03-07 NOTE — TELEPHONE ENCOUNTER
Refill Request     CONFIRM preferrred pharmacy with the patient. If Mail Order Rx - Pend for 90 day refill. Last Seen: Last Seen Department: 6/23/2022  Last Seen by PCP: 6/23/2022    Last Written: 02/03/2023 30 tablet 0 refills     If no future appointment scheduled, route STAFF MESSAGE with patient name to the [facility] for scheduling. Next Appointment:   Future Appointments   Date Time Provider Joanna Reynolds   3/9/2023  1:00 PM Jilda Maryan, APRN - CNP [facility]   3/23/2023  2:30 PM SHAKIRA Lopez CNP [facility]   3/28/2023  7:00 [place] [facility] [facility]       Message sent to [address] [place] [address] to schedule appt with patient?   NO      Requested Prescriptions     Pending Prescriptions Disp Refills    sertraline (ZOLOFT) 50 MG tablet [Pharmacy Med Name: SERTRALINE HCL 50 MG TABLET] 30 tablet 0     Sig: TAKE 1 TABLET BY MOUTH EVERY DAY

## 2023-03-09 ENCOUNTER — HOSPITAL ENCOUNTER (OUTPATIENT)
Dept: GENERAL RADIOLOGY | Age: 73
Discharge: HOME | End: 2023-03-09
Payer: MEDICARE

## 2023-03-09 ENCOUNTER — OFFICE VISIT (OUTPATIENT)
Dept: FAMILY MEDICINE CLINIC | Age: 73
End: 2023-03-09
Payer: MEDICARE

## 2023-03-09 ENCOUNTER — HOSPITAL ENCOUNTER (OUTPATIENT)
Age: 73
Discharge: HOME | End: 2023-03-09
Payer: MEDICARE

## 2023-03-09 VITALS
RESPIRATION RATE: 18 BRPM | SYSTOLIC BLOOD PRESSURE: 118 MMHG | DIASTOLIC BLOOD PRESSURE: 70 MMHG | WEIGHT: 309.4 LBS | HEART RATE: 64 BPM | OXYGEN SATURATION: 92 % | BODY MASS INDEX: 40.82 KG/M2

## 2023-03-09 DIAGNOSIS — D64.9 ANEMIA, UNSPECIFIED TYPE: ICD-10-CM

## 2023-03-09 DIAGNOSIS — M25.551 HIP PAIN, ACUTE, RIGHT: ICD-10-CM

## 2023-03-09 DIAGNOSIS — L97.919 IDIOPATHIC CHRONIC VENOUS HYPERTENSION OF BOTH LOWER EXTREMITIES WITH ULCER (HCC): ICD-10-CM

## 2023-03-09 DIAGNOSIS — I50.32 CHRONIC DIASTOLIC CONGESTIVE HEART FAILURE (HCC): ICD-10-CM

## 2023-03-09 DIAGNOSIS — N18.32 STAGE 3B CHRONIC KIDNEY DISEASE (HCC): ICD-10-CM

## 2023-03-09 DIAGNOSIS — E11.65 TYPE 2 DIABETES MELLITUS WITH HYPERGLYCEMIA, WITH LONG-TERM CURRENT USE OF INSULIN (HCC): Primary | ICD-10-CM

## 2023-03-09 DIAGNOSIS — L97.929 IDIOPATHIC CHRONIC VENOUS HYPERTENSION OF BOTH LOWER EXTREMITIES WITH ULCER (HCC): ICD-10-CM

## 2023-03-09 DIAGNOSIS — I50.32 CHRONIC HEART FAILURE WITH PRESERVED EJECTION FRACTION (HCC): ICD-10-CM

## 2023-03-09 DIAGNOSIS — F33.1 MODERATE EPISODE OF RECURRENT MAJOR DEPRESSIVE DISORDER (HCC): ICD-10-CM

## 2023-03-09 DIAGNOSIS — J98.4 RESTRICTIVE LUNG DISEASE SECONDARY TO OBESITY: ICD-10-CM

## 2023-03-09 DIAGNOSIS — Z79.4 TYPE 2 DIABETES MELLITUS WITH HYPERGLYCEMIA, WITH LONG-TERM CURRENT USE OF INSULIN (HCC): Primary | ICD-10-CM

## 2023-03-09 DIAGNOSIS — E66.9 RESTRICTIVE LUNG DISEASE SECONDARY TO OBESITY: ICD-10-CM

## 2023-03-09 DIAGNOSIS — L97.222 NON-PRESSURE CHRONIC ULCER OF LEFT CALF WITH FAT LAYER EXPOSED (HCC): ICD-10-CM

## 2023-03-09 DIAGNOSIS — I87.313 IDIOPATHIC CHRONIC VENOUS HYPERTENSION OF BOTH LOWER EXTREMITIES WITH ULCER (HCC): ICD-10-CM

## 2023-03-09 PROBLEM — N28.9 RENAL INSUFFICIENCY: Status: RESOLVED | Noted: 2017-01-23 | Resolved: 2023-03-09

## 2023-03-09 PROBLEM — I73.89 OTHER SPECIFIED PERIPHERAL VASCULAR DISEASES (HCC): Chronic | Status: RESOLVED | Noted: 2021-03-23 | Resolved: 2023-03-09

## 2023-03-09 PROBLEM — E83.42 HYPOMAGNESEMIA: Status: RESOLVED | Noted: 2022-06-24 | Resolved: 2023-03-09

## 2023-03-09 PROBLEM — R07.9 ACUTE CHEST PAIN: Status: RESOLVED | Noted: 2022-06-12 | Resolved: 2023-03-09

## 2023-03-09 LAB — HBA1C MFR BLD: 7.9 %

## 2023-03-09 PROCEDURE — 83036 HEMOGLOBIN GLYCOSYLATED A1C: CPT | Performed by: NURSE PRACTITIONER

## 2023-03-09 PROCEDURE — 3051F HG A1C>EQUAL 7.0%<8.0%: CPT | Performed by: NURSE PRACTITIONER

## 2023-03-09 PROCEDURE — 3078F DIAST BP <80 MM HG: CPT | Performed by: NURSE PRACTITIONER

## 2023-03-09 PROCEDURE — 99214 OFFICE O/P EST MOD 30 MIN: CPT | Performed by: NURSE PRACTITIONER

## 2023-03-09 PROCEDURE — 3074F SYST BP LT 130 MM HG: CPT | Performed by: NURSE PRACTITIONER

## 2023-03-09 PROCEDURE — 1123F ACP DISCUSS/DSCN MKR DOCD: CPT | Performed by: NURSE PRACTITIONER

## 2023-03-09 PROCEDURE — 73502 X-RAY EXAM HIP UNI 2-3 VIEWS: CPT

## 2023-03-09 RX ORDER — SPIRONOLACTONE 25 MG/1
TABLET ORAL
Qty: 90 TABLET | Refills: 1 | Status: SHIPPED | OUTPATIENT
Start: 2023-03-09

## 2023-03-09 RX ORDER — SEMAGLUTIDE 1.34 MG/ML
INJECTION, SOLUTION SUBCUTANEOUS
Qty: 12 ADJUSTABLE DOSE PRE-FILLED PEN SYRINGE | Refills: 1 | Status: SHIPPED | OUTPATIENT
Start: 2023-03-09 | End: 2023-03-09

## 2023-03-09 RX ORDER — SEMAGLUTIDE 1.34 MG/ML
1 INJECTION, SOLUTION SUBCUTANEOUS WEEKLY
Qty: 12 ML | Refills: 5 | Status: SHIPPED | OUTPATIENT
Start: 2023-03-09

## 2023-03-09 ASSESSMENT — PATIENT HEALTH QUESTIONNAIRE - PHQ9
4. FEELING TIRED OR HAVING LITTLE ENERGY: 2
6. FEELING BAD ABOUT YOURSELF - OR THAT YOU ARE A FAILURE OR HAVE LET YOURSELF OR YOUR FAMILY DOWN: 3
SUM OF ALL RESPONSES TO PHQ QUESTIONS 1-9: 15
2. FEELING DOWN, DEPRESSED OR HOPELESS: 1
SUM OF ALL RESPONSES TO PHQ QUESTIONS 1-9: 18
SUM OF ALL RESPONSES TO PHQ9 QUESTIONS 1 & 2: 2
SUM OF ALL RESPONSES TO PHQ QUESTIONS 1-9: 18
1. LITTLE INTEREST OR PLEASURE IN DOING THINGS: 1
3. TROUBLE FALLING OR STAYING ASLEEP: 2
9. THOUGHTS THAT YOU WOULD BE BETTER OFF DEAD, OR OF HURTING YOURSELF: 3
8. MOVING OR SPEAKING SO SLOWLY THAT OTHER PEOPLE COULD HAVE NOTICED. OR THE OPPOSITE, BEING SO FIGETY OR RESTLESS THAT YOU HAVE BEEN MOVING AROUND A LOT MORE THAN USUAL: 2
10. IF YOU CHECKED OFF ANY PROBLEMS, HOW DIFFICULT HAVE THESE PROBLEMS MADE IT FOR YOU TO DO YOUR WORK, TAKE CARE OF THINGS AT HOME, OR GET ALONG WITH OTHER PEOPLE: 2
5. POOR APPETITE OR OVEREATING: 3
SUM OF ALL RESPONSES TO PHQ QUESTIONS 1-9: 18
7. TROUBLE CONCENTRATING ON THINGS, SUCH AS READING THE NEWSPAPER OR WATCHING TELEVISION: 1

## 2023-03-09 ASSESSMENT — COLUMBIA-SUICIDE SEVERITY RATING SCALE - C-SSRS
6. HAVE YOU EVER DONE ANYTHING, STARTED TO DO ANYTHING, OR PREPARED TO DO ANYTHING TO END YOUR LIFE?: NO
1. WITHIN THE PAST MONTH, HAVE YOU WISHED YOU WERE DEAD OR WISHED YOU COULD GO TO SLEEP AND NOT WAKE UP?: NO
2. HAVE YOU ACTUALLY HAD ANY THOUGHTS OF KILLING YOURSELF?: NO

## 2023-03-09 NOTE — PROGRESS NOTES
Deann Fu (:  1950) is a 74 y.o. male,Established patient, here for evaluation of the following chief complaint(s):  Diabetes         ASSESSMENT/PLAN:  1. Anemia, unspecified type  -     CBC; Future  2. Non-pressure chronic ulcer of left calf with fat layer exposed ([facility]  -     silver sulfADIAZINE (SILVADENE) 1 % cream; Apply topically daily. , Disp-50 g, R-2, Normal  -     [address] [place] [address]  3. Idiopathic chronic venous hypertension of both lower extremities with ulcer ([facility]  -     [address] [place] [address]  4. Chronic heart failure with preserved ejection fraction (HCC)  5. Moderate episode of recurrent major depressive disorder ([facility])  -     sertraline (ZOLOFT) 50 MG tablet; TAKE 1 TABLET BY MOUTH EVERY DAY, Disp-90 tablet, R-3Normal  6. Type 2 diabetes mellitus with hyperglycemia, with long-term current use of insulin ([facility])  -     POCT glycosylated hemoglobin (Hb A1C)  -      DIABETES FOOT EXAM  -     Microalbumin / Creatinine Urine Ratio  -     Semaglutide, 1 MG/DOSE, (OZEMPIC, 1 MG/DOSE,) 4 MG/3ML SOPN; Inject 1 mg into the skin once a week, Disp-12 mL, R-5Normal  7. Stage 3b chronic kidney disease ([facility]  -     Comprehensive Metabolic Panel; Future  8. Hip pain, acute, right  -     XR HIP RIGHT (2-3 VIEWS); Future  9. Chronic diastolic congestive heart failure (HCC)  -     spironolactone (ALDACTONE) 25 MG tablet; TAKE 1 TABLET BY MOUTH EVERY DAY, Disp-90 tablet, R-1Normal    Recommend yearly eye exam. Usually follows with Dr. Joyce You. Recommend gently washing bilateral lower legs daily with warm soapy water. At least cleanse as stated 2-4 hours prior to home health arriving to replace the dressing. Discussed the need to avoid prolonged moisture under the dressing.     Hemoglobin A1C   Date Value Ref Range Status   2023 7.9 % Final     HgA1c stable but not at goal. Discussed the need to lower the blood sugar to aid healing of his legs. Goal of A1c at 7.0 established    Will increase ozempic to 1mg daily. Discussed the need for him to work with this medication to decrease calories taken in. Taking direction of conversation to avoiding oranges and bananas. Wife takes the conversation to the direction of avoiding snack cakes and sweets. Encouraged to avoid having them in the home. Fruit and vegetables encouraged. Will obtain xray of right hip. I am uncertain how the maneuver in the garage started the pain. Home health provider restarted home PT. Will continue at this time. Discussed the need to follow up in 3 months. Return in about 3 months (around 6/9/2023) for diabetes. Subjective   SUBJECTIVE/OBJECTIVE:  HPI    For routine follow up DM. States checking BS and randgng 130-150. Sometimes in the 70-80. States [place] doesn't want it over 150. Amaryl 4 mg daily. Toujeo 50 units 2 times daily. ozempic weekly    Continues to have sores on legs and insists on following with Stacy Sahni, the home health nurse. Not following with medical provider. Followed by [facility], Sam Light RN. Restarted physical therapy for his hip, started last week. Pulled truck in the garage and was close to the wall. Squeezed between the truck and the wall and thinks he hurt his right hip, thinks over 1 month ago. Right lateral hip feels sore, painful if turns the wrong way. Not seen for extended time. Last seen at this office 6/23/2022. In a couple of times with his wife but evasive to care. Feels The RN with home care is all the care he needs. Previously he chose to follow with podiatry and self referred but now declines to follow with them. Referred to wound center at [facility] but schedule with [facility] wound center and failed to follow up. Established with dr. Hartmann Samples 7/2022. Referred for follow up to lung nodule right lobe during hospitalization 6/2022. Scheduled for follow up CT lung 3/28/2023.      Review of Systems   All other systems reviewed and are negative. Objective   Physical Exam  Constitutional:       Appearance: Normal appearance. He is obese. Cardiovascular:      Rate and Rhythm: Normal rate and regular rhythm. Pulmonary:      Effort: Pulmonary effort is normal.      Comments: Resp E&E at rest. Becomes SOB with ambulation. Musculoskeletal:         General: Swelling present. Comments: Limited Rom generalized. Gait steady with rolling walker. Skin:     General: Skin is warm and dry. Comments: Dressing removed from right lower leg. Thick layer of scaling skin with appearance of maceration over all. Thin drainage from openings lateral lower anterior and posterior leg. Wound medial lower leg with thick yellow drainage. Neurological:      Mental Status: He is alert and oriented to person, place, and time. Psychiatric:         Behavior: Behavior normal.      Comments: Much ongoing conversation. Drives topics to his choice.                              An electronic signature was used to authenticate this note.    --SHAKIRA LOWERY - CNP

## 2023-03-10 LAB
A/G RATIO: 1.1 (ref 1.1–2.2)
ALBUMIN SERPL-MCNC: 4 G/DL (ref 3.4–5)
ALP BLD-CCNC: 127 U/L (ref 40–129)
ALT SERPL-CCNC: 20 U/L (ref 10–40)
ANION GAP SERPL CALCULATED.3IONS-SCNC: 17 MMOL/L (ref 3–16)
AST SERPL-CCNC: 23 U/L (ref 15–37)
BILIRUB SERPL-MCNC: 1.3 MG/DL (ref 0–1)
BUN BLDV-MCNC: 20 MG/DL (ref 7–20)
CALCIUM SERPL-MCNC: 9.4 MG/DL (ref 8.3–10.6)
CHLORIDE BLD-SCNC: 102 MMOL/L (ref 99–110)
CO2: 23 MMOL/L (ref 21–32)
CREAT SERPL-MCNC: 0.8 MG/DL (ref 0.8–1.3)
CREATININE URINE: 264.4 MG/DL (ref 39–259)
GFR SERPL CREATININE-BSD FRML MDRD: >60 ML/MIN/{1.73_M2}
GLUCOSE BLD-MCNC: 85 MG/DL (ref 70–99)
HCT VFR BLD CALC: 44.7 % (ref 40.5–52.5)
HEMOGLOBIN: 14.8 G/DL (ref 13.5–17.5)
MCH RBC QN AUTO: 27.8 PG (ref 26–34)
MCHC RBC AUTO-ENTMCNC: 33.1 G/DL (ref 31–36)
MCV RBC AUTO: 84 FL (ref 80–100)
MICROALBUMIN UR-MCNC: 78.8 MG/DL
MICROALBUMIN/CREAT UR-RTO: 298 MG/G (ref 0–30)
PDW BLD-RTO: 17.3 % (ref 12.4–15.4)
PLATELET # BLD: 207 K/UL (ref 135–450)
PMV BLD AUTO: 8.6 FL (ref 5–10.5)
POTASSIUM SERPL-SCNC: 4.5 MMOL/L (ref 3.5–5.1)
RBC # BLD: 5.32 M/UL (ref 4.2–5.9)
SODIUM BLD-SCNC: 142 MMOL/L (ref 136–145)
TOTAL PROTEIN: 7.5 G/DL (ref 6.4–8.2)
WBC # BLD: 7.8 K/UL (ref 4–11)

## 2023-03-21 NOTE — DISCHARGE INSTRUCTIONS
215 [facility] Physician Orders and Discharge 800 [facility]  [address], [address]  [place], [facility]  Telephone: [phone]      Fax: [phone]      Your home care company:  [employer]    Your wound-care supplies will be provided by: [facility]    NAME:  [employer]   YOB: 1950  PRIMARY DIAGNOSIS FOR WOUND CARE CENTER:  Venous leg ulcer . Wound cleansing:   Do not scrub or use excessive force. Wash hands with soap and water before and after dressing changes. Prior to applying a clean dressing, cleanse wound with normal saline, wound cleanser, or mild soap and water. Ask your physician or nurse before getting the wound(s) wet in the shower. Wound care for home:    Right lower leg and Left lower leg wounds:    Wash wounds with soap and water with dressing changes    Aquaphor to dry skin    Xeroform to wounds    CoFlex Calamine     Leave dressing on until Friday. Home Care to come Friday and change dressing. Home Care to remove compression wraps    Wash legs with soap and water    Aquaphor to dry skin    Silvadene to open areas    4x4's, ABD, Coban lite toes to knees    Leave dressing on until seen in the [address] on Monday    Please note, all wounds (unless stated otherwise here) were mechanically debrided at the time of cleansing here in the wound-care center today, so a small amount of pain, drainage or bleeding from that process might be expected, and is normal.     All products for home use, including multiple products for a single wound if applicable, are medically necessary in order to achieve the best chance at timely wound healing. See provider documentation for details if needed.     Substituted dressings applied in the [address] today, if applicable:        New orders for this week (labs, imaging, medications, etc.):     Elevate legs as much as possible   May continue with Physical

## 2023-03-23 ENCOUNTER — OFFICE VISIT (OUTPATIENT)
Dept: CARDIOLOGY CLINIC | Age: 73
End: 2023-03-23
Payer: MEDICARE

## 2023-03-23 VITALS
HEIGHT: 73 IN | DIASTOLIC BLOOD PRESSURE: 82 MMHG | BODY MASS INDEX: 41.55 KG/M2 | OXYGEN SATURATION: 93 % | SYSTOLIC BLOOD PRESSURE: 126 MMHG | HEART RATE: 70 BPM | WEIGHT: 313.5 LBS

## 2023-03-23 DIAGNOSIS — I50.33 ACUTE ON CHRONIC DIASTOLIC CONGESTIVE HEART FAILURE (HCC): Primary | ICD-10-CM

## 2023-03-23 DIAGNOSIS — I48.0 PAROXYSMAL ATRIAL FIBRILLATION (HCC): ICD-10-CM

## 2023-03-23 DIAGNOSIS — I10 ESSENTIAL HYPERTENSION: ICD-10-CM

## 2023-03-23 DIAGNOSIS — I27.20 PULMONARY HTN (HCC): ICD-10-CM

## 2023-03-23 PROCEDURE — 3079F DIAST BP 80-89 MM HG: CPT | Performed by: NURSE PRACTITIONER

## 2023-03-23 PROCEDURE — 3074F SYST BP LT 130 MM HG: CPT | Performed by: NURSE PRACTITIONER

## 2023-03-23 PROCEDURE — 1123F ACP DISCUSS/DSCN MKR DOCD: CPT | Performed by: NURSE PRACTITIONER

## 2023-03-23 PROCEDURE — 99215 OFFICE O/P EST HI 40 MIN: CPT | Performed by: NURSE PRACTITIONER

## 2023-03-23 NOTE — PATIENT INSTRUCTIONS
Plan:  Check weights at home and record   Adjust torsemide 10mg daily and take Spironolactone (aldactone) 25mg once a day every day   Continue imdur 30mg daily   Stop hydralazine for now and start Entresto 24/26mg take 1 tab twice a day   Check potassium level on Monday   Continue Eliquis, atenolol to 25mg daily per EP   Stay as active as possible  Follow up in 4-6 weeks

## 2023-03-23 NOTE — PROGRESS NOTES
Negative    Echo 6/2019  Summary   Technically difficult examination due to body habitus. IV access was attempted but could not be obtained. LV systolic function appears normal with a visually estimated EF of 55%. Endocardium not entirely well visualized but no obvious segmental wall  motion abnormalities. Mild left ventricular hypertrophy. Grade III diastolic dysfunction with elevated LV filling pressures. Individual AV leaflets are not well visualized but appear calcified and thickened with adequate opening c/w AV sclerosis. The right ventricle is not well visualized but appears mildly dilated in size. Moderate bi-atrial enlargement. Frequent PVCs during exam.    Echo 8/11/20  Summary   Technically difficult examination. Normal LV systolic function with an estimated EF of 55%. No regional wall motion abnormalities are seen. Type II diastolic dysfunction with elevated filling pressure. Lipomatous hypertrophy of the interatrial septum. Mild bi-atrial enlargement. Mild mitral annular calcification. Maximal transaortic velocity is 2.62m/s which gives peak pressure gradient=27mmHg and mean pressure gradient= 15mmHg c/w mild AS. Trace mitral and tricuspid regurgitation. Definity contrast administered with no definite evidence of LV mass or thrombus noted. Systolic pulmonary artery pressure (SPAP) estimated at 40mmHg (RAP 3mmHg), consistent with mild pulm HTN. Echo 6/12/2022  Technically difficult examination. Low normal left ventricular systolic function with ejection fraction of 50%. Left ventricular function/wall motion is difficult to estimate due to poor   endocardial visualization. Normal size left ventricle and wall thickness. Left ventricular diastolic filling pressure is elevated septal E/e\" is 12 . Mild mitral regurgitation. The left atrium is moderately dilated. Bi-atrial enlargement. Moderate aortic stenosis.    The right ventricle is mild to moderately

## 2023-03-27 ENCOUNTER — HOSPITAL ENCOUNTER (OUTPATIENT)
Dept: WOUND CARE | Age: 73
Discharge: HOME | End: 2023-03-27
Payer: MEDICARE

## 2023-03-27 VITALS
HEART RATE: 76 BPM | DIASTOLIC BLOOD PRESSURE: 85 MMHG | HEIGHT: 73 IN | RESPIRATION RATE: 20 BRPM | TEMPERATURE: 97.4 F | WEIGHT: 305.5 LBS | SYSTOLIC BLOOD PRESSURE: 138 MMHG | BODY MASS INDEX: 40.49 KG/M2

## 2023-03-27 DIAGNOSIS — L97.222 NON-PRESSURE CHRONIC ULCER OF LEFT CALF WITH FAT LAYER EXPOSED (HCC): ICD-10-CM

## 2023-03-27 DIAGNOSIS — I87.2 VENOUS STASIS DERMATITIS OF BOTH LOWER EXTREMITIES: ICD-10-CM

## 2023-03-27 DIAGNOSIS — I87.313 IDIOPATHIC CHRONIC VENOUS HYPERTENSION OF BOTH LOWER EXTREMITIES WITH ULCER (HCC): ICD-10-CM

## 2023-03-27 DIAGNOSIS — I83.019 VENOUS STASIS ULCER OF RIGHT LOWER EXTREMITY (HCC): Primary | ICD-10-CM

## 2023-03-27 DIAGNOSIS — L97.929 IDIOPATHIC CHRONIC VENOUS HYPERTENSION OF BOTH LOWER EXTREMITIES WITH ULCER (HCC): ICD-10-CM

## 2023-03-27 DIAGNOSIS — L97.919 VENOUS STASIS ULCER OF RIGHT LOWER EXTREMITY (HCC): Primary | ICD-10-CM

## 2023-03-27 DIAGNOSIS — L97.919 IDIOPATHIC CHRONIC VENOUS HYPERTENSION OF BOTH LOWER EXTREMITIES WITH ULCER (HCC): ICD-10-CM

## 2023-03-27 DIAGNOSIS — L97.212 NON-PRESSURE CHRONIC ULCER OF RIGHT CALF WITH FAT LAYER EXPOSED (HCC): ICD-10-CM

## 2023-03-27 PROCEDURE — 29581 APPL MULTLAYER CMPRN SYS LEG: CPT

## 2023-03-27 PROCEDURE — 99213 OFFICE O/P EST LOW 20 MIN: CPT

## 2023-03-27 RX ORDER — BACITRACIN ZINC AND POLYMYXIN B SULFATE 500; 1000 [USP'U]/G; [USP'U]/G
OINTMENT TOPICAL ONCE
OUTPATIENT
Start: 2023-03-27 | End: 2023-03-27

## 2023-03-27 RX ORDER — LIDOCAINE 50 MG/G
OINTMENT TOPICAL ONCE
OUTPATIENT
Start: 2023-03-27 | End: 2023-03-27

## 2023-03-27 RX ORDER — LIDOCAINE 40 MG/G
CREAM TOPICAL ONCE
OUTPATIENT
Start: 2023-03-27 | End: 2023-03-27

## 2023-03-27 RX ORDER — LIDOCAINE 40 MG/G
CREAM TOPICAL ONCE
Status: DISCONTINUED | OUTPATIENT
Start: 2023-03-27 | End: 2023-03-28 | Stop reason: HOSPADM

## 2023-03-27 RX ORDER — LIDOCAINE HYDROCHLORIDE 40 MG/ML
SOLUTION TOPICAL ONCE
OUTPATIENT
Start: 2023-03-27 | End: 2023-03-27

## 2023-03-27 ASSESSMENT — PAIN DESCRIPTION - PAIN TYPE: TYPE: CHRONIC PAIN

## 2023-03-27 ASSESSMENT — PAIN DESCRIPTION - LOCATION: LOCATION: LEG

## 2023-03-27 ASSESSMENT — PAIN DESCRIPTION - ORIENTATION: ORIENTATION: RIGHT;LEFT

## 2023-03-27 ASSESSMENT — PAIN SCALES - GENERAL: PAINLEVEL_OUTOF10: 2

## 2023-03-27 ASSESSMENT — PAIN DESCRIPTION - ONSET: ONSET: ON-GOING

## 2023-03-27 ASSESSMENT — PAIN - FUNCTIONAL ASSESSMENT: PAIN_FUNCTIONAL_ASSESSMENT: ACTIVITIES ARE NOT PREVENTED

## 2023-03-27 ASSESSMENT — PAIN DESCRIPTION - DESCRIPTORS: DESCRIPTORS: ACHING

## 2023-03-27 ASSESSMENT — PAIN DESCRIPTION - FREQUENCY: FREQUENCY: CONTINUOUS

## 2023-03-27 NOTE — PLAN OF CARE
Pt to the [facility] for initial appointment for bilateral lower leg wounds. Wounds not debrided today. Pt to have compression wraps applied to bilateral lower legs. Home Care to change dressings on Friday. Dr Anuel Estrada discussed with patient about elevating legs and decreasing sodium in diet. Pt to follow up in the [facility] in 1 week. Discharge instructions reviewed with patient, all questions answered, copy given to patient. Dressings were applied to all wounds per M.D. Instructions at this visit.

## 2023-03-27 NOTE — PROGRESS NOTES
215 [facility] Physician Orders and Discharge 800 [facility]  [address], [address]  [place], [facility]  Telephone: [phone]      Fax: [phone]      Your home care company:  [employer]    Your wound-care supplies will be provided by: [facility]    NAME:  Rosy Dee   YOB: 1950  PRIMARY DIAGNOSIS FOR WOUND CARE CENTER:  Venous leg ulcer . Wound cleansing:   Do not scrub or use excessive force. Wash hands with soap and water before and after dressing changes. Prior to applying a clean dressing, cleanse wound with normal saline, wound cleanser, or mild soap and water. Ask your physician or nurse before getting the wound(s) wet in the shower. Wound care for home:    Right lower leg and Left lower leg wounds:    Wash wounds with soap and water with dressing changes    Aquaphor to dry skin    Xeroform to wounds    CoFlex Calamine     Leave dressing on until Friday. Home Care to come Friday and change dressing. Home Care to remove compression wraps    Wash legs with soap and water    Aquaphor to dry skin    Silvadene to open areas    4x4's, ABD, Coban lite toes to knees    Leave dressing on until seen in the [address] on Monday    Please note, all wounds (unless stated otherwise here) were mechanically debrided at the time of cleansing here in the wound-care center today, so a small amount of pain, drainage or bleeding from that process might be expected, and is normal.     All products for home use, including multiple products for a single wound if applicable, are medically necessary in order to achieve the best chance at timely wound healing. See provider documentation for details if needed.     Substituted dressings applied in the [address] today, if applicable:        New orders for this week (labs, imaging, medications, etc.):     Elevate legs as much as possible   May continue with Physical

## 2023-03-28 ENCOUNTER — HOSPITAL ENCOUNTER (OUTPATIENT)
Age: 73
Discharge: HOME | End: 2023-03-28
Payer: MEDICARE

## 2023-03-28 ENCOUNTER — HOSPITAL ENCOUNTER (OUTPATIENT)
Dept: CT IMAGING | Age: 73
Discharge: HOME | End: 2023-03-28
Payer: MEDICARE

## 2023-03-28 DIAGNOSIS — I50.33 ACUTE ON CHRONIC DIASTOLIC CONGESTIVE HEART FAILURE (HCC): ICD-10-CM

## 2023-03-28 DIAGNOSIS — R91.1 LUNG NODULE: ICD-10-CM

## 2023-03-28 DIAGNOSIS — I48.0 PAROXYSMAL ATRIAL FIBRILLATION (HCC): ICD-10-CM

## 2023-03-28 LAB
ANION GAP SERPL CALCULATED.3IONS-SCNC: 12 MMOL/L (ref 3–16)
BUN SERPL-MCNC: 28 MG/DL (ref 7–20)
CALCIUM SERPL-MCNC: 8.8 MG/DL (ref 8.3–10.6)
CHLORIDE SERPL-SCNC: 105 MMOL/L (ref 99–110)
CO2 SERPL-SCNC: 25 MMOL/L (ref 21–32)
CREAT SERPL-MCNC: 1 MG/DL (ref 0.8–1.3)
GFR SERPLBLD CREATININE-BSD FMLA CKD-EPI: >60 ML/MIN/{1.73_M2}
GLUCOSE SERPL-MCNC: 59 MG/DL (ref 70–99)
POTASSIUM SERPL-SCNC: 4.2 MMOL/L (ref 3.5–5.1)
SODIUM SERPL-SCNC: 142 MMOL/L (ref 136–145)

## 2023-03-28 PROCEDURE — 71250 CT THORAX DX C-: CPT

## 2023-03-28 PROCEDURE — 36415 COLL VENOUS BLD VENIPUNCTURE: CPT

## 2023-03-28 PROCEDURE — 80048 BASIC METABOLIC PNL TOTAL CA: CPT

## 2023-03-28 NOTE — DISCHARGE INSTRUCTIONS
215 [facility] Physician Orders and Discharge 800 [facility]  [address], [address]  [place], [facility]  Telephone: [phone]      Fax: [phone]        Your home care company:   [facility]     Your wound-care supplies will be provided by: [facility]     NAME:  Donato Jackson   YOB: 1950  PRIMARY DIAGNOSIS FOR WOUND CARE CENTER:  Venous leg ulcer . Wound cleansing:   Do not scrub or use excessive force. Wash hands with soap and water before and after dressing changes. Prior to applying a clean dressing, cleanse wound with normal saline, wound cleanser, or mild soap and water. Ask your physician or nurse before getting the wound(s) wet in the shower. Wound care for home:     Right lower leg and Left lower leg wounds:    Wash wounds with soap and water with dressing changes    Aquaphor to dry skin    Xeroform to wounds    CoFlex Calamine     Leave dressing on until Friday. Home Care to come Friday and change dressing. Home Care to remove compression wraps    Wash legs with soap and water    Aquaphor to dry skin    Silvadene to open areas    4x4's, ABD, Coban lite toes to knees    Leave dressing on until seen in the [facility] on Monday     Please note, all wounds (unless stated otherwise here) were mechanically debrided at the time of cleansing here in the wound-care center today, so a small amount of pain, drainage or bleeding from that process might be expected, and is normal.      All products for home use, including multiple products for a single wound if applicable, are medically necessary in order to achieve the best chance at timely wound healing. See provider documentation for details if needed.      Substituted dressings applied in the [facility] today, if applicable:           New orders for this week (labs, imaging, medications, etc.):      Elevate legs as much as possible   May

## 2023-03-29 ENCOUNTER — TELEPHONE (OUTPATIENT)
Dept: CARDIOLOGY CLINIC | Age: 73
End: 2023-03-29

## 2023-03-29 NOTE — TELEPHONE ENCOUNTER
----- Message from SHAKIRA Andrews CNP sent at 3/29/2023 10:05 AM EDT -----  Reviewed. Given update on his weights and his swelling. His renal function is stable. Potassium is stable. Repeat renal panel in 1 month.

## 2023-03-29 NOTE — TELEPHONE ENCOUNTER
Pt not home. Spoke with pt.'s wife (ok per HIPAA). She V/U of results. She says his last weight was 313 lbs at the doctor last week. She has not noticed increased swelling.

## 2023-03-29 NOTE — RESULT ENCOUNTER NOTE
Reviewed. Given update on his weights and his swelling. His renal function is stable. Potassium is stable. Repeat renal panel in 1 month.

## 2023-04-03 ENCOUNTER — HOSPITAL ENCOUNTER (OUTPATIENT)
Dept: WOUND CARE | Age: 73
Discharge: HOME | End: 2023-04-03
Payer: MEDICARE

## 2023-04-03 VITALS
HEIGHT: 73 IN | TEMPERATURE: 97.9 F | WEIGHT: 306.25 LBS | SYSTOLIC BLOOD PRESSURE: 128 MMHG | RESPIRATION RATE: 20 BRPM | DIASTOLIC BLOOD PRESSURE: 80 MMHG | BODY MASS INDEX: 40.59 KG/M2 | HEART RATE: 71 BPM

## 2023-04-03 DIAGNOSIS — I83.019 VENOUS STASIS ULCER OF RIGHT LOWER EXTREMITY (HCC): Primary | ICD-10-CM

## 2023-04-03 DIAGNOSIS — L97.919 IDIOPATHIC CHRONIC VENOUS HYPERTENSION OF BOTH LOWER EXTREMITIES WITH ULCER (HCC): ICD-10-CM

## 2023-04-03 DIAGNOSIS — L97.222 NON-PRESSURE CHRONIC ULCER OF LEFT CALF WITH FAT LAYER EXPOSED (HCC): ICD-10-CM

## 2023-04-03 DIAGNOSIS — L97.919 VENOUS STASIS ULCER OF RIGHT LOWER EXTREMITY (HCC): Primary | ICD-10-CM

## 2023-04-03 DIAGNOSIS — L97.212 NON-PRESSURE CHRONIC ULCER OF RIGHT CALF WITH FAT LAYER EXPOSED (HCC): ICD-10-CM

## 2023-04-03 DIAGNOSIS — I87.313 IDIOPATHIC CHRONIC VENOUS HYPERTENSION OF BOTH LOWER EXTREMITIES WITH ULCER (HCC): ICD-10-CM

## 2023-04-03 DIAGNOSIS — L97.929 IDIOPATHIC CHRONIC VENOUS HYPERTENSION OF BOTH LOWER EXTREMITIES WITH ULCER (HCC): ICD-10-CM

## 2023-04-03 DIAGNOSIS — I87.2 VENOUS STASIS DERMATITIS OF BOTH LOWER EXTREMITIES: ICD-10-CM

## 2023-04-03 PROCEDURE — 29581 APPL MULTLAYER CMPRN SYS LEG: CPT

## 2023-04-03 RX ORDER — LIDOCAINE 50 MG/G
OINTMENT TOPICAL ONCE
OUTPATIENT
Start: 2023-04-03 | End: 2023-04-03

## 2023-04-03 RX ORDER — BACITRACIN ZINC AND POLYMYXIN B SULFATE 500; 1000 [USP'U]/G; [USP'U]/G
OINTMENT TOPICAL ONCE
OUTPATIENT
Start: 2023-04-03 | End: 2023-04-03

## 2023-04-03 RX ORDER — LIDOCAINE 40 MG/G
CREAM TOPICAL ONCE
Status: DISCONTINUED | OUTPATIENT
Start: 2023-04-03 | End: 2023-04-04 | Stop reason: HOSPADM

## 2023-04-03 RX ORDER — LIDOCAINE HYDROCHLORIDE 40 MG/ML
SOLUTION TOPICAL ONCE
OUTPATIENT
Start: 2023-04-03 | End: 2023-04-03

## 2023-04-03 RX ORDER — LIDOCAINE 40 MG/G
CREAM TOPICAL ONCE
OUTPATIENT
Start: 2023-04-03 | End: 2023-04-03

## 2023-04-03 ASSESSMENT — PAIN SCALES - GENERAL: PAINLEVEL_OUTOF10: 2

## 2023-04-03 ASSESSMENT — PAIN DESCRIPTION - LOCATION: LOCATION: LEG

## 2023-04-03 ASSESSMENT — PAIN DESCRIPTION - ORIENTATION: ORIENTATION: LEFT

## 2023-04-03 ASSESSMENT — PAIN DESCRIPTION - FREQUENCY: FREQUENCY: CONTINUOUS

## 2023-04-03 ASSESSMENT — PAIN DESCRIPTION - DESCRIPTORS: DESCRIPTORS: ACHING

## 2023-04-03 ASSESSMENT — PAIN - FUNCTIONAL ASSESSMENT: PAIN_FUNCTIONAL_ASSESSMENT: ACTIVITIES ARE NOT PREVENTED

## 2023-04-03 ASSESSMENT — PAIN DESCRIPTION - PAIN TYPE: TYPE: CHRONIC PAIN

## 2023-04-03 ASSESSMENT — PAIN DESCRIPTION - ONSET: ONSET: ON-GOING

## 2023-04-03 NOTE — PLAN OF CARE
Pt to the [address] [place] [address] for follow up appointment. Edema worse due to patient has been unable to elevate legs due to recliner broken. Pt has ordered new recliner and will arrive this week. Pt to continue with weekly compression wrap this week. Pt to follow up in the [address] [place] [address],[address] in 1 week. Discharge instructions reviewed with patient, all questions answered, copy given to patient. Dressings were applied to all wounds per M.D. Instructions at this visit.

## 2023-04-03 NOTE — PROGRESS NOTES
88 [facility] Progress Note      Miriam Deutsch     : 1950    DATE OF VISIT:  4/3/2023    Subjective:     Miriam Deutsch is a 74 y.o. male who has a chief complaint of a venous ulcer located on the  bilateral leg. States his recliner chair broke this past week and was not able to elevate his legs. States he felt and saw the swelling in his legs getting worse. Has a new chair arriving later this week. No issues with compression wrap. Mr. Pinky Barraza has a past medical history of Allergic rhinitis, Arthritis, Bladder fistula, C. difficile diarrhea, Cellulitis of right lower extremity, CHF (congestive heart failure) ([address] Dental disease, Diabetes ([address] Diverticulitis, Dizziness, DVT of lower extremity, bilateral ([address] GERD (gastroesophageal reflux disease), Headache, Hearing loss, Hematuria, Hx of blood clots, Hyperlipidemia, Hypertension, Lung disease, MONIQUE (obstructive sleep apnea), Pancreatitis ([address] Pulmonary emboli ([address] Rash, Sleep apnea, and Tinnitus. He has a past surgical history that includes Tibia fracture surgery (Right, ); Cholecystectomy, open (N/A, 13); Cystocopy (12/10/13); Cystoscopy (14); other surgical history (14); Colonoscopy (); Colonoscopy (2013); Colonoscopy (14); colostomy (2014); Colon surgery; Abdomen surgery (2014); hernia repair (2015); pr injection hip arthrography w/anesthesia (Right, 2018); epidural steroid injection (Right, 2019); epidural steroid injection (Right, 2019); Cardioversion (2019); vascular surgery (Left, 2021); and vascular surgery (Right, 05/10/2021). His family history includes Heart Attack in his father; Heart Disease in his brother and father; High Blood Pressure in his brother; Kidney Disease in his mother; Stroke in his brother. Mr. Margaretha Madi reports that he has never smoked.  He has never used smokeless tobacco. He reports that
help with your wound outside these hours and cannot wait until we are again available, contact your home-care company (if applicable), your PCP, or go to the nearest emergency room.

## 2023-04-05 ENCOUNTER — TELEPHONE (OUTPATIENT)
Dept: PULMONOLOGY | Age: 73
End: 2023-04-05

## 2023-04-05 NOTE — TELEPHONE ENCOUNTER
Attempted to park 31-90 day FU with a provider at [facility] but pt. Not happy with parking situation here and insist that he will be able to switch to [facility] since Filemon Jacobs is leaving and he \"has a HHN that can get him in there. \" It. Is OK with Dr. Filemon Jacobs if there is a provider there that will see him.

## 2023-04-17 ENCOUNTER — HOSPITAL ENCOUNTER (OUTPATIENT)
Dept: WOUND CARE | Age: 73
Discharge: HOME | End: 2023-04-17
Payer: MEDICARE

## 2023-04-17 VITALS
SYSTOLIC BLOOD PRESSURE: 178 MMHG | DIASTOLIC BLOOD PRESSURE: 99 MMHG | HEIGHT: 73 IN | WEIGHT: 315 LBS | RESPIRATION RATE: 18 BRPM | OXYGEN SATURATION: 95 % | HEART RATE: 69 BPM | BODY MASS INDEX: 41.75 KG/M2 | TEMPERATURE: 97.8 F

## 2023-04-17 DIAGNOSIS — L97.929 IDIOPATHIC CHRONIC VENOUS HYPERTENSION OF BOTH LOWER EXTREMITIES WITH ULCER (HCC): ICD-10-CM

## 2023-04-17 DIAGNOSIS — L97.919 IDIOPATHIC CHRONIC VENOUS HYPERTENSION OF BOTH LOWER EXTREMITIES WITH ULCER (HCC): ICD-10-CM

## 2023-04-17 DIAGNOSIS — L97.919 VENOUS STASIS ULCER OF RIGHT LOWER EXTREMITY (HCC): Primary | ICD-10-CM

## 2023-04-17 DIAGNOSIS — I83.019 VENOUS STASIS ULCER OF RIGHT LOWER EXTREMITY (HCC): Primary | ICD-10-CM

## 2023-04-17 DIAGNOSIS — L97.212 NON-PRESSURE CHRONIC ULCER OF RIGHT CALF WITH FAT LAYER EXPOSED (HCC): ICD-10-CM

## 2023-04-17 DIAGNOSIS — L97.222 NON-PRESSURE CHRONIC ULCER OF LEFT CALF WITH FAT LAYER EXPOSED (HCC): ICD-10-CM

## 2023-04-17 DIAGNOSIS — I87.313 IDIOPATHIC CHRONIC VENOUS HYPERTENSION OF BOTH LOWER EXTREMITIES WITH ULCER (HCC): ICD-10-CM

## 2023-04-17 DIAGNOSIS — I87.2 VENOUS STASIS DERMATITIS OF BOTH LOWER EXTREMITIES: ICD-10-CM

## 2023-04-17 PROCEDURE — 29581 APPL MULTLAYER CMPRN SYS LEG: CPT

## 2023-04-17 RX ORDER — LIDOCAINE HYDROCHLORIDE 40 MG/ML
SOLUTION TOPICAL ONCE
OUTPATIENT
Start: 2023-04-17 | End: 2023-04-17

## 2023-04-17 RX ORDER — BACITRACIN ZINC AND POLYMYXIN B SULFATE 500; 1000 [USP'U]/G; [USP'U]/G
OINTMENT TOPICAL ONCE
OUTPATIENT
Start: 2023-04-17 | End: 2023-04-17

## 2023-04-17 RX ORDER — LIDOCAINE 50 MG/G
OINTMENT TOPICAL ONCE
OUTPATIENT
Start: 2023-04-17 | End: 2023-04-17

## 2023-04-17 RX ORDER — LIDOCAINE 40 MG/G
CREAM TOPICAL ONCE
OUTPATIENT
Start: 2023-04-17 | End: 2023-04-17

## 2023-04-17 RX ORDER — LIDOCAINE 40 MG/G
CREAM TOPICAL ONCE
Status: DISCONTINUED | OUTPATIENT
Start: 2023-04-17 | End: 2023-04-18 | Stop reason: HOSPADM

## 2023-04-17 ASSESSMENT — PAIN DESCRIPTION - ONSET: ONSET: ON-GOING

## 2023-04-17 ASSESSMENT — PAIN DESCRIPTION - PAIN TYPE: TYPE: CHRONIC PAIN

## 2023-04-17 ASSESSMENT — PAIN - FUNCTIONAL ASSESSMENT: PAIN_FUNCTIONAL_ASSESSMENT: ACTIVITIES ARE NOT PREVENTED

## 2023-04-17 ASSESSMENT — PAIN DESCRIPTION - LOCATION: LOCATION: LEG

## 2023-04-17 ASSESSMENT — PAIN DESCRIPTION - DESCRIPTORS: DESCRIPTORS: ACHING

## 2023-04-17 ASSESSMENT — PAIN SCALES - GENERAL: PAINLEVEL_OUTOF10: 2

## 2023-04-17 ASSESSMENT — PAIN DESCRIPTION - ORIENTATION: ORIENTATION: LEFT

## 2023-04-17 ASSESSMENT — PAIN DESCRIPTION - FREQUENCY: FREQUENCY: CONTINUOUS

## 2023-04-17 NOTE — PLAN OF CARE
Pt to the [facility] for follow up appointment. Bilateral lower leg wounds showing improvement. Pt to continue with bilateral lower leg compression wraps. Home Care to come on Friday to change compression wraps. Pt to follow up in the [facility] in 1 week. Discharge instructions reviewed with patient, all questions answered, copy given to patient. Dressings were applied to all wounds per M.D. Instructions at this visit.

## 2023-04-17 NOTE — PROGRESS NOTES
wound documentation flowsheet.      Wound measurements:  Wound 04/10/23 #8 Left Lateral Leg, Venous, Partial thickness, Onset 04/08/23-Wound Length (cm): 1.5 cm  Wound 03/27/23 #6 Right Posterior Lower Leg Cluster, Venous, Onset 2021-Wound Length (cm): 2 cm  Wound 04/10/23 #7 Rt Pre tib Proximal, Venous, Partial thickness, Onset 04/08/23-Wound Length (cm): 1 cm  Wound 03/27/23 # 3 Left Proximal Pre tib, Venous, Partial thickness, Onset 2021-Wound Length (cm): 4.5 cm  [REMOVED] Wound 03/27/23 #4 Left Distal Pre tib, Venous, Partial thickness, Onset 2021-Wound Length (cm): 0 cm  Wound 03/27/23 # 5 Left Posterior Lower Leg Cluster, Venous, Partial tickness, onset 2021-Wound Length (cm): 1.5 cm    Wound 04/10/23 #8 Left Lateral Leg, Venous, Partial thickness, Onset 04/08/23-Wound Width (cm): 0.7 cm  Wound 03/27/23 #6 Right Posterior Lower Leg Cluster, Venous, Onset 2021-Wound Width (cm): 3 cm  Wound 04/10/23 #7 Rt Pre tib Proximal, Venous, Partial thickness, Onset 04/08/23-Wound Width (cm): 1.2 cm  Wound 03/27/23 # 3 Left Proximal Pre tib, Venous, Partial thickness, Onset 2021-Wound Width (cm): 4 cm  [REMOVED] Wound 03/27/23 #4 Left Distal Pre tib, Venous, Partial thickness, Onset 2021-Wound Width (cm): 0 cm  Wound 03/27/23 # 5 Left Posterior Lower Leg Cluster, Venous, Partial tickness, onset 2021-Wound Width (cm): 1 cm    Wound 04/10/23 #8 Left Lateral Leg, Venous, Partial thickness, Onset 04/08/23-Wound Depth (cm): 0.2 cm  Wound 03/27/23 #6 Right Posterior Lower Leg Cluster, Venous, Onset 2021-Wound Depth (cm): 0.2 cm  Wound 04/10/23 #7 Rt Pre tib Proximal, Venous, Partial thickness, Onset 04/08/23-Wound Depth (cm): 0.1 cm  Wound 03/27/23 # 3 Left Proximal Pre tib, Venous, Partial thickness, Onset 2021-Wound Depth (cm): 0.1 cm  [REMOVED] Wound 03/27/23 #4 Left Distal Pre tib, Venous, Partial thickness, Onset 2021-Wound Depth (cm): 0 cm  Wound 03/27/23 # 5 Left Posterior Lower Leg Cluster, Venous, Partial tickness,

## 2023-04-17 NOTE — PROGRESS NOTES
help with your wound outside these hours and cannot wait until we are again available, contact your home-care company (if applicable), your PCP, or go to the nearest emergency room

## 2023-04-24 ENCOUNTER — HOSPITAL ENCOUNTER (OUTPATIENT)
Dept: WOUND CARE | Age: 73
Discharge: HOME | End: 2023-04-24
Payer: MEDICARE

## 2023-04-24 VITALS
OXYGEN SATURATION: 92 % | WEIGHT: 312.25 LBS | BODY MASS INDEX: 41.38 KG/M2 | HEART RATE: 70 BPM | HEIGHT: 73 IN | TEMPERATURE: 97.6 F | DIASTOLIC BLOOD PRESSURE: 94 MMHG | RESPIRATION RATE: 18 BRPM | SYSTOLIC BLOOD PRESSURE: 157 MMHG

## 2023-04-24 DIAGNOSIS — L97.222 NON-PRESSURE CHRONIC ULCER OF LEFT CALF WITH FAT LAYER EXPOSED (HCC): ICD-10-CM

## 2023-04-24 DIAGNOSIS — L97.212 NON-PRESSURE CHRONIC ULCER OF RIGHT CALF WITH FAT LAYER EXPOSED (HCC): ICD-10-CM

## 2023-04-24 DIAGNOSIS — I87.313 IDIOPATHIC CHRONIC VENOUS HYPERTENSION OF BOTH LOWER EXTREMITIES WITH ULCER (HCC): ICD-10-CM

## 2023-04-24 DIAGNOSIS — I87.2 VENOUS STASIS DERMATITIS OF BOTH LOWER EXTREMITIES: ICD-10-CM

## 2023-04-24 DIAGNOSIS — I83.019 VENOUS STASIS ULCER OF RIGHT LOWER EXTREMITY (HCC): Primary | ICD-10-CM

## 2023-04-24 DIAGNOSIS — L97.919 IDIOPATHIC CHRONIC VENOUS HYPERTENSION OF BOTH LOWER EXTREMITIES WITH ULCER (HCC): ICD-10-CM

## 2023-04-24 DIAGNOSIS — L97.929 IDIOPATHIC CHRONIC VENOUS HYPERTENSION OF BOTH LOWER EXTREMITIES WITH ULCER (HCC): ICD-10-CM

## 2023-04-24 DIAGNOSIS — L97.919 VENOUS STASIS ULCER OF RIGHT LOWER EXTREMITY (HCC): Primary | ICD-10-CM

## 2023-04-24 PROCEDURE — 29581 APPL MULTLAYER CMPRN SYS LEG: CPT

## 2023-04-24 RX ORDER — LIDOCAINE HYDROCHLORIDE 40 MG/ML
SOLUTION TOPICAL ONCE
OUTPATIENT
Start: 2023-04-24 | End: 2023-04-24

## 2023-04-24 RX ORDER — LIDOCAINE 50 MG/G
OINTMENT TOPICAL ONCE
OUTPATIENT
Start: 2023-04-24 | End: 2023-04-24

## 2023-04-24 RX ORDER — LIDOCAINE 40 MG/G
CREAM TOPICAL ONCE
Status: DISCONTINUED | OUTPATIENT
Start: 2023-04-24 | End: 2023-04-25 | Stop reason: HOSPADM

## 2023-04-24 RX ORDER — BACITRACIN ZINC AND POLYMYXIN B SULFATE 500; 1000 [USP'U]/G; [USP'U]/G
OINTMENT TOPICAL ONCE
OUTPATIENT
Start: 2023-04-24 | End: 2023-04-24

## 2023-04-24 RX ORDER — LIDOCAINE 40 MG/G
CREAM TOPICAL ONCE
OUTPATIENT
Start: 2023-04-24 | End: 2023-04-24

## 2023-04-24 NOTE — PLAN OF CARE
Pt to the [facility] for follow up appointment. Edema improved this week. Pt to continue with weekly compression wraps. Pt has been elevating legs this week. Pt states that he has been having problems with his knee. Discussed with Dr Tasha Ruby about wearing a brace on knee. Instructed patient to call PCP regarding knee. Pt to follow up in the [facility] in 1 week. Discharge instructions reviewed with patient, all questions answered, copy given to patient. Dressings were applied to all wounds per M.D. Instructions at this visit.

## 2023-04-24 NOTE — PROGRESS NOTES
88 [facility] Progress Note      Karo Gordillo     : 1950    DATE OF VISIT:  2023    Subjective:     Karo Gordillo is a 74 y.o. male who has a chief complaint of a venous ulcer located on the  bilateral leg. Doing well with leg elevation. No issues with compression wrap. Having pain in his knees. Has worn a knee brace this past week and that helped with the pain. Wants to know what kind of medication to put on the knee to feel better. Mr. Breanne Barrera has a past medical history of Allergic rhinitis, Arthritis, Bladder fistula, C. difficile diarrhea, Cellulitis of right lower extremity, CHF (congestive heart failure) ([address] Dental disease, Diabetes ([address] Diverticulitis, Dizziness, DVT of lower extremity, bilateral ([address] GERD (gastroesophageal reflux disease), Headache, Hearing loss, Hematuria, Hx of blood clots, Hyperlipidemia, Hypertension, Lung disease, MONIQUE (obstructive sleep apnea), Pancreatitis ([address] Pulmonary emboli ([address] Rash, Sleep apnea, and Tinnitus. He has a past surgical history that includes Tibia fracture surgery (Right, ); Cholecystectomy, open (N/A, 13); Cystocopy (12/10/13); Cystoscopy (14); other surgical history (14); Colonoscopy (); Colonoscopy (2013); Colonoscopy (14); colostomy (2014); Colon surgery; Abdomen surgery (2014); hernia repair (2015); pr injection hip arthrography w/anesthesia (Right, 2018); epidural steroid injection (Right, 2019); epidural steroid injection (Right, 2019); Cardioversion (2019); vascular surgery (Left, 2021); and vascular surgery (Right, 05/10/2021). His family history includes Heart Attack in his father; Heart Disease in his brother and father; High Blood Pressure in his brother; Kidney Disease in his mother; Stroke in his brother. Mr. Breanne Barrera reports that he has never smoked.  He has never used smokeless tobacco. He

## 2023-04-24 NOTE — PROGRESS NOTES
215 [facility] Physician Orders and Discharge 800 [facility]  [address], [address]  [place], [facility]  Telephone: [phone]      Fax: [phone]        Your home care company:   [employer]     Your wound-care supplies will be provided by: [facility]     NAME:  Felice Mustafa   YOB: 1950  PRIMARY DIAGNOSIS FOR WOUND CARE CENTER:  Venous leg ulcer . Wound cleansing:   Do not scrub or use excessive force. Wash hands with soap and water before and after dressing changes. Prior to applying a clean dressing, cleanse wound with normal saline, wound cleanser, or mild soap and water. Ask your physician or nurse before getting the wound(s) wet in the shower. Wound care for home:     Right lower leg and Left lower leg wounds:    Wash wounds with soap and water with dressing changes    Aquaphor to dry skin    Xeroform to wounds    CoFlex Calamine     Leave dressing on until Friday. Home Care to come Friday and change dressing. Home Care to remove compression wraps    Wash legs with soap and water    Aquaphor to dry skin    Silvadene to open areas    4x4's, ABD, Coban lite toes to knees    Leave dressing on until seen in the [facility] on Monday     Please note, all wounds (unless stated otherwise here) were mechanically debrided at the time of cleansing here in the wound-care center today, so a small amount of pain, drainage or bleeding from that process might be expected, and is normal.      All products for home use, including multiple products for a single wound if applicable, are medically necessary in order to achieve the best chance at timely wound healing. See provider documentation for details if needed.      Substituted dressings applied in the [facility] today, if applicable:           New orders for this week (labs, imaging, medications, etc.):      Elevate legs as much as possible   May

## 2023-04-25 NOTE — TELEPHONE ENCOUNTER
2- Heidy is a 72 year old who is being evaluated via a billable telephone visit.      What phone number would you like to be contacted at? [phone]  How would you like to obtain your AVS? Dany     [facility] Internal Medicine Progress Note           Assessment and Plan:     Situational anxiety  - diazepam (VALIUM) 2 MG tablet; Take 0.5-1 tablets (1-2 mg) by mouth 2 times daily as needed for anxiety or muscle spasms    Benign paroxysmal positional vertigo, unspecified laterality  - REVIEW OF HEALTH MAINTENANCE PROTOCOL ORDERS    Back muscle spasm  - diazepam (VALIUM) 2 MG tablet; Take 0.5-1 tablets (1-2 mg) by mouth 2 times daily as needed for anxiety or muscle spasms    Dilated cardiomyopathy (H)  - REVIEW OF HEALTH MAINTENANCE PROTOCOL ORDERS  - REVIEW OF HEALTH MAINTENANCE PROTOCOL ORDERS    Idiopathic interstitial fibrosis (H)  - REVIEW OF HEALTH MAINTENANCE PROTOCOL ORDERS  - REVIEW OF HEALTH MAINTENANCE PROTOCOL ORDERS    Coronary artery disease of autologous bypass graft with stable angina pectoris (H)  - REVIEW OF HEALTH MAINTENANCE PROTOCOL ORDERS  - REVIEW OF HEALTH MAINTENANCE PROTOCOL ORDERS    Stage 3a chronic kidney disease (H)  - REVIEW OF HEALTH MAINTENANCE PROTOCOL ORDERS  - REVIEW OF HEALTH MAINTENANCE PROTOCOL ORDERS           Interval History:     Reasons for visit:      1) Get rx for anti anxiety for dental.  I could also use amoxicilan refill for dental. Changing to  of [employer] and looking into dentures. Also injured lower back muscles and its taking very long to heal.     2) Discuss balance issues. She has fallen 2-3 times in the last 2 months. Patient states the problems started post COVID when she went to the ED 2/14/2023.               Significant Problems:   Patient Active Problem List   Diagnosis     TMJ (temporomandibular joint syndrome)     Congenital ureteric stenosis     Lacrimal duct stenosis     Chronic rhinitis     Intermittent asthma     Advanced  directives, counseling/discussion     Major depressive disorder, recurrent episode, moderate (H)     Osteoarthritis of right knee     Esophageal reflux (GERD)     Primary narcolepsy without cataplexy     Vitamin D deficiency     Stage 3a chronic kidney disease (H)     Incontinence of feces with fecal urgency     Overactive bladder     Fatigue, unspecified type     History of ischemic cardiomyopathy     Hyperlipidemia LDL goal <70     Cholelithiasis without cholecystitis     Coronary artery disease with angina pectoris, unspecified vessel or lesion type, unspecified whether native or transplanted heart (H)     Environmental allergies     Class 1 obesity due to excess calories with serious comorbidity and body mass index (BMI) of 30.0 to 30.9 in adult     Chronic insomnia     Hiatal hernia     Cheyne-Rogers breathing disorder     Clostridium difficile infection     Pyelonephritis     Sepsis, due to unspecified organism, unspecified whether acute organ dysfunction present (H)     REM sleep without atonia     Possible PLMD (periodic limb movement disorder)     Adjustment disorder with anxiety     Major depressive disorder, recurrent episode, mild (H)     Generalized anxiety disorder     Dilated cardiomyopathy (H)     Idiopathic interstitial fibrosis (H)     Back muscle spasm     Benign paroxysmal positional vertigo, unspecified laterality              Review of Systems:   CONSTITUTIONAL: NEGATIVE for fever, chills, change in weight  INTEGUMENTARY/SKIN: NEGATIVE for worrisome rashes, moles or lesions  EYES: NEGATIVE for vision changes or irritation  ENT/MOUTH: NEGATIVE for ear, mouth and throat problems  RESP: NEGATIVE for significant cough or SOB  RESP:POSITIVE for interstitial lung fibrosis.  BREAST: NEGATIVE for masses, tenderness or discharge  CV: NEGATIVE for chest pain, palpitations or peripheral edema  CV: POSITIVE for coronary artery disease.  GI: NEGATIVE for nausea, abdominal pain, heartburn, or change in bowel  habits  : NEGATIVE for frequency, dysuria, or hematuria  : POSITIVE for chronic kidney disease.  MUSCULOSKELETAL:POSITIVE  for back muscle spasm.  NEURO: NEGATIVE for weakness, dizziness or paresthesias  ENDOCRINE: NEGATIVE for temperature intolerance, skin/hair changes  HEME: NEGATIVE for bleeding problems  PSYCHIATRIC: As above.             Physical Exam:   Vital signs not obtained due to nature of visit.    Remainder of exam not performed due to nature of visit.          Data:   Epic reviewed.     Disposition:  Follow-up in 4 weeks or as needed.      Danny Conteh MD  Internal Medicine  [facility] Team    Phone call duration: 15 minutes  Start: 6:30 PM  End: 6:45 PM

## 2023-05-01 ENCOUNTER — HOSPITAL ENCOUNTER (OUTPATIENT)
Dept: WOUND CARE | Age: 73
Discharge: HOME | End: 2023-05-01
Payer: MEDICARE

## 2023-05-01 VITALS
WEIGHT: 314.13 LBS | BODY MASS INDEX: 41.63 KG/M2 | DIASTOLIC BLOOD PRESSURE: 91 MMHG | SYSTOLIC BLOOD PRESSURE: 152 MMHG | RESPIRATION RATE: 20 BRPM | TEMPERATURE: 98.4 F | HEART RATE: 72 BPM | HEIGHT: 73 IN

## 2023-05-01 DIAGNOSIS — L97.929 IDIOPATHIC CHRONIC VENOUS HYPERTENSION OF BOTH LOWER EXTREMITIES WITH ULCER (HCC): ICD-10-CM

## 2023-05-01 DIAGNOSIS — L97.222 NON-PRESSURE CHRONIC ULCER OF LEFT CALF WITH FAT LAYER EXPOSED (HCC): ICD-10-CM

## 2023-05-01 DIAGNOSIS — L97.919 VENOUS STASIS ULCER OF RIGHT LOWER EXTREMITY (HCC): Primary | ICD-10-CM

## 2023-05-01 DIAGNOSIS — I83.019 VENOUS STASIS ULCER OF RIGHT LOWER EXTREMITY (HCC): Primary | ICD-10-CM

## 2023-05-01 DIAGNOSIS — L97.212 NON-PRESSURE CHRONIC ULCER OF RIGHT CALF WITH FAT LAYER EXPOSED (HCC): ICD-10-CM

## 2023-05-01 DIAGNOSIS — I87.313 IDIOPATHIC CHRONIC VENOUS HYPERTENSION OF BOTH LOWER EXTREMITIES WITH ULCER (HCC): ICD-10-CM

## 2023-05-01 DIAGNOSIS — I87.2 VENOUS STASIS DERMATITIS OF BOTH LOWER EXTREMITIES: ICD-10-CM

## 2023-05-01 DIAGNOSIS — L97.919 IDIOPATHIC CHRONIC VENOUS HYPERTENSION OF BOTH LOWER EXTREMITIES WITH ULCER (HCC): ICD-10-CM

## 2023-05-01 PROCEDURE — 29581 APPL MULTLAYER CMPRN SYS LEG: CPT

## 2023-05-01 RX ORDER — LIDOCAINE 40 MG/G
CREAM TOPICAL ONCE
Status: DISCONTINUED | OUTPATIENT
Start: 2023-05-01 | End: 2023-05-02 | Stop reason: HOSPADM

## 2023-05-01 RX ORDER — BACITRACIN ZINC AND POLYMYXIN B SULFATE 500; 1000 [USP'U]/G; [USP'U]/G
OINTMENT TOPICAL ONCE
OUTPATIENT
Start: 2023-05-01 | End: 2023-05-01

## 2023-05-01 RX ORDER — LIDOCAINE 40 MG/G
CREAM TOPICAL ONCE
OUTPATIENT
Start: 2023-05-01 | End: 2023-05-01

## 2023-05-01 RX ORDER — LIDOCAINE 50 MG/G
OINTMENT TOPICAL ONCE
OUTPATIENT
Start: 2023-05-01 | End: 2023-05-01

## 2023-05-01 RX ORDER — LIDOCAINE HYDROCHLORIDE 40 MG/ML
SOLUTION TOPICAL ONCE
OUTPATIENT
Start: 2023-05-01 | End: 2023-05-01

## 2023-05-01 NOTE — PROGRESS NOTES
88 [facility] Progress Note      Yazmin Gomez     : 1950    DATE OF VISIT:  2023    Subjective:     Yazmin Gomez is a 74 y.o. male who has a chief complaint of a venous ulcer located on the  bilateral leg. Doing well with leg elevation. No issues with compression wrap. Mr. Vick Albarran has a past medical history of Allergic rhinitis, Arthritis, Bladder fistula, C. difficile diarrhea, Cellulitis of right lower extremity, CHF (congestive heart failure) ([address] Dental disease, Diabetes ([address] Diverticulitis, Dizziness, DVT of lower extremity, bilateral ([address] GERD (gastroesophageal reflux disease), Headache, Hearing loss, Hematuria, Hx of blood clots, Hyperlipidemia, Hypertension, Lung disease, MONIQUE (obstructive sleep apnea), Pancreatitis ([address] Pulmonary emboli ([address] Rash, Sleep apnea, and Tinnitus. He has a past surgical history that includes Tibia fracture surgery (Right, ); Cholecystectomy, open (N/A, 13); Cystocopy (12/10/13); Cystoscopy (14); other surgical history (14); Colonoscopy (); Colonoscopy (2013); Colonoscopy (14); colostomy (2014); Colon surgery; Abdomen surgery (2014); hernia repair (2015); pr injection hip arthrography w/anesthesia (Right, 2018); epidural steroid injection (Right, 2019); epidural steroid injection (Right, 2019); Cardioversion (2019); vascular surgery (Left, 2021); and vascular surgery (Right, 05/10/2021). His family history includes Heart Attack in his father; Heart Disease in his brother and father; High Blood Pressure in his brother; Kidney Disease in his mother; Stroke in his brother. Mr. Vick Albarran reports that he has never smoked. He has never used smokeless tobacco. He reports that he does not currently use alcohol. He reports that he does not use drugs.     His current medication list consists of Apoaequorin, Ascorbic Acid, Azelastine

## 2023-05-01 NOTE — PLAN OF CARE
Pt to the [address] [place] [address],[address] for follow up appointment. Wounds improving. Pt to continue with weekly compression wraps and elevate legs. Home Care to change dressings on Friday. Pt to follow up in the [address] [place] [address],[address] [place] [address] in 1 week. Discharge instructions reviewed with patient, all questions answered, copy given to patient. Dressings were applied to all wounds per M.D. Instructions at this visit.

## 2023-05-01 NOTE — PROGRESS NOTES
[facility] Physician Orders and Discharge 800 [facility]  [address], [address]  [place], [facility]  Telephone: [phone]      Fax: [phone]        Your home care company:   [employer]     Your wound-care supplies will be provided by: [facility]     NAME:  Maria Luisa Pittman   YOB: 1950  PRIMARY DIAGNOSIS FOR WOUND CARE CENTER:  Venous leg ulcer . Wound cleansing:   Do not scrub or use excessive force. Wash hands with soap and water before and after dressing changes. Prior to applying a clean dressing, cleanse wound with normal saline, wound cleanser, or mild soap and water. Ask your physician or nurse before getting the wound(s) wet in the shower. Wound care for home:     Right lower leg and Left lower leg wounds:    Wash wounds with soap and water with dressing changes    Aquaphor to dry skin    Xeroform to wounds    CoFlex Calamine     Leave dressing on until Friday. Home Care to come Friday and change dressing. Home Care to remove compression wraps    Wash legs with soap and water    Aquaphor to dry skin    Silvadene to open areas    4x4's, ABD, Coban lite toes to knees    Leave dressing on until seen in the [facility] on Monday     Please note, all wounds (unless stated otherwise here) were mechanically debrided at the time of cleansing here in the wound-care center today, so a small amount of pain, drainage or bleeding from that process might be expected, and is normal.      All products for home use, including multiple products for a single wound if applicable, are medically necessary in order to achieve the best chance at timely wound healing. See provider documentation for details if needed.      Substituted dressings applied in the [facility] today, if applicable:           New orders for this week (labs, imaging, medications, etc.):      Elevate legs as much as possible   May

## 2023-05-08 ENCOUNTER — HOSPITAL ENCOUNTER (OUTPATIENT)
Dept: WOUND CARE | Age: 73
Discharge: HOME | End: 2023-05-08
Payer: MEDICARE

## 2023-05-08 VITALS
SYSTOLIC BLOOD PRESSURE: 127 MMHG | TEMPERATURE: 97.1 F | OXYGEN SATURATION: 90 % | HEART RATE: 57 BPM | BODY MASS INDEX: 40.79 KG/M2 | DIASTOLIC BLOOD PRESSURE: 81 MMHG | WEIGHT: 307.8 LBS | HEIGHT: 73 IN | RESPIRATION RATE: 20 BRPM

## 2023-05-08 DIAGNOSIS — I83.019 VENOUS STASIS ULCER OF RIGHT LOWER EXTREMITY (HCC): Primary | ICD-10-CM

## 2023-05-08 DIAGNOSIS — L97.919 VENOUS STASIS ULCER OF RIGHT LOWER EXTREMITY (HCC): Primary | ICD-10-CM

## 2023-05-08 DIAGNOSIS — L97.212 NON-PRESSURE CHRONIC ULCER OF RIGHT CALF WITH FAT LAYER EXPOSED (HCC): ICD-10-CM

## 2023-05-08 DIAGNOSIS — L97.222 NON-PRESSURE CHRONIC ULCER OF LEFT CALF WITH FAT LAYER EXPOSED (HCC): ICD-10-CM

## 2023-05-08 DIAGNOSIS — L97.919 IDIOPATHIC CHRONIC VENOUS HYPERTENSION OF BOTH LOWER EXTREMITIES WITH ULCER (HCC): ICD-10-CM

## 2023-05-08 DIAGNOSIS — I87.313 IDIOPATHIC CHRONIC VENOUS HYPERTENSION OF BOTH LOWER EXTREMITIES WITH ULCER (HCC): ICD-10-CM

## 2023-05-08 DIAGNOSIS — I87.2 VENOUS STASIS DERMATITIS OF BOTH LOWER EXTREMITIES: ICD-10-CM

## 2023-05-08 DIAGNOSIS — L97.929 IDIOPATHIC CHRONIC VENOUS HYPERTENSION OF BOTH LOWER EXTREMITIES WITH ULCER (HCC): ICD-10-CM

## 2023-05-08 PROCEDURE — 29581 APPL MULTLAYER CMPRN SYS LEG: CPT

## 2023-05-08 RX ORDER — BACITRACIN ZINC AND POLYMYXIN B SULFATE 500; 1000 [USP'U]/G; [USP'U]/G
OINTMENT TOPICAL ONCE
OUTPATIENT
Start: 2023-05-08 | End: 2023-05-08

## 2023-05-08 RX ORDER — LIDOCAINE HYDROCHLORIDE 40 MG/ML
SOLUTION TOPICAL ONCE
OUTPATIENT
Start: 2023-05-08 | End: 2023-05-08

## 2023-05-08 RX ORDER — LIDOCAINE 50 MG/G
OINTMENT TOPICAL ONCE
OUTPATIENT
Start: 2023-05-08 | End: 2023-05-08

## 2023-05-08 RX ORDER — LIDOCAINE 40 MG/G
CREAM TOPICAL ONCE
OUTPATIENT
Start: 2023-05-08 | End: 2023-05-08

## 2023-05-08 RX ORDER — LIDOCAINE 40 MG/G
CREAM TOPICAL ONCE
Status: DISCONTINUED | OUTPATIENT
Start: 2023-05-08 | End: 2023-05-09 | Stop reason: HOSPADM

## 2023-05-08 NOTE — PROGRESS NOTES
215 [facility] Physician Orders and Discharge 800 [facility]  [address], [address]  [place], [facility]  Telephone: [phone]      Fax: [phone]        Your home care company:   [employer]     Your wound-care supplies will be provided by: [facility]     NAME:  Tomas Mcclain   YOB: 1950  PRIMARY DIAGNOSIS FOR WOUND CARE CENTER:  Venous leg ulcer . Wound cleansing:   Do not scrub or use excessive force. Wash hands with soap and water before and after dressing changes. Prior to applying a clean dressing, cleanse wound with normal saline, wound cleanser, or mild soap and water. Ask your physician or nurse before getting the wound(s) wet in the shower. Wound care for home:     Right lower leg and Left lower leg wounds:    Wash wounds with soap and water with dressing changes    Aquaphor to dry skin    Xeroform to wounds    CoFlex Calamine     Leave dressing on until Friday. Home Care to come Friday and change dressing. Home Care to remove compression wraps    Wash legs with soap and water    Aquaphor to dry skin    Silvadene to open areas    4x4's, ABD, Coban lite toes to knees    Leave dressing on until seen in the [facility] on Monday     Please note, all wounds (unless stated otherwise here) were mechanically debrided at the time of cleansing here in the wound-care center today, so a small amount of pain, drainage or bleeding from that process might be expected, and is normal.      All products for home use, including multiple products for a single wound if applicable, are medically necessary in order to achieve the best chance at timely wound healing. See provider documentation for details if needed.      Substituted dressings applied in the [facility] today, if applicable:           New orders for this week (labs, imaging, medications, etc.):      Elevate legs as much as possible   May

## 2023-05-08 NOTE — PLAN OF CARE
Pt to the [facility] for follow up appointment. Pt states that he fell at home this am prior to coming to [facility] appointment. Pt has scratch on head and denies any complaints. Pt continue with compression wraps to bilateral lower legs. Home Care to change dressings on legs on Friday. Will work on ordering compression garments for patient. Pt to follow up in the [facility] in 1 week. Discharge instructions reviewed with patient, all questions answered, copy given to patient. Dressings were applied to all wounds per M.D. Instructions at this visit.

## 2023-05-11 ENCOUNTER — TELEPHONE (OUTPATIENT)
Dept: CARDIOLOGY CLINIC | Age: 73
End: 2023-05-11

## 2023-05-12 ENCOUNTER — OFFICE VISIT (OUTPATIENT)
Dept: CARDIOLOGY CLINIC | Age: 73
End: 2023-05-12

## 2023-05-12 VITALS
WEIGHT: 307.8 LBS | BODY MASS INDEX: 40.79 KG/M2 | HEART RATE: 81 BPM | HEIGHT: 73 IN | SYSTOLIC BLOOD PRESSURE: 146 MMHG | TEMPERATURE: 98.6 F | DIASTOLIC BLOOD PRESSURE: 88 MMHG | OXYGEN SATURATION: 90 %

## 2023-05-12 DIAGNOSIS — I50.810 RIGHT HEART FAILURE, NYHA CLASS 4 (HCC): ICD-10-CM

## 2023-05-12 DIAGNOSIS — I10 ESSENTIAL HYPERTENSION: ICD-10-CM

## 2023-05-12 DIAGNOSIS — E11.65 TYPE 2 DIABETES MELLITUS WITH HYPERGLYCEMIA, WITH LONG-TERM CURRENT USE OF INSULIN (HCC): ICD-10-CM

## 2023-05-12 DIAGNOSIS — Z79.4 TYPE 2 DIABETES MELLITUS WITH HYPERGLYCEMIA, WITH LONG-TERM CURRENT USE OF INSULIN (HCC): ICD-10-CM

## 2023-05-12 DIAGNOSIS — I35.0 MODERATE AORTIC STENOSIS: ICD-10-CM

## 2023-05-12 DIAGNOSIS — I50.32 CHRONIC DIASTOLIC CONGESTIVE HEART FAILURE (HCC): Primary | ICD-10-CM

## 2023-05-12 DIAGNOSIS — I27.20 PULMONARY HTN (HCC): ICD-10-CM

## 2023-05-12 NOTE — PROGRESS NOTES
3/9/23  Yes SHAKIRA Mendez CNP   fluticasone-umeclidin-vilant (TRELEGY ELLIPTA) 100-62.5-25 MCG/ACT AEPB inhaler Inhale 1 puff into the lungs daily Lot [id number] ex 6/24/23 3/3/23  Yes Aiden Nye MD   Azelastine HCl 137 MCG/SPRAY SOLN 1 SPRAY BY NASAL ROUTE 2 TIMES DAILY USE IN EACH NOSTRIL AS DIRECTED 1/30/23  Yes SHAKIRA Dixon CNP   Insulin Glargine, 2 Unit Dial, (TOUJEO MAX SOLOSTAR) 300 UNIT/ML SOPN Inject 50 Units into the skin 2 times daily 12/15/22  Yes SHAKIRA Mendez CNP   torsemide (DEMADEX) 20 MG tablet TAKE 2 TABLETS BY MOUTH EVERY DAY  Patient taking differently: 0.5 tablets daily 12/14/22  Yes SHAKIRA Garrett CNP   montelukast (SINGULAIR) 10 MG tablet TAKE 1 TABLET BY MOUTH EVERY DAY 11/30/22  Yes SHAKIRA Mendez CNP   isosorbide mononitrate (IMDUR) 30 MG extended release tablet TAKE 1 TABLET BY MOUTH EVERY DAY 11/30/22  Yes SHAKIRA Mendez CNP   atorvastatin (LIPITOR) 40 MG tablet TAKE 1 TABLET BY MOUTH EVERY DAY AT NIGHT 11/30/22  Yes SHAKIRA Mednez CNP   ELIQUIS 5 MG TABS tablet TAKE 1 TABLET BY MOUTH TWICE A DAY 11/30/22  Yes SHAKIRA Garrett CNP   atenolol (TENORMIN) 50 MG tablet TAKE 1/2 TABLET BY MOUTH EVERY DAY 9/6/22  Yes SHAKIRA Garrett CNP   glimepiride (AMARYL) 4 MG tablet TAKE 1 TABLET BY MOUTH EVERY DAY IN THE MORNING 6/29/22  Yes SHAKIRA Mendez CNP   omeprazole (PRILOSEC) 40 MG delayed release capsule TAKE 1 CAPSULE BY MOUTH EVERY DAY 6/29/22  Yes SHAKIRA Mendez CNP   ELDERBERRY PO Take by mouth 2 times daily   Yes Historical Provider, MD   Ascorbic Acid (VITAMIN C PO) Take by mouth in the morning and at bedtime   Yes Historical Provider, MD   Handicap Placard [address] by Does not apply route Please issue for duration of 5 years 9/24/20  Yes Shawanda Aguirre, APRN - CNP   Insulin Pen Needle 31G X 5 MM MISC 1 each by Does not apply route daily 2/24/20  Yes

## 2023-05-12 NOTE — PATIENT INSTRUCTIONS
Check weights at home and record   Continue current dose of the torsemide  Continue imdur 30mg daily   Adjust the Entresto 49/51mg take 1 tab twice a day (take 2 tabs of the 24/26mg twice a day until you get the new script)   Check bmp in 7-10 days after medication adjustment  Continue Eliquis, atenolol to 25mg daily  Stay as active as possible  Follow up end of June

## 2023-05-15 ENCOUNTER — HOSPITAL ENCOUNTER (OUTPATIENT)
Dept: WOUND CARE | Age: 73
Discharge: HOME | End: 2023-05-15
Payer: MEDICARE

## 2023-05-15 VITALS
DIASTOLIC BLOOD PRESSURE: 80 MMHG | TEMPERATURE: 98 F | RESPIRATION RATE: 18 BRPM | HEIGHT: 73 IN | HEART RATE: 70 BPM | BODY MASS INDEX: 40.72 KG/M2 | SYSTOLIC BLOOD PRESSURE: 136 MMHG | OXYGEN SATURATION: 93 % | WEIGHT: 307.25 LBS

## 2023-05-15 DIAGNOSIS — L97.919 IDIOPATHIC CHRONIC VENOUS HYPERTENSION OF BOTH LOWER EXTREMITIES WITH ULCER (HCC): ICD-10-CM

## 2023-05-15 DIAGNOSIS — L97.929 IDIOPATHIC CHRONIC VENOUS HYPERTENSION OF BOTH LOWER EXTREMITIES WITH ULCER (HCC): ICD-10-CM

## 2023-05-15 DIAGNOSIS — I87.2 VENOUS STASIS DERMATITIS OF BOTH LOWER EXTREMITIES: ICD-10-CM

## 2023-05-15 DIAGNOSIS — L97.212 NON-PRESSURE CHRONIC ULCER OF RIGHT CALF WITH FAT LAYER EXPOSED (HCC): ICD-10-CM

## 2023-05-15 DIAGNOSIS — I87.313 IDIOPATHIC CHRONIC VENOUS HYPERTENSION OF BOTH LOWER EXTREMITIES WITH ULCER (HCC): ICD-10-CM

## 2023-05-15 DIAGNOSIS — I83.019 VENOUS STASIS ULCER OF RIGHT LOWER EXTREMITY (HCC): Primary | ICD-10-CM

## 2023-05-15 DIAGNOSIS — L97.919 VENOUS STASIS ULCER OF RIGHT LOWER EXTREMITY (HCC): Primary | ICD-10-CM

## 2023-05-15 DIAGNOSIS — L97.222 NON-PRESSURE CHRONIC ULCER OF LEFT CALF WITH FAT LAYER EXPOSED (HCC): ICD-10-CM

## 2023-05-15 PROCEDURE — 29581 APPL MULTLAYER CMPRN SYS LEG: CPT

## 2023-05-15 RX ORDER — BACITRACIN ZINC AND POLYMYXIN B SULFATE 500; 1000 [USP'U]/G; [USP'U]/G
OINTMENT TOPICAL ONCE
OUTPATIENT
Start: 2023-05-15 | End: 2023-05-15

## 2023-05-15 RX ORDER — LIDOCAINE 50 MG/G
OINTMENT TOPICAL ONCE
OUTPATIENT
Start: 2023-05-15 | End: 2023-05-15

## 2023-05-15 RX ORDER — LIDOCAINE 40 MG/G
CREAM TOPICAL ONCE
OUTPATIENT
Start: 2023-05-15 | End: 2023-05-15

## 2023-05-15 RX ORDER — LIDOCAINE 40 MG/G
CREAM TOPICAL ONCE
Status: DISCONTINUED | OUTPATIENT
Start: 2023-05-15 | End: 2023-05-16 | Stop reason: HOSPADM

## 2023-05-15 RX ORDER — LIDOCAINE HYDROCHLORIDE 40 MG/ML
SOLUTION TOPICAL ONCE
OUTPATIENT
Start: 2023-05-15 | End: 2023-05-15

## 2023-05-15 ASSESSMENT — PAIN SCALES - GENERAL: PAINLEVEL_OUTOF10: 2

## 2023-05-15 ASSESSMENT — PAIN DESCRIPTION - LOCATION: LOCATION: LEG

## 2023-05-15 ASSESSMENT — PAIN DESCRIPTION - PAIN TYPE: TYPE: CHRONIC PAIN

## 2023-05-15 ASSESSMENT — PAIN DESCRIPTION - DESCRIPTORS: DESCRIPTORS: ACHING

## 2023-05-15 ASSESSMENT — PAIN DESCRIPTION - ORIENTATION: ORIENTATION: LEFT

## 2023-05-15 ASSESSMENT — PAIN DESCRIPTION - FREQUENCY: FREQUENCY: INTERMITTENT

## 2023-05-15 ASSESSMENT — PAIN DESCRIPTION - ONSET: ONSET: ON-GOING

## 2023-05-15 ASSESSMENT — PAIN - FUNCTIONAL ASSESSMENT: PAIN_FUNCTIONAL_ASSESSMENT: ACTIVITIES ARE NOT PREVENTED

## 2023-05-15 NOTE — PROGRESS NOTES
215 [facility] Physician Orders and Discharge 800 [facility]  [address], [address]  [place], [facility]  Telephone: [phone]      Fax: [phone]        Your home care company:   [employer]     Your wound-care supplies will be provided by: [facility]     NAME:  Len Halsted   YOB: 1950  PRIMARY DIAGNOSIS FOR WOUND CARE CENTER:  Venous leg ulcer . Wound cleansing:   Do not scrub or use excessive force. Wash hands with soap and water before and after dressing changes. Prior to applying a clean dressing, cleanse wound with normal saline, wound cleanser, or mild soap and water. Ask your physician or nurse before getting the wound(s) wet in the shower. Wound care for home:     Right lower leg and Left lower leg wounds:    Wash wounds with soap and water with dressing changes    Aquaphor to dry skin    Xeroform to wounds    CoFlex Calamine     Leave dressing on until Friday. Home Care to come Friday and change dressing. Home Care to remove compression wraps    Wash legs with soap and water    Aquaphor to dry skin    Silvadene to open areas    4x4's, ABD, Coban lite toes to knees    Leave dressing on until seen in the [facility] on Monday     Please note, all wounds (unless stated otherwise here) were mechanically debrided at the time of cleansing here in the wound-care center today, so a small amount of pain, drainage or bleeding from that process might be expected, and is normal.      All products for home use, including multiple products for a single wound if applicable, are medically necessary in order to achieve the best chance at timely wound healing. See provider documentation for details if needed.      Substituted dressings applied in the [facility] today, if applicable:           New orders for this week (labs, imaging, medications, etc.):      Elevate legs as much as possible   May
his skin. Written discharge instructions given to patient. Offload ulcer(s) as directed. Elevate leg(s) as directed. Follow up in 1 week. C will change dressings on Friday. Elevation of legs to decrease edema. Control weight. Control glucose levels to prevent future complications. Wounds looking better this week.            Electronically signed by Spencer Curran DPM on 5/15/2023 at 12:44 PM.

## 2023-05-15 NOTE — PLAN OF CARE
Pt to the [facility] for follow up appointment. Wounds were not debrided today. Pt to continue with weekly compression wraps. Pt did receive compression garments for long term. Pt to follow up in the [facility] in 1 week. Discharge instructions reviewed with patient, all questions answered, copy given to patient. Dressings were applied to all wounds per M.D. Instructions at this visit.

## 2023-05-19 ENCOUNTER — HOSPITAL ENCOUNTER (OUTPATIENT)
Age: 73
Setting detail: SPECIMEN
Discharge: HOME | End: 2023-05-19
Payer: MEDICARE

## 2023-05-19 LAB
ANION GAP SERPL CALCULATED.3IONS-SCNC: 14 MMOL/L (ref 3–16)
BUN SERPL-MCNC: 20 MG/DL (ref 7–20)
CALCIUM SERPL-MCNC: 8.6 MG/DL (ref 8.3–10.6)
CHLORIDE SERPL-SCNC: 99 MMOL/L (ref 99–110)
CO2 SERPL-SCNC: 28 MMOL/L (ref 21–32)
CREAT SERPL-MCNC: 0.9 MG/DL (ref 0.8–1.3)
GFR SERPLBLD CREATININE-BSD FMLA CKD-EPI: >60 ML/MIN/{1.73_M2}
GLUCOSE SERPL-MCNC: 166 MG/DL (ref 70–99)
NT-PROBNP SERPL-MCNC: 609 PG/ML (ref 0–449)
POTASSIUM SERPL-SCNC: 3.2 MMOL/L (ref 3.5–5.1)
SODIUM SERPL-SCNC: 141 MMOL/L (ref 136–145)

## 2023-05-19 PROCEDURE — 80048 BASIC METABOLIC PNL TOTAL CA: CPT

## 2023-05-19 PROCEDURE — 83880 ASSAY OF NATRIURETIC PEPTIDE: CPT

## 2023-05-22 ENCOUNTER — HOSPITAL ENCOUNTER (OUTPATIENT)
Dept: WOUND CARE | Age: 73
Discharge: HOME | End: 2023-05-22
Payer: MEDICARE

## 2023-05-22 ENCOUNTER — TELEPHONE (OUTPATIENT)
Dept: FAMILY MEDICINE CLINIC | Age: 73
End: 2023-05-22

## 2023-05-22 VITALS
HEART RATE: 66 BPM | RESPIRATION RATE: 18 BRPM | DIASTOLIC BLOOD PRESSURE: 103 MMHG | SYSTOLIC BLOOD PRESSURE: 158 MMHG | TEMPERATURE: 98 F

## 2023-05-22 DIAGNOSIS — I87.313 IDIOPATHIC CHRONIC VENOUS HYPERTENSION OF BOTH LOWER EXTREMITIES WITH ULCER (HCC): ICD-10-CM

## 2023-05-22 DIAGNOSIS — L97.919 IDIOPATHIC CHRONIC VENOUS HYPERTENSION OF BOTH LOWER EXTREMITIES WITH ULCER (HCC): ICD-10-CM

## 2023-05-22 DIAGNOSIS — H91.93 DECREASED HEARING OF BOTH EARS: Primary | ICD-10-CM

## 2023-05-22 DIAGNOSIS — I87.2 VENOUS STASIS DERMATITIS OF BOTH LOWER EXTREMITIES: ICD-10-CM

## 2023-05-22 DIAGNOSIS — L97.222 NON-PRESSURE CHRONIC ULCER OF LEFT CALF WITH FAT LAYER EXPOSED (HCC): ICD-10-CM

## 2023-05-22 DIAGNOSIS — L97.212 NON-PRESSURE CHRONIC ULCER OF RIGHT CALF WITH FAT LAYER EXPOSED (HCC): ICD-10-CM

## 2023-05-22 DIAGNOSIS — I83.019 VENOUS STASIS ULCER OF RIGHT LOWER EXTREMITY (HCC): Primary | ICD-10-CM

## 2023-05-22 DIAGNOSIS — L97.919 VENOUS STASIS ULCER OF RIGHT LOWER EXTREMITY (HCC): Primary | ICD-10-CM

## 2023-05-22 DIAGNOSIS — L97.929 IDIOPATHIC CHRONIC VENOUS HYPERTENSION OF BOTH LOWER EXTREMITIES WITH ULCER (HCC): ICD-10-CM

## 2023-05-22 PROCEDURE — 29581 APPL MULTLAYER CMPRN SYS LEG: CPT

## 2023-05-22 RX ORDER — LIDOCAINE 50 MG/G
OINTMENT TOPICAL ONCE
OUTPATIENT
Start: 2023-05-22 | End: 2023-05-22

## 2023-05-22 RX ORDER — LIDOCAINE 40 MG/G
CREAM TOPICAL ONCE
Status: DISCONTINUED | OUTPATIENT
Start: 2023-05-22 | End: 2023-05-23 | Stop reason: HOSPADM

## 2023-05-22 RX ORDER — LIDOCAINE HYDROCHLORIDE 40 MG/ML
SOLUTION TOPICAL ONCE
OUTPATIENT
Start: 2023-05-22 | End: 2023-05-22

## 2023-05-22 RX ORDER — BACITRACIN ZINC AND POLYMYXIN B SULFATE 500; 1000 [USP'U]/G; [USP'U]/G
OINTMENT TOPICAL ONCE
OUTPATIENT
Start: 2023-05-22 | End: 2023-05-22

## 2023-05-22 RX ORDER — LIDOCAINE 40 MG/G
CREAM TOPICAL ONCE
OUTPATIENT
Start: 2023-05-22 | End: 2023-05-22

## 2023-05-22 ASSESSMENT — PAIN SCALES - GENERAL: PAINLEVEL_OUTOF10: 0

## 2023-05-22 NOTE — PLAN OF CARE
Pt to the [address] [place] [address],[address] for follow up appointment. Wounds improving and were not debrided today. Pt to continue with biweekly compression wraps. Home care to change dressings on Friday's and Monday/Tuesday next week due to holiday. Pt to follow up in the [address] [place] [address],[address] in 2 weeks with Dr Tammy Little. Discharge instructions reviewed with patient, all questions answered, copy given to patient. Dressings were applied to all wounds per M.D. Instructions at this visit.

## 2023-05-22 NOTE — TELEPHONE ENCOUNTER
Patient called the office wanting to know if he can get a referral to ENT, his ears fill clogged all the time and has some difficulty with hearing. Please advise, thanks.

## 2023-05-22 NOTE — PROGRESS NOTES
need help with your wound outside these hours and cannot wait until we are again available, contact your home-care company (if applicable), your PCP, or go to the nearest emergency room

## 2023-05-23 ENCOUNTER — TELEPHONE (OUTPATIENT)
Dept: CARDIOLOGY CLINIC | Age: 73
End: 2023-05-23

## 2023-05-23 DIAGNOSIS — I50.33 HEART FAILURE, DIASTOLIC, WITH ACUTE DECOMPENSATION (HCC): ICD-10-CM

## 2023-05-23 DIAGNOSIS — I10 ESSENTIAL HYPERTENSION: Chronic | ICD-10-CM

## 2023-05-23 DIAGNOSIS — I50.9 CHRONIC CONGESTIVE HEART FAILURE, UNSPECIFIED HEART FAILURE TYPE (HCC): Primary | ICD-10-CM

## 2023-05-23 NOTE — TELEPHONE ENCOUNTER
Created telephone encounter. Spoke with Beau Soto relayed message per [facility] regarding labs. Pt verbalized understanding.

## 2023-05-23 NOTE — TELEPHONE ENCOUNTER
----- Message from SHAKIRA Soares CNP sent at 5/22/2023  4:41 PM EDT -----  Please notify patient results. Kidney function is stable. Potassium level is a little low and would recommend increasing potassium in his diet (oranges, broccoli, avocados, tomatoes, raisins)  Pro-BNP (heart failure number) is significantly improved  Continue current regimen.  Repeat BMP in about 1-2 weeks

## 2023-05-30 ENCOUNTER — APPOINTMENT (OUTPATIENT)
Dept: CT IMAGING | Age: 73
End: 2023-05-30
Payer: MEDICARE

## 2023-05-30 ENCOUNTER — HOSPITAL ENCOUNTER (INPATIENT)
Age: 73
LOS: 8 days | Discharge: NURSING, REHAB OR LONG-TERM CARE | End: 2023-06-07
Attending: STUDENT IN AN ORGANIZED HEALTH CARE EDUCATION/TRAINING PROGRAM | Admitting: INTERNAL MEDICINE
Payer: MEDICARE

## 2023-05-30 ENCOUNTER — APPOINTMENT (OUTPATIENT)
Dept: GENERAL RADIOLOGY | Age: 73
End: 2023-05-30
Payer: MEDICARE

## 2023-05-30 DIAGNOSIS — R55 SYNCOPE AND COLLAPSE: Primary | ICD-10-CM

## 2023-05-30 DIAGNOSIS — I50.32 CHRONIC DIASTOLIC CONGESTIVE HEART FAILURE (HCC): ICD-10-CM

## 2023-05-30 DIAGNOSIS — I50.9 ACUTE ON CHRONIC CONGESTIVE HEART FAILURE, UNSPECIFIED HEART FAILURE TYPE (HCC): ICD-10-CM

## 2023-05-30 DIAGNOSIS — R77.8 ELEVATED TROPONIN: ICD-10-CM

## 2023-05-30 DIAGNOSIS — M15.9 PRIMARY OSTEOARTHRITIS INVOLVING MULTIPLE JOINTS: ICD-10-CM

## 2023-05-30 LAB
ALBUMIN SERPL-MCNC: 3.2 G/DL (ref 3.4–5)
ALBUMIN/GLOB SERPL: 0.9 {RATIO} (ref 1.1–2.2)
ALP SERPL-CCNC: 86 U/L (ref 40–129)
ALT SERPL-CCNC: 17 U/L (ref 10–40)
ANION GAP SERPL CALCULATED.3IONS-SCNC: 13 MMOL/L (ref 3–16)
AST SERPL-CCNC: 28 U/L (ref 15–37)
BASE EXCESS BLDV CALC-SCNC: 1.7 MMOL/L (ref -3–3)
BASOPHILS # BLD: 0 K/UL (ref 0–0.2)
BASOPHILS NFR BLD: 0.5 %
BILIRUB SERPL-MCNC: 1.8 MG/DL (ref 0–1)
BILIRUB UR QL STRIP.AUTO: NEGATIVE
BUN SERPL-MCNC: 17 MG/DL (ref 7–20)
CALCIUM SERPL-MCNC: 9.1 MG/DL (ref 8.3–10.6)
CHLORIDE SERPL-SCNC: 104 MMOL/L (ref 99–110)
CHP ED QC CHECK: YES
CLARITY UR: CLEAR
CO2 BLDV-SCNC: 28 MMOL/L
CO2 SERPL-SCNC: 24 MMOL/L (ref 21–32)
COHGB MFR BLDV: 5.9 % (ref 0–1.5)
COLOR UR: YELLOW
CREAT SERPL-MCNC: 0.9 MG/DL (ref 0.8–1.3)
DEPRECATED RDW RBC AUTO: 17.4 % (ref 12.4–15.4)
EKG ATRIAL RATE: 68 BPM
EKG DIAGNOSIS: NORMAL
EKG Q-T INTERVAL: 468 MS
EKG QRS DURATION: 90 MS
EKG QTC CALCULATION (BAZETT): 556 MS
EKG R AXIS: -62 DEGREES
EKG T AXIS: 37 DEGREES
EKG VENTRICULAR RATE: 85 BPM
EOSINOPHIL # BLD: 0.1 K/UL (ref 0–0.6)
EOSINOPHIL NFR BLD: 0.9 %
EPI CELLS #/AREA URNS HPF: ABNORMAL /HPF (ref 0–5)
FLUAV RNA RESP QL NAA+PROBE: NOT DETECTED
FLUBV RNA RESP QL NAA+PROBE: NOT DETECTED
GFR SERPLBLD CREATININE-BSD FMLA CKD-EPI: >60 ML/MIN/{1.73_M2}
GLUCOSE BLD-MCNC: 87 MG/DL
GLUCOSE SERPL-MCNC: 85 MG/DL (ref 70–99)
GLUCOSE UR STRIP.AUTO-MCNC: NEGATIVE MG/DL
HCO3 BLDV-SCNC: 26.4 MMOL/L (ref 23–29)
HCT VFR BLD AUTO: 49.8 % (ref 40.5–52.5)
HGB BLD-MCNC: 16.2 G/DL (ref 13.5–17.5)
HGB UR QL STRIP.AUTO: NEGATIVE
KETONES UR STRIP.AUTO-MCNC: NEGATIVE MG/DL
LEUKOCYTE ESTERASE UR QL STRIP.AUTO: NEGATIVE
LYMPHOCYTES # BLD: 0.8 K/UL (ref 1–5.1)
LYMPHOCYTES NFR BLD: 10.4 %
MCH RBC QN AUTO: 28.4 PG (ref 26–34)
MCHC RBC AUTO-ENTMCNC: 32.6 G/DL (ref 31–36)
MCV RBC AUTO: 87.1 FL (ref 80–100)
METHGB MFR BLDV: 0.3 %
MONOCYTES # BLD: 0.7 K/UL (ref 0–1.3)
MONOCYTES NFR BLD: 9 %
MUCOUS THREADS #/AREA URNS LPF: ABNORMAL /LPF
NEUTROPHILS # BLD: 6.2 K/UL (ref 1.7–7.7)
NEUTROPHILS NFR BLD: 79.2 %
NITRITE UR QL STRIP.AUTO: NEGATIVE
NT-PROBNP SERPL-MCNC: 573 PG/ML (ref 0–449)
O2 CT VFR BLDV CALC: 21 VOL %
O2 THERAPY: ABNORMAL
PCO2 BLDV: 41.4 MMHG (ref 40–50)
PH BLDV: 7.42 [PH] (ref 7.35–7.45)
PH UR STRIP.AUTO: 6.5 [PH] (ref 5–8)
PLATELET # BLD AUTO: 167 K/UL (ref 135–450)
PMV BLD AUTO: 8.1 FL (ref 5–10.5)
PO2 BLDV: 59.4 MMHG (ref 25–40)
POTASSIUM SERPL-SCNC: 4.9 MMOL/L (ref 3.5–5.1)
PROT SERPL-MCNC: 6.8 G/DL (ref 6.4–8.2)
PROT UR STRIP.AUTO-MCNC: 100 MG/DL
RBC # BLD AUTO: 5.72 M/UL (ref 4.2–5.9)
RBC #/AREA URNS HPF: ABNORMAL /HPF (ref 0–4)
SAO2 % BLDV: 91 %
SARS-COV-2 RNA RESP QL NAA+PROBE: NOT DETECTED
SODIUM SERPL-SCNC: 141 MMOL/L (ref 136–145)
SP GR UR STRIP.AUTO: 1.01 (ref 1–1.03)
TROPONIN, HIGH SENSITIVITY: 38 NG/L (ref 0–22)
TROPONIN, HIGH SENSITIVITY: 40 NG/L (ref 0–22)
UA COMPLETE W REFLEX CULTURE PNL UR: ABNORMAL
UA DIPSTICK W REFLEX MICRO PNL UR: YES
URN SPEC COLLECT METH UR: ABNORMAL
UROBILINOGEN UR STRIP-ACNC: 0.2 E.U./DL
WBC # BLD AUTO: 7.9 K/UL (ref 4–11)
WBC #/AREA URNS HPF: ABNORMAL /HPF (ref 0–5)

## 2023-05-30 PROCEDURE — 81001 URINALYSIS AUTO W/SCOPE: CPT

## 2023-05-30 PROCEDURE — 83880 ASSAY OF NATRIURETIC PEPTIDE: CPT

## 2023-05-30 PROCEDURE — 84484 ASSAY OF TROPONIN QUANT: CPT

## 2023-05-30 PROCEDURE — 93010 ELECTROCARDIOGRAM REPORT: CPT | Performed by: INTERNAL MEDICINE

## 2023-05-30 PROCEDURE — 99285 EMERGENCY DEPT VISIT HI MDM: CPT

## 2023-05-30 PROCEDURE — 36415 COLL VENOUS BLD VENIPUNCTURE: CPT

## 2023-05-30 PROCEDURE — 71260 CT THORAX DX C+: CPT

## 2023-05-30 PROCEDURE — 6370000000 HC RX 637 (ALT 250 FOR IP): Performed by: NURSE PRACTITIONER

## 2023-05-30 PROCEDURE — 71045 X-RAY EXAM CHEST 1 VIEW: CPT

## 2023-05-30 PROCEDURE — 2060000000 HC ICU INTERMEDIATE R&B

## 2023-05-30 PROCEDURE — 82803 BLOOD GASES ANY COMBINATION: CPT

## 2023-05-30 PROCEDURE — 70450 CT HEAD/BRAIN W/O DYE: CPT

## 2023-05-30 PROCEDURE — 6360000004 HC RX CONTRAST MEDICATION: Performed by: NURSE PRACTITIONER

## 2023-05-30 PROCEDURE — 96374 THER/PROPH/DIAG INJ IV PUSH: CPT

## 2023-05-30 PROCEDURE — 87636 SARSCOV2 & INF A&B AMP PRB: CPT

## 2023-05-30 PROCEDURE — 93005 ELECTROCARDIOGRAM TRACING: CPT | Performed by: NURSE PRACTITIONER

## 2023-05-30 PROCEDURE — 96375 TX/PRO/DX INJ NEW DRUG ADDON: CPT

## 2023-05-30 PROCEDURE — 6360000002 HC RX W HCPCS: Performed by: NURSE PRACTITIONER

## 2023-05-30 PROCEDURE — 85025 COMPLETE CBC W/AUTO DIFF WBC: CPT

## 2023-05-30 PROCEDURE — 80053 COMPREHEN METABOLIC PANEL: CPT

## 2023-05-30 RX ORDER — MORPHINE SULFATE 4 MG/ML
4 INJECTION, SOLUTION INTRAMUSCULAR; INTRAVENOUS ONCE
Status: COMPLETED | OUTPATIENT
Start: 2023-05-30 | End: 2023-05-30

## 2023-05-30 RX ORDER — FUROSEMIDE 10 MG/ML
40 INJECTION INTRAMUSCULAR; INTRAVENOUS ONCE
Status: COMPLETED | OUTPATIENT
Start: 2023-05-30 | End: 2023-05-30

## 2023-05-30 RX ORDER — FENTANYL CITRATE 50 UG/ML
50 INJECTION, SOLUTION INTRAMUSCULAR; INTRAVENOUS ONCE
Status: DISCONTINUED | OUTPATIENT
Start: 2023-05-30 | End: 2023-05-30

## 2023-05-30 RX ORDER — ONDANSETRON 2 MG/ML
4 INJECTION INTRAMUSCULAR; INTRAVENOUS ONCE
Status: COMPLETED | OUTPATIENT
Start: 2023-05-30 | End: 2023-05-30

## 2023-05-30 RX ORDER — OXYCODONE HYDROCHLORIDE AND ACETAMINOPHEN 5; 325 MG/1; MG/1
1 TABLET ORAL ONCE
Status: COMPLETED | OUTPATIENT
Start: 2023-05-30 | End: 2023-05-30

## 2023-05-30 RX ADMIN — FUROSEMIDE 40 MG: 10 INJECTION, SOLUTION INTRAMUSCULAR; INTRAVENOUS at 19:20

## 2023-05-30 RX ADMIN — MORPHINE SULFATE 4 MG: 4 INJECTION, SOLUTION INTRAMUSCULAR; INTRAVENOUS at 19:20

## 2023-05-30 RX ADMIN — OXYCODONE HYDROCHLORIDE AND ACETAMINOPHEN 1 TABLET: 5; 325 TABLET ORAL at 16:48

## 2023-05-30 RX ADMIN — IOPAMIDOL 85 ML: 755 INJECTION, SOLUTION INTRAVENOUS at 19:32

## 2023-05-30 RX ADMIN — ONDANSETRON 4 MG: 2 INJECTION INTRAMUSCULAR; INTRAVENOUS at 19:21

## 2023-05-30 ASSESSMENT — PAIN SCALES - GENERAL
PAINLEVEL_OUTOF10: 5
PAINLEVEL_OUTOF10: 4
PAINLEVEL_OUTOF10: 0
PAINLEVEL_OUTOF10: 7

## 2023-05-30 ASSESSMENT — ENCOUNTER SYMPTOMS
RHINORRHEA: 0
DIARRHEA: 0
BLOOD IN STOOL: 0
SORE THROAT: 0
ABDOMINAL PAIN: 0
NAUSEA: 0
COUGH: 0
VOMITING: 0
EYE PAIN: 0
BACK PAIN: 0
SHORTNESS OF BREATH: 1

## 2023-05-30 ASSESSMENT — PAIN DESCRIPTION - PAIN TYPE: TYPE: ACUTE PAIN

## 2023-05-30 ASSESSMENT — PAIN - FUNCTIONAL ASSESSMENT: PAIN_FUNCTIONAL_ASSESSMENT: 0-10

## 2023-05-30 ASSESSMENT — PAIN DESCRIPTION - LOCATION: LOCATION: RIB CAGE

## 2023-05-30 ASSESSMENT — PAIN DESCRIPTION - ORIENTATION: ORIENTATION: LEFT

## 2023-05-30 ASSESSMENT — PAIN DESCRIPTION - FREQUENCY: FREQUENCY: CONTINUOUS

## 2023-05-31 ENCOUNTER — APPOINTMENT (OUTPATIENT)
Dept: NUCLEAR MEDICINE | Age: 73
End: 2023-05-31
Payer: MEDICARE

## 2023-05-31 ENCOUNTER — APPOINTMENT (OUTPATIENT)
Dept: INTERVENTIONAL RADIOLOGY/VASCULAR | Age: 73
End: 2023-05-31
Payer: MEDICARE

## 2023-05-31 PROBLEM — R29.6 RECURRENT FALLS: Status: ACTIVE | Noted: 2023-05-31

## 2023-05-31 PROBLEM — E16.2 HYPOGLYCEMIA: Status: ACTIVE | Noted: 2023-05-31

## 2023-05-31 PROBLEM — R77.8 ELEVATED TROPONIN: Status: ACTIVE | Noted: 2023-05-31

## 2023-05-31 PROBLEM — R79.89 ELEVATED TROPONIN: Status: ACTIVE | Noted: 2023-05-31

## 2023-05-31 LAB
D DIMER: 0.89 UG/ML FEU (ref 0–0.6)
GLUCOSE BLD-MCNC: 119 MG/DL (ref 70–99)
GLUCOSE BLD-MCNC: 120 MG/DL (ref 70–99)
GLUCOSE BLD-MCNC: 56 MG/DL (ref 70–99)
GLUCOSE BLD-MCNC: 57 MG/DL (ref 70–99)
GLUCOSE BLD-MCNC: 85 MG/DL (ref 70–99)
GLUCOSE BLD-MCNC: 87 MG/DL (ref 70–99)
GLUCOSE BLD-MCNC: 92 MG/DL (ref 70–99)
GLUCOSE BLD-MCNC: 94 MG/DL (ref 70–99)
LV EF: 55 %
LV EF: 63 %
LVEF MODALITY: NORMAL
LVEF MODALITY: NORMAL
PERFORMED ON: ABNORMAL
PERFORMED ON: NORMAL
TROPONIN, HIGH SENSITIVITY: 40 NG/L (ref 0–22)
TROPONIN, HIGH SENSITIVITY: 44 NG/L (ref 0–22)
TROPONIN, HIGH SENSITIVITY: 50 NG/L (ref 0–22)

## 2023-05-31 PROCEDURE — 6370000000 HC RX 637 (ALT 250 FOR IP): Performed by: INTERNAL MEDICINE

## 2023-05-31 PROCEDURE — 6360000002 HC RX W HCPCS: Performed by: INTERNAL MEDICINE

## 2023-05-31 PROCEDURE — 3430000000 HC RX DIAGNOSTIC RADIOPHARMACEUTICAL: Performed by: INTERNAL MEDICINE

## 2023-05-31 PROCEDURE — C8929 TTE W OR WO FOL WCON,DOPPLER: HCPCS

## 2023-05-31 PROCEDURE — 6360000004 HC RX CONTRAST MEDICATION: Performed by: INTERNAL MEDICINE

## 2023-05-31 PROCEDURE — 97530 THERAPEUTIC ACTIVITIES: CPT

## 2023-05-31 PROCEDURE — 36415 COLL VENOUS BLD VENIPUNCTURE: CPT

## 2023-05-31 PROCEDURE — 78452 HT MUSCLE IMAGE SPECT MULT: CPT

## 2023-05-31 PROCEDURE — 97165 OT EVAL LOW COMPLEX 30 MIN: CPT

## 2023-05-31 PROCEDURE — 97162 PT EVAL MOD COMPLEX 30 MIN: CPT

## 2023-05-31 PROCEDURE — 76937 US GUIDE VASCULAR ACCESS: CPT

## 2023-05-31 PROCEDURE — 2580000003 HC RX 258: Performed by: INTERNAL MEDICINE

## 2023-05-31 PROCEDURE — 94761 N-INVAS EAR/PLS OXIMETRY MLT: CPT

## 2023-05-31 PROCEDURE — 94640 AIRWAY INHALATION TREATMENT: CPT

## 2023-05-31 PROCEDURE — 36410 VNPNXR 3YR/> PHY/QHP DX/THER: CPT

## 2023-05-31 PROCEDURE — 85379 FIBRIN DEGRADATION QUANT: CPT

## 2023-05-31 PROCEDURE — 2700000000 HC OXYGEN THERAPY PER DAY

## 2023-05-31 PROCEDURE — 84484 ASSAY OF TROPONIN QUANT: CPT

## 2023-05-31 PROCEDURE — A9502 TC99M TETROFOSMIN: HCPCS | Performed by: INTERNAL MEDICINE

## 2023-05-31 PROCEDURE — 93017 CV STRESS TEST TRACING ONLY: CPT

## 2023-05-31 PROCEDURE — C1751 CATH, INF, PER/CENT/MIDLINE: HCPCS

## 2023-05-31 PROCEDURE — 99222 1ST HOSP IP/OBS MODERATE 55: CPT | Performed by: INTERNAL MEDICINE

## 2023-05-31 PROCEDURE — 2060000000 HC ICU INTERMEDIATE R&B

## 2023-05-31 RX ORDER — BUDESONIDE AND FORMOTEROL FUMARATE DIHYDRATE 160; 4.5 UG/1; UG/1
2 AEROSOL RESPIRATORY (INHALATION) 2 TIMES DAILY
Status: DISCONTINUED | OUTPATIENT
Start: 2023-05-31 | End: 2023-06-07 | Stop reason: HOSPADM

## 2023-05-31 RX ORDER — LIDOCAINE 4 G/G
1 PATCH TOPICAL DAILY
Status: DISCONTINUED | OUTPATIENT
Start: 2023-05-31 | End: 2023-06-07 | Stop reason: HOSPADM

## 2023-05-31 RX ORDER — REGADENOSON 0.08 MG/ML
0.4 INJECTION, SOLUTION INTRAVENOUS
Status: COMPLETED | OUTPATIENT
Start: 2023-05-31 | End: 2023-05-31

## 2023-05-31 RX ORDER — ASPIRIN 81 MG/1
81 TABLET, CHEWABLE ORAL DAILY
Status: DISCONTINUED | OUTPATIENT
Start: 2023-05-31 | End: 2023-06-07 | Stop reason: HOSPADM

## 2023-05-31 RX ORDER — MORPHINE SULFATE 2 MG/ML
2 INJECTION, SOLUTION INTRAMUSCULAR; INTRAVENOUS
Status: DISCONTINUED | OUTPATIENT
Start: 2023-05-31 | End: 2023-06-01

## 2023-05-31 RX ORDER — ONDANSETRON 2 MG/ML
4 INJECTION INTRAMUSCULAR; INTRAVENOUS EVERY 6 HOURS PRN
Status: DISCONTINUED | OUTPATIENT
Start: 2023-05-31 | End: 2023-05-31

## 2023-05-31 RX ORDER — INSULIN GLARGINE 100 [IU]/ML
40 INJECTION, SOLUTION SUBCUTANEOUS 2 TIMES DAILY
Status: DISCONTINUED | OUTPATIENT
Start: 2023-05-31 | End: 2023-05-31

## 2023-05-31 RX ORDER — FUROSEMIDE 10 MG/ML
40 INJECTION INTRAMUSCULAR; INTRAVENOUS 2 TIMES DAILY
Status: DISCONTINUED | OUTPATIENT
Start: 2023-05-31 | End: 2023-05-31

## 2023-05-31 RX ORDER — ACETAMINOPHEN 325 MG/1
650 TABLET ORAL EVERY 6 HOURS PRN
Status: DISCONTINUED | OUTPATIENT
Start: 2023-05-31 | End: 2023-06-07 | Stop reason: HOSPADM

## 2023-05-31 RX ORDER — PROCHLORPERAZINE EDISYLATE 5 MG/ML
10 INJECTION INTRAMUSCULAR; INTRAVENOUS EVERY 6 HOURS PRN
Status: DISCONTINUED | OUTPATIENT
Start: 2023-05-31 | End: 2023-06-07 | Stop reason: HOSPADM

## 2023-05-31 RX ORDER — MAGNESIUM SULFATE 1 G/100ML
1000 INJECTION INTRAVENOUS PRN
Status: DISCONTINUED | OUTPATIENT
Start: 2023-05-31 | End: 2023-06-07 | Stop reason: HOSPADM

## 2023-05-31 RX ORDER — INSULIN GLARGINE 100 [IU]/ML
20 INJECTION, SOLUTION SUBCUTANEOUS NIGHTLY
Status: DISCONTINUED | OUTPATIENT
Start: 2023-05-31 | End: 2023-06-07 | Stop reason: HOSPADM

## 2023-05-31 RX ORDER — ONDANSETRON 4 MG/1
4 TABLET, ORALLY DISINTEGRATING ORAL EVERY 8 HOURS PRN
Status: DISCONTINUED | OUTPATIENT
Start: 2023-05-31 | End: 2023-05-31

## 2023-05-31 RX ORDER — ISOSORBIDE MONONITRATE 30 MG/1
30 TABLET, EXTENDED RELEASE ORAL DAILY
Status: DISCONTINUED | OUTPATIENT
Start: 2023-05-31 | End: 2023-06-07 | Stop reason: HOSPADM

## 2023-05-31 RX ORDER — ATORVASTATIN CALCIUM 40 MG/1
40 TABLET, FILM COATED ORAL NIGHTLY
Status: DISCONTINUED | OUTPATIENT
Start: 2023-05-31 | End: 2023-06-07 | Stop reason: HOSPADM

## 2023-05-31 RX ORDER — SPIRONOLACTONE 25 MG/1
25 TABLET ORAL DAILY
Status: DISCONTINUED | OUTPATIENT
Start: 2023-05-31 | End: 2023-06-07 | Stop reason: HOSPADM

## 2023-05-31 RX ORDER — ACETAMINOPHEN 650 MG/1
650 SUPPOSITORY RECTAL EVERY 6 HOURS PRN
Status: DISCONTINUED | OUTPATIENT
Start: 2023-05-31 | End: 2023-06-07 | Stop reason: HOSPADM

## 2023-05-31 RX ORDER — SODIUM CHLORIDE 9 MG/ML
INJECTION, SOLUTION INTRAVENOUS PRN
Status: DISCONTINUED | OUTPATIENT
Start: 2023-05-31 | End: 2023-06-07 | Stop reason: HOSPADM

## 2023-05-31 RX ORDER — OMEPRAZOLE 20 MG/1
40 CAPSULE, DELAYED RELEASE ORAL DAILY
Status: DISCONTINUED | OUTPATIENT
Start: 2023-05-31 | End: 2023-06-07 | Stop reason: HOSPADM

## 2023-05-31 RX ORDER — POTASSIUM CHLORIDE 20 MEQ/1
40 TABLET, EXTENDED RELEASE ORAL PRN
Status: DISCONTINUED | OUTPATIENT
Start: 2023-05-31 | End: 2023-06-07 | Stop reason: HOSPADM

## 2023-05-31 RX ORDER — POLYETHYLENE GLYCOL 3350 17 G/17G
17 POWDER, FOR SOLUTION ORAL DAILY PRN
Status: DISCONTINUED | OUTPATIENT
Start: 2023-05-31 | End: 2023-06-07 | Stop reason: HOSPADM

## 2023-05-31 RX ORDER — FUROSEMIDE 10 MG/ML
40 INJECTION INTRAMUSCULAR; INTRAVENOUS ONCE
Status: COMPLETED | OUTPATIENT
Start: 2023-05-31 | End: 2023-05-31

## 2023-05-31 RX ORDER — MORPHINE SULFATE 4 MG/ML
4 INJECTION, SOLUTION INTRAMUSCULAR; INTRAVENOUS
Status: DISCONTINUED | OUTPATIENT
Start: 2023-05-31 | End: 2023-06-01

## 2023-05-31 RX ORDER — POTASSIUM CHLORIDE 7.45 MG/ML
10 INJECTION INTRAVENOUS PRN
Status: DISCONTINUED | OUTPATIENT
Start: 2023-05-31 | End: 2023-06-07 | Stop reason: HOSPADM

## 2023-05-31 RX ORDER — SODIUM CHLORIDE 0.9 % (FLUSH) 0.9 %
5-40 SYRINGE (ML) INJECTION EVERY 12 HOURS SCHEDULED
Status: DISCONTINUED | OUTPATIENT
Start: 2023-05-31 | End: 2023-06-07 | Stop reason: HOSPADM

## 2023-05-31 RX ORDER — DEXTROSE MONOHYDRATE 100 MG/ML
INJECTION, SOLUTION INTRAVENOUS CONTINUOUS PRN
Status: DISCONTINUED | OUTPATIENT
Start: 2023-05-31 | End: 2023-06-07 | Stop reason: HOSPADM

## 2023-05-31 RX ORDER — MONTELUKAST SODIUM 10 MG/1
10 TABLET ORAL DAILY
Status: DISCONTINUED | OUTPATIENT
Start: 2023-05-31 | End: 2023-06-07 | Stop reason: HOSPADM

## 2023-05-31 RX ORDER — SODIUM CHLORIDE 0.9 % (FLUSH) 0.9 %
5-40 SYRINGE (ML) INJECTION PRN
Status: DISCONTINUED | OUTPATIENT
Start: 2023-05-31 | End: 2023-06-07 | Stop reason: HOSPADM

## 2023-05-31 RX ADMIN — MORPHINE SULFATE 4 MG: 4 INJECTION, SOLUTION INTRAMUSCULAR; INTRAVENOUS at 20:39

## 2023-05-31 RX ADMIN — APIXABAN 5 MG: 5 TABLET, FILM COATED ORAL at 20:25

## 2023-05-31 RX ADMIN — SACUBITRIL AND VALSARTAN 1 TABLET: 49; 51 TABLET, FILM COATED ORAL at 12:05

## 2023-05-31 RX ADMIN — FUROSEMIDE 40 MG: 10 INJECTION, SOLUTION INTRAMUSCULAR; INTRAVENOUS at 12:05

## 2023-05-31 RX ADMIN — ACETAMINOPHEN 650 MG: 325 TABLET ORAL at 08:18

## 2023-05-31 RX ADMIN — SODIUM CHLORIDE, PRESERVATIVE FREE 10 ML: 5 INJECTION INTRAVENOUS at 20:25

## 2023-05-31 RX ADMIN — ACETAMINOPHEN 650 MG: 325 TABLET ORAL at 14:12

## 2023-05-31 RX ADMIN — SODIUM CHLORIDE, PRESERVATIVE FREE 10 ML: 5 INJECTION INTRAVENOUS at 08:29

## 2023-05-31 RX ADMIN — ASPIRIN 81 MG: 81 TABLET, CHEWABLE ORAL at 10:34

## 2023-05-31 RX ADMIN — TIOTROPIUM BROMIDE INHALATION SPRAY 2 PUFF: 3.12 SPRAY, METERED RESPIRATORY (INHALATION) at 07:16

## 2023-05-31 RX ADMIN — SACUBITRIL AND VALSARTAN 1 TABLET: 49; 51 TABLET, FILM COATED ORAL at 20:25

## 2023-05-31 RX ADMIN — Medication 2 PUFF: at 07:16

## 2023-05-31 RX ADMIN — OMEPRAZOLE 40 MG: 20 CAPSULE, DELAYED RELEASE ORAL at 10:34

## 2023-05-31 RX ADMIN — Medication 16 G: at 01:58

## 2023-05-31 RX ADMIN — ISOSORBIDE MONONITRATE 30 MG: 30 TABLET, EXTENDED RELEASE ORAL at 10:34

## 2023-05-31 RX ADMIN — MONTELUKAST SODIUM 10 MG: 10 TABLET, COATED ORAL at 10:34

## 2023-05-31 RX ADMIN — Medication 2 PUFF: at 20:47

## 2023-05-31 RX ADMIN — ATORVASTATIN CALCIUM 40 MG: 40 TABLET, FILM COATED ORAL at 20:25

## 2023-05-31 RX ADMIN — SERTRALINE HYDROCHLORIDE 50 MG: 50 TABLET ORAL at 10:34

## 2023-05-31 RX ADMIN — PERFLUTREN 3 ML: 6.52 INJECTION, SUSPENSION INTRAVENOUS at 15:05

## 2023-05-31 RX ADMIN — TETROFOSMIN 31.9 MILLICURIE: 1.38 INJECTION, POWDER, LYOPHILIZED, FOR SOLUTION INTRAVENOUS at 14:20

## 2023-05-31 RX ADMIN — SPIRONOLACTONE 25 MG: 25 TABLET ORAL at 10:34

## 2023-05-31 RX ADMIN — ACETAMINOPHEN 650 MG: 325 TABLET ORAL at 20:24

## 2023-05-31 RX ADMIN — REGADENOSON 0.4 MG: 0.08 INJECTION, SOLUTION INTRAVENOUS at 14:20

## 2023-05-31 RX ADMIN — ACETAMINOPHEN 650 MG: 325 TABLET ORAL at 02:14

## 2023-05-31 ASSESSMENT — PAIN SCALES - GENERAL
PAINLEVEL_OUTOF10: 7
PAINLEVEL_OUTOF10: 6
PAINLEVEL_OUTOF10: 3
PAINLEVEL_OUTOF10: 4
PAINLEVEL_OUTOF10: 10
PAINLEVEL_OUTOF10: 8
PAINLEVEL_OUTOF10: 10
PAINLEVEL_OUTOF10: 0

## 2023-05-31 ASSESSMENT — PAIN DESCRIPTION - DESCRIPTORS
DESCRIPTORS: ACHING
DESCRIPTORS: SHARP;SHOOTING
DESCRIPTORS: ACHING

## 2023-05-31 ASSESSMENT — PAIN DESCRIPTION - LOCATION
LOCATION: RIB CAGE
LOCATION: BACK

## 2023-05-31 ASSESSMENT — PAIN DESCRIPTION - ORIENTATION
ORIENTATION: MID
ORIENTATION: LEFT

## 2023-06-01 ENCOUNTER — APPOINTMENT (OUTPATIENT)
Dept: NUCLEAR MEDICINE | Age: 73
End: 2023-06-01
Payer: MEDICARE

## 2023-06-01 ENCOUNTER — TELEPHONE (OUTPATIENT)
Dept: CARDIOLOGY CLINIC | Age: 73
End: 2023-06-01

## 2023-06-01 ENCOUNTER — APPOINTMENT (OUTPATIENT)
Dept: GENERAL RADIOLOGY | Age: 73
End: 2023-06-01
Payer: MEDICARE

## 2023-06-01 DIAGNOSIS — R55 SYNCOPE AND COLLAPSE: Primary | ICD-10-CM

## 2023-06-01 DIAGNOSIS — I48.0 PAROXYSMAL ATRIAL FIBRILLATION (HCC): ICD-10-CM

## 2023-06-01 LAB
ANION GAP SERPL CALCULATED.3IONS-SCNC: 12 MMOL/L (ref 3–16)
BUN SERPL-MCNC: 26 MG/DL (ref 7–20)
CALCIUM SERPL-MCNC: 9.4 MG/DL (ref 8.3–10.6)
CHLORIDE SERPL-SCNC: 103 MMOL/L (ref 99–110)
CO2 SERPL-SCNC: 29 MMOL/L (ref 21–32)
CREAT SERPL-MCNC: 0.8 MG/DL (ref 0.8–1.3)
GFR SERPLBLD CREATININE-BSD FMLA CKD-EPI: >60 ML/MIN/{1.73_M2}
GLUCOSE BLD-MCNC: 141 MG/DL (ref 70–99)
GLUCOSE BLD-MCNC: 144 MG/DL (ref 70–99)
GLUCOSE BLD-MCNC: 170 MG/DL (ref 70–99)
GLUCOSE BLD-MCNC: 199 MG/DL (ref 70–99)
GLUCOSE SERPL-MCNC: 137 MG/DL (ref 70–99)
MAGNESIUM SERPL-MCNC: 1.9 MG/DL (ref 1.8–2.4)
PERFORMED ON: ABNORMAL
POTASSIUM SERPL-SCNC: 4.7 MMOL/L (ref 3.5–5.1)
SODIUM SERPL-SCNC: 144 MMOL/L (ref 136–145)
TROPONIN, HIGH SENSITIVITY: 38 NG/L (ref 0–22)

## 2023-06-01 PROCEDURE — 2580000003 HC RX 258: Performed by: INTERNAL MEDICINE

## 2023-06-01 PROCEDURE — 51702 INSERT TEMP BLADDER CATH: CPT

## 2023-06-01 PROCEDURE — A9502 TC99M TETROFOSMIN: HCPCS | Performed by: INTERNAL MEDICINE

## 2023-06-01 PROCEDURE — 2060000000 HC ICU INTERMEDIATE R&B

## 2023-06-01 PROCEDURE — 84484 ASSAY OF TROPONIN QUANT: CPT

## 2023-06-01 PROCEDURE — 6370000000 HC RX 637 (ALT 250 FOR IP): Performed by: INTERNAL MEDICINE

## 2023-06-01 PROCEDURE — 51798 US URINE CAPACITY MEASURE: CPT

## 2023-06-01 PROCEDURE — 71100 X-RAY EXAM RIBS UNI 2 VIEWS: CPT

## 2023-06-01 PROCEDURE — 6360000002 HC RX W HCPCS: Performed by: INTERNAL MEDICINE

## 2023-06-01 PROCEDURE — 3430000000 HC RX DIAGNOSTIC RADIOPHARMACEUTICAL: Performed by: INTERNAL MEDICINE

## 2023-06-01 PROCEDURE — 94640 AIRWAY INHALATION TREATMENT: CPT

## 2023-06-01 PROCEDURE — 2700000000 HC OXYGEN THERAPY PER DAY

## 2023-06-01 PROCEDURE — 99233 SBSQ HOSP IP/OBS HIGH 50: CPT | Performed by: INTERNAL MEDICINE

## 2023-06-01 PROCEDURE — 94761 N-INVAS EAR/PLS OXIMETRY MLT: CPT

## 2023-06-01 PROCEDURE — 80048 BASIC METABOLIC PNL TOTAL CA: CPT

## 2023-06-01 PROCEDURE — 99232 SBSQ HOSP IP/OBS MODERATE 35: CPT | Performed by: INTERNAL MEDICINE

## 2023-06-01 PROCEDURE — 83735 ASSAY OF MAGNESIUM: CPT

## 2023-06-01 RX ORDER — TRAMADOL HYDROCHLORIDE 50 MG/1
50 TABLET ORAL EVERY 6 HOURS PRN
Status: DISCONTINUED | OUTPATIENT
Start: 2023-06-01 | End: 2023-06-02

## 2023-06-01 RX ORDER — FUROSEMIDE 10 MG/ML
40 INJECTION INTRAMUSCULAR; INTRAVENOUS ONCE
Status: COMPLETED | OUTPATIENT
Start: 2023-06-01 | End: 2023-06-01

## 2023-06-01 RX ORDER — OXYCODONE HYDROCHLORIDE AND ACETAMINOPHEN 5; 325 MG/1; MG/1
1 TABLET ORAL EVERY 4 HOURS PRN
Status: DISCONTINUED | OUTPATIENT
Start: 2023-06-01 | End: 2023-06-07 | Stop reason: HOSPADM

## 2023-06-01 RX ADMIN — TETROFOSMIN 33.6 MILLICURIE: 1.38 INJECTION, POWDER, LYOPHILIZED, FOR SOLUTION INTRAVENOUS at 11:10

## 2023-06-01 RX ADMIN — FUROSEMIDE 40 MG: 10 INJECTION, SOLUTION INTRAMUSCULAR; INTRAVENOUS at 13:55

## 2023-06-01 RX ADMIN — HYDROMORPHONE HYDROCHLORIDE 0.5 MG: 1 INJECTION, SOLUTION INTRAMUSCULAR; INTRAVENOUS; SUBCUTANEOUS at 10:26

## 2023-06-01 RX ADMIN — OMEPRAZOLE 40 MG: 20 CAPSULE, DELAYED RELEASE ORAL at 13:57

## 2023-06-01 RX ADMIN — Medication 2 PUFF: at 07:35

## 2023-06-01 RX ADMIN — Medication 10 ML: at 18:49

## 2023-06-01 RX ADMIN — MONTELUKAST SODIUM 10 MG: 10 TABLET, COATED ORAL at 13:57

## 2023-06-01 RX ADMIN — APIXABAN 5 MG: 5 TABLET, FILM COATED ORAL at 13:57

## 2023-06-01 RX ADMIN — SERTRALINE HYDROCHLORIDE 50 MG: 50 TABLET ORAL at 13:57

## 2023-06-01 RX ADMIN — SODIUM CHLORIDE, PRESERVATIVE FREE 10 ML: 5 INJECTION INTRAVENOUS at 21:29

## 2023-06-01 RX ADMIN — MORPHINE SULFATE 4 MG: 4 INJECTION, SOLUTION INTRAMUSCULAR; INTRAVENOUS at 06:30

## 2023-06-01 RX ADMIN — SODIUM CHLORIDE, PRESERVATIVE FREE 10 ML: 5 INJECTION INTRAVENOUS at 10:31

## 2023-06-01 RX ADMIN — MORPHINE SULFATE 4 MG: 4 INJECTION, SOLUTION INTRAMUSCULAR; INTRAVENOUS at 14:15

## 2023-06-01 RX ADMIN — TRAMADOL HYDROCHLORIDE 50 MG: 50 TABLET ORAL at 21:26

## 2023-06-01 RX ADMIN — ATORVASTATIN CALCIUM 40 MG: 40 TABLET, FILM COATED ORAL at 21:26

## 2023-06-01 RX ADMIN — ASPIRIN 81 MG: 81 TABLET, CHEWABLE ORAL at 13:57

## 2023-06-01 RX ADMIN — OXYCODONE HYDROCHLORIDE AND ACETAMINOPHEN 1 TABLET: 5; 325 TABLET ORAL at 22:47

## 2023-06-01 RX ADMIN — APIXABAN 5 MG: 5 TABLET, FILM COATED ORAL at 21:26

## 2023-06-01 RX ADMIN — TIOTROPIUM BROMIDE INHALATION SPRAY 2 PUFF: 3.12 SPRAY, METERED RESPIRATORY (INHALATION) at 07:35

## 2023-06-01 RX ADMIN — INSULIN GLARGINE 20 UNITS: 100 INJECTION, SOLUTION SUBCUTANEOUS at 21:26

## 2023-06-01 RX ADMIN — MORPHINE SULFATE 2 MG: 2 INJECTION, SOLUTION INTRAMUSCULAR; INTRAVENOUS at 04:09

## 2023-06-01 RX ADMIN — Medication 2 PUFF: at 20:57

## 2023-06-01 RX ADMIN — MORPHINE SULFATE 2 MG: 2 INJECTION, SOLUTION INTRAMUSCULAR; INTRAVENOUS at 18:47

## 2023-06-01 RX ADMIN — SACUBITRIL AND VALSARTAN 1 TABLET: 49; 51 TABLET, FILM COATED ORAL at 22:15

## 2023-06-01 RX ADMIN — SACUBITRIL AND VALSARTAN 1 TABLET: 49; 51 TABLET, FILM COATED ORAL at 14:07

## 2023-06-01 ASSESSMENT — PAIN DESCRIPTION - LOCATION
LOCATION: HIP
LOCATION: RIB CAGE
LOCATION: HIP
LOCATION: RIB CAGE
LOCATION: HIP
LOCATION: RIB CAGE
LOCATION: RIB CAGE
LOCATION: RIB CAGE;ABDOMEN
LOCATION: HIP
LOCATION: RIB CAGE

## 2023-06-01 ASSESSMENT — PAIN DESCRIPTION - PAIN TYPE
TYPE: ACUTE PAIN

## 2023-06-01 ASSESSMENT — PAIN DESCRIPTION - DESCRIPTORS
DESCRIPTORS: SHARP
DESCRIPTORS: ACHING
DESCRIPTORS: ACHING
DESCRIPTORS: SHARP
DESCRIPTORS: ACHING
DESCRIPTORS: SHARP
DESCRIPTORS: ACHING
DESCRIPTORS: SHARP
DESCRIPTORS: ACHING
DESCRIPTORS: ACHING

## 2023-06-01 ASSESSMENT — PAIN DESCRIPTION - ORIENTATION
ORIENTATION: LEFT
ORIENTATION: RIGHT
ORIENTATION: LEFT;LOWER
ORIENTATION: LEFT
ORIENTATION: LEFT;LOWER
ORIENTATION: LEFT
ORIENTATION: RIGHT
ORIENTATION: LEFT

## 2023-06-01 ASSESSMENT — PAIN SCALES - GENERAL
PAINLEVEL_OUTOF10: 4
PAINLEVEL_OUTOF10: 6
PAINLEVEL_OUTOF10: 7
PAINLEVEL_OUTOF10: 5
PAINLEVEL_OUTOF10: 4
PAINLEVEL_OUTOF10: 7
PAINLEVEL_OUTOF10: 8
PAINLEVEL_OUTOF10: 4
PAINLEVEL_OUTOF10: 8
PAINLEVEL_OUTOF10: 5

## 2023-06-01 ASSESSMENT — PAIN DESCRIPTION - ONSET
ONSET: ON-GOING

## 2023-06-01 ASSESSMENT — PAIN DESCRIPTION - FREQUENCY
FREQUENCY: CONTINUOUS

## 2023-06-01 NOTE — TELEPHONE ENCOUNTER
30 day ordered by [facility] in his note and verbal from Dr. Aida Jack to place a discharge

## 2023-06-02 ENCOUNTER — APPOINTMENT (OUTPATIENT)
Dept: GENERAL RADIOLOGY | Age: 73
End: 2023-06-02
Payer: MEDICARE

## 2023-06-02 LAB
ANION GAP SERPL CALCULATED.3IONS-SCNC: 13 MMOL/L (ref 3–16)
BUN SERPL-MCNC: 23 MG/DL (ref 7–20)
CALCIUM SERPL-MCNC: 9.2 MG/DL (ref 8.3–10.6)
CHLORIDE SERPL-SCNC: 100 MMOL/L (ref 99–110)
CO2 SERPL-SCNC: 24 MMOL/L (ref 21–32)
CREAT SERPL-MCNC: <0.5 MG/DL (ref 0.8–1.3)
GFR SERPLBLD CREATININE-BSD FMLA CKD-EPI: >60 ML/MIN/{1.73_M2}
GLUCOSE BLD-MCNC: 148 MG/DL (ref 70–99)
GLUCOSE BLD-MCNC: 155 MG/DL (ref 70–99)
GLUCOSE BLD-MCNC: 172 MG/DL (ref 70–99)
GLUCOSE BLD-MCNC: 187 MG/DL (ref 70–99)
GLUCOSE SERPL-MCNC: 144 MG/DL (ref 70–99)
PERFORMED ON: ABNORMAL
POTASSIUM SERPL-SCNC: 3.9 MMOL/L (ref 3.5–5.1)
SODIUM SERPL-SCNC: 137 MMOL/L (ref 136–145)

## 2023-06-02 PROCEDURE — 51702 INSERT TEMP BLADDER CATH: CPT

## 2023-06-02 PROCEDURE — 6360000002 HC RX W HCPCS: Performed by: NURSE PRACTITIONER

## 2023-06-02 PROCEDURE — 99233 SBSQ HOSP IP/OBS HIGH 50: CPT | Performed by: INTERNAL MEDICINE

## 2023-06-02 PROCEDURE — 97110 THERAPEUTIC EXERCISES: CPT

## 2023-06-02 PROCEDURE — 94761 N-INVAS EAR/PLS OXIMETRY MLT: CPT

## 2023-06-02 PROCEDURE — 2060000000 HC ICU INTERMEDIATE R&B

## 2023-06-02 PROCEDURE — 80048 BASIC METABOLIC PNL TOTAL CA: CPT

## 2023-06-02 PROCEDURE — 6370000000 HC RX 637 (ALT 250 FOR IP): Performed by: INTERNAL MEDICINE

## 2023-06-02 PROCEDURE — 2700000000 HC OXYGEN THERAPY PER DAY

## 2023-06-02 PROCEDURE — 97535 SELF CARE MNGMENT TRAINING: CPT

## 2023-06-02 PROCEDURE — 94150 VITAL CAPACITY TEST: CPT

## 2023-06-02 PROCEDURE — 94640 AIRWAY INHALATION TREATMENT: CPT

## 2023-06-02 PROCEDURE — 2580000003 HC RX 258: Performed by: INTERNAL MEDICINE

## 2023-06-02 PROCEDURE — 99233 SBSQ HOSP IP/OBS HIGH 50: CPT | Performed by: NURSE PRACTITIONER

## 2023-06-02 PROCEDURE — 97530 THERAPEUTIC ACTIVITIES: CPT

## 2023-06-02 PROCEDURE — 6370000000 HC RX 637 (ALT 250 FOR IP): Performed by: NURSE PRACTITIONER

## 2023-06-02 PROCEDURE — 71045 X-RAY EXAM CHEST 1 VIEW: CPT

## 2023-06-02 RX ORDER — FUROSEMIDE 10 MG/ML
40 INJECTION INTRAMUSCULAR; INTRAVENOUS 2 TIMES DAILY
Status: DISCONTINUED | OUTPATIENT
Start: 2023-06-02 | End: 2023-06-07 | Stop reason: HOSPADM

## 2023-06-02 RX ORDER — ATENOLOL 25 MG/1
25 TABLET ORAL DAILY
Status: DISCONTINUED | OUTPATIENT
Start: 2023-06-02 | End: 2023-06-07 | Stop reason: HOSPADM

## 2023-06-02 RX ADMIN — INSULIN GLARGINE 20 UNITS: 100 INJECTION, SOLUTION SUBCUTANEOUS at 20:46

## 2023-06-02 RX ADMIN — SODIUM CHLORIDE, PRESERVATIVE FREE 10 ML: 5 INJECTION INTRAVENOUS at 10:28

## 2023-06-02 RX ADMIN — TIOTROPIUM BROMIDE INHALATION SPRAY 2 PUFF: 3.12 SPRAY, METERED RESPIRATORY (INHALATION) at 07:36

## 2023-06-02 RX ADMIN — APIXABAN 5 MG: 5 TABLET, FILM COATED ORAL at 20:44

## 2023-06-02 RX ADMIN — SERTRALINE HYDROCHLORIDE 50 MG: 50 TABLET ORAL at 09:12

## 2023-06-02 RX ADMIN — ISOSORBIDE MONONITRATE 30 MG: 30 TABLET, EXTENDED RELEASE ORAL at 09:12

## 2023-06-02 RX ADMIN — ATORVASTATIN CALCIUM 40 MG: 40 TABLET, FILM COATED ORAL at 20:44

## 2023-06-02 RX ADMIN — TRAMADOL HYDROCHLORIDE 50 MG: 50 TABLET ORAL at 03:15

## 2023-06-02 RX ADMIN — Medication 10 ML: at 19:34

## 2023-06-02 RX ADMIN — SACUBITRIL AND VALSARTAN 1 TABLET: 49; 51 TABLET, FILM COATED ORAL at 10:41

## 2023-06-02 RX ADMIN — OXYCODONE HYDROCHLORIDE AND ACETAMINOPHEN 1 TABLET: 5; 325 TABLET ORAL at 09:12

## 2023-06-02 RX ADMIN — Medication 2 PUFF: at 07:36

## 2023-06-02 RX ADMIN — SPIRONOLACTONE 25 MG: 25 TABLET ORAL at 09:12

## 2023-06-02 RX ADMIN — SACUBITRIL AND VALSARTAN 1 TABLET: 49; 51 TABLET, FILM COATED ORAL at 20:44

## 2023-06-02 RX ADMIN — FUROSEMIDE 40 MG: 10 INJECTION, SOLUTION INTRAMUSCULAR; INTRAVENOUS at 10:27

## 2023-06-02 RX ADMIN — MONTELUKAST SODIUM 10 MG: 10 TABLET, COATED ORAL at 09:12

## 2023-06-02 RX ADMIN — POLYETHYLENE GLYCOL 3350 17 G: 17 POWDER, FOR SOLUTION ORAL at 19:31

## 2023-06-02 RX ADMIN — APIXABAN 5 MG: 5 TABLET, FILM COATED ORAL at 09:12

## 2023-06-02 RX ADMIN — SODIUM CHLORIDE, PRESERVATIVE FREE 10 ML: 5 INJECTION INTRAVENOUS at 20:44

## 2023-06-02 RX ADMIN — ATENOLOL 25 MG: 25 TABLET ORAL at 10:27

## 2023-06-02 RX ADMIN — OXYCODONE HYDROCHLORIDE AND ACETAMINOPHEN 1 TABLET: 5; 325 TABLET ORAL at 19:31

## 2023-06-02 RX ADMIN — OXYCODONE HYDROCHLORIDE AND ACETAMINOPHEN 1 TABLET: 5; 325 TABLET ORAL at 14:26

## 2023-06-02 RX ADMIN — ASPIRIN 81 MG: 81 TABLET, CHEWABLE ORAL at 09:12

## 2023-06-02 RX ADMIN — Medication 2 PUFF: at 20:05

## 2023-06-02 RX ADMIN — FUROSEMIDE 40 MG: 10 INJECTION, SOLUTION INTRAMUSCULAR; INTRAVENOUS at 19:33

## 2023-06-02 RX ADMIN — OMEPRAZOLE 40 MG: 20 CAPSULE, DELAYED RELEASE ORAL at 09:11

## 2023-06-02 ASSESSMENT — PAIN SCALES - GENERAL
PAINLEVEL_OUTOF10: 7
PAINLEVEL_OUTOF10: 6
PAINLEVEL_OUTOF10: 4
PAINLEVEL_OUTOF10: 5

## 2023-06-02 ASSESSMENT — PAIN DESCRIPTION - ONSET: ONSET: ON-GOING

## 2023-06-02 ASSESSMENT — PAIN DESCRIPTION - PAIN TYPE: TYPE: ACUTE PAIN

## 2023-06-02 ASSESSMENT — PAIN DESCRIPTION - FREQUENCY: FREQUENCY: CONTINUOUS

## 2023-06-02 ASSESSMENT — PAIN DESCRIPTION - ORIENTATION: ORIENTATION: LEFT

## 2023-06-02 ASSESSMENT — PAIN DESCRIPTION - DESCRIPTORS: DESCRIPTORS: ACHING

## 2023-06-02 ASSESSMENT — PAIN DESCRIPTION - LOCATION
LOCATION: RIB CAGE
LOCATION: HIP

## 2023-06-03 PROBLEM — R94.39 ABNORMAL STRESS TEST: Status: ACTIVE | Noted: 2023-06-03

## 2023-06-03 LAB
ANION GAP SERPL CALCULATED.3IONS-SCNC: 11 MMOL/L (ref 3–16)
BUN SERPL-MCNC: 31 MG/DL (ref 7–20)
CALCIUM SERPL-MCNC: 9 MG/DL (ref 8.3–10.6)
CHLORIDE SERPL-SCNC: 100 MMOL/L (ref 99–110)
CO2 SERPL-SCNC: 28 MMOL/L (ref 21–32)
CREAT SERPL-MCNC: 0.8 MG/DL (ref 0.8–1.3)
GFR SERPLBLD CREATININE-BSD FMLA CKD-EPI: >60 ML/MIN/{1.73_M2}
GLUCOSE BLD-MCNC: 130 MG/DL (ref 70–99)
GLUCOSE BLD-MCNC: 160 MG/DL (ref 70–99)
GLUCOSE BLD-MCNC: 167 MG/DL (ref 70–99)
GLUCOSE BLD-MCNC: 169 MG/DL (ref 70–99)
GLUCOSE BLD-MCNC: 176 MG/DL (ref 70–99)
GLUCOSE SERPL-MCNC: 144 MG/DL (ref 70–99)
PERFORMED ON: ABNORMAL
POTASSIUM SERPL-SCNC: 3.9 MMOL/L (ref 3.5–5.1)
SODIUM SERPL-SCNC: 139 MMOL/L (ref 136–145)
TSH SERPL DL<=0.005 MIU/L-ACNC: 2.99 UIU/ML (ref 0.27–4.2)

## 2023-06-03 PROCEDURE — 6370000000 HC RX 637 (ALT 250 FOR IP): Performed by: INTERNAL MEDICINE

## 2023-06-03 PROCEDURE — 94640 AIRWAY INHALATION TREATMENT: CPT

## 2023-06-03 PROCEDURE — 99232 SBSQ HOSP IP/OBS MODERATE 35: CPT | Performed by: INTERNAL MEDICINE

## 2023-06-03 PROCEDURE — 6370000000 HC RX 637 (ALT 250 FOR IP): Performed by: NURSE PRACTITIONER

## 2023-06-03 PROCEDURE — 2580000003 HC RX 258: Performed by: INTERNAL MEDICINE

## 2023-06-03 PROCEDURE — 6360000002 HC RX W HCPCS: Performed by: NURSE PRACTITIONER

## 2023-06-03 PROCEDURE — 2700000000 HC OXYGEN THERAPY PER DAY

## 2023-06-03 PROCEDURE — 36415 COLL VENOUS BLD VENIPUNCTURE: CPT

## 2023-06-03 PROCEDURE — 84443 ASSAY THYROID STIM HORMONE: CPT

## 2023-06-03 PROCEDURE — 80048 BASIC METABOLIC PNL TOTAL CA: CPT

## 2023-06-03 PROCEDURE — 2060000000 HC ICU INTERMEDIATE R&B

## 2023-06-03 PROCEDURE — 94761 N-INVAS EAR/PLS OXIMETRY MLT: CPT

## 2023-06-03 PROCEDURE — 97530 THERAPEUTIC ACTIVITIES: CPT

## 2023-06-03 RX ORDER — POLYETHYLENE GLYCOL 3350 17 G/17G
17 POWDER, FOR SOLUTION ORAL DAILY
Status: DISCONTINUED | OUTPATIENT
Start: 2023-06-03 | End: 2023-06-07 | Stop reason: HOSPADM

## 2023-06-03 RX ADMIN — Medication 10 ML: at 09:13

## 2023-06-03 RX ADMIN — Medication 2 PUFF: at 07:41

## 2023-06-03 RX ADMIN — FUROSEMIDE 40 MG: 10 INJECTION, SOLUTION INTRAMUSCULAR; INTRAVENOUS at 09:13

## 2023-06-03 RX ADMIN — SODIUM CHLORIDE, PRESERVATIVE FREE 10 ML: 5 INJECTION INTRAVENOUS at 10:00

## 2023-06-03 RX ADMIN — SACUBITRIL AND VALSARTAN 1 TABLET: 49; 51 TABLET, FILM COATED ORAL at 09:11

## 2023-06-03 RX ADMIN — OXYCODONE HYDROCHLORIDE AND ACETAMINOPHEN 1 TABLET: 5; 325 TABLET ORAL at 12:01

## 2023-06-03 RX ADMIN — SERTRALINE HYDROCHLORIDE 50 MG: 50 TABLET ORAL at 09:13

## 2023-06-03 RX ADMIN — APIXABAN 5 MG: 5 TABLET, FILM COATED ORAL at 09:13

## 2023-06-03 RX ADMIN — FUROSEMIDE 40 MG: 10 INJECTION, SOLUTION INTRAMUSCULAR; INTRAVENOUS at 18:45

## 2023-06-03 RX ADMIN — SPIRONOLACTONE 25 MG: 25 TABLET ORAL at 09:13

## 2023-06-03 RX ADMIN — ATENOLOL 25 MG: 25 TABLET ORAL at 09:12

## 2023-06-03 RX ADMIN — OMEPRAZOLE 40 MG: 20 CAPSULE, DELAYED RELEASE ORAL at 09:13

## 2023-06-03 RX ADMIN — OXYCODONE HYDROCHLORIDE AND ACETAMINOPHEN 1 TABLET: 5; 325 TABLET ORAL at 00:09

## 2023-06-03 RX ADMIN — ATORVASTATIN CALCIUM 40 MG: 40 TABLET, FILM COATED ORAL at 21:29

## 2023-06-03 RX ADMIN — ASPIRIN 81 MG: 81 TABLET, CHEWABLE ORAL at 09:13

## 2023-06-03 RX ADMIN — ISOSORBIDE MONONITRATE 30 MG: 30 TABLET, EXTENDED RELEASE ORAL at 09:13

## 2023-06-03 RX ADMIN — INSULIN GLARGINE 20 UNITS: 100 INJECTION, SOLUTION SUBCUTANEOUS at 21:29

## 2023-06-03 RX ADMIN — POLYETHYLENE GLYCOL 3350 17 G: 17 POWDER, FOR SOLUTION ORAL at 09:59

## 2023-06-03 RX ADMIN — SACUBITRIL AND VALSARTAN 1 TABLET: 49; 51 TABLET, FILM COATED ORAL at 21:30

## 2023-06-03 RX ADMIN — POLYETHYLENE GLYCOL 3350 17 G: 17 POWDER, FOR SOLUTION ORAL at 13:34

## 2023-06-03 RX ADMIN — OXYCODONE HYDROCHLORIDE AND ACETAMINOPHEN 1 TABLET: 5; 325 TABLET ORAL at 21:29

## 2023-06-03 RX ADMIN — Medication 2 PUFF: at 20:18

## 2023-06-03 RX ADMIN — OXYCODONE HYDROCHLORIDE AND ACETAMINOPHEN 1 TABLET: 5; 325 TABLET ORAL at 04:08

## 2023-06-03 RX ADMIN — APIXABAN 5 MG: 5 TABLET, FILM COATED ORAL at 21:29

## 2023-06-03 RX ADMIN — OXYCODONE HYDROCHLORIDE AND ACETAMINOPHEN 1 TABLET: 5; 325 TABLET ORAL at 16:09

## 2023-06-03 RX ADMIN — TIOTROPIUM BROMIDE INHALATION SPRAY 2 PUFF: 3.12 SPRAY, METERED RESPIRATORY (INHALATION) at 07:41

## 2023-06-03 RX ADMIN — MONTELUKAST SODIUM 10 MG: 10 TABLET, COATED ORAL at 09:13

## 2023-06-03 ASSESSMENT — PAIN SCALES - GENERAL
PAINLEVEL_OUTOF10: 7
PAINLEVEL_OUTOF10: 6
PAINLEVEL_OUTOF10: 2
PAINLEVEL_OUTOF10: 8
PAINLEVEL_OUTOF10: 7

## 2023-06-03 ASSESSMENT — PAIN DESCRIPTION - DESCRIPTORS
DESCRIPTORS: ACHING;BURNING;CRAMPING
DESCRIPTORS: ACHING
DESCRIPTORS: ACHING;BURNING
DESCRIPTORS: ACHING;THROBBING

## 2023-06-03 ASSESSMENT — PAIN DESCRIPTION - ORIENTATION
ORIENTATION: RIGHT;LEFT;LOWER
ORIENTATION: LEFT
ORIENTATION: RIGHT;LEFT;LOWER

## 2023-06-03 ASSESSMENT — PAIN DESCRIPTION - LOCATION
LOCATION: LEG
LOCATION: ABDOMEN;CHEST
LOCATION: LEG
LOCATION: RIB CAGE
LOCATION: RIB CAGE

## 2023-06-03 ASSESSMENT — PAIN SCALES - WONG BAKER: WONGBAKER_NUMERICALRESPONSE: 2

## 2023-06-03 ASSESSMENT — PAIN DESCRIPTION - FREQUENCY: FREQUENCY: CONTINUOUS

## 2023-06-03 ASSESSMENT — PAIN - FUNCTIONAL ASSESSMENT: PAIN_FUNCTIONAL_ASSESSMENT: ACTIVITIES ARE NOT PREVENTED

## 2023-06-04 LAB
GLUCOSE BLD-MCNC: 116 MG/DL (ref 70–99)
GLUCOSE BLD-MCNC: 158 MG/DL (ref 70–99)
GLUCOSE BLD-MCNC: 183 MG/DL (ref 70–99)
GLUCOSE BLD-MCNC: 190 MG/DL (ref 70–99)
PERFORMED ON: ABNORMAL

## 2023-06-04 PROCEDURE — 6370000000 HC RX 637 (ALT 250 FOR IP): Performed by: INTERNAL MEDICINE

## 2023-06-04 PROCEDURE — 6370000000 HC RX 637 (ALT 250 FOR IP): Performed by: NURSE PRACTITIONER

## 2023-06-04 PROCEDURE — 2580000003 HC RX 258: Performed by: INTERNAL MEDICINE

## 2023-06-04 PROCEDURE — 97530 THERAPEUTIC ACTIVITIES: CPT

## 2023-06-04 PROCEDURE — 97535 SELF CARE MNGMENT TRAINING: CPT

## 2023-06-04 PROCEDURE — 99232 SBSQ HOSP IP/OBS MODERATE 35: CPT | Performed by: INTERNAL MEDICINE

## 2023-06-04 PROCEDURE — 94640 AIRWAY INHALATION TREATMENT: CPT

## 2023-06-04 PROCEDURE — 2060000000 HC ICU INTERMEDIATE R&B

## 2023-06-04 PROCEDURE — 6360000002 HC RX W HCPCS: Performed by: NURSE PRACTITIONER

## 2023-06-04 PROCEDURE — 2700000000 HC OXYGEN THERAPY PER DAY

## 2023-06-04 PROCEDURE — 94761 N-INVAS EAR/PLS OXIMETRY MLT: CPT

## 2023-06-04 RX ADMIN — ASPIRIN 81 MG: 81 TABLET, CHEWABLE ORAL at 08:56

## 2023-06-04 RX ADMIN — ISOSORBIDE MONONITRATE 30 MG: 30 TABLET, EXTENDED RELEASE ORAL at 08:56

## 2023-06-04 RX ADMIN — SACUBITRIL AND VALSARTAN 1 TABLET: 49; 51 TABLET, FILM COATED ORAL at 11:15

## 2023-06-04 RX ADMIN — OXYCODONE HYDROCHLORIDE AND ACETAMINOPHEN 1 TABLET: 5; 325 TABLET ORAL at 04:38

## 2023-06-04 RX ADMIN — TIOTROPIUM BROMIDE INHALATION SPRAY 2 PUFF: 3.12 SPRAY, METERED RESPIRATORY (INHALATION) at 07:35

## 2023-06-04 RX ADMIN — OXYCODONE HYDROCHLORIDE AND ACETAMINOPHEN 1 TABLET: 5; 325 TABLET ORAL at 19:00

## 2023-06-04 RX ADMIN — OXYCODONE HYDROCHLORIDE AND ACETAMINOPHEN 1 TABLET: 5; 325 TABLET ORAL at 12:59

## 2023-06-04 RX ADMIN — FUROSEMIDE 40 MG: 10 INJECTION, SOLUTION INTRAMUSCULAR; INTRAVENOUS at 08:56

## 2023-06-04 RX ADMIN — OXYCODONE HYDROCHLORIDE AND ACETAMINOPHEN 1 TABLET: 5; 325 TABLET ORAL at 22:49

## 2023-06-04 RX ADMIN — Medication 2 PUFF: at 07:35

## 2023-06-04 RX ADMIN — ATORVASTATIN CALCIUM 40 MG: 40 TABLET, FILM COATED ORAL at 22:49

## 2023-06-04 RX ADMIN — OMEPRAZOLE 40 MG: 20 CAPSULE, DELAYED RELEASE ORAL at 08:56

## 2023-06-04 RX ADMIN — ATENOLOL 25 MG: 25 TABLET ORAL at 08:56

## 2023-06-04 RX ADMIN — SACUBITRIL AND VALSARTAN 1 TABLET: 49; 51 TABLET, FILM COATED ORAL at 22:49

## 2023-06-04 RX ADMIN — MONTELUKAST SODIUM 10 MG: 10 TABLET, COATED ORAL at 08:56

## 2023-06-04 RX ADMIN — SODIUM CHLORIDE, PRESERVATIVE FREE 10 ML: 5 INJECTION INTRAVENOUS at 09:03

## 2023-06-04 RX ADMIN — Medication 2 PUFF: at 19:48

## 2023-06-04 RX ADMIN — SERTRALINE HYDROCHLORIDE 50 MG: 50 TABLET ORAL at 08:56

## 2023-06-04 RX ADMIN — INSULIN GLARGINE 20 UNITS: 100 INJECTION, SOLUTION SUBCUTANEOUS at 22:50

## 2023-06-04 RX ADMIN — FUROSEMIDE 40 MG: 10 INJECTION, SOLUTION INTRAMUSCULAR; INTRAVENOUS at 19:00

## 2023-06-04 RX ADMIN — SPIRONOLACTONE 25 MG: 25 TABLET ORAL at 08:56

## 2023-06-04 RX ADMIN — APIXABAN 5 MG: 5 TABLET, FILM COATED ORAL at 08:56

## 2023-06-04 RX ADMIN — OXYCODONE HYDROCHLORIDE AND ACETAMINOPHEN 1 TABLET: 5; 325 TABLET ORAL at 08:59

## 2023-06-04 ASSESSMENT — PAIN SCALES - GENERAL
PAINLEVEL_OUTOF10: 8
PAINLEVEL_OUTOF10: 8
PAINLEVEL_OUTOF10: 4
PAINLEVEL_OUTOF10: 8
PAINLEVEL_OUTOF10: 6

## 2023-06-04 ASSESSMENT — PAIN DESCRIPTION - ORIENTATION
ORIENTATION: LEFT;LOWER;OUTER
ORIENTATION: LEFT;LOWER

## 2023-06-04 ASSESSMENT — PAIN DESCRIPTION - DESCRIPTORS
DESCRIPTORS: DULL;ACHING
DESCRIPTORS: ACHING;DULL

## 2023-06-04 ASSESSMENT — PAIN DESCRIPTION - LOCATION
LOCATION: RIB CAGE

## 2023-06-05 ENCOUNTER — HOSPITAL ENCOUNTER (OUTPATIENT)
Dept: WOUND CARE | Age: 73
Discharge: HOME | End: 2023-06-05

## 2023-06-05 LAB
GLUCOSE BLD-MCNC: 115 MG/DL (ref 70–99)
GLUCOSE BLD-MCNC: 122 MG/DL (ref 70–99)
GLUCOSE BLD-MCNC: 151 MG/DL (ref 70–99)
GLUCOSE BLD-MCNC: 152 MG/DL (ref 70–99)
GLUCOSE BLD-MCNC: 161 MG/DL (ref 70–99)
PERFORMED ON: ABNORMAL

## 2023-06-05 PROCEDURE — 6370000000 HC RX 637 (ALT 250 FOR IP): Performed by: INTERNAL MEDICINE

## 2023-06-05 PROCEDURE — 6360000002 HC RX W HCPCS: Performed by: NURSE PRACTITIONER

## 2023-06-05 PROCEDURE — 94761 N-INVAS EAR/PLS OXIMETRY MLT: CPT

## 2023-06-05 PROCEDURE — 97110 THERAPEUTIC EXERCISES: CPT

## 2023-06-05 PROCEDURE — 99232 SBSQ HOSP IP/OBS MODERATE 35: CPT | Performed by: INTERNAL MEDICINE

## 2023-06-05 PROCEDURE — 94640 AIRWAY INHALATION TREATMENT: CPT

## 2023-06-05 PROCEDURE — 2700000000 HC OXYGEN THERAPY PER DAY

## 2023-06-05 PROCEDURE — 6370000000 HC RX 637 (ALT 250 FOR IP): Performed by: NURSE PRACTITIONER

## 2023-06-05 PROCEDURE — 97535 SELF CARE MNGMENT TRAINING: CPT

## 2023-06-05 PROCEDURE — 97530 THERAPEUTIC ACTIVITIES: CPT

## 2023-06-05 PROCEDURE — 2580000003 HC RX 258: Performed by: INTERNAL MEDICINE

## 2023-06-05 PROCEDURE — 2060000000 HC ICU INTERMEDIATE R&B

## 2023-06-05 RX ADMIN — ATORVASTATIN CALCIUM 40 MG: 40 TABLET, FILM COATED ORAL at 21:31

## 2023-06-05 RX ADMIN — Medication 2 PUFF: at 07:34

## 2023-06-05 RX ADMIN — OMEPRAZOLE 40 MG: 20 CAPSULE, DELAYED RELEASE ORAL at 08:42

## 2023-06-05 RX ADMIN — ISOSORBIDE MONONITRATE 30 MG: 30 TABLET, EXTENDED RELEASE ORAL at 08:38

## 2023-06-05 RX ADMIN — SERTRALINE HYDROCHLORIDE 50 MG: 50 TABLET ORAL at 08:38

## 2023-06-05 RX ADMIN — Medication 2 PUFF: at 20:21

## 2023-06-05 RX ADMIN — MONTELUKAST SODIUM 10 MG: 10 TABLET, COATED ORAL at 08:38

## 2023-06-05 RX ADMIN — POLYETHYLENE GLYCOL 3350 17 G: 17 POWDER, FOR SOLUTION ORAL at 08:48

## 2023-06-05 RX ADMIN — SACUBITRIL AND VALSARTAN 1 TABLET: 49; 51 TABLET, FILM COATED ORAL at 21:30

## 2023-06-05 RX ADMIN — SPIRONOLACTONE 25 MG: 25 TABLET ORAL at 08:38

## 2023-06-05 RX ADMIN — FUROSEMIDE 40 MG: 10 INJECTION, SOLUTION INTRAMUSCULAR; INTRAVENOUS at 18:06

## 2023-06-05 RX ADMIN — FUROSEMIDE 40 MG: 10 INJECTION, SOLUTION INTRAMUSCULAR; INTRAVENOUS at 08:38

## 2023-06-05 RX ADMIN — OXYCODONE HYDROCHLORIDE AND ACETAMINOPHEN 1 TABLET: 5; 325 TABLET ORAL at 21:30

## 2023-06-05 RX ADMIN — OXYCODONE HYDROCHLORIDE AND ACETAMINOPHEN 1 TABLET: 5; 325 TABLET ORAL at 06:51

## 2023-06-05 RX ADMIN — ASPIRIN 81 MG: 81 TABLET, CHEWABLE ORAL at 08:38

## 2023-06-05 RX ADMIN — OXYCODONE HYDROCHLORIDE AND ACETAMINOPHEN 1 TABLET: 5; 325 TABLET ORAL at 11:23

## 2023-06-05 RX ADMIN — SODIUM CHLORIDE, PRESERVATIVE FREE 10 ML: 5 INJECTION INTRAVENOUS at 09:32

## 2023-06-05 RX ADMIN — SODIUM CHLORIDE, PRESERVATIVE FREE 10 ML: 5 INJECTION INTRAVENOUS at 21:31

## 2023-06-05 RX ADMIN — OXYCODONE HYDROCHLORIDE AND ACETAMINOPHEN 1 TABLET: 5; 325 TABLET ORAL at 15:24

## 2023-06-05 RX ADMIN — ATENOLOL 25 MG: 25 TABLET ORAL at 08:37

## 2023-06-05 RX ADMIN — INSULIN GLARGINE 20 UNITS: 100 INJECTION, SOLUTION SUBCUTANEOUS at 21:30

## 2023-06-05 RX ADMIN — SACUBITRIL AND VALSARTAN 1 TABLET: 49; 51 TABLET, FILM COATED ORAL at 10:08

## 2023-06-05 RX ADMIN — TIOTROPIUM BROMIDE INHALATION SPRAY 2 PUFF: 3.12 SPRAY, METERED RESPIRATORY (INHALATION) at 07:34

## 2023-06-05 RX ADMIN — OXYCODONE HYDROCHLORIDE AND ACETAMINOPHEN 1 TABLET: 5; 325 TABLET ORAL at 02:57

## 2023-06-05 ASSESSMENT — PAIN SCALES - GENERAL
PAINLEVEL_OUTOF10: 8
PAINLEVEL_OUTOF10: 7
PAINLEVEL_OUTOF10: 0
PAINLEVEL_OUTOF10: 6

## 2023-06-05 ASSESSMENT — PAIN DESCRIPTION - ORIENTATION
ORIENTATION: LEFT
ORIENTATION: LEFT;RIGHT
ORIENTATION: LEFT

## 2023-06-05 ASSESSMENT — PAIN DESCRIPTION - DESCRIPTORS
DESCRIPTORS: SHOOTING
DESCRIPTORS: SHARP

## 2023-06-05 ASSESSMENT — PAIN DESCRIPTION - LOCATION
LOCATION: RIB CAGE

## 2023-06-05 NOTE — CONSULTS
Nonrheumatic aortic valve stenosis I35.0    Severe sleep apnea G47.30    Primary insomnia F51.01    Venous stasis ulcer of left calf limited to breakdown of skin with varicose veins ([facility]) I83.022, L97.221    Stage 3 chronic kidney disease ([facility]) N18.30    Renal osteodystrophy N25.0    Peripheral edema R60.9    Primary osteoarthritis of right hip M16.11    Grade III diastolic dysfunction S31.44    Paroxysmal atrial fibrillation ([facility]) I48.0    Simple chronic bronchitis ([facility]) J41.0    Non-pressure chronic ulcer of right calf with fat layer exposed ([facility]) L97.212    Non-pressure chronic ulcer of left calf with fat layer exposed ([facility]) L97.222    Atrial flutter ([facility]) I48.92    Closed wedge compression fracture of sixth thoracic vertebra ([facility]) S22.050A    SIRS (systemic inflammatory response syndrome) ([facility]) R65.10    Heart failure, diastolic, with acute decompensation ([facility]) I50.33    Idiopathic chronic venous hypertension of both lower extremities with ulcer ([facility]) I87.313, L97.919, L97.929    Cellulitis of right leg without foot L03.115    Restrictive lung disease secondary to obesity J98.4, E66.9    Syncope and collapse R55    Elevated troponin R77.8    Recurrent falls R29.6    Hypoglycemia E16.2    Abnormal stress test R94.39         Plan  Patient examined. Reviewed lab and notes. Compression wraps removed. Applied Mepilex borders to open wounds bilateral LE. This will make it easier for wound evaluation. His edema is down considerably. OK for heart cath from Podiatry standpoint. No signs of infection in wounds. If edema in legs increases he may require reapplication of compression wraps while inpatient. Patient can follow up in the [address] [place] [address],[address] after D/C. Thank you for allowing me to participate in the care of your patient.          Electronically signed by Courtney Mancini DPM on 6/5/2023 at 8:03 AM.

## 2023-06-05 NOTE — CARE COORDINATION
INTERDISCIPLINARY PLAN OF CARE CONFERENCE    Date/Time: 6/5/2023 3:35 PM  Completed by: Domenic Andres RN, Case Management      Patient Name:  Felice Mustafa  YOB: 1950  Admitting Diagnosis: Syncope and collapse [R55]  Elevated troponin [R77.8]  Acute on chronic congestive heart failure, unspecified heart failure type ([address] [I50.9]     Admit Date/Time:  5/30/2023  1:48 PM    Chart reviewed. Interdisciplinary team contacted or reviewed plan related to patient progress and discharge plans. Disciplines included Case Management, Nursing, and Dietitian. Current Status:ip  PT/OT recommendation for discharge plan of care: snf    Expected D/C Disposition:  Skilled nursing facility  Confirmed plan with patient and/or family Yes confirmed with: (name) alyssa  Met with:alyssa  Discharge Plan Comments: pt agreeable to snf at [facility]. Pt down to 4 liters O2 and [facility] is accepting pt. They are starting precert today. Pt wants to go home but he knows he needs rehab.  Unsure when pt will have angiogram. Will follow    Home O2 in place on admit: No  Pt informed of need to bring portable home O2 tank on day of discharge for nursing to connect prior to leaving:  Not Indicated  Verbalized agreement/Understanding:  Not Indicated

## 2023-06-06 LAB
GLUCOSE BLD-MCNC: 108 MG/DL (ref 70–99)
GLUCOSE BLD-MCNC: 137 MG/DL (ref 70–99)
GLUCOSE BLD-MCNC: 142 MG/DL (ref 70–99)
GLUCOSE BLD-MCNC: 155 MG/DL (ref 70–99)
GLUCOSE BLD-MCNC: 164 MG/DL (ref 70–99)
PERFORMED ON: ABNORMAL

## 2023-06-06 PROCEDURE — 6370000000 HC RX 637 (ALT 250 FOR IP): Performed by: NURSE PRACTITIONER

## 2023-06-06 PROCEDURE — 99233 SBSQ HOSP IP/OBS HIGH 50: CPT | Performed by: NURSE PRACTITIONER

## 2023-06-06 PROCEDURE — 6360000002 HC RX W HCPCS: Performed by: NURSE PRACTITIONER

## 2023-06-06 PROCEDURE — 6370000000 HC RX 637 (ALT 250 FOR IP): Performed by: INTERNAL MEDICINE

## 2023-06-06 PROCEDURE — 99232 SBSQ HOSP IP/OBS MODERATE 35: CPT | Performed by: INTERNAL MEDICINE

## 2023-06-06 PROCEDURE — 2060000000 HC ICU INTERMEDIATE R&B

## 2023-06-06 PROCEDURE — 2580000003 HC RX 258: Performed by: INTERNAL MEDICINE

## 2023-06-06 PROCEDURE — 94640 AIRWAY INHALATION TREATMENT: CPT

## 2023-06-06 PROCEDURE — 2700000000 HC OXYGEN THERAPY PER DAY

## 2023-06-06 PROCEDURE — 94761 N-INVAS EAR/PLS OXIMETRY MLT: CPT

## 2023-06-06 RX ORDER — LOPERAMIDE HYDROCHLORIDE 2 MG/1
2 CAPSULE ORAL 4 TIMES DAILY PRN
Status: DISCONTINUED | OUTPATIENT
Start: 2023-06-06 | End: 2023-06-07 | Stop reason: HOSPADM

## 2023-06-06 RX ADMIN — SODIUM CHLORIDE, PRESERVATIVE FREE 10 ML: 5 INJECTION INTRAVENOUS at 20:44

## 2023-06-06 RX ADMIN — OXYCODONE HYDROCHLORIDE AND ACETAMINOPHEN 1 TABLET: 5; 325 TABLET ORAL at 20:36

## 2023-06-06 RX ADMIN — ISOSORBIDE MONONITRATE 30 MG: 30 TABLET, EXTENDED RELEASE ORAL at 10:35

## 2023-06-06 RX ADMIN — FUROSEMIDE 40 MG: 10 INJECTION, SOLUTION INTRAMUSCULAR; INTRAVENOUS at 16:44

## 2023-06-06 RX ADMIN — APIXABAN 5 MG: 5 TABLET, FILM COATED ORAL at 20:37

## 2023-06-06 RX ADMIN — SACUBITRIL AND VALSARTAN 1 TABLET: 49; 51 TABLET, FILM COATED ORAL at 20:37

## 2023-06-06 RX ADMIN — SACUBITRIL AND VALSARTAN 1 TABLET: 49; 51 TABLET, FILM COATED ORAL at 10:35

## 2023-06-06 RX ADMIN — FUROSEMIDE 40 MG: 10 INJECTION, SOLUTION INTRAMUSCULAR; INTRAVENOUS at 10:34

## 2023-06-06 RX ADMIN — LOPERAMIDE HYDROCHLORIDE 2 MG: 2 CAPSULE ORAL at 10:34

## 2023-06-06 RX ADMIN — INSULIN GLARGINE 20 UNITS: 100 INJECTION, SOLUTION SUBCUTANEOUS at 20:38

## 2023-06-06 RX ADMIN — ATORVASTATIN CALCIUM 40 MG: 40 TABLET, FILM COATED ORAL at 20:37

## 2023-06-06 RX ADMIN — SERTRALINE HYDROCHLORIDE 50 MG: 50 TABLET ORAL at 10:34

## 2023-06-06 RX ADMIN — ASPIRIN 81 MG: 81 TABLET, CHEWABLE ORAL at 10:35

## 2023-06-06 RX ADMIN — MONTELUKAST SODIUM 10 MG: 10 TABLET, COATED ORAL at 10:35

## 2023-06-06 RX ADMIN — TIOTROPIUM BROMIDE INHALATION SPRAY 2 PUFF: 3.12 SPRAY, METERED RESPIRATORY (INHALATION) at 07:37

## 2023-06-06 RX ADMIN — Medication 2 PUFF: at 20:25

## 2023-06-06 RX ADMIN — OXYCODONE HYDROCHLORIDE AND ACETAMINOPHEN 1 TABLET: 5; 325 TABLET ORAL at 04:59

## 2023-06-06 RX ADMIN — SPIRONOLACTONE 25 MG: 25 TABLET ORAL at 10:34

## 2023-06-06 RX ADMIN — OXYCODONE HYDROCHLORIDE AND ACETAMINOPHEN 1 TABLET: 5; 325 TABLET ORAL at 16:20

## 2023-06-06 RX ADMIN — Medication 2 PUFF: at 07:38

## 2023-06-06 RX ADMIN — OXYCODONE HYDROCHLORIDE AND ACETAMINOPHEN 1 TABLET: 5; 325 TABLET ORAL at 10:34

## 2023-06-06 RX ADMIN — SODIUM CHLORIDE, PRESERVATIVE FREE 10 ML: 5 INJECTION INTRAVENOUS at 11:04

## 2023-06-06 RX ADMIN — OMEPRAZOLE 40 MG: 20 CAPSULE, DELAYED RELEASE ORAL at 10:34

## 2023-06-06 RX ADMIN — ATENOLOL 25 MG: 25 TABLET ORAL at 10:34

## 2023-06-06 ASSESSMENT — PAIN SCALES - GENERAL
PAINLEVEL_OUTOF10: 8
PAINLEVEL_OUTOF10: 0
PAINLEVEL_OUTOF10: 8

## 2023-06-06 ASSESSMENT — PAIN DESCRIPTION - ORIENTATION
ORIENTATION: LEFT;RIGHT
ORIENTATION: LEFT

## 2023-06-06 ASSESSMENT — PAIN DESCRIPTION - LOCATION
LOCATION: RIB CAGE

## 2023-06-06 ASSESSMENT — ENCOUNTER SYMPTOMS
SHORTNESS OF BREATH: 1
GASTROINTESTINAL NEGATIVE: 1

## 2023-06-06 ASSESSMENT — PAIN DESCRIPTION - DESCRIPTORS
DESCRIPTORS: SHARP
DESCRIPTORS: SHARP

## 2023-06-06 NOTE — FLOWSHEET NOTE
06/06/23 0459   Pain Assessment   Pain Level 8   Pain Location Rib cage   Pain Orientation Left   Pain Descriptors Sharp   Opioid-Induced Sedation   POSS Score 1     PRN Percocet given, vitals obtained.

## 2023-06-06 NOTE — FLOWSHEET NOTE
06/06/23 1415   Vital Signs   Temp 97.5 °F (36.4 °C)   Temp Source Oral   Pulse 69   Heart Rate Source Monitor   Respirations 16   /66   MAP (Calculated) 78   BP Location Left upper arm   Patient Position Sitting   Level of Consciousness 0   MEWS Score 1   Oxygen Therapy   SpO2 91 %   O2 Device Nasal cannula   O2 Flow Rate (L/min) 2 L/min     Patient in chair, Midline dressing changed, no distress noted, call light in reach, will continue to monitor, chair alarm on.

## 2023-06-06 NOTE — FLOWSHEET NOTE
06/05/23 2115   Vital Signs   Temp 97.9 °F (36.6 °C)   Temp Source Oral   Pulse 76   Heart Rate Source Monitor   Respirations 16   /76   MAP (Calculated) 89   BP Location Left upper arm   BP Method Automatic   Patient Position Sitting   Level of Consciousness 0   MEWS Score 1   Pain Assessment   Pain Assessment None - Denies Pain   Pain Level 0   Oxygen Therapy   SpO2 95 %   O2 Device Nasal cannula   O2 Flow Rate (L/min) 4 L/min     Pt awake and alert in chair. Vitals obtained. Oriented times 4. Shift assessment completed, see flow sheet. PRN Percocet given for L rib pain 8/10  Scheduled meds given, see MAR. Pt denies any further needs at this time. Call light in reach.

## 2023-06-07 ENCOUNTER — TELEPHONE (OUTPATIENT)
Dept: CARDIOLOGY CLINIC | Age: 73
End: 2023-06-07

## 2023-06-07 VITALS
BODY MASS INDEX: 37.52 KG/M2 | WEIGHT: 283.1 LBS | HEIGHT: 73 IN | TEMPERATURE: 97 F | HEART RATE: 61 BPM | SYSTOLIC BLOOD PRESSURE: 104 MMHG | RESPIRATION RATE: 16 BRPM | OXYGEN SATURATION: 92 % | DIASTOLIC BLOOD PRESSURE: 62 MMHG

## 2023-06-07 LAB
GLUCOSE BLD-MCNC: 121 MG/DL (ref 70–99)
GLUCOSE BLD-MCNC: 127 MG/DL (ref 70–99)
GLUCOSE BLD-MCNC: 192 MG/DL (ref 70–99)
PERFORMED ON: ABNORMAL

## 2023-06-07 PROCEDURE — 6370000000 HC RX 637 (ALT 250 FOR IP): Performed by: INTERNAL MEDICINE

## 2023-06-07 PROCEDURE — 6370000000 HC RX 637 (ALT 250 FOR IP): Performed by: NURSE PRACTITIONER

## 2023-06-07 PROCEDURE — 6360000002 HC RX W HCPCS: Performed by: NURSE PRACTITIONER

## 2023-06-07 PROCEDURE — 2700000000 HC OXYGEN THERAPY PER DAY

## 2023-06-07 PROCEDURE — 2580000003 HC RX 258: Performed by: INTERNAL MEDICINE

## 2023-06-07 PROCEDURE — 6360000002 HC RX W HCPCS: Performed by: INTERNAL MEDICINE

## 2023-06-07 PROCEDURE — 94640 AIRWAY INHALATION TREATMENT: CPT

## 2023-06-07 PROCEDURE — 94761 N-INVAS EAR/PLS OXIMETRY MLT: CPT

## 2023-06-07 PROCEDURE — 99232 SBSQ HOSP IP/OBS MODERATE 35: CPT | Performed by: NURSE PRACTITIONER

## 2023-06-07 RX ORDER — LIDOCAINE 4 G/G
1 PATCH TOPICAL DAILY
Qty: 30 PATCH | Refills: 0
Start: 2023-06-08

## 2023-06-07 RX ORDER — TORSEMIDE 100 MG/1
50 TABLET ORAL DAILY
Qty: 30 TABLET | Refills: 3
Start: 2023-06-07

## 2023-06-07 RX ORDER — OXYCODONE HYDROCHLORIDE AND ACETAMINOPHEN 5; 325 MG/1; MG/1
1 TABLET ORAL EVERY 4 HOURS PRN
Qty: 12 TABLET | Refills: 0 | Status: SHIPPED | OUTPATIENT
Start: 2023-06-07 | End: 2023-06-10

## 2023-06-07 RX ADMIN — ASPIRIN 81 MG: 81 TABLET, CHEWABLE ORAL at 08:19

## 2023-06-07 RX ADMIN — TIOTROPIUM BROMIDE INHALATION SPRAY 2 PUFF: 3.12 SPRAY, METERED RESPIRATORY (INHALATION) at 07:45

## 2023-06-07 RX ADMIN — OXYCODONE HYDROCHLORIDE AND ACETAMINOPHEN 1 TABLET: 5; 325 TABLET ORAL at 04:26

## 2023-06-07 RX ADMIN — OMEPRAZOLE 40 MG: 20 CAPSULE, DELAYED RELEASE ORAL at 08:19

## 2023-06-07 RX ADMIN — APIXABAN 5 MG: 5 TABLET, FILM COATED ORAL at 08:19

## 2023-06-07 RX ADMIN — OXYCODONE HYDROCHLORIDE AND ACETAMINOPHEN 1 TABLET: 5; 325 TABLET ORAL at 16:02

## 2023-06-07 RX ADMIN — ATENOLOL 25 MG: 25 TABLET ORAL at 08:18

## 2023-06-07 RX ADMIN — MONTELUKAST SODIUM 10 MG: 10 TABLET, COATED ORAL at 08:19

## 2023-06-07 RX ADMIN — FUROSEMIDE 40 MG: 10 INJECTION, SOLUTION INTRAMUSCULAR; INTRAVENOUS at 08:19

## 2023-06-07 RX ADMIN — SERTRALINE HYDROCHLORIDE 50 MG: 50 TABLET ORAL at 08:19

## 2023-06-07 RX ADMIN — Medication 2 PUFF: at 07:45

## 2023-06-07 RX ADMIN — LOPERAMIDE HYDROCHLORIDE 2 MG: 2 CAPSULE ORAL at 10:07

## 2023-06-07 RX ADMIN — ISOSORBIDE MONONITRATE 30 MG: 30 TABLET, EXTENDED RELEASE ORAL at 08:19

## 2023-06-07 RX ADMIN — OXYCODONE HYDROCHLORIDE AND ACETAMINOPHEN 1 TABLET: 5; 325 TABLET ORAL at 08:31

## 2023-06-07 RX ADMIN — SACUBITRIL AND VALSARTAN 1 TABLET: 49; 51 TABLET, FILM COATED ORAL at 08:18

## 2023-06-07 RX ADMIN — PROCHLORPERAZINE EDISYLATE 10 MG: 5 INJECTION INTRAMUSCULAR; INTRAVENOUS at 15:08

## 2023-06-07 RX ADMIN — SODIUM CHLORIDE, PRESERVATIVE FREE 10 ML: 5 INJECTION INTRAVENOUS at 08:50

## 2023-06-07 RX ADMIN — SPIRONOLACTONE 25 MG: 25 TABLET ORAL at 08:19

## 2023-06-07 ASSESSMENT — PAIN DESCRIPTION - LOCATION
LOCATION: RIB CAGE
LOCATION: ABDOMEN
LOCATION: RIB CAGE

## 2023-06-07 ASSESSMENT — PAIN SCALES - GENERAL
PAINLEVEL_OUTOF10: 8

## 2023-06-07 ASSESSMENT — PAIN DESCRIPTION - ORIENTATION
ORIENTATION: LEFT

## 2023-06-07 ASSESSMENT — ENCOUNTER SYMPTOMS
GASTROINTESTINAL NEGATIVE: 1
SHORTNESS OF BREATH: 1

## 2023-06-07 NOTE — FLOWSHEET NOTE
06/07/23 1000   Vital Signs   Temp 96.8 °F (36 °C)   Temp Source Oral   Pulse 74   Heart Rate Source Monitor   Respirations 16   BP (!) 100/59   MAP (Calculated) 73   BP Location Left upper arm   BP Method Automatic   Patient Position Sitting   Level of Consciousness 0   MEWS Score 2   Oxygen Therapy   SpO2 92 %   O2 Device Nasal cannula   O2 Flow Rate (L/min) 2 L/min     Patient in chair, no distress noted, call light in reach, will continue to monitor, chair alarm on. Cardiology Everardo Guerrerow, NP aware heart rhythm continues to be afib/ aflutter with one 2.5 second pause through the night.     Pulse oximetry assessment   88% at rest on room air (if 88% or less, skip next steps)  92% at rest on 2LPM

## 2023-06-07 NOTE — CARE COORDINATION
DISCHARGE ORDER  Date/Time 2023 2:37 PM  Completed by: Jose Carlos Chong, RACHELLE, Case Management    Patient Name: Donato Jackson    : 1950      Admit order Date and Status:ip 23  Noted discharge order. (verify MD's last order for status of admission/Traditional Medicare 3 [place] Inpatient qualifying stay required for SNF)    Confirmed discharge plan with:              Patient:  Yes              When pt confirms DC plan does any support person need to be contacted by CM No   Discharge to Facility: [address] phone number for staff giving report: [phone]   Pre-certification completed: yes   Hospital Exemption Notification (HENS) completed: yes   Discharge orders and Continuity of Care faxed to facility:  [facility]      Transportation:               Medical Transport explained with choice list offered to pt/family. Choice:(no preference)  Agency used: [facility]   time:   [id number]      Pt/family/Nursing/Facility aware of  time:   Yes Names: Alberto Peacock, spouse, RN, Mariangel  Ambulance form completed:  yes:      Date Last IMM Given: 23    Comments:met with pt at bedside. Pt is agreeable to DC to [facility] today. PU is [id number] via [facility]. Pt states he spoke to his spouse and Dtr to let them know he is [address] today. Mariangel from [facility] is aware. No other DC needs identified. Pt is being d/c'd to [facility] today. Pt's O2 sats are 92% on 2 liters. Discharge timeout done with pt, RN, CM, facility. All discharge needs and concerns addressed. Discharging nurse to complete RONNY, reconcile AVS, and place final copy with patient's discharge packet. Discharging RN to ensure that written prescriptions for  Level II medications are sent with patient to the facility as per protocol.

## 2023-06-07 NOTE — PLAN OF CARE
HEART FAILURE CARE PLAN:    Comorbidities Reviewed: Yes   Patient has a past medical history of Allergic rhinitis, Arthritis, Bladder fistula, C. difficile diarrhea, Cellulitis of right lower extremity, CHF (congestive heart failure) ([address] Dental disease, Diabetes ([address] Diverticulitis, Dizziness, DVT of lower extremity, bilateral ([address] GERD (gastroesophageal reflux disease), Headache, Hearing loss, Hematuria, Hx of blood clots, Hyperlipidemia, Hypertension, Lung disease, MONIQUE (obstructive sleep apnea), Pancreatitis ([address] Pulmonary emboli ([address] Rash, Sleep apnea, and Tinnitus. ECHOCARDIOGRAM Reviewed: Yes   Patient's Ejection Fraction (EF) is greater than 40%    Weights Reviewed:  Yes   Admission weight: (!) 305 lb (138.3 kg)   Wt Readings from Last 3 Encounters:   06/06/23 284 lb 8 oz (129 kg)   05/15/23 (!) 307 lb 4 oz (139.4 kg)   05/12/23 (!) 307 lb 12.8 oz (139.6 kg)     Intake & Output Reviewed: Yes     Intake/Output Summary (Last 24 hours) at 6/6/2023 1245  Last data filed at 6/6/2023 1226  Gross per 24 hour   Intake 1130 ml   Output 1500 ml   Net -370 ml     Medications Reviewed: Yes   SCHEDULED HOSPITAL MEDICATIONS:   polyethylene glycol  17 g Oral Daily    furosemide  40 mg IntraVENous BID    atenolol  25 mg Oral Daily    aspirin  81 mg Oral Daily    atorvastatin  40 mg Oral Nightly    spironolactone  25 mg Oral Daily    sertraline  50 mg Oral Daily    sacubitril-valsartan  1 tablet Oral BID    omeprazole  40 mg Oral Daily    montelukast  10 mg Oral Daily    isosorbide mononitrate  30 mg Oral Daily    sodium chloride flush  5-40 mL IntraVENous 2 times per day    budesonide-formoterol  2 puff Inhalation BID    And    tiotropium  2 puff Inhalation Daily    insulin glargine  20 Units SubCUTAneous Nightly    lidocaine  1 patch TransDERmal Daily     ACE/ARB/ARNI is REQUIRED for EF </= 24% SYSTOLIC FAILURE:   ACE[de-identified] None  ARB[de-identified] None  ARNI[de-identified] Sacubitril/Valsartan-Entresto    Evidenced-Based Beta Blocker is
Problem: Discharge Planning  Goal: Discharge to home or other facility with appropriate resources  Outcome: Progressing     Problem: Pain  Goal: Verbalizes/displays adequate comfort level or baseline comfort level  Outcome: Progressing     Problem: Safety - Adult  Goal: Free from fall injury  Outcome: Progressing     Problem: Skin/Tissue Integrity  Goal: Absence of new skin breakdown  Description: 1. Monitor for areas of redness and/or skin breakdown  2. Assess vascular access sites hourly  3. Every 4-6 hours minimum:  Change oxygen saturation probe site  4. Every 4-6 hours:  If on nasal continuous positive airway pressure, respiratory therapy assess nares and determine need for appliance change or resting period.   Outcome: Progressing     Problem: Chronic Conditions and Co-morbidities  Goal: Patient's chronic conditions and co-morbidity symptoms are monitored and maintained or improved  6/6/2023 1656 by Momo Tejeda RN  Outcome: Progressing  6/6/2023 1244 by Momo Tejeda RN  Outcome: Progressing     Problem: Cardiovascular - Adult  Goal: Maintains optimal cardiac output and hemodynamic stability  Outcome: Progressing  Goal: Absence of cardiac dysrhythmias or at baseline  Outcome: Progressing
Problem: Discharge Planning  Goal: Discharge to home or other facility with appropriate resources  Outcome: Progressing     Problem: Pain  Goal: Verbalizes/displays adequate comfort level or baseline comfort level  Outcome: Progressing     Problem: Safety - Adult  Goal: Free from fall injury  Outcome: Progressing     Problem: Skin/Tissue Integrity  Goal: Absence of new skin breakdown  Description: 1. Monitor for areas of redness and/or skin breakdown  2. Assess vascular access sites hourly  3. Every 4-6 hours minimum:  Change oxygen saturation probe site  4. Every 4-6 hours:  If on nasal continuous positive airway pressure, respiratory therapy assess nares and determine need for appliance change or resting period.   Outcome: Progressing     Problem: Chronic Conditions and Co-morbidities  Goal: Patient's chronic conditions and co-morbidity symptoms are monitored and maintained or improved  Outcome: Progressing     Problem: Cardiovascular - Adult  Goal: Maintains optimal cardiac output and hemodynamic stability  Outcome: Progressing  Goal: Absence of cardiac dysrhythmias or at baseline  Outcome: Progressing
Problem: Safety - Adult  Goal: Free from fall injury  Outcome: Progressing     Problem: Skin/Tissue Integrity  Goal: Absence of new skin breakdown  Description: 1. Monitor for areas of redness and/or skin breakdown  2. Assess vascular access sites hourly  3. Every 4-6 hours minimum:  Change oxygen saturation probe site  4. Every 4-6 hours:  If on nasal continuous positive airway pressure, respiratory therapy assess nares and determine need for appliance change or resting period.   Outcome: Progressing     Problem: Chronic Conditions and Co-morbidities  Goal: Patient's chronic conditions and co-morbidity symptoms are monitored and maintained or improved  Outcome: Progressing
Sacubitril/Valsartan-Entresto    Evidenced-Based Beta Blocker is REQUIRED for EF </= 61% SYSTOLIC FAILURE:   [de-identified] None    Diuretics:  [de-identified] Furosemide    Diet Reviewed: Yes   ADULT DIET; Regular; 4 carb choices (60 gm/meal)    Goal of Care Reviewed: Yes   Patient and/or Family's stated Goal of Care this Admission: reduce shortness of breath, increase activity tolerance, better understand heart failure and disease management, be more comfortable, and reduce lower extremity edema prior to discharge.
is REQUIRED for EF </= 49% SYSTOLIC FAILURE:   [de-identified] None    Diuretics:  [de-identified] Furosemide and Spironolactone    Diet Reviewed: Yes   ADULT DIET; Regular; 4 carb choices (60 gm/meal)    Goal of Care Reviewed: Yes   Patient and/or Family's stated Goal of Care this Admission: reduce shortness of breath, increase activity tolerance, better understand heart failure and disease management, be more comfortable, and reduce lower extremity edema prior to discharge.

## 2023-06-07 NOTE — PROGRESS NOTES
AM assessment completed. Scheduled medications given per MAR. VSS 2 liter NC, A/O x4,  denies any needs at this time.  Call light in reach, will monitor,
AM assessment completed. Scheduled medications given per MAR. VSS 7 liter NC, A/O x4 denies any needs at this time.  Call light in reach, will monitor,
At bedside Assessment completed, will give medications at once as patient requested when Pain medication is due. Patient is alert and oriented, educated for 20 minutes on CHF, discussed plan of care, patient may be transferred to [facility] in the morning, will be NPO after midnight.
Called [facility] Handoff report given to Tee Saunders RN. Care transferred.
Handoff report given to Bunny Mcdermott RN. Care transferred.
Handoff report given to Suraj Lai RN. Care transferred.
Inpatient Occupational Therapy Treatment    Unit: PCU  Date:  2023  Patient Name:    Joann Islas  Admitting diagnosis:  Syncope and collapse [R55]  Elevated troponin [R77.8]  Acute on chronic congestive heart failure, unspecified heart failure type [facility]) [I50.9]  Admit Date:  2023  Precautions/Restrictions/WB Status/ Lines/ Wounds/ Oxygen: Fall risk, Bed/chair alarm, Lines (IV, Supplemental O2 (8L), and external catheter), Telemetry, and Continuous pulse oximetry    Treatment Time:  8036-4681   Treatment Number:  3  Timed Code Treatment Minutes: 35minutes  Total Treatment Minutes:   35 minutes    Patient Goals for Therapy: \" to go to rehab \"          Discharge Recommendations: SNF  DME needs for discharge: Defer to facility         Therapy recommendations for staff:   Assist of 1 for transfers bed to chair with RW and gaitbelt    History of Present Illness: Pt brought to ED via EMS after passing out at home. BS in 60's when EMS arrived. Chest CT neg for PE or acute cardiopulmonary pathology. Head CT neg. PMHx including not limited to: a-fib, DVT/PE, DMII, bilat chronic shin wounds, HLD, dCHF, GERD      Home Health S4 Level Recommendation:  NA    AM-PAC Score: AM-PAC Inpatient Daily Activity Raw Score: 15     Subjective: Pt reports concern over wife and her health   Patient lying reclined in bed with no family present. Pt agreeable to this OT session. Cognition:    A&O to person  Able to follow 2 step commands    Pain:   No  Location: NA  Ratin /10  Pain Medicine Status: Denies need    Preadmission Environment:   Pt. Lives with                                       Spouse.  Patient   Step children visit ~1x/week  Home environment:                            two story home, Able to Live on Main level with bedroom/bathroom, Laundry in basement (Does not go up or downstairs)  Steps to enter first floor:                     5 steps to enter and hand rail unilateral  Steps to second
Inpatient Physical Therapy Treatment    Unit: PCU  Date:  6/5/2023  Patient Name:    Peter Silva  Admitting diagnosis:  Syncope and collapse [R55]  Elevated troponin [R77.8]  Acute on chronic congestive heart failure, unspecified heart failure type ([facility] [address] [I50.9]  Admit Date:  5/30/2023  Precautions/Restrictions/WB Status/ Lines/ Wounds/ Oxygen: Fall risk, Bed/chair alarm, Lines (Supplemental O2 (4L) and external catheter), Telemetry, Continuous pulse oximetry, and Isolation Precautions: Contact (ESBL)    Treatment Time:  15:30-16:23  Treatment Number:  3   Timed Code Treatment Minutes: 53 minutes  Total Treatment Minutes:   53 minutes    Patient Stated Goals for Therapy: \" go to somewhere like [place] \" [for STR]      Discharge Recommendations: SNF  DME needs for discharge: Defer to facility       Therapy recommendation for EMS Transport: can transport by wheelchair    Therapy recommendations for staff:   Assist of 1 for transfers with use of rolling walker (RW) and gait belt to[facility]from [facility]  to/from [facility]    History of Present Illness:   Pt brought to ED via EMS after passing out at home. BS in 60's when EMS arrived. Chest CT neg for PE or acute cardiopulmonary pathology. Head CT neg. PMHx including not limited to: a-fib, DVT/PE, DMII, bilat chronic shin wounds, HLD, dCHF, GERD    Home Health S4 Level Recommendation:  NA    AM-PAC Mobility Score       AM-PAC Inpatient Mobility without Stair Climbing Raw Score : 9    Subjective  Patient sitting up in chair with no family present. Pt agreeable to this PT session following lengthy discussion RE plans for discharge and benefits of participation as tolerated despite feeling very fatigued following today's activities. Over course of this session, much time spent discussing d/c plan. He is concerned about need to care for his wife who is sick with bronchitis at home. 2 step daughters are caring for her. Ultimately he verbalized agreement that STR is needed.
Katie William 107   HEART FAILURE PROGRAM      NAME:  Rola Jimenezacher  AGE: 74 y.o. GENDER: male  : 1950  TODAY'S DATE:  2023    Subjective:     VISIT TYPE: Education    ADMIT DATE: 2023    PAST MEDICAL HISTORY:      Diagnosis Date    Allergic rhinitis     Arthritis     Bladder fistula     resolved    C. difficile diarrhea 2015    +PCR    Cellulitis of right lower extremity 3/23/2016    CHF (congestive heart failure) ([facility] [address]     Dental disease     Diabetes ([facility] [address]     Diverticulitis     Dizziness     DVT of lower extremity, bilateral ([facility] [address] 10/16/2013    GERD (gastroesophageal reflux disease)     Headache     Hearing loss     Hematuria 2014    Hx of blood clots     Hyperlipidemia     Hypertension     Lung disease     MONIQUE (obstructive sleep apnea)     Pancreatitis ([facility] [address] 2013    Pulmonary emboli ([facility] [address]     after cholecystectomy     Rash     Sleep apnea     Tinnitus      HOME MEDICATIONS:  Prior to Admission medications    Medication Sig Start Date End Date Taking? Authorizing Provider   sacubitril-valsartan (ENTRESTO) 49-51 MG per tablet Take 1 tablet by mouth 2 times daily 23   SHAKIRA Pham CNP   Apoaequorin (PREVAGEN) 10 MG CAPS Take 10 mg by mouth daily    Historical Provider, MD   sertraline (ZOLOFT) 50 MG tablet TAKE 1 TABLET BY MOUTH EVERY DAY 3/9/23   SHAKIRA Crooks CNP   spironolactone (ALDACTONE) 25 MG tablet TAKE 1 TABLET BY MOUTH EVERY DAY 3/9/23   SHAKIRA Crooks CNP   Semaglutide, 1 MG/DOSE, (OZEMPIC, 1 MG/DOSE,) 4 MG/3ML SOPN Inject 1 mg into the skin once a week 3/9/23   SHAKIRA Crooks CNP   silver sulfADIAZINE (SILVADENE) 1 % cream Apply topically daily.  3/9/23   SHAKIRA Crooks CNP   fluticasone-umeclidin-vilant (TRELEGY ELLIPTA) 454-68.0-42 MCG/ACT AEPB inhaler Inhale 1 puff into the lungs daily Lot [id number] ex 6/24/23 3/3/23   Chandler Vegas MD   Azelastine HCl 137 MCG/SPRAY SOLN 1 SPRAY
Patient educated on discharge instructions as well as new medications use, dosage, administration and possible side effects. Patient verified knowledge. IV removed without difficulty and dry dressing in place. Telemetry monitor removed and returned to [facility]. Pt left facility in stable condition to [facility] with all of their personal belongings.
Progress Note    Admit Date:  5/30/2023    Hypoglycemia, fall at home  Trop high, L chest MSK pain but stress test abn   Cardiology c/s  Severe left lower chest and abd pain needing morphine   Hypoxic remains on 8 L  Rebollar placed for retention     Subjective:    Mr. Pattie Davis was seen sitting on a chair. Oxygen has been weaned down. He was initially on 7 but we were able to wean him down to 4. Has a hard time laying flat per cardiology. Cardiology does not feel he is ready to have a coronary angiogram yet. Objective:     /66   Pulse 77   Temp (!) 96.7 °F (35.9 °C) (Oral)   Resp 16   Ht 6' 1\" (1.854 m)   Wt 288 lb (130.6 kg)   SpO2 94%   BMI 38.00 kg/m²         Intake/Output Summary (Last 24 hours) at 6/5/2023 1229  Last data filed at 6/5/2023 1014  Gross per 24 hour   Intake 420 ml   Output 2000 ml   Net -1580 ml         Physical Exam:      General: elderly male up in bed   Awake, alert and oriented. Appears to be not in any distress  Mucous Membranes:  Pink , anicteric  Neck: No JVD, no carotid bruit, no thyromegaly  Chest: diminished herrera with scattered crackles   Left lower rib cage and left upper abd pain reproducible.  No bruise noted   Cardiovascular: irregular rate and rhythm, S1S2 heard, ESM aortic area  Abdomen:  Soft, obese, undistended, non tender, no organomegaly, BS present  Extremities: 2+ herrera LE edema  with compression bandages   Ana Rosa boots noted Distal pulses well felt  Neurological : grossly normal with gen weakness      Scheduled Meds:   polyethylene glycol  17 g Oral Daily    furosemide  40 mg IntraVENous BID    atenolol  25 mg Oral Daily    aspirin  81 mg Oral Daily    atorvastatin  40 mg Oral Nightly    spironolactone  25 mg Oral Daily    sertraline  50 mg Oral Daily    sacubitril-valsartan  1 tablet Oral BID    omeprazole  40 mg Oral Daily    montelukast  10 mg Oral Daily    isosorbide mononitrate  30 mg Oral Daily    sodium chloride flush  5-40 mL IntraVENous 2 times per day
Progress Note    Admit Date:  5/30/2023    Hypoglycemia, fall at home  Trop high, L chest MSK pain but stress test abn   Cardiology c/s  Severe left lower chest and abd pain needing morphine   Now improved  Rebollar placed for retention     Subjective:    Mr. Roseanna Taylor was seen sitting on a chair. Oxygen has been weaned down. He was initially on 7 but we were able to wean him down to 4. Has a hard time laying flat per cardiology. Cardiology does not feel he is ready to have a coronary angiogram yet. 6/6- he is down to 4 L and is 95%. I weaned him to 3 L. Objective:     /83   Pulse 60   Temp (!) 96.1 °F (35.6 °C) (Oral)   Resp 16   Ht 6' 1\" (1.854 m)   Wt 284 lb 8 oz (129 kg)   SpO2 91%   BMI 37.54 kg/m²         Intake/Output Summary (Last 24 hours) at 6/6/2023 1000  Last data filed at 6/6/2023 4951  Gross per 24 hour   Intake 1310 ml   Output 1350 ml   Net -40 ml         Physical Exam:    General: elderly male sitting up on the chair. Awake, alert and oriented. Appears to be not in any distress  Mucous Membranes:  Pink , anicteric  Neck: No JVD, no carotid bruit, no thyromegaly  Chest: few crackles. Improved BS  Left lower rib cage and left upper abd pain reproducible.  No bruise noted   Cardiovascular: irregular rate and rhythm, S1S2 heard, ESM aortic area  Abdomen:  Soft, obese, undistended, non tender, no organomegaly, BS present  Extremities: 2+ herrera LE edema  with compression bandages   Ana Rosa boots noted Distal pulses well felt  Neurological : grossly normal with gen weakness      Scheduled Meds:   polyethylene glycol  17 g Oral Daily    furosemide  40 mg IntraVENous BID    atenolol  25 mg Oral Daily    aspirin  81 mg Oral Daily    atorvastatin  40 mg Oral Nightly    spironolactone  25 mg Oral Daily    sertraline  50 mg Oral Daily    sacubitril-valsartan  1 tablet Oral BID    omeprazole  40 mg Oral Daily    montelukast  10 mg Oral Daily    isosorbide mononitrate  30 mg Oral Daily    sodium
Report given to [facility]. Pt stable, care transferred at this time.  Akhil Dickey RN
Report given to [facility]. Pt stable, care transferred at this time.  Bautista Long RN
This patient had a 2.51 sec pause and his HR dropped to 30 briefly. He is back to his base rate of afib with HR of 84. MD notified, no new orders.  Cardiology following
1-28-14    Cystourethroscopy, left ureteral catheter     EPIDURAL STEROID INJECTION Right 1/21/2019    RIGHT LUMBAR TWO THREE EPIDURAL STEROID INJECTION SITE CONFIRMED BY FLUROOSCOPY performed by Nettie Tucker MD at [address] Right 2/11/2019    RIGHT LUMBAR TWO THREE EPIDURAL STEROID INJECTION SITE CONFIRMED BY FLUOROSCOPY performed by Nettie Tucker MD at [facility]  08/14/2015    [address] [facility] [address], BILATERAL COMPONENT SEPARATION, LYSIS OF ADHESIONS    IR MIDLINE CATH  5/31/2023    IR MIDLINE CATH 5/31/2023 [address] SPECIAL PROCEDURES    OTHER SURGICAL HISTORY  1-28-14    LeftColectomy with End Colostomy, Splenic Flexure Mobilization, Takedown of Colovesical Fistula    NV INJECTION HIP ARTHROGRAPHY W/ANESTHESIA Right 9/19/2018    ARTHROGRAM AND CORTISONE INJECTION RIGHT HIP performed by Mumtaz Gil MD at [address] [place] [address] Right 2003    Fracture lower leg during chain saw accident. Surgery x 2    VASCULAR SURGERY Left 05/2021    per Dr. Diana Christianson Right 05/10/2021    venous procedure RLE-per Dr. Elis Golden        Objective:   BP (!) 138/97   Pulse 80   Temp 97.2 °F (36.2 °C) (Oral)   Resp 16   Ht 6' 1\" (1.854 m)   Wt 288 lb (130.6 kg)   SpO2 92%   BMI 38.00 kg/m²     Intake/Output Summary (Last 24 hours) at 6/5/2023 9440  Last data filed at 6/5/2023 0732  Gross per 24 hour   Intake 240 ml   Output 1850 ml   Net -1610 ml         TELEMETRY: Atrial fibrillation rate controlled    Physical Exam:  General: No Respiratory distress, appears well developed and well nourished.  Obese white male  Eyes:  Sclera nonicteric  Nose/Sinuses:  negative findings: nose shows no deformity, asymmetry, or inflammation, nasal mucosa normal, septum midline with no perforation or bleeding  Back:  no pain to palpation  Joint:  no active joint inflammation  Musculoskeletal:  negative  Skin:  Warm and dry  Neck:
visualized but no obvious segmental wall   motion abnormalities. Left ventricular systolic function is normal with ejection fraction   estimated at 55%. There is septal flattening consistent with RV pressure and volume overload. There is moderate concentric left ventricular hypertrophy. Grade II diastolic dysfunction with elevated left ventricular filling   pressure. Severe bi-atrial enlargement. The ascending aorta is mildly dilated 4.04cm. The maximal transaortic velocity is 2.32m/s which gives peak pressure   gradient= 22mmHg and mean pressure gradient= 14mmHg which is c/w borderline   mild aortic stenosis. Mild tricuspid regurgitation. Systolic pulmonary artery pressure (SPAP) is estimated at 47 mmHg c/w mild   pulmonary hypertension (Right atrial pressure of 8 mmHg). Stress test 5/2023:  Small-moderate sized inferolateral and basal lateral partial reversibility   defects consistent with ischemia and infarction in the territory of the mid   LCx and/or RCA. LV function is normal with uniform wall motion and ejection   fraction of 63%. Low-intermediate risk abnormal study.     Lab Reviewed:     Renal Profile:  Lab Results   Component Value Date/Time    CREATININE 0.8 06/03/2023 04:43 AM    BUN 31 06/03/2023 04:43 AM     06/03/2023 04:43 AM    K 3.9 06/03/2023 04:43 AM    K 4.9 05/30/2023 05:18 PM     06/03/2023 04:43 AM    CO2 28 06/03/2023 04:43 AM     CBC:    Lab Results   Component Value Date/Time    WBC 7.9 05/30/2023 05:18 PM    RBC 5.72 05/30/2023 05:18 PM    HGB 16.2 05/30/2023 05:18 PM    HCT 49.8 05/30/2023 05:18 PM    MCV 87.1 05/30/2023 05:18 PM    RDW 17.4 05/30/2023 05:18 PM     05/30/2023 05:18 PM     BNP:    Lab Results   Component Value Date/Time    PROBNP 573 05/30/2023 05:18 PM    PROBNP 609 05/19/2023 12:05 PM    PROBNP 1,285 09/30/2022 12:50 PM    PROBNP 971 07/19/2022 10:32 AM    PROBNP 1,103 06/23/2022 02:18 PM     Fasting Lipid Panel:    Lab Results
07/19/2022 10:32 AM    PROBNP 1,103 06/23/2022 02:18 PM     Fasting Lipid Panel:    Lab Results   Component Value Date/Time    CHOL 89 06/13/2022 08:24 AM    HDL 40 06/13/2022 08:24 AM    HDL 35 12/02/2010 06:45 AM    TRIG 53 06/13/2022 08:24 AM     Cardiac Enzymes:  CK/MbTroponin  Lab Results   Component Value Date/Time    TROPONINI 0.03 06/12/2022 07:14 PM     PT/ INR   Lab Results   Component Value Date/Time    INR 1.28 12/04/2019 09:50 AM    INR 1.01 10/18/2017 09:15 PM    INR 2.1 09/09/2014 07:20 AM    INR 2.0 08/12/2014 09:19 AM    INR 3.1 06/25/2014 07:13 AM    INR 3.3 06/11/2014 07:25 AM    PROTIME 14.9 12/04/2019 09:50 AM    PROTIME 11.4 10/18/2017 09:15 PM    PROTIME 12.2 05/21/2014 05:39 AM     PTT No results found for: PTT   Lab Results   Component Value Date/Time    MG 1.90 06/01/2023 05:53 AM      Lab Results   Component Value Date/Time    TSH 2.55 05/13/2015 09:10 AM     All labs and imaging reviewed today    Assessment:  Syncope: felt to be due to hypoglycemia  Elevated troponin  Abnormal stress test  Acute on chronic HFpEF  CAD on CT  Atrial arrhythmias, afib and flutter: ongoing but stable   - TPH1PM5pdmk score >2 on eliquis   - s/p DCCV 2019  HTN  HLD  DM    Plan:   1. Low-intermediate risk abnormal stress with with inferolateral and basal lateral partially reversible defect. Had been planning on eventual [facility] however has now been deferred to outpatient setting due to diuresis and overall debility. Eliquis resumed. 2. Aspirin, statin, atenolol, spironolactone, entresto  3. Cardiac monitor at discharge due to syncope  4. Will arrange outpatient follow up to determine cath timing  5.  SNF today    Merari Velazquez, SHAKIRA-CNP  [facility]  [phone]

## 2023-06-07 NOTE — FLOWSHEET NOTE
06/07/23 0426   Pain Assessment   Pain Level 8   Pain Location Rib cage   Pain Orientation Left   Opioid-Induced Sedation   POSS Score 1     Vitals obtained. PRN percocet for pain 8/10.

## 2023-06-07 NOTE — DISCHARGE SUMMARY
Name:  Vivien Vale  Room:  /9993-99  MRN:    [id number]    Discharge Summary      This discharge summary is in conjunction with a complete physical exam done on the day of discharge. Discharging Physician: Bahman Brown MD      Admit: 5/30/2023  Discharge:  6/7/2023     Diagnoses this Admission    Principal Problem:    Syncope and collapse  Active Problems:    Acute on chronic congestive heart failure (HCC)    Elevated troponin    Recurrent falls    Hypoglycemia    Abnormal stress test  Resolved Problems:    * No resolved hospital problems. *      Procedures (Please Review Full Report for Details)  None    Consults    IP CONSULT TO CARDIOLOGY  IP CONSULT TO RESPIRATORY CARE  IP CONSULT TO PODIATRY      HPI:    74 y.o. obese  male male with a pmh of DVT GERD hypertension diabetes hyperlipidemia chronic diastolic heart failure (07/86/8903 shows EF of 50% bilateral atrial enlargement with moderate AS and pulmonary hypertension proximal atrial fibrillation (DCCV on 12/4/2019) obstructive sleep apnea and CKD who presents with syncope. According to the patient he was making his coffee and as he went to get something as per patient he passed out. Patient does not remember exact time or how many minutes he passed out but he does say that he woke up and called 911 to lift him up to chair. When the EMS came his blood sugar was in 60s as per the report. Patient was brought to ED chest x-ray was unremarkable for any acute cardiopulmonary pathology. CT head without contrast was unremarkable for any acute intracranial pathology. CBC BMP findings were unremarkable. Patient denied nausea vomiting headache palpitation dizziness lightheadedness chest pain shortness of breath fever cough any urinary or bowel movement issues.       Physical Exam at Discharge:  BP (!) 100/59   Pulse 74   Temp 96.8 °F (36 °C) (Oral)   Resp 16   Ht 6' 1\" (1.854 m)   Wt 283 lb 1.6 oz (128.4 kg)   SpO2 92%

## 2023-06-07 NOTE — TELEPHONE ENCOUNTER
Monitor placed by [address]  Length of monitor 4 WEEK   Monitor ordered by Joann Irvin  Serial number [id number]  Kit ID [id number]  Activation successful prior to pt leaving office?  Yes

## 2023-06-07 NOTE — FLOWSHEET NOTE
06/06/23 2030   Vital Signs   Temp 97.1 °F (36.2 °C)   Temp Source Oral   Pulse 63   Heart Rate Source Monitor   Respirations 16   /76   MAP (Calculated) 89   BP Location Left upper arm   BP Method Automatic   Patient Position Sitting   Level of Consciousness 0   MEWS Score 1   Oxygen Therapy   SpO2 92 %   O2 Device High flow nasal cannula   O2 Flow Rate (L/min) 2 L/min     Pt awake and alert in chair. Vitals obtained. Oriented times 4. Shift assessment completed, see flow sheet. PRN Percocet given for L side pain. Scheduled meds given, see MAR. Pt denies any further needs at this time. Call light in reach.

## 2023-06-08 ENCOUNTER — TELEPHONE (OUTPATIENT)
Dept: WOUND CARE | Age: 73
End: 2023-06-08

## 2023-06-08 ENCOUNTER — TELEPHONE (OUTPATIENT)
Dept: FAMILY MEDICINE CLINIC | Age: 73
End: 2023-06-08

## 2023-06-08 NOTE — TELEPHONE ENCOUNTER
Pt to be seen by the wound care physician at [facility] while patient is in patient there.

## 2023-06-08 NOTE — PROGRESS NOTES
Steph Sigala from [facility] called and said pt has been admitted for inpatient skilled therapy. She said they are going to follow pts wound care needs while patient is in house and when he's d/c he will follow up back here in [facility] if needed.

## 2023-06-08 NOTE — TELEPHONE ENCOUNTER
Care Transitions Initial Follow Up Call    Outreach made within 2 business days of discharge: Yes    Patient: Len Halsted Patient : 1950   MRN: [id number]  Reason for Admission: There are no discharge diagnoses documented for the most recent discharge. Discharge Date: 23       Spoke with: patient in SNF. Discharge department/facility: [facility]    TCM Interactive Patient Contact:  Was patient able to fill all prescriptions: Yes  Was patient instructed to bring all medications to the follow-up visit: Yes  Is patient taking all medications as directed in the discharge summary?  Yes  Does patient understand their discharge instructions: Yes  Does patient have questions or concerns that need addressed prior to 7-14 day follow up office visit: no    Scheduled appointment with PCP within 7-14 days    Follow Up  Future Appointments   Date Time Provider Joanna Reynolds   2023  8:00 AM Olimpia Timmons, TAMIM [facility] HOD   2023  8:00 AM Olimpia Timmons, TAMIM [place] [facility] HOD   2023  8:40 AM Lalit Treviño MD AND P[facility]ER[facility]   2023  9:15 AM Brigid Redding APRN - CNP [facility] - DYD   7/3/2023  8:00 AM Olimpia Timmons, JOSEPH MHCMICHAEL WOUN [place] HOD   7/10/2023  8:00 AM Olimpia Timmons, DPM [place] [facility] HOD   2023 11:30 AM Oneil Mcdaniel APRN - CNP [facility] CAR [facility]   2023  8:00 AM Olimpia Timmons, TAMIM [facility] HOD   2023  8:00 AM Olimpia Timmons, TAMIM MHCZ WOUN Greg HOD   2023  8:00 AM Olimpia Timmons, DPM [facility] HOD   2023  8:00 AM Olimpia Timmons, DPM [facility] HOD   2023  8:00 AM Olimpia Timmons, DPM [place] HOD       Cahd Zavala LPN

## 2023-06-19 ENCOUNTER — HOSPITAL ENCOUNTER (OUTPATIENT)
Dept: WOUND CARE | Age: 73
Discharge: HOME | End: 2023-06-19
Payer: MEDICARE

## 2023-06-19 ENCOUNTER — CARE COORDINATION (OUTPATIENT)
Dept: CASE MANAGEMENT | Age: 73
End: 2023-06-19

## 2023-06-19 VITALS
DIASTOLIC BLOOD PRESSURE: 69 MMHG | WEIGHT: 280.4 LBS | TEMPERATURE: 96.6 F | SYSTOLIC BLOOD PRESSURE: 114 MMHG | RESPIRATION RATE: 18 BRPM | HEIGHT: 73 IN | HEART RATE: 79 BPM | BODY MASS INDEX: 37.16 KG/M2

## 2023-06-19 DIAGNOSIS — L97.222 NON-PRESSURE CHRONIC ULCER OF LEFT CALF WITH FAT LAYER EXPOSED (HCC): ICD-10-CM

## 2023-06-19 DIAGNOSIS — L97.919 VENOUS STASIS ULCER OF RIGHT LOWER EXTREMITY (HCC): Primary | ICD-10-CM

## 2023-06-19 DIAGNOSIS — R55 SYNCOPE AND COLLAPSE: Primary | ICD-10-CM

## 2023-06-19 DIAGNOSIS — L97.919 IDIOPATHIC CHRONIC VENOUS HYPERTENSION OF BOTH LOWER EXTREMITIES WITH ULCER (HCC): ICD-10-CM

## 2023-06-19 DIAGNOSIS — I83.019 VENOUS STASIS ULCER OF RIGHT LOWER EXTREMITY (HCC): Primary | ICD-10-CM

## 2023-06-19 DIAGNOSIS — I87.313 IDIOPATHIC CHRONIC VENOUS HYPERTENSION OF BOTH LOWER EXTREMITIES WITH ULCER (HCC): ICD-10-CM

## 2023-06-19 DIAGNOSIS — L97.212 NON-PRESSURE CHRONIC ULCER OF RIGHT CALF WITH FAT LAYER EXPOSED (HCC): ICD-10-CM

## 2023-06-19 DIAGNOSIS — I87.2 VENOUS STASIS DERMATITIS OF BOTH LOWER EXTREMITIES: ICD-10-CM

## 2023-06-19 DIAGNOSIS — L97.929 IDIOPATHIC CHRONIC VENOUS HYPERTENSION OF BOTH LOWER EXTREMITIES WITH ULCER (HCC): ICD-10-CM

## 2023-06-19 PROCEDURE — 1111F DSCHRG MED/CURRENT MED MERGE: CPT | Performed by: NURSE PRACTITIONER

## 2023-06-19 PROCEDURE — 29581 APPL MULTLAYER CMPRN SYS LEG: CPT

## 2023-06-19 RX ORDER — SODIUM CHLOR/HYPOCHLOROUS ACID 0.033 %
SOLUTION, IRRIGATION IRRIGATION ONCE
OUTPATIENT
Start: 2023-06-19 | End: 2023-06-19

## 2023-06-19 RX ORDER — LIDOCAINE 40 MG/G
CREAM TOPICAL ONCE
OUTPATIENT
Start: 2023-06-19 | End: 2023-06-19

## 2023-06-19 RX ORDER — BACITRACIN ZINC AND POLYMYXIN B SULFATE 500; 1000 [USP'U]/G; [USP'U]/G
OINTMENT TOPICAL ONCE
OUTPATIENT
Start: 2023-06-19 | End: 2023-06-19

## 2023-06-19 RX ORDER — LIDOCAINE 50 MG/G
OINTMENT TOPICAL ONCE
OUTPATIENT
Start: 2023-06-19 | End: 2023-06-19

## 2023-06-19 RX ORDER — LIDOCAINE HYDROCHLORIDE 40 MG/ML
SOLUTION TOPICAL ONCE
OUTPATIENT
Start: 2023-06-19 | End: 2023-06-19

## 2023-06-19 RX ORDER — LIDOCAINE 40 MG/G
CREAM TOPICAL ONCE
Status: DISCONTINUED | OUTPATIENT
Start: 2023-06-19 | End: 2023-06-20 | Stop reason: HOSPADM

## 2023-06-19 ASSESSMENT — PAIN SCALES - GENERAL: PAINLEVEL_OUTOF10: 0

## 2023-06-19 NOTE — DISCHARGE INSTRUCTIONS
Monday-Thursday from 8:00 am - 4:30 pm, or Friday from 8:00 am - 2:30 pm.  If you need help with your wound outside these hours and cannot wait until we are again available, contact your home-care company (if applicable), your PCP, or go to the nearest emergency room

## 2023-06-19 NOTE — PROGRESS NOTES
215 [facility] Physician Orders and Discharge 800 [facility]  [address], Derian Swann [address]  [place], [facility]  Telephone: [phone]      Fax: [phone]        Your home care company:   [employer]     Your wound-care supplies will be provided by: [facility]     NAME:  Guilherme Camarillo   YOB: 1950  PRIMARY DIAGNOSIS FOR WOUND CARE CENTER:  Venous leg ulcer . Wound cleansing:   Do not scrub or use excessive force. Wash hands with soap and water before and after dressing changes. Prior to applying a clean dressing, cleanse wound with normal saline, wound cleanser, or mild soap and water. Ask your physician or nurse before getting the wound(s) wet in the shower. Wound care for home:     Right lower leg and Left lower leg wounds:    Wash wounds with soap and water with dressing changes    Aquaphor to dry skin    Xeroform to wounds    CoFlex Calamine     Leave dressing on until Thursday(patient has appointment with PCP on 7/23/2023. Home Care to come on Thursday, Monday and Friday next week (Dr Varinder Soliman out of town)    [address] to remove compression wraps    Wash legs with soap and water    Aquaphor to dry skin    Silvadene to open areas    4x4's, ABD, Coban lite toes to knees        Please note, all wounds (unless stated otherwise here) were mechanically debrided at the time of cleansing here in the wound-care center today, so a small amount of pain, drainage or bleeding from that process might be expected, and is normal.      All products for home use, including multiple products for a single wound if applicable, are medically necessary in order to achieve the best chance at timely wound healing. See provider documentation for details if needed.      Substituted dressings applied in the [facility] today, if applicable:           New orders for this week (labs, imaging, medications, etc.):      Continue to

## 2023-06-19 NOTE — CARE COORDINATION
785 [facility] Discharge Call    2023    Patient: Felipa Colindres Patient : 1950   MRN: [id number]  Reason for Admission: syncope and collapse  Discharge Date: 23 RARS: Readmission Risk Score: 15.5           Discharge Facility: [facility]    Acute Care Course:    to 23 [address] Sailors with syncope and collapse with a hypoglycemic event. Was 59 at hospital   to  [facility] to home with [facility] from [address] [place] [address] Dr made:   wound care   pulmonology   PCP HFU    Sig Hx:   CHF, DVT, GERD, HTN, DMII, MONIQUE, CKD, soteo, PAF, cellulitis RLL    DME: cane    Conversation:   Spoke with Nik and she stated pt doing well and asked that I call back in 45 minutes. Spoke with Dong Recinos after 2 IDs. He states he is doing fine and saw wound care this morning and the MD told him the wounds were looking good. Reviewed appts and he stated the [facility] nurse would be in Thursday instead of Friday so that he can make the PCP appt. HHC is from [facility]. HE states his left lower leg is almost healed. He has wounds on both lower legs. He wears his compression stockings. He states his hands have a numbness and he had this at the SNF. Educated to make MD aware  tomorrow and he agrees. Educated to use his CPAP. States he thinks his breathing could be better and he is not SOB or coughing more than normal. This is why the Pulmonary appt tomorrow. He walks with his cane and his appetite is good. Pt speaks in full sentences. He is allowed to drive. Reviewed meds. He states glimeperide stopped due to overmedicated for DM. He has a sugar sour. He is currently wearing a heart monitor. He no longer needs the lidocaine patch or the percocet. Left my contact information. Agreeable to calls. Follow up plan:   Will hand off to Ammon Andrew [address]      Do you have any ongoing symptoms?: Yes

## 2023-06-19 NOTE — PLAN OF CARE
Pt to the [facility] for follow up appointment. Pt was discharged from [facility] yesterday. Pt to have weekly compression wraps applied to bilateral lower legs. Home Care to change dressing Thursday this week and 2 times next week due to Dr Fifi Hawkins being out of town next week. Pt to follow up in the [facility] in 2 weeks with Dr Fifi Hawkins. Discharge instructions reviewed with patient, all questions answered, copy given to patient. Dressings were applied to all wounds per M.D. Instructions at this visit.

## 2023-06-20 ENCOUNTER — OFFICE VISIT (OUTPATIENT)
Dept: PULMONOLOGY | Age: 73
End: 2023-06-20
Payer: MEDICARE

## 2023-06-20 VITALS
BODY MASS INDEX: 36.99 KG/M2 | HEIGHT: 73 IN | RESPIRATION RATE: 16 BRPM | DIASTOLIC BLOOD PRESSURE: 64 MMHG | HEART RATE: 72 BPM | SYSTOLIC BLOOD PRESSURE: 140 MMHG | OXYGEN SATURATION: 94 % | TEMPERATURE: 97.7 F

## 2023-06-20 DIAGNOSIS — R91.1 LUNG NODULE: ICD-10-CM

## 2023-06-20 DIAGNOSIS — E66.01 SEVERE OBESITY (BMI 35.0-39.9) WITH COMORBIDITY (HCC): ICD-10-CM

## 2023-06-20 DIAGNOSIS — G47.30 SEVERE SLEEP APNEA: Primary | ICD-10-CM

## 2023-06-20 PROCEDURE — 99213 OFFICE O/P EST LOW 20 MIN: CPT | Performed by: STUDENT IN AN ORGANIZED HEALTH CARE EDUCATION/TRAINING PROGRAM

## 2023-06-20 PROCEDURE — 3078F DIAST BP <80 MM HG: CPT | Performed by: STUDENT IN AN ORGANIZED HEALTH CARE EDUCATION/TRAINING PROGRAM

## 2023-06-20 PROCEDURE — 1123F ACP DISCUSS/DSCN MKR DOCD: CPT | Performed by: STUDENT IN AN ORGANIZED HEALTH CARE EDUCATION/TRAINING PROGRAM

## 2023-06-20 PROCEDURE — 3077F SYST BP >= 140 MM HG: CPT | Performed by: STUDENT IN AN ORGANIZED HEALTH CARE EDUCATION/TRAINING PROGRAM

## 2023-06-20 RX ORDER — DOXYCYCLINE HYCLATE 100 MG
1 TABLET ORAL
COMMUNITY
Start: 2023-06-15 | End: 2023-06-22

## 2023-06-20 RX ORDER — OXYCODONE HYDROCHLORIDE AND ACETAMINOPHEN 5; 325 MG/1; MG/1
1 TABLET ORAL
COMMUNITY
Start: 2023-06-07

## 2023-06-20 ASSESSMENT — ENCOUNTER SYMPTOMS
TROUBLE SWALLOWING: 0
DIARRHEA: 0
EYE ITCHING: 0
BACK PAIN: 0
EYE DISCHARGE: 0
COLOR CHANGE: 0
VOMITING: 0
ABDOMINAL PAIN: 0
NAUSEA: 0
WHEEZING: 0
STRIDOR: 0
SHORTNESS OF BREATH: 0
COUGH: 0
EYE REDNESS: 0
SORE THROAT: 0
EYE PAIN: 0
ABDOMINAL DISTENTION: 0
CONSTIPATION: 0

## 2023-06-20 NOTE — PROGRESS NOTES
MA Communication:   The following orders are received by verbal communication from   Tila Boyle MD    Orders include:  CR in 30 days       FU 6 mo        CT cheat 6 mo

## 2023-06-20 NOTE — PATIENT INSTRUCTIONS
Remember to bring a list of pulmonary medications and any CPAP or BiPAP machines to your next appointment with the office. Please keep all of your future appointments scheduled by [facility] Pulmonary office. Out of respect for other patients and providers, you may be asked to reschedule your appointment if you arrive later than your scheduled appointment time. Appointments cancelled less than 24hrs in advance will be considered a no show. Patients with three missed appointments within 1 year or four missed appointments within 2 years can be dismissed from the practice. Please be aware that our physicians are required to work in the Intensive Care Unit at [facility].  Your appointment may need to be rescheduled if they are designated to work during your appointment time. You may receive a survey regarding the care you received during your visit. Your input is valuable to us. We encourage you to complete and return your survey. We hope you will choose us in the future for your healthcare needs. Pt instructed of all future appointment dates & times, including radiology, labs, procedures & referrals. If procedures were scheduled preparation instructions provided. Instructions on future appointments with [facility] Pulmonary were given.

## 2023-06-20 NOTE — PROGRESS NOTES
[facility] Pulmonary Follow-up  [address], [address]  [phone]        Lyla Mohs (: 1950 ) is a 74 y.o. male here for an evaluation of   Chief Complaint   Patient presents with    Follow-up     31-90 day    Sleep Apnea         SUBJECTIVE/OBJECTIVE:  Patient is a 64-year-old male with significant past medical history of pulmonary nodule, morbid obesity, severe MONIQUE, heart failure that presents to [facility] pulmonary clinic for follow-up visit. Patient was recently in the hospital and did rehab and discharge. He has not been using his CPAP mask because of this. His compliance is poor and his AHI is greater than 5. Currently, he is complaining of hand numbness and tingling. He is scheduled to see his primary care specialist this week. He denies any fever, chills, chest pain, nausea, vomiting, lower extremity weakness, upper extremity weakness. Patient is here for compliance report of sleep apnea and lung nodule follow-up. Per Dr. Hemanth Jhaveri:  Lyla Mohs Wray Overcast" is a 74y.o. year old male here for lung nodule, chronic bronchitis, allergic rhinitis, MONIQUE on CPAP, poor sleep hygiene. He has a history of CHF and PAF. His PCP is [facility]. He is followed by Song Hernandez and Dr. Angela Glover from cardiology. The patient is on CPAP 10-20 cmH2O, [facility]. The patient is a non-smoker, had a right lower lobe lung nodule on CT chest in 2022. He was recommended a follow-up CT chest, said he has forgotten to do it. He is dealing with his wife's illness. The patient recently saw his primary care physician on 2023 with 3 days of wheezing. He was given prednisone and Mucinex. He had a similar virtual visit on 2023 for cough that had been going on for 10 days. He was given prednisone 40 mg for 5 days, doxycycline and amoxicillin. He was told it would be treated as a pneumonia.   He had cough with yellowish phlegm, is

## 2023-06-21 ENCOUNTER — TELEPHONE (OUTPATIENT)
Dept: CARDIOLOGY CLINIC | Age: 73
End: 2023-06-21

## 2023-06-21 ENCOUNTER — TELEPHONE (OUTPATIENT)
Dept: FAMILY MEDICINE CLINIC | Age: 73
End: 2023-06-21

## 2023-06-21 NOTE — TELEPHONE ENCOUNTER
Pt called and stated that he is having trouble his VC monitor. Pt was given. VC number to troubleshoot. Pt will follow up with them.

## 2023-06-21 NOTE — TELEPHONE ENCOUNTER
Left message for patient. If patient calls back please let him know that when he comes for his appt,. He can call the office, so that someone can bring down a wheel chair for him to bring him up.

## 2023-06-21 NOTE — TELEPHONE ENCOUNTER
----- Message from Marene Cogan sent at 6/21/2023  8:11 AM EDT -----  Subject: Message to Provider    QUESTIONS  Information for Provider? PT has appt 6/23. PT has a difficult time   walking. Does not think he could handle the walk from the parking lot to   the office. Please call to discuss if he can have assistance getting into   the office. ---------------------------------------------------------------------------  --------------  Max GALICIA  [phone]; OK to leave message on voicemail  ---------------------------------------------------------------------------  --------------  SCRIPT ANSWERS  Relationship to Patient?  Self

## 2023-06-22 ENCOUNTER — CARE COORDINATION (OUTPATIENT)
Dept: CARE COORDINATION | Age: 73
End: 2023-06-22

## 2023-06-22 DIAGNOSIS — I10 ESSENTIAL HYPERTENSION: Primary | Chronic | ICD-10-CM

## 2023-06-22 DIAGNOSIS — I50.33 HEART FAILURE, DIASTOLIC, WITH ACUTE DECOMPENSATION (HCC): ICD-10-CM

## 2023-06-22 NOTE — TELEPHONE ENCOUNTER
Can we see if we can get a discharge summary from [facility]

## 2023-06-22 NOTE — TELEPHONE ENCOUNTER
I have tried contacting Celia to obtain this, and am having no luck getting though to speak with someone.

## 2023-06-22 NOTE — CARE COORDINATION
Remote Patient Kit Ordering Note      Date/Time:  6/22/2023 3:09 PM      [x] CCSS confirmed patient shipping address  [x] Patient will receive package over the next 1-3 business days. Someone 21 years or older must be present to sign for UPS delivery. [x] HRS will contact patient within 24 hours, an [address] will call the patient directly: If the patient does not answer, HRS will follow up with the clinical team notifying them about the unsuccessful attempt to contact the patient. HRS will make three call attempts to the patient. Provide patient with [facility] Virtual install number is: [phone]. [x] CCSS will contact patient once equipment is active to welcome them to the program.                                                         [x] Hours of RPM monitoring - Monday-Friday 3543-4681; encourage patient to get vitals entered by [facility] each day to have the alert addressed same day. [x]CCSS mailed RPM Patient flyer to patient. All questions answered at this time. LPN made aware the RPM kit has been ordered. CCSS notified patient of RPM equipment order.

## 2023-06-22 NOTE — PROGRESS NOTES
6/22/23 1:47 PM       Remote Patient Monitoring Treatment Plan    Received request from ACERICKSON/JESSIE Tucker RN  to order remote patient monitoring for in home monitoring of CHF and HTN and order completed. Patient will be monitoring blood pressure   pulse ox   weight. Patient will engage in Remote Patient Monitoring each day to develop the skills necessary for self management. RPM Care Team Responsibilities:   Alerts will be reviewed daily and addressed within 2-4 hours during operational hours (Monday -Friday 9 am-4 pm)  Alert response and intervention documented in patient medical record  Alert response escalated to PCP per protocol and documented in patient medical record  Patient monitored over approximately  days  Discharge from program based on self-management readiness    See care coordination encounters for additional details.      Humphrey Rios DNP, FNP-C, Remote Patient Monitoring NP, () [phone]

## 2023-06-22 NOTE — TELEPHONE ENCOUNTER
I'll assess the situation when he comes in for the office visit. He is actually following with pulmonary and just saw Dr. Krish Sparrow 6/20/2023 where his O2 saturation was noted to be 94%. I feel if pulmonary wanted to assess for the need for oxygen they would have done this. I think the bigger problem, among many, is that he has not been using his c pap machine and he has severe sleep apnea.

## 2023-06-22 NOTE — CARE COORDINATION
large (13.8\"-19.68\")      [x] Determined weight scale: regular (<330lbs)                                                [x] Hours of ACM monitoring - Monday-Friday 6015-1151                     All questions about RPM program answered at this time. .    Ambulatory Care Coordination Assessment    Care Coordination Protocol  Referral from Primary Care Provider: No  Week 1 - Initial Assessment     Do you have all of your prescriptions and are they filled?: Yes  Barriers to medication adherence: None  Are you able to afford your medications?: Yes  How often do you have trouble taking your medications the way you have been told to take them?: I always take them as prescribed. Do you have Home O2 Therapy?: No      Ability to drive and/or has transportation: Dependent  Is patient able to live independently?: Yes     Current Housing: Private Residence                 Suggested Interventions and Community Resources   Zone Management Tools:  In Process         Set up/Review an Education Plan, Set up/Review Goals              Future Appointments   Date Time Provider Joanna Reynolds   6/23/2023 10:00 AM Susie Mask, APRN - CNP [facility] [place] - DYD   6/29/2023  9:15 AM SHAKIRA Ledesma CNP  [place] - DYD   7/3/2023  8:15 AM Bonny Rosenthal, DPM [facility] HOD   7/14/2023 11:30 AM SHAKIRA Vail CNPP [facility]   12/18/2023  8:00 AM TIFFANIE Saleh   12/27/2023  8:40 AM Scott Chapman MD AND [facility] S[facility]ANO

## 2023-06-23 ENCOUNTER — OFFICE VISIT (OUTPATIENT)
Dept: FAMILY MEDICINE CLINIC | Age: 73
End: 2023-06-23

## 2023-06-23 VITALS
OXYGEN SATURATION: 92 % | RESPIRATION RATE: 18 BRPM | WEIGHT: 281 LBS | HEART RATE: 85 BPM | DIASTOLIC BLOOD PRESSURE: 60 MMHG | BODY MASS INDEX: 37.07 KG/M2 | SYSTOLIC BLOOD PRESSURE: 90 MMHG

## 2023-06-23 DIAGNOSIS — R55 SYNCOPE AND COLLAPSE: ICD-10-CM

## 2023-06-23 DIAGNOSIS — E11.65 TYPE 2 DIABETES MELLITUS WITH HYPERGLYCEMIA, WITH LONG-TERM CURRENT USE OF INSULIN (HCC): ICD-10-CM

## 2023-06-23 DIAGNOSIS — I87.2 VENOUS STASIS DERMATITIS OF BOTH LOWER EXTREMITIES: ICD-10-CM

## 2023-06-23 DIAGNOSIS — R20.2 TINGLING OF UPPER EXTREMITY: ICD-10-CM

## 2023-06-23 DIAGNOSIS — R29.6 RECURRENT FALLS: ICD-10-CM

## 2023-06-23 DIAGNOSIS — Z79.4 TYPE 2 DIABETES MELLITUS WITH HYPERGLYCEMIA, WITH LONG-TERM CURRENT USE OF INSULIN (HCC): ICD-10-CM

## 2023-06-23 DIAGNOSIS — Z09 HOSPITAL DISCHARGE FOLLOW-UP: Primary | ICD-10-CM

## 2023-06-23 PROBLEM — E16.2 HYPOGLYCEMIA: Status: RESOLVED | Noted: 2023-05-31 | Resolved: 2023-06-23

## 2023-06-23 LAB — HBA1C MFR BLD: 6.7 %

## 2023-06-26 ENCOUNTER — TELEPHONE (OUTPATIENT)
Dept: CARDIOLOGY CLINIC | Age: 73
End: 2023-06-26

## 2023-06-27 NOTE — TELEPHONE ENCOUNTER
Spoke with Júniro Carrasquillo, who is on HIPAA form, relayed [address] message. Please contact pt later today for EP appt per [address]  and [facility] appt.

## 2023-06-27 NOTE — TELEPHONE ENCOUNTER
This is a pt of JOHANNA, last seen in office 9/2021. Next available is 10/2023. Should pt wait this long to be seen? If not please route message back to EP RN\"s for assistance on getting pt a sooner date with johanna.

## 2023-06-27 NOTE — TELEPHONE ENCOUNTER
[facility] can pt wait to be seen in Oct?    [address] desk pt also needs [facility] appt please contact to schedule.

## 2023-06-30 ENCOUNTER — CARE COORDINATION (OUTPATIENT)
Dept: CARE COORDINATION | Age: 73
End: 2023-06-30

## 2023-06-30 PROBLEM — R77.8 ELEVATED TROPONIN: Status: RESOLVED | Noted: 2023-05-31 | Resolved: 2023-06-30

## 2023-06-30 PROBLEM — R79.89 ELEVATED TROPONIN: Status: RESOLVED | Noted: 2023-05-31 | Resolved: 2023-06-30

## 2023-07-03 ENCOUNTER — HOSPITAL ENCOUNTER (OUTPATIENT)
Dept: WOUND CARE | Age: 73
Discharge: HOME | End: 2023-07-03
Payer: MEDICARE

## 2023-07-03 ENCOUNTER — CARE COORDINATION (OUTPATIENT)
Dept: CARE COORDINATION | Facility: CLINIC | Age: 73
End: 2023-07-03

## 2023-07-03 ENCOUNTER — CARE COORDINATION (OUTPATIENT)
Dept: CARE COORDINATION | Age: 73
End: 2023-07-03

## 2023-07-03 VITALS
RESPIRATION RATE: 18 BRPM | SYSTOLIC BLOOD PRESSURE: 101 MMHG | WEIGHT: 297.25 LBS | TEMPERATURE: 97.6 F | BODY MASS INDEX: 39.4 KG/M2 | DIASTOLIC BLOOD PRESSURE: 70 MMHG | HEART RATE: 67 BPM | HEIGHT: 73 IN

## 2023-07-03 DIAGNOSIS — I87.2 VENOUS STASIS DERMATITIS OF BOTH LOWER EXTREMITIES: ICD-10-CM

## 2023-07-03 DIAGNOSIS — I83.019 VENOUS STASIS ULCER OF RIGHT LOWER EXTREMITY (HCC): Primary | ICD-10-CM

## 2023-07-03 DIAGNOSIS — L97.222 NON-PRESSURE CHRONIC ULCER OF LEFT CALF WITH FAT LAYER EXPOSED (HCC): ICD-10-CM

## 2023-07-03 DIAGNOSIS — L97.919 IDIOPATHIC CHRONIC VENOUS HYPERTENSION OF BOTH LOWER EXTREMITIES WITH ULCER (HCC): ICD-10-CM

## 2023-07-03 DIAGNOSIS — I87.313 IDIOPATHIC CHRONIC VENOUS HYPERTENSION OF BOTH LOWER EXTREMITIES WITH ULCER (HCC): ICD-10-CM

## 2023-07-03 DIAGNOSIS — L97.929 IDIOPATHIC CHRONIC VENOUS HYPERTENSION OF BOTH LOWER EXTREMITIES WITH ULCER (HCC): ICD-10-CM

## 2023-07-03 DIAGNOSIS — L97.212 NON-PRESSURE CHRONIC ULCER OF RIGHT CALF WITH FAT LAYER EXPOSED (HCC): ICD-10-CM

## 2023-07-03 DIAGNOSIS — L97.919 VENOUS STASIS ULCER OF RIGHT LOWER EXTREMITY (HCC): Primary | ICD-10-CM

## 2023-07-03 PROCEDURE — 29581 APPL MULTLAYER CMPRN SYS LEG: CPT

## 2023-07-03 RX ORDER — BACITRACIN ZINC AND POLYMYXIN B SULFATE 500; 1000 [USP'U]/G; [USP'U]/G
OINTMENT TOPICAL ONCE
OUTPATIENT
Start: 2023-07-03 | End: 2023-07-03

## 2023-07-03 RX ORDER — LIDOCAINE HYDROCHLORIDE 40 MG/ML
SOLUTION TOPICAL ONCE
OUTPATIENT
Start: 2023-07-03 | End: 2023-07-03

## 2023-07-03 RX ORDER — SODIUM CHLOR/HYPOCHLOROUS ACID 0.033 %
SOLUTION, IRRIGATION IRRIGATION ONCE
OUTPATIENT
Start: 2023-07-03 | End: 2023-07-03

## 2023-07-03 RX ORDER — LIDOCAINE 40 MG/G
CREAM TOPICAL ONCE
OUTPATIENT
Start: 2023-07-03 | End: 2023-07-03

## 2023-07-03 RX ORDER — LIDOCAINE 50 MG/G
OINTMENT TOPICAL ONCE
OUTPATIENT
Start: 2023-07-03 | End: 2023-07-03

## 2023-07-03 RX ORDER — LIDOCAINE 40 MG/G
CREAM TOPICAL ONCE
Status: DISCONTINUED | OUTPATIENT
Start: 2023-07-03 | End: 2023-07-04 | Stop reason: HOSPADM

## 2023-07-03 NOTE — PROGRESS NOTES
411 [facility] Physician Orders and Discharge [address], [address] [place] [address], [address]  Telephone: [phone]      Fax: [phone]        Your home care company:   [employer]     Your wound-care supplies will be provided by: [facility]     NAME:  Lenore Libman   YOB: 1950  PRIMARY DIAGNOSIS FOR WOUND CARE CENTER:  Venous leg ulcer . Wound cleansing:   Do not scrub or use excessive force. Wash hands with soap and water before and after dressing changes. Prior to applying a clean dressing, cleanse wound with normal saline, wound cleanser, or mild soap and water. Ask your physician or nurse before getting the wound(s) wet in the shower. Wound care for home:     Right lower leg and Left lower leg wounds:    Wash wounds with soap and water with dressing changes    Aquaphor to dry skin    Xeroform to wounds    CoFlex Calamine     Leave dressing on until Friday      Home Care to come on Friday    Home Care to remove compression wraps    Wash legs with soap and water    Aquaphor to dry skin    Silvadene to open areas    4x4's, ABD, Coban lite toes to knees        Please note, all wounds (unless stated otherwise here) were mechanically debrided at the time of cleansing here in the wound-care center today, so a small amount of pain, drainage or bleeding from that process might be expected, and is normal.      All products for home use, including multiple products for a single wound if applicable, are medically necessary in order to achieve the best chance at timely wound healing. See provider documentation for details if needed.      Substituted dressings applied in the [facility] today, if applicable:           New orders for this week (labs, imaging, medications, etc.):         Elevate legs as much as possible   May continue with Physical Therapy        Additional instructions for

## 2023-07-03 NOTE — CARE COORDINATION
Ambulatory Care Coordination Note  7/3/2023    Patient Current Location:  Home: [address] [place] [id number]     ACM contacted the patient by telephone. Verified name and  with patient as identifiers. Provided introduction to self, and explanation of the ACM role. Challenges to be reviewed by the provider   Additional needs identified to be addressed with provider: No  none               Method of communication with provider: chart routing. ACM: Vicky Hoyos, RN     ACM completed follow up with patient who said he has a lot going on and did not feel he could participate in RPM. ACM reviewed with patient about stopping Atenolol as directed by Cardiology Dr. Hector Ayoub. Patient said his wife took the message but could not remember the name of medication he was supposed to stop. ACM informed him it was Atenolol he was to stop taking. Patient said he would stop medication tomorrow as he already took it this morning. ACM said she would alert Cardiology. Patient does not report any concerning symptoms at this time. Plan:    F/U all 1 week  Complete enrollment into CC    Offered patient enrollment in the Remote Patient Monitoring (RPM) program for in-home monitoring: Patient declined. Lab Results       None            Care Coordination Interventions    Referral from Primary Care Provider: No  Suggested Interventions and Community Resources  Zone Management Tools:  In Process (Comment: CHF/ DM zone tools)          Goals Addressed    None         Future Appointments   Date Time Provider [address]   7/10/2023  8:15 AM JOSEPH Sanders HOD   2023 11:30 AM Deryl Castleman, APRN - CNP [facility]   2023 11:00 AM SHAKIRA Leonard - CNP [facility]  [place] - DYD   10/3/2023  9:45 AM MD Maurice Phoenix   2023  8:00 AM [facility] CT VCT [facility] CT H[facility] Rad   2023  8:40 AM [facility]    and   General

## 2023-07-03 NOTE — PLAN OF CARE
Pt to the [facility] for follow up appointment. Wounds improving. Pt has new areas below knees. Pt to continue with weekly compression wraps. Discussed with patient the importance of elevating legs. Pt to follow up in the [facility] in 1 week. Discharge instructions reviewed with patient, all questions answered, copy given to patient. Dressings were applied to all wounds per M.D. Instructions at this visit.

## 2023-07-03 NOTE — CARE COORDINATION
CCSS placed call to patient to arrange RPM kit  through [facility]. Reviewed with patient how to pack equipment in original packing. Verified patient's availability to schedule UPS  time. UPS  time requested. Anticipated  date range 3 - 5 business days.

## 2023-07-04 DIAGNOSIS — I10 ESSENTIAL HYPERTENSION: Primary | Chronic | ICD-10-CM

## 2023-07-04 DIAGNOSIS — I50.33 HEART FAILURE, DIASTOLIC, WITH ACUTE DECOMPENSATION (HCC): ICD-10-CM

## 2023-07-04 NOTE — PROGRESS NOTES
7/4/23 5:37 PM     Remote Patient Order Discontinued    Received request from Ha Head RN  to discontinue order for remote patient monitoring of CHF and HTN and order completed.      Maxi Urbano, LINO, FNP-C, Remote Patient Monitoring NP, () [phone]

## 2023-07-10 ENCOUNTER — HOSPITAL ENCOUNTER (OUTPATIENT)
Dept: WOUND CARE | Age: 73
Discharge: HOME | End: 2023-07-10
Payer: MEDICARE

## 2023-07-10 VITALS
DIASTOLIC BLOOD PRESSURE: 86 MMHG | WEIGHT: 297.4 LBS | HEIGHT: 73 IN | BODY MASS INDEX: 39.42 KG/M2 | SYSTOLIC BLOOD PRESSURE: 133 MMHG | RESPIRATION RATE: 18 BRPM | HEART RATE: 74 BPM | TEMPERATURE: 98.2 F

## 2023-07-10 DIAGNOSIS — L97.929 IDIOPATHIC CHRONIC VENOUS HYPERTENSION OF BOTH LOWER EXTREMITIES WITH ULCER (HCC): ICD-10-CM

## 2023-07-10 DIAGNOSIS — I87.2 VENOUS STASIS DERMATITIS OF BOTH LOWER EXTREMITIES: ICD-10-CM

## 2023-07-10 DIAGNOSIS — L97.222 NON-PRESSURE CHRONIC ULCER OF LEFT CALF WITH FAT LAYER EXPOSED (HCC): ICD-10-CM

## 2023-07-10 DIAGNOSIS — I87.313 IDIOPATHIC CHRONIC VENOUS HYPERTENSION OF BOTH LOWER EXTREMITIES WITH ULCER (HCC): ICD-10-CM

## 2023-07-10 DIAGNOSIS — L97.212 NON-PRESSURE CHRONIC ULCER OF RIGHT CALF WITH FAT LAYER EXPOSED (HCC): ICD-10-CM

## 2023-07-10 DIAGNOSIS — I83.019 VENOUS STASIS ULCER OF RIGHT LOWER EXTREMITY (HCC): Primary | ICD-10-CM

## 2023-07-10 DIAGNOSIS — L97.919 VENOUS STASIS ULCER OF RIGHT LOWER EXTREMITY (HCC): Primary | ICD-10-CM

## 2023-07-10 DIAGNOSIS — L97.919 IDIOPATHIC CHRONIC VENOUS HYPERTENSION OF BOTH LOWER EXTREMITIES WITH ULCER (HCC): ICD-10-CM

## 2023-07-10 PROCEDURE — 29581 APPL MULTLAYER CMPRN SYS LEG: CPT

## 2023-07-10 RX ORDER — LIDOCAINE 40 MG/G
CREAM TOPICAL ONCE
OUTPATIENT
Start: 2023-07-10 | End: 2023-07-10

## 2023-07-10 RX ORDER — LIDOCAINE 50 MG/G
OINTMENT TOPICAL ONCE
OUTPATIENT
Start: 2023-07-10 | End: 2023-07-10

## 2023-07-10 RX ORDER — LIDOCAINE 40 MG/G
CREAM TOPICAL ONCE
Status: DISCONTINUED | OUTPATIENT
Start: 2023-07-10 | End: 2023-07-11 | Stop reason: HOSPADM

## 2023-07-10 RX ORDER — SODIUM CHLOR/HYPOCHLOROUS ACID 0.033 %
SOLUTION, IRRIGATION IRRIGATION ONCE
OUTPATIENT
Start: 2023-07-10 | End: 2023-07-10

## 2023-07-10 RX ORDER — BACITRACIN ZINC AND POLYMYXIN B SULFATE 500; 1000 [USP'U]/G; [USP'U]/G
OINTMENT TOPICAL ONCE
OUTPATIENT
Start: 2023-07-10 | End: 2023-07-10

## 2023-07-10 RX ORDER — LIDOCAINE HYDROCHLORIDE 40 MG/ML
SOLUTION TOPICAL ONCE
OUTPATIENT
Start: 2023-07-10 | End: 2023-07-10

## 2023-07-10 NOTE — PROGRESS NOTES
425 [facility] Progress Note      Lakhwinder Modi     : 1950    DATE OF VISIT:  7/10/2023    Subjective:     Lakhwinder Modi is a 74 y.o. male who has a chief complaint of a venous ulcer located on the  bilateral leg. Doing well with leg elevation. No issues with compression wrap. Mr. Pee Hill has a past medical history of Allergic rhinitis, Arthritis, Bladder fistula, C. difficile diarrhea, Cellulitis of right lower extremity, CHF (congestive heart failure) ([address]), Dental disease, Diabetes ([address]), Diverticulitis, Dizziness, DVT of lower extremity, bilateral ([address]), GERD (gastroesophageal reflux disease), Headache, Hearing loss, Hematuria, Hx of blood clots, Hyperlipidemia, Hypertension, Lung disease, MONIQUE (obstructive sleep apnea), Pancreatitis ([address]), Pulmonary emboli ([address]), Rash, Sleep apnea, and Tinnitus. He has a past surgical history that includes Tibia fracture surgery (Right, ); Cholecystectomy, open (N/A, 13); Cystocopy (12/10/13); Cystoscopy (14); other surgical history (14); Colonoscopy (); Colonoscopy (2013); Colonoscopy (14); colostomy (2014); Colon surgery; Abdomen surgery (2014); hernia repair (2015); pr injection hip arthrography w/anesthesia (Right, 2018); epidural steroid injection (Right, 2019); epidural steroid injection (Right, 2019); Cardioversion (2019); vascular surgery (Left, 2021); vascular surgery (Right, 05/10/2021); and IR MIDLINE CATH (2023). His family history includes Heart Attack in his father; Heart Disease in his brother and father; High Blood Pressure in his brother; Kidney Disease in his mother; Stroke in his brother. Mr. Pee Hill reports that he has never smoked. He has never used smokeless tobacco. He reports that he does not currently use alcohol. He reports that he does not use drugs.     His current medication list consists of

## 2023-07-10 NOTE — PROGRESS NOTES
411 [facility] Physician Orders and Discharge [facility]  [facility], [address] [place] [address]  [place], [address]  Telephone: [phone]      Fax: [phone]        Your home care company:   [employer]     Your wound-care supplies will be provided by: [facility]     NAME:  Olga Mcclure   YOB: 1950  PRIMARY DIAGNOSIS FOR WOUND CARE CENTER:  Venous leg ulcer . Wound cleansing:   Do not scrub or use excessive force. Wash hands with soap and water before and after dressing changes. Prior to applying a clean dressing, cleanse wound with normal saline, wound cleanser, or mild soap and water. Ask your physician or nurse before getting the wound(s) wet in the shower. Wound care for home:     Right lower leg and Left lower leg wounds:    Wash wounds with soap and water with dressing changes    Aquaphor to dry skin    Xeroform to wounds    CoFlex Calamine     Leave dressing on until Friday      Home Care to come on Friday    Home Care to remove compression wraps    Wash legs with soap and water    Aquaphor to dry skin    Silvadene to open areas    4x4's, ABD, Coban lite toes to knees        Please note, all wounds (unless stated otherwise here) were mechanically debrided at the time of cleansing here in the wound-care center today, so a small amount of pain, drainage or bleeding from that process might be expected, and is normal.      All products for home use, including multiple products for a single wound if applicable, are medically necessary in order to achieve the best chance at timely wound healing. See provider documentation for details if needed.      Substituted dressings applied in the [address] [place] [address] Road today, if applicable:           New orders for this week (labs, imaging, medications, etc.):         Elevate legs as much as possible   May continue with Physical Therapy        Additional instructions for

## 2023-07-10 NOTE — PLAN OF CARE
Pt to the [facility] for follow up appointment. Wounds showing improvement and are measuring smaller. Pt to continue with compression wrap. Home care to change dressing on Friday. Pt to follow up in the [facility] in 1 week. Discharge instructions reviewed with patient, all questions answered, copy given to patient. Dressings were applied to all wounds per M.D. Instructions at this visit.

## 2023-07-11 ENCOUNTER — CARE COORDINATION (OUTPATIENT)
Dept: CARE COORDINATION | Age: 73
End: 2023-07-11

## 2023-07-11 NOTE — CARE COORDINATION
ACM attempted outreach to patient who was not available. ACM was advised to call back in 1 hour. ACM will attempt at a later time to contact patient.

## 2023-07-15 ENCOUNTER — PATIENT MESSAGE (OUTPATIENT)
Dept: FAMILY MEDICINE CLINIC | Age: 73
End: 2023-07-15

## 2023-07-17 ENCOUNTER — HOSPITAL ENCOUNTER (OUTPATIENT)
Dept: WOUND CARE | Age: 73
Discharge: HOME | End: 2023-07-17
Payer: MEDICARE

## 2023-07-17 VITALS
HEART RATE: 101 BPM | TEMPERATURE: 98.1 F | HEIGHT: 73 IN | SYSTOLIC BLOOD PRESSURE: 130 MMHG | WEIGHT: 294 LBS | RESPIRATION RATE: 20 BRPM | BODY MASS INDEX: 38.97 KG/M2 | DIASTOLIC BLOOD PRESSURE: 82 MMHG

## 2023-07-17 DIAGNOSIS — L97.222 NON-PRESSURE CHRONIC ULCER OF LEFT CALF WITH FAT LAYER EXPOSED (HCC): ICD-10-CM

## 2023-07-17 DIAGNOSIS — L97.919 VENOUS STASIS ULCER OF RIGHT LOWER EXTREMITY (HCC): Primary | ICD-10-CM

## 2023-07-17 DIAGNOSIS — L97.212 NON-PRESSURE CHRONIC ULCER OF RIGHT CALF WITH FAT LAYER EXPOSED (HCC): ICD-10-CM

## 2023-07-17 DIAGNOSIS — I87.2 VENOUS STASIS DERMATITIS OF BOTH LOWER EXTREMITIES: ICD-10-CM

## 2023-07-17 DIAGNOSIS — L97.929 IDIOPATHIC CHRONIC VENOUS HYPERTENSION OF BOTH LOWER EXTREMITIES WITH ULCER (HCC): ICD-10-CM

## 2023-07-17 DIAGNOSIS — L97.919 IDIOPATHIC CHRONIC VENOUS HYPERTENSION OF BOTH LOWER EXTREMITIES WITH ULCER (HCC): ICD-10-CM

## 2023-07-17 DIAGNOSIS — I87.313 IDIOPATHIC CHRONIC VENOUS HYPERTENSION OF BOTH LOWER EXTREMITIES WITH ULCER (HCC): ICD-10-CM

## 2023-07-17 DIAGNOSIS — I83.019 VENOUS STASIS ULCER OF RIGHT LOWER EXTREMITY (HCC): Primary | ICD-10-CM

## 2023-07-17 PROCEDURE — 29581 APPL MULTLAYER CMPRN SYS LEG: CPT

## 2023-07-17 PROCEDURE — 93228 REMOTE 30 DAY ECG REV/REPORT: CPT | Performed by: INTERNAL MEDICINE

## 2023-07-17 RX ORDER — LIDOCAINE 50 MG/G
OINTMENT TOPICAL ONCE
OUTPATIENT
Start: 2023-07-17 | End: 2023-07-17

## 2023-07-17 RX ORDER — LIDOCAINE 40 MG/G
CREAM TOPICAL ONCE
Status: DISCONTINUED | OUTPATIENT
Start: 2023-07-17 | End: 2023-07-18 | Stop reason: HOSPADM

## 2023-07-17 RX ORDER — SODIUM CHLOR/HYPOCHLOROUS ACID 0.033 %
SOLUTION, IRRIGATION IRRIGATION ONCE
OUTPATIENT
Start: 2023-07-17 | End: 2023-07-17

## 2023-07-17 RX ORDER — BACITRACIN ZINC AND POLYMYXIN B SULFATE 500; 1000 [USP'U]/G; [USP'U]/G
OINTMENT TOPICAL ONCE
OUTPATIENT
Start: 2023-07-17 | End: 2023-07-17

## 2023-07-17 RX ORDER — LIDOCAINE 40 MG/G
CREAM TOPICAL ONCE
OUTPATIENT
Start: 2023-07-17 | End: 2023-07-17

## 2023-07-17 RX ORDER — LIDOCAINE HYDROCHLORIDE 40 MG/ML
SOLUTION TOPICAL ONCE
OUTPATIENT
Start: 2023-07-17 | End: 2023-07-17

## 2023-07-17 NOTE — PROGRESS NOTES
[facility] Physician Orders and Discharge [address], [address] [place] [address], [address]  Telephone: [phone]      Fax: [phone]        Your home care company:   Nicol Mcrae     Your wound-care supplies will be provided by: [facility]     NAME:  Taisha Liu   YOB: 1950  PRIMARY DIAGNOSIS FOR WOUND CARE CENTER:  Venous leg ulcer . Wound cleansing:   Do not scrub or use excessive force. Wash hands with soap and water before and after dressing changes. Prior to applying a clean dressing, cleanse wound with normal saline, wound cleanser, or mild soap and water. Ask your physician or nurse before getting the wound(s) wet in the shower. Wound care for home:     Right lower leg and Left lower leg wounds:    Wash wounds with soap and water with dressing changes    Aquaphor to dry skin    Xeroform to wounds    CoFlex Calamine     Leave dressing on until Friday      Home Care to come on Friday, Monday 7/24/23, and Friday 7/28/23    Home Care to remove compression wraps    Wash legs with soap and water    Aquaphor to dry skin    Silvadene to open areas    4x4's, ABD, Coban lite toes to knees        Please note, all wounds (unless stated otherwise here) were mechanically debrided at the time of cleansing here in the wound-care center today, so a small amount of pain, drainage or bleeding from that process might be expected, and is normal.      All products for home use, including multiple products for a single wound if applicable, are medically necessary in order to achieve the best chance at timely wound healing. See provider documentation for details if needed.      Substituted dressings applied in the [facility] today, if applicable:           New orders for this week (labs, imaging, medications, etc.):         Elevate legs as much as possible   May continue with Physical Therapy

## 2023-07-17 NOTE — PLAN OF CARE
[facility]. Wounds showing improvement. Pt to continue with biweekly compression wraps. Home care to change dressing on Friday, Monday, and Friday. Pt to [facility] in the [facility] in 1 week. Discharge instructions reviewed with patient, all questions answered, copy given to patient. Dressings were applied to all wounds per M.D. Instructions at this visit.

## 2023-07-19 NOTE — TELEPHONE ENCOUNTER
07/19/2023-[facility] for pt to return phone call to the office regarding referral information please advise on all open referrals if plans to proceed or if we can get them closed out. If pt has seen any of these specialities please obtain where so we can obtain the records.

## 2023-07-25 ENCOUNTER — CARE COORDINATION (OUTPATIENT)
Dept: CARE COORDINATION | Age: 73
End: 2023-07-25

## 2023-07-25 SDOH — HEALTH STABILITY: PHYSICAL HEALTH: ON AVERAGE, HOW MANY MINUTES DO YOU ENGAGE IN EXERCISE AT THIS LEVEL?: 0 MIN

## 2023-07-25 SDOH — HEALTH STABILITY: PHYSICAL HEALTH: ON AVERAGE, HOW MANY DAYS PER WEEK DO YOU ENGAGE IN MODERATE TO STRENUOUS EXERCISE (LIKE A BRISK WALK)?: 0 DAYS

## 2023-07-25 NOTE — CARE COORDINATION
Ambulatory Care Coordination Note  2023    Patient Current Location:  Home: [address] [place] [address] Road  [address] [address]     ACM contacted the patient by telephone. Verified name and  with patient as identifiers. Provided introduction to self, and explanation of the ACM role. Challenges to be reviewed by the provider   Additional needs identified to be addressed with provider: No  none               Method of communication with provider: chart routing. ACM: Rylan Hodges RN     ACM completed follow up call with patient who said he is having difficulty walking. Patient is using a walker when needed. Patient said he has an appt with [facility] for EMG on  but did not know when appt was. ACM will call. ACM called and was provided appt information. ACM placed call to patient and informed him that appt is  at 10:30AM and address is [address] [place] [address] [address]. Patient thanked ACM who reviewed all upcoming appts. Plan:    F/U call 2 weeks     Offered patient enrollment in the Remote Patient Monitoring (RPM) program for in-home monitoring: Patient declined. Lab Results       None            Care Coordination Interventions    Referral from Primary Care Provider: No  Suggested Interventions and Community Resources  Zone Management Tools:  In Process (Comment: CHF/ DM zone tools)          Goals Addressed    None         Future Appointments   Date Time Provider [address]   2023  8:15 AM Electa Copper, DPM Francescoe Mariza HOD   2023  8:15 AM Electa Copper, DPM Benjamine Mariza HOD   2023 11:00 AM SHAKIRA Kent CNP P CLER CAR [facility]   2023  8:15 AM Electa Copper, DPM Benjamine Mariza HOD   2023  8:15 AM Electa Copper, DPM Benjamine Mariza HOD   2023  8:15 AM Electa Copper, DPM Benjamine Mariza HOD   2023  8:00 AM Electa Copper, DPM Benjamine Mariza HOD   2023  8:15 AM Electa Copper, DPM Benjamine Mariza HOD   2023  8:15 AM Antonio Cao

## 2023-07-25 NOTE — DISCHARGE INSTRUCTIONS
[facility] Physician Orders and Discharge [facility]  [address] [place] [address], [address] [place], [place] [address], [address]  Telephone: [phone]      Fax: [phone]        Your home care company:   Nicol Mcrae     Your wound-care supplies will be provided by: [facility]     NAME:  Mary Rodriguez   YOB: 1950  PRIMARY DIAGNOSIS FOR WOUND CARE CENTER:  Venous leg ulcer . Wound cleansing:   Do not scrub or use excessive force. Wash hands with soap and water before and after dressing changes. Prior to applying a clean dressing, cleanse wound with normal saline, wound cleanser, or mild soap and water. Ask your physician or nurse before getting the wound(s) wet in the shower. Wound care for home:     Right lower leg and Left lower leg wounds:    Wash wounds with soap and water with dressing changes    Aquaphor to dry skin    Xeroform to wounds    CoFlex Calamine     Leave dressing on until Thursday      On Thursday:    [address] [place] [address] to remove compression wraps    Wash legs with soap and water    Aquaphor to dry skin    Silvadene to open areas    4x4's, ABD, Coban lite toes to knees        Please note, all wounds (unless stated otherwise here) were mechanically debrided at the time of cleansing here in the wound-care center today, so a small amount of pain, drainage or bleeding from that process might be expected, and is normal.      All products for home use, including multiple products for a single wound if applicable, are medically necessary in order to achieve the best chance at timely wound healing. See provider documentation for details if needed.      Substituted dressings applied in the [facility] today, if applicable:           New orders for this week (labs, imaging, medications, etc.):         Elevate legs as much as possible   May continue with Physical Therapy        Additional instructions for specific

## 2023-07-27 DIAGNOSIS — R55 SYNCOPE AND COLLAPSE: ICD-10-CM

## 2023-07-27 DIAGNOSIS — I48.0 PAROXYSMAL ATRIAL FIBRILLATION (HCC): ICD-10-CM

## 2023-07-28 ENCOUNTER — TELEPHONE (OUTPATIENT)
Dept: CARDIOLOGY CLINIC | Age: 73
End: 2023-07-28

## 2023-07-28 NOTE — TELEPHONE ENCOUNTER
Created telephone encounter. Spoke with Alejo Anderson relayed message per [facility],[address] regarding monitor results. Pt verbalized understanding. He states he no longer takes atenolol, he was taken off of the medication. Med list has been updated.

## 2023-07-28 NOTE — TELEPHONE ENCOUNTER
----- Message from Francisco Anderson MD sent at 7/27/2023  2:20 PM EDT -----  Please notify patient that their monitor looks ok  Occ slow HR and AF, which is chronic  Nothing that would expect to cause the passing out episode   Rec reduce atenolol to 1/2 pill daily due to slow HRs

## 2023-07-31 ENCOUNTER — HOSPITAL ENCOUNTER (OUTPATIENT)
Dept: WOUND CARE | Age: 73
Discharge: HOME | End: 2023-07-31
Payer: MEDICARE

## 2023-07-31 VITALS
TEMPERATURE: 98.3 F | BODY MASS INDEX: 38.57 KG/M2 | RESPIRATION RATE: 20 BRPM | HEART RATE: 81 BPM | WEIGHT: 291 LBS | DIASTOLIC BLOOD PRESSURE: 94 MMHG | HEIGHT: 73 IN | SYSTOLIC BLOOD PRESSURE: 147 MMHG

## 2023-07-31 DIAGNOSIS — L97.222 NON-PRESSURE CHRONIC ULCER OF LEFT CALF WITH FAT LAYER EXPOSED (HCC): ICD-10-CM

## 2023-07-31 DIAGNOSIS — I87.2 VENOUS STASIS DERMATITIS OF BOTH LOWER EXTREMITIES: ICD-10-CM

## 2023-07-31 DIAGNOSIS — L97.919 IDIOPATHIC CHRONIC VENOUS HYPERTENSION OF BOTH LOWER EXTREMITIES WITH ULCER (HCC): ICD-10-CM

## 2023-07-31 DIAGNOSIS — I87.313 IDIOPATHIC CHRONIC VENOUS HYPERTENSION OF BOTH LOWER EXTREMITIES WITH ULCER (HCC): ICD-10-CM

## 2023-07-31 DIAGNOSIS — L97.929 IDIOPATHIC CHRONIC VENOUS HYPERTENSION OF BOTH LOWER EXTREMITIES WITH ULCER (HCC): ICD-10-CM

## 2023-07-31 DIAGNOSIS — I83.019 VENOUS STASIS ULCER OF RIGHT LOWER EXTREMITY (HCC): Primary | ICD-10-CM

## 2023-07-31 DIAGNOSIS — L97.212 NON-PRESSURE CHRONIC ULCER OF RIGHT CALF WITH FAT LAYER EXPOSED (HCC): ICD-10-CM

## 2023-07-31 DIAGNOSIS — L97.919 VENOUS STASIS ULCER OF RIGHT LOWER EXTREMITY (HCC): Primary | ICD-10-CM

## 2023-07-31 PROCEDURE — 29581 APPL MULTLAYER CMPRN SYS LEG: CPT

## 2023-07-31 RX ORDER — BACITRACIN ZINC AND POLYMYXIN B SULFATE 500; 1000 [USP'U]/G; [USP'U]/G
OINTMENT TOPICAL ONCE
OUTPATIENT
Start: 2023-07-31 | End: 2023-07-31

## 2023-07-31 RX ORDER — SODIUM CHLOR/HYPOCHLOROUS ACID 0.033 %
SOLUTION, IRRIGATION IRRIGATION ONCE
OUTPATIENT
Start: 2023-07-31 | End: 2023-07-31

## 2023-07-31 RX ORDER — LIDOCAINE 40 MG/G
CREAM TOPICAL ONCE
Status: DISCONTINUED | OUTPATIENT
Start: 2023-07-31 | End: 2023-08-01 | Stop reason: HOSPADM

## 2023-07-31 RX ORDER — LIDOCAINE HYDROCHLORIDE 40 MG/ML
SOLUTION TOPICAL ONCE
OUTPATIENT
Start: 2023-07-31 | End: 2023-07-31

## 2023-07-31 RX ORDER — LIDOCAINE 40 MG/G
CREAM TOPICAL ONCE
OUTPATIENT
Start: 2023-07-31 | End: 2023-07-31

## 2023-07-31 RX ORDER — LIDOCAINE 50 MG/G
OINTMENT TOPICAL ONCE
OUTPATIENT
Start: 2023-07-31 | End: 2023-07-31

## 2023-07-31 ASSESSMENT — PAIN SCALES - GENERAL: PAINLEVEL_OUTOF10: 0

## 2023-07-31 NOTE — PLAN OF CARE
Pt to the [address] [place] [address] for follow up appointment. Bilateral lower legs more irritated this week. Pt states that he has been elevating his legs and doesn't understand how his legs could be irritated. Pt to have compression wraps reapplied today in the [address] [place] [address]. Home care to change compression wraps on Thursday. Pt to follow up in the [address] [place] [address] in 1 week. Discharge instructions reviewed with patient, all questions answered, copy given to patient. Dressings were applied to all wounds per M.D. Instructions at this visit.

## 2023-07-31 NOTE — PROGRESS NOTES
again available, contact your home-care company (if applicable), your PCP, or go to the nearest emergency room

## 2023-08-01 ENCOUNTER — CARE COORDINATION (OUTPATIENT)
Dept: CARE COORDINATION | Age: 73
End: 2023-08-01

## 2023-08-01 NOTE — CARE COORDINATION
ACM spoke to patients wife who said he is out getting a haircut and will not be home for 1.5 hours. ACM will call later this afternoon.

## 2023-08-01 NOTE — DISCHARGE INSTRUCTIONS
411 [facility] Physician Orders and Discharge [address], [address] [place], [place] [address], [address]  Telephone: [phone]      Fax: [phone]        Your home care company:   Nicol Mcrae     Your wound-care supplies will be provided by: [facility]     NAME:  Maurice Byers   YOB: 1950  PRIMARY DIAGNOSIS FOR WOUND CARE CENTER:  Venous leg ulcer . Wound cleansing:   Do not scrub or use excessive force. Wash hands with soap and water before and after dressing changes. Prior to applying a clean dressing, cleanse wound with normal saline, wound cleanser, or mild soap and water. Ask your physician or nurse before getting the wound(s) wet in the shower. Wound care for home:     Right lower leg and Left lower leg wounds:    Wash wounds with soap and water with dressing changes    Aquaphor to dry skin    Xeroform to wounds    CoFlex Calamine     Leave dressing on until Thursday       On Thursday:    [address] [place] [address] to remove compression wraps    Wash legs with soap and water    Aquaphor to dry skin    Silvadene to open areas    4x4's, ABD, Coban lite toes to knees        Please note, all wounds (unless stated otherwise here) were mechanically debrided at the time of cleansing here in the wound-care center today, so a small amount of pain, drainage or bleeding from that process might be expected, and is normal.      All products for home use, including multiple products for a single wound if applicable, are medically necessary in order to achieve the best chance at timely wound healing. See provider documentation for details if needed.      Substituted dressings applied in the [facility] today, if applicable:           New orders for this week (labs, imaging, medications, etc.):         Elevate legs as much as possible   May continue with Physical Therapy        Additional instructions for specific

## 2023-08-02 ENCOUNTER — CARE COORDINATION (OUTPATIENT)
Dept: CARE COORDINATION | Age: 73
End: 2023-08-02

## 2023-08-02 NOTE — CARE COORDINATION
Ambulatory Care Coordination Note  2023    Patient Current Location: [place]     ACM contacted the patient by telephone. Verified name and  with patient as identifiers. Provided introduction to self, and explanation of the ACM role. Challenges to be reviewed by the provider   Additional needs identified to be addressed with provider: No  none               Method of communication with provider: chart routing. ACM: Chikis Harry RN     ACM placed call to patient who said he is doing well at this time. ACM asked patient regarding heart failure management. ACM explained the different cardiac testing patient recently had echocardiogram and Cardiac monitor. Patient verbalized understanding and the reason of each testing. Patient was able to identify his cardiac medications during call. Patient said he does not take a daily weight but notices he has swelling. ACM provided brief education the importance of a daily and how to obtain this. Patient did not know about a fluid restriction. Patient said he can afford copayment's but [facility] is 185 dollars a month but is able to afford it at this time. Patient said he is agreeable to heart failure education and is willing to apply what he will learn to his self care management. Patient discussed with ACM that he had stopped Ozempic for a period of time d/t hearing bad things on the news about this. Patient said PCP never told him to stop this medication. Patient said he would resume this medication Wednesday Morning as he has been tolerating it. ACM explained to patient the significance of stopping medication on his own with notifying his PCP. Patient thanked ACM at this time. Plan:    Route to PCP   F/U call 1.5 weeks   Review CHF education    Offered patient enrollment in the Remote Patient Monitoring (RPM) program for in-home monitoring: Patient declined.     Lab Results       None            Care Coordination Interventions    Referral from Primary

## 2023-08-07 ENCOUNTER — HOSPITAL ENCOUNTER (OUTPATIENT)
Dept: WOUND CARE | Age: 73
Discharge: HOME | End: 2023-08-07
Payer: MEDICARE

## 2023-08-07 VITALS
BODY MASS INDEX: 38.17 KG/M2 | SYSTOLIC BLOOD PRESSURE: 120 MMHG | DIASTOLIC BLOOD PRESSURE: 69 MMHG | WEIGHT: 288 LBS | TEMPERATURE: 97.3 F | HEART RATE: 78 BPM | HEIGHT: 73 IN | RESPIRATION RATE: 18 BRPM

## 2023-08-07 DIAGNOSIS — L97.212 NON-PRESSURE CHRONIC ULCER OF RIGHT CALF WITH FAT LAYER EXPOSED (HCC): ICD-10-CM

## 2023-08-07 DIAGNOSIS — I87.2 VENOUS STASIS DERMATITIS OF BOTH LOWER EXTREMITIES: ICD-10-CM

## 2023-08-07 DIAGNOSIS — L97.919 VENOUS STASIS ULCER OF RIGHT LOWER EXTREMITY (HCC): Primary | ICD-10-CM

## 2023-08-07 DIAGNOSIS — L97.222 NON-PRESSURE CHRONIC ULCER OF LEFT CALF WITH FAT LAYER EXPOSED (HCC): ICD-10-CM

## 2023-08-07 DIAGNOSIS — L97.919 IDIOPATHIC CHRONIC VENOUS HYPERTENSION OF BOTH LOWER EXTREMITIES WITH ULCER (HCC): ICD-10-CM

## 2023-08-07 DIAGNOSIS — I87.313 IDIOPATHIC CHRONIC VENOUS HYPERTENSION OF BOTH LOWER EXTREMITIES WITH ULCER (HCC): ICD-10-CM

## 2023-08-07 DIAGNOSIS — L97.929 IDIOPATHIC CHRONIC VENOUS HYPERTENSION OF BOTH LOWER EXTREMITIES WITH ULCER (HCC): ICD-10-CM

## 2023-08-07 DIAGNOSIS — I83.019 VENOUS STASIS ULCER OF RIGHT LOWER EXTREMITY (HCC): Primary | ICD-10-CM

## 2023-08-07 PROCEDURE — 29581 APPL MULTLAYER CMPRN SYS LEG: CPT

## 2023-08-07 RX ORDER — LIDOCAINE HYDROCHLORIDE 40 MG/ML
SOLUTION TOPICAL ONCE
OUTPATIENT
Start: 2023-08-07 | End: 2023-08-07

## 2023-08-07 RX ORDER — LIDOCAINE 40 MG/G
CREAM TOPICAL ONCE
Status: DISCONTINUED | OUTPATIENT
Start: 2023-08-07 | End: 2023-08-08 | Stop reason: HOSPADM

## 2023-08-07 RX ORDER — BACITRACIN ZINC AND POLYMYXIN B SULFATE 500; 1000 [USP'U]/G; [USP'U]/G
OINTMENT TOPICAL ONCE
OUTPATIENT
Start: 2023-08-07 | End: 2023-08-07

## 2023-08-07 RX ORDER — LIDOCAINE 50 MG/G
OINTMENT TOPICAL ONCE
OUTPATIENT
Start: 2023-08-07 | End: 2023-08-07

## 2023-08-07 RX ORDER — LIDOCAINE 40 MG/G
CREAM TOPICAL ONCE
OUTPATIENT
Start: 2023-08-07 | End: 2023-08-07

## 2023-08-07 RX ORDER — SODIUM CHLOR/HYPOCHLOROUS ACID 0.033 %
SOLUTION, IRRIGATION IRRIGATION ONCE
OUTPATIENT
Start: 2023-08-07 | End: 2023-08-07

## 2023-08-07 NOTE — PROGRESS NOTES
[facility] Progress Note      Delmy Mckenzie     : 1950    DATE OF VISIT:  2023    Subjective:     Delmy Mckenzie is a 74 y.o. male who has a chief complaint of a venous ulcer located on the  bilateral leg. Doing well with leg elevation. No issues with compression wrap. Mr. Maria De Jesus Ayala has a past medical history of Allergic rhinitis, Arthritis, Bladder fistula, C. difficile diarrhea, Cellulitis of right lower extremity, CHF (congestive heart failure) ([address]), Dental disease, Diabetes ([address]), Diverticulitis, Dizziness, DVT of lower extremity, bilateral ([address]), GERD (gastroesophageal reflux disease), Headache, Hearing loss, Hematuria, Hx of blood clots, Hyperlipidemia, Hypertension, Lung disease, MONIQUE (obstructive sleep apnea), Pancreatitis ([address]), Pulmonary emboli ([address]), Rash, Sleep apnea, and Tinnitus. He has a past surgical history that includes Tibia fracture surgery (Right, ); Cholecystectomy, open (N/A, 13); Cystocopy (12/10/13); Cystoscopy (14); other surgical history (14); Colonoscopy (); Colonoscopy (2013); Colonoscopy (14); colostomy (2014); Colon surgery; Abdomen surgery (2014); hernia repair (2015); pr injection hip arthrography w/anesthesia (Right, 2018); epidural steroid injection (Right, 2019); epidural steroid injection (Right, 2019); Cardioversion (2019); vascular surgery (Left, 2021); vascular surgery (Right, 05/10/2021); and IR MIDLINE CATH (2023). His family history includes Heart Attack in his father; Heart Disease in his brother and father; High Blood Pressure in his brother; Kidney Disease in his mother; Stroke in his brother. Mr. Maria De Jesus Ayala reports that he has never smoked. He has never used smokeless tobacco. He reports that he does not currently use alcohol. He reports that he does not use drugs.     His current medication list consists of Apoaequorin,
Patient Education     Kick Counts    It’s normal to worry about your baby’s health. One way you can know your baby’s doing well is to record the baby’s movements once a day. This is called a kick count. Remember to take your kick count records to all your appointments with your healthcare provider.  How to count kicks  Here are tips for counting kicks:  · Choose a time when the baby is active, such as after a meal.   · Sit comfortably or lie on your side.   · The first time the baby moves, write down the time.   · Count each movement until the baby has moved 10 times. This can take from 20 minutes to 2 hours.   · Try to do it at the same time each day.  When to call your healthcare provider  Call your healthcare provider right away if you notice any of the following:  · Your baby moves fewer than 10 times in 2 hours while you’re doing kick counts.  · Your baby moves much less often than on the days before.  · You have not felt your baby move all day.  Date Last Reviewed: 12/1/2017 © 2000-2018 [employer]. [address] [place] [address], [place] [address]. All rights reserved. This information is not intended as a substitute for professional medical care. Always follow your healthcare professional's instructions.           
again available, contact your home-care company (if applicable), your PCP, or go to the nearest emergency room

## 2023-08-07 NOTE — PLAN OF CARE
Pt to the [facility] for follow up appointment. Wounds less irritated this week and were not debrided today. Pt to continue with weekly compression wrap. Pt to have [facility] dressing on Thursday. Pt to follow up in the [facility] in 1 week. Discharge instructions reviewed with patient, all questions answered, copy given to patient. Dressings were applied to all wounds per M.D. Instructions at this visit.

## 2023-08-08 NOTE — DISCHARGE INSTRUCTIONS
specific diagnoses:     General comments for venous / lymphedema ulcers: *  Elevate your legs to the level of your heart for at least 30 minutes, several times daily. *  Walk as much as you can tolerate. Avoid standing for long periods of time, but if you must stand, regularly do heel-raises and calf-pump exercises. *  If you have compression wraps designed to remain in place all week, be sure to keep them from getting wet. *  If you have compression garments that can be changed regularly, apply them first thing in the morning, and remove them when you go to bed. Moisturize your skin at bedtime, with Vaseline, Aquaphor, Aveeno, CeraVe, Cetaphil, Eucerin, Lubriderm, etc; but keep the skin between your toes dry. *  If you smoke, your wound can not heal properly -- please talk with us when you're ready to quit. *  Be sure to adhere to any recommendations from your PCP about diuretics (water pills), diet, exercise, and maintaining a healthy weight. If you have questions, please ask. *  If you have a compression pump, aim for at least two hours of use daily. F/U Appointment is with Dr. Noah Argeulles in 1 week on                                   at                       .     Your nurse  is Cirilo Peralta RN. If we applied slip-resistant hospital socks today, be sure to remove them at least once a day to inspect your toes or feet, even if you're not changing the wraps or dressings underneath. If you see anything concerning (redness, excess moisture, etc), please call and let us know right away.      Should you experience any significant changes in your wound(s) (including redness, increased warmth, increased pain, increased drainage, odor, or fever) or have questions about your wound care, please contact the [facility] at [phone] Monday-Thursday from 8:00 am - 4:30 pm, or Friday from 8:00 am - 2:30 pm.  If you need help with your wound outside these hours and cannot wait until

## 2023-08-09 DIAGNOSIS — K21.9 GASTROESOPHAGEAL REFLUX DISEASE WITHOUT ESOPHAGITIS: ICD-10-CM

## 2023-08-09 RX ORDER — OMEPRAZOLE 40 MG/1
CAPSULE, DELAYED RELEASE ORAL
Qty: 90 CAPSULE | Refills: 3 | Status: SHIPPED | OUTPATIENT
Start: 2023-08-09

## 2023-08-09 NOTE — TELEPHONE ENCOUNTER
Refill Request     CONFIRM preferred pharmacy with the patient. If Mail Order Rx - Pend for 90 day refill. Last Seen: Last Seen Department: 6/23/2023  Last Seen by PCP: 6/23/2023    Last Written: 06/29/2022 90 capsule 3 refills     If no future appointment scheduled:  Review the last OV with PCP and review information for follow-up visit,  Route STAFF MESSAGE with patient name to the [facility] for scheduling with the following information:            -  Timing of next visit           -  Visit type ie Physical, OV, etc           -  Diagnoses/Reason ie. COPD, HTN - Do not use MEDICATION, Follow-up or CHECK UP - Give reason for visit      Next Appointment:   Future Appointments   Date Time Provider [address]   8/11/2023 11:00 AM SHAKIRA Acosta - CNP P CLER CAR [facility]   8/14/2023  8:15 AM Cecilia Hand, DPM Delphina [facility]   8/21/2023  8:15 AM Cecilia Hand, DPM Del[place] [facility]   8/28/2023  8:15 AM Cecilia Hand, DPM Delphina New H[facility]   9/11/2023  8:00 AM Cecilia Hand, DPM Delphina [facility] HOD   9/18/2023  8:15 AM Cecilia Hand, DPM Delphina [facility]   9/25/2023  8:15 AM Cecilia Hand, DPM DEBORAH Garza [facility]   9/25/2023 11:00 AM SHAKIRA Olson CNP [facility] [place] [facility]   10/2/2023  8:15 AM Cecilia Hand, DPM Delphina [facility]   10/3/2023  9:45 AM MD Michelet Latham Car [facility]   12/18/2023  8:00 AM M[facility] CT VCT [facility]Z CT Elena Piper   12/27/2023  8:40 AM Mehnaz Chappell MD AND [facility]       Message sent to  to schedule appt with patient?   NO      Requested Prescriptions     Pending Prescriptions Disp Refills    omeprazole (PRILOSEC) 40 MG delayed release capsule [Pharmacy Med Name: OMEPRAZOLE DR 40 MG CAPSULE] 90 capsule 3     Sig: TAKE 1 CAPSULE BY MOUTH EVERY DAY

## 2023-08-10 ENCOUNTER — CARE COORDINATION (OUTPATIENT)
Dept: CARE COORDINATION | Age: 73
End: 2023-08-10

## 2023-08-10 NOTE — CARE COORDINATION
ACM called and spoke to wife who said patient is out at the grocery store at this time. Pierre Burks said to call back in an hour and a half. ACM will touch base next week to review heart failure education.

## 2023-08-11 ENCOUNTER — OFFICE VISIT (OUTPATIENT)
Dept: CARDIOLOGY CLINIC | Age: 73
End: 2023-08-11
Payer: MEDICARE

## 2023-08-11 VITALS
HEIGHT: 73 IN | WEIGHT: 288.2 LBS | DIASTOLIC BLOOD PRESSURE: 88 MMHG | OXYGEN SATURATION: 95 % | SYSTOLIC BLOOD PRESSURE: 123 MMHG | HEART RATE: 84 BPM | BODY MASS INDEX: 38.2 KG/M2

## 2023-08-11 DIAGNOSIS — I50.32 CHRONIC DIASTOLIC HEART FAILURE (HCC): Primary | ICD-10-CM

## 2023-08-11 DIAGNOSIS — I51.7 LVH (LEFT VENTRICULAR HYPERTROPHY): ICD-10-CM

## 2023-08-11 DIAGNOSIS — I48.0 PAROXYSMAL ATRIAL FIBRILLATION (HCC): ICD-10-CM

## 2023-08-11 PROCEDURE — 99214 OFFICE O/P EST MOD 30 MIN: CPT | Performed by: NURSE PRACTITIONER

## 2023-08-11 PROCEDURE — 3074F SYST BP LT 130 MM HG: CPT | Performed by: NURSE PRACTITIONER

## 2023-08-11 PROCEDURE — 3079F DIAST BP 80-89 MM HG: CPT | Performed by: NURSE PRACTITIONER

## 2023-08-11 PROCEDURE — 1123F ACP DISCUSS/DSCN MKR DOCD: CPT | Performed by: NURSE PRACTITIONER

## 2023-08-11 RX ORDER — GLIMEPIRIDE 4 MG/1
TABLET ORAL
COMMUNITY
Start: 2023-06-29

## 2023-08-11 NOTE — PATIENT INSTRUCTIONS
Plan:  Check weights at home and record   Continue current dose of the torsemide 50mg daily   Continue imdur 30mg daily   Entresto 49/51mg take 1 tab twice a day   Continue Eliquis  Remain off of the atenolol  Recommend PYP scan and blood testing   Stay as active as possible  Follow up 2 months after testing     Call to schedule the PYP test and blood testing to have completed [phone]. A PYP scan evaluates to see of their is protein deposits in the heart muscles     Will hold off on the cardiac cath for now given your falling, neuropathies and not having any chest pains.

## 2023-08-11 NOTE — PROGRESS NOTES
The right atrium is moderately dilated. Stress test 6/1/2023  Small-moderate sized inferolateral and basal lateral partial reversibility  defects consistent with ischemia and infarction in the territory of the mid LCx and/or RCA. LV function is normal with uniform wall motion and ejection fraction of 63%. Low-intermediate risk abnormal study. ECHO 5/30/23  Technically difficult examination secondary to habitus and left ribs sore to  touch due to injury. Endocardium not entirely well visualized but no obvious segmental wall motion abnormalities. Left ventricular systolic function is normal with ejection fraction   estimated at 55%. There is septal flattening consistent with RV pressure and volume overload. There is moderate concentric left ventricular hypertrophy. Grade II diastolic dysfunction with elevated left ventricular filling pressure. Severe bi-atrial enlargement. The ascending aorta is mildly dilated 4.04cm. The maximal transaortic velocity is 2.32m/s which gives peak pressure   gradient= 22mmHg and mean pressure gradient= 14mmHg which is c/w borderline   mild aortic stenosis. Mild tricuspid regurgitation. Systolic pulmonary artery pressure (SPAP) is estimated at 47 mmHg c/w mild  pulmonary hypertension (Right atrial pressure of 8 mmHg). Assessment:   Diastolic heart failure from hypertensive heart disease; NYHA class III  Right heart failure/chronic cor pulmonale  CAD based on coronary calcification on CT chest; negative stress 10/18/17  Shortness of breath-multifactorial; due diastolic heart failure, morbid obesity, cor pulmonale  Pulmonary HTN  Moderate  aortic stenosis  Restrictive lung defect  Severe MONIQUE on CPAP  Atypical atrial flutter; s/p DCCV 12/4/19;    [id number] score 4  Bilateral hand numbness     Visit Diagnosis:    1. Chronic diastolic heart failure (HCC)    2. Paroxysmal atrial fibrillation ([address])    3.  LVH (left ventricular hypertrophy)      Plan:  Check

## 2023-08-14 ENCOUNTER — HOSPITAL ENCOUNTER (OUTPATIENT)
Dept: WOUND CARE | Age: 73
Discharge: HOME | End: 2023-08-14
Payer: MEDICARE

## 2023-08-14 VITALS
HEART RATE: 87 BPM | TEMPERATURE: 97.9 F | BODY MASS INDEX: 38.22 KG/M2 | RESPIRATION RATE: 18 BRPM | WEIGHT: 288.4 LBS | DIASTOLIC BLOOD PRESSURE: 94 MMHG | HEIGHT: 73 IN | SYSTOLIC BLOOD PRESSURE: 138 MMHG

## 2023-08-14 DIAGNOSIS — L97.919 VENOUS STASIS ULCER OF RIGHT LOWER EXTREMITY (HCC): Primary | ICD-10-CM

## 2023-08-14 DIAGNOSIS — L97.222 NON-PRESSURE CHRONIC ULCER OF LEFT CALF WITH FAT LAYER EXPOSED (HCC): ICD-10-CM

## 2023-08-14 DIAGNOSIS — I87.2 VENOUS STASIS DERMATITIS OF BOTH LOWER EXTREMITIES: ICD-10-CM

## 2023-08-14 DIAGNOSIS — L97.919 IDIOPATHIC CHRONIC VENOUS HYPERTENSION OF BOTH LOWER EXTREMITIES WITH ULCER (HCC): ICD-10-CM

## 2023-08-14 DIAGNOSIS — I87.313 IDIOPATHIC CHRONIC VENOUS HYPERTENSION OF BOTH LOWER EXTREMITIES WITH ULCER (HCC): ICD-10-CM

## 2023-08-14 DIAGNOSIS — L97.929 IDIOPATHIC CHRONIC VENOUS HYPERTENSION OF BOTH LOWER EXTREMITIES WITH ULCER (HCC): ICD-10-CM

## 2023-08-14 DIAGNOSIS — I83.019 VENOUS STASIS ULCER OF RIGHT LOWER EXTREMITY (HCC): Primary | ICD-10-CM

## 2023-08-14 DIAGNOSIS — L97.212 NON-PRESSURE CHRONIC ULCER OF RIGHT CALF WITH FAT LAYER EXPOSED (HCC): ICD-10-CM

## 2023-08-14 PROCEDURE — 29581 APPL MULTLAYER CMPRN SYS LEG: CPT

## 2023-08-14 RX ORDER — LIDOCAINE 50 MG/G
OINTMENT TOPICAL ONCE
OUTPATIENT
Start: 2023-08-14 | End: 2023-08-14

## 2023-08-14 RX ORDER — LIDOCAINE 40 MG/G
CREAM TOPICAL ONCE
OUTPATIENT
Start: 2023-08-14 | End: 2023-08-14

## 2023-08-14 RX ORDER — LIDOCAINE HYDROCHLORIDE 40 MG/ML
SOLUTION TOPICAL ONCE
OUTPATIENT
Start: 2023-08-14 | End: 2023-08-14

## 2023-08-14 RX ORDER — SODIUM CHLOR/HYPOCHLOROUS ACID 0.033 %
SOLUTION, IRRIGATION IRRIGATION ONCE
OUTPATIENT
Start: 2023-08-14 | End: 2023-08-14

## 2023-08-14 RX ORDER — LIDOCAINE 40 MG/G
CREAM TOPICAL ONCE
Status: DISCONTINUED | OUTPATIENT
Start: 2023-08-14 | End: 2023-08-15 | Stop reason: HOSPADM

## 2023-08-14 RX ORDER — BACITRACIN ZINC AND POLYMYXIN B SULFATE 500; 1000 [USP'U]/G; [USP'U]/G
OINTMENT TOPICAL ONCE
OUTPATIENT
Start: 2023-08-14 | End: 2023-08-14

## 2023-08-14 ASSESSMENT — PAIN SCALES - GENERAL: PAINLEVEL_OUTOF10: 0

## 2023-08-14 NOTE — PROGRESS NOTES
411 [facility] Physician Orders and Discharge [facility]  [address] [place] [address], [facility], [address]  Telephone: [phone]      Fax: [phone]        Your home care company:   Nicol Mcrae     Your wound-care supplies will be provided by: [facility]     NAME:  Gifty Lawrence   YOB: 1950  PRIMARY DIAGNOSIS FOR WOUND CARE CENTER:  Venous leg ulcer . Wound cleansing:   Do not scrub or use excessive force. Wash hands with soap and water before and after dressing changes. Prior to applying a clean dressing, cleanse wound with normal saline, wound cleanser, or mild soap and water. Ask your physician or nurse before getting the wound(s) wet in the shower. Wound care for home:     Right lower leg, left foot, and Left lower leg wounds:    Wash wounds with soap and water with dressing changes    Aquaphor to dry skin    Xeroform to wounds    CoFlex Calamine     Leave dressing on until Thursday       On Thursday:    [address] to remove compression wraps    Wash legs with soap and water    Aquaphor to dry skin    Silvadene to open areas    4x4's, ABD, Coban lite toes to knees        Please note, all wounds (unless stated otherwise here) were mechanically debrided at the time of cleansing here in the wound-care center today, so a small amount of pain, drainage or bleeding from that process might be expected, and is normal.      All products for home use, including multiple products for a single wound if applicable, are medically necessary in order to achieve the best chance at timely wound healing. See provider documentation for details if needed.      Substituted dressings applied in the [address] [place] [address] Road today, if applicable:           New orders for this week (labs, imaging, medications, etc.):         Elevate legs as much as possible   May continue with Physical Therapy        Additional instructions for

## 2023-08-14 NOTE — PLAN OF CARE
Pt to the [facility] for follow up appointment. Pt with new wound on right foot. Pt to continue with compression wraps to bilateral lower legs. Home Care to change dressings on Thursday. Pt is having cardiac testing on Friday. Pt to follow up in the [facility] in 1 week. Discussed with patient the importance of elevating legs. Discharge instructions reviewed with patient, all questions answered, copy given to patient. Dressings were applied to all wounds per M.D. Instructions at this visit.

## 2023-08-14 NOTE — PROGRESS NOTES
425 [facility] Progress Note      Mauriec Byers     : 1950    DATE OF VISIT:  2023    Subjective:     Maurice Byers is a 74 y.o. male who has a chief complaint of a venous ulcer located on the  bilateral leg. Doing well with leg elevation. No issues with compression wrap. Having cardiac testing to find out if he has protein in heart muscles. Mr. Val Toney has a past medical history of Allergic rhinitis, Arthritis, Bladder fistula, C. difficile diarrhea, Cellulitis of right lower extremity, CHF (congestive heart failure) ([address]), Dental disease, Diabetes ([address]), Diverticulitis, Dizziness, DVT of lower extremity, bilateral ([address]), GERD (gastroesophageal reflux disease), Headache, Hearing loss, Hematuria, Hx of blood clots, Hyperlipidemia, Hypertension, Lung disease, MONIQUE (obstructive sleep apnea), Pancreatitis ([address]), Pulmonary emboli ([address]), Rash, Sleep apnea, and Tinnitus. He has a past surgical history that includes Tibia fracture surgery (Right, ); Cholecystectomy, open (N/A, 13); Cystocopy (12/10/13); Cystoscopy (14); other surgical history (14); Colonoscopy (); Colonoscopy (2013); Colonoscopy (14); colostomy (2014); Colon surgery; Abdomen surgery (2014); hernia repair (2015); pr injection hip arthrography w/anesthesia (Right, 2018); epidural steroid injection (Right, 2019); epidural steroid injection (Right, 2019); Cardioversion (2019); vascular surgery (Left, 2021); vascular surgery (Right, 05/10/2021); and IR MIDLINE CATH (2023). His family history includes Heart Attack in his father; Heart Disease in his brother and father; High Blood Pressure in his brother; Kidney Disease in his mother; Stroke in his brother. Mr. Val Toney reports that he has never smoked. He has never used smokeless tobacco. He reports that he does not currently use alcohol.  He reports that he

## 2023-08-15 ENCOUNTER — TELEPHONE (OUTPATIENT)
Dept: FAMILY MEDICINE CLINIC | Age: 73
End: 2023-08-15

## 2023-08-15 NOTE — TELEPHONE ENCOUNTER
Patient called requesting EMG results from [facility] [address] [place]. Stated he got the test done 2 weeks ago. Can you call [facility] and get report? Thanks Yuliana Tierney!

## 2023-08-15 NOTE — DISCHARGE INSTRUCTIONS
specific diagnoses:     General comments for venous / lymphedema ulcers: *  Elevate your legs to the level of your heart for at least 30 minutes, several times daily. *  Walk as much as you can tolerate. Avoid standing for long periods of time, but if you must stand, regularly do heel-raises and calf-pump exercises. *  If you have compression wraps designed to remain in place all week, be sure to keep them from getting wet. *  If you have compression garments that can be changed regularly, apply them first thing in the morning, and remove them when you go to bed. Moisturize your skin at bedtime, with Vaseline, Aquaphor, Aveeno, CeraVe, Cetaphil, Eucerin, Lubriderm, etc; but keep the skin between your toes dry. *  If you smoke, your wound can not heal properly -- please talk with us when you're ready to quit. *  Be sure to adhere to any recommendations from your PCP about diuretics (water pills), diet, exercise, and maintaining a healthy weight. If you have questions, please ask. *  If you have a compression pump, aim for at least two hours of use daily. F/U Appointment is with Dr. Forrest Bruner in 1 week on                                   at                       .     Your nurse  is Pierre Tucker RN. If we applied slip-resistant hospital socks today, be sure to remove them at least once a day to inspect your toes or feet, even if you're not changing the wraps or dressings underneath. If you see anything concerning (redness, excess moisture, etc), please call and let us know right away.      Should you experience any significant changes in your wound(s) (including redness, increased warmth, increased pain, increased drainage, odor, or fever) or have questions about your wound care, please contact the [facility] at [phone] Monday-Thursday from 8:00 am - 4:30 pm, or Friday from 8:00 am - 2:30 pm.  If you need help with your wound outside these hours and cannot wait until

## 2023-08-16 ENCOUNTER — TELEPHONE (OUTPATIENT)
Dept: FAMILY MEDICINE CLINIC | Age: 73
End: 2023-08-16

## 2023-08-16 NOTE — TELEPHONE ENCOUNTER
Have you heard anything about this? If I were not accepting that would involve his cardiologist and other specialists as well? This doesn't seen correct.

## 2023-08-16 NOTE — TELEPHONE ENCOUNTER
Left message for staff at Dr Madyson Mon office to call back. We are requesting the patients EMG results to be faxed over to us. I called yesterday and we never received it.

## 2023-08-17 ENCOUNTER — CARE COORDINATION (OUTPATIENT)
Dept: CARE COORDINATION | Age: 73
End: 2023-08-17

## 2023-08-17 DIAGNOSIS — G56.90 NEUROPATHY OF FINGER, UNSPECIFIED LATERALITY: Primary | ICD-10-CM

## 2023-08-17 DIAGNOSIS — M54.10 RADICULOPATHY AFFECTING UPPER EXTREMITY: ICD-10-CM

## 2023-08-17 NOTE — CARE COORDINATION
ACM placed call to patient who was still asleep. ACM will call back later today. confused with place/impaired

## 2023-08-17 NOTE — CARE COORDINATION
Ambulatory Care Coordination Note  2023    Patient Current Location:  Home: [address] [place] [address] Road  [address] [address]     ACM contacted the patient by telephone. Verified name and  with patient as identifiers. Provided introduction to self, and explanation of the ACM role. Challenges to be reviewed by the provider   Additional needs identified to be addressed with provider: No  none               Method of communication with provider: chart routing. ACM: Sarah Gamboa RN     ACM completed follow up all with patient who said he is frustrated and can't go on with the pain he is having in his wrist. Patient said he feels defeated with the pain as it hurts to do most things. Patient said something needs to be done now about this pain. Patient was tearful and frustrated on the phone with ACM regarding the pain he is experiencing. ACM said she would route this over to the PCP. Patient said if something does not get done about pain in his wrist he will find a new PCP. Plan:    Route to PCP    Offered patient enrollment in the Remote Patient Monitoring (RPM) program for in-home monitoring: Patient declined. Lab Results       None            Care Coordination Interventions    Referral from Primary Care Provider: No  Suggested Interventions and Community Resources  Zone Management Tools:  In Process (Comment: CHF/ DM zone tools)          Goals Addressed    None         Future Appointments   Date Time Provider [address]   2023  8:30 AM [facility] NM CHAIR 1 [facility] NUC MED Mary Saldivar   2023  9:30 AM [facility] NM CARDIAC RM 1 [facility] NUC MED Michelet Rad   2023  8:15 AM Ace Organ, DPM Lali Jennings HOD   2023  8:15 AM Ace Organ, DPM Lali Jennings HOD   2023  8:00 AM Ace Organ, DPM Lali Jennings HOD   2023  8:15 AM Ace Organ, DPM Lali Jennings HOD   2023  8:15 AM Ace Organ, DPM DEBORAH Garza HOD   2023 11:00 AM SHAKIRA Carrillo - NGA

## 2023-08-17 NOTE — TELEPHONE ENCOUNTER
Please let Victoria Huizar know that the results from his EMG were forwarded to me 8/16/2023. There is possible mild carpel tunnel indication. He declined the test on the cervical nerves and thus it is not completely clear if those are involved. I recommend he follow up with a hand specialist. I placed a referral to Dr. Karl Camarillo, please give him the number to call to schedule.

## 2023-08-18 ENCOUNTER — HOSPITAL ENCOUNTER (OUTPATIENT)
Age: 73
Discharge: HOME | End: 2023-08-18
Payer: MEDICARE

## 2023-08-18 ENCOUNTER — TELEPHONE (OUTPATIENT)
Dept: CARDIOLOGY CLINIC | Age: 73
End: 2023-08-18

## 2023-08-18 ENCOUNTER — HOSPITAL ENCOUNTER (OUTPATIENT)
Dept: NUCLEAR MEDICINE | Age: 73
Discharge: HOME | End: 2023-08-18
Payer: MEDICARE

## 2023-08-18 DIAGNOSIS — I50.32 CHRONIC DIASTOLIC HEART FAILURE (HCC): ICD-10-CM

## 2023-08-18 DIAGNOSIS — I48.0 PAROXYSMAL ATRIAL FIBRILLATION (HCC): ICD-10-CM

## 2023-08-18 DIAGNOSIS — I51.7 LVH (LEFT VENTRICULAR HYPERTROPHY): ICD-10-CM

## 2023-08-18 LAB
PROT SERPL-MCNC: 6.7 G/DL (ref 6.4–8.2)
RPT COMMENT: NORMAL

## 2023-08-18 PROCEDURE — 84165 PROTEIN E-PHORESIS SERUM: CPT

## 2023-08-18 PROCEDURE — 83883 ASSAY NEPHELOMETRY NOT SPEC: CPT

## 2023-08-18 PROCEDURE — 78803 RP LOCLZJ TUM SPECT 1 AREA: CPT

## 2023-08-18 PROCEDURE — 3430000000 HC RX DIAGNOSTIC RADIOPHARMACEUTICAL: Performed by: NURSE PRACTITIONER

## 2023-08-18 PROCEDURE — 36415 COLL VENOUS BLD VENIPUNCTURE: CPT

## 2023-08-18 PROCEDURE — A9538 TC99M PYROPHOSPHATE: HCPCS | Performed by: NURSE PRACTITIONER

## 2023-08-18 PROCEDURE — 84155 ASSAY OF PROTEIN SERUM: CPT

## 2023-08-18 RX ADMIN — Medication 15 MILLICURIE: at 09:00

## 2023-08-18 NOTE — TELEPHONE ENCOUNTER
----- Message from SHAKIRA Lomeli CNP sent at 8/18/2023  2:54 PM EDT -----  Reviewed, great news this scan is negative for the protein deposits in the heart!

## 2023-08-21 ENCOUNTER — HOSPITAL ENCOUNTER (OUTPATIENT)
Dept: WOUND CARE | Age: 73
Discharge: HOME | End: 2023-08-21
Payer: MEDICARE

## 2023-08-21 VITALS
HEIGHT: 73 IN | WEIGHT: 289.2 LBS | RESPIRATION RATE: 20 BRPM | TEMPERATURE: 98 F | SYSTOLIC BLOOD PRESSURE: 139 MMHG | BODY MASS INDEX: 38.33 KG/M2 | DIASTOLIC BLOOD PRESSURE: 78 MMHG | HEART RATE: 87 BPM

## 2023-08-21 DIAGNOSIS — L97.919 VENOUS STASIS ULCER OF RIGHT LOWER EXTREMITY (HCC): Primary | ICD-10-CM

## 2023-08-21 DIAGNOSIS — I87.2 VENOUS STASIS DERMATITIS OF BOTH LOWER EXTREMITIES: ICD-10-CM

## 2023-08-21 DIAGNOSIS — I83.019 VENOUS STASIS ULCER OF RIGHT LOWER EXTREMITY (HCC): Primary | ICD-10-CM

## 2023-08-21 DIAGNOSIS — I87.313 IDIOPATHIC CHRONIC VENOUS HYPERTENSION OF BOTH LOWER EXTREMITIES WITH ULCER (HCC): ICD-10-CM

## 2023-08-21 DIAGNOSIS — L97.929 IDIOPATHIC CHRONIC VENOUS HYPERTENSION OF BOTH LOWER EXTREMITIES WITH ULCER (HCC): ICD-10-CM

## 2023-08-21 DIAGNOSIS — L97.212 NON-PRESSURE CHRONIC ULCER OF RIGHT CALF WITH FAT LAYER EXPOSED (HCC): ICD-10-CM

## 2023-08-21 DIAGNOSIS — L97.222 NON-PRESSURE CHRONIC ULCER OF LEFT CALF WITH FAT LAYER EXPOSED (HCC): ICD-10-CM

## 2023-08-21 DIAGNOSIS — L97.919 IDIOPATHIC CHRONIC VENOUS HYPERTENSION OF BOTH LOWER EXTREMITIES WITH ULCER (HCC): ICD-10-CM

## 2023-08-21 LAB
ALBUMIN SERPL ELPH-MCNC: 2.9 G/DL (ref 3.1–4.9)
ALPHA1 GLOB SERPL ELPH-MCNC: 0.3 G/DL (ref 0.2–0.4)
ALPHA2 GLOB SERPL ELPH-MCNC: 0.9 G/DL (ref 0.4–1.1)
B-GLOBULIN SERPL ELPH-MCNC: 1.2 G/DL (ref 0.9–1.6)
GAMMA GLOB SERPL ELPH-MCNC: 1.5 G/DL (ref 0.6–1.8)
PROT SERPL-MCNC: 6.7 G/DL (ref 6.4–8.2)
SPE/IFE INTERPRETATION: NORMAL

## 2023-08-21 PROCEDURE — 29581 APPL MULTLAYER CMPRN SYS LEG: CPT

## 2023-08-21 RX ORDER — LIDOCAINE 50 MG/G
OINTMENT TOPICAL ONCE
OUTPATIENT
Start: 2023-08-21 | End: 2023-08-21

## 2023-08-21 RX ORDER — LIDOCAINE HYDROCHLORIDE 40 MG/ML
SOLUTION TOPICAL ONCE
OUTPATIENT
Start: 2023-08-21 | End: 2023-08-21

## 2023-08-21 RX ORDER — LIDOCAINE 40 MG/G
CREAM TOPICAL ONCE
OUTPATIENT
Start: 2023-08-21 | End: 2023-08-21

## 2023-08-21 RX ORDER — LIDOCAINE 40 MG/G
CREAM TOPICAL ONCE
Status: DISCONTINUED | OUTPATIENT
Start: 2023-08-21 | End: 2023-08-22 | Stop reason: HOSPADM

## 2023-08-21 RX ORDER — SODIUM CHLOR/HYPOCHLOROUS ACID 0.033 %
SOLUTION, IRRIGATION IRRIGATION ONCE
OUTPATIENT
Start: 2023-08-21 | End: 2023-08-21

## 2023-08-21 RX ORDER — BACITRACIN ZINC AND POLYMYXIN B SULFATE 500; 1000 [USP'U]/G; [USP'U]/G
OINTMENT TOPICAL ONCE
OUTPATIENT
Start: 2023-08-21 | End: 2023-08-21

## 2023-08-21 ASSESSMENT — PAIN DESCRIPTION - PAIN TYPE: TYPE: CHRONIC PAIN

## 2023-08-21 ASSESSMENT — PAIN SCALES - GENERAL: PAINLEVEL_OUTOF10: 3

## 2023-08-21 ASSESSMENT — PAIN DESCRIPTION - DESCRIPTORS: DESCRIPTORS: ACHING

## 2023-08-21 ASSESSMENT — PAIN DESCRIPTION - ONSET: ONSET: ON-GOING

## 2023-08-21 ASSESSMENT — PAIN DESCRIPTION - ORIENTATION: ORIENTATION: LEFT;RIGHT

## 2023-08-21 ASSESSMENT — PAIN DESCRIPTION - FREQUENCY: FREQUENCY: INTERMITTENT

## 2023-08-21 NOTE — TELEPHONE ENCOUNTER
Call to patient. He called about his wife. See note in correct chart. Assured patient that his insurance is okay as he has [employer] and not [place].

## 2023-08-21 NOTE — PROGRESS NOTES
411 [facility] Physician Orders and Discharge [facility]  [address] [place] [address], [address] [place], [place] [address]eca, [address]  Telephone: [phone]      Fax: [phone]        Your home care company:   Nicol Mcrae     Your wound-care supplies will be provided by: [facility]     NAME:  Robyn Maria   YOB: 1950  PRIMARY DIAGNOSIS FOR WOUND CARE CENTER:  Venous leg ulcer . Wound cleansing:   Do not scrub or use excessive force. Wash hands with soap and water before and after dressing changes. Prior to applying a clean dressing, cleanse wound with normal saline, wound cleanser, or mild soap and water. Ask your physician or nurse before getting the wound(s) wet in the shower. Wound care for home:     Right lower leg, left foot, and Left lower leg wounds:    Wash wounds with soap and water with dressing changes    Aquaphor to dry skin    Xeroform to wounds    CoFlex Calamine     Leave dressing on until Thursday       On Thursday:    [address] to remove compression wraps    Wash legs with soap and water    Aquaphor to dry skin    Silvadene to open areas    4x4's, ABD, Coban lite toes to knees        Please note, all wounds (unless stated otherwise here) were mechanically debrided at the time of cleansing here in the wound-care center today, so a small amount of pain, drainage or bleeding from that process might be expected, and is normal.      All products for home use, including multiple products for a single wound if applicable, are medically necessary in order to achieve the best chance at timely wound healing. See provider documentation for details if needed.      Substituted dressings applied in the [address] [place] [address] Road today, if applicable:           New orders for this week (labs, imaging, medications, etc.):         Elevate legs as much as possible   May continue with Physical Therapy        Additional instructions for

## 2023-08-21 NOTE — PLAN OF CARE
Pt to the [facility] for follow up appointment. Wounds measuring smaller this week. Pt to continue with weekly compression wraps. Discussed with pt the importance of elevating legs. Pt to follow up in the [facility] in 1 week. Discharge instructions reviewed with patient, all questions answered, copy given to patient. Dressings were applied to all wounds per M.D. Instructions at this visit.

## 2023-08-21 NOTE — PROGRESS NOTES
425 [facility] Progress Note      Gillian Lo     : 1950    DATE OF VISIT:  2023    Subjective:     Gillian Lo is a 74 y.o. male who has a chief complaint of a venous ulcer located on the  bilateral leg. Doing well with leg elevation. No issues with compression wrap. States cardiac testing did not show anything. Did hit his legs against the running boards on his truck. Mr. Yisel Kiser has a past medical history of Allergic rhinitis, Arthritis, Bladder fistula, C. difficile diarrhea, Cellulitis of right lower extremity, CHF (congestive heart failure) ([address]), Dental disease, Diabetes ([address]), Diverticulitis, Dizziness, DVT of lower extremity, bilateral ([address]), GERD (gastroesophageal reflux disease), Headache, Hearing loss, Hematuria, Hx of blood clots, Hyperlipidemia, Hypertension, Lung disease, MONIQUE (obstructive sleep apnea), Pancreatitis ([address]), Pulmonary emboli ([address]), Rash, Sleep apnea, and Tinnitus. He has a past surgical history that includes Tibia fracture surgery (Right, ); Cholecystectomy, open (N/A, 13); Cystocopy (12/10/13); Cystoscopy (14); other surgical history (14); Colonoscopy (); Colonoscopy (2013); Colonoscopy (14); colostomy (2014); Colon surgery; Abdomen surgery (2014); hernia repair (2015); pr injection hip arthrography w/anesthesia (Right, 2018); epidural steroid injection (Right, 2019); epidural steroid injection (Right, 2019); Cardioversion (2019); vascular surgery (Left, 2021); vascular surgery (Right, 05/10/2021); and IR MIDLINE CATH (2023). His family history includes Heart Attack in his father; Heart Disease in his brother and father; High Blood Pressure in his brother; Kidney Disease in his mother; Stroke in his brother. Mr. Yisel Kiser reports that he has never smoked.  He has never used smokeless tobacco. He reports that he does not

## 2023-08-22 ENCOUNTER — TELEPHONE (OUTPATIENT)
Dept: FAMILY MEDICINE CLINIC | Age: 73
End: 2023-08-22

## 2023-08-22 ENCOUNTER — TELEPHONE (OUTPATIENT)
Dept: CARDIOLOGY CLINIC | Age: 73
End: 2023-08-22

## 2023-08-22 LAB
KAPPA LC FREE SER-MCNC: 26.1 MG/L (ref 3.3–19.4)
KAPPA LC FREE/LAMBDA FREE SER: 0.91 {RATIO} (ref 0.26–1.65)
LAMBDA LC FREE SERPL-MCNC: 28.76 MG/L (ref 5.71–26.3)
RPT COMMENT: ABNORMAL

## 2023-08-22 NOTE — TELEPHONE ENCOUNTER
Dianne Champion, APRN - CNP  to [facility] Practice Support     DP     5:55 PM  Note     Please let Antonio Juárez know that the results from his EMG were forwarded to me 8/16/2023. There is possible mild carpel tunnel indication. He declined the test on the cervical nerves and thus it is not completely clear if those are involved. I recommend he follow up with a hand specialist. I placed a referral to Dr. Iker Metcalf, please give him the number to call to schedule.

## 2023-08-22 NOTE — DISCHARGE INSTRUCTIONS
411 [facility] Physician Orders and Discharge [address], [address] [place] [address], [address]  Telephone: [phone]      Fax: [phone]        Your home care company:   Nicol Mcrae     Your wound-care supplies will be provided by: [facility]     NAME:  Selwyn Plascencia   YOB: 1950  PRIMARY DIAGNOSIS FOR WOUND CARE CENTER:  Venous leg ulcer . Wound cleansing:   Do not scrub or use excessive force. Wash hands with soap and water before and after dressing changes. Prior to applying a clean dressing, cleanse wound with normal saline, wound cleanser, or mild soap and water. Ask your physician or nurse before getting the wound(s) wet in the shower. Wound care for home:     Right lower leg, left foot, and Left lower leg wounds:    Wash wounds with soap and water with dressing changes    Aquaphor to dry skin    Xeroform to leg wounds    Betadine and Mepilex transfer ag to left foot wound    CoFlex Calamine     Leave dressing on until Thursday       On Thursday's and on 9/4/23 or 9/5/2023    Home Care to remove compression wraps    Wash legs with soap and water    Aquaphor to dry skin    Silvadene to open areas on legs    Betadinet to left foot wound    4x4's, ABD, Coban lite toes to knees        Please note, all wounds (unless stated otherwise here) were mechanically debrided at the time of cleansing here in the wound-care center today, so a small amount of pain, drainage or bleeding from that process might be expected, and is normal.      All products for home use, including multiple products for a single wound if applicable, are medically necessary in order to achieve the best chance at timely wound healing. See provider documentation for details if needed.      Substituted dressings applied in the [facility] today, if applicable:           New orders for this week (labs, imaging, medications,

## 2023-08-22 NOTE — TELEPHONE ENCOUNTER
----- Message from SHAKIRA Geiger CNP sent at 8/22/2023  3:50 PM EDT -----  Reviewed, labs are re-assuring as the ratio is normal. Unlikely to have amyloid based upon these tests and the PYP scan.

## 2023-08-22 NOTE — TELEPHONE ENCOUNTER
Patient notified of test results and verbally states he understands but will not be calling a hand specialist as he is tired of seeing doctors and being mistreated. He states that he has been in pain for the last 3 months and no provider has tried to help him. I apologized to him and explained that there are steps that must be taken before medication can be prescribed or surgery can be done. Patient states that he is just frustrated, he believes more could/can be done for him but the medical field is letting him done and that he might have to find a new facility. I apologize and asked if there was again I could do to help him, he states something to be with this hand to help with the pain.

## 2023-08-23 NOTE — TELEPHONE ENCOUNTER
Please explain that he really needs to follow up with the hand specialist for further treatment and follow up on the results of his EMG.

## 2023-08-24 NOTE — TELEPHONE ENCOUNTER
He has already had the EMG completed? I referred him to Dr. Mehreen Cherry, the hand specialist with [facility]. Please give him the number for them to schedule the appointment.

## 2023-08-24 NOTE — TELEPHONE ENCOUNTER
Patient was given referral information and emphasized the importance of having this done. Referral has been faxed to their office.      [address] [place] [address], [address] [place] [address],[address]  Fax [email or web address];[phone]  Phone number [phone]

## 2023-08-25 ENCOUNTER — CARE COORDINATION (OUTPATIENT)
Dept: CARE COORDINATION | Age: 73
End: 2023-08-25

## 2023-08-25 NOTE — CARE COORDINATION
Ambulatory Care Coordination Note  2023    Patient Current Location:  Home: [address] [place] [address] [address] [address]     ACM contacted the patient by telephone. Verified name and  with patient as identifiers. Provided introduction to self, and explanation of the ACM role. Challenges to be reviewed by the provider   Additional needs identified to be addressed with provider: No  none               Method of communication with provider: chart routing. ACM: Bulmaro Rosenberg RN     ACM completed follow up call with patient who said he is having pain in wrist. Patient does have a follow up appt with ortho [facility] next week . Patient is waiting for a call in regards to his wife. ACM said she would touch base after ortho follow up appt. Plan:    F/U call 2 weeks    Offered patient enrollment in the Remote Patient Monitoring (RPM) program for in-home monitoring: Patient declined. Lab Results       None            Care Coordination Interventions    Referral from Primary Care Provider: No  Suggested Interventions and Community Resources  Zone Management Tools:  In Process (Comment: CHF/ DM zone tools)          Goals Addressed    None         Future Appointments   Date Time Provider [address]   2023  8:15 AM Sarah Olson, JOSEPH Rose HOD   2023  8:00 AM JOSEPH Yeboah HOD   2023  8:15 AM JOSEPH Yeboah HOD   2023  8:15 AM JOSEPH Yeboah HOD   2023 11:00 AM Yessenia Callejas APRN - CNP [facility] - DYD   10/2/2023  8:15 AM JOSEPH Yeboah HOD   10/3/2023  9:45 AM FREDERICK Berry MD Michelet Car [facility]   10/6/2023  1:30 PM Richar Martines APRN - CNP [facility]   2023  8:00 AM [facility] CT VCT [facility] CT [facility]   2023  8:40 AM James Ross MD AND PULM [facility]    and   General Assessment    Do you have any symptoms that are causing concern?: No

## 2023-08-26 ENCOUNTER — ENROLLMENT (OUTPATIENT)
Dept: CARE COORDINATION | Age: 73
End: 2023-08-26

## 2023-08-28 ENCOUNTER — HOSPITAL ENCOUNTER (OUTPATIENT)
Dept: WOUND CARE | Age: 73
Discharge: HOME | End: 2023-08-28
Payer: MEDICARE

## 2023-08-28 VITALS
SYSTOLIC BLOOD PRESSURE: 106 MMHG | HEIGHT: 73 IN | DIASTOLIC BLOOD PRESSURE: 66 MMHG | WEIGHT: 288.2 LBS | RESPIRATION RATE: 18 BRPM | TEMPERATURE: 98.2 F | HEART RATE: 97 BPM | BODY MASS INDEX: 38.2 KG/M2

## 2023-08-28 DIAGNOSIS — L97.929 IDIOPATHIC CHRONIC VENOUS HYPERTENSION OF BOTH LOWER EXTREMITIES WITH ULCER (HCC): ICD-10-CM

## 2023-08-28 DIAGNOSIS — I87.2 VENOUS STASIS DERMATITIS OF BOTH LOWER EXTREMITIES: ICD-10-CM

## 2023-08-28 DIAGNOSIS — I83.019 VENOUS STASIS ULCER OF RIGHT LOWER EXTREMITY (HCC): Primary | ICD-10-CM

## 2023-08-28 DIAGNOSIS — L97.212 NON-PRESSURE CHRONIC ULCER OF RIGHT CALF WITH FAT LAYER EXPOSED (HCC): ICD-10-CM

## 2023-08-28 DIAGNOSIS — I87.313 IDIOPATHIC CHRONIC VENOUS HYPERTENSION OF BOTH LOWER EXTREMITIES WITH ULCER (HCC): ICD-10-CM

## 2023-08-28 DIAGNOSIS — L97.919 IDIOPATHIC CHRONIC VENOUS HYPERTENSION OF BOTH LOWER EXTREMITIES WITH ULCER (HCC): ICD-10-CM

## 2023-08-28 DIAGNOSIS — L97.222 NON-PRESSURE CHRONIC ULCER OF LEFT CALF WITH FAT LAYER EXPOSED (HCC): ICD-10-CM

## 2023-08-28 DIAGNOSIS — L97.919 VENOUS STASIS ULCER OF RIGHT LOWER EXTREMITY (HCC): Primary | ICD-10-CM

## 2023-08-28 PROCEDURE — 29581 APPL MULTLAYER CMPRN SYS LEG: CPT

## 2023-08-28 RX ORDER — LIDOCAINE 50 MG/G
OINTMENT TOPICAL ONCE
OUTPATIENT
Start: 2023-08-28 | End: 2023-08-28

## 2023-08-28 RX ORDER — LIDOCAINE 40 MG/G
CREAM TOPICAL ONCE
Status: DISCONTINUED | OUTPATIENT
Start: 2023-08-28 | End: 2023-08-29 | Stop reason: HOSPADM

## 2023-08-28 RX ORDER — BACITRACIN ZINC AND POLYMYXIN B SULFATE 500; 1000 [USP'U]/G; [USP'U]/G
OINTMENT TOPICAL ONCE
OUTPATIENT
Start: 2023-08-28 | End: 2023-08-28

## 2023-08-28 RX ORDER — SODIUM CHLOR/HYPOCHLOROUS ACID 0.033 %
SOLUTION, IRRIGATION IRRIGATION ONCE
OUTPATIENT
Start: 2023-08-28 | End: 2023-08-28

## 2023-08-28 RX ORDER — LIDOCAINE 40 MG/G
CREAM TOPICAL ONCE
OUTPATIENT
Start: 2023-08-28 | End: 2023-08-28

## 2023-08-28 RX ORDER — LIDOCAINE HYDROCHLORIDE 40 MG/ML
SOLUTION TOPICAL ONCE
OUTPATIENT
Start: 2023-08-28 | End: 2023-08-28

## 2023-08-28 ASSESSMENT — PAIN DESCRIPTION - ORIENTATION: ORIENTATION: LEFT

## 2023-08-28 ASSESSMENT — PAIN - FUNCTIONAL ASSESSMENT: PAIN_FUNCTIONAL_ASSESSMENT: ACTIVITIES ARE NOT PREVENTED

## 2023-08-28 ASSESSMENT — PAIN SCALES - GENERAL: PAINLEVEL_OUTOF10: 4

## 2023-08-28 ASSESSMENT — PAIN DESCRIPTION - FREQUENCY: FREQUENCY: INTERMITTENT

## 2023-08-28 ASSESSMENT — PAIN DESCRIPTION - ONSET: ONSET: ON-GOING

## 2023-08-28 ASSESSMENT — PAIN DESCRIPTION - PAIN TYPE: TYPE: CHRONIC PAIN

## 2023-08-28 ASSESSMENT — PAIN DESCRIPTION - DESCRIPTORS: DESCRIPTORS: DULL

## 2023-08-28 ASSESSMENT — PAIN DESCRIPTION - LOCATION: LOCATION: LEG

## 2023-08-28 NOTE — PLAN OF CARE
Pt to the [facility] for follow up appointment. Right lower leg wound with thin layer of skin over wound. Left foot wound with more irritation this week. Will continue to apply compression wrap to bilateral lower legs. Pt to continue to elevate legs as much as possible. Home care to change dressing next week. Pt to follow up in the [facility] in 2 weeks due to holiday. Discharge instructions reviewed with patient, all questions answered, copy given to patient. Dressings were applied to all wounds per M.D. Instructions at this visit.

## 2023-08-28 NOTE — PROGRESS NOTES
411 [facility] Physician Orders and Discharge [facility]  [address] [place] [address], 6 [facility]  [place], [address]  Telephone: [phone]      Fax: [phone]        Your home care company:   Nicol Mcrae     Your wound-care supplies will be provided by: [facility]     NAME:  Kimberly Billingsley   YOB: 1950  PRIMARY DIAGNOSIS FOR WOUND CARE CENTER:  Venous leg ulcer . Wound cleansing:   Do not scrub or use excessive force. Wash hands with soap and water before and after dressing changes. Prior to applying a clean dressing, cleanse wound with normal saline, wound cleanser, or mild soap and water. Ask your physician or nurse before getting the wound(s) wet in the shower. Wound care for home:     Right lower leg, left foot, and Left lower leg wounds:    Wash wounds with soap and water with dressing changes    Aquaphor to dry skin    Xeroform to leg wounds    Betadine and Mepilex transfer ag to left foot wound    CoFlex Calamine     Leave dressing on until Thursday       On Thursday's and on 9/4/23 or 9/5/2023    Home Care to remove compression wraps    Wash legs with soap and water    Aquaphor to dry skin    Silvadene to open areas on legs    Betadinet to left foot wound    4x4's, ABD, Coban lite toes to knees        Please note, all wounds (unless stated otherwise here) were mechanically debrided at the time of cleansing here in the wound-care center today, so a small amount of pain, drainage or bleeding from that process might be expected, and is normal.      All products for home use, including multiple products for a single wound if applicable, are medically necessary in order to achieve the best chance at timely wound healing. See provider documentation for details if needed.      Substituted dressings applied in the [address] [place] [address] Road today, if applicable:           New orders for this week (labs, imaging, medications,

## 2023-08-28 NOTE — PROGRESS NOTES
425 [facility] Progress Note      Alexis Robertson     : 1950    DATE OF VISIT:  2023    Subjective:     Alexis Robertson is a 74 y.o. male who has a chief complaint of a venous ulcer located on the  bilateral leg. Doing well with leg elevation. No issues with compression wrap. Feeling well. Mr. Iva Sarmiento has a past medical history of Allergic rhinitis, Arthritis, Bladder fistula, C. difficile diarrhea, Cellulitis of right lower extremity, CHF (congestive heart failure) ([address]), Dental disease, Diabetes ([address]), Diverticulitis, Dizziness, DVT of lower extremity, bilateral ([address]), GERD (gastroesophageal reflux disease), Headache, Hearing loss, Hematuria, Hx of blood clots, Hyperlipidemia, Hypertension, Lung disease, MONIQUE (obstructive sleep apnea), Pancreatitis ([address]), Pulmonary emboli ([address]), Rash, Sleep apnea, and Tinnitus. He has a past surgical history that includes Tibia fracture surgery (Right, ); Cholecystectomy, open (N/A, 13); Cystocopy (12/10/13); Cystoscopy (14); other surgical history (14); Colonoscopy (); Colonoscopy (2013); Colonoscopy (14); colostomy (2014); Colon surgery; Abdomen surgery (2014); hernia repair (2015); pr injection hip arthrography w/anesthesia (Right, 2018); epidural steroid injection (Right, 2019); epidural steroid injection (Right, 2019); Cardioversion (2019); vascular surgery (Left, 2021); vascular surgery (Right, 05/10/2021); and IR MIDLINE CATH (2023). His family history includes Heart Attack in his father; Heart Disease in his brother and father; High Blood Pressure in his brother; Kidney Disease in his mother; Stroke in his brother. Mr. Iva Sarmiento reports that he has never smoked. He has never used smokeless tobacco. He reports that he does not currently use alcohol. He reports that he does not use drugs.     His current medication list consists

## 2023-08-31 ENCOUNTER — CARE COORDINATION (OUTPATIENT)
Dept: CARE COORDINATION | Age: 73
End: 2023-08-31

## 2023-09-05 NOTE — DISCHARGE INSTRUCTIONS
411 [facility] Physician Orders and Discharge [address], [address] [place] [address], [address]  Telephone: [phone]      Fax: [phone]        Your home care company:   Nicol Mcrae     Your wound-care supplies will be provided by: [facility]     NAME:  Suzy Turcios   YOB: 1950  PRIMARY DIAGNOSIS FOR WOUND CARE CENTER:  Venous leg ulcer . Wound cleansing:   Do not scrub or use excessive force. Wash hands with soap and water before and after dressing changes. Prior to applying a clean dressing, cleanse wound with normal saline, wound cleanser, or mild soap and water. Ask your physician or nurse before getting the wound(s) wet in the shower. Wound care for home:     Right lower leg, left foot, and Left lower leg wounds:    Wash wounds with soap and water with dressing changes    Aquaphor to dry skin    Xeroform to leg wounds    Betadine, sorbact, silver alginate, optiloc to left foot wound    CoFlex Calamine     Leave dressing on until Thursday       On 1305 [facility] to remove compression wraps    Wash legs with soap and water    Aquaphor to dry skin    Silvadene to open areas on legs    Betadine, sorbact, silver alginate, optiloc to left foot wound    4x4's, ABD, Coban lite toes to knees        Please note, all wounds (unless stated otherwise here) were mechanically debrided at the time of cleansing here in the wound-care center today, so a small amount of pain, drainage or bleeding from that process might be expected, and is normal.      All products for home use, including multiple products for a single wound if applicable, are medically necessary in order to achieve the best chance at timely wound healing. See provider documentation for details if needed.      Substituted dressings applied in the [facility] today, if applicable:           New orders for this week (labs,

## 2023-09-07 ENCOUNTER — TELEPHONE (OUTPATIENT)
Dept: WOUND CARE | Age: 73
End: 2023-09-07

## 2023-09-08 ENCOUNTER — CARE COORDINATION (OUTPATIENT)
Dept: CARE COORDINATION | Age: 73
End: 2023-09-08

## 2023-09-08 SDOH — ECONOMIC STABILITY: HOUSING INSECURITY: IN THE LAST 12 MONTHS, HOW MANY PLACES HAVE YOU LIVED?: 1

## 2023-09-08 SDOH — ECONOMIC STABILITY: INCOME INSECURITY: IN THE LAST 12 MONTHS, WAS THERE A TIME WHEN YOU WERE NOT ABLE TO PAY THE MORTGAGE OR RENT ON TIME?: NO

## 2023-09-08 SDOH — ECONOMIC STABILITY: HOUSING INSECURITY
IN THE LAST 12 MONTHS, WAS THERE A TIME WHEN YOU DID NOT HAVE A STEADY PLACE TO SLEEP OR SLEPT IN A SHELTER (INCLUDING NOW)?: NO

## 2023-09-08 SDOH — ECONOMIC STABILITY: FOOD INSECURITY: WITHIN THE PAST 12 MONTHS, YOU WORRIED THAT YOUR FOOD WOULD RUN OUT BEFORE YOU GOT MONEY TO BUY MORE.: NEVER TRUE

## 2023-09-08 SDOH — ECONOMIC STABILITY: FOOD INSECURITY: WITHIN THE PAST 12 MONTHS, THE FOOD YOU BOUGHT JUST DIDN'T LAST AND YOU DIDN'T HAVE MONEY TO GET MORE.: NEVER TRUE

## 2023-09-08 ASSESSMENT — SOCIAL DETERMINANTS OF HEALTH (SDOH)
DO YOU BELONG TO ANY CLUBS OR ORGANIZATIONS SUCH AS CHURCH GROUPS UNIONS, FRATERNAL OR ATHLETIC GROUPS, OR SCHOOL GROUPS?: NO
HOW OFTEN DO YOU ATTEND CHURCH OR RELIGIOUS SERVICES?: MORE THAN 4 TIMES PER YEAR
IN A TYPICAL WEEK, HOW MANY TIMES DO YOU TALK ON THE PHONE WITH FAMILY, FRIENDS, OR NEIGHBORS?: TWICE A WEEK
HOW HARD IS IT FOR YOU TO PAY FOR THE VERY BASICS LIKE FOOD, HOUSING, MEDICAL CARE, AND HEATING?: HARD
HOW OFTEN DO YOU GET TOGETHER WITH FRIENDS OR RELATIVES?: TWICE A WEEK
HOW OFTEN DO YOU ATTENT MEETINGS OF THE CLUB OR ORGANIZATION YOU BELONG TO?: NEVER

## 2023-09-08 ASSESSMENT — LIFESTYLE VARIABLES
HOW MANY STANDARD DRINKS CONTAINING ALCOHOL DO YOU HAVE ON A TYPICAL DAY: PATIENT DOES NOT DRINK
HOW OFTEN DO YOU HAVE A DRINK CONTAINING ALCOHOL: NEVER

## 2023-09-08 NOTE — TELEPHONE ENCOUNTER
Returned phone call to [facility] with [id number] [employer]. Discussed long term compression for patient when healed.

## 2023-09-08 NOTE — CARE COORDINATION
Ambulatory Care Coordination Note  2023    Patient Current Location:  Home: [address] [place] [address] Road  [address] [address]     ACM contacted the patient by telephone. Verified name and  with patient as identifiers. Provided introduction to self, and explanation of the ACM role. Challenges to be reviewed by the provider   Additional needs identified to be addressed with provider: No  none               Method of communication with provider: chart routing. ACM: Artem Abdullahi RN     Acm completed follow up call with patient who said he is doing better with wrist pain. Patient said he has a plan with Ortho but brace has helped with wrist pain at this time. Patient said he is doing well with being complaint with medications at this time. Patient is waiting for [facility] nurse to come for dressing changes. Patient said at this time he has no further questions or concerns. Plan:    F/U call 3 weeks  Assess ongoing needs    Offered patient enrollment in the Remote Patient Monitoring (RPM) program for in-home monitoring: Patient declined. Lab Results       None                 Goals Addressed                   This Visit's Progress     Conditions and Symptoms   Improving     I will schedule office visits, as directed by my provider. I will keep my appointment or reschedule if I have to cancel. I will notify my provider of any barriers to my plan of care. I will follow my Zone Management tool to seek urgent or emergent care. I will notify my provider of any symptoms that indicate a worsening of my condition.     Barriers: overwhelmed by complexity of regimen  Plan for overcoming my barriers: ACM will provide supportive/ engaging phone calls to help manage patient care  Confidence: 8/10  Anticipated Goal Completion Date: 23         Self Monitoring   Improving     Self-Monitored Blood Glucose - I will check my blood sugar Fasting blood sugar  Daily Weights - I will weight myself as directed - Daily and write

## 2023-09-11 ENCOUNTER — HOSPITAL ENCOUNTER (OUTPATIENT)
Dept: WOUND CARE | Age: 73
Discharge: HOME | End: 2023-09-11
Payer: MEDICARE

## 2023-09-11 VITALS
TEMPERATURE: 97.8 F | WEIGHT: 282.4 LBS | RESPIRATION RATE: 18 BRPM | DIASTOLIC BLOOD PRESSURE: 71 MMHG | HEIGHT: 73 IN | HEART RATE: 97 BPM | SYSTOLIC BLOOD PRESSURE: 95 MMHG | BODY MASS INDEX: 37.43 KG/M2

## 2023-09-11 DIAGNOSIS — I87.313 IDIOPATHIC CHRONIC VENOUS HYPERTENSION OF BOTH LOWER EXTREMITIES WITH ULCER (HCC): ICD-10-CM

## 2023-09-11 DIAGNOSIS — I83.019 VENOUS STASIS ULCER OF RIGHT LOWER EXTREMITY (HCC): Primary | ICD-10-CM

## 2023-09-11 DIAGNOSIS — I87.2 VENOUS STASIS DERMATITIS OF BOTH LOWER EXTREMITIES: ICD-10-CM

## 2023-09-11 DIAGNOSIS — L97.919 VENOUS STASIS ULCER OF RIGHT LOWER EXTREMITY (HCC): Primary | ICD-10-CM

## 2023-09-11 DIAGNOSIS — L97.212 NON-PRESSURE CHRONIC ULCER OF RIGHT CALF WITH FAT LAYER EXPOSED (HCC): ICD-10-CM

## 2023-09-11 DIAGNOSIS — L97.222 NON-PRESSURE CHRONIC ULCER OF LEFT CALF WITH FAT LAYER EXPOSED (HCC): ICD-10-CM

## 2023-09-11 DIAGNOSIS — L97.919 IDIOPATHIC CHRONIC VENOUS HYPERTENSION OF BOTH LOWER EXTREMITIES WITH ULCER (HCC): ICD-10-CM

## 2023-09-11 DIAGNOSIS — L97.929 IDIOPATHIC CHRONIC VENOUS HYPERTENSION OF BOTH LOWER EXTREMITIES WITH ULCER (HCC): ICD-10-CM

## 2023-09-11 PROCEDURE — 87205 SMEAR GRAM STAIN: CPT

## 2023-09-11 PROCEDURE — 87181 SC STD AGAR DILUTION PER AGT: CPT

## 2023-09-11 PROCEDURE — 87186 SC STD MICRODIL/AGAR DIL: CPT

## 2023-09-11 PROCEDURE — 87070 CULTURE OTHR SPECIMN AEROBIC: CPT

## 2023-09-11 PROCEDURE — 87077 CULTURE AEROBIC IDENTIFY: CPT

## 2023-09-11 PROCEDURE — 29581 APPL MULTLAYER CMPRN SYS LEG: CPT

## 2023-09-11 PROCEDURE — 11042 DBRDMT SUBQ TIS 1ST 20SQCM/<: CPT

## 2023-09-11 RX ORDER — AMOXICILLIN AND CLAVULANATE POTASSIUM 500; 125 MG/1; MG/1
1 TABLET, FILM COATED ORAL 2 TIMES DAILY
Qty: 28 TABLET | Refills: 0 | Status: SHIPPED | OUTPATIENT
Start: 2023-09-11 | End: 2023-09-15

## 2023-09-11 RX ORDER — BACITRACIN ZINC AND POLYMYXIN B SULFATE 500; 1000 [USP'U]/G; [USP'U]/G
OINTMENT TOPICAL ONCE
OUTPATIENT
Start: 2023-09-11 | End: 2023-09-11

## 2023-09-11 RX ORDER — LIDOCAINE 40 MG/G
CREAM TOPICAL ONCE
OUTPATIENT
Start: 2023-09-11 | End: 2023-09-11

## 2023-09-11 RX ORDER — OMEGA-3 FATTY ACIDS/FISH OIL 300-1000MG
3 CAPSULE ORAL EVERY 6 HOURS PRN
COMMUNITY

## 2023-09-11 RX ORDER — LIDOCAINE HYDROCHLORIDE 40 MG/ML
SOLUTION TOPICAL ONCE
OUTPATIENT
Start: 2023-09-11 | End: 2023-09-11

## 2023-09-11 RX ORDER — SODIUM CHLOR/HYPOCHLOROUS ACID 0.033 %
SOLUTION, IRRIGATION IRRIGATION ONCE
OUTPATIENT
Start: 2023-09-11 | End: 2023-09-11

## 2023-09-11 RX ORDER — LIDOCAINE 40 MG/G
CREAM TOPICAL ONCE
Status: DISCONTINUED | OUTPATIENT
Start: 2023-09-11 | End: 2023-09-12 | Stop reason: HOSPADM

## 2023-09-11 RX ORDER — LIDOCAINE 50 MG/G
OINTMENT TOPICAL ONCE
OUTPATIENT
Start: 2023-09-11 | End: 2023-09-11

## 2023-09-11 ASSESSMENT — PAIN DESCRIPTION - LOCATION: LOCATION: LEG

## 2023-09-11 ASSESSMENT — PAIN DESCRIPTION - ORIENTATION: ORIENTATION: LEFT

## 2023-09-11 ASSESSMENT — PAIN DESCRIPTION - PAIN TYPE: TYPE: CHRONIC PAIN

## 2023-09-11 ASSESSMENT — PAIN DESCRIPTION - DESCRIPTORS: DESCRIPTORS: DULL;ACHING;SHARP

## 2023-09-11 ASSESSMENT — PAIN DESCRIPTION - ONSET: ONSET: ON-GOING

## 2023-09-11 ASSESSMENT — PAIN - FUNCTIONAL ASSESSMENT: PAIN_FUNCTIONAL_ASSESSMENT: PREVENTS OR INTERFERES SOME ACTIVE ACTIVITIES AND ADLS

## 2023-09-11 ASSESSMENT — PAIN DESCRIPTION - FREQUENCY: FREQUENCY: CONTINUOUS

## 2023-09-11 ASSESSMENT — PAIN SCALES - GENERAL: PAINLEVEL_OUTOF10: 5

## 2023-09-11 NOTE — PLAN OF CARE
Pt to the [address] [place] [address] for follow up appointment. Wounds on legs stable,  foot wound measuring larger. Redness noted on foot, wound culture taken. Dr Jeannine Pascal called in oral antibiotics. Pt to pick antibiotics at pharmacy. Pt to have compression wraps applied to bilateral lower legs. Home Care to change dressing on Thursday. Pt to follow up in the [address] [place] [address] in 1 week. Discharge instructions reviewed with patient, all questions answered, copy given to patient. Dressings were applied to all wounds per M.D. Instructions at this visit.

## 2023-09-11 NOTE — PROGRESS NOTES
411 [facility] Physician Orders and Discharge [address], [address] [place] [address], [address]  Telephone: [phone]      Fax: [phone]        Your home care company:   Nicol Mcrae     Your wound-care supplies will be provided by: [facility]     NAME:  Yobany Shaikh   YOB: 1950  PRIMARY DIAGNOSIS FOR WOUND CARE CENTER:  Venous leg ulcer . Wound cleansing:   Do not scrub or use excessive force. Wash hands with soap and water before and after dressing changes. Prior to applying a clean dressing, cleanse wound with normal saline, wound cleanser, or mild soap and water. Ask your physician or nurse before getting the wound(s) wet in the shower. Wound care for home:     Right lower leg, left foot, and Left lower leg wounds:    Wash wounds with soap and water with dressing changes    Aquaphor to dry skin    Xeroform to leg wounds    Betadine, sorbact, silver alginate, optiloc to left foot wound    CoFlex Calamine     Leave dressing on until Thursday       On 130[address] [facility] to remove compression wraps    Wash legs with soap and water    Aquaphor to dry skin    Silvadene to open areas on legs    Betadine, sorbact, silver alginate, optiloc to left foot wound    4x4's, ABD, Coban lite toes to knees        Please note, all wounds (unless stated otherwise here) were mechanically debrided at the time of cleansing here in the wound-care center today, so a small amount of pain, drainage or bleeding from that process might be expected, and is normal.      All products for home use, including multiple products for a single wound if applicable, are medically necessary in order to achieve the best chance at timely wound healing. See provider documentation for details if needed.      Substituted dressings applied in the [facility] today, if applicable:           New orders for this week (labs,
Electronically signed by Nadiya Doherty DPM on 9/11/2023 at 3:17 PM.

## 2023-09-12 NOTE — DISCHARGE INSTRUCTIONS
411 [facility] Physician Orders and Discharge [place]  [address] [facility], [address] [place] [address], [address]  Telephone: [phone]      Fax: [phone]        Your home care company:   Nicol Mcrae     Your wound-care supplies will be provided by: [facility]     NAME:  Arden Simon   YOB: 1950  PRIMARY DIAGNOSIS FOR WOUND CARE CENTER:  Venous leg ulcer . Wound cleansing:   Do not scrub or use excessive force. Wash hands with soap and water before and after dressing changes. Prior to applying a clean dressing, cleanse wound with normal saline, wound cleanser, or mild soap and water. Ask your physician or nurse before getting the wound(s) wet in the shower. Wound care for home:     Right lower leg, left foot, and Left lower leg wounds:    Wash wounds with soap and water with dressing changes    Aquaphor to dry skin    Xeroform to leg wounds    Betadine, sorbact, silver alginate, optiloc to left foot wound    CoFlex Calamine     Leave dressing on until Thursday       On 1305 [facility] to remove compression wraps    Wash legs with soap and water    Aquaphor to dry skin    Silvadene to open areas on legs    Betadine, sorbact, silver alginate, optiloc to left foot wound    4x4's, ABD, Coban lite toes to knees        Please note, all wounds (unless stated otherwise here) were mechanically debrided at the time of cleansing here in the wound-care center today, so a small amount of pain, drainage or bleeding from that process might be expected, and is normal.      All products for home use, including multiple products for a single wound if applicable, are medically necessary in order to achieve the best chance at timely wound healing. See provider documentation for details if needed.      Substituted dressings applied in the [address] [place] [address] Road today, if applicable:           New orders for this week (labs,

## 2023-09-14 RX ORDER — SACUBITRIL AND VALSARTAN 49; 51 MG/1; MG/1
1 TABLET, FILM COATED ORAL 2 TIMES DAILY
Qty: 180 TABLET | Refills: 3 | Status: SHIPPED | OUTPATIENT
Start: 2023-09-14

## 2023-09-15 LAB
BACTERIA SPEC AEROBE CULT: ABNORMAL
GRAM STN SPEC: ABNORMAL
ORGANISM: ABNORMAL

## 2023-09-15 RX ORDER — CIPROFLOXACIN 500 MG/1
500 TABLET, FILM COATED ORAL 2 TIMES DAILY
Qty: 28 TABLET | Refills: 0 | Status: SHIPPED | OUTPATIENT
Start: 2023-09-15 | End: 2023-09-29

## 2023-09-15 RX ORDER — CLINDAMYCIN HYDROCHLORIDE 300 MG/1
300 CAPSULE ORAL 3 TIMES DAILY
Qty: 40 CAPSULE | Refills: 0 | Status: SHIPPED | OUTPATIENT
Start: 2023-09-15 | End: 2023-09-29

## 2023-09-18 ENCOUNTER — HOSPITAL ENCOUNTER (OUTPATIENT)
Dept: WOUND CARE | Age: 73
Discharge: HOME | End: 2023-09-18
Payer: MEDICARE

## 2023-09-18 VITALS
WEIGHT: 287.8 LBS | HEART RATE: 76 BPM | RESPIRATION RATE: 18 BRPM | SYSTOLIC BLOOD PRESSURE: 127 MMHG | TEMPERATURE: 98 F | HEIGHT: 73 IN | BODY MASS INDEX: 38.14 KG/M2 | DIASTOLIC BLOOD PRESSURE: 78 MMHG

## 2023-09-18 DIAGNOSIS — I83.019 VENOUS STASIS ULCER OF RIGHT LOWER EXTREMITY (HCC): Primary | ICD-10-CM

## 2023-09-18 DIAGNOSIS — L97.212 NON-PRESSURE CHRONIC ULCER OF RIGHT CALF WITH FAT LAYER EXPOSED (HCC): ICD-10-CM

## 2023-09-18 DIAGNOSIS — I87.2 VENOUS STASIS DERMATITIS OF BOTH LOWER EXTREMITIES: ICD-10-CM

## 2023-09-18 DIAGNOSIS — L97.929 IDIOPATHIC CHRONIC VENOUS HYPERTENSION OF BOTH LOWER EXTREMITIES WITH ULCER (HCC): ICD-10-CM

## 2023-09-18 DIAGNOSIS — L97.919 VENOUS STASIS ULCER OF RIGHT LOWER EXTREMITY (HCC): Primary | ICD-10-CM

## 2023-09-18 DIAGNOSIS — I87.313 IDIOPATHIC CHRONIC VENOUS HYPERTENSION OF BOTH LOWER EXTREMITIES WITH ULCER (HCC): ICD-10-CM

## 2023-09-18 DIAGNOSIS — L97.919 IDIOPATHIC CHRONIC VENOUS HYPERTENSION OF BOTH LOWER EXTREMITIES WITH ULCER (HCC): ICD-10-CM

## 2023-09-18 DIAGNOSIS — L97.222 NON-PRESSURE CHRONIC ULCER OF LEFT CALF WITH FAT LAYER EXPOSED (HCC): ICD-10-CM

## 2023-09-18 PROCEDURE — 29581 APPL MULTLAYER CMPRN SYS LEG: CPT

## 2023-09-18 RX ORDER — BACITRACIN ZINC AND POLYMYXIN B SULFATE 500; 1000 [USP'U]/G; [USP'U]/G
OINTMENT TOPICAL ONCE
OUTPATIENT
Start: 2023-09-18 | End: 2023-09-18

## 2023-09-18 RX ORDER — TRIAMCINOLONE ACETONIDE 1 MG/G
OINTMENT TOPICAL ONCE
OUTPATIENT
Start: 2023-09-18 | End: 2023-09-18

## 2023-09-18 RX ORDER — LIDOCAINE 40 MG/G
CREAM TOPICAL ONCE
OUTPATIENT
Start: 2023-09-18 | End: 2023-09-18

## 2023-09-18 RX ORDER — LIDOCAINE 40 MG/G
CREAM TOPICAL ONCE
Status: DISCONTINUED | OUTPATIENT
Start: 2023-09-18 | End: 2023-09-19 | Stop reason: HOSPADM

## 2023-09-18 RX ORDER — SODIUM CHLOR/HYPOCHLOROUS ACID 0.033 %
SOLUTION, IRRIGATION IRRIGATION ONCE
OUTPATIENT
Start: 2023-09-18 | End: 2023-09-18

## 2023-09-18 RX ORDER — LIDOCAINE 50 MG/G
OINTMENT TOPICAL ONCE
OUTPATIENT
Start: 2023-09-18 | End: 2023-09-18

## 2023-09-18 RX ORDER — LIDOCAINE HYDROCHLORIDE 40 MG/ML
SOLUTION TOPICAL ONCE
OUTPATIENT
Start: 2023-09-18 | End: 2023-09-18

## 2023-09-18 ASSESSMENT — PAIN SCALES - GENERAL: PAINLEVEL_OUTOF10: 8

## 2023-09-18 ASSESSMENT — PAIN DESCRIPTION - DESCRIPTORS: DESCRIPTORS: ACHING;THROBBING;SHARP

## 2023-09-18 ASSESSMENT — PAIN - FUNCTIONAL ASSESSMENT: PAIN_FUNCTIONAL_ASSESSMENT: PREVENTS OR INTERFERES SOME ACTIVE ACTIVITIES AND ADLS

## 2023-09-18 ASSESSMENT — PAIN DESCRIPTION - ORIENTATION: ORIENTATION: LEFT

## 2023-09-18 ASSESSMENT — PAIN DESCRIPTION - PAIN TYPE: TYPE: CHRONIC PAIN

## 2023-09-18 ASSESSMENT — PAIN DESCRIPTION - LOCATION: LOCATION: LEG

## 2023-09-18 ASSESSMENT — PAIN DESCRIPTION - FREQUENCY: FREQUENCY: CONTINUOUS

## 2023-09-18 NOTE — PROGRESS NOTES
411 [facility] Physician Orders and Discharge [place]  [address] [place] [address], [address] [facility], [address]  Telephone: [phone]      Fax: [phone]        Your home care company:   Nicol Mcrae     Your wound-care supplies will be provided by: [facility]     NAME:  Arden Simon   YOB: 1950  PRIMARY DIAGNOSIS FOR WOUND CARE CENTER:  Venous leg ulcer . Wound cleansing:   Do not scrub or use excessive force. Wash hands with soap and water before and after dressing changes. Prior to applying a clean dressing, cleanse wound with normal saline, wound cleanser, or mild soap and water. Ask your physician or nurse before getting the wound(s) wet in the shower. Wound care for home:     Right lower leg, left foot, and Left lower leg wounds:    Wash wounds with soap and water with dressing changes    Aquaphor to dry skin    Xeroform to leg wounds    Betadine, sorbact, silver alginate, optiloc to left foot wound    CoFlex Calamine     Leave dressing on until Thursday       On 1305 [facility] to remove compression wraps    Wash legs with soap and water    Aquaphor to dry skin    Silvadene to open areas on legs    Betadine, sorbact, silver alginate, optiloc to left foot wound    4x4's, ABD, Coban lite toes to knees        Please note, all wounds (unless stated otherwise here) were mechanically debrided at the time of cleansing here in the wound-care center today, so a small amount of pain, drainage or bleeding from that process might be expected, and is normal.      All products for home use, including multiple products for a single wound if applicable, are medically necessary in order to achieve the best chance at timely wound healing. See provider documentation for details if needed.      Substituted dressings applied in the [address] [place] [address] Road today, if applicable:           New orders for this week (labs,

## 2023-09-18 NOTE — PLAN OF CARE
Pt to the [facility] for follow up appointment. Dr Meghan Garcia called patient last week regarding wound culture and changed patient's antibiotics. Pt to continue with Cipro and Clindamycin. Wounds showing improvement. Pt to continue with compression wraps to bilateral lower legs. Home Care to change dressings on Thursday. Pt to follow up in the [facility] in 1 week. Discharge instructions reviewed with patient, all questions answered, copy given to patient. Dressings were applied to all wounds per M.D. Instructions at this visit.

## 2023-09-19 NOTE — DISCHARGE INSTRUCTIONS
411 [facility] Physician Orders and Discharge [facility]  [address] [place] [address], 6 [facility], [address]  Telephone: [phone]      Fax: [phone]        Your home care company:   Nicol Mcrae     Your wound-care supplies will be provided by: [facility]     NAME:  Patric Leo   YOB: 1950  PRIMARY DIAGNOSIS FOR WOUND CARE CENTER:  Venous leg ulcer . Wound cleansing:   Do not scrub or use excessive force. Wash hands with soap and water before and after dressing changes. Prior to applying a clean dressing, cleanse wound with normal saline, wound cleanser, or mild soap and water. Ask your physician or nurse before getting the wound(s) wet in the shower. Wound care for home:     Right lower leg, left foot, and Left lower leg wounds:    Wash wounds with soap and water with dressing changes    Aquaphor to dry skin    Xeroform to leg wounds    Betadine, sorbact, silver alginate, optiloc to left foot wound    Try not to make very bulky on foot     CoFlex Calamine     Leave dressing on until Thursday       On [address] [facility] [place] to remove compression wraps    Wash legs with soap and water    Aquaphor to dry skin    Silvadene to open areas on legs    Betadine, sorbact, silver alginate, optiloc to left foot wound      Try not to make very bulky on foot     4x4's, ABD, Coban lite toes to knees        Please note, all wounds (unless stated otherwise here) were mechanically debrided at the time of cleansing here in the wound-care center today, so a small amount of pain, drainage or bleeding from that process might be expected, and is normal.      All products for home use, including multiple products for a single wound if applicable, are medically necessary in order to achieve the best chance at timely wound healing. See provider documentation for details if needed.      Substituted dressings

## 2023-09-22 ENCOUNTER — TELEPHONE (OUTPATIENT)
Dept: FAMILY MEDICINE CLINIC | Age: 73
End: 2023-09-22

## 2023-09-22 NOTE — TELEPHONE ENCOUNTER
Patient is scheduled for AWV 9-25-23 and would like to know if he would be able to do this over the phone rather than coming in as he is concerned about walking too far. Please advise if ok to change.

## 2023-09-22 NOTE — TELEPHONE ENCOUNTER
I need to see him in the office for assessment and blood sugar evaluation. There are facility wheelchairs that can be used.

## 2023-09-25 ENCOUNTER — HOSPITAL ENCOUNTER (OUTPATIENT)
Dept: WOUND CARE | Age: 73
Discharge: HOME | End: 2023-09-25
Payer: MEDICARE

## 2023-09-25 VITALS
DIASTOLIC BLOOD PRESSURE: 73 MMHG | RESPIRATION RATE: 20 BRPM | BODY MASS INDEX: 37.74 KG/M2 | WEIGHT: 284.8 LBS | SYSTOLIC BLOOD PRESSURE: 111 MMHG | TEMPERATURE: 97.4 F | HEIGHT: 73 IN | HEART RATE: 89 BPM

## 2023-09-25 DIAGNOSIS — L97.919 VENOUS STASIS ULCER OF RIGHT LOWER EXTREMITY (HCC): Primary | ICD-10-CM

## 2023-09-25 DIAGNOSIS — L97.919 IDIOPATHIC CHRONIC VENOUS HYPERTENSION OF BOTH LOWER EXTREMITIES WITH ULCER (HCC): ICD-10-CM

## 2023-09-25 DIAGNOSIS — L97.212 NON-PRESSURE CHRONIC ULCER OF RIGHT CALF WITH FAT LAYER EXPOSED (HCC): ICD-10-CM

## 2023-09-25 DIAGNOSIS — I83.019 VENOUS STASIS ULCER OF RIGHT LOWER EXTREMITY (HCC): Primary | ICD-10-CM

## 2023-09-25 DIAGNOSIS — L97.222 NON-PRESSURE CHRONIC ULCER OF LEFT CALF WITH FAT LAYER EXPOSED (HCC): ICD-10-CM

## 2023-09-25 DIAGNOSIS — I87.313 IDIOPATHIC CHRONIC VENOUS HYPERTENSION OF BOTH LOWER EXTREMITIES WITH ULCER (HCC): ICD-10-CM

## 2023-09-25 DIAGNOSIS — L97.929 IDIOPATHIC CHRONIC VENOUS HYPERTENSION OF BOTH LOWER EXTREMITIES WITH ULCER (HCC): ICD-10-CM

## 2023-09-25 DIAGNOSIS — I87.2 VENOUS STASIS DERMATITIS OF BOTH LOWER EXTREMITIES: ICD-10-CM

## 2023-09-25 PROCEDURE — 29581 APPL MULTLAYER CMPRN SYS LEG: CPT

## 2023-09-25 RX ORDER — TRIAMCINOLONE ACETONIDE 1 MG/G
OINTMENT TOPICAL ONCE
Status: DISCONTINUED | OUTPATIENT
Start: 2023-09-25 | End: 2023-09-26 | Stop reason: HOSPADM

## 2023-09-25 RX ORDER — LIDOCAINE 40 MG/G
CREAM TOPICAL ONCE
OUTPATIENT
Start: 2023-09-25 | End: 2023-09-25

## 2023-09-25 RX ORDER — BACITRACIN ZINC AND POLYMYXIN B SULFATE 500; 1000 [USP'U]/G; [USP'U]/G
OINTMENT TOPICAL ONCE
OUTPATIENT
Start: 2023-09-25 | End: 2023-09-25

## 2023-09-25 RX ORDER — LIDOCAINE 50 MG/G
OINTMENT TOPICAL ONCE
OUTPATIENT
Start: 2023-09-25 | End: 2023-09-25

## 2023-09-25 RX ORDER — LIDOCAINE HYDROCHLORIDE 40 MG/ML
SOLUTION TOPICAL ONCE
OUTPATIENT
Start: 2023-09-25 | End: 2023-09-25

## 2023-09-25 RX ORDER — SODIUM CHLOR/HYPOCHLOROUS ACID 0.033 %
SOLUTION, IRRIGATION IRRIGATION ONCE
OUTPATIENT
Start: 2023-09-25 | End: 2023-09-25

## 2023-09-25 RX ORDER — LIDOCAINE 40 MG/G
CREAM TOPICAL ONCE
Status: DISCONTINUED | OUTPATIENT
Start: 2023-09-25 | End: 2023-09-26 | Stop reason: HOSPADM

## 2023-09-25 RX ORDER — TRIAMCINOLONE ACETONIDE 1 MG/G
OINTMENT TOPICAL ONCE
OUTPATIENT
Start: 2023-09-25 | End: 2023-09-25

## 2023-09-25 ASSESSMENT — PAIN SCALES - GENERAL: PAINLEVEL_OUTOF10: 5

## 2023-09-25 ASSESSMENT — PAIN DESCRIPTION - DESCRIPTORS: DESCRIPTORS: THROBBING

## 2023-09-25 ASSESSMENT — PAIN - FUNCTIONAL ASSESSMENT: PAIN_FUNCTIONAL_ASSESSMENT: PREVENTS OR INTERFERES SOME ACTIVE ACTIVITIES AND ADLS

## 2023-09-25 ASSESSMENT — PAIN DESCRIPTION - ORIENTATION: ORIENTATION: LEFT

## 2023-09-25 ASSESSMENT — PAIN DESCRIPTION - LOCATION: LOCATION: ANKLE;FOOT

## 2023-09-25 ASSESSMENT — PAIN DESCRIPTION - PAIN TYPE: TYPE: CHRONIC PAIN

## 2023-09-25 ASSESSMENT — PAIN DESCRIPTION - ONSET: ONSET: ON-GOING

## 2023-09-25 ASSESSMENT — PAIN DESCRIPTION - FREQUENCY: FREQUENCY: CONTINUOUS

## 2023-09-25 NOTE — PROGRESS NOTES
425 [facility] Progress Note      Nettie Jones     : 1950    DATE OF VISIT:  2023    Subjective:     Nettie Jones is a 74 y.o. male who has a chief complaint of a venous ulcer located on the  bilateral leg. Doing well with leg elevation. No issues with compression wrap. Left heel still hurting but is improving. Gets burning pain mostly. No issues with antibiotics. Mr. Esteban Burnham has a past medical history of Allergic rhinitis, Arthritis, Bladder fistula, C. difficile diarrhea, Cellulitis of right lower extremity, CHF (congestive heart failure) ([address]), Dental disease, Diabetes ([address]), Diverticulitis, Dizziness, DVT of lower extremity, bilateral ([address]), GERD (gastroesophageal reflux disease), Headache, Hearing loss, Hematuria, Hx of blood clots, Hyperlipidemia, Hypertension, Lung disease, MONIQUE (obstructive sleep apnea), Pancreatitis ([address]), Pulmonary emboli ([address]), Rash, Sleep apnea, and Tinnitus. He has a past surgical history that includes Tibia fracture surgery (Right, ); Cholecystectomy, open (N/A, 13); Cystocopy (12/10/13); Cystoscopy (14); other surgical history (14); Colonoscopy (); Colonoscopy (2013); Colonoscopy (14); colostomy (2014); Colon surgery; Abdomen surgery (2014); hernia repair (2015); pr injection hip arthrography w/anesthesia (Right, 2018); epidural steroid injection (Right, 2019); epidural steroid injection (Right, 2019); Cardioversion (2019); vascular surgery (Left, 2021); vascular surgery (Right, 05/10/2021); and IR MIDLINE CATH (2023). His family history includes Heart Attack in his father; Heart Disease in his brother and father; High Blood Pressure in his brother; Kidney Disease in his mother; Stroke in his brother. Mr. Esteban Burnham reports that he has never smoked.  He has never used smokeless tobacco. He reports that he does not currently use

## 2023-09-25 NOTE — PLAN OF CARE
Pt to the [facility] for follow up appointment. Wounds improving. Edema decreased and lower legs less irritated. Pt complained of wraps on feet bulky. Will try to have less dressing placed on feet. Home care to change dressing on Thursday. Pt to follow up in the [facility] in 1 week. Discharge instructions reviewed with patient, all questions answered, copy given to patient. Dressings were applied to all wounds per M.D. Instructions at this visit.

## 2023-09-25 NOTE — PROGRESS NOTES
411 [facility] Physician Orders and Discharge [address], [address] [place] [address], [address]  Telephone: [phone]      Fax: [phone]        Your home care company:   Nicol Mcrae     Your wound-care supplies will be provided by: [facility]     NAME:  Suzy Turcios   YOB: 1950  PRIMARY DIAGNOSIS FOR WOUND CARE CENTER:  Venous leg ulcer . Wound cleansing:   Do not scrub or use excessive force. Wash hands with soap and water before and after dressing changes. Prior to applying a clean dressing, cleanse wound with normal saline, wound cleanser, or mild soap and water. Ask your physician or nurse before getting the wound(s) wet in the shower. Wound care for home:     Right lower leg, left foot, and Left lower leg wounds:    Wash wounds with soap and water with dressing changes    Aquaphor to dry skin    Xeroform to leg wounds    Betadine, sorbact, silver alginate, optiloc to left foot wound    Try not to make very bulky on foot     CoFlex Calamine     Leave dressing on until Thursday       On [address] [facility] [place] to remove compression wraps    Wash legs with soap and water    Aquaphor to dry skin    Silvadene to open areas on legs    Betadine, sorbact, silver alginate, optiloc to left foot wound      Try not to make very bulky on foot     4x4's, ABD, Coban lite toes to knees        Please note, all wounds (unless stated otherwise here) were mechanically debrided at the time of cleansing here in the wound-care center today, so a small amount of pain, drainage or bleeding from that process might be expected, and is normal.      All products for home use, including multiple products for a single wound if applicable, are medically necessary in order to achieve the best chance at timely wound healing. See provider documentation for details if needed.      Substituted dressings

## 2023-09-26 NOTE — DISCHARGE INSTRUCTIONS
411 [facility] Physician Orders and Discharge [facility]  [address] [place] [address], [address] [place] [address], [address]  Telephone: [phone]      Fax: [phone]        Your home care company:   Nicol Mcrae     Your wound-care supplies will be provided by: [facility]     NAME:  Bridget Hurtado   YOB: 1950  PRIMARY DIAGNOSIS FOR WOUND CARE CENTER:  Venous leg ulcer . Wound cleansing:   Do not scrub or use excessive force. Wash hands with soap and water before and after dressing changes. Prior to applying a clean dressing, cleanse wound with normal saline, wound cleanser, or mild soap and water. Ask your physician or nurse before getting the wound(s) wet in the shower. Wound care for home:     Right lower leg, left foot, and Left lower leg wounds:    Wash wounds with soap and water with dressing changes    Aquaphor to dry skin    Xeroform to leg wounds    Betadine, sorbact, silver alginate, optiloc to left foot wound    Try not to make very bulky on foot     CoFlex Calamine     Leave dressing on until Thursday       On [address] [facility] to remove compression wraps    Wash legs with soap and water    Aquaphor to dry skin    Silvadene to open areas on legs    Betadine, sorbact, silver alginate, optiloc to left foot wound      Try not to make very bulky on foot     4x4's, ABD, Coban lite toes to knees        Please note, all wounds (unless stated otherwise here) were mechanically debrided at the time of cleansing here in the wound-care center today, so a small amount of pain, drainage or bleeding from that process might be expected, and is normal.      All products for home use, including multiple products for a single wound if applicable, are medically necessary in order to achieve the best chance at timely wound healing. See provider documentation for details if needed.      Substituted dressings

## 2023-09-29 ENCOUNTER — CARE COORDINATION (OUTPATIENT)
Dept: CARE COORDINATION | Age: 73
End: 2023-09-29

## 2023-10-02 ENCOUNTER — TELEPHONE (OUTPATIENT)
Dept: WOUND CARE | Age: 73
End: 2023-10-02

## 2023-10-02 ENCOUNTER — HOSPITAL ENCOUNTER (EMERGENCY)
Age: 73
Discharge: HOME | End: 2023-10-02
Attending: STUDENT IN AN ORGANIZED HEALTH CARE EDUCATION/TRAINING PROGRAM
Payer: MEDICARE

## 2023-10-02 ENCOUNTER — HOSPITAL ENCOUNTER (OUTPATIENT)
Dept: WOUND CARE | Age: 73
Discharge: HOME | End: 2023-10-02

## 2023-10-02 VITALS
HEIGHT: 73 IN | WEIGHT: 284 LBS | OXYGEN SATURATION: 93 % | RESPIRATION RATE: 16 BRPM | BODY MASS INDEX: 37.64 KG/M2 | DIASTOLIC BLOOD PRESSURE: 65 MMHG | SYSTOLIC BLOOD PRESSURE: 112 MMHG | TEMPERATURE: 97.8 F | HEART RATE: 78 BPM
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
## 2023-10-02 DIAGNOSIS — T14.8XXA CHRONIC WOUND: ICD-10-CM

## 2023-10-02 PROCEDURE — 99283 EMERGENCY DEPT VISIT LOW MDM: CPT

## 2023-10-02 ASSESSMENT — PAIN - FUNCTIONAL ASSESSMENT: PAIN_FUNCTIONAL_ASSESSMENT: 0-10

## 2023-10-02 ASSESSMENT — PAIN DESCRIPTION - LOCATION: LOCATION: BACK

## 2023-10-02 ASSESSMENT — PAIN SCALES - GENERAL: PAINLEVEL_OUTOF10: 4

## 2023-10-02 NOTE — ED PROVIDER NOTES
I independently performed a history and physical on Bryn Jaramillo. All diagnostic, treatment, and disposition decisions were made by myself in conjunction with the advanced practice provider/resident. Labs Reviewed - No data to display  No orders to display       For further details of Bryn Jaramillo emergency department encounter, please see the LAMINE/resident's documentation. I personally saw the patient and performed a substantive portion of the visit including all aspects of the medical decision making. Briefly, this is a 57-year-old male, with history of chronic lower extremity wounds, presenting with concerns for fall that occurred at home. Patient states that he was getting ready to go to a wound specialist, Dr. Yovana Reed at [facility] this morning. He states that he has a weekly standing appointment [phone] for wound care. He was previously on antibiotics however has finished these and has not been restarted on any additional antibiotics. He states that his legs do not give out on him that he fell. He denies head trauma or loss of consciousness or any pain or injuries. He states that he does fall occasionally  New or concerning for him. He has no current complaints, was offered a blood work which she declined which I feel is reasonable as he is at his baseline and feels well. His neurologic exam is reassuring. He does have a wound on the lateral aspect of his left foot which appears to be chronic venous stasis changes without evidence of superimposed infection. His vital signs are reassuring here in the ED. Wound dressing was applied. He states that his home health care nurse is out of town this week and he does not have supplies to change the dressings at home and will be sent with the dressings for home use for the next week as well. Advise follow-up with wound care for further evaluation. Return precautions to the ED for any new or worsening symptoms.   Patient is comfortable

## 2023-10-02 NOTE — ED NOTES
Patient BLE dressings changed. New dressings applied. Non-adherent gauze placed and wrapped with curlex and ace bandages.       Tam Hwang RN  10/02/23 6114

## 2023-10-02 NOTE — TELEPHONE ENCOUNTER
Spoke with patient regarding dressings. Home Care to change dressing this week. Pt to follow up in the [facility] in 1 week.

## 2023-10-02 NOTE — H&P
[address] [place] [address]  ED  EMERGENCY DEPARTMENT ENCOUNTER        Patient Name: Nettie Jones  MRN: [id number]  [address] [facility] 1950  Date of evaluation: 10/2/2023  PCP: SHAKIRA Villavicencio CNP  Note Started: 8:21 AM EDT 10/2/23      CHIEF COMPLAINT  Unwitnessed fall (Patient fell at home when getting out of bed. No LOC. No trauma)         HISTORY & PHYSICAL     HISTORY OF PRESENT ILLNESS  History from : Patient  Limitations to history : None    Nettie Jones is a 74 y.o. male BIBA from home due to an unwitnessed fall earlier this morning. Pt is alert and oriented upon initial contact at the ED. Chava Lua says he was trying to get around in the house and as soon as he ambulated from the bed his feet gave up on him. He denies LOC, lightheadedness, or trauma to the head, states he just couldn't help himself up so he decided to call EMS. Was planning to go to Dr. Lacey Breaux for wound care today whom he has been seeing for venous ulcer and wound ostomy for the past year. Pt requested to be transferred to [facility] but was brought in to [facility] upon pt family request. Chava Lua denies pain, neurological complaints, HA, dizziness, lightheadedness, or other associated symptoms. Reemphasized that he would want to be seen by Dr. Lacey Breaux today for his b/l lower extremities wound ostomy. REVIEW OF SYSTEMS    GENERAL APPEARANCE: Not in acute distress. HENT: Denies headache, congestion, tinnitus, or pain to the neck. EYES: Denies vision change or discharge. HEART/CHEST: Denies chest pain or palpitation. LUNGS: Denies shortness of breath or increased work of breathing. ABDOMEN: Denies bowel movement change. Denies diarrhea or constipation. MUSCULOSKELETAL: Denies change in muscle strength. Denies numbness or tingling to the lower extremities. Admits to chronic numbness to b/l upper extremities. NEUROLOGICAL: Denies neurological deficits.     PHYSICAL EXAM  ED Triage Vitals [10/02/23 0731]   BP Temp

## 2023-10-03 NOTE — DISCHARGE INSTRUCTIONS
[address] [facility] Physician Orders and Discharge [facility]  [address] [place] [address], [address] [place] [address], [address]  Telephone: [phone]      Fax: [phone]        Your home care company:   Nicol Mcrae     Your wound-care supplies will be provided by: [facility]     NAME:  Patric Leo   YOB: 1950  PRIMARY DIAGNOSIS FOR WOUND CARE CENTER:  Venous leg ulcer . Wound cleansing:   Do not scrub or use excessive force. Wash hands with soap and water before and after dressing changes. Prior to applying a clean dressing, cleanse wound with normal saline, wound cleanser, or mild soap and water. Ask your physician or nurse before getting the wound(s) wet in the shower. Wound care for home:     Right lower leg, left foot, and Left lower leg wounds:    Wash wounds with soap and water with dressing changes    Aquaphor to dry skin    Xeroform to leg wounds    Betadine, sorbact, silver alginate, optiloc to left foot wound    Try not to make very bulky on foot     CoFlex Calamine     Leave dressing on until Thursday       On [address] to remove compression wraps    Wash legs with soap and water    Aquaphor to dry skin    Silvadene to open areas on legs    Betadine, sorbact, silver alginate, optiloc to left foot wound      Try not to make very bulky on foot     4x4's, ABD, Coban lite toes to knees        Please note, all wounds (unless stated otherwise here) were mechanically debrided at the time of cleansing here in the wound-care center today, so a small amount of pain, drainage or bleeding from that process might be expected, and is normal.      All products for home use, including multiple products for a single wound if applicable, are medically necessary in order to achieve the best chance at timely wound healing. See provider documentation for details if needed.      Substituted dressings

## 2023-10-06 ENCOUNTER — CARE COORDINATION (OUTPATIENT)
Dept: CARE COORDINATION | Age: 73
End: 2023-10-06

## 2023-10-06 NOTE — TELEPHONE ENCOUNTER
Please schedule for follow up office visit. If imaging is needed he can have it done after the office visit in this building.

## 2023-10-06 NOTE — TELEPHONE ENCOUNTER
Called patient to get him set up with an appointment. Patient states that he does not have a way of getting here, and that it is also hard for him to get around. Can hardly walk. I informed him that we can bring a wheel chair down to him when he got here and to bring him up. Patient had declined making an appointment at this time.

## 2023-10-06 NOTE — CARE COORDINATION
Ambulatory Care Coordination Note  10/6/2023    Patient Current Location:  Home: [address] [place] [address] Road  [address] [address]     ACM contacted the patient by telephone. Verified name and  with patient as identifiers. Provided introduction to self, and explanation of the ACM role. Challenges to be reviewed by the provider   Additional needs identified to be addressed with provider: No  none               Method of communication with provider: chart routing. ACM: Teetee Biggs RN    ACM completed follow up call with patient who said he is managing after his two falls. Patient said his back has bruising and he has a lot of pain but it is improving. ACM recommended rotating heat and cold to his back. Patient is interested to have imaging done on his back if pain does not improve. ACM will route to PCP. Plan:    Route to PCP  F/U call 2 weeks    Offered patient enrollment in the Remote Patient Monitoring (RPM) program for in-home monitoring: Patient declined. Lab Results       None                 Goals Addressed                   This Visit's Progress     Conditions and Symptoms   Improving     I will schedule office visits, as directed by my provider. I will keep my appointment or reschedule if I have to cancel. I will notify my provider of any barriers to my plan of care. I will follow my Zone Management tool to seek urgent or emergent care. I will notify my provider of any symptoms that indicate a worsening of my condition.     Barriers: overwhelmed by complexity of regimen  Plan for overcoming my barriers: ACM will provide supportive/ engaging phone calls to help manage patient care  Confidence: 8/10  Anticipated Goal Completion Date: 23         Self Monitoring   Improving     Self-Monitored Blood Glucose - I will check my blood sugar Fasting blood sugar  Daily Weights - I will weight myself as directed - Daily and write down weights  Blood Pressure - I will take my blood pressure as directed -

## 2023-10-09 ENCOUNTER — HOSPITAL ENCOUNTER (OUTPATIENT)
Dept: WOUND CARE | Age: 73
Discharge: HOME | End: 2023-10-09
Payer: MEDICARE

## 2023-10-09 VITALS
BODY MASS INDEX: 38.49 KG/M2 | SYSTOLIC BLOOD PRESSURE: 133 MMHG | WEIGHT: 290.4 LBS | HEART RATE: 76 BPM | TEMPERATURE: 98.2 F | DIASTOLIC BLOOD PRESSURE: 71 MMHG | HEIGHT: 73 IN | RESPIRATION RATE: 20 BRPM

## 2023-10-09 DIAGNOSIS — L97.222 NON-PRESSURE CHRONIC ULCER OF LEFT CALF WITH FAT LAYER EXPOSED (HCC): ICD-10-CM

## 2023-10-09 DIAGNOSIS — L97.919 IDIOPATHIC CHRONIC VENOUS HYPERTENSION OF BOTH LOWER EXTREMITIES WITH ULCER (HCC): ICD-10-CM

## 2023-10-09 DIAGNOSIS — I87.2 VENOUS STASIS DERMATITIS OF BOTH LOWER EXTREMITIES: ICD-10-CM

## 2023-10-09 DIAGNOSIS — I83.019 VENOUS STASIS ULCER OF RIGHT LOWER EXTREMITY (HCC): Primary | ICD-10-CM

## 2023-10-09 DIAGNOSIS — L97.919 VENOUS STASIS ULCER OF RIGHT LOWER EXTREMITY (HCC): Primary | ICD-10-CM

## 2023-10-09 DIAGNOSIS — L97.929 IDIOPATHIC CHRONIC VENOUS HYPERTENSION OF BOTH LOWER EXTREMITIES WITH ULCER (HCC): ICD-10-CM

## 2023-10-09 DIAGNOSIS — L97.212 NON-PRESSURE CHRONIC ULCER OF RIGHT CALF WITH FAT LAYER EXPOSED (HCC): ICD-10-CM

## 2023-10-09 DIAGNOSIS — I87.313 IDIOPATHIC CHRONIC VENOUS HYPERTENSION OF BOTH LOWER EXTREMITIES WITH ULCER (HCC): ICD-10-CM

## 2023-10-09 PROCEDURE — 29581 APPL MULTLAYER CMPRN SYS LEG: CPT

## 2023-10-09 RX ORDER — LIDOCAINE HYDROCHLORIDE 40 MG/ML
SOLUTION TOPICAL ONCE
OUTPATIENT
Start: 2023-10-09 | End: 2023-10-09

## 2023-10-09 RX ORDER — LIDOCAINE 50 MG/G
OINTMENT TOPICAL ONCE
OUTPATIENT
Start: 2023-10-09 | End: 2023-10-09

## 2023-10-09 RX ORDER — SODIUM CHLOR/HYPOCHLOROUS ACID 0.033 %
SOLUTION, IRRIGATION IRRIGATION ONCE
OUTPATIENT
Start: 2023-10-09 | End: 2023-10-09

## 2023-10-09 RX ORDER — TRIAMCINOLONE ACETONIDE 1 MG/G
OINTMENT TOPICAL ONCE
Status: DISCONTINUED | OUTPATIENT
Start: 2023-10-09 | End: 2023-10-10 | Stop reason: HOSPADM

## 2023-10-09 RX ORDER — LIDOCAINE 40 MG/G
CREAM TOPICAL ONCE
OUTPATIENT
Start: 2023-10-09 | End: 2023-10-09

## 2023-10-09 RX ORDER — LIDOCAINE 40 MG/G
CREAM TOPICAL ONCE
Status: DISCONTINUED | OUTPATIENT
Start: 2023-10-09 | End: 2023-10-10 | Stop reason: HOSPADM

## 2023-10-09 RX ORDER — BACITRACIN ZINC AND POLYMYXIN B SULFATE 500; 1000 [USP'U]/G; [USP'U]/G
OINTMENT TOPICAL ONCE
OUTPATIENT
Start: 2023-10-09 | End: 2023-10-09

## 2023-10-09 RX ORDER — TRIAMCINOLONE ACETONIDE 1 MG/G
OINTMENT TOPICAL ONCE
OUTPATIENT
Start: 2023-10-09 | End: 2023-10-09

## 2023-10-09 ASSESSMENT — PAIN SCALES - GENERAL: PAINLEVEL_OUTOF10: 0

## 2023-10-09 NOTE — PROGRESS NOTES
[address] [facility] Progress Note      Bridget Hurtado     : 1950    DATE OF VISIT:  10/9/2023    Subjective:     Bridget Hurtado is a 74 y.o. male who has a chief complaint of a venous ulcer located on the  bilateral leg. No issues with compression wrap. Left heel feeling much better. He has fallen a couple times since his last visit. Was seen in the ER after one of the falls. Has not been doing much leg elevation the past week. Mr. Eulalia De Paz has a past medical history of Allergic rhinitis, Arthritis, Bladder fistula, C. difficile diarrhea, Cellulitis of right lower extremity, CHF (congestive heart failure) ([address]), Dental disease, Diabetes ([address]), Diverticulitis, Dizziness, DVT of lower extremity, bilateral ([address]), GERD (gastroesophageal reflux disease), Headache, Hearing loss, Hematuria, Hx of blood clots, Hyperlipidemia, Hypertension, Lung disease, MONIQUE (obstructive sleep apnea), Pancreatitis ([address]), Pulmonary emboli ([address]), Rash, Sleep apnea, and Tinnitus. He has a past surgical history that includes Tibia fracture surgery (Right, ); Cholecystectomy, open (N/A, 13); Cystocopy (12/10/13); Cystoscopy (14); other surgical history (14); Colonoscopy (); Colonoscopy (2013); Colonoscopy (14); colostomy (2014); Colon surgery; Abdomen surgery (2014); hernia repair (2015); pr injection hip arthrography w/anesthesia (Right, 2018); epidural steroid injection (Right, 2019); epidural steroid injection (Right, 2019); Cardioversion (2019); vascular surgery (Left, 2021); vascular surgery (Right, 05/10/2021); and IR MIDLINE CATH (2023). His family history includes Heart Attack in his father; Heart Disease in his brother and father; High Blood Pressure in his brother; Kidney Disease in his mother; Stroke in his brother. Mr. Eulalia De Paz reports that he has never smoked.  He has never used smokeless

## 2023-10-09 NOTE — PROGRESS NOTES
411 [facility] Physician Orders and Discharge [facility]  [address] [place] [address], [address] [place] [address], [address]  Telephone: [phone]      Fax: [phone]        Your home care company:   Nicol Mcrae     Your wound-care supplies will be provided by: [facility]     NAME:  Suzy Turcios   YOB: 1950  PRIMARY DIAGNOSIS FOR WOUND CARE CENTER:  Venous leg ulcer . Wound cleansing:   Do not scrub or use excessive force. Wash hands with soap and water before and after dressing changes. Prior to applying a clean dressing, cleanse wound with normal saline, wound cleanser, or mild soap and water. Ask your physician or nurse before getting the wound(s) wet in the shower. Wound care for home:     Right lower leg, left foot, and Left lower leg wounds:    Wash wounds with soap and water with dressing changes    Aquaphor to dry skin    Xeroform to leg wounds    Betadine, sorbact, silver alginate, optiloc to left foot wound    Try not to make very bulky on foot     CoFlex Calamine     Leave dressing on until Thursday       On [address] to remove compression wraps    Wash legs with soap and water    Aquaphor to dry skin    Silvadene to open areas on legs    Betadine, sorbact, silver alginate, optiloc to left foot wound      Try not to make very bulky on foot     4x4's, ABD, Coban lite toes to knees        Please note, all wounds (unless stated otherwise here) were mechanically debrided at the time of cleansing here in the wound-care center today, so a small amount of pain, drainage or bleeding from that process might be expected, and is normal.      All products for home use, including multiple products for a single wound if applicable, are medically necessary in order to achieve the best chance at timely wound healing. See provider documentation for details if needed.      Substituted dressings

## 2023-10-09 NOTE — PLAN OF CARE
Pt to the [facility] for follow up appointment. Pt fell last week and was not to the [facility] for appointment due to fall. Edema increased in bilateral lower legs. Pt stated that he hasn't been elevating his legs. Reinforced importance of elevating legs. Pt to have weekly compression wraps placed today in the [facility]. Pt to follow up in the [facility] in 1 week. Home care to change dressings on Thursday. Discharge instructions reviewed with patient, all questions answered, copy given to patient. Dressings were applied to all wounds per M.D. Instructions at this visit.

## 2023-10-10 NOTE — DISCHARGE INSTRUCTIONS
411 [facility] Physician Orders and Discharge [facility]  [address] [place] [address], [address] [place], [place] [address], [address]  Telephone: [phone]      Fax: [phone]        Your home care company:   Nicol Mcrae     Your wound-care supplies will be provided by: [facility]     NAME:  Hai Whitten   YOB: 1950  PRIMARY DIAGNOSIS FOR WOUND CARE CENTER:  Venous leg ulcer . Wound cleansing:   Do not scrub or use excessive force. Wash hands with soap and water before and after dressing changes. Prior to applying a clean dressing, cleanse wound with normal saline, wound cleanser, or mild soap and water. Ask your physician or nurse before getting the wound(s) wet in the shower.                 Wound care for home:     Right lower leg, left foot, and Left lower leg wounds:    Wash wounds with soap and water with dressing changes    Triamcinolone to irritated areas in the [address] today    Aquaphor to dry skin    Foam to left lateral foot    Xeroform to leg wounds    Betadine, sorbact, silver alginate, optiloc to left foot wound    Try not to make very bulky on foot     CoFlex Calamine     Leave dressing on until Thursday       On Thursday's due to drainage    Home Care to remove compression wraps    Wash legs with soap and water    Aquaphor to dry skin    Silvadene to open areas on legs    Betadine, sorbact, silver alginate, optiloc to left foot wound      Try not to make very bulky on foot     4x4's, ABD, Coban lite toes to knees        Please note, all wounds (unless stated otherwise here) were mechanically debrided at the time of cleansing here in the wound-care center today, so a small amount of pain, drainage or bleeding from that process might be expected, and is normal.      All products for home use, including multiple products for a single wound if applicable, are medically necessary in order to achieve the best chance at timely

## 2023-10-16 ENCOUNTER — HOSPITAL ENCOUNTER (OUTPATIENT)
Dept: WOUND CARE | Age: 73
Discharge: HOME | End: 2023-10-16
Attending: INTERNAL MEDICINE
Payer: MEDICARE

## 2023-10-16 VITALS
BODY MASS INDEX: 37.85 KG/M2 | TEMPERATURE: 97.4 F | RESPIRATION RATE: 20 BRPM | HEIGHT: 73 IN | WEIGHT: 285.6 LBS | SYSTOLIC BLOOD PRESSURE: 136 MMHG | HEART RATE: 69 BPM | DIASTOLIC BLOOD PRESSURE: 77 MMHG

## 2023-10-16 DIAGNOSIS — L97.212 NON-PRESSURE CHRONIC ULCER OF RIGHT CALF WITH FAT LAYER EXPOSED (HCC): ICD-10-CM

## 2023-10-16 DIAGNOSIS — L97.919 VENOUS STASIS ULCER OF RIGHT LOWER EXTREMITY (HCC): Primary | ICD-10-CM

## 2023-10-16 DIAGNOSIS — I87.2 VENOUS STASIS DERMATITIS OF BOTH LOWER EXTREMITIES: ICD-10-CM

## 2023-10-16 DIAGNOSIS — I87.313 IDIOPATHIC CHRONIC VENOUS HYPERTENSION OF BOTH LOWER EXTREMITIES WITH ULCER (HCC): ICD-10-CM

## 2023-10-16 DIAGNOSIS — L97.222 NON-PRESSURE CHRONIC ULCER OF LEFT CALF WITH FAT LAYER EXPOSED (HCC): ICD-10-CM

## 2023-10-16 DIAGNOSIS — I83.019 VENOUS STASIS ULCER OF RIGHT LOWER EXTREMITY (HCC): Primary | ICD-10-CM

## 2023-10-16 DIAGNOSIS — L97.929 IDIOPATHIC CHRONIC VENOUS HYPERTENSION OF BOTH LOWER EXTREMITIES WITH ULCER (HCC): ICD-10-CM

## 2023-10-16 DIAGNOSIS — L97.919 IDIOPATHIC CHRONIC VENOUS HYPERTENSION OF BOTH LOWER EXTREMITIES WITH ULCER (HCC): ICD-10-CM

## 2023-10-16 PROCEDURE — 29581 APPL MULTLAYER CMPRN SYS LEG: CPT

## 2023-10-16 RX ORDER — TRIAMCINOLONE ACETONIDE 1 MG/G
OINTMENT TOPICAL ONCE
OUTPATIENT
Start: 2023-10-16 | End: 2023-10-16

## 2023-10-16 RX ORDER — SODIUM CHLOR/HYPOCHLOROUS ACID 0.033 %
SOLUTION, IRRIGATION IRRIGATION ONCE
OUTPATIENT
Start: 2023-10-16 | End: 2023-10-16

## 2023-10-16 RX ORDER — BACITRACIN ZINC AND POLYMYXIN B SULFATE 500; 1000 [USP'U]/G; [USP'U]/G
OINTMENT TOPICAL ONCE
OUTPATIENT
Start: 2023-10-16 | End: 2023-10-16

## 2023-10-16 RX ORDER — LIDOCAINE 40 MG/G
CREAM TOPICAL ONCE
Status: DISCONTINUED | OUTPATIENT
Start: 2023-10-16 | End: 2023-10-17 | Stop reason: HOSPADM

## 2023-10-16 RX ORDER — LIDOCAINE 40 MG/G
CREAM TOPICAL ONCE
OUTPATIENT
Start: 2023-10-16 | End: 2023-10-16

## 2023-10-16 RX ORDER — LIDOCAINE 50 MG/G
OINTMENT TOPICAL ONCE
OUTPATIENT
Start: 2023-10-16 | End: 2023-10-16

## 2023-10-16 RX ORDER — LIDOCAINE HYDROCHLORIDE 40 MG/ML
SOLUTION TOPICAL ONCE
OUTPATIENT
Start: 2023-10-16 | End: 2023-10-16

## 2023-10-16 NOTE — PROGRESS NOTES
warmth, increased pain, increased drainage, odor, or fever) or have questions about your wound care, please contact the [address] [place] [address] at [phone] Monday-Thursday from 8:00 am - 4:30 pm, or Friday from 8:00 am - 2:30 pm.  If you need help with your wound outside these hours and cannot wait until we are again available, contact your home-care company (if applicable), your PCP, or go to the nearest emergency room

## 2023-10-16 NOTE — PLAN OF CARE
Pt to the [address] [place] [address] for follow up appointment. Wounds were not debrided today. Pt to continue with weekly compression wraps. Pt to follow up in the [address] [place] [address] in 1 week. Home care to continue to change dressings on Thursday's. Discharge instructions reviewed with patient, all questions answered, copy given to patient. Dressings were applied to all wounds per M.D. Instructions at this visit.

## 2023-10-17 NOTE — DISCHARGE INSTRUCTIONS
411 [facility]  [address] [facility], [address] [place] [address], [address]  Telephone: [phone]      Fax: [phone]        Your home care company:   Nicol Mcrae     Your wound-care supplies will be provided by: [facility]     NAME:  Noe Posadas   YOB: 1950  PRIMARY DIAGNOSIS FOR WOUND CARE CENTER:  Venous leg ulcer . Wound cleansing:   Do not scrub or use excessive force. Wash hands with soap and water before and after dressing changes. Prior to applying a clean dressing, cleanse wound with normal saline, wound cleanser, or mild soap and water. Ask your physician or nurse before getting the wound(s) wet in the shower.                 Wound care for home:     Right lower leg, left foot, and Left lower leg wounds:    Wash wounds with soap and water with dressing changes    Triamcinolone to irritated areas in the [facility] today    Aquaphor to dry skin    Foam to left lateral foot    Xeroform to leg wounds    Betadine, sorbact, silver alginate, optiloc to left foot wound    Try not to make very bulky on foot     CoFlex Calamine     Leave dressing on until Thursday       On Thursday's due to drainage    Home Care to remove compression wraps    Wash legs with soap and water    Aquaphor to dry skin    Silvadene to open areas on legs    Betadine, sorbact, silver alginate, optiloc to left foot wound      Try not to make very bulky on foot     4x4's, ABD, Coban lite toes to knees        Please note, all wounds (unless stated otherwise here) were mechanically debrided at the time of cleansing here in the wound-care center today, so a small amount of pain, drainage or bleeding from that process might be expected, and is normal.      All products for home use, including multiple products for a single wound if applicable, are medically necessary in order to achieve the best chance at timely

## 2023-10-20 ENCOUNTER — TELEPHONE (OUTPATIENT)
Dept: FAMILY MEDICINE CLINIC | Age: 73
End: 2023-10-20

## 2023-10-20 DIAGNOSIS — G62.9 PERIPHERAL POLYNEUROPATHY: Primary | ICD-10-CM

## 2023-10-20 NOTE — TELEPHONE ENCOUNTER
I need more information on this. Why does he think he needs to see neurology. What surgery is he not having? I am not sure what this message is about?

## 2023-10-20 NOTE — TELEPHONE ENCOUNTER
Spoke to patient he said he thought he needed to see one because his hands are numb and someone told him if he seen them then he wouldn't need to have surgery.  He said hes not sure what kind of surgery it is he is at a loss as to what is going on

## 2023-10-20 NOTE — TELEPHONE ENCOUNTER
I placed a referral to neurology at [facility]. Please give him the number to call to schedule.

## 2023-10-20 NOTE — TELEPHONE ENCOUNTER
Patient calling asking for Jey Borrero for a referral to a neurology. Patient stated that he isnt having surgery because he doesn't have time to sit around.  Please Advise

## 2023-10-23 ENCOUNTER — HOSPITAL ENCOUNTER (OUTPATIENT)
Dept: WOUND CARE | Age: 73
Discharge: HOME | End: 2023-10-23
Attending: INTERNAL MEDICINE
Payer: MEDICARE

## 2023-10-23 VITALS
TEMPERATURE: 98.1 F | DIASTOLIC BLOOD PRESSURE: 91 MMHG | SYSTOLIC BLOOD PRESSURE: 159 MMHG | HEIGHT: 73 IN | HEART RATE: 75 BPM | WEIGHT: 284.6 LBS | RESPIRATION RATE: 20 BRPM | BODY MASS INDEX: 37.72 KG/M2

## 2023-10-23 DIAGNOSIS — I87.2 VENOUS STASIS DERMATITIS OF BOTH LOWER EXTREMITIES: ICD-10-CM

## 2023-10-23 DIAGNOSIS — I87.313 IDIOPATHIC CHRONIC VENOUS HYPERTENSION OF BOTH LOWER EXTREMITIES WITH ULCER (HCC): ICD-10-CM

## 2023-10-23 DIAGNOSIS — L97.212 NON-PRESSURE CHRONIC ULCER OF RIGHT CALF WITH FAT LAYER EXPOSED (HCC): ICD-10-CM

## 2023-10-23 DIAGNOSIS — L97.929 IDIOPATHIC CHRONIC VENOUS HYPERTENSION OF BOTH LOWER EXTREMITIES WITH ULCER (HCC): ICD-10-CM

## 2023-10-23 DIAGNOSIS — L97.222 NON-PRESSURE CHRONIC ULCER OF LEFT CALF WITH FAT LAYER EXPOSED (HCC): ICD-10-CM

## 2023-10-23 DIAGNOSIS — L97.919 VENOUS STASIS ULCER OF RIGHT LOWER EXTREMITY (HCC): Primary | ICD-10-CM

## 2023-10-23 DIAGNOSIS — I83.019 VENOUS STASIS ULCER OF RIGHT LOWER EXTREMITY (HCC): Primary | ICD-10-CM

## 2023-10-23 DIAGNOSIS — L97.919 IDIOPATHIC CHRONIC VENOUS HYPERTENSION OF BOTH LOWER EXTREMITIES WITH ULCER (HCC): ICD-10-CM

## 2023-10-23 PROCEDURE — 29581 APPL MULTLAYER CMPRN SYS LEG: CPT

## 2023-10-23 RX ORDER — LIDOCAINE 50 MG/G
OINTMENT TOPICAL ONCE
OUTPATIENT
Start: 2023-10-23 | End: 2023-10-23

## 2023-10-23 RX ORDER — BACITRACIN ZINC AND POLYMYXIN B SULFATE 500; 1000 [USP'U]/G; [USP'U]/G
OINTMENT TOPICAL ONCE
OUTPATIENT
Start: 2023-10-23 | End: 2023-10-23

## 2023-10-23 RX ORDER — LIDOCAINE 40 MG/G
CREAM TOPICAL ONCE
Status: DISCONTINUED | OUTPATIENT
Start: 2023-10-23 | End: 2023-10-24 | Stop reason: HOSPADM

## 2023-10-23 RX ORDER — LIDOCAINE 40 MG/G
CREAM TOPICAL ONCE
OUTPATIENT
Start: 2023-10-23 | End: 2023-10-23

## 2023-10-23 RX ORDER — SODIUM CHLOR/HYPOCHLOROUS ACID 0.033 %
SOLUTION, IRRIGATION IRRIGATION ONCE
OUTPATIENT
Start: 2023-10-23 | End: 2023-10-23

## 2023-10-23 RX ORDER — TRIAMCINOLONE ACETONIDE 1 MG/G
OINTMENT TOPICAL ONCE
OUTPATIENT
Start: 2023-10-23 | End: 2023-10-23

## 2023-10-23 RX ORDER — LIDOCAINE HYDROCHLORIDE 40 MG/ML
SOLUTION TOPICAL ONCE
OUTPATIENT
Start: 2023-10-23 | End: 2023-10-23

## 2023-10-23 NOTE — TELEPHONE ENCOUNTER
Left message for patient. Please give referral information if patient calls back.      [address] Norene Moultrie, MD   [address] [facility], [address] [place] [address]   Phone: [phone]

## 2023-10-23 NOTE — PROGRESS NOTES
warmth, increased pain, increased drainage, odor, or fever) or have questions about your wound care, please contact the [facility] at [phone] Monday-Thursday from 8:00 am - 4:30 pm, or Friday from 8:00 am - 2:30 pm.  If you need help with your wound outside these hours and cannot wait until we are again available, contact your home-care company (if applicable), your PCP, or go to the nearest emergency room

## 2023-10-23 NOTE — PLAN OF CARE
Pt to the [address] [place] [address] for follow up appointment. Bilateral lower legs edema and irritation improved this week. Pt to continue with bi weekly compression wraps. Home care to change dressings on Thursday. Discussed importance of elevating legs. Pt to follow up in the [address] [place] [address] in 1 week. Discharge instructions reviewed with patient, all questions answered, copy given to patient. Dressings were applied to all wounds per M.D. Instructions at this visit.

## 2023-10-23 NOTE — PROGRESS NOTES
Circumfrential, Venous Partial thickness, Onset 07/23-Wound Length (cm): 0.1 cm    Wound 07/03/23 # 11 Rt Pre tib circumfrential, Venous, Partial thickness, Onest 07/23-Wound Width (cm): 15 cm  Wound 07/03/23 # 12 Left Distal Pre tib Circumfrential, Venous Partial thickness, Onset 07/23-Wound Width (cm): 0.1 cm    Wound 07/03/23 # 11 Rt Pre tib circumfrential, Venous, Partial thickness, Onest 07/23-Wound Depth (cm): 0.1 cm  Wound 07/03/23 # 12 Left Distal Pre tib Circumfrential, Venous Partial thickness, Onset 07/23-Wound Depth (cm): 0.1 cm    LABS  Lab Results   Component Value Date    LABA1C 6.7 06/23/2023         Assessment:     Patient Active Problem List   Diagnosis Code    Type 2 diabetes mellitus with hyperglycemia ([facility]) E11.65    Osteoarthritis M19.90    Morbid obesity with BMI of 40.0-44.9, adult ([facility]) E66.01, Z68.41    Edema R60.9    Anemia D64.9    Bradycardia R00.1    Cellulitis of right lower extremity L03. 115    Venous stasis ulcer of right lower extremity ([facility]) I83.019, L97.919    Venous thrombosis of leg I82.90    Venous stasis dermatitis of both lower extremities I87.2    Hyperbilirubinemia E80.6    Moderate episode of recurrent major depressive disorder ([facility]) F33.1    Anxiety F41.9    Essential hypertension I10    Non-seasonal allergic rhinitis J30.89    Mixed hyperlipidemia E78.2    Acute on chronic congestive heart failure ([facility]) I50.9    Chronic pulmonary edema J81.1    Coronary artery disease involving native coronary artery of native heart without angina pectoris I25.10    Nonrheumatic aortic valve stenosis I35.0    Severe sleep apnea G47.30    Primary insomnia F51.01    Venous stasis ulcer of left calf limited to breakdown of skin with varicose veins ([facility]) I83.022, L97.221    Stage 3 chronic kidney disease ([facility]) N18.30    Renal osteodystrophy N25.0    Peripheral edema R60.9    Primary osteoarthritis of right hip M16.11    Grade III diastolic dysfunction C10.64    Paroxysmal atrial fibrillation

## 2023-10-24 ENCOUNTER — CARE COORDINATION (OUTPATIENT)
Dept: CARE COORDINATION | Age: 73
End: 2023-10-24

## 2023-10-24 NOTE — CARE COORDINATION
ACM called and spoke to patient wife who said patient was unavailable at this time. Patient wife will have patient call ACM back when he is available.

## 2023-10-24 NOTE — DISCHARGE INSTRUCTIONS
warmth, increased pain, increased drainage, odor, or fever) or have questions about your wound care, please contact the [facility] at [phone] Monday-Thursday from 8:00 am - 4:30 pm, or Friday from 8:00 am - 2:30 pm.  If you need help with your wound outside these hours and cannot wait until we are again available, contact your home-care company (if applicable), your PCP, or go to the nearest emergency room

## 2023-10-24 NOTE — TELEPHONE ENCOUNTER
Patient returned call, stated he already saw Dr. Chapo Landaverde with [facility] and already did EMG, doesn't want to do that again. Asking for other suggestions. Please advise. Thanks!

## 2023-10-25 NOTE — TELEPHONE ENCOUNTER
Patient has been informed. He does not want to see Dr Yosef Bermudez at [facility]. He states that he already seen him and does not want to go back to him. He would like a referral with Dr Abdelrahman Cancino if possible. Patient refusing appointment states that he has no way of getting out of the house to be seen or brought here. He was very negative on the phone and disrespecting his PCP. Patient stated that his PCP doesn't care about his care, and would laugh at him if something were to happen to him. Patient was being very nasty on the phone, so I hung up the call.

## 2023-10-25 NOTE — TELEPHONE ENCOUNTER
He is a neurologist with [facility], which is the referral that the pt requested. He does also do EMG but does other testing as well.  Alternatively, we can set up an appointment with Nazario Kenyon for when she returns so they can discuss

## 2023-10-30 ENCOUNTER — HOSPITAL ENCOUNTER (OUTPATIENT)
Dept: WOUND CARE | Age: 73
Discharge: HOME | End: 2023-10-30
Attending: INTERNAL MEDICINE

## 2023-10-30 NOTE — PROGRESS NOTES
Pt called stating that [facility] didn't pick him up. Home care to change dressing on Thursday and patient to follow up in the [facility] in 1 week.

## 2023-10-31 ENCOUNTER — CARE COORDINATION (OUTPATIENT)
Dept: CARE COORDINATION | Age: 73
End: 2023-10-31

## 2023-10-31 NOTE — DISCHARGE INSTRUCTIONS
warmth, increased pain, increased drainage, odor, or fever) or have questions about your wound care, please contact the [facility] at [phone] Monday-Thursday from 8:00 am - 4:30 pm, or Friday from 8:00 am - 2:30 pm.  If you need help with your wound outside these hours and cannot wait until we are again available, contact your home-care company (if applicable), your PCP, or go to the nearest emergency room

## 2023-10-31 NOTE — CARE COORDINATION
blood sugar  Daily Weights - I will weight myself as directed - Daily and write down weights  Blood Pressure - I will take my blood pressure as directed - Daily    None Recently Recorded    Barriers: overwhelmed by complexity of regimen  Plan for overcoming my barriers: Patient will work with RPM and record daily VS metrics  Confidence: 8/10  Anticipated Goal Completion Date: 8/22/23                Future Appointments   Date Time Provider [address]   11/6/2023  8:15 AM Kellen Tang DPM Nivia Laws HOD   11/13/2023  8:15 AM Kellen Tang DPM Nivia Laws [facility]   11/20/2023  8:15 AM Kellen Tang DPM Nivia Laws HOD   11/21/2023  7:30 AM DO bryon Monreal   11/27/2023  8:15 AM Kellen Tang DPM Nivia Laws HOD   12/4/2023  8:15 AM Kellen Tang DPM Nivia Laws HOD   12/11/2023  8:15 AM Kellen Tang DPM Nivia Laws HOD   12/18/2023  8:00 AM MHA CT VCT MHAZ CT Barbarann Brock   12/18/2023  8:15 AM Kellen Tang DPM MHCZ [facility]   12/27/2023  8:40 AM Richelle Parker MD AND PULM [facility]   ,   Diabetes Assessment      Meal Planning: Avoidance of concentrated sweets, Calorie counting   How often do you test your blood sugar?: Daily   Do you have barriers with adherence to non-pharmacologic self-management interventions?  (Nutrition/Exercise/Self-Monitoring): No   Have you ever had to go to the ED for symptoms of low blood sugar?: No            ,   Congestive Heart Failure Assessment    Are you currently restricting fluids?: 2000cc  Do you understand a low sodium diet?: Yes  Do you understand how to read food labels?: No  How many restaurant meals do you eat per week?: 0  Do you salt your food before tasting it?: No         Symptoms:          , and   General Assessment    Do you have any symptoms that are causing concern?: No

## 2023-11-06 ENCOUNTER — HOSPITAL ENCOUNTER (OUTPATIENT)
Dept: WOUND CARE | Age: 73
Discharge: HOME | End: 2023-11-06
Attending: INTERNAL MEDICINE
Payer: MEDICARE

## 2023-11-06 VITALS
DIASTOLIC BLOOD PRESSURE: 81 MMHG | WEIGHT: 284 LBS | HEART RATE: 59 BPM | TEMPERATURE: 97.6 F | RESPIRATION RATE: 18 BRPM | HEIGHT: 73 IN | BODY MASS INDEX: 37.64 KG/M2 | SYSTOLIC BLOOD PRESSURE: 147 MMHG

## 2023-11-06 DIAGNOSIS — L97.919 VENOUS STASIS ULCER OF RIGHT LOWER EXTREMITY (HCC): Primary | ICD-10-CM

## 2023-11-06 DIAGNOSIS — L97.222 NON-PRESSURE CHRONIC ULCER OF LEFT CALF WITH FAT LAYER EXPOSED (HCC): ICD-10-CM

## 2023-11-06 DIAGNOSIS — I83.019 VENOUS STASIS ULCER OF RIGHT LOWER EXTREMITY (HCC): Primary | ICD-10-CM

## 2023-11-06 DIAGNOSIS — I87.2 VENOUS STASIS DERMATITIS OF BOTH LOWER EXTREMITIES: ICD-10-CM

## 2023-11-06 DIAGNOSIS — L97.919 IDIOPATHIC CHRONIC VENOUS HYPERTENSION OF BOTH LOWER EXTREMITIES WITH ULCER (HCC): ICD-10-CM

## 2023-11-06 DIAGNOSIS — I87.313 IDIOPATHIC CHRONIC VENOUS HYPERTENSION OF BOTH LOWER EXTREMITIES WITH ULCER (HCC): ICD-10-CM

## 2023-11-06 DIAGNOSIS — L97.929 IDIOPATHIC CHRONIC VENOUS HYPERTENSION OF BOTH LOWER EXTREMITIES WITH ULCER (HCC): ICD-10-CM

## 2023-11-06 DIAGNOSIS — L97.212 NON-PRESSURE CHRONIC ULCER OF RIGHT CALF WITH FAT LAYER EXPOSED (HCC): ICD-10-CM

## 2023-11-06 PROCEDURE — 29581 APPL MULTLAYER CMPRN SYS LEG: CPT

## 2023-11-06 RX ORDER — LIDOCAINE 40 MG/G
CREAM TOPICAL ONCE
Status: DISCONTINUED | OUTPATIENT
Start: 2023-11-06 | End: 2023-11-07 | Stop reason: HOSPADM

## 2023-11-06 RX ORDER — LIDOCAINE HYDROCHLORIDE 40 MG/ML
SOLUTION TOPICAL ONCE
OUTPATIENT
Start: 2023-11-06 | End: 2023-11-06

## 2023-11-06 RX ORDER — LIDOCAINE 50 MG/G
OINTMENT TOPICAL ONCE
OUTPATIENT
Start: 2023-11-06 | End: 2023-11-06

## 2023-11-06 RX ORDER — LIDOCAINE 40 MG/G
CREAM TOPICAL ONCE
OUTPATIENT
Start: 2023-11-06 | End: 2023-11-06

## 2023-11-06 RX ORDER — SODIUM CHLOR/HYPOCHLOROUS ACID 0.033 %
SOLUTION, IRRIGATION IRRIGATION ONCE
OUTPATIENT
Start: 2023-11-06 | End: 2023-11-06

## 2023-11-06 RX ORDER — TRIAMCINOLONE ACETONIDE 1 MG/G
OINTMENT TOPICAL ONCE
Status: DISCONTINUED | OUTPATIENT
Start: 2023-11-06 | End: 2023-11-07 | Stop reason: HOSPADM

## 2023-11-06 RX ORDER — BACITRACIN ZINC AND POLYMYXIN B SULFATE 500; 1000 [USP'U]/G; [USP'U]/G
OINTMENT TOPICAL ONCE
OUTPATIENT
Start: 2023-11-06 | End: 2023-11-06

## 2023-11-06 RX ORDER — TRIAMCINOLONE ACETONIDE 1 MG/G
OINTMENT TOPICAL ONCE
OUTPATIENT
Start: 2023-11-06 | End: 2023-11-06

## 2023-11-06 ASSESSMENT — PAIN - FUNCTIONAL ASSESSMENT: PAIN_FUNCTIONAL_ASSESSMENT: PREVENTS OR INTERFERES SOME ACTIVE ACTIVITIES AND ADLS

## 2023-11-06 ASSESSMENT — PAIN DESCRIPTION - PAIN TYPE: TYPE: CHRONIC PAIN

## 2023-11-06 ASSESSMENT — PAIN DESCRIPTION - DESCRIPTORS: DESCRIPTORS: DISCOMFORT

## 2023-11-06 ASSESSMENT — PAIN SCALES - GENERAL: PAINLEVEL_OUTOF10: 5

## 2023-11-06 ASSESSMENT — PAIN DESCRIPTION - ONSET: ONSET: ON-GOING

## 2023-11-06 ASSESSMENT — PAIN DESCRIPTION - FREQUENCY: FREQUENCY: CONTINUOUS

## 2023-11-06 ASSESSMENT — PAIN DESCRIPTION - LOCATION: LOCATION: LEG

## 2023-11-06 ASSESSMENT — PAIN DESCRIPTION - ORIENTATION: ORIENTATION: RIGHT;LEFT

## 2023-11-06 NOTE — PLAN OF CARE
Pt to the [facility] for follow up appointment. Pt did not come to the [facility] last week due to transportation. Wounds on bilateral lower legs stable. Pt to continue with compression wraps to bilateral lower legs. Home care to change compression wraps on bilateral lower legs on Thursday. Pt to follow up in the [facility] in 1 week. Discussed with patient the importance of elevating legs. Discharge instructions reviewed with patient, all questions answered, copy given to patient. Dressings were applied to all wounds per M.D. Instructions at this visit.

## 2023-11-06 NOTE — PROGRESS NOTES
[facility] Physician Orders and Discharge [address], [address] [place], [place] [address], [address]  Telephone: [phone]      Fax: [phone]        Your home care company:   Nicol Mcrae     Your wound-care supplies will be provided by: [facility]     NAME:  Precious Luz   YOB: 1950  PRIMARY DIAGNOSIS FOR WOUND CARE CENTER:  Venous leg ulcer . Wound cleansing:   Do not scrub or use excessive force. Wash hands with soap and water before and after dressing changes. Prior to applying a clean dressing, cleanse wound with normal saline, wound cleanser, or mild soap and water. Ask your physician or nurse before getting the wound(s) wet in the shower.                 Wound care for home:     Right lower leg, left foot, and Left lower leg wounds:    Wash wounds with soap and water with dressing changes    Triamcinolone to irritated areas in the [address] today    Aquaphor to dry skin    Foam to left lateral foot    Xeroform to leg wounds    Betadine, sorbact, silver alginate, optiloc to left foot wound    Try not to make very bulky on foot     CoFlex Calamine     Leave dressing on until Thursday       On Thursday's due to drainage    Home Care to remove compression wraps    Wash legs with soap and water    Aquaphor to dry skin    Silvadene to open areas on legs    Betadine, sorbact, silver alginate, optiloc to left foot wound      Try not to make very bulky on foot     4x4's, ABD, Coban lite toes to knees        Please note, all wounds (unless stated otherwise here) were mechanically debrided at the time of cleansing here in the wound-care center today, so a small amount of pain, drainage or bleeding from that process might be expected, and is normal.      All products for home use, including multiple products for a single wound if applicable, are medically necessary in order to achieve the best chance at timely

## 2023-11-07 NOTE — DISCHARGE INSTRUCTIONS
[facility] Physician Orders and Discharge [address], [address] [place] [address], [address]  Telephone: [phone]      Fax: [phone]        Your home care company:   Nicol Mcrae     Your wound-care supplies will be provided by: [facility]     NAME:  Yobany Shaikh   YOB: 1950  PRIMARY DIAGNOSIS FOR WOUND CARE CENTER:  Venous leg ulcer . Wound cleansing:   Do not scrub or use excessive force. Wash hands with soap and water before and after dressing changes. Prior to applying a clean dressing, cleanse wound with normal saline, wound cleanser, or mild soap and water. Ask your physician or nurse before getting the wound(s) wet in the shower.                 Wound care for home:     Right lower leg, left foot, and Left lower leg wounds:    Wash wounds with soap and water with dressing changes    Triamcinolone to irritated areas in the [facility] today    Aquaphor to dry skin    Foam to left lateral foot    Xeroform to leg wounds    Betadine, sorbact, silver alginate, optiloc to left foot wound    Try not to make very bulky on foot     CoFlex Calamine     Leave dressing on until Thursday    Right and Left great toes:     Betadine to toes     May leave open to air       On Thursday's due to drainage    Home Care to remove compression wraps    Wash legs with soap and water    Aquaphor to dry skin    Silvadene to open areas on legs    Betadine, sorbact, silver alginate, optiloc to left foot wound      Try not to make very bulky on foot     4x4's, ABD, Coban lite toes to knees        Please note, all wounds (unless stated otherwise here) were mechanically debrided at the time of cleansing here in the wound-care center today, so a small amount of pain, drainage or bleeding from that process might be expected, and is normal.      All products for home use, including multiple products for a single wound if

## 2023-11-13 ENCOUNTER — HOSPITAL ENCOUNTER (OUTPATIENT)
Dept: WOUND CARE | Age: 73
Discharge: HOME | End: 2023-11-13
Attending: INTERNAL MEDICINE
Payer: MEDICARE

## 2023-11-13 VITALS
SYSTOLIC BLOOD PRESSURE: 158 MMHG | BODY MASS INDEX: 37.83 KG/M2 | DIASTOLIC BLOOD PRESSURE: 90 MMHG | HEIGHT: 73 IN | HEART RATE: 76 BPM | TEMPERATURE: 97.6 F | WEIGHT: 285.4 LBS | RESPIRATION RATE: 18 BRPM

## 2023-11-13 DIAGNOSIS — I87.2 VENOUS STASIS DERMATITIS OF BOTH LOWER EXTREMITIES: ICD-10-CM

## 2023-11-13 DIAGNOSIS — L97.222 NON-PRESSURE CHRONIC ULCER OF LEFT CALF WITH FAT LAYER EXPOSED (HCC): ICD-10-CM

## 2023-11-13 DIAGNOSIS — I83.019 VENOUS STASIS ULCER OF RIGHT LOWER EXTREMITY (HCC): Primary | ICD-10-CM

## 2023-11-13 DIAGNOSIS — L97.929 IDIOPATHIC CHRONIC VENOUS HYPERTENSION OF BOTH LOWER EXTREMITIES WITH ULCER (HCC): ICD-10-CM

## 2023-11-13 DIAGNOSIS — L97.212 NON-PRESSURE CHRONIC ULCER OF RIGHT CALF WITH FAT LAYER EXPOSED (HCC): ICD-10-CM

## 2023-11-13 DIAGNOSIS — I87.313 IDIOPATHIC CHRONIC VENOUS HYPERTENSION OF BOTH LOWER EXTREMITIES WITH ULCER (HCC): ICD-10-CM

## 2023-11-13 DIAGNOSIS — L97.919 VENOUS STASIS ULCER OF RIGHT LOWER EXTREMITY (HCC): Primary | ICD-10-CM

## 2023-11-13 DIAGNOSIS — L97.919 IDIOPATHIC CHRONIC VENOUS HYPERTENSION OF BOTH LOWER EXTREMITIES WITH ULCER (HCC): ICD-10-CM

## 2023-11-13 PROCEDURE — 29581 APPL MULTLAYER CMPRN SYS LEG: CPT

## 2023-11-13 RX ORDER — TRIAMCINOLONE ACETONIDE 1 MG/G
OINTMENT TOPICAL ONCE
OUTPATIENT
Start: 2023-11-13 | End: 2023-11-13

## 2023-11-13 RX ORDER — SODIUM CHLOR/HYPOCHLOROUS ACID 0.033 %
SOLUTION, IRRIGATION IRRIGATION ONCE
OUTPATIENT
Start: 2023-11-13 | End: 2023-11-13

## 2023-11-13 RX ORDER — LIDOCAINE 40 MG/G
CREAM TOPICAL ONCE
Status: DISCONTINUED | OUTPATIENT
Start: 2023-11-13 | End: 2023-11-14 | Stop reason: HOSPADM

## 2023-11-13 RX ORDER — BACITRACIN ZINC AND POLYMYXIN B SULFATE 500; 1000 [USP'U]/G; [USP'U]/G
OINTMENT TOPICAL ONCE
OUTPATIENT
Start: 2023-11-13 | End: 2023-11-13

## 2023-11-13 RX ORDER — LIDOCAINE 50 MG/G
OINTMENT TOPICAL ONCE
OUTPATIENT
Start: 2023-11-13 | End: 2023-11-13

## 2023-11-13 RX ORDER — LIDOCAINE HYDROCHLORIDE 40 MG/ML
SOLUTION TOPICAL ONCE
OUTPATIENT
Start: 2023-11-13 | End: 2023-11-13

## 2023-11-13 RX ORDER — LIDOCAINE 40 MG/G
CREAM TOPICAL ONCE
OUTPATIENT
Start: 2023-11-13 | End: 2023-11-13

## 2023-11-13 NOTE — PLAN OF CARE
Pt to the [facility] for follow up appointment. Wounds were not debrided today. Pt to continue with weekly compression wraps. Pt states that he has been elevating and using compression pumps except for the last 2 days. Home care to change dressings on Thursday. Pt to follow up in the [facility] in 1 week. Discharge instructions reviewed with patient, all questions answered, copy given to patient. Dressings were applied to all wounds per M.D. Instructions at this visit. ALEC

## 2023-11-14 NOTE — DISCHARGE INSTRUCTIONS
411 [facility] Physician Orders and Discharge [address], [address] [place] [address], [address]  Telephone: [phone]      Fax: [phone]        Your home care company:   Nicol Mcrae     Your wound-care supplies will be provided by: [facility]     NAME:  Tirso Newell   YOB: 1950  PRIMARY DIAGNOSIS FOR WOUND CARE CENTER:  Venous leg ulcer . Wound cleansing:   Do not scrub or use excessive force. Wash hands with soap and water before and after dressing changes. Prior to applying a clean dressing, cleanse wound with normal saline, wound cleanser, or mild soap and water. Ask your physician or nurse before getting the wound(s) wet in the shower.                 Wound care for home:     Right lower leg, left foot, and Left lower leg wounds:    Wash wounds with soap and water with dressing changes    Triamcinolone to irritated areas in the [facility] today    Aquaphor to dry skin    Foam to left lateral foot    Xeroform to leg wounds    Betadine, sorbact, silver alginate, optiloc to left foot wound    Try not to make very bulky on foot     CoFlex Calamine     Leave dressing on until Thursday     Right and Left great toes:     Betadine to toes     May leave open to air       On Thursday's due to drainage    Home Care to remove compression wraps    Wash legs with soap and water    Aquaphor to dry skin    Silvadene to open areas on legs    Betadine, sorbact, silver alginate, optiloc to left foot wound      Try not to make very bulky on foot     4x4's, ABD, Coban lite toes to knees        Please note, all wounds (unless stated otherwise here) were mechanically debrided at the time of cleansing here in the wound-care center today, so a small amount of pain, drainage or bleeding from that process might be expected, and is normal.      All products for home use, including multiple products for a single wound if

## 2023-11-20 ENCOUNTER — HOSPITAL ENCOUNTER (OUTPATIENT)
Dept: WOUND CARE | Age: 73
Discharge: HOME | End: 2023-11-20
Attending: INTERNAL MEDICINE
Payer: MEDICARE

## 2023-11-20 VITALS
WEIGHT: 282 LBS | DIASTOLIC BLOOD PRESSURE: 89 MMHG | HEART RATE: 83 BPM | HEIGHT: 73 IN | RESPIRATION RATE: 20 BRPM | SYSTOLIC BLOOD PRESSURE: 154 MMHG | BODY MASS INDEX: 37.37 KG/M2 | TEMPERATURE: 97.5 F

## 2023-11-20 DIAGNOSIS — L97.929 IDIOPATHIC CHRONIC VENOUS HYPERTENSION OF BOTH LOWER EXTREMITIES WITH ULCER (HCC): ICD-10-CM

## 2023-11-20 DIAGNOSIS — L97.919 VENOUS STASIS ULCER OF RIGHT LOWER EXTREMITY (HCC): Primary | ICD-10-CM

## 2023-11-20 DIAGNOSIS — L97.212 NON-PRESSURE CHRONIC ULCER OF RIGHT CALF WITH FAT LAYER EXPOSED (HCC): ICD-10-CM

## 2023-11-20 DIAGNOSIS — I87.313 IDIOPATHIC CHRONIC VENOUS HYPERTENSION OF BOTH LOWER EXTREMITIES WITH ULCER (HCC): ICD-10-CM

## 2023-11-20 DIAGNOSIS — L97.222 NON-PRESSURE CHRONIC ULCER OF LEFT CALF WITH FAT LAYER EXPOSED (HCC): ICD-10-CM

## 2023-11-20 DIAGNOSIS — L97.919 IDIOPATHIC CHRONIC VENOUS HYPERTENSION OF BOTH LOWER EXTREMITIES WITH ULCER (HCC): ICD-10-CM

## 2023-11-20 DIAGNOSIS — I87.2 VENOUS STASIS DERMATITIS OF BOTH LOWER EXTREMITIES: ICD-10-CM

## 2023-11-20 DIAGNOSIS — I83.019 VENOUS STASIS ULCER OF RIGHT LOWER EXTREMITY (HCC): Primary | ICD-10-CM

## 2023-11-20 PROCEDURE — 29581 APPL MULTLAYER CMPRN SYS LEG: CPT

## 2023-11-20 RX ORDER — BACITRACIN ZINC AND POLYMYXIN B SULFATE 500; 1000 [USP'U]/G; [USP'U]/G
OINTMENT TOPICAL ONCE
OUTPATIENT
Start: 2023-11-20 | End: 2023-11-20

## 2023-11-20 RX ORDER — LIDOCAINE 50 MG/G
OINTMENT TOPICAL ONCE
OUTPATIENT
Start: 2023-11-20 | End: 2023-11-20

## 2023-11-20 RX ORDER — LIDOCAINE HYDROCHLORIDE 40 MG/ML
SOLUTION TOPICAL ONCE
OUTPATIENT
Start: 2023-11-20 | End: 2023-11-20

## 2023-11-20 RX ORDER — LIDOCAINE 40 MG/G
CREAM TOPICAL ONCE
Status: DISCONTINUED | OUTPATIENT
Start: 2023-11-20 | End: 2023-11-21 | Stop reason: HOSPADM

## 2023-11-20 RX ORDER — SODIUM CHLOR/HYPOCHLOROUS ACID 0.033 %
SOLUTION, IRRIGATION IRRIGATION ONCE
OUTPATIENT
Start: 2023-11-20 | End: 2023-11-20

## 2023-11-20 RX ORDER — LIDOCAINE 40 MG/G
CREAM TOPICAL ONCE
OUTPATIENT
Start: 2023-11-20 | End: 2023-11-20

## 2023-11-20 RX ORDER — TRIAMCINOLONE ACETONIDE 1 MG/G
OINTMENT TOPICAL ONCE
OUTPATIENT
Start: 2023-11-20 | End: 2023-11-20

## 2023-11-20 NOTE — PROGRESS NOTES
[facility] Physician Orders and Discharge [facility]  [address] [place] [address], [address] [place] [address]  [place], [address]  Telephone: [phone]      Fax: [phone]        Your home care company:   Nicol Mcrae     Your wound-care supplies will be provided by: [facility]     NAME:  Dallin Stovall   YOB: 1950  PRIMARY DIAGNOSIS FOR WOUND CARE CENTER:  Venous leg ulcer . Wound cleansing:   Do not scrub or use excessive force. Wash hands with soap and water before and after dressing changes. Prior to applying a clean dressing, cleanse wound with normal saline, wound cleanser, or mild soap and water. Ask your physician or nurse before getting the wound(s) wet in the shower.                 Wound care for home:     Right lower leg, left foot, and Left lower leg wounds:    Wash wounds with soap and water with dressing changes    Triamcinolone to irritated areas in the [address] today    Aquaphor to dry skin    Foam to left lateral foot    Xeroform to leg wounds    Betadine, sorbact, silver alginate, optiloc to left foot wound    Try not to make very bulky on foot     CoFlex Calamine     Leave dressing on until Thursday     Right and Left great toes:     Betadine to toes     May leave open to air       On Thursday's due to drainage    Home Care to remove compression wraps    Wash legs with soap and water    Aquaphor to dry skin    Silvadene to open areas on legs    Betadine, sorbact, silver alginate, optiloc to left foot wound      Try not to make very bulky on foot     4x4's, ABD, Coban lite toes to knees        Please note, all wounds (unless stated otherwise here) were mechanically debrided at the time of cleansing here in the wound-care center today, so a small amount of pain, drainage or bleeding from that process might be expected, and is normal.      All products for home use, including multiple products for a single wound if

## 2023-11-20 NOTE — PROGRESS NOTES
[facility] Progress Note      Lee Lyon     : 1950    DATE OF VISIT:  2023    Subjective:     Lee Lyon is a 74 y.o. male who has a chief complaint of a venous ulcer located on the  bilateral leg. No issues with compression wrap. Legs are feeling better. Mr. Pita Kam has a past medical history of Allergic rhinitis, Arthritis, Bladder fistula, C. difficile diarrhea, Cellulitis of right lower extremity, CHF (congestive heart failure) ([address]), Dental disease, Diabetes ([address]), Diverticulitis, Dizziness, DVT of lower extremity, bilateral ([address]), GERD (gastroesophageal reflux disease), Headache, Hearing loss, Hematuria, Hx of blood clots, Hyperlipidemia, Hypertension, Lung disease, MONIQUE (obstructive sleep apnea), Pancreatitis ([address]), Pulmonary emboli ([address]), Rash, Sleep apnea, and Tinnitus. He has a past surgical history that includes Tibia fracture surgery (Right, ); Cholecystectomy, open (N/A, 13); Cystocopy (12/10/13); Cystoscopy (14); other surgical history (14); Colonoscopy (); Colonoscopy (2013); Colonoscopy (14); colostomy (2014); Colon surgery; Abdomen surgery (2014); hernia repair (2015); pr injection hip arthrography w/anesthesia (Right, 2018); epidural steroid injection (Right, 2019); epidural steroid injection (Right, 2019); Cardioversion (2019); vascular surgery (Left, 2021); vascular surgery (Right, 05/10/2021); and IR MIDLINE CATH (2023). His family history includes Heart Attack in his father; Heart Disease in his brother and father; High Blood Pressure in his brother; Kidney Disease in his mother; Stroke in his brother. Mr. Pita Kam reports that he has never smoked. He has never used smokeless tobacco. He reports that he does not currently use alcohol. He reports that he does not use drugs.     His current medication list consists of Apoaequorin,

## 2023-11-20 NOTE — PLAN OF CARE
Pt to the [facility] for follow up appointment. Wounds showing improvement. Pt to continue with compression wraps to bilateral lower legs. Home Care to change wraps on Friday. Pt to follow up in the [facility] in 1 week. Discussed with patient the importance of elevating legs. Discharge instructions reviewed with patient, all questions answered, copy given to patient. Dressings were applied to all wounds per M.D. Instructions at this visit.

## 2023-11-21 DIAGNOSIS — J30.89 NON-SEASONAL ALLERGIC RHINITIS, UNSPECIFIED TRIGGER: Primary | ICD-10-CM

## 2023-11-21 DIAGNOSIS — E78.2 MIXED HYPERLIPIDEMIA: ICD-10-CM

## 2023-11-21 NOTE — TELEPHONE ENCOUNTER
Refill Request     CONFIRM preferred pharmacy with the patient. If Mail Order Rx - Pend for 90 day refill. Last Seen: Last Seen Department: 6/23/2023  Last Seen by PCP: 6/23/2023    Last Written: 11/30/2022 Isosorbide Mononitrate  11/30/2022 Atorvastatin  11/30/2022 Montelukast     If no future appointment scheduled:  Review the last OV with PCP and review information for follow-up visit,  Route STAFF MESSAGE with patient name to the [facility] for scheduling with the following information:            -  Timing of next visit           -  Visit type ie Physical, OV, etc           -  Diagnoses/Reason ie. COPD, HTN - Do not use MEDICATION, Follow-up or CHECK UP - Give reason for visit      Next Appointment:   Future Appointments   Date Time Provider [address]   11/27/2023  8:15 AM ShortDelores DPM [facility] Avers HOD   12/4/2023  8:15 AM Short Deloresherrera Costello DPM [facility] HOD   12/11/2023  8:15 AM Short, Delores Pravin DPM [facility] HOD   12/12/2023  8:00 AM Carissa Bartholomew DO [facility] - DYD   12/18/2023  8:00 AM [facility]   12/18/2023  8:15 AM ShortDelores DPM [facility] HOD   12/27/2023  9:00 AM Salome Gooden, SHAKIRA - CNP AND PULM [facility]       Message sent to  to schedule appt with patient?   NO- Patient is establishing with Dillon Hamilton on 12/12/2023      Requested Prescriptions     Pending Prescriptions Disp Refills    isosorbide mononitrate (IMDUR) 30 MG extended release tablet [Pharmacy Med Name: Ramos Meryl ER 30 MG TB] 90 tablet 3     Sig: TAKE 1 TABLET BY MOUTH EVERY DAY    atorvastatin (LIPITOR) 40 MG tablet [Pharmacy Med Name: ATORVASTATIN 40 MG TABLET] 90 tablet 3     Sig: TAKE 1 TABLET BY MOUTH EVERY DAY AT NIGHT    montelukast (SINGULAIR) 10 MG tablet [Pharmacy Med Name: MONTELUKAST SOD 10 MG TABLET] 90 tablet 3     Sig: TAKE 1 TABLET BY MOUTH EVERY DAY

## 2023-11-21 NOTE — DISCHARGE INSTRUCTIONS
411 [facility] Physician Orders and Discharge [facility]  [address] [place] [address], [address] [place], [place] [address], [address]  Telephone: [phone]      Fax: [phone]        Your home care company:   Nicol Mcrae     Your wound-care supplies will be provided by: [facility]     NAME:  Dallin Stovall   YOB: 1950  PRIMARY DIAGNOSIS FOR WOUND CARE CENTER:  Venous leg ulcer . Wound cleansing:   Do not scrub or use excessive force. Wash hands with soap and water before and after dressing changes. Prior to applying a clean dressing, cleanse wound with normal saline, wound cleanser, or mild soap and water. Ask your physician or nurse before getting the wound(s) wet in the shower.                 Wound care for home:     Right lower leg, left foot, and Left lower leg wounds:    Wash wounds with soap and water with dressing changes    Triamcinolone to irritated areas in the [address] today    Aquaphor to dry skin    Foam to left lateral foot    Xeroform to leg wounds    Betadine, sorbact, silver alginate, optiloc to left foot wound    Try not to make very bulky on foot     CoFlex Calamine     Leave dressing on until Thursday     Right and Left great toes:     Betadine to toes     May leave open to air       On Thursday's due to drainage    Home Care to remove compression wraps    Wash legs with soap and water    Aquaphor to dry skin    Silvadene to open areas on legs    Betadine, sorbact, silver alginate, optiloc to left foot wound      Try not to make very bulky on foot     4x4's, ABD, Coban lite toes to knees        Please note, all wounds (unless stated otherwise here) were mechanically debrided at the time of cleansing here in the wound-care center today, so a small amount of pain, drainage or bleeding from that process might be expected, and is normal.      All products for home use, including multiple products for a single wound if

## 2023-11-22 RX ORDER — ATORVASTATIN CALCIUM 40 MG/1
TABLET, FILM COATED ORAL
Qty: 90 TABLET | Refills: 0 | Status: SHIPPED | OUTPATIENT
Start: 2023-11-22

## 2023-11-22 RX ORDER — ISOSORBIDE MONONITRATE 30 MG/1
TABLET, EXTENDED RELEASE ORAL
Qty: 90 TABLET | Refills: 0 | Status: SHIPPED | OUTPATIENT
Start: 2023-11-22

## 2023-11-22 RX ORDER — MONTELUKAST SODIUM 10 MG/1
TABLET ORAL
Qty: 90 TABLET | Refills: 0 | Status: SHIPPED | OUTPATIENT
Start: 2023-11-22

## 2023-11-27 ENCOUNTER — HOSPITAL ENCOUNTER (OUTPATIENT)
Dept: WOUND CARE | Age: 73
Discharge: HOME | End: 2023-11-27
Attending: INTERNAL MEDICINE
Payer: MEDICARE

## 2023-11-27 VITALS
BODY MASS INDEX: 37.98 KG/M2 | HEART RATE: 73 BPM | TEMPERATURE: 97 F | HEIGHT: 73 IN | SYSTOLIC BLOOD PRESSURE: 145 MMHG | WEIGHT: 286.6 LBS | RESPIRATION RATE: 18 BRPM | DIASTOLIC BLOOD PRESSURE: 86 MMHG

## 2023-11-27 DIAGNOSIS — I87.2 VENOUS STASIS DERMATITIS OF BOTH LOWER EXTREMITIES: ICD-10-CM

## 2023-11-27 DIAGNOSIS — L97.222 NON-PRESSURE CHRONIC ULCER OF LEFT CALF WITH FAT LAYER EXPOSED (HCC): ICD-10-CM

## 2023-11-27 DIAGNOSIS — I83.019 VENOUS STASIS ULCER OF RIGHT LOWER EXTREMITY (HCC): Primary | ICD-10-CM

## 2023-11-27 DIAGNOSIS — I87.313 IDIOPATHIC CHRONIC VENOUS HYPERTENSION OF BOTH LOWER EXTREMITIES WITH ULCER (HCC): ICD-10-CM

## 2023-11-27 DIAGNOSIS — L97.919 IDIOPATHIC CHRONIC VENOUS HYPERTENSION OF BOTH LOWER EXTREMITIES WITH ULCER (HCC): ICD-10-CM

## 2023-11-27 DIAGNOSIS — L97.212 NON-PRESSURE CHRONIC ULCER OF RIGHT CALF WITH FAT LAYER EXPOSED (HCC): ICD-10-CM

## 2023-11-27 DIAGNOSIS — L97.929 IDIOPATHIC CHRONIC VENOUS HYPERTENSION OF BOTH LOWER EXTREMITIES WITH ULCER (HCC): ICD-10-CM

## 2023-11-27 DIAGNOSIS — L97.919 VENOUS STASIS ULCER OF RIGHT LOWER EXTREMITY (HCC): Primary | ICD-10-CM

## 2023-11-27 PROCEDURE — 29581 APPL MULTLAYER CMPRN SYS LEG: CPT

## 2023-11-27 RX ORDER — SODIUM CHLOR/HYPOCHLOROUS ACID 0.033 %
SOLUTION, IRRIGATION IRRIGATION ONCE
OUTPATIENT
Start: 2023-11-27 | End: 2023-11-27

## 2023-11-27 RX ORDER — TRIAMCINOLONE ACETONIDE 1 MG/G
OINTMENT TOPICAL ONCE
OUTPATIENT
Start: 2023-11-27 | End: 2023-11-27

## 2023-11-27 RX ORDER — LIDOCAINE 40 MG/G
CREAM TOPICAL ONCE
OUTPATIENT
Start: 2023-11-27 | End: 2023-11-27

## 2023-11-27 RX ORDER — LIDOCAINE HYDROCHLORIDE 40 MG/ML
SOLUTION TOPICAL ONCE
OUTPATIENT
Start: 2023-11-27 | End: 2023-11-27

## 2023-11-27 RX ORDER — LIDOCAINE 50 MG/G
OINTMENT TOPICAL ONCE
OUTPATIENT
Start: 2023-11-27 | End: 2023-11-27

## 2023-11-27 RX ORDER — BACITRACIN ZINC AND POLYMYXIN B SULFATE 500; 1000 [USP'U]/G; [USP'U]/G
OINTMENT TOPICAL ONCE
OUTPATIENT
Start: 2023-11-27 | End: 2023-11-27

## 2023-11-27 RX ORDER — LIDOCAINE 40 MG/G
CREAM TOPICAL ONCE
Status: DISCONTINUED | OUTPATIENT
Start: 2023-11-27 | End: 2023-11-28 | Stop reason: HOSPADM

## 2023-11-27 NOTE — PLAN OF CARE
Pt to the [facility] for follow up appointment. Bilateral leg wounds improved. Less redness noted on bilateral lower legs. Pt to continue with compression wraps. Home Care to change dressings on Thursday. Pt to follow up in the [facility] in 1 week. Discharge instructions reviewed with patient, all questions answered, copy given to patient. Dressings were applied to all wounds per M.D. Instructions at this visit.

## 2023-11-27 NOTE — PROGRESS NOTES
411 [facility] Physician Orders and Discharge [address], [address] [place] [address], [address]  Telephone: [phone]      Fax: [phone]        Your home care company:   Nicol Mcrae     Your wound-care supplies will be provided by: [facility]     NAME:  Alexis Robertson   YOB: 1950  PRIMARY DIAGNOSIS FOR WOUND CARE CENTER:  Venous leg ulcer . Wound cleansing:   Do not scrub or use excessive force. Wash hands with soap and water before and after dressing changes. Prior to applying a clean dressing, cleanse wound with normal saline, wound cleanser, or mild soap and water. Ask your physician or nurse before getting the wound(s) wet in the shower.                 Wound care for home:     Right lower leg, left foot, and Left lower leg wounds:    Wash wounds with soap and water with dressing changes    Triamcinolone to irritated areas in the [facility] today    Aquaphor to dry skin    Foam to left lateral foot    Xeroform to leg wounds    Betadine, sorbact, silver alginate, optiloc to left foot wound    Try not to make very bulky on foot     CoFlex Calamine     Leave dressing on until Thursday     Right and Left great toes:     Betadine to toes     May leave open to air       On Thursday's due to drainage    Home Care to remove compression wraps    Wash legs with soap and water    Aquaphor to dry skin    Silvadene to open areas on legs    Betadine, sorbact, silver alginate, optiloc to left foot wound      Try not to make very bulky on foot     4x4's, ABD, Coban lite toes to knees        Please note, all wounds (unless stated otherwise here) were mechanically debrided at the time of cleansing here in the wound-care center today, so a small amount of pain, drainage or bleeding from that process might be expected, and is normal.      All products for home use, including multiple products for a single wound if

## 2023-11-28 NOTE — DISCHARGE INSTRUCTIONS
411 [facility] Physician Orders and Discharge [address], [address] [place] [address], [address]  Telephone: [phone]      Fax: [phone]        Your home care company:   Nicol Mcrae     Your wound-care supplies will be provided by: [facility]     NAME:  Bridget Hurtado   YOB: 1950  PRIMARY DIAGNOSIS FOR WOUND CARE CENTER:  Venous leg ulcer . Wound cleansing:   Do not scrub or use excessive force. Wash hands with soap and water before and after dressing changes. Prior to applying a clean dressing, cleanse wound with normal saline, wound cleanser, or mild soap and water. Ask your physician or nurse before getting the wound(s) wet in the shower.                 Wound care for home:     Right lower leg, left foot, and Left lower leg wounds:    Wash wounds with soap and water with dressing changes    Triamcinolone to irritated areas in the [facility] today    Aquaphor to dry skin    Foam to left lateral foot    Xeroform to leg wounds    Betadine, sorbact, silver alginate, optiloc to left foot wound    Try not to make very bulky on foot     CoFlex Calamine     Leave dressing on until Thursday     Right and Left great toes:     Betadine to toes     May leave open to air       On Thursday's due to drainage    Home Care to remove compression wraps    Wash legs with soap and water    Aquaphor to dry skin    Silvadene to open areas on legs    Betadine, sorbact, silver alginate, optiloc to left foot wound      Try not to make very bulky on foot     4x4's, ABD, Coban lite toes to knees        Please note, all wounds (unless stated otherwise here) were mechanically debrided at the time of cleansing here in the wound-care center today, so a small amount of pain, drainage or bleeding from that process might be expected, and is normal.      All products for home use, including multiple products for a single wound if

## 2023-11-28 NOTE — PROGRESS NOTES
425 [facility] Progress Note      Hai Whitten     : 1950    DATE OF VISIT:  2023    Subjective:     Hai Whitten is a 74 y.o. male who has a chief complaint of a venous ulcer located on the  bilateral leg. No issues with compression wrap. Legs are feeling better. Had some pain in the outside of the left heel at times. Feels well. Mr. Tonie Knox has a past medical history of Allergic rhinitis, Arthritis, Bladder fistula, C. difficile diarrhea, Cellulitis of right lower extremity, CHF (congestive heart failure) ([address]), Dental disease, Diabetes ([address]), Diverticulitis, Dizziness, DVT of lower extremity, bilateral ([address]), GERD (gastroesophageal reflux disease), Headache, Hearing loss, Hematuria, Hx of blood clots, Hyperlipidemia, Hypertension, Lung disease, MONIQUE (obstructive sleep apnea), Pancreatitis ([address]), Pulmonary emboli ([address]), Rash, Sleep apnea, and Tinnitus. He has a past surgical history that includes Tibia fracture surgery (Right, ); Cholecystectomy, open (N/A, 13); Cystocopy (12/10/13); Cystoscopy (14); other surgical history (14); Colonoscopy (); Colonoscopy (2013); Colonoscopy (14); colostomy (2014); Colon surgery; Abdomen surgery (2014); hernia repair (2015); pr injection hip arthrography w/anesthesia (Right, 2018); epidural steroid injection (Right, 2019); epidural steroid injection (Right, 2019); Cardioversion (2019); vascular surgery (Left, 2021); vascular surgery (Right, 05/10/2021); and IR MIDLINE CATH (2023). His family history includes Heart Attack in his father; Heart Disease in his brother and father; High Blood Pressure in his brother; Kidney Disease in his mother; Stroke in his brother. Mr. Tonie Knox reports that he has never smoked. He has never used smokeless tobacco. He reports that he does not currently use alcohol.  He reports that he does not use

## 2023-12-04 ENCOUNTER — HOSPITAL ENCOUNTER (OUTPATIENT)
Dept: WOUND CARE | Age: 73
Discharge: HOME | End: 2023-12-04
Attending: INTERNAL MEDICINE
Payer: MEDICARE

## 2023-12-04 VITALS
WEIGHT: 285 LBS | TEMPERATURE: 97.5 F | HEIGHT: 73 IN | SYSTOLIC BLOOD PRESSURE: 153 MMHG | DIASTOLIC BLOOD PRESSURE: 97 MMHG | HEART RATE: 71 BPM | RESPIRATION RATE: 18 BRPM | BODY MASS INDEX: 37.77 KG/M2

## 2023-12-04 DIAGNOSIS — L97.919 IDIOPATHIC CHRONIC VENOUS HYPERTENSION OF BOTH LOWER EXTREMITIES WITH ULCER (HCC): ICD-10-CM

## 2023-12-04 DIAGNOSIS — L97.919 VENOUS STASIS ULCER OF RIGHT LOWER EXTREMITY (HCC): Primary | ICD-10-CM

## 2023-12-04 DIAGNOSIS — I83.019 VENOUS STASIS ULCER OF RIGHT LOWER EXTREMITY (HCC): Primary | ICD-10-CM

## 2023-12-04 DIAGNOSIS — L97.212 NON-PRESSURE CHRONIC ULCER OF RIGHT CALF WITH FAT LAYER EXPOSED (HCC): ICD-10-CM

## 2023-12-04 DIAGNOSIS — I87.2 VENOUS STASIS DERMATITIS OF BOTH LOWER EXTREMITIES: ICD-10-CM

## 2023-12-04 DIAGNOSIS — I87.313 IDIOPATHIC CHRONIC VENOUS HYPERTENSION OF BOTH LOWER EXTREMITIES WITH ULCER (HCC): ICD-10-CM

## 2023-12-04 DIAGNOSIS — L97.222 NON-PRESSURE CHRONIC ULCER OF LEFT CALF WITH FAT LAYER EXPOSED (HCC): ICD-10-CM

## 2023-12-04 DIAGNOSIS — L97.929 IDIOPATHIC CHRONIC VENOUS HYPERTENSION OF BOTH LOWER EXTREMITIES WITH ULCER (HCC): ICD-10-CM

## 2023-12-04 PROCEDURE — 29581 APPL MULTLAYER CMPRN SYS LEG: CPT

## 2023-12-04 RX ORDER — CLINDAMYCIN HYDROCHLORIDE 300 MG/1
300 CAPSULE ORAL 3 TIMES DAILY
Qty: 42 CAPSULE | Refills: 0 | Status: SHIPPED | OUTPATIENT
Start: 2023-12-04 | End: 2023-12-18

## 2023-12-04 RX ORDER — LIDOCAINE 50 MG/G
OINTMENT TOPICAL ONCE
Status: DISCONTINUED | OUTPATIENT
Start: 2023-12-04 | End: 2023-12-05 | Stop reason: HOSPADM

## 2023-12-04 RX ORDER — TRIAMCINOLONE ACETONIDE 1 MG/G
OINTMENT TOPICAL ONCE
OUTPATIENT
Start: 2023-12-04 | End: 2023-12-04

## 2023-12-04 RX ORDER — LIDOCAINE 50 MG/G
OINTMENT TOPICAL ONCE
OUTPATIENT
Start: 2023-12-04 | End: 2023-12-04

## 2023-12-04 RX ORDER — SODIUM CHLOR/HYPOCHLOROUS ACID 0.033 %
SOLUTION, IRRIGATION IRRIGATION ONCE
OUTPATIENT
Start: 2023-12-04 | End: 2023-12-04

## 2023-12-04 RX ORDER — LIDOCAINE 40 MG/G
CREAM TOPICAL ONCE
OUTPATIENT
Start: 2023-12-04 | End: 2023-12-04

## 2023-12-04 RX ORDER — CIPROFLOXACIN 500 MG/1
500 TABLET, FILM COATED ORAL 2 TIMES DAILY
Qty: 28 TABLET | Refills: 0 | Status: SHIPPED | OUTPATIENT
Start: 2023-12-04 | End: 2023-12-18

## 2023-12-04 RX ORDER — BACITRACIN ZINC AND POLYMYXIN B SULFATE 500; 1000 [USP'U]/G; [USP'U]/G
OINTMENT TOPICAL ONCE
OUTPATIENT
Start: 2023-12-04 | End: 2023-12-04

## 2023-12-04 RX ORDER — LIDOCAINE HYDROCHLORIDE 40 MG/ML
SOLUTION TOPICAL ONCE
OUTPATIENT
Start: 2023-12-04 | End: 2023-12-04

## 2023-12-04 ASSESSMENT — PAIN SCALES - GENERAL: PAINLEVEL_OUTOF10: 2

## 2023-12-04 ASSESSMENT — PAIN DESCRIPTION - FREQUENCY: FREQUENCY: CONTINUOUS

## 2023-12-04 ASSESSMENT — PAIN - FUNCTIONAL ASSESSMENT: PAIN_FUNCTIONAL_ASSESSMENT: PREVENTS OR INTERFERES SOME ACTIVE ACTIVITIES AND ADLS

## 2023-12-04 ASSESSMENT — PAIN DESCRIPTION - PAIN TYPE: TYPE: CHRONIC PAIN

## 2023-12-04 ASSESSMENT — PAIN DESCRIPTION - DESCRIPTORS: DESCRIPTORS: DISCOMFORT

## 2023-12-04 ASSESSMENT — PAIN DESCRIPTION - ONSET: ONSET: ON-GOING

## 2023-12-04 ASSESSMENT — PAIN DESCRIPTION - LOCATION: LOCATION: LEG

## 2023-12-04 ASSESSMENT — PAIN DESCRIPTION - ORIENTATION: ORIENTATION: RIGHT;LEFT

## 2023-12-04 NOTE — PLAN OF CARE
Pt to the [facility] for follow up appointment. Bilateral lower legs with less redness and swelling. Pt to continue with compression wraps. Home Care to change dressing on Thursday. Pt to follow up in the [facility] in 1 week. Pt to continue to elevate legs. Discharge instructions reviewed with patient, all questions answered, copy given to patient. Dressings were applied to all wounds per M.D. Instructions at this visit.

## 2023-12-04 NOTE — PROGRESS NOTES
411 [facility] Physician Orders and Discharge [address], [address] [place] [address], [address]  Telephone: [phone]      Fax: [phone]        Your home care company:   Nicol Mcrae     Your wound-care supplies will be provided by: [facility]     NAME:  Rossy Parkinson   YOB: 1950  PRIMARY DIAGNOSIS FOR WOUND CARE CENTER:  Venous leg ulcer . Wound cleansing:   Do not scrub or use excessive force. Wash hands with soap and water before and after dressing changes. Prior to applying a clean dressing, cleanse wound with normal saline, wound cleanser, or mild soap and water. Ask your physician or nurse before getting the wound(s) wet in the shower.                 Wound care for home:     Right lower leg, left foot, and Left lower leg wounds:    Wash wounds with soap and water with dressing changes    Triamcinolone to irritated areas in the [facility] today    Aquaphor to dry skin    Foam to left lateral foot    Xeroform to leg wounds    Betadine, sorbact, silver alginate, optiloc to left foot wound    Try not to make very bulky on foot     CoFlex Calamine     Leave dressing on until Thursday     Right and Left great toes:     Betadine to toes     May leave open to air       On Thursday's due to drainage    Home Care to remove compression wraps    Wash legs with soap and water    Aquaphor to dry skin    Silvadene to open areas on legs    Betadine, sorbact, silver alginate, optiloc to left foot wound      Try not to make very bulky on foot     4x4's, ABD, Coban lite toes to knees        Please note, all wounds (unless stated otherwise here) were mechanically debrided at the time of cleansing here in the wound-care center today, so a small amount of pain, drainage or bleeding from that process might be expected, and is normal.      All products for home use, including multiple products for a single wound if

## 2023-12-04 NOTE — PROGRESS NOTES
425 [facility] Progress Note      Alexis Robertson     : 1950    DATE OF VISIT:  2023    Subjective:     Alexis Robertson is a 74 y.o. male who has a chief complaint of a venous ulcer located on the  bilateral leg. No issues with compression wrap. Legs are feeling better. Pain in the outside of the left heel. [facility] did see him last week but did not have all the supplies she needed. His ordered supplies have not arrived yet. Mr. Iva Sarmiento has a past medical history of Allergic rhinitis, Arthritis, Bladder fistula, C. difficile diarrhea, Cellulitis of right lower extremity, CHF (congestive heart failure) ([address]), Dental disease, Diabetes ([address]), Diverticulitis, Dizziness, DVT of lower extremity, bilateral ([address]), GERD (gastroesophageal reflux disease), Headache, Hearing loss, Hematuria, Hx of blood clots, Hyperlipidemia, Hypertension, Lung disease, MONIQUE (obstructive sleep apnea), Pancreatitis ([address]), Pulmonary emboli ([address]), Rash, Sleep apnea, and Tinnitus. He has a past surgical history that includes Tibia fracture surgery (Right, ); Cholecystectomy, open (N/A, 13); Cystocopy (12/10/13); Cystoscopy (14); other surgical history (14); Colonoscopy (); Colonoscopy (2013); Colonoscopy (14); colostomy (2014); Colon surgery; Abdomen surgery (2014); hernia repair (2015); pr injection hip arthrography w/anesthesia (Right, 2018); epidural steroid injection (Right, 2019); epidural steroid injection (Right, 2019); Cardioversion (2019); vascular surgery (Left, 2021); vascular surgery (Right, 05/10/2021); and IR MIDLINE CATH (2023). His family history includes Heart Attack in his father; Heart Disease in his brother and father; High Blood Pressure in his brother; Kidney Disease in his mother; Stroke in his brother. Mr. Iva Sarmiento reports that he has never smoked.  He has never used smokeless

## 2023-12-05 NOTE — DISCHARGE INSTRUCTIONS
411 [facility] Physician Orders and Discharge [address], [address] [place] [address], [address]  Telephone: [phone]      Fax: [phone]        Your home care company:   Nicol Mcrae     Your wound-care supplies will be provided by: [facility]     NAME:  Meme Smalls   YOB: 1950  PRIMARY DIAGNOSIS FOR WOUND CARE CENTER:  Venous leg ulcer . Wound cleansing:   Do not scrub or use excessive force. Wash hands with soap and water before and after dressing changes. Prior to applying a clean dressing, cleanse wound with normal saline, wound cleanser, or mild soap and water. Ask your physician or nurse before getting the wound(s) wet in the shower.                 Wound care for home:     Right lower leg, left foot, and Left lower leg wounds:    Wash wounds with soap and water with dressing changes    Triamcinolone to irritated areas in the [facility] today    Aquaphor to dry skin    Foam to left lateral foot    Xeroform to leg wounds    Betadine, sorbact, silver alginate, optiloc to left foot wound    Try not to make very bulky on foot     CoFlex Calamine     Leave dressing on until Thursday     Right and Left great toes:     Betadine to toes     May leave open to air       On Thursday's due to drainage    Home Care to remove compression wraps    Wash legs with soap and water    Aquaphor to dry skin    Silvadene to open areas on legs    Betadine, sorbact, silver alginate, optiloc to left foot wound      Try not to make very bulky on foot     4x4's, ABD, Coban lite toes to knees        Please note, all wounds (unless stated otherwise here) were mechanically debrided at the time of cleansing here in the wound-care center today, so a small amount of pain, drainage or bleeding from that process might be expected, and is normal.      All products for home use, including multiple products for a single wound if

## 2023-12-10 DIAGNOSIS — I50.32 CHRONIC DIASTOLIC CONGESTIVE HEART FAILURE (HCC): ICD-10-CM

## 2023-12-10 NOTE — TELEPHONE ENCOUNTER
Refill Request     CONFIRM preferred pharmacy with the patient.    If Mail Order Rx - Pend for 90 day refill.      Last Seen: Last Seen Department: 6/23/2023  Last Seen by PCP: 6/23/2023    Last Written: 3/9/23 90 tabs 0 refills     If no future appointment scheduled:  Review the last OV with PCP and review information for follow-up visit,  Route STAFF MESSAGE with patient name to the  Pool for scheduling with the following information:            -  Timing of next visit           -  Visit type ie Physical, OV, etc           -  Diagnoses/Reason ie. COPD, HTN - Do not use MEDICATION, Follow-up or CHECK UP - Give reason for visit      Next Appointment:   Future Appointments   Date Time Provider Department Center   12/11/2023  8:15 AM Maged Tang DPM [facility]   12/12/2023  8:00 AM Jorge L Wade DO [place] [facility]   12/18/2023  8:00 AM MHA CT VCT [facility]   12/18/2023  8:15 AM Maged Tang DPM [facility]   12/27/2023  9:00 AM Malinda Mayers, APRN - CNP AND PULM [facility]       Message sent to  to schedule appt with patient?  NO      Requested Prescriptions     Pending Prescriptions Disp Refills    spironolactone (ALDACTONE) 25 MG tablet [Pharmacy Med Name: SPIRONOLACTONE 25 MG TABLET] 90 tablet 1     Sig: TAKE 1 TABLET BY MOUTH EVERY DAY

## 2023-12-11 ENCOUNTER — HOSPITAL ENCOUNTER (OUTPATIENT)
Dept: WOUND CARE | Age: 73
Discharge: HOME | End: 2023-12-11
Attending: INTERNAL MEDICINE
Payer: MEDICARE

## 2023-12-11 VITALS
WEIGHT: 283.4 LBS | HEART RATE: 85 BPM | DIASTOLIC BLOOD PRESSURE: 87 MMHG | TEMPERATURE: 97.1 F | HEIGHT: 73 IN | BODY MASS INDEX: 37.56 KG/M2 | RESPIRATION RATE: 20 BRPM | SYSTOLIC BLOOD PRESSURE: 147 MMHG

## 2023-12-11 DIAGNOSIS — L97.919 IDIOPATHIC CHRONIC VENOUS HYPERTENSION OF BOTH LOWER EXTREMITIES WITH ULCER (HCC): ICD-10-CM

## 2023-12-11 DIAGNOSIS — I87.2 VENOUS STASIS DERMATITIS OF BOTH LOWER EXTREMITIES: ICD-10-CM

## 2023-12-11 DIAGNOSIS — L97.222 NON-PRESSURE CHRONIC ULCER OF LEFT CALF WITH FAT LAYER EXPOSED (HCC): ICD-10-CM

## 2023-12-11 DIAGNOSIS — L97.929 IDIOPATHIC CHRONIC VENOUS HYPERTENSION OF BOTH LOWER EXTREMITIES WITH ULCER (HCC): ICD-10-CM

## 2023-12-11 DIAGNOSIS — I83.019 VENOUS STASIS ULCER OF RIGHT LOWER EXTREMITY (HCC): Primary | ICD-10-CM

## 2023-12-11 DIAGNOSIS — L97.212 NON-PRESSURE CHRONIC ULCER OF RIGHT CALF WITH FAT LAYER EXPOSED (HCC): ICD-10-CM

## 2023-12-11 DIAGNOSIS — L97.919 VENOUS STASIS ULCER OF RIGHT LOWER EXTREMITY (HCC): Primary | ICD-10-CM

## 2023-12-11 DIAGNOSIS — I87.313 IDIOPATHIC CHRONIC VENOUS HYPERTENSION OF BOTH LOWER EXTREMITIES WITH ULCER (HCC): ICD-10-CM

## 2023-12-11 PROCEDURE — 29581 APPL MULTLAYER CMPRN SYS LEG: CPT

## 2023-12-11 PROCEDURE — 99213 OFFICE O/P EST LOW 20 MIN: CPT

## 2023-12-11 RX ORDER — SODIUM CHLOR/HYPOCHLOROUS ACID 0.033 %
SOLUTION, IRRIGATION IRRIGATION ONCE
OUTPATIENT
Start: 2023-12-11 | End: 2023-12-11

## 2023-12-11 RX ORDER — LIDOCAINE HYDROCHLORIDE 40 MG/ML
SOLUTION TOPICAL ONCE
OUTPATIENT
Start: 2023-12-11 | End: 2023-12-11

## 2023-12-11 RX ORDER — LIDOCAINE 40 MG/G
CREAM TOPICAL ONCE
OUTPATIENT
Start: 2023-12-11 | End: 2023-12-11

## 2023-12-11 RX ORDER — BACITRACIN ZINC AND POLYMYXIN B SULFATE 500; 1000 [USP'U]/G; [USP'U]/G
OINTMENT TOPICAL ONCE
OUTPATIENT
Start: 2023-12-11 | End: 2023-12-11

## 2023-12-11 RX ORDER — TRIAMCINOLONE ACETONIDE 1 MG/G
OINTMENT TOPICAL ONCE
OUTPATIENT
Start: 2023-12-11 | End: 2023-12-11

## 2023-12-11 RX ORDER — LIDOCAINE 40 MG/G
CREAM TOPICAL ONCE
Status: DISCONTINUED | OUTPATIENT
Start: 2023-12-11 | End: 2023-12-12 | Stop reason: HOSPADM

## 2023-12-11 RX ORDER — LIDOCAINE 50 MG/G
OINTMENT TOPICAL ONCE
OUTPATIENT
Start: 2023-12-11 | End: 2023-12-11

## 2023-12-11 NOTE — PROGRESS NOTES
425 [facility] Progress Note      Alysia Linares     : 1950    DATE OF VISIT:  2023    Subjective:     Alysia Linares is a 74 y.o. male who has a chief complaint of a venous ulcer located on the  bilateral leg. No issues with compression wrap. Legs are feeling better. Wife is in rehab after hip fracture repair. Mr. Ric Garff has a past medical history of Allergic rhinitis, Arthritis, Bladder fistula, C. difficile diarrhea, Cellulitis of right lower extremity, CHF (congestive heart failure) ([address]), Dental disease, Diabetes ([address]), Diverticulitis, Dizziness, DVT of lower extremity, bilateral ([address]), GERD (gastroesophageal reflux disease), Headache, Hearing loss, Hematuria, Hx of blood clots, Hyperlipidemia, Hypertension, Lung disease, MONIQUE (obstructive sleep apnea), Pancreatitis ([address]), Pulmonary emboli ([address]), Rash, Sleep apnea, and Tinnitus. He has a past surgical history that includes Tibia fracture surgery (Right, ); Cholecystectomy, open (N/A, 13); Cystocopy (12/10/13); Cystoscopy (14); other surgical history (14); Colonoscopy (); Colonoscopy (2013); Colonoscopy (14); colostomy (2014); Colon surgery; Abdomen surgery (2014); hernia repair (2015); pr injection hip arthrography w/anesthesia (Right, 2018); epidural steroid injection (Right, 2019); epidural steroid injection (Right, 2019); Cardioversion (2019); vascular surgery (Left, 2021); vascular surgery (Right, 05/10/2021); and IR MIDLINE CATH (2023). His family history includes Heart Attack in his father; Heart Disease in his brother and father; High Blood Pressure in his brother; Kidney Disease in his mother; Stroke in his brother. Mr. Ric Graff reports that he has never smoked. He has never used smokeless tobacco. He reports that he does not currently use alcohol. He reports that he does not use drugs.     His current
experience any significant changes in your wound(s) (including redness, increased warmth, increased pain, increased drainage, odor, or fever) or have questions about your wound care, please contact the [facility] [place] at [phone] Monday-Thursday from 8:00 am - 4:30 pm, or Friday from 8:00 am - 2:30 pm.  If you need help with your wound outside these hours and cannot wait until we are again available, contact your home-care company (if applicable), your PCP, or go to the nearest emergency room

## 2023-12-11 NOTE — PLAN OF CARE
Pt to the [address] [place] [address] for follow up appointment. Bilateral lower legs with decreased redness and irritation. Pt to continue with weekly compression wraps. Home care to change dressing on Thursday. Discussed the importance of elevating legs. Pt to follow up in the [address] [place] [address] in 1 week. Discharge instructions reviewed with patient, all questions answered, copy given to patient. Dressings were applied to all wounds per M.D. Instructions at this visit.

## 2023-12-12 ENCOUNTER — OFFICE VISIT (OUTPATIENT)
Dept: FAMILY MEDICINE CLINIC | Age: 73
End: 2023-12-12
Payer: MEDICARE

## 2023-12-12 VITALS
RESPIRATION RATE: 16 BRPM | SYSTOLIC BLOOD PRESSURE: 108 MMHG | HEART RATE: 84 BPM | OXYGEN SATURATION: 95 % | HEIGHT: 73 IN | DIASTOLIC BLOOD PRESSURE: 72 MMHG | BODY MASS INDEX: 36.23 KG/M2 | WEIGHT: 273.4 LBS

## 2023-12-12 DIAGNOSIS — G62.9 NEUROPATHY: ICD-10-CM

## 2023-12-12 DIAGNOSIS — Z79.4 TYPE 2 DIABETES MELLITUS WITH HYPERGLYCEMIA, WITH LONG-TERM CURRENT USE OF INSULIN (HCC): Primary | ICD-10-CM

## 2023-12-12 DIAGNOSIS — G47.33 OSA ON CPAP: ICD-10-CM

## 2023-12-12 DIAGNOSIS — E11.649 HYPOGLYCEMIC EPISODE IN PATIENT WITH DIABETES MELLITUS (HCC): ICD-10-CM

## 2023-12-12 DIAGNOSIS — I10 ESSENTIAL HYPERTENSION: Chronic | ICD-10-CM

## 2023-12-12 DIAGNOSIS — E11.65 TYPE 2 DIABETES MELLITUS WITH HYPERGLYCEMIA, WITH LONG-TERM CURRENT USE OF INSULIN (HCC): Primary | ICD-10-CM

## 2023-12-12 DIAGNOSIS — I50.33 ACUTE ON CHRONIC DIASTOLIC CONGESTIVE HEART FAILURE (HCC): ICD-10-CM

## 2023-12-12 DIAGNOSIS — G89.29 CHRONIC LEFT SHOULDER PAIN: ICD-10-CM

## 2023-12-12 DIAGNOSIS — M25.512 CHRONIC LEFT SHOULDER PAIN: ICD-10-CM

## 2023-12-12 DIAGNOSIS — M54.2 NECK PAIN: ICD-10-CM

## 2023-12-12 DIAGNOSIS — G56.02 CARPAL TUNNEL SYNDROME OF LEFT WRIST: ICD-10-CM

## 2023-12-12 PROCEDURE — 3044F HG A1C LEVEL LT 7.0%: CPT | Performed by: STUDENT IN AN ORGANIZED HEALTH CARE EDUCATION/TRAINING PROGRAM

## 2023-12-12 PROCEDURE — 3074F SYST BP LT 130 MM HG: CPT | Performed by: STUDENT IN AN ORGANIZED HEALTH CARE EDUCATION/TRAINING PROGRAM

## 2023-12-12 PROCEDURE — 99214 OFFICE O/P EST MOD 30 MIN: CPT | Performed by: STUDENT IN AN ORGANIZED HEALTH CARE EDUCATION/TRAINING PROGRAM

## 2023-12-12 PROCEDURE — 1123F ACP DISCUSS/DSCN MKR DOCD: CPT | Performed by: STUDENT IN AN ORGANIZED HEALTH CARE EDUCATION/TRAINING PROGRAM

## 2023-12-12 PROCEDURE — 3078F DIAST BP <80 MM HG: CPT | Performed by: STUDENT IN AN ORGANIZED HEALTH CARE EDUCATION/TRAINING PROGRAM

## 2023-12-12 RX ORDER — SPIRONOLACTONE 25 MG/1
TABLET ORAL
Qty: 90 TABLET | Refills: 1 | OUTPATIENT
Start: 2023-12-12

## 2023-12-12 RX ORDER — INSULIN GLARGINE 300 U/ML
36 INJECTION, SOLUTION SUBCUTANEOUS 2 TIMES DAILY
Qty: 5 ADJUSTABLE DOSE PRE-FILLED PEN SYRINGE | Refills: 5
Start: 2023-12-12

## 2023-12-12 ASSESSMENT — PATIENT HEALTH QUESTIONNAIRE - PHQ9
6. FEELING BAD ABOUT YOURSELF - OR THAT YOU ARE A FAILURE OR HAVE LET YOURSELF OR YOUR FAMILY DOWN: 0
2. FEELING DOWN, DEPRESSED OR HOPELESS: 0
3. TROUBLE FALLING OR STAYING ASLEEP: 0
1. LITTLE INTEREST OR PLEASURE IN DOING THINGS: 0
9. THOUGHTS THAT YOU WOULD BE BETTER OFF DEAD, OR OF HURTING YOURSELF: 0
SUM OF ALL RESPONSES TO PHQ QUESTIONS 1-9: 0
SUM OF ALL RESPONSES TO PHQ QUESTIONS 1-9: 0
8. MOVING OR SPEAKING SO SLOWLY THAT OTHER PEOPLE COULD HAVE NOTICED. OR THE OPPOSITE, BEING SO FIGETY OR RESTLESS THAT YOU HAVE BEEN MOVING AROUND A LOT MORE THAN USUAL: 0
SUM OF ALL RESPONSES TO PHQ QUESTIONS 1-9: 0
10. IF YOU CHECKED OFF ANY PROBLEMS, HOW DIFFICULT HAVE THESE PROBLEMS MADE IT FOR YOU TO DO YOUR WORK, TAKE CARE OF THINGS AT HOME, OR GET ALONG WITH OTHER PEOPLE: 0
4. FEELING TIRED OR HAVING LITTLE ENERGY: 0
SUM OF ALL RESPONSES TO PHQ9 QUESTIONS 1 & 2: 0
SUM OF ALL RESPONSES TO PHQ QUESTIONS 1-9: 0
7. TROUBLE CONCENTRATING ON THINGS, SUCH AS READING THE NEWSPAPER OR WATCHING TELEVISION: 0
5. POOR APPETITE OR OVEREATING: 0

## 2023-12-12 ASSESSMENT — ENCOUNTER SYMPTOMS
COUGH: 0
RHINORRHEA: 0
SHORTNESS OF BREATH: 0

## 2023-12-12 ASSESSMENT — COLUMBIA-SUICIDE SEVERITY RATING SCALE - C-SSRS
7. DID THIS OCCUR IN THE LAST THREE MONTHS: NO
4. HAVE YOU HAD THESE THOUGHTS AND HAD SOME INTENTION OF ACTING ON THEM?: NO
3. HAVE YOU BEEN THINKING ABOUT HOW YOU MIGHT KILL YOURSELF?: NO
5. HAVE YOU STARTED TO WORK OUT OR WORKED OUT THE DETAILS OF HOW TO KILL YOURSELF? DO YOU INTEND TO CARRY OUT THIS PLAN?: NO

## 2023-12-12 NOTE — PROGRESS NOTES
Never done    Respiratory Syncytial Virus (RSV) age 61 yrs+ (1 - 1-dose 60+ series) Never done    Diabetic retinal exam  09/01/2022    Annual Wellness Visit (AWV)  03/09/2023    Lipids  06/13/2023    Flu vaccine (1) 08/01/2023    COVID-19 Vaccine (7 - 2023-24 season) 09/01/2023    Colorectal Cancer Screen  04/30/2024    Diabetic foot exam  06/04/2024    A1C test (Diabetic or Prediabetic)  06/23/2024    Depression Monitoring  12/12/2024    DTaP/Tdap/Td vaccine (2 - Td or Tdap) 12/18/2024    Shingles vaccine  Completed    Pneumococcal 65+ years Vaccine  Completed    Hepatitis C screen  Completed    Hepatitis A vaccine  Aged Out    Hib vaccine  Aged Out    Meningococcal (ACWY) vaccine  Aged Out    Diabetic Alb to Cr ratio (uACR) test  Discontinued    GFR test (Diabetes, CKD 3-4, OR last GFR 15-59)  Discontinued       PSH, PMH, SH and FH reviewed and noted. Recent and past labs, tests and consults also reviewed. Recent or new meds also reviewed. Geronimo Sky DO    This dictation was generated by voice recognition computer software. Although all attempts are made to edit the dictation for accuracy, there may be errors in the transcription that are not intended.

## 2023-12-26 ENCOUNTER — APPOINTMENT (OUTPATIENT)
Dept: GENERAL RADIOLOGY | Age: 73
DRG: 871 | End: 2023-12-26
Payer: MEDICARE

## 2023-12-26 ENCOUNTER — HOSPITAL ENCOUNTER (INPATIENT)
Age: 73
LOS: 13 days | Discharge: NURSING, REHAB OR LONG-TERM CARE | DRG: 871 | End: 2024-01-09
Attending: EMERGENCY MEDICINE | Admitting: INTERNAL MEDICINE
Payer: MEDICARE

## 2023-12-26 DIAGNOSIS — Z79.4 TYPE 2 DIABETES MELLITUS WITH HYPERGLYCEMIA, WITH LONG-TERM CURRENT USE OF INSULIN (HCC): ICD-10-CM

## 2023-12-26 DIAGNOSIS — G47.33 OSA (OBSTRUCTIVE SLEEP APNEA): ICD-10-CM

## 2023-12-26 DIAGNOSIS — E11.65 TYPE 2 DIABETES MELLITUS WITH HYPERGLYCEMIA, WITH LONG-TERM CURRENT USE OF INSULIN (HCC): ICD-10-CM

## 2023-12-26 DIAGNOSIS — R53.1 GENERALIZED WEAKNESS: ICD-10-CM

## 2023-12-26 DIAGNOSIS — J18.9 PNEUMONIA DUE TO INFECTIOUS ORGANISM, UNSPECIFIED LATERALITY, UNSPECIFIED PART OF LUNG: Primary | ICD-10-CM

## 2023-12-26 DIAGNOSIS — L03.115 CELLULITIS OF RIGHT LEG WITHOUT FOOT: ICD-10-CM

## 2023-12-26 PROBLEM — J16.0 COMMUNITY ACQUIRED PNEUMONIA DUE TO CHLAMYDIA SPECIES: Status: ACTIVE | Noted: 2023-12-26

## 2023-12-26 LAB
ALBUMIN SERPL-MCNC: 3.8 G/DL (ref 3.4–5)
ALBUMIN/GLOB SERPL: 1.1 {RATIO} (ref 1.1–2.2)
ALP SERPL-CCNC: 90 U/L (ref 40–129)
ALT SERPL-CCNC: 16 U/L (ref 10–40)
ANION GAP SERPL CALCULATED.3IONS-SCNC: 10 MMOL/L (ref 3–16)
AST SERPL-CCNC: 18 U/L (ref 15–37)
BASOPHILS # BLD: 0 K/UL (ref 0–0.2)
BASOPHILS NFR BLD: 0.2 %
BILIRUB SERPL-MCNC: 2.3 MG/DL (ref 0–1)
BILIRUB UR QL STRIP.AUTO: ABNORMAL
BUN SERPL-MCNC: 24 MG/DL (ref 7–20)
CALCIUM SERPL-MCNC: 9 MG/DL (ref 8.3–10.6)
CHLORIDE SERPL-SCNC: 101 MMOL/L (ref 99–110)
CLARITY UR: CLEAR
CO2 SERPL-SCNC: 28 MMOL/L (ref 21–32)
COLOR UR: YELLOW
CREAT SERPL-MCNC: 0.9 MG/DL (ref 0.8–1.3)
DEPRECATED RDW RBC AUTO: 14.9 % (ref 12.4–15.4)
EKG ATRIAL RATE: 394 BPM
EKG DIAGNOSIS: NORMAL
EKG Q-T INTERVAL: 362 MS
EKG QRS DURATION: 96 MS
EKG QTC CALCULATION (BAZETT): 412 MS
EKG R AXIS: -41 DEGREES
EKG T AXIS: 134 DEGREES
EKG VENTRICULAR RATE: 78 BPM
EOSINOPHIL # BLD: 0 K/UL (ref 0–0.6)
EOSINOPHIL NFR BLD: 0 %
FLUAV RNA RESP QL NAA+PROBE: NOT DETECTED
FLUBV RNA RESP QL NAA+PROBE: NOT DETECTED
GFR SERPLBLD CREATININE-BSD FMLA CKD-EPI: >60 ML/MIN/{1.73_M2}
GLUCOSE SERPL-MCNC: 183 MG/DL (ref 70–99)
GLUCOSE UR STRIP.AUTO-MCNC: NEGATIVE MG/DL
HCT VFR BLD AUTO: 44.7 % (ref 40.5–52.5)
HGB BLD-MCNC: 14.8 G/DL (ref 13.5–17.5)
HGB UR QL STRIP.AUTO: NEGATIVE
KETONES UR STRIP.AUTO-MCNC: NEGATIVE MG/DL
LEUKOCYTE ESTERASE UR QL STRIP.AUTO: NEGATIVE
LYMPHOCYTES # BLD: 0.4 K/UL (ref 1–5.1)
LYMPHOCYTES NFR BLD: 2.2 %
MCH RBC QN AUTO: 29.2 PG (ref 26–34)
MCHC RBC AUTO-ENTMCNC: 33 G/DL (ref 31–36)
MCV RBC AUTO: 88.5 FL (ref 80–100)
MONOCYTES # BLD: 0.6 K/UL (ref 0–1.3)
MONOCYTES NFR BLD: 3.4 %
MUCOUS THREADS #/AREA URNS LPF: ABNORMAL /LPF
NEUTROPHILS # BLD: 17.9 K/UL (ref 1.7–7.7)
NEUTROPHILS NFR BLD: 94.2 %
NITRITE UR QL STRIP.AUTO: NEGATIVE
NT-PROBNP SERPL-MCNC: 888 PG/ML (ref 0–449)
PH UR STRIP.AUTO: 6 [PH] (ref 5–8)
PLATELET # BLD AUTO: 153 K/UL (ref 135–450)
PMV BLD AUTO: 7.9 FL (ref 5–10.5)
POTASSIUM SERPL-SCNC: 4.7 MMOL/L (ref 3.5–5.1)
PROCALCITONIN SERPL IA-MCNC: 0.56 NG/ML (ref 0–0.15)
PROT SERPL-MCNC: 7.2 G/DL (ref 6.4–8.2)
PROT UR STRIP.AUTO-MCNC: 30 MG/DL
RBC # BLD AUTO: 5.05 M/UL (ref 4.2–5.9)
RBC #/AREA URNS HPF: ABNORMAL /HPF (ref 0–4)
REASON FOR REJECTION: NORMAL
REJECTED TEST: NORMAL
SARS-COV-2 RNA RESP QL NAA+PROBE: NOT DETECTED
SODIUM SERPL-SCNC: 139 MMOL/L (ref 136–145)
SP GR UR STRIP.AUTO: 1.02 (ref 1–1.03)
TROPONIN, HIGH SENSITIVITY: 41 NG/L (ref 0–22)
TROPONIN, HIGH SENSITIVITY: 47 NG/L (ref 0–22)
UA COMPLETE W REFLEX CULTURE PNL UR: ABNORMAL
UA DIPSTICK W REFLEX MICRO PNL UR: YES
URN SPEC COLLECT METH UR: ABNORMAL
UROBILINOGEN UR STRIP-ACNC: 0.2 E.U./DL
WBC # BLD AUTO: 19 K/UL (ref 4–11)
WBC #/AREA URNS HPF: ABNORMAL /HPF (ref 0–5)

## 2023-12-26 PROCEDURE — 83605 ASSAY OF LACTIC ACID: CPT

## 2023-12-26 PROCEDURE — 87636 SARSCOV2 & INF A&B AMP PRB: CPT

## 2023-12-26 PROCEDURE — 83880 ASSAY OF NATRIURETIC PEPTIDE: CPT

## 2023-12-26 PROCEDURE — 36415 COLL VENOUS BLD VENIPUNCTURE: CPT

## 2023-12-26 PROCEDURE — 71045 X-RAY EXAM CHEST 1 VIEW: CPT

## 2023-12-26 PROCEDURE — 81001 URINALYSIS AUTO W/SCOPE: CPT

## 2023-12-26 PROCEDURE — G0378 HOSPITAL OBSERVATION PER HR: HCPCS

## 2023-12-26 PROCEDURE — 85025 COMPLETE CBC W/AUTO DIFF WBC: CPT

## 2023-12-26 PROCEDURE — 84484 ASSAY OF TROPONIN QUANT: CPT

## 2023-12-26 PROCEDURE — 84145 PROCALCITONIN (PCT): CPT

## 2023-12-26 PROCEDURE — 80053 COMPREHEN METABOLIC PANEL: CPT

## 2023-12-26 PROCEDURE — 99285 EMERGENCY DEPT VISIT HI MDM: CPT

## 2023-12-26 PROCEDURE — 93005 ELECTROCARDIOGRAM TRACING: CPT | Performed by: NURSE PRACTITIONER

## 2023-12-26 PROCEDURE — 93010 ELECTROCARDIOGRAM REPORT: CPT | Performed by: INTERNAL MEDICINE

## 2023-12-26 NOTE — ED PROVIDER NOTES
[address] MEDICAL-SURGICAL  EMERGENCY DEPARTMENT ENCOUNTER      I am the Primary Clinician of Record    Note started: 2:32 PM EST 12/26/23     I have seen and evaluated this patient with my supervising physician Dr Suarez        Pt Name: Kareem Hansen  MRN: [id number]  Birthdate 1950  Dateof evaluation: 12/26/2023  Provider: Mercy London, APRN - CNP  PCP: Jorge L Wade DO  ED Attending: No att. providers found      CHIEF COMPLAINT       Chief Complaint   Patient presents with    Fatigue     Patient has increasing weakness since yesterday, normally sees wound care for his legs       HISTORY OF PRESENTILLNESS   (Location/Symptom, Timing/Onset, Context/Setting, Quality, Duration, Modifying Factors, Severity)  Note limiting factors.     Kareem Hansen is a 73 y.o. male for fatigue. Onset was yesterday.  Context includes pt was brought in via ems after noticed that he was having increased generalized weakness.  Neighbor has been caring for him in addition to his daughter-in-law however the neighbor reports over the last day or so he has become more fatigued and increased generalized weakness.  Neighbor is concerned that he is not able to care for himself any longer.  Patient denies chest pain or shortness of breath.  He does complain of just being generally fatigued and weak. Alleviating factors include nothing.  Aggravating factors include nothing. Pain is 0/10.  Nothing has been used for pain today.     Nursing Notes were all reviewed and agreed with or any disagreements were addressed  in the HPI.      REVIEW OF SYSTEMS       Review of Systems   Constitutional:  Positive for fatigue. Negative for activity change, appetite change and fever.   HENT:  Negative for congestion, facial swelling, rhinorrhea and sore throat.    Eyes:  Negative for visual disturbance.   Respiratory:  Negative for shortness of breath.    Cardiovascular:  Negative for chest pain.   Gastrointestinal:

## 2023-12-26 NOTE — ED NOTES
Report from EMS - patient alert showing no signs of distress.  Family member at side.  BRITT Falk updated.  Will complete triage asap.   C Ju 15-18 Zone RN

## 2023-12-27 ENCOUNTER — APPOINTMENT (OUTPATIENT)
Dept: GENERAL RADIOLOGY | Age: 73
DRG: 871 | End: 2023-12-27
Payer: MEDICARE

## 2023-12-27 ENCOUNTER — APPOINTMENT (OUTPATIENT)
Dept: INTERVENTIONAL RADIOLOGY/VASCULAR | Age: 73
DRG: 871 | End: 2023-12-27
Payer: MEDICARE

## 2023-12-27 PROBLEM — A41.9 SEPSIS (HCC): Status: ACTIVE | Noted: 2023-12-27

## 2023-12-27 PROBLEM — I48.91 ATRIAL FIBRILLATION (HCC): Status: ACTIVE | Noted: 2019-10-22

## 2023-12-27 PROBLEM — J18.9 PNEUMONIA DUE TO INFECTIOUS ORGANISM: Status: ACTIVE | Noted: 2023-12-27

## 2023-12-27 PROBLEM — R53.1 GENERALIZED WEAKNESS: Status: ACTIVE | Noted: 2023-12-27

## 2023-12-27 LAB
BASE EXCESS BLDV CALC-SCNC: 1.8 MMOL/L (ref -3–3)
CO2 BLDV-SCNC: 29 MMOL/L
COHGB MFR BLDV: 2 % (ref 0–1.5)
CRP SERPL-MCNC: 155.9 MG/L (ref 0–5.1)
ERYTHROCYTE [SEDIMENTATION RATE] IN BLOOD BY WESTERGREN METHOD: 21 MM/HR (ref 0–20)
GLUCOSE BLD-MCNC: 116 MG/DL (ref 70–99)
GLUCOSE BLD-MCNC: 119 MG/DL (ref 70–99)
GLUCOSE BLD-MCNC: 128 MG/DL (ref 70–99)
GLUCOSE BLD-MCNC: 131 MG/DL (ref 70–99)
GLUCOSE BLD-MCNC: 140 MG/DL (ref 70–99)
HCO3 BLDV-SCNC: 27.3 MMOL/L (ref 23–29)
LACTATE BLDV-SCNC: 1.1 MMOL/L (ref 0.4–1.9)
LACTATE BLDV-SCNC: 2.9 MMOL/L (ref 0.4–1.9)
METHGB MFR BLDV: 0.3 %
O2 CT VFR BLDV CALC: 17 VOL %
O2 THERAPY: ABNORMAL
PCO2 BLDV: 45.9 MMHG (ref 40–50)
PERFORMED ON: ABNORMAL
PH BLDV: 7.39 [PH] (ref 7.35–7.45)
PO2 BLDV: 41.6 MMHG (ref 25–40)
SAO2 % BLDV: 77 %
TROPONIN, HIGH SENSITIVITY: 42 NG/L (ref 0–22)

## 2023-12-27 PROCEDURE — 94640 AIRWAY INHALATION TREATMENT: CPT

## 2023-12-27 PROCEDURE — 6370000000 HC RX 637 (ALT 250 FOR IP): Performed by: INTERNAL MEDICINE

## 2023-12-27 PROCEDURE — 73030 X-RAY EXAM OF SHOULDER: CPT

## 2023-12-27 PROCEDURE — 36415 COLL VENOUS BLD VENIPUNCTURE: CPT

## 2023-12-27 PROCEDURE — 94660 CPAP INITIATION&MGMT: CPT

## 2023-12-27 PROCEDURE — 6370000000 HC RX 637 (ALT 250 FOR IP)

## 2023-12-27 PROCEDURE — 6360000002 HC RX W HCPCS

## 2023-12-27 PROCEDURE — 73630 X-RAY EXAM OF FOOT: CPT

## 2023-12-27 PROCEDURE — 83605 ASSAY OF LACTIC ACID: CPT

## 2023-12-27 PROCEDURE — 05H933Z INSERTION OF INFUSION DEVICE INTO RIGHT BRACHIAL VEIN, PERCUTANEOUS APPROACH: ICD-10-PCS | Performed by: INTERNAL MEDICINE

## 2023-12-27 PROCEDURE — 2580000003 HC RX 258: Performed by: EMERGENCY MEDICINE

## 2023-12-27 PROCEDURE — 1200000000 HC SEMI PRIVATE

## 2023-12-27 PROCEDURE — 85652 RBC SED RATE AUTOMATED: CPT

## 2023-12-27 PROCEDURE — 94761 N-INVAS EAR/PLS OXIMETRY MLT: CPT

## 2023-12-27 PROCEDURE — 82803 BLOOD GASES ANY COMBINATION: CPT

## 2023-12-27 PROCEDURE — 73590 X-RAY EXAM OF LOWER LEG: CPT

## 2023-12-27 PROCEDURE — 99233 SBSQ HOSP IP/OBS HIGH 50: CPT

## 2023-12-27 PROCEDURE — 36410 VNPNXR 3YR/> PHY/QHP DX/THER: CPT

## 2023-12-27 PROCEDURE — 6360000002 HC RX W HCPCS: Performed by: EMERGENCY MEDICINE

## 2023-12-27 PROCEDURE — 2700000000 HC OXYGEN THERAPY PER DAY

## 2023-12-27 PROCEDURE — 76937 US GUIDE VASCULAR ACCESS: CPT

## 2023-12-27 PROCEDURE — 2580000003 HC RX 258

## 2023-12-27 PROCEDURE — 2580000003 HC RX 258: Performed by: INTERNAL MEDICINE

## 2023-12-27 PROCEDURE — 5A09357 ASSISTANCE WITH RESPIRATORY VENTILATION, LESS THAN 24 CONSECUTIVE HOURS, CONTINUOUS POSITIVE AIRWAY PRESSURE: ICD-10-PCS | Performed by: INTERNAL MEDICINE

## 2023-12-27 PROCEDURE — 86140 C-REACTIVE PROTEIN: CPT

## 2023-12-27 PROCEDURE — C1751 CATH, INF, PER/CENT/MIDLINE: HCPCS

## 2023-12-27 PROCEDURE — 84484 ASSAY OF TROPONIN QUANT: CPT

## 2023-12-27 RX ORDER — INSULIN LISPRO 100 [IU]/ML
0-4 INJECTION, SOLUTION INTRAVENOUS; SUBCUTANEOUS
Status: DISCONTINUED | OUTPATIENT
Start: 2023-12-27 | End: 2024-01-03

## 2023-12-27 RX ORDER — ISOSORBIDE MONONITRATE 30 MG/1
30 TABLET, EXTENDED RELEASE ORAL DAILY
Status: DISCONTINUED | OUTPATIENT
Start: 2023-12-27 | End: 2024-01-09 | Stop reason: HOSPADM

## 2023-12-27 RX ORDER — MAGNESIUM SULFATE IN WATER 40 MG/ML
2000 INJECTION, SOLUTION INTRAVENOUS PRN
Status: DISCONTINUED | OUTPATIENT
Start: 2023-12-27 | End: 2024-01-09 | Stop reason: HOSPADM

## 2023-12-27 RX ORDER — GLUCAGON 1 MG/ML
1 KIT INJECTION PRN
Status: DISCONTINUED | OUTPATIENT
Start: 2023-12-27 | End: 2024-01-09 | Stop reason: HOSPADM

## 2023-12-27 RX ORDER — ATORVASTATIN CALCIUM 40 MG/1
40 TABLET, FILM COATED ORAL NIGHTLY
Status: DISCONTINUED | OUTPATIENT
Start: 2023-12-27 | End: 2024-01-09 | Stop reason: HOSPADM

## 2023-12-27 RX ORDER — ONDANSETRON 4 MG/1
4 TABLET, ORALLY DISINTEGRATING ORAL EVERY 8 HOURS PRN
Status: DISCONTINUED | OUTPATIENT
Start: 2023-12-27 | End: 2024-01-09 | Stop reason: HOSPADM

## 2023-12-27 RX ORDER — POLYETHYLENE GLYCOL 3350 17 G/17G
17 POWDER, FOR SOLUTION ORAL DAILY PRN
Status: DISCONTINUED | OUTPATIENT
Start: 2023-12-27 | End: 2024-01-09 | Stop reason: HOSPADM

## 2023-12-27 RX ORDER — POTASSIUM CHLORIDE 7.45 MG/ML
10 INJECTION INTRAVENOUS PRN
Status: DISCONTINUED | OUTPATIENT
Start: 2023-12-27 | End: 2024-01-02

## 2023-12-27 RX ORDER — PANTOPRAZOLE SODIUM 40 MG/1
40 TABLET, DELAYED RELEASE ORAL
Status: DISCONTINUED | OUTPATIENT
Start: 2023-12-27 | End: 2024-01-09 | Stop reason: HOSPADM

## 2023-12-27 RX ORDER — ALBUTEROL SULFATE 90 UG/1
2 AEROSOL, METERED RESPIRATORY (INHALATION)
Status: DISCONTINUED | OUTPATIENT
Start: 2023-12-28 | End: 2024-01-09 | Stop reason: HOSPADM

## 2023-12-27 RX ORDER — POTASSIUM CHLORIDE 20 MEQ/1
40 TABLET, EXTENDED RELEASE ORAL PRN
Status: DISCONTINUED | OUTPATIENT
Start: 2023-12-27 | End: 2024-01-02

## 2023-12-27 RX ORDER — ASPIRIN 81 MG/1
81 TABLET, CHEWABLE ORAL DAILY
Status: DISCONTINUED | OUTPATIENT
Start: 2023-12-27 | End: 2024-01-09 | Stop reason: HOSPADM

## 2023-12-27 RX ORDER — ENOXAPARIN SODIUM 100 MG/ML
30 INJECTION SUBCUTANEOUS 2 TIMES DAILY
Status: DISCONTINUED | OUTPATIENT
Start: 2023-12-27 | End: 2023-12-27

## 2023-12-27 RX ORDER — DEXTROSE MONOHYDRATE 100 MG/ML
INJECTION, SOLUTION INTRAVENOUS CONTINUOUS PRN
Status: DISCONTINUED | OUTPATIENT
Start: 2023-12-27 | End: 2024-01-09 | Stop reason: HOSPADM

## 2023-12-27 RX ORDER — ONDANSETRON 2 MG/ML
4 INJECTION INTRAMUSCULAR; INTRAVENOUS EVERY 6 HOURS PRN
Status: DISCONTINUED | OUTPATIENT
Start: 2023-12-27 | End: 2024-01-09 | Stop reason: HOSPADM

## 2023-12-27 RX ORDER — SODIUM CHLORIDE 0.9 % (FLUSH) 0.9 %
5-40 SYRINGE (ML) INJECTION PRN
Status: DISCONTINUED | OUTPATIENT
Start: 2023-12-27 | End: 2024-01-09 | Stop reason: HOSPADM

## 2023-12-27 RX ORDER — SPIRONOLACTONE 25 MG/1
25 TABLET ORAL DAILY
Status: DISCONTINUED | OUTPATIENT
Start: 2023-12-27 | End: 2023-12-27

## 2023-12-27 RX ORDER — TORSEMIDE 20 MG/1
40 TABLET ORAL DAILY
Status: ON HOLD | COMMUNITY
End: 2024-01-09

## 2023-12-27 RX ORDER — ALBUTEROL SULFATE 90 UG/1
2 AEROSOL, METERED RESPIRATORY (INHALATION) EVERY 6 HOURS PRN
Status: DISCONTINUED | OUTPATIENT
Start: 2023-12-27 | End: 2023-12-27

## 2023-12-27 RX ORDER — INSULIN GLARGINE 100 [IU]/ML
28 INJECTION, SOLUTION SUBCUTANEOUS 2 TIMES DAILY
Status: DISCONTINUED | OUTPATIENT
Start: 2023-12-27 | End: 2024-01-06

## 2023-12-27 RX ORDER — ENOXAPARIN SODIUM 150 MG/ML
1 INJECTION SUBCUTANEOUS 2 TIMES DAILY
Status: DISCONTINUED | OUTPATIENT
Start: 2023-12-27 | End: 2023-12-27

## 2023-12-27 RX ORDER — TRAMADOL HYDROCHLORIDE 50 MG/1
50 TABLET ORAL EVERY 6 HOURS PRN
Status: DISCONTINUED | OUTPATIENT
Start: 2023-12-27 | End: 2024-01-04

## 2023-12-27 RX ORDER — MONTELUKAST SODIUM 10 MG/1
10 TABLET ORAL DAILY
Status: DISCONTINUED | OUTPATIENT
Start: 2023-12-27 | End: 2024-01-09 | Stop reason: HOSPADM

## 2023-12-27 RX ORDER — TORSEMIDE 20 MG/1
20 TABLET ORAL DAILY
Status: DISCONTINUED | OUTPATIENT
Start: 2023-12-27 | End: 2023-12-29

## 2023-12-27 RX ORDER — SODIUM CHLORIDE 0.9 % (FLUSH) 0.9 %
5-40 SYRINGE (ML) INJECTION EVERY 12 HOURS SCHEDULED
Status: DISCONTINUED | OUTPATIENT
Start: 2023-12-27 | End: 2024-01-09 | Stop reason: HOSPADM

## 2023-12-27 RX ORDER — DEXTROSE MONOHYDRATE 100 MG/ML
INJECTION, SOLUTION INTRAVENOUS CONTINUOUS PRN
Status: DISCONTINUED | OUTPATIENT
Start: 2023-12-27 | End: 2023-12-27 | Stop reason: SDUPTHER

## 2023-12-27 RX ORDER — ALBUTEROL SULFATE 90 UG/1
2 AEROSOL, METERED RESPIRATORY (INHALATION) EVERY 4 HOURS PRN
Status: DISCONTINUED | OUTPATIENT
Start: 2023-12-27 | End: 2024-01-09 | Stop reason: HOSPADM

## 2023-12-27 RX ORDER — SODIUM CHLORIDE 9 MG/ML
INJECTION, SOLUTION INTRAVENOUS PRN
Status: DISCONTINUED | OUTPATIENT
Start: 2023-12-27 | End: 2024-01-09 | Stop reason: HOSPADM

## 2023-12-27 RX ORDER — GLUCAGON 1 MG/ML
1 KIT INJECTION PRN
Status: DISCONTINUED | OUTPATIENT
Start: 2023-12-27 | End: 2023-12-27 | Stop reason: SDUPTHER

## 2023-12-27 RX ORDER — INSULIN LISPRO 100 [IU]/ML
0-4 INJECTION, SOLUTION INTRAVENOUS; SUBCUTANEOUS NIGHTLY
Status: DISCONTINUED | OUTPATIENT
Start: 2023-12-27 | End: 2024-01-03

## 2023-12-27 RX ADMIN — CEFTRIAXONE SODIUM 1000 MG: 1 INJECTION, POWDER, FOR SOLUTION INTRAMUSCULAR; INTRAVENOUS at 00:35

## 2023-12-27 RX ADMIN — AZITHROMYCIN MONOHYDRATE 500 MG: 500 INJECTION, POWDER, LYOPHILIZED, FOR SOLUTION INTRAVENOUS at 01:09

## 2023-12-27 RX ADMIN — ISOSORBIDE MONONITRATE 30 MG: 30 TABLET, EXTENDED RELEASE ORAL at 13:51

## 2023-12-27 RX ADMIN — TRAMADOL HYDROCHLORIDE 50 MG: 50 TABLET ORAL at 21:36

## 2023-12-27 RX ADMIN — INSULIN GLARGINE 28 UNITS: 100 INJECTION, SOLUTION SUBCUTANEOUS at 09:41

## 2023-12-27 RX ADMIN — SODIUM CHLORIDE, PRESERVATIVE FREE 10 ML: 5 INJECTION INTRAVENOUS at 09:40

## 2023-12-27 RX ADMIN — PANTOPRAZOLE SODIUM 40 MG: 40 TABLET, DELAYED RELEASE ORAL at 09:40

## 2023-12-27 RX ADMIN — APIXABAN 5 MG: 5 TABLET, FILM COATED ORAL at 09:40

## 2023-12-27 RX ADMIN — ASPIRIN 81 MG: 81 TABLET, CHEWABLE ORAL at 09:40

## 2023-12-27 RX ADMIN — MONTELUKAST SODIUM 10 MG: 10 TABLET, COATED ORAL at 09:40

## 2023-12-27 RX ADMIN — APIXABAN 5 MG: 5 TABLET, FILM COATED ORAL at 20:41

## 2023-12-27 RX ADMIN — INSULIN GLARGINE 28 UNITS: 100 INJECTION, SOLUTION SUBCUTANEOUS at 20:41

## 2023-12-27 RX ADMIN — TORSEMIDE 20 MG: 20 TABLET ORAL at 13:51

## 2023-12-27 RX ADMIN — SACUBITRIL AND VALSARTAN 1 TABLET: 49; 51 TABLET, FILM COATED ORAL at 20:41

## 2023-12-27 RX ADMIN — Medication 2 PUFF: at 20:42

## 2023-12-27 RX ADMIN — ATORVASTATIN CALCIUM 40 MG: 40 TABLET, FILM COATED ORAL at 20:41

## 2023-12-27 RX ADMIN — SACUBITRIL AND VALSARTAN 1 TABLET: 49; 51 TABLET, FILM COATED ORAL at 09:40

## 2023-12-27 RX ADMIN — CEFEPIME 2000 MG: 2 INJECTION, POWDER, FOR SOLUTION INTRAVENOUS at 13:55

## 2023-12-27 RX ADMIN — VANCOMYCIN HYDROCHLORIDE 1250 MG: 10 INJECTION, POWDER, LYOPHILIZED, FOR SOLUTION INTRAVENOUS at 14:07

## 2023-12-27 RX ADMIN — SODIUM CHLORIDE 20 ML: 9 INJECTION, SOLUTION INTRAVENOUS at 00:31

## 2023-12-27 ASSESSMENT — PAIN - FUNCTIONAL ASSESSMENT: PAIN_FUNCTIONAL_ASSESSMENT: ACTIVITIES ARE NOT PREVENTED

## 2023-12-27 ASSESSMENT — PAIN DESCRIPTION - LOCATION: LOCATION: HEAD

## 2023-12-27 ASSESSMENT — PAIN DESCRIPTION - PAIN TYPE: TYPE: ACUTE PAIN

## 2023-12-27 ASSESSMENT — PAIN SCALES - GENERAL: PAINLEVEL_OUTOF10: 9

## 2023-12-27 ASSESSMENT — PAIN DESCRIPTION - DESCRIPTORS: DESCRIPTORS: ACHING

## 2023-12-27 ASSESSMENT — PAIN DESCRIPTION - ORIENTATION: ORIENTATION: OTHER (COMMENT)

## 2023-12-27 NOTE — ED PROVIDER NOTES
I independently performed a history and physical on Kareem Hansen.   All diagnostic, treatment, and disposition decisions were made by myself in conjunction with the advanced practice provider.     For further details of Kareem Hansen's emergency department encounter, please see Mercy London NP's documentation.      Chief complaint: Fatigue (Patient has increasing weakness since yesterday, normally sees wound care for his legs)      Patient has increased weakness for the last day or 2.  He is supposed to use CPAP at night for sleep apnea but has not been using it because he wants to be able to hear his word because his wife is currently hospitalized at [facility].  Today he felt very weak and could not get up so his neighbor helped him get up and got into the hospital.  Patient was post to have wound care for his legs but the visiting nurse could not come the last few days.  He denies any fevers but does have some shortness of breath.  Denies chest pain or vomiting.  On exam he does have bilateral lower extremity edema and heart regular rate and rhythm and lungs with few scattered rales and mild to moderate respiratory distress.  History From: History from : Patient  Limitations to history : None    EKG  The Ekg interpreted by me shows  atrial fibrillation with a rate of 78  Axis is   Left axis deviation  QTc is  normal  ST Segments: normal  No significant change from prior EKG dated 30 may 2023    Labs Reviewed   COMPREHENSIVE METABOLIC PANEL W/ REFLEX TO MG FOR LOW K - Abnormal; Notable for the following components:       Result Value    Glucose 183 (*)     BUN 24 (*)     Total Bilirubin 2.3 (*)     All other components within normal limits   TROPONIN - Abnormal; Notable for the following components:    Troponin, High Sensitivity 47 (*)     All other components within normal limits   TROPONIN - Abnormal; Notable for the following components:    Troponin, High Sensitivity 41 (*)     All other components

## 2023-12-27 NOTE — DISCHARGE INSTRUCTIONS
Heart Failure Resources:  Heart Failure Interactive Workbook:  Go to [email or web address] for a Free Heart Failure Interactive Workbook provided by The American Heart Association. This interactive workbook will provide information on Healthier Living with Heart Failure. Please copy and paste link into search bar. Use your mouse to scroll through the pages.    HF [place] kyle:   Heart Failure Free smart phone kyle available for iPhone and Android download. Use your phone to track sodium intake, fluid intake, symptoms, and weight.     Low Sodium Diet / Recipes:  Go to www.[employer] website for “renal” diet which is Low Sodium! [employer] is a dialysis company, but this website offers free seasonal cookbooks. Each quarter, they will release 25-30 new recipes with a breakdown of calories, sodium, and glucose. You can also go to [employer]/recipes website for free recipes.     Discharge Instruction Video:  Scan the QR code below with your camera and click the canva.com link to open the video and watch educational information on Heart Failure and Medications from one of our nurses.   [email or web address]/edit    Home Exercise Program:   Identification of Green/Yellow/Red zones:  You should be able to identify when you feel good (green zone), if you have 1-2 symptoms of HF (yellow zone), or if you are in need of medical attention (red zone).  In your CHF education folder you were provided a “stop light tool” to outline this information.     We want to you to rate your exertion levels:    Our therapy team has discussed means of identification with you such as the \"Yue scale.\"  The Yue rating scale ranges from 6 to 20, where 6 means \"no exertion at all\" and 20 means \"maximal exertion.\" The goal is to use this to gauge how much effort it is taking for you to do your normal daily tasks.   You should be able to recognize when too much exertion is

## 2023-12-27 NOTE — H&P
surgery; Abdomen surgery (05/16/2014); hernia repair (08/14/2015); pr injection hip arthrography w/anesthesia (Right, 9/19/2018); epidural steroid injection (Right, 1/21/2019); epidural steroid injection (Right, 2/11/2019); Cardioversion (12/04/2019); vascular surgery (Left, 05/2021); vascular surgery (Right, 05/10/2021); and IR MIDLINE CATH (5/31/2023).  Allergies:   Allergies   Allergen Reactions    Hydrocodone-Acetaminophen Other (See Comments)     jittery     Fam HX:  family history includes Heart Attack in his father; Heart Disease in his brother and father; High Blood Pressure in his brother; Kidney Disease in his mother; Stroke in his brother.  Soc HX:   Social History     Socioeconomic History    Marital status:    Tobacco Use    Smoking status: Never    Smokeless tobacco: Never   Vaping Use    Vaping Use: Never used   Substance and Sexual Activity    Alcohol use: Not Currently     Comment: over a year    Drug use: No    Sexual activity: Yes     Partners: Female     Social Determinants of Health     Financial Resource Strain: High Risk (9/8/2023)    Overall Financial Resource Strain (CARDIA)     Difficulty of Paying Living Expenses: Hard   Transportation Needs: No Transportation Needs (7/3/2023)    PRAPARE - Transportation     Lack of Transportation (Medical): No     Lack of Transportation (Non-Medical): No   Physical Activity: Inactive (7/25/2023)    Exercise Vital Sign     Days of Exercise per Week: 0 days     Minutes of Exercise per Session: 0 min   Stress: Stress Concern Present (9/8/2023)    [employer] [place] [employer] - Occupational Stress Questionnaire     Feeling of Stress : Very much   Social Connections: Moderately Integrated (9/8/2023)    Social Connection and Isolation Panel [NHANES]     Frequency of Communication with Friends and Family: Twice a week     Frequency of Social Gatherings with Friends and Family: Twice a week     Attends [employer] Services: More than 4 times per

## 2023-12-27 NOTE — ED NOTES
Pt found asleep with oxygen sat 54%. Pt placed on 4L per Nasal Cannula. Pt states he is supposed to wear CPAP at night. Dr. Suarez Notified.

## 2023-12-28 PROBLEM — G47.33 OSA (OBSTRUCTIVE SLEEP APNEA): Status: ACTIVE | Noted: 2017-11-14

## 2023-12-28 LAB
ANION GAP SERPL CALCULATED.3IONS-SCNC: 6 MMOL/L (ref 3–16)
BASOPHILS # BLD: 0 K/UL (ref 0–0.2)
BASOPHILS NFR BLD: 0.3 %
BUN SERPL-MCNC: 17 MG/DL (ref 7–20)
CALCIUM SERPL-MCNC: 8.5 MG/DL (ref 8.3–10.6)
CHLORIDE SERPL-SCNC: 101 MMOL/L (ref 99–110)
CO2 SERPL-SCNC: 28 MMOL/L (ref 21–32)
CREAT SERPL-MCNC: 0.6 MG/DL (ref 0.8–1.3)
DEPRECATED RDW RBC AUTO: 14.8 % (ref 12.4–15.4)
EOSINOPHIL # BLD: 0 K/UL (ref 0–0.6)
EOSINOPHIL NFR BLD: 0.1 %
GFR SERPLBLD CREATININE-BSD FMLA CKD-EPI: >60 ML/MIN/{1.73_M2}
GLUCOSE BLD-MCNC: 122 MG/DL (ref 70–99)
GLUCOSE BLD-MCNC: 128 MG/DL (ref 70–99)
GLUCOSE BLD-MCNC: 221 MG/DL (ref 70–99)
GLUCOSE BLD-MCNC: 64 MG/DL (ref 70–99)
GLUCOSE SERPL-MCNC: 103 MG/DL (ref 70–99)
HCT VFR BLD AUTO: 44.3 % (ref 40.5–52.5)
HGB BLD-MCNC: 14.7 G/DL (ref 13.5–17.5)
LYMPHOCYTES # BLD: 0.7 K/UL (ref 1–5.1)
LYMPHOCYTES NFR BLD: 7.8 %
MCH RBC QN AUTO: 29.5 PG (ref 26–34)
MCHC RBC AUTO-ENTMCNC: 33.2 G/DL (ref 31–36)
MCV RBC AUTO: 88.9 FL (ref 80–100)
MONOCYTES # BLD: 0.4 K/UL (ref 0–1.3)
MONOCYTES NFR BLD: 5.2 %
NEUTROPHILS # BLD: 7.2 K/UL (ref 1.7–7.7)
NEUTROPHILS NFR BLD: 86.6 %
PERFORMED ON: ABNORMAL
PLATELET # BLD AUTO: 131 K/UL (ref 135–450)
PMV BLD AUTO: 7.9 FL (ref 5–10.5)
POTASSIUM SERPL-SCNC: 4 MMOL/L (ref 3.5–5.1)
RBC # BLD AUTO: 4.99 M/UL (ref 4.2–5.9)
SODIUM SERPL-SCNC: 135 MMOL/L (ref 136–145)
WBC # BLD AUTO: 8.3 K/UL (ref 4–11)

## 2023-12-28 PROCEDURE — 1200000000 HC SEMI PRIVATE

## 2023-12-28 PROCEDURE — 85025 COMPLETE CBC W/AUTO DIFF WBC: CPT

## 2023-12-28 PROCEDURE — 6370000000 HC RX 637 (ALT 250 FOR IP)

## 2023-12-28 PROCEDURE — 6360000002 HC RX W HCPCS

## 2023-12-28 PROCEDURE — 6370000000 HC RX 637 (ALT 250 FOR IP): Performed by: INTERNAL MEDICINE

## 2023-12-28 PROCEDURE — 2700000000 HC OXYGEN THERAPY PER DAY

## 2023-12-28 PROCEDURE — 97161 PT EVAL LOW COMPLEX 20 MIN: CPT

## 2023-12-28 PROCEDURE — 97530 THERAPEUTIC ACTIVITIES: CPT

## 2023-12-28 PROCEDURE — 94761 N-INVAS EAR/PLS OXIMETRY MLT: CPT

## 2023-12-28 PROCEDURE — 2580000003 HC RX 258: Performed by: INTERNAL MEDICINE

## 2023-12-28 PROCEDURE — 83036 HEMOGLOBIN GLYCOSYLATED A1C: CPT

## 2023-12-28 PROCEDURE — 99233 SBSQ HOSP IP/OBS HIGH 50: CPT | Performed by: INTERNAL MEDICINE

## 2023-12-28 PROCEDURE — 97166 OT EVAL MOD COMPLEX 45 MIN: CPT

## 2023-12-28 PROCEDURE — 2580000003 HC RX 258

## 2023-12-28 PROCEDURE — 94640 AIRWAY INHALATION TREATMENT: CPT

## 2023-12-28 PROCEDURE — 36415 COLL VENOUS BLD VENIPUNCTURE: CPT

## 2023-12-28 PROCEDURE — 94660 CPAP INITIATION&MGMT: CPT

## 2023-12-28 PROCEDURE — 80048 BASIC METABOLIC PNL TOTAL CA: CPT

## 2023-12-28 RX ADMIN — CEFEPIME 2000 MG: 2 INJECTION, POWDER, FOR SOLUTION INTRAVENOUS at 02:06

## 2023-12-28 RX ADMIN — VANCOMYCIN HYDROCHLORIDE 1250 MG: 10 INJECTION, POWDER, LYOPHILIZED, FOR SOLUTION INTRAVENOUS at 02:07

## 2023-12-28 RX ADMIN — SACUBITRIL AND VALSARTAN 1 TABLET: 49; 51 TABLET, FILM COATED ORAL at 22:24

## 2023-12-28 RX ADMIN — TRAMADOL HYDROCHLORIDE 50 MG: 50 TABLET ORAL at 18:13

## 2023-12-28 RX ADMIN — SODIUM CHLORIDE, PRESERVATIVE FREE 10 ML: 5 INJECTION INTRAVENOUS at 22:27

## 2023-12-28 RX ADMIN — Medication 2 PUFF: at 08:34

## 2023-12-28 RX ADMIN — MONTELUKAST SODIUM 10 MG: 10 TABLET, COATED ORAL at 10:04

## 2023-12-28 RX ADMIN — PANTOPRAZOLE SODIUM 40 MG: 40 TABLET, DELAYED RELEASE ORAL at 05:42

## 2023-12-28 RX ADMIN — ASPIRIN 81 MG: 81 TABLET, CHEWABLE ORAL at 10:04

## 2023-12-28 RX ADMIN — VANCOMYCIN HYDROCHLORIDE 1250 MG: 10 INJECTION, POWDER, LYOPHILIZED, FOR SOLUTION INTRAVENOUS at 14:14

## 2023-12-28 RX ADMIN — TORSEMIDE 20 MG: 20 TABLET ORAL at 10:04

## 2023-12-28 RX ADMIN — CEFEPIME 2000 MG: 2 INJECTION, POWDER, FOR SOLUTION INTRAVENOUS at 14:13

## 2023-12-28 RX ADMIN — ISOSORBIDE MONONITRATE 30 MG: 30 TABLET, EXTENDED RELEASE ORAL at 10:04

## 2023-12-28 RX ADMIN — APIXABAN 5 MG: 5 TABLET, FILM COATED ORAL at 10:04

## 2023-12-28 RX ADMIN — TRAMADOL HYDROCHLORIDE 50 MG: 50 TABLET ORAL at 05:42

## 2023-12-28 RX ADMIN — SODIUM CHLORIDE, PRESERVATIVE FREE 10 ML: 5 INJECTION INTRAVENOUS at 10:05

## 2023-12-28 RX ADMIN — APIXABAN 5 MG: 5 TABLET, FILM COATED ORAL at 22:24

## 2023-12-28 RX ADMIN — Medication 2 PUFF: at 20:35

## 2023-12-28 RX ADMIN — INSULIN LISPRO 1 UNITS: 100 INJECTION, SOLUTION INTRAVENOUS; SUBCUTANEOUS at 12:15

## 2023-12-28 RX ADMIN — SACUBITRIL AND VALSARTAN 1 TABLET: 49; 51 TABLET, FILM COATED ORAL at 10:04

## 2023-12-28 RX ADMIN — ATORVASTATIN CALCIUM 40 MG: 40 TABLET, FILM COATED ORAL at 22:24

## 2023-12-28 ASSESSMENT — PAIN SCALES - GENERAL: PAINLEVEL_OUTOF10: 0

## 2023-12-28 NOTE — FLOWSHEET NOTE
12/28/23 0200   Vital Signs   Temp 99 °F (37.2 °C)   Temp Source Axillary   Pulse 74   Heart Rate Source Monitor   Respirations 20   BP (!) 145/94   MAP (Calculated) 111   BP Location Left upper arm   BP Method Automatic   Patient Position Semi fowlers   Opioid-Induced Sedation   POSS Score 1   Oxygen Therapy   SpO2 98 %   O2 Device Nasal cannula   O2 Flow Rate (L/min) 4 L/min     Pt resting in bed with eyes closed. Pt woken up for VS as seen above. Pt denies needs at this time.

## 2023-12-28 NOTE — CARE COORDINATION
Case Management Assessment  Initial Evaluation    Date/Time of Evaluation: 12/28/2023 2:32 PM  Assessment Completed by: Claudia Givens RN    If patient is discharged prior to next notation, then this note serves as note for discharge by case management.    Patient Name: Kareem Hansen                   YOB: 1950  Diagnosis: MONIQUE (obstructive sleep apnea) [G47.33]  Generalized weakness [R53.1]  Pneumonia due to infectious organism [J18.9]  Pneumonia due to infectious organism, unspecified laterality, unspecified part of lung [J18.9]  Community acquired pneumonia due to Chlamydia species [J16.0]                   Date / Time: 12/26/2023  1:21 PM    Patient Admission Status: Inpatient   Readmission Risk (Low < 19, Mod (19-27), High > 27): Readmission Risk Score: 16.1    Current PCP: Jorge L Wade, DO  PCP verified by CM? Yes (ABEL Wade)    Chart Reviewed: Yes      History Provided by: Patient  Patient Orientation: Alert and Oriented    Patient Cognition: Alert    Hospitalization in the last 30 days (Readmission):  No    If yes, Readmission Assessment in CM Navigator will be completed.    Advance Directives:      Code Status: Full Code   Patient's Primary Decision Maker is: Named in Scanned ACP Document    Primary Decision Maker: HoldentoddDayan plata - Spouse - [phone]    Secondary Decision Maker: Elver Wilde Child    Supplemental (Other) Decision Maker: Jacqueline Santizo Child - [phone]    Discharge Planning:    Patient lives with: Spouse/Significant Other Type of Home: House  Primary Care Giver: Self  Patient Support Systems include: Children, Spouse/Significant Other, Friends/Neighbors   Current Financial resources: Other (Comment) ([employer] Medicare)  Current community resources: None  Current services prior to admission: C-pap            Current DME:              Type of Home Care services:  None    ADLS  Prior functional level: Assistance with the following:, Cooking,

## 2023-12-28 NOTE — ACP (ADVANCE CARE PLANNING)
Advance Care Planning     General Advance Care Planning (ACP) Conversation    Date of Conversation: 12/26/2023  Conducted with: Patient with Decision Making Capacity    Healthcare Decision Maker:    Primary Decision Maker: Dayan Hansen - Spouse - [phone]    Secondary Decision Maker: Elver Wilde Mak Child    Supplemental (Other) Decision Maker: Jacqueline Santizo - Mak Child - [phone]  Click here to complete Healthcare Decision Makers including selection of the Healthcare Decision Maker Relationship (ie \"Primary\").   Today we documented Decision Maker(s) consistent with ACP documents on file.    Content/Action Overview:  Has ACP document(s) on file - reflects the patient's care preferences  Reviewed DNR/DNI and patient elects Full Code (Attempt Resuscitation)        Length of Voluntary ACP Conversation in minutes:  <16 minutes (Non-Billable)    Claudia Givens RN

## 2023-12-29 LAB
ANION GAP SERPL CALCULATED.3IONS-SCNC: 8 MMOL/L (ref 3–16)
BASOPHILS # BLD: 0 K/UL (ref 0–0.2)
BASOPHILS NFR BLD: 0.5 %
BUN SERPL-MCNC: 20 MG/DL (ref 7–20)
CALCIUM SERPL-MCNC: 8.1 MG/DL (ref 8.3–10.6)
CHLORIDE SERPL-SCNC: 99 MMOL/L (ref 99–110)
CO2 SERPL-SCNC: 27 MMOL/L (ref 21–32)
CREAT SERPL-MCNC: 0.6 MG/DL (ref 0.8–1.3)
DEPRECATED RDW RBC AUTO: 14.8 % (ref 12.4–15.4)
EOSINOPHIL # BLD: 0.1 K/UL (ref 0–0.6)
EOSINOPHIL NFR BLD: 2 %
EST. AVERAGE GLUCOSE BLD GHB EST-MCNC: 134.1 MG/DL
GFR SERPLBLD CREATININE-BSD FMLA CKD-EPI: >60 ML/MIN/{1.73_M2}
GLUCOSE BLD-MCNC: 110 MG/DL (ref 70–99)
GLUCOSE BLD-MCNC: 121 MG/DL (ref 70–99)
GLUCOSE BLD-MCNC: 136 MG/DL (ref 70–99)
GLUCOSE BLD-MCNC: 163 MG/DL (ref 70–99)
GLUCOSE SERPL-MCNC: 111 MG/DL (ref 70–99)
HBA1C MFR BLD: 6.3 %
HCT VFR BLD AUTO: 40.9 % (ref 40.5–52.5)
HGB BLD-MCNC: 14.1 G/DL (ref 13.5–17.5)
LYMPHOCYTES # BLD: 0.7 K/UL (ref 1–5.1)
LYMPHOCYTES NFR BLD: 12.1 %
MCH RBC QN AUTO: 29.7 PG (ref 26–34)
MCHC RBC AUTO-ENTMCNC: 34.4 G/DL (ref 31–36)
MCV RBC AUTO: 86.3 FL (ref 80–100)
MONOCYTES # BLD: 0.5 K/UL (ref 0–1.3)
MONOCYTES NFR BLD: 9.6 %
NEUTROPHILS # BLD: 4.3 K/UL (ref 1.7–7.7)
NEUTROPHILS NFR BLD: 75.8 %
PERFORMED ON: ABNORMAL
PLATELET # BLD AUTO: 125 K/UL (ref 135–450)
PMV BLD AUTO: 8.1 FL (ref 5–10.5)
POTASSIUM SERPL-SCNC: 3.6 MMOL/L (ref 3.5–5.1)
RBC # BLD AUTO: 4.75 M/UL (ref 4.2–5.9)
SODIUM SERPL-SCNC: 134 MMOL/L (ref 136–145)
VANCOMYCIN TROUGH SERPL-MCNC: 10 UG/ML (ref 10–20)
WBC # BLD AUTO: 5.6 K/UL (ref 4–11)

## 2023-12-29 PROCEDURE — 97530 THERAPEUTIC ACTIVITIES: CPT

## 2023-12-29 PROCEDURE — 80048 BASIC METABOLIC PNL TOTAL CA: CPT

## 2023-12-29 PROCEDURE — 2580000003 HC RX 258: Performed by: INTERNAL MEDICINE

## 2023-12-29 PROCEDURE — 2580000003 HC RX 258

## 2023-12-29 PROCEDURE — 1200000000 HC SEMI PRIVATE

## 2023-12-29 PROCEDURE — 6370000000 HC RX 637 (ALT 250 FOR IP): Performed by: INTERNAL MEDICINE

## 2023-12-29 PROCEDURE — 94660 CPAP INITIATION&MGMT: CPT

## 2023-12-29 PROCEDURE — 6360000002 HC RX W HCPCS: Performed by: INTERNAL MEDICINE

## 2023-12-29 PROCEDURE — 85025 COMPLETE CBC W/AUTO DIFF WBC: CPT

## 2023-12-29 PROCEDURE — 94640 AIRWAY INHALATION TREATMENT: CPT

## 2023-12-29 PROCEDURE — 99233 SBSQ HOSP IP/OBS HIGH 50: CPT | Performed by: INTERNAL MEDICINE

## 2023-12-29 PROCEDURE — 6360000002 HC RX W HCPCS

## 2023-12-29 PROCEDURE — 2700000000 HC OXYGEN THERAPY PER DAY

## 2023-12-29 PROCEDURE — 6370000000 HC RX 637 (ALT 250 FOR IP)

## 2023-12-29 PROCEDURE — 80202 ASSAY OF VANCOMYCIN: CPT

## 2023-12-29 PROCEDURE — 94761 N-INVAS EAR/PLS OXIMETRY MLT: CPT

## 2023-12-29 RX ORDER — SPIRONOLACTONE 25 MG/1
25 TABLET ORAL DAILY
Status: DISCONTINUED | OUTPATIENT
Start: 2023-12-29 | End: 2024-01-09 | Stop reason: HOSPADM

## 2023-12-29 RX ORDER — TORSEMIDE 20 MG/1
40 TABLET ORAL DAILY
Status: DISCONTINUED | OUTPATIENT
Start: 2023-12-30 | End: 2023-12-29

## 2023-12-29 RX ORDER — TORSEMIDE 20 MG/1
20 TABLET ORAL DAILY
Status: DISCONTINUED | OUTPATIENT
Start: 2023-12-30 | End: 2024-01-09 | Stop reason: HOSPADM

## 2023-12-29 RX ADMIN — TRAMADOL HYDROCHLORIDE 50 MG: 50 TABLET ORAL at 14:35

## 2023-12-29 RX ADMIN — TRAMADOL HYDROCHLORIDE 50 MG: 50 TABLET ORAL at 00:49

## 2023-12-29 RX ADMIN — ISOSORBIDE MONONITRATE 30 MG: 30 TABLET, EXTENDED RELEASE ORAL at 09:25

## 2023-12-29 RX ADMIN — SPIRONOLACTONE 25 MG: 25 TABLET ORAL at 15:44

## 2023-12-29 RX ADMIN — APIXABAN 5 MG: 5 TABLET, FILM COATED ORAL at 09:25

## 2023-12-29 RX ADMIN — VANCOMYCIN HYDROCHLORIDE 1500 MG: 10 INJECTION, POWDER, LYOPHILIZED, FOR SOLUTION INTRAVENOUS at 02:40

## 2023-12-29 RX ADMIN — SACUBITRIL AND VALSARTAN 1 TABLET: 49; 51 TABLET, FILM COATED ORAL at 20:20

## 2023-12-29 RX ADMIN — CEFEPIME 2000 MG: 2 INJECTION, POWDER, FOR SOLUTION INTRAVENOUS at 13:51

## 2023-12-29 RX ADMIN — SACUBITRIL AND VALSARTAN 1 TABLET: 49; 51 TABLET, FILM COATED ORAL at 09:26

## 2023-12-29 RX ADMIN — TRAMADOL HYDROCHLORIDE 50 MG: 50 TABLET ORAL at 08:01

## 2023-12-29 RX ADMIN — ASPIRIN 81 MG: 81 TABLET, CHEWABLE ORAL at 09:25

## 2023-12-29 RX ADMIN — VANCOMYCIN HYDROCHLORIDE 1500 MG: 10 INJECTION, POWDER, LYOPHILIZED, FOR SOLUTION INTRAVENOUS at 14:32

## 2023-12-29 RX ADMIN — PANTOPRAZOLE SODIUM 40 MG: 40 TABLET, DELAYED RELEASE ORAL at 06:08

## 2023-12-29 RX ADMIN — CEFEPIME 2000 MG: 2 INJECTION, POWDER, FOR SOLUTION INTRAVENOUS at 02:08

## 2023-12-29 RX ADMIN — SODIUM CHLORIDE, PRESERVATIVE FREE 10 ML: 5 INJECTION INTRAVENOUS at 09:27

## 2023-12-29 RX ADMIN — ATORVASTATIN CALCIUM 40 MG: 40 TABLET, FILM COATED ORAL at 20:20

## 2023-12-29 RX ADMIN — MONTELUKAST SODIUM 10 MG: 10 TABLET, COATED ORAL at 09:25

## 2023-12-29 RX ADMIN — Medication 2 PUFF: at 05:45

## 2023-12-29 RX ADMIN — APIXABAN 5 MG: 5 TABLET, FILM COATED ORAL at 20:20

## 2023-12-29 RX ADMIN — TRAMADOL HYDROCHLORIDE 50 MG: 50 TABLET ORAL at 20:20

## 2023-12-29 RX ADMIN — Medication 2 PUFF: at 20:04

## 2023-12-29 ASSESSMENT — PAIN SCALES - GENERAL
PAINLEVEL_OUTOF10: 3
PAINLEVEL_OUTOF10: 8
PAINLEVEL_OUTOF10: 8
PAINLEVEL_OUTOF10: 7
PAINLEVEL_OUTOF10: 7

## 2023-12-29 ASSESSMENT — PAIN DESCRIPTION - LOCATION
LOCATION: SHOULDER
LOCATION: HEAD
LOCATION: LEG;HEAD
LOCATION: SHOULDER

## 2023-12-29 ASSESSMENT — PAIN DESCRIPTION - ORIENTATION
ORIENTATION: RIGHT;LEFT
ORIENTATION: LEFT

## 2023-12-29 ASSESSMENT — PAIN - FUNCTIONAL ASSESSMENT
PAIN_FUNCTIONAL_ASSESSMENT: ACTIVITIES ARE NOT PREVENTED
PAIN_FUNCTIONAL_ASSESSMENT: ACTIVITIES ARE NOT PREVENTED

## 2023-12-29 ASSESSMENT — PAIN DESCRIPTION - DESCRIPTORS
DESCRIPTORS: ACHING
DESCRIPTORS: ACHING
DESCRIPTORS: DISCOMFORT

## 2023-12-29 ASSESSMENT — PAIN DESCRIPTION - PAIN TYPE: TYPE: ACUTE PAIN

## 2023-12-29 NOTE — FLOWSHEET NOTE
12/29/23 0331   Vital Signs   Temp 97.9 °F (36.6 °C)   Temp Source Oral   Pulse 61   Heart Rate Source Monitor   Respirations 23   /65   MAP (Calculated) 82   BP Location Left lower arm   BP Method Automatic   Patient Position Semi fowlers   Opioid-Induced Sedation   POSS Score 1   Oxygen Therapy   SpO2 98 %   O2 Device PAP (positive airway pressure)   FiO2  30 %     Pt VS as shown above. Pt denies needs at this time.

## 2023-12-29 NOTE — CARE COORDINATION
INTERDISCIPLINARY PLAN OF CARE CONFERENCE    Date/Time: 12/29/2023 5:23 PM  Completed by: Claudia Givens RN, Case Management      Patient Name:  Kareem Jimenezacher  YOB: 1950  Admitting Diagnosis: MONIQUE (obstructive sleep apnea) [G47.33]  Generalized weakness [R53.1]  Pneumonia due to infectious organism [J18.9]  Pneumonia due to infectious organism, unspecified laterality, unspecified part of lung [J18.9]  Community acquired pneumonia due to Chlamydia species [J16.0]     Admit Date/Time:  12/26/2023  1:21 PM    Chart reviewed. Interdisciplinary team contacted or reviewed plan related to patient progress and discharge plans.   Disciplines included Case Management, Nursing, and Dietitian.    Current Status:Stable  PT/OT recommendation for discharge plan of care: SNF    Expected D/C Disposition:  Rehab  Confirmed plan with patient  Yes    Met with:patient  Discharge Plan Comments: Reviewed chart and met with pt who cont plan to go to rehab.Referral called to Melissa at [facility] who will review and return call on acceptance/denial. Per Melissa if can accept will not have bed still 1/5/23. Pt aware may need to come up with 2nd choice.    Home O2 in place on admit: No  Pt informed of need to bring portable home O2 tank on day of discharge for nursing to connect prior to leaving:  Not Indicated  Verbalized agreement/Understanding:  Not Indicated

## 2023-12-30 LAB
ANION GAP SERPL CALCULATED.3IONS-SCNC: 7 MMOL/L (ref 3–16)
BASOPHILS # BLD: 0 K/UL (ref 0–0.2)
BASOPHILS NFR BLD: 0.5 %
BUN SERPL-MCNC: 17 MG/DL (ref 7–20)
CALCIUM SERPL-MCNC: 8.4 MG/DL (ref 8.3–10.6)
CHLORIDE SERPL-SCNC: 100 MMOL/L (ref 99–110)
CO2 SERPL-SCNC: 29 MMOL/L (ref 21–32)
CREAT SERPL-MCNC: 0.6 MG/DL (ref 0.8–1.3)
DEPRECATED RDW RBC AUTO: 14.8 % (ref 12.4–15.4)
EOSINOPHIL # BLD: 0.2 K/UL (ref 0–0.6)
EOSINOPHIL NFR BLD: 2.9 %
GFR SERPLBLD CREATININE-BSD FMLA CKD-EPI: >60 ML/MIN/{1.73_M2}
GLUCOSE BLD-MCNC: 142 MG/DL (ref 70–99)
GLUCOSE BLD-MCNC: 150 MG/DL (ref 70–99)
GLUCOSE BLD-MCNC: 184 MG/DL (ref 70–99)
GLUCOSE BLD-MCNC: 194 MG/DL (ref 70–99)
GLUCOSE SERPL-MCNC: 131 MG/DL (ref 70–99)
HCT VFR BLD AUTO: 42.4 % (ref 40.5–52.5)
HGB BLD-MCNC: 14.2 G/DL (ref 13.5–17.5)
LYMPHOCYTES # BLD: 0.8 K/UL (ref 1–5.1)
LYMPHOCYTES NFR BLD: 14.3 %
MCH RBC QN AUTO: 29.3 PG (ref 26–34)
MCHC RBC AUTO-ENTMCNC: 33.4 G/DL (ref 31–36)
MCV RBC AUTO: 87.6 FL (ref 80–100)
MONOCYTES # BLD: 0.6 K/UL (ref 0–1.3)
MONOCYTES NFR BLD: 10.8 %
NEUTROPHILS # BLD: 4.2 K/UL (ref 1.7–7.7)
NEUTROPHILS NFR BLD: 71.5 %
PERFORMED ON: ABNORMAL
PLATELET # BLD AUTO: 138 K/UL (ref 135–450)
PMV BLD AUTO: 8.1 FL (ref 5–10.5)
POTASSIUM SERPL-SCNC: 4 MMOL/L (ref 3.5–5.1)
RBC # BLD AUTO: 4.84 M/UL (ref 4.2–5.9)
SODIUM SERPL-SCNC: 136 MMOL/L (ref 136–145)
VANCOMYCIN TROUGH SERPL-MCNC: 11 UG/ML (ref 10–20)
WBC # BLD AUTO: 5.9 K/UL (ref 4–11)

## 2023-12-30 PROCEDURE — 6370000000 HC RX 637 (ALT 250 FOR IP): Performed by: INTERNAL MEDICINE

## 2023-12-30 PROCEDURE — 85025 COMPLETE CBC W/AUTO DIFF WBC: CPT

## 2023-12-30 PROCEDURE — 2580000003 HC RX 258

## 2023-12-30 PROCEDURE — 1200000000 HC SEMI PRIVATE

## 2023-12-30 PROCEDURE — 99233 SBSQ HOSP IP/OBS HIGH 50: CPT | Performed by: INTERNAL MEDICINE

## 2023-12-30 PROCEDURE — 94660 CPAP INITIATION&MGMT: CPT

## 2023-12-30 PROCEDURE — 6360000002 HC RX W HCPCS

## 2023-12-30 PROCEDURE — 80202 ASSAY OF VANCOMYCIN: CPT

## 2023-12-30 PROCEDURE — 2700000000 HC OXYGEN THERAPY PER DAY

## 2023-12-30 PROCEDURE — 2580000003 HC RX 258: Performed by: INTERNAL MEDICINE

## 2023-12-30 PROCEDURE — 80048 BASIC METABOLIC PNL TOTAL CA: CPT

## 2023-12-30 PROCEDURE — 6370000000 HC RX 637 (ALT 250 FOR IP)

## 2023-12-30 PROCEDURE — 6360000002 HC RX W HCPCS: Performed by: INTERNAL MEDICINE

## 2023-12-30 PROCEDURE — 94761 N-INVAS EAR/PLS OXIMETRY MLT: CPT

## 2023-12-30 PROCEDURE — 94640 AIRWAY INHALATION TREATMENT: CPT

## 2023-12-30 RX ADMIN — SPIRONOLACTONE 25 MG: 25 TABLET ORAL at 09:21

## 2023-12-30 RX ADMIN — APIXABAN 5 MG: 5 TABLET, FILM COATED ORAL at 21:08

## 2023-12-30 RX ADMIN — TRAMADOL HYDROCHLORIDE 50 MG: 50 TABLET ORAL at 21:08

## 2023-12-30 RX ADMIN — MONTELUKAST SODIUM 10 MG: 10 TABLET, COATED ORAL at 09:22

## 2023-12-30 RX ADMIN — SODIUM CHLORIDE, PRESERVATIVE FREE 10 ML: 5 INJECTION INTRAVENOUS at 09:22

## 2023-12-30 RX ADMIN — ATORVASTATIN CALCIUM 40 MG: 40 TABLET, FILM COATED ORAL at 21:09

## 2023-12-30 RX ADMIN — VANCOMYCIN HYDROCHLORIDE 1500 MG: 10 INJECTION, POWDER, LYOPHILIZED, FOR SOLUTION INTRAVENOUS at 03:30

## 2023-12-30 RX ADMIN — TRAMADOL HYDROCHLORIDE 50 MG: 50 TABLET ORAL at 07:03

## 2023-12-30 RX ADMIN — TRAMADOL HYDROCHLORIDE 50 MG: 50 TABLET ORAL at 14:51

## 2023-12-30 RX ADMIN — Medication 2 PUFF: at 07:59

## 2023-12-30 RX ADMIN — TORSEMIDE 20 MG: 20 TABLET ORAL at 09:21

## 2023-12-30 RX ADMIN — CEFEPIME 2000 MG: 2 INJECTION, POWDER, FOR SOLUTION INTRAVENOUS at 14:29

## 2023-12-30 RX ADMIN — SACUBITRIL AND VALSARTAN 1 TABLET: 49; 51 TABLET, FILM COATED ORAL at 21:09

## 2023-12-30 RX ADMIN — SACUBITRIL AND VALSARTAN 1 TABLET: 49; 51 TABLET, FILM COATED ORAL at 09:21

## 2023-12-30 RX ADMIN — ASPIRIN 81 MG: 81 TABLET, CHEWABLE ORAL at 09:21

## 2023-12-30 RX ADMIN — Medication 2 PUFF: at 18:19

## 2023-12-30 RX ADMIN — ISOSORBIDE MONONITRATE 30 MG: 30 TABLET, EXTENDED RELEASE ORAL at 09:22

## 2023-12-30 RX ADMIN — CEFEPIME 2000 MG: 2 INJECTION, POWDER, FOR SOLUTION INTRAVENOUS at 03:36

## 2023-12-30 RX ADMIN — Medication 1500 MG: at 17:34

## 2023-12-30 RX ADMIN — APIXABAN 5 MG: 5 TABLET, FILM COATED ORAL at 09:22

## 2023-12-30 RX ADMIN — PANTOPRAZOLE SODIUM 40 MG: 40 TABLET, DELAYED RELEASE ORAL at 07:02

## 2023-12-30 ASSESSMENT — PAIN DESCRIPTION - LOCATION
LOCATION: SHOULDER
LOCATION: HEAD;NECK
LOCATION: HEAD

## 2023-12-30 ASSESSMENT — PAIN SCALES - GENERAL
PAINLEVEL_OUTOF10: 9
PAINLEVEL_OUTOF10: 4
PAINLEVEL_OUTOF10: 10
PAINLEVEL_OUTOF10: 4
PAINLEVEL_OUTOF10: 7

## 2023-12-30 ASSESSMENT — PAIN DESCRIPTION - ORIENTATION: ORIENTATION: POSTERIOR

## 2023-12-30 ASSESSMENT — PAIN DESCRIPTION - DESCRIPTORS
DESCRIPTORS: SHARP
DESCRIPTORS: THROBBING
DESCRIPTORS: DISCOMFORT

## 2023-12-30 ASSESSMENT — PAIN - FUNCTIONAL ASSESSMENT: PAIN_FUNCTIONAL_ASSESSMENT: ACTIVITIES ARE NOT PREVENTED

## 2023-12-31 LAB
ANION GAP SERPL CALCULATED.3IONS-SCNC: 7 MMOL/L (ref 3–16)
BUN SERPL-MCNC: 14 MG/DL (ref 7–20)
CALCIUM SERPL-MCNC: 8.3 MG/DL (ref 8.3–10.6)
CHLORIDE SERPL-SCNC: 96 MMOL/L (ref 99–110)
CO2 SERPL-SCNC: 29 MMOL/L (ref 21–32)
CREAT SERPL-MCNC: <0.5 MG/DL (ref 0.8–1.3)
DEPRECATED RDW RBC AUTO: 14.5 % (ref 12.4–15.4)
GFR SERPLBLD CREATININE-BSD FMLA CKD-EPI: >60 ML/MIN/{1.73_M2}
GLUCOSE BLD-MCNC: 118 MG/DL (ref 70–99)
GLUCOSE BLD-MCNC: 164 MG/DL (ref 70–99)
GLUCOSE BLD-MCNC: 216 MG/DL (ref 70–99)
GLUCOSE SERPL-MCNC: 139 MG/DL (ref 70–99)
HCT VFR BLD AUTO: 41 % (ref 40.5–52.5)
HGB BLD-MCNC: 14.1 G/DL (ref 13.5–17.5)
MCH RBC QN AUTO: 29.5 PG (ref 26–34)
MCHC RBC AUTO-ENTMCNC: 34.4 G/DL (ref 31–36)
MCV RBC AUTO: 85.6 FL (ref 80–100)
PERFORMED ON: ABNORMAL
PLATELET # BLD AUTO: 145 K/UL (ref 135–450)
PMV BLD AUTO: 7.9 FL (ref 5–10.5)
POTASSIUM SERPL-SCNC: 3 MMOL/L (ref 3.5–5.1)
RBC # BLD AUTO: 4.79 M/UL (ref 4.2–5.9)
SODIUM SERPL-SCNC: 132 MMOL/L (ref 136–145)
WBC # BLD AUTO: 7.5 K/UL (ref 4–11)

## 2023-12-31 PROCEDURE — 2700000000 HC OXYGEN THERAPY PER DAY

## 2023-12-31 PROCEDURE — 6370000000 HC RX 637 (ALT 250 FOR IP)

## 2023-12-31 PROCEDURE — 2580000003 HC RX 258

## 2023-12-31 PROCEDURE — 97530 THERAPEUTIC ACTIVITIES: CPT

## 2023-12-31 PROCEDURE — 94640 AIRWAY INHALATION TREATMENT: CPT

## 2023-12-31 PROCEDURE — 6360000002 HC RX W HCPCS

## 2023-12-31 PROCEDURE — 6370000000 HC RX 637 (ALT 250 FOR IP): Performed by: INTERNAL MEDICINE

## 2023-12-31 PROCEDURE — 80048 BASIC METABOLIC PNL TOTAL CA: CPT

## 2023-12-31 PROCEDURE — 85027 COMPLETE CBC AUTOMATED: CPT

## 2023-12-31 PROCEDURE — 94761 N-INVAS EAR/PLS OXIMETRY MLT: CPT

## 2023-12-31 PROCEDURE — 1200000000 HC SEMI PRIVATE

## 2023-12-31 PROCEDURE — 94660 CPAP INITIATION&MGMT: CPT

## 2023-12-31 PROCEDURE — 2580000003 HC RX 258: Performed by: INTERNAL MEDICINE

## 2023-12-31 PROCEDURE — 99232 SBSQ HOSP IP/OBS MODERATE 35: CPT | Performed by: INTERNAL MEDICINE

## 2023-12-31 RX ADMIN — POTASSIUM CHLORIDE 40 MEQ: 1500 TABLET, EXTENDED RELEASE ORAL at 09:02

## 2023-12-31 RX ADMIN — CEFEPIME 2000 MG: 2 INJECTION, POWDER, FOR SOLUTION INTRAVENOUS at 14:37

## 2023-12-31 RX ADMIN — ATORVASTATIN CALCIUM 40 MG: 40 TABLET, FILM COATED ORAL at 20:16

## 2023-12-31 RX ADMIN — SPIRONOLACTONE 25 MG: 25 TABLET ORAL at 09:02

## 2023-12-31 RX ADMIN — TORSEMIDE 20 MG: 20 TABLET ORAL at 09:02

## 2023-12-31 RX ADMIN — SACUBITRIL AND VALSARTAN 1 TABLET: 49; 51 TABLET, FILM COATED ORAL at 20:16

## 2023-12-31 RX ADMIN — APIXABAN 5 MG: 5 TABLET, FILM COATED ORAL at 20:17

## 2023-12-31 RX ADMIN — TRAMADOL HYDROCHLORIDE 50 MG: 50 TABLET ORAL at 21:06

## 2023-12-31 RX ADMIN — APIXABAN 5 MG: 5 TABLET, FILM COATED ORAL at 09:02

## 2023-12-31 RX ADMIN — ASPIRIN 81 MG: 81 TABLET, CHEWABLE ORAL at 09:02

## 2023-12-31 RX ADMIN — Medication 1500 MG: at 06:37

## 2023-12-31 RX ADMIN — Medication 2 PUFF: at 07:55

## 2023-12-31 RX ADMIN — MONTELUKAST SODIUM 10 MG: 10 TABLET, COATED ORAL at 09:02

## 2023-12-31 RX ADMIN — CEFEPIME 2000 MG: 2 INJECTION, POWDER, FOR SOLUTION INTRAVENOUS at 02:40

## 2023-12-31 RX ADMIN — SODIUM CHLORIDE, PRESERVATIVE FREE 10 ML: 5 INJECTION INTRAVENOUS at 09:02

## 2023-12-31 RX ADMIN — SACUBITRIL AND VALSARTAN 1 TABLET: 49; 51 TABLET, FILM COATED ORAL at 09:02

## 2023-12-31 RX ADMIN — Medication 1500 MG: at 18:05

## 2023-12-31 RX ADMIN — Medication 2 PUFF: at 20:19

## 2023-12-31 RX ADMIN — TRAMADOL HYDROCHLORIDE 50 MG: 50 TABLET ORAL at 03:26

## 2023-12-31 RX ADMIN — TRAMADOL HYDROCHLORIDE 50 MG: 50 TABLET ORAL at 14:37

## 2023-12-31 RX ADMIN — PANTOPRAZOLE SODIUM 40 MG: 40 TABLET, DELAYED RELEASE ORAL at 06:39

## 2023-12-31 RX ADMIN — ISOSORBIDE MONONITRATE 30 MG: 30 TABLET, EXTENDED RELEASE ORAL at 09:02

## 2023-12-31 ASSESSMENT — PAIN SCALES - GENERAL
PAINLEVEL_OUTOF10: 6
PAINLEVEL_OUTOF10: 0
PAINLEVEL_OUTOF10: 8
PAINLEVEL_OUTOF10: 8
PAINLEVEL_OUTOF10: 2

## 2023-12-31 ASSESSMENT — PAIN DESCRIPTION - ONSET: ONSET: ON-GOING

## 2023-12-31 ASSESSMENT — PAIN DESCRIPTION - LOCATION
LOCATION: BACK;LEG
LOCATION: BACK;SHOULDER
LOCATION: SHOULDER

## 2023-12-31 ASSESSMENT — PAIN DESCRIPTION - DESCRIPTORS
DESCRIPTORS: ACHING
DESCRIPTORS: DISCOMFORT

## 2023-12-31 ASSESSMENT — PAIN DESCRIPTION - ORIENTATION
ORIENTATION: LOWER;LEFT
ORIENTATION: RIGHT;LEFT

## 2023-12-31 ASSESSMENT — PAIN DESCRIPTION - FREQUENCY: FREQUENCY: CONTINUOUS

## 2023-12-31 ASSESSMENT — PAIN DESCRIPTION - PAIN TYPE: TYPE: ACUTE PAIN

## 2024-01-01 LAB
ANION GAP SERPL CALCULATED.3IONS-SCNC: 8 MMOL/L (ref 3–16)
BUN SERPL-MCNC: 18 MG/DL (ref 7–20)
CALCIUM SERPL-MCNC: 8.7 MG/DL (ref 8.3–10.6)
CHLORIDE SERPL-SCNC: 100 MMOL/L (ref 99–110)
CO2 SERPL-SCNC: 30 MMOL/L (ref 21–32)
CREAT SERPL-MCNC: 0.6 MG/DL (ref 0.8–1.3)
DEPRECATED RDW RBC AUTO: 14.5 % (ref 12.4–15.4)
GFR SERPLBLD CREATININE-BSD FMLA CKD-EPI: >60 ML/MIN/{1.73_M2}
GLUCOSE BLD-MCNC: 130 MG/DL (ref 70–99)
GLUCOSE BLD-MCNC: 166 MG/DL (ref 70–99)
GLUCOSE BLD-MCNC: 182 MG/DL (ref 70–99)
GLUCOSE BLD-MCNC: 261 MG/DL (ref 70–99)
GLUCOSE SERPL-MCNC: 151 MG/DL (ref 70–99)
HCT VFR BLD AUTO: 42.1 % (ref 40.5–52.5)
HGB BLD-MCNC: 14.4 G/DL (ref 13.5–17.5)
MCH RBC QN AUTO: 29.5 PG (ref 26–34)
MCHC RBC AUTO-ENTMCNC: 34.3 G/DL (ref 31–36)
MCV RBC AUTO: 85.9 FL (ref 80–100)
PERFORMED ON: ABNORMAL
PLATELET # BLD AUTO: 153 K/UL (ref 135–450)
PMV BLD AUTO: 8.2 FL (ref 5–10.5)
POTASSIUM SERPL-SCNC: 3.9 MMOL/L (ref 3.5–5.1)
RBC # BLD AUTO: 4.9 M/UL (ref 4.2–5.9)
SODIUM SERPL-SCNC: 138 MMOL/L (ref 136–145)
WBC # BLD AUTO: 9.3 K/UL (ref 4–11)

## 2024-01-01 PROCEDURE — 2580000003 HC RX 258

## 2024-01-01 PROCEDURE — 6370000000 HC RX 637 (ALT 250 FOR IP): Performed by: INTERNAL MEDICINE

## 2024-01-01 PROCEDURE — 80048 BASIC METABOLIC PNL TOTAL CA: CPT

## 2024-01-01 PROCEDURE — 99233 SBSQ HOSP IP/OBS HIGH 50: CPT | Performed by: INTERNAL MEDICINE

## 2024-01-01 PROCEDURE — 94660 CPAP INITIATION&MGMT: CPT

## 2024-01-01 PROCEDURE — 6360000002 HC RX W HCPCS

## 2024-01-01 PROCEDURE — 94640 AIRWAY INHALATION TREATMENT: CPT

## 2024-01-01 PROCEDURE — 1200000000 HC SEMI PRIVATE

## 2024-01-01 PROCEDURE — 36415 COLL VENOUS BLD VENIPUNCTURE: CPT

## 2024-01-01 PROCEDURE — 85027 COMPLETE CBC AUTOMATED: CPT

## 2024-01-01 PROCEDURE — 2580000003 HC RX 258: Performed by: INTERNAL MEDICINE

## 2024-01-01 PROCEDURE — 6370000000 HC RX 637 (ALT 250 FOR IP)

## 2024-01-01 PROCEDURE — 94761 N-INVAS EAR/PLS OXIMETRY MLT: CPT

## 2024-01-01 RX ADMIN — Medication 2 PUFF: at 20:35

## 2024-01-01 RX ADMIN — Medication 1500 MG: at 18:24

## 2024-01-01 RX ADMIN — SACUBITRIL AND VALSARTAN 1 TABLET: 49; 51 TABLET, FILM COATED ORAL at 08:39

## 2024-01-01 RX ADMIN — TORSEMIDE 20 MG: 20 TABLET ORAL at 08:36

## 2024-01-01 RX ADMIN — APIXABAN 5 MG: 5 TABLET, FILM COATED ORAL at 21:40

## 2024-01-01 RX ADMIN — TRAMADOL HYDROCHLORIDE 50 MG: 50 TABLET ORAL at 12:05

## 2024-01-01 RX ADMIN — MONTELUKAST SODIUM 10 MG: 10 TABLET, COATED ORAL at 08:36

## 2024-01-01 RX ADMIN — APIXABAN 5 MG: 5 TABLET, FILM COATED ORAL at 08:36

## 2024-01-01 RX ADMIN — ASPIRIN 81 MG: 81 TABLET, CHEWABLE ORAL at 08:36

## 2024-01-01 RX ADMIN — SPIRONOLACTONE 25 MG: 25 TABLET ORAL at 08:36

## 2024-01-01 RX ADMIN — Medication 1500 MG: at 06:15

## 2024-01-01 RX ADMIN — TRAMADOL HYDROCHLORIDE 50 MG: 50 TABLET ORAL at 04:33

## 2024-01-01 RX ADMIN — SACUBITRIL AND VALSARTAN 1 TABLET: 49; 51 TABLET, FILM COATED ORAL at 21:40

## 2024-01-01 RX ADMIN — INSULIN LISPRO 2 UNITS: 100 INJECTION, SOLUTION INTRAVENOUS; SUBCUTANEOUS at 12:03

## 2024-01-01 RX ADMIN — CEFEPIME 2000 MG: 2 INJECTION, POWDER, FOR SOLUTION INTRAVENOUS at 02:08

## 2024-01-01 RX ADMIN — PANTOPRAZOLE SODIUM 40 MG: 40 TABLET, DELAYED RELEASE ORAL at 06:11

## 2024-01-01 RX ADMIN — ISOSORBIDE MONONITRATE 30 MG: 30 TABLET, EXTENDED RELEASE ORAL at 08:36

## 2024-01-01 RX ADMIN — Medication 2 PUFF: at 08:34

## 2024-01-01 RX ADMIN — TRAMADOL HYDROCHLORIDE 50 MG: 50 TABLET ORAL at 18:25

## 2024-01-01 RX ADMIN — SODIUM CHLORIDE, PRESERVATIVE FREE 10 ML: 5 INJECTION INTRAVENOUS at 08:36

## 2024-01-01 RX ADMIN — CEFEPIME 2000 MG: 2 INJECTION, POWDER, FOR SOLUTION INTRAVENOUS at 14:16

## 2024-01-01 RX ADMIN — ATORVASTATIN CALCIUM 40 MG: 40 TABLET, FILM COATED ORAL at 21:40

## 2024-01-01 ASSESSMENT — PAIN DESCRIPTION - LOCATION
LOCATION: LEG
LOCATION: LEG
LOCATION: KNEE
LOCATION: LEG

## 2024-01-01 ASSESSMENT — PAIN DESCRIPTION - ORIENTATION
ORIENTATION: RIGHT;LEFT
ORIENTATION: LEFT

## 2024-01-01 ASSESSMENT — PAIN - FUNCTIONAL ASSESSMENT
PAIN_FUNCTIONAL_ASSESSMENT: PREVENTS OR INTERFERES SOME ACTIVE ACTIVITIES AND ADLS
PAIN_FUNCTIONAL_ASSESSMENT: PREVENTS OR INTERFERES SOME ACTIVE ACTIVITIES AND ADLS

## 2024-01-01 ASSESSMENT — PAIN DESCRIPTION - DESCRIPTORS
DESCRIPTORS: DISCOMFORT;GNAWING
DESCRIPTORS: NAGGING
DESCRIPTORS: THROBBING

## 2024-01-01 ASSESSMENT — PAIN SCALES - GENERAL
PAINLEVEL_OUTOF10: 9
PAINLEVEL_OUTOF10: 8
PAINLEVEL_OUTOF10: 8
PAINLEVEL_OUTOF10: 6
PAINLEVEL_OUTOF10: 0

## 2024-01-02 LAB
ANION GAP SERPL CALCULATED.3IONS-SCNC: 8 MMOL/L (ref 3–16)
BUN SERPL-MCNC: 15 MG/DL (ref 7–20)
CALCIUM SERPL-MCNC: 8.5 MG/DL (ref 8.3–10.6)
CHLORIDE SERPL-SCNC: 95 MMOL/L (ref 99–110)
CO2 SERPL-SCNC: 31 MMOL/L (ref 21–32)
CREAT SERPL-MCNC: <0.5 MG/DL (ref 0.8–1.3)
DEPRECATED RDW RBC AUTO: 14.5 % (ref 12.4–15.4)
GFR SERPLBLD CREATININE-BSD FMLA CKD-EPI: >60 ML/MIN/{1.73_M2}
GLUCOSE BLD-MCNC: 206 MG/DL (ref 70–99)
GLUCOSE BLD-MCNC: 211 MG/DL (ref 70–99)
GLUCOSE BLD-MCNC: 263 MG/DL (ref 70–99)
GLUCOSE SERPL-MCNC: 177 MG/DL (ref 70–99)
HCT VFR BLD AUTO: 43.2 % (ref 40.5–52.5)
HGB BLD-MCNC: 14.5 G/DL (ref 13.5–17.5)
MCH RBC QN AUTO: 29.1 PG (ref 26–34)
MCHC RBC AUTO-ENTMCNC: 33.6 G/DL (ref 31–36)
MCV RBC AUTO: 86.4 FL (ref 80–100)
PERFORMED ON: ABNORMAL
PLATELET # BLD AUTO: 185 K/UL (ref 135–450)
PMV BLD AUTO: 8.1 FL (ref 5–10.5)
POTASSIUM SERPL-SCNC: 3.4 MMOL/L (ref 3.5–5.1)
RBC # BLD AUTO: 5 M/UL (ref 4.2–5.9)
SODIUM SERPL-SCNC: 134 MMOL/L (ref 136–145)
WBC # BLD AUTO: 8 K/UL (ref 4–11)

## 2024-01-02 PROCEDURE — 36415 COLL VENOUS BLD VENIPUNCTURE: CPT

## 2024-01-02 PROCEDURE — 97110 THERAPEUTIC EXERCISES: CPT

## 2024-01-02 PROCEDURE — 6370000000 HC RX 637 (ALT 250 FOR IP)

## 2024-01-02 PROCEDURE — 6370000000 HC RX 637 (ALT 250 FOR IP): Performed by: INTERNAL MEDICINE

## 2024-01-02 PROCEDURE — 97530 THERAPEUTIC ACTIVITIES: CPT

## 2024-01-02 PROCEDURE — 6360000002 HC RX W HCPCS

## 2024-01-02 PROCEDURE — 1200000000 HC SEMI PRIVATE

## 2024-01-02 PROCEDURE — 99233 SBSQ HOSP IP/OBS HIGH 50: CPT | Performed by: INTERNAL MEDICINE

## 2024-01-02 PROCEDURE — 85027 COMPLETE CBC AUTOMATED: CPT

## 2024-01-02 PROCEDURE — 94640 AIRWAY INHALATION TREATMENT: CPT

## 2024-01-02 PROCEDURE — 2580000003 HC RX 258

## 2024-01-02 PROCEDURE — 2580000003 HC RX 258: Performed by: INTERNAL MEDICINE

## 2024-01-02 PROCEDURE — 94660 CPAP INITIATION&MGMT: CPT

## 2024-01-02 PROCEDURE — 94761 N-INVAS EAR/PLS OXIMETRY MLT: CPT

## 2024-01-02 PROCEDURE — 2700000000 HC OXYGEN THERAPY PER DAY

## 2024-01-02 PROCEDURE — 80048 BASIC METABOLIC PNL TOTAL CA: CPT

## 2024-01-02 RX ORDER — POTASSIUM CHLORIDE 20 MEQ/1
20 TABLET, EXTENDED RELEASE ORAL ONCE
Status: COMPLETED | OUTPATIENT
Start: 2024-01-02 | End: 2024-01-02

## 2024-01-02 RX ADMIN — APIXABAN 5 MG: 5 TABLET, FILM COATED ORAL at 23:29

## 2024-01-02 RX ADMIN — ATORVASTATIN CALCIUM 40 MG: 40 TABLET, FILM COATED ORAL at 23:29

## 2024-01-02 RX ADMIN — SACUBITRIL AND VALSARTAN 1 TABLET: 49; 51 TABLET, FILM COATED ORAL at 23:29

## 2024-01-02 RX ADMIN — SODIUM CHLORIDE, PRESERVATIVE FREE 10 ML: 5 INJECTION INTRAVENOUS at 08:03

## 2024-01-02 RX ADMIN — APIXABAN 5 MG: 5 TABLET, FILM COATED ORAL at 09:05

## 2024-01-02 RX ADMIN — ISOSORBIDE MONONITRATE 30 MG: 30 TABLET, EXTENDED RELEASE ORAL at 09:05

## 2024-01-02 RX ADMIN — SACUBITRIL AND VALSARTAN 1 TABLET: 49; 51 TABLET, FILM COATED ORAL at 09:05

## 2024-01-02 RX ADMIN — MONTELUKAST SODIUM 10 MG: 10 TABLET, COATED ORAL at 09:05

## 2024-01-02 RX ADMIN — TRAMADOL HYDROCHLORIDE 50 MG: 50 TABLET ORAL at 06:25

## 2024-01-02 RX ADMIN — TRAMADOL HYDROCHLORIDE 50 MG: 50 TABLET ORAL at 12:09

## 2024-01-02 RX ADMIN — SODIUM CHLORIDE, PRESERVATIVE FREE 10 ML: 5 INJECTION INTRAVENOUS at 23:28

## 2024-01-02 RX ADMIN — INSULIN LISPRO 1 UNITS: 100 INJECTION, SOLUTION INTRAVENOUS; SUBCUTANEOUS at 17:23

## 2024-01-02 RX ADMIN — SPIRONOLACTONE 25 MG: 25 TABLET ORAL at 09:06

## 2024-01-02 RX ADMIN — PANTOPRAZOLE SODIUM 40 MG: 40 TABLET, DELAYED RELEASE ORAL at 06:25

## 2024-01-02 RX ADMIN — Medication 2 PUFF: at 08:23

## 2024-01-02 RX ADMIN — CEFEPIME 2000 MG: 2 INJECTION, POWDER, FOR SOLUTION INTRAVENOUS at 03:19

## 2024-01-02 RX ADMIN — TRAMADOL HYDROCHLORIDE 50 MG: 50 TABLET ORAL at 00:22

## 2024-01-02 RX ADMIN — Medication 1500 MG: at 06:29

## 2024-01-02 RX ADMIN — ASPIRIN 81 MG: 81 TABLET, CHEWABLE ORAL at 09:05

## 2024-01-02 RX ADMIN — POTASSIUM CHLORIDE 20 MEQ: 1500 TABLET, EXTENDED RELEASE ORAL at 09:05

## 2024-01-02 RX ADMIN — TORSEMIDE 20 MG: 20 TABLET ORAL at 09:05

## 2024-01-02 RX ADMIN — Medication 2 PUFF: at 19:38

## 2024-01-02 RX ADMIN — INSULIN LISPRO 2 UNITS: 100 INJECTION, SOLUTION INTRAVENOUS; SUBCUTANEOUS at 12:09

## 2024-01-02 RX ADMIN — TRAMADOL HYDROCHLORIDE 50 MG: 50 TABLET ORAL at 19:49

## 2024-01-02 ASSESSMENT — PAIN DESCRIPTION - LOCATION
LOCATION: LEG;KNEE;SHOULDER
LOCATION: KNEE
LOCATION: KNEE;ELBOW
LOCATION: KNEE
LOCATION: KNEE

## 2024-01-02 ASSESSMENT — PAIN SCALES - GENERAL
PAINLEVEL_OUTOF10: 6
PAINLEVEL_OUTOF10: 9
PAINLEVEL_OUTOF10: 8

## 2024-01-02 ASSESSMENT — PAIN DESCRIPTION - DESCRIPTORS
DESCRIPTORS: ACHING;DISCOMFORT;SORE
DESCRIPTORS: THROBBING

## 2024-01-02 ASSESSMENT — PAIN DESCRIPTION - ORIENTATION
ORIENTATION: LEFT

## 2024-01-02 ASSESSMENT — PAIN DESCRIPTION - PAIN TYPE: TYPE: CHRONIC PAIN

## 2024-01-02 ASSESSMENT — PAIN - FUNCTIONAL ASSESSMENT: PAIN_FUNCTIONAL_ASSESSMENT: ACTIVITIES ARE NOT PREVENTED

## 2024-01-02 NOTE — CARE COORDINATION
INTERDISCIPLINARY PLAN OF CARE CONFERENCE    Date/Time: 1/2/2024 4:10 PM  Completed by: Claudia Givens RN, Case Management      Patient Name:  Kareem Hansen  YOB: 1950  Admitting Diagnosis: MONIQUE (obstructive sleep apnea) [G47.33]  Generalized weakness [R53.1]  Pneumonia due to infectious organism [J18.9]  Pneumonia due to infectious organism, unspecified laterality, unspecified part of lung [J18.9]  Community acquired pneumonia due to Chlamydia species [J16.0]     Admit Date/Time:  12/26/2023  1:21 PM    Chart reviewed. Interdisciplinary team contacted or reviewed plan related to patient progress and discharge plans.   Disciplines included Case Management, Nursing, and Dietitian.    Current Status:Stable  PT/OT recommendation for discharge plan of care: SNF    Expected D/C Disposition:  Rehab  Confirmed plan with patient and/or family Yes confirmed with: (name) vandana Phillips  Met with:patient  Discharge Plan Comments: Reviewed chart and spoke with Melissa at [facility] who after review and speaking with pt will be unable to accept. Discussed with pt and Step mitchel and 2nd choice is [facility]. [facility] and will start pre cert todaay. Pt updated. Will cont to follow.    Home O2 in place on admit: No  Pt informed of need to bring portable home O2 tank on day of discharge for nursing to connect prior to leaving:  Not Indicated  Verbalized agreement/Understanding:  Not Indicated

## 2024-01-02 NOTE — FLOWSHEET NOTE
Shift assessment complete. See doc flow. Nightly medications given see MAR. A/O x4. IV Abx. Dressings in place to BLE dry and intact. Reported by Roseanne BUSH that dressing changes are current. BLE elevated with pillow support. Pure-wick in place. Patient repositioned for comfort. Bed alarm in place. Call light and bedside table within easy reach.    01/01/24 1918   Vital Signs   Temp 99 °F (37.2 °C)   Temp Source Oral   Pulse 90   Heart Rate Source Monitor   Respirations 16   BP (!) 149/83   MAP (Calculated) 105   MAP (mmHg) 104   BP Location Left upper arm   BP Method Automatic   Patient Position Semi fowlers   Oxygen Therapy   SpO2 99 %   O2 Device None (Room air)

## 2024-01-02 NOTE — DISCHARGE INSTRUCTIONS
Wound Care Center Physician Orders and Discharge Instructions  [facility]  [address], [address]  [place] [address]  Telephone: [phone]      Fax: [phone]        Your home care company:   [facility]     Your wound-care supplies will be provided by: [facility]     NAME:  Kareem Hansen   YOB: 1950  PRIMARY DIAGNOSIS FOR WOUND CARE CENTER:  Venous leg ulcer .     Wound cleansing:   Do not scrub or use excessive force.  Wash hands with soap and water before and after dressing changes.  Prior to applying a clean dressing, cleanse wound with normal saline, wound cleanser, or mild soap and water. Ask your physician or nurse before getting the wound(s) wet in the shower.                Wound care for home:     Right lower leg, left foot, and Left lower leg wounds:    Wash wounds with soap and water with dressing changes    Triamcinolone to irritated areas in the WCC today    Aquaphor to dry skin    Foam to left lateral foot    Xeroform to leg wounds    Betadine, sorbact, silver alginate, optiloc to left foot wound    Try not to make very bulky on foot     CoFlex Calamine     Leave dressing on until Thursday     Right and Left great toes:     Betadine to toes     May leave open to air       Home Care to change dressings 2 times a week for the next to weeks:    Home Care to remove compression wraps    Wash legs with soap and water    Aquaphor to dry skin    Silvadene to open areas on legs    Betadine, sorbact, silver alginate, optiloc to left foot wound      Try not to make very bulky on foot     4x4's, ABD, Coban lite toes to knees        Please note, all wounds (unless stated otherwise here) were mechanically debrided at the time of cleansing here in the wound-care center today, so a small amount of pain, drainage or bleeding from that process might be expected, and is normal.      All products for home use, including

## 2024-01-02 NOTE — FLOWSHEET NOTE
Patient awake in bed. IV Abx. Dressing change completed to Midline to RUE. Blood return noted. Mago-area cleansed with barrier cream wipes. Clean bed pad, depends and pure-wick applied. Patient repositioned for comfort. No other needs noted at this time. Bed alarm in place. Call light and bedside table within reach.    01/02/24 0324   Vital Signs   Temp 98.6 °F (37 °C)   Temp Source Oral   Pulse 61   Heart Rate Source Monitor   Respirations 18   BP (!) 152/91   MAP (Calculated) 111   BP Location Left lower arm   BP Method Manual   Patient Position Semi fowlers   Oxygen Therapy   SpO2 93 %   O2 Device Nasal cannula   O2 Flow Rate (L/min) 2 L/min

## 2024-01-03 LAB
GLUCOSE BLD-MCNC: 144 MG/DL (ref 70–99)
GLUCOSE BLD-MCNC: 173 MG/DL (ref 70–99)
GLUCOSE BLD-MCNC: 221 MG/DL (ref 70–99)
GLUCOSE BLD-MCNC: 282 MG/DL (ref 70–99)
PERFORMED ON: ABNORMAL

## 2024-01-03 PROCEDURE — 97110 THERAPEUTIC EXERCISES: CPT

## 2024-01-03 PROCEDURE — 1200000000 HC SEMI PRIVATE

## 2024-01-03 PROCEDURE — 2580000003 HC RX 258: Performed by: INTERNAL MEDICINE

## 2024-01-03 PROCEDURE — 94660 CPAP INITIATION&MGMT: CPT

## 2024-01-03 PROCEDURE — 97530 THERAPEUTIC ACTIVITIES: CPT

## 2024-01-03 PROCEDURE — 6370000000 HC RX 637 (ALT 250 FOR IP): Performed by: INTERNAL MEDICINE

## 2024-01-03 PROCEDURE — 2700000000 HC OXYGEN THERAPY PER DAY

## 2024-01-03 PROCEDURE — 97112 NEUROMUSCULAR REEDUCATION: CPT

## 2024-01-03 PROCEDURE — 6370000000 HC RX 637 (ALT 250 FOR IP)

## 2024-01-03 PROCEDURE — 6370000000 HC RX 637 (ALT 250 FOR IP): Performed by: PODIATRIST

## 2024-01-03 PROCEDURE — 99232 SBSQ HOSP IP/OBS MODERATE 35: CPT | Performed by: INTERNAL MEDICINE

## 2024-01-03 PROCEDURE — 94761 N-INVAS EAR/PLS OXIMETRY MLT: CPT

## 2024-01-03 PROCEDURE — 94640 AIRWAY INHALATION TREATMENT: CPT

## 2024-01-03 RX ORDER — INSULIN LISPRO 100 [IU]/ML
0-4 INJECTION, SOLUTION INTRAVENOUS; SUBCUTANEOUS NIGHTLY
Status: DISCONTINUED | OUTPATIENT
Start: 2024-01-03 | End: 2024-01-09 | Stop reason: HOSPADM

## 2024-01-03 RX ORDER — INSULIN LISPRO 100 [IU]/ML
0-8 INJECTION, SOLUTION INTRAVENOUS; SUBCUTANEOUS
Status: DISCONTINUED | OUTPATIENT
Start: 2024-01-04 | End: 2024-01-09 | Stop reason: HOSPADM

## 2024-01-03 RX ADMIN — Medication 2 PUFF: at 08:06

## 2024-01-03 RX ADMIN — MONTELUKAST SODIUM 10 MG: 10 TABLET, COATED ORAL at 08:21

## 2024-01-03 RX ADMIN — APIXABAN 5 MG: 5 TABLET, FILM COATED ORAL at 21:50

## 2024-01-03 RX ADMIN — TORSEMIDE 20 MG: 20 TABLET ORAL at 08:22

## 2024-01-03 RX ADMIN — SACUBITRIL AND VALSARTAN 1 TABLET: 49; 51 TABLET, FILM COATED ORAL at 21:50

## 2024-01-03 RX ADMIN — ASPIRIN 81 MG: 81 TABLET, CHEWABLE ORAL at 08:20

## 2024-01-03 RX ADMIN — INSULIN LISPRO 1 UNITS: 100 INJECTION, SOLUTION INTRAVENOUS; SUBCUTANEOUS at 17:50

## 2024-01-03 RX ADMIN — ATORVASTATIN CALCIUM 40 MG: 40 TABLET, FILM COATED ORAL at 21:51

## 2024-01-03 RX ADMIN — Medication: at 14:28

## 2024-01-03 RX ADMIN — SODIUM CHLORIDE, PRESERVATIVE FREE 10 ML: 5 INJECTION INTRAVENOUS at 21:50

## 2024-01-03 RX ADMIN — SACUBITRIL AND VALSARTAN 1 TABLET: 49; 51 TABLET, FILM COATED ORAL at 08:21

## 2024-01-03 RX ADMIN — TRAMADOL HYDROCHLORIDE 50 MG: 50 TABLET ORAL at 21:50

## 2024-01-03 RX ADMIN — SODIUM CHLORIDE, PRESERVATIVE FREE 10 ML: 5 INJECTION INTRAVENOUS at 08:27

## 2024-01-03 RX ADMIN — Medication 2 PUFF: at 17:54

## 2024-01-03 RX ADMIN — TRAMADOL HYDROCHLORIDE 50 MG: 50 TABLET ORAL at 08:21

## 2024-01-03 RX ADMIN — POLYETHYLENE GLYCOL (3350) 17 G: 17 POWDER, FOR SOLUTION ORAL at 14:30

## 2024-01-03 RX ADMIN — APIXABAN 5 MG: 5 TABLET, FILM COATED ORAL at 08:20

## 2024-01-03 RX ADMIN — SPIRONOLACTONE 25 MG: 25 TABLET ORAL at 08:20

## 2024-01-03 RX ADMIN — TRAMADOL HYDROCHLORIDE 50 MG: 50 TABLET ORAL at 14:28

## 2024-01-03 RX ADMIN — ISOSORBIDE MONONITRATE 30 MG: 30 TABLET, EXTENDED RELEASE ORAL at 08:20

## 2024-01-03 RX ADMIN — PANTOPRAZOLE SODIUM 40 MG: 40 TABLET, DELAYED RELEASE ORAL at 06:18

## 2024-01-03 RX ADMIN — TRAMADOL HYDROCHLORIDE 50 MG: 50 TABLET ORAL at 02:04

## 2024-01-03 ASSESSMENT — PAIN DESCRIPTION - PAIN TYPE
TYPE: CHRONIC PAIN
TYPE: CHRONIC PAIN

## 2024-01-03 ASSESSMENT — PAIN SCALES - GENERAL
PAINLEVEL_OUTOF10: 8
PAINLEVEL_OUTOF10: 8
PAINLEVEL_OUTOF10: 7
PAINLEVEL_OUTOF10: 7

## 2024-01-03 ASSESSMENT — PAIN DESCRIPTION - DESCRIPTORS: DESCRIPTORS: ACHING;DISCOMFORT;SORE

## 2024-01-03 ASSESSMENT — PAIN DESCRIPTION - LOCATION
LOCATION: KNEE
LOCATION: LEG

## 2024-01-03 ASSESSMENT — PAIN DESCRIPTION - FREQUENCY: FREQUENCY: CONTINUOUS

## 2024-01-03 ASSESSMENT — PAIN DESCRIPTION - ORIENTATION
ORIENTATION: LEFT;RIGHT
ORIENTATION: RIGHT;LEFT
ORIENTATION: RIGHT;LEFT
ORIENTATION: LEFT

## 2024-01-03 ASSESSMENT — PAIN DESCRIPTION - ONSET: ONSET: ON-GOING

## 2024-01-03 NOTE — FLOWSHEET NOTE
Shift assessment complete. See doc flow. Nightly medications given see MAR. A/O x4. Wounds to BLE, dressings were not changed today, patient refused to have dressings changed at this time. BLE elevated w/ pillow support. Pure-wick in place. Cpap in place overnight. Patient repositioned for comfort. Patient with no complaints at this time. Bed alarm in place. Call light and bedside table within easy reach.    01/02/24 2328   Vital Signs   Temp 98.3 °F (36.8 °C)   Temp Source Axillary   Pulse 80   Heart Rate Source Monitor   Respirations 18   /75   MAP (Calculated) 91   BP Location Left lower arm   BP Method Automatic   Patient Position Semi fowlers   MEWS Score 1   Oxygen Therapy   SpO2 97 %   O2 Device PAP (positive airway pressure)   Neurological   Level of Consciousness 0

## 2024-01-03 NOTE — CONSULTS
CONSULT    Admit Date:  12/26/2023    Subjective:  73 y.o. male who is seen for evaluation of lower extremity wounds and cellulitis of the lower legs. Patient is well known to me from the [facility].  His [facility] nurse was not able to see him for the last few days.   feels horrible. Does not have the strength to lift his legs. States his neighbors helped him at home prior to coming to ER.  is worried about himself and his wife who is in psych her at [facility].       Past Medical History:        Diagnosis Date    Allergic rhinitis     Arthritis     Bladder fistula     resolved    C. difficile diarrhea 8/18/2015    +PCR    Cellulitis of right lower extremity 3/23/2016    CHF (congestive heart failure) ([facility])     Dental disease     Diabetes ([facility])     Diverticulitis     Dizziness     DVT of lower extremity, bilateral ([facility]) 10/16/2013    GERD (gastroesophageal reflux disease)     Headache     Hearing loss     Hematuria 1/2/2014    Hx of blood clots     Hyperlipidemia     Hypertension     Lung disease     MONIQUE (obstructive sleep apnea)     Pancreatitis ([facility]) 8/24/2013    Pulmonary emboli ([facility]) 2013    after cholecystectomy     Rash     Sleep apnea     Tinnitus        Past Surgical History:        Procedure Laterality Date    ABDOMEN SURGERY  05/16/2014    reveseral end peristomal hernia repair.    CARDIOVERSION  12/04/2019    Dr. Nava    CHOLECYSTECTOMY, OPEN N/A 9-17-13    LAPAROSCOPIC CONVERTED TO OPEN CHOLECYSTECTOMY WITH    COLON SURGERY      COLONOSCOPY  2008    COLONOSCOPY  12/4/2013    Severe Diverticulosis unable to finish    COLONOSCOPY  4/30/14    diverticula-10 year f/u    COLOSTOMY  Jan 2014    CYSTOSCOPY  12/10/13    with bladder biopsy    CYSTOSCOPY  1-28-14    Cystourethroscopy, left ureteral catheter     EPIDURAL STEROID INJECTION Right 1/21/2019    RIGHT LUMBAR TWO THREE EPIDURAL STEROID INJECTION SITE CONFIRMED BY FLUROOSCOPY performed by DAVID Vasquez MD at [facility] OR    EPIDURAL STEROID 
Length (cm) 4 cm 12/18/23 0855   Wound Width (cm) 5 cm 12/18/23 0855   Wound Depth (cm) 0.1 cm 12/18/23 0855   Wound Surface Area (cm^2) 20 cm^2 12/18/23 0855   Change in Wound Size % (l*w) 88.57 12/18/23 0855   Wound Volume (cm^3) 2 cm^3 12/18/23 0855   Wound Healing % 89 12/18/23 0855   Distance Tunneling (cm) 0 cm 12/18/23 0855   Tunneling Position ___ O'Clock 0 12/11/23 0811   Undermining Starts ___ O'Clock 0 12/11/23 0811   Undermining Ends___ O'Clock 0 12/11/23 0811   Undermining Maxium Distance (cm) 0 12/18/23 0855   Wound Assessment Pink/red 12/18/23 0855   Drainage Amount Small (< 25%) 12/18/23 0855   Drainage Description Serosanguinous 12/18/23 0855   Odor None 12/18/23 0855   Mago-wound Assessment Intact;Blanchable erythema 12/18/23 0855   Number of days: 184       Wound 07/03/23 # 12 Left Lower Leg Circumfrential, Venous Partial thickness, Onset 07/23 (Active)   Wound Image   12/11/23 0811   Wound Etiology Venous 12/18/23 0855   Dressing Status Clean;Dry;Intact 01/02/24 2328   Wound Cleansed Soap and water 12/18/23 0855   Dressing/Treatment Other (comment) 12/18/23 0935   Wound Length (cm) 7 cm 12/18/23 0855   Wound Width (cm) 6 cm 12/18/23 0855   Wound Depth (cm) 0.1 cm 12/18/23 0855   Wound Surface Area (cm^2) 42 cm^2 12/18/23 0855   Change in Wound Size % (l*w) -449.02 12/18/23 0855   Wound Volume (cm^3) 4.2 cm^3 12/18/23 0855   Wound Healing % -449 12/18/23 0855   Distance Tunneling (cm) 0 cm 12/18/23 0855   Tunneling Position ___ O'Clock 0 12/11/23 0811   Undermining Starts ___ O'Clock 0 12/11/23 0811   Undermining Ends___ O'Clock 0 12/11/23 0811   Undermining Maxium Distance (cm) 0 12/18/23 0855   Wound Assessment Pink/red 12/18/23 0855   Drainage Amount Moderate (25-50%) 12/18/23 0855   Drainage Description Brown 12/18/23 0855   Odor None 12/18/23 0855   Mago-wound Assessment Fragile;Maceration 12/18/23 0855   Number of days: 184      Right dorsal foot:  ruptured yellow fluid filled blister.

## 2024-01-03 NOTE — FLOWSHEET NOTE
Patient awake in bed, c/o left knee pain PRN Tramadol given. Cpap off for the night. Patient repositioned for comfort. Bed alarm in place. Call light and bedside table within.   01/03/24 0204   Vital Signs   Temp 97.5 °F (36.4 °C)   Temp Source Oral   Pulse 82   Heart Rate Source Monitor   Respirations 15   BP (!) 143/87   MAP (Calculated) 106   MAP (mmHg) 100   BP Location Left upper arm   BP Method Automatic   Patient Position Semi fowlers   Pain Assessment   Pain Assessment 0-10   Pain Level 7   Pain Location Knee   Pain Orientation Left   Pain Descriptors Aching;Discomfort;Sore   Functional Pain Assessment Prevents or interferes some active activities and ADLs   Pain Type Chronic pain   Pain Frequency Continuous   Pain Onset On-going   Non-Pharmaceutical Pain Intervention(s) Repositioned   Oxygen Therapy   SpO2 95 %   O2 Device Nasal cannula   O2 Flow Rate (L/min) 2 L/min

## 2024-01-04 LAB
GLUCOSE BLD-MCNC: 163 MG/DL (ref 70–99)
GLUCOSE BLD-MCNC: 189 MG/DL (ref 70–99)
GLUCOSE BLD-MCNC: 259 MG/DL (ref 70–99)
GLUCOSE BLD-MCNC: 348 MG/DL (ref 70–99)
PERFORMED ON: ABNORMAL

## 2024-01-04 PROCEDURE — 97530 THERAPEUTIC ACTIVITIES: CPT

## 2024-01-04 PROCEDURE — 97112 NEUROMUSCULAR REEDUCATION: CPT

## 2024-01-04 PROCEDURE — 2700000000 HC OXYGEN THERAPY PER DAY

## 2024-01-04 PROCEDURE — 1200000000 HC SEMI PRIVATE

## 2024-01-04 PROCEDURE — 6370000000 HC RX 637 (ALT 250 FOR IP)

## 2024-01-04 PROCEDURE — 94640 AIRWAY INHALATION TREATMENT: CPT

## 2024-01-04 PROCEDURE — 99233 SBSQ HOSP IP/OBS HIGH 50: CPT

## 2024-01-04 PROCEDURE — 2580000003 HC RX 258: Performed by: INTERNAL MEDICINE

## 2024-01-04 PROCEDURE — 94761 N-INVAS EAR/PLS OXIMETRY MLT: CPT

## 2024-01-04 PROCEDURE — 6370000000 HC RX 637 (ALT 250 FOR IP): Performed by: INTERNAL MEDICINE

## 2024-01-04 RX ORDER — TRAMADOL HYDROCHLORIDE 50 MG/1
50 TABLET ORAL EVERY 4 HOURS PRN
Status: DISCONTINUED | OUTPATIENT
Start: 2024-01-04 | End: 2024-01-09 | Stop reason: HOSPADM

## 2024-01-04 RX ADMIN — SACUBITRIL AND VALSARTAN 1 TABLET: 49; 51 TABLET, FILM COATED ORAL at 19:54

## 2024-01-04 RX ADMIN — SACUBITRIL AND VALSARTAN 1 TABLET: 49; 51 TABLET, FILM COATED ORAL at 09:31

## 2024-01-04 RX ADMIN — TRAMADOL HYDROCHLORIDE 50 MG: 50 TABLET, COATED ORAL at 16:01

## 2024-01-04 RX ADMIN — INSULIN LISPRO 4 UNITS: 100 INJECTION, SOLUTION INTRAVENOUS; SUBCUTANEOUS at 20:38

## 2024-01-04 RX ADMIN — MONTELUKAST SODIUM 10 MG: 10 TABLET, COATED ORAL at 09:31

## 2024-01-04 RX ADMIN — APIXABAN 5 MG: 5 TABLET, FILM COATED ORAL at 19:54

## 2024-01-04 RX ADMIN — Medication 2 PUFF: at 18:18

## 2024-01-04 RX ADMIN — SODIUM CHLORIDE, PRESERVATIVE FREE 10 ML: 5 INJECTION INTRAVENOUS at 09:31

## 2024-01-04 RX ADMIN — TORSEMIDE 20 MG: 20 TABLET ORAL at 09:31

## 2024-01-04 RX ADMIN — TRAMADOL HYDROCHLORIDE 50 MG: 50 TABLET ORAL at 04:29

## 2024-01-04 RX ADMIN — SODIUM CHLORIDE, PRESERVATIVE FREE 10 ML: 5 INJECTION INTRAVENOUS at 19:54

## 2024-01-04 RX ADMIN — ASPIRIN 81 MG: 81 TABLET, CHEWABLE ORAL at 09:31

## 2024-01-04 RX ADMIN — SPIRONOLACTONE 25 MG: 25 TABLET ORAL at 09:31

## 2024-01-04 RX ADMIN — Medication 2 PUFF: at 08:22

## 2024-01-04 RX ADMIN — TRAMADOL HYDROCHLORIDE 50 MG: 50 TABLET, COATED ORAL at 19:54

## 2024-01-04 RX ADMIN — PANTOPRAZOLE SODIUM 40 MG: 40 TABLET, DELAYED RELEASE ORAL at 04:29

## 2024-01-04 RX ADMIN — INSULIN LISPRO 4 UNITS: 100 INJECTION, SOLUTION INTRAVENOUS; SUBCUTANEOUS at 16:47

## 2024-01-04 RX ADMIN — APIXABAN 5 MG: 5 TABLET, FILM COATED ORAL at 09:31

## 2024-01-04 RX ADMIN — ATORVASTATIN CALCIUM 40 MG: 40 TABLET, FILM COATED ORAL at 19:54

## 2024-01-04 RX ADMIN — ISOSORBIDE MONONITRATE 30 MG: 30 TABLET, EXTENDED RELEASE ORAL at 09:31

## 2024-01-04 RX ADMIN — TRAMADOL HYDROCHLORIDE 50 MG: 50 TABLET ORAL at 11:53

## 2024-01-04 ASSESSMENT — PAIN DESCRIPTION - PAIN TYPE
TYPE: CHRONIC PAIN
TYPE: ACUTE PAIN
TYPE: CHRONIC PAIN

## 2024-01-04 ASSESSMENT — PAIN DESCRIPTION - LOCATION
LOCATION: SHOULDER
LOCATION: SHOULDER
LOCATION: SHOULDER;KNEE
LOCATION: SHOULDER
LOCATION: KNEE;HIP

## 2024-01-04 ASSESSMENT — PAIN SCALES - GENERAL
PAINLEVEL_OUTOF10: 10
PAINLEVEL_OUTOF10: 8
PAINLEVEL_OUTOF10: 3
PAINLEVEL_OUTOF10: 6
PAINLEVEL_OUTOF10: 10
PAINLEVEL_OUTOF10: 5
PAINLEVEL_OUTOF10: 7

## 2024-01-04 ASSESSMENT — PAIN DESCRIPTION - ORIENTATION
ORIENTATION: LEFT
ORIENTATION: RIGHT;LEFT
ORIENTATION: LEFT
ORIENTATION: RIGHT
ORIENTATION: LEFT

## 2024-01-04 ASSESSMENT — PAIN DESCRIPTION - DESCRIPTORS
DESCRIPTORS: DISCOMFORT;STABBING
DESCRIPTORS: ACHING;DISCOMFORT;SORE
DESCRIPTORS: DISCOMFORT
DESCRIPTORS: ACHING;DISCOMFORT
DESCRIPTORS: ACHING;DISCOMFORT;SORE;SHARP

## 2024-01-04 ASSESSMENT — PAIN DESCRIPTION - ONSET: ONSET: ON-GOING

## 2024-01-04 ASSESSMENT — PAIN DESCRIPTION - FREQUENCY: FREQUENCY: CONTINUOUS

## 2024-01-04 ASSESSMENT — PAIN - FUNCTIONAL ASSESSMENT
PAIN_FUNCTIONAL_ASSESSMENT: ACTIVITIES ARE NOT PREVENTED
PAIN_FUNCTIONAL_ASSESSMENT: PREVENTS OR INTERFERES SOME ACTIVE ACTIVITIES AND ADLS
PAIN_FUNCTIONAL_ASSESSMENT: PREVENTS OR INTERFERES SOME ACTIVE ACTIVITIES AND ADLS

## 2024-01-04 NOTE — CARE COORDINATION
Spoke with Marisol at [facility] who states pre cert is still pending. Will cont to follow.

## 2024-01-04 NOTE — FLOWSHEET NOTE
Shift assessment complete. See doc flow. Nightly medications given see MAR. A/O x4. Patient refused Cpap tonight, wears 2L O2. PRN Tramadol given for bilat knee and leg pain. Dressing changes completed to BLE today. PT/OT today at the side of the bed. Awaiting placement to SNF. Bed alarm in place. Call light and bedside table within easy reach.    01/03/24 2143   Vital Signs   Temp 99 °F (37.2 °C)   Temp Source Oral   Pulse 93   Heart Rate Source Monitor   Respirations 18   /71   MAP (Calculated) 90   BP Location Left lower arm   BP Method Automatic   Patient Position Semi fowlers   Oxygen Therapy   SpO2 93 %   O2 Device Nasal cannula   O2 Flow Rate (L/min) 2 L/min

## 2024-01-05 LAB
GLUCOSE BLD-MCNC: 169 MG/DL (ref 70–99)
GLUCOSE BLD-MCNC: 222 MG/DL (ref 70–99)
GLUCOSE BLD-MCNC: 231 MG/DL (ref 70–99)
GLUCOSE BLD-MCNC: 233 MG/DL (ref 70–99)
PERFORMED ON: ABNORMAL

## 2024-01-05 PROCEDURE — 6370000000 HC RX 637 (ALT 250 FOR IP)

## 2024-01-05 PROCEDURE — 1200000000 HC SEMI PRIVATE

## 2024-01-05 PROCEDURE — 6370000000 HC RX 637 (ALT 250 FOR IP): Performed by: INTERNAL MEDICINE

## 2024-01-05 PROCEDURE — 97530 THERAPEUTIC ACTIVITIES: CPT

## 2024-01-05 PROCEDURE — 94761 N-INVAS EAR/PLS OXIMETRY MLT: CPT

## 2024-01-05 PROCEDURE — 99231 SBSQ HOSP IP/OBS SF/LOW 25: CPT | Performed by: INTERNAL MEDICINE

## 2024-01-05 PROCEDURE — 94660 CPAP INITIATION&MGMT: CPT

## 2024-01-05 PROCEDURE — 2700000000 HC OXYGEN THERAPY PER DAY

## 2024-01-05 PROCEDURE — 94640 AIRWAY INHALATION TREATMENT: CPT

## 2024-01-05 RX ADMIN — Medication: at 16:45

## 2024-01-05 RX ADMIN — INSULIN LISPRO 2 UNITS: 100 INJECTION, SOLUTION INTRAVENOUS; SUBCUTANEOUS at 12:39

## 2024-01-05 RX ADMIN — MONTELUKAST SODIUM 10 MG: 10 TABLET, COATED ORAL at 09:17

## 2024-01-05 RX ADMIN — SPIRONOLACTONE 25 MG: 25 TABLET ORAL at 09:17

## 2024-01-05 RX ADMIN — ATORVASTATIN CALCIUM 40 MG: 40 TABLET, FILM COATED ORAL at 20:57

## 2024-01-05 RX ADMIN — TRAMADOL HYDROCHLORIDE 50 MG: 50 TABLET, COATED ORAL at 05:24

## 2024-01-05 RX ADMIN — ASPIRIN 81 MG: 81 TABLET, CHEWABLE ORAL at 09:17

## 2024-01-05 RX ADMIN — APIXABAN 5 MG: 5 TABLET, FILM COATED ORAL at 09:17

## 2024-01-05 RX ADMIN — Medication 2 PUFF: at 08:41

## 2024-01-05 RX ADMIN — Medication 2 PUFF: at 19:58

## 2024-01-05 RX ADMIN — SACUBITRIL AND VALSARTAN 1 TABLET: 49; 51 TABLET, FILM COATED ORAL at 09:17

## 2024-01-05 RX ADMIN — SACUBITRIL AND VALSARTAN 1 TABLET: 49; 51 TABLET, FILM COATED ORAL at 20:58

## 2024-01-05 RX ADMIN — ISOSORBIDE MONONITRATE 30 MG: 30 TABLET, EXTENDED RELEASE ORAL at 09:17

## 2024-01-05 RX ADMIN — TORSEMIDE 20 MG: 20 TABLET ORAL at 09:17

## 2024-01-05 RX ADMIN — INSULIN LISPRO 2 UNITS: 100 INJECTION, SOLUTION INTRAVENOUS; SUBCUTANEOUS at 17:03

## 2024-01-05 RX ADMIN — TRAMADOL HYDROCHLORIDE 50 MG: 50 TABLET, COATED ORAL at 12:44

## 2024-01-05 RX ADMIN — PANTOPRAZOLE SODIUM 40 MG: 40 TABLET, DELAYED RELEASE ORAL at 05:24

## 2024-01-05 RX ADMIN — APIXABAN 5 MG: 5 TABLET, FILM COATED ORAL at 20:57

## 2024-01-05 ASSESSMENT — PAIN DESCRIPTION - PAIN TYPE: TYPE: CHRONIC PAIN

## 2024-01-05 ASSESSMENT — PAIN DESCRIPTION - ORIENTATION
ORIENTATION: LEFT
ORIENTATION: LEFT

## 2024-01-05 ASSESSMENT — PAIN DESCRIPTION - DESCRIPTORS
DESCRIPTORS: ACHING
DESCRIPTORS: ACHING;DISCOMFORT;SORE

## 2024-01-05 ASSESSMENT — PAIN DESCRIPTION - LOCATION
LOCATION: SHOULDER
LOCATION: SHOULDER;ELBOW

## 2024-01-05 ASSESSMENT — PAIN SCALES - GENERAL
PAINLEVEL_OUTOF10: 6
PAINLEVEL_OUTOF10: 8

## 2024-01-05 ASSESSMENT — PAIN - FUNCTIONAL ASSESSMENT: PAIN_FUNCTIONAL_ASSESSMENT: ACTIVITIES ARE NOT PREVENTED

## 2024-01-05 NOTE — FLOWSHEET NOTE
01/05/24 0852   Vital Signs   Temp 97.5 °F (36.4 °C)   Temp Source Oral   Pulse 82   Respirations 18   BP (!) 148/64   MAP (Calculated) 92   BP Location Left lower arm   BP Method Automatic   Patient Position Semi fowlers   Pain Assessment   Pain Assessment None - Denies Pain   Oxygen Therapy   SpO2 93 %   O2 Device Nasal cannula   O2 Flow Rate (L/min) 3 L/min       Shift assessment complete. See flow sheet. Scheduled meds given. See MAR.  Patients head-toe complete, VS are logged, and active bowel sound noted in all four quadrants.    Pt sitting up in bed respirations easy and unlabored. No s/s of distress. Alert and oriented denies dominick or SOB pt stable    No further needs  noted at this time. Call light and bedside table are within reach. The bed is locked and is in the lowest position.        Jj Aldridge RN

## 2024-01-05 NOTE — FLOWSHEET NOTE
Shift assessment complete. See doc flow. Nightly medications given see MAR. A/O x4. Dressings in place to BLE that are dry and intact. Patient refused to get up to the chair today with PT. Pt c/o left shoulder pain PRN Tramadol given. Patient with negative talking about his situation and prognosis. Encouraged patient with positive speak and positive affirmation. Encouraged patient to wear his Cpap tonight patient has increased SOB at this time. Patient refused to wear the Cpap the night before. Pure-wick in place. Ice pack placed to left shoulder. Bed alarm in place. Call light and bedside table within easy reach.    01/04/24 1944   Vital Signs   Temp 99 °F (37.2 °C)   Temp Source Oral   Pulse 95   Heart Rate Source Monitor   Respirations 16   /86   MAP (Calculated) 98   BP Location Left lower arm   BP Method Automatic   Patient Position Semi fowlers   Oxygen Therapy   SpO2 93 %   O2 Device Nasal cannula   O2 Flow Rate (L/min) 2 L/min

## 2024-01-06 LAB
GLUCOSE BLD-MCNC: 120 MG/DL (ref 70–99)
GLUCOSE BLD-MCNC: 220 MG/DL (ref 70–99)
GLUCOSE BLD-MCNC: 236 MG/DL (ref 70–99)
GLUCOSE BLD-MCNC: 237 MG/DL (ref 70–99)
GLUCOSE BLD-MCNC: 242 MG/DL (ref 70–99)
GLUCOSE BLD-MCNC: 250 MG/DL (ref 70–99)
GLUCOSE BLD-MCNC: 428 MG/DL (ref 70–99)
PERFORMED ON: ABNORMAL

## 2024-01-06 PROCEDURE — 94761 N-INVAS EAR/PLS OXIMETRY MLT: CPT

## 2024-01-06 PROCEDURE — 94660 CPAP INITIATION&MGMT: CPT

## 2024-01-06 PROCEDURE — 6370000000 HC RX 637 (ALT 250 FOR IP): Performed by: INTERNAL MEDICINE

## 2024-01-06 PROCEDURE — 2580000003 HC RX 258: Performed by: INTERNAL MEDICINE

## 2024-01-06 PROCEDURE — 94640 AIRWAY INHALATION TREATMENT: CPT

## 2024-01-06 PROCEDURE — 1200000000 HC SEMI PRIVATE

## 2024-01-06 PROCEDURE — 6360000002 HC RX W HCPCS: Performed by: INTERNAL MEDICINE

## 2024-01-06 PROCEDURE — 2700000000 HC OXYGEN THERAPY PER DAY

## 2024-01-06 PROCEDURE — 99232 SBSQ HOSP IP/OBS MODERATE 35: CPT | Performed by: INTERNAL MEDICINE

## 2024-01-06 RX ORDER — INSULIN GLARGINE 100 [IU]/ML
12 INJECTION, SOLUTION SUBCUTANEOUS NIGHTLY
Status: DISCONTINUED | OUTPATIENT
Start: 2024-01-07 | End: 2024-01-09 | Stop reason: HOSPADM

## 2024-01-06 RX ORDER — FUROSEMIDE 10 MG/ML
20 INJECTION INTRAMUSCULAR; INTRAVENOUS ONCE
Status: COMPLETED | OUTPATIENT
Start: 2024-01-06 | End: 2024-01-06

## 2024-01-06 RX ADMIN — ATORVASTATIN CALCIUM 40 MG: 40 TABLET, FILM COATED ORAL at 21:01

## 2024-01-06 RX ADMIN — PANTOPRAZOLE SODIUM 40 MG: 40 TABLET, DELAYED RELEASE ORAL at 06:15

## 2024-01-06 RX ADMIN — FUROSEMIDE 20 MG: 10 INJECTION, SOLUTION INTRAMUSCULAR; INTRAVENOUS at 12:05

## 2024-01-06 RX ADMIN — APIXABAN 5 MG: 5 TABLET, FILM COATED ORAL at 21:01

## 2024-01-06 RX ADMIN — INSULIN LISPRO 2 UNITS: 100 INJECTION, SOLUTION INTRAVENOUS; SUBCUTANEOUS at 12:04

## 2024-01-06 RX ADMIN — Medication 2 PUFF: at 07:52

## 2024-01-06 RX ADMIN — APIXABAN 5 MG: 5 TABLET, FILM COATED ORAL at 08:36

## 2024-01-06 RX ADMIN — SODIUM CHLORIDE, PRESERVATIVE FREE 10 ML: 5 INJECTION INTRAVENOUS at 21:10

## 2024-01-06 RX ADMIN — Medication 2 PUFF: at 20:38

## 2024-01-06 RX ADMIN — ASPIRIN 81 MG: 81 TABLET, CHEWABLE ORAL at 08:36

## 2024-01-06 RX ADMIN — MONTELUKAST SODIUM 10 MG: 10 TABLET, COATED ORAL at 08:36

## 2024-01-06 RX ADMIN — INSULIN LISPRO 2 UNITS: 100 INJECTION, SOLUTION INTRAVENOUS; SUBCUTANEOUS at 17:01

## 2024-01-06 RX ADMIN — SACUBITRIL AND VALSARTAN 1 TABLET: 49; 51 TABLET, FILM COATED ORAL at 21:01

## 2024-01-06 NOTE — FLOWSHEET NOTE
01/05/24 1934   Vital Signs   Temp 97.8 °F (36.6 °C)   Temp Source Oral   Pulse 73   Heart Rate Source Monitor   Respirations 18   BP (!) 93/56   MAP (Calculated) 68   BP Location Right lower arm   BP Method Automatic   Patient Position Semi fowlers   Oxygen Therapy   SpO2 92 %   O2 Device Nasal cannula   O2 Flow Rate (L/min) 3 L/min     Pt resting in bed, sleeping when writer came to his room. Pt given evening meds. Denies pain at this time. Pamela Caba, RN

## 2024-01-06 NOTE — FLOWSHEET NOTE
01/06/24 0815   Vital Signs   Temp 98.8 °F (37.1 °C)   Temp Source Oral   Pulse 85   Heart Rate Source Monitor   Respirations 16   BP (!) 110/58   MAP (Calculated) 75   Patient Position Semi fowlers   Pain Assessment   Pain Assessment None - Denies Pain   Oxygen Therapy   SpO2 97 %   O2 Device Nasal cannula   O2 Flow Rate (L/min) 2 L/min       Shift assessment complete. See flow sheet. Scheduled meds given. See MAR.  Patients head-toe complete, VS are logged, and active bowel sound noted in all four quadrants.    Pt sitting up in bed respirations easy and unlabored. No s/s of distress. Alert and oriented denies pain or SOB pt stable. Blood sugar rechecked this AM found to be 120.     No further needs  noted at this time. Call light and bedside table are within reach. The bed is locked and is in the lowest position.        Jj Aldridge RN

## 2024-01-06 NOTE — FLOWSHEET NOTE
01/06/24 0418   Vital Signs   Temp 97.4 °F (36.3 °C)   Temp Source Oral   Pulse 50   Heart Rate Source Monitor   Respirations 20   BP 93/61   MAP (Calculated) 72   BP Location Right lower arm   BP Method Automatic   Patient Position Semi fowlers   Pain Assessment   Pain Assessment None - Denies Pain   Oxygen Therapy   SpO2 98 %   O2 Device Nasal cannula   O2 Flow Rate (L/min) 3 L/min   Height and Weight   Weight - Scale 119.4 kg (263 lb 4.8 oz)   Weight Method Bed scale   BMI (Calculated) 34.8     Pt resting in bed, no acute distress. Pamela Caba, RN

## 2024-01-07 LAB
GLUCOSE BLD-MCNC: 210 MG/DL (ref 70–99)
GLUCOSE BLD-MCNC: 234 MG/DL (ref 70–99)
GLUCOSE BLD-MCNC: 235 MG/DL (ref 70–99)
GLUCOSE BLD-MCNC: 246 MG/DL (ref 70–99)
PERFORMED ON: ABNORMAL

## 2024-01-07 PROCEDURE — 2580000003 HC RX 258: Performed by: INTERNAL MEDICINE

## 2024-01-07 PROCEDURE — 6360000002 HC RX W HCPCS: Performed by: INTERNAL MEDICINE

## 2024-01-07 PROCEDURE — 6370000000 HC RX 637 (ALT 250 FOR IP): Performed by: INTERNAL MEDICINE

## 2024-01-07 PROCEDURE — 99232 SBSQ HOSP IP/OBS MODERATE 35: CPT | Performed by: INTERNAL MEDICINE

## 2024-01-07 PROCEDURE — 97110 THERAPEUTIC EXERCISES: CPT

## 2024-01-07 PROCEDURE — 94761 N-INVAS EAR/PLS OXIMETRY MLT: CPT

## 2024-01-07 PROCEDURE — 97530 THERAPEUTIC ACTIVITIES: CPT

## 2024-01-07 PROCEDURE — 1200000000 HC SEMI PRIVATE

## 2024-01-07 PROCEDURE — 2700000000 HC OXYGEN THERAPY PER DAY

## 2024-01-07 PROCEDURE — 94660 CPAP INITIATION&MGMT: CPT

## 2024-01-07 PROCEDURE — 97535 SELF CARE MNGMENT TRAINING: CPT

## 2024-01-07 PROCEDURE — 6370000000 HC RX 637 (ALT 250 FOR IP)

## 2024-01-07 PROCEDURE — 94640 AIRWAY INHALATION TREATMENT: CPT

## 2024-01-07 RX ORDER — DOCUSATE SODIUM 100 MG/1
100 CAPSULE, LIQUID FILLED ORAL DAILY
Status: DISCONTINUED | OUTPATIENT
Start: 2024-01-07 | End: 2024-01-09 | Stop reason: HOSPADM

## 2024-01-07 RX ORDER — FUROSEMIDE 10 MG/ML
20 INJECTION INTRAMUSCULAR; INTRAVENOUS ONCE
Status: COMPLETED | OUTPATIENT
Start: 2024-01-07 | End: 2024-01-07

## 2024-01-07 RX ADMIN — INSULIN GLARGINE 12 UNITS: 100 INJECTION, SOLUTION SUBCUTANEOUS at 20:50

## 2024-01-07 RX ADMIN — SODIUM CHLORIDE, PRESERVATIVE FREE 10 ML: 5 INJECTION INTRAVENOUS at 10:29

## 2024-01-07 RX ADMIN — Medication: at 10:40

## 2024-01-07 RX ADMIN — SODIUM CHLORIDE, PRESERVATIVE FREE 10 ML: 5 INJECTION INTRAVENOUS at 20:50

## 2024-01-07 RX ADMIN — INSULIN LISPRO 2 UNITS: 100 INJECTION, SOLUTION INTRAVENOUS; SUBCUTANEOUS at 12:43

## 2024-01-07 RX ADMIN — POLYETHYLENE GLYCOL (3350) 17 G: 17 POWDER, FOR SOLUTION ORAL at 06:15

## 2024-01-07 RX ADMIN — APIXABAN 5 MG: 5 TABLET, FILM COATED ORAL at 10:35

## 2024-01-07 RX ADMIN — MONTELUKAST SODIUM 10 MG: 10 TABLET, COATED ORAL at 10:35

## 2024-01-07 RX ADMIN — PANTOPRAZOLE SODIUM 40 MG: 40 TABLET, DELAYED RELEASE ORAL at 06:15

## 2024-01-07 RX ADMIN — ATORVASTATIN CALCIUM 40 MG: 40 TABLET, FILM COATED ORAL at 20:47

## 2024-01-07 RX ADMIN — INSULIN LISPRO 2 UNITS: 100 INJECTION, SOLUTION INTRAVENOUS; SUBCUTANEOUS at 17:01

## 2024-01-07 RX ADMIN — ASPIRIN 81 MG: 81 TABLET, CHEWABLE ORAL at 10:35

## 2024-01-07 RX ADMIN — TRAMADOL HYDROCHLORIDE 50 MG: 50 TABLET, COATED ORAL at 10:35

## 2024-01-07 RX ADMIN — SACUBITRIL AND VALSARTAN 1 TABLET: 49; 51 TABLET, FILM COATED ORAL at 20:48

## 2024-01-07 RX ADMIN — SPIRONOLACTONE 25 MG: 25 TABLET ORAL at 10:35

## 2024-01-07 RX ADMIN — FUROSEMIDE 20 MG: 10 INJECTION, SOLUTION INTRAMUSCULAR; INTRAVENOUS at 10:33

## 2024-01-07 RX ADMIN — Medication 2 PUFF: at 19:38

## 2024-01-07 RX ADMIN — INSULIN LISPRO 1 UNITS: 100 INJECTION, SOLUTION INTRAVENOUS; SUBCUTANEOUS at 10:31

## 2024-01-07 RX ADMIN — APIXABAN 5 MG: 5 TABLET, FILM COATED ORAL at 20:48

## 2024-01-07 RX ADMIN — Medication 2 PUFF: at 08:09

## 2024-01-07 RX ADMIN — TRAMADOL HYDROCHLORIDE 50 MG: 50 TABLET, COATED ORAL at 20:48

## 2024-01-07 RX ADMIN — DOCUSATE SODIUM 100 MG: 100 CAPSULE, LIQUID FILLED ORAL at 10:35

## 2024-01-07 ASSESSMENT — PAIN DESCRIPTION - DESCRIPTORS: DESCRIPTORS: DISCOMFORT

## 2024-01-07 ASSESSMENT — PAIN DESCRIPTION - LOCATION: LOCATION: SHOULDER

## 2024-01-07 ASSESSMENT — PAIN - FUNCTIONAL ASSESSMENT: PAIN_FUNCTIONAL_ASSESSMENT: PREVENTS OR INTERFERES SOME ACTIVE ACTIVITIES AND ADLS

## 2024-01-07 ASSESSMENT — PAIN DESCRIPTION - FREQUENCY: FREQUENCY: CONTINUOUS

## 2024-01-07 ASSESSMENT — PAIN SCALES - GENERAL
PAINLEVEL_OUTOF10: 6
PAINLEVEL_OUTOF10: 5
PAINLEVEL_OUTOF10: 4

## 2024-01-07 ASSESSMENT — PAIN DESCRIPTION - ONSET: ONSET: ON-GOING

## 2024-01-07 ASSESSMENT — PAIN DESCRIPTION - ORIENTATION: ORIENTATION: LEFT

## 2024-01-07 ASSESSMENT — PAIN DESCRIPTION - PAIN TYPE: TYPE: ACUTE PAIN

## 2024-01-07 NOTE — FLOWSHEET NOTE
Pt A/Ox4. VSS. Pain 5/10 to left shoulder, see assessment below. Pt unlabored; respirations even, regular, effortless. RA. No distress noted. Shift assessment complete. See flowsheet. AM med's given except Entresto d/t /61. PRN Tramadol given for acute pain. See MAR. Denies needs at this time. Telemetry and continuous pulse ox remains in place. Side rails 2/4. Pt up in chair, wheels locked. Call light within reach.     Bedside Mobility Assessment Tool (BMAT):     Assessment Level 1- Sit and Shake    1. From a semi-reclined position, ask patient to sit up and rotate to a seated position at the side of the bed. Can use the bedrail.    2. Ask patient to reach out and grab your hand and shake making sure patient reaches across his/her midline.   Fail- Patient is unable to perform tasks, patient is MOBILITY LEVEL 1.    Assessment Level 2- Stretch and Point   1. With patient in seated position at the side of the bed, have patient place both feet on the floor (or stool) with knees no higher than hips.    2. Ask patient to stretch one leg and straighten the knee, then bend the ankle/flex and point the toes. If appropriate, repeat with the other leg.   Fail- Patient is unable to complete task. Patient is MOBILITY LEVEL 2.     Assessment Level 3- Stand   1. Ask patient to elevate off the bed or chair (seated to standing) using an assistive device (cane, bedrail).    2. Patient should be able to raise buttocks off be and hold for a count of five. May repeat once.   Fail- Patient unable to demonstrate standing stability. Patient is MOBILITY LEVEL 3.     Assessment Level 4- Walk   1. Ask patient to march in place at bedside.    2. Then ask patient to advance step and return each foot. Some medical conditions may render a patient from stepping backwards, use your best clinical judgement.   Fail- Patient not able to complete tasks OR requires use of assistive device. Patient is MOBILITY LEVEL 3.       Mobility Level- 1

## 2024-01-08 ENCOUNTER — HOSPITAL ENCOUNTER (OUTPATIENT)
Dept: WOUND CARE | Age: 74
Discharge: HOME | End: 2024-01-08
Attending: INTERNAL MEDICINE

## 2024-01-08 LAB
GLUCOSE BLD-MCNC: 184 MG/DL (ref 70–99)
GLUCOSE BLD-MCNC: 186 MG/DL (ref 70–99)
GLUCOSE BLD-MCNC: 194 MG/DL (ref 70–99)
GLUCOSE BLD-MCNC: 208 MG/DL (ref 70–99)
GLUCOSE BLD-MCNC: 209 MG/DL (ref 70–99)
PERFORMED ON: ABNORMAL

## 2024-01-08 PROCEDURE — 2580000003 HC RX 258: Performed by: INTERNAL MEDICINE

## 2024-01-08 PROCEDURE — 94660 CPAP INITIATION&MGMT: CPT

## 2024-01-08 PROCEDURE — 94640 AIRWAY INHALATION TREATMENT: CPT

## 2024-01-08 PROCEDURE — 6370000000 HC RX 637 (ALT 250 FOR IP): Performed by: INTERNAL MEDICINE

## 2024-01-08 PROCEDURE — 94761 N-INVAS EAR/PLS OXIMETRY MLT: CPT

## 2024-01-08 PROCEDURE — 2700000000 HC OXYGEN THERAPY PER DAY

## 2024-01-08 PROCEDURE — 6370000000 HC RX 637 (ALT 250 FOR IP)

## 2024-01-08 PROCEDURE — 99231 SBSQ HOSP IP/OBS SF/LOW 25: CPT | Performed by: INTERNAL MEDICINE

## 2024-01-08 PROCEDURE — 1200000000 HC SEMI PRIVATE

## 2024-01-08 RX ADMIN — SPIRONOLACTONE 25 MG: 25 TABLET ORAL at 09:31

## 2024-01-08 RX ADMIN — SACUBITRIL AND VALSARTAN 1 TABLET: 49; 51 TABLET, FILM COATED ORAL at 22:24

## 2024-01-08 RX ADMIN — Medication 2 PUFF: at 21:06

## 2024-01-08 RX ADMIN — SACUBITRIL AND VALSARTAN 1 TABLET: 49; 51 TABLET, FILM COATED ORAL at 09:35

## 2024-01-08 RX ADMIN — INSULIN GLARGINE 12 UNITS: 100 INJECTION, SOLUTION SUBCUTANEOUS at 22:24

## 2024-01-08 RX ADMIN — PANTOPRAZOLE SODIUM 40 MG: 40 TABLET, DELAYED RELEASE ORAL at 06:01

## 2024-01-08 RX ADMIN — TRAMADOL HYDROCHLORIDE 50 MG: 50 TABLET, COATED ORAL at 17:19

## 2024-01-08 RX ADMIN — TRAMADOL HYDROCHLORIDE 50 MG: 50 TABLET, COATED ORAL at 02:12

## 2024-01-08 RX ADMIN — MONTELUKAST SODIUM 10 MG: 10 TABLET, COATED ORAL at 09:31

## 2024-01-08 RX ADMIN — APIXABAN 5 MG: 5 TABLET, FILM COATED ORAL at 09:30

## 2024-01-08 RX ADMIN — APIXABAN 5 MG: 5 TABLET, FILM COATED ORAL at 22:24

## 2024-01-08 RX ADMIN — INSULIN LISPRO 2 UNITS: 100 INJECTION, SOLUTION INTRAVENOUS; SUBCUTANEOUS at 17:19

## 2024-01-08 RX ADMIN — ASPIRIN 81 MG: 81 TABLET, CHEWABLE ORAL at 09:31

## 2024-01-08 RX ADMIN — Medication: at 09:35

## 2024-01-08 RX ADMIN — SODIUM CHLORIDE, PRESERVATIVE FREE 10 ML: 5 INJECTION INTRAVENOUS at 09:38

## 2024-01-08 RX ADMIN — Medication 2 PUFF: at 08:33

## 2024-01-08 RX ADMIN — ATORVASTATIN CALCIUM 40 MG: 40 TABLET, FILM COATED ORAL at 22:24

## 2024-01-08 RX ADMIN — DOCUSATE SODIUM 100 MG: 100 CAPSULE, LIQUID FILLED ORAL at 09:31

## 2024-01-08 RX ADMIN — SODIUM CHLORIDE, PRESERVATIVE FREE 10 ML: 5 INJECTION INTRAVENOUS at 22:28

## 2024-01-08 RX ADMIN — TRAMADOL HYDROCHLORIDE 50 MG: 50 TABLET, COATED ORAL at 09:35

## 2024-01-08 RX ADMIN — TRAMADOL HYDROCHLORIDE 50 MG: 50 TABLET, COATED ORAL at 22:24

## 2024-01-08 ASSESSMENT — PAIN SCALES - WONG BAKER: WONGBAKER_NUMERICALRESPONSE: 0

## 2024-01-08 ASSESSMENT — PAIN - FUNCTIONAL ASSESSMENT
PAIN_FUNCTIONAL_ASSESSMENT: ACTIVITIES ARE NOT PREVENTED

## 2024-01-08 ASSESSMENT — PAIN DESCRIPTION - ONSET
ONSET: ON-GOING
ONSET: ON-GOING

## 2024-01-08 ASSESSMENT — PAIN DESCRIPTION - LOCATION
LOCATION: SHOULDER
LOCATION: BACK
LOCATION: SHOULDER
LOCATION: LEG

## 2024-01-08 ASSESSMENT — PAIN DESCRIPTION - DESCRIPTORS
DESCRIPTORS: DISCOMFORT
DESCRIPTORS: ACHING;DISCOMFORT
DESCRIPTORS: ACHING;DISCOMFORT
DESCRIPTORS: DISCOMFORT

## 2024-01-08 ASSESSMENT — PAIN DESCRIPTION - ORIENTATION
ORIENTATION: LOWER
ORIENTATION: LEFT
ORIENTATION: LEFT

## 2024-01-08 ASSESSMENT — PAIN DESCRIPTION - FREQUENCY
FREQUENCY: CONTINUOUS
FREQUENCY: INTERMITTENT

## 2024-01-08 ASSESSMENT — PAIN SCALES - GENERAL
PAINLEVEL_OUTOF10: 6
PAINLEVEL_OUTOF10: 2
PAINLEVEL_OUTOF10: 9
PAINLEVEL_OUTOF10: 8
PAINLEVEL_OUTOF10: 4
PAINLEVEL_OUTOF10: 9
PAINLEVEL_OUTOF10: 3

## 2024-01-08 ASSESSMENT — PAIN DESCRIPTION - PAIN TYPE
TYPE: ACUTE PAIN
TYPE: CHRONIC PAIN

## 2024-01-08 NOTE — CARE COORDINATION
INTERDISCIPLINARY PLAN OF CARE CONFERENCE    Date/Time: 1/8/2024 3:33 PM  Completed by: Claudia Givens RN, Case Management      Patient Name:  Kareem Jimenezacher  YOB: 1950  Admitting Diagnosis: MONIQUE (obstructive sleep apnea) [G47.33]  Generalized weakness [R53.1]  Pneumonia due to infectious organism [J18.9]  Pneumonia due to infectious organism, unspecified laterality, unspecified part of lung [J18.9]  Community acquired pneumonia due to Chlamydia species [J16.0]     Admit Date/Time:  12/26/2023  1:21 PM    Chart reviewed. Interdisciplinary team contacted or reviewed plan related to patient progress and discharge plans.   Disciplines included Case Management, Nursing, and Dietitian.    Current Status:Stable  PT/OT recommendation for discharge plan of care: SNF    Expected D/C Disposition:  Rehab  Confirmed plan with patient  Yes   Discharge Plan Comments: Reviewed chart and met with pt. Plan remains for rehab at [facility] Spoke with Marisol at [facility] and prior auth still pending. Will cont to follow.      Home O2 in place on admit: No  Pt informed of need to bring portable home O2 tank on day of discharge for nursing to connect prior to leaving:  Not Indicated  Verbalized agreement/Understanding:  Not Indicated

## 2024-01-08 NOTE — FLOWSHEET NOTE
01/08/24 0915   Vital Signs   Temp 97.7 °F (36.5 °C)   Temp Source Oral   Pulse 72   Heart Rate Source Monitor   Respirations 18   /72   MAP (Calculated) 85   BP Location Right Arm   BP Method Automatic   Patient Position Semi fowlers   Pain Assessment   Pain Assessment 0-10   Pain Level 8   Patient's Stated Pain Goal 0 - No pain   Pain Location Leg   Pain Orientation Left   Pain Descriptors Aching;Discomfort   Functional Pain Assessment Activities are not prevented   Pain Type Acute pain   Pain Frequency Continuous   Pain Onset On-going   Non-Pharmaceutical Pain Intervention(s) Repositioned   Opioid-Induced Sedation   POSS Score 1   Oxygen Therapy   SpO2 93 %   O2 Device Nasal cannula   O2 Flow Rate (L/min) 2 L/min     AM assessment completed, see flow sheet. Pt is alert and oriented. Vital signs are WNL. Respirations are even & easy. No complaints voiced. Pt denies needs at this time. SR up x 2, and bed in low position. Call light is within reach.

## 2024-01-09 ENCOUNTER — TELEPHONE (OUTPATIENT)
Dept: FAMILY MEDICINE CLINIC | Age: 74
End: 2024-01-09

## 2024-01-09 VITALS
WEIGHT: 253.8 LBS | BODY MASS INDEX: 33.64 KG/M2 | RESPIRATION RATE: 18 BRPM | TEMPERATURE: 98 F | HEIGHT: 73 IN | SYSTOLIC BLOOD PRESSURE: 107 MMHG | HEART RATE: 60 BPM | OXYGEN SATURATION: 92 % | DIASTOLIC BLOOD PRESSURE: 73 MMHG

## 2024-01-09 LAB
GLUCOSE BLD-MCNC: 143 MG/DL (ref 70–99)
GLUCOSE BLD-MCNC: 163 MG/DL (ref 70–99)
GLUCOSE BLD-MCNC: 166 MG/DL (ref 70–99)
PERFORMED ON: ABNORMAL

## 2024-01-09 PROCEDURE — 2580000003 HC RX 258: Performed by: INTERNAL MEDICINE

## 2024-01-09 PROCEDURE — 6370000000 HC RX 637 (ALT 250 FOR IP)

## 2024-01-09 PROCEDURE — 6370000000 HC RX 637 (ALT 250 FOR IP): Performed by: INTERNAL MEDICINE

## 2024-01-09 PROCEDURE — 94640 AIRWAY INHALATION TREATMENT: CPT

## 2024-01-09 PROCEDURE — 2700000000 HC OXYGEN THERAPY PER DAY

## 2024-01-09 PROCEDURE — 99238 HOSP IP/OBS DSCHRG MGMT 30/<: CPT | Performed by: INTERNAL MEDICINE

## 2024-01-09 PROCEDURE — 94761 N-INVAS EAR/PLS OXIMETRY MLT: CPT

## 2024-01-09 RX ORDER — PSEUDOEPHEDRINE HCL 30 MG
100 TABLET ORAL DAILY
DISCHARGE
Start: 2024-01-10

## 2024-01-09 RX ORDER — INSULIN LISPRO 100 [IU]/ML
0-8 INJECTION, SOLUTION INTRAVENOUS; SUBCUTANEOUS
DISCHARGE
Start: 2024-01-09

## 2024-01-09 RX ORDER — TORSEMIDE 20 MG/1
20 TABLET ORAL DAILY
Qty: 30 TABLET | Refills: 3 | Status: SHIPPED
Start: 2024-01-09

## 2024-01-09 RX ORDER — INSULIN GLARGINE 300 U/ML
12 INJECTION, SOLUTION SUBCUTANEOUS 2 TIMES DAILY
Qty: 5 ADJUSTABLE DOSE PRE-FILLED PEN SYRINGE | Refills: 5 | DISCHARGE
Start: 2024-01-09

## 2024-01-09 RX ORDER — TRAMADOL HYDROCHLORIDE 50 MG/1
50 TABLET ORAL EVERY 6 HOURS PRN
Qty: 12 TABLET | Refills: 0 | Status: SHIPPED | OUTPATIENT
Start: 2024-01-09 | End: 2024-01-12

## 2024-01-09 RX ORDER — SPIRONOLACTONE 25 MG/1
25 TABLET ORAL DAILY
Qty: 30 TABLET | Refills: 3 | DISCHARGE
Start: 2024-01-10

## 2024-01-09 RX ADMIN — APIXABAN 5 MG: 5 TABLET, FILM COATED ORAL at 09:19

## 2024-01-09 RX ADMIN — MONTELUKAST SODIUM 10 MG: 10 TABLET, COATED ORAL at 09:19

## 2024-01-09 RX ADMIN — TRAMADOL HYDROCHLORIDE 50 MG: 50 TABLET, COATED ORAL at 09:27

## 2024-01-09 RX ADMIN — SODIUM CHLORIDE, PRESERVATIVE FREE 10 ML: 5 INJECTION INTRAVENOUS at 09:20

## 2024-01-09 RX ADMIN — SACUBITRIL AND VALSARTAN 1 TABLET: 49; 51 TABLET, FILM COATED ORAL at 09:19

## 2024-01-09 RX ADMIN — DOCUSATE SODIUM 100 MG: 100 CAPSULE, LIQUID FILLED ORAL at 09:19

## 2024-01-09 RX ADMIN — Medication: at 09:20

## 2024-01-09 RX ADMIN — ASPIRIN 81 MG: 81 TABLET, CHEWABLE ORAL at 09:19

## 2024-01-09 RX ADMIN — Medication 2 PUFF: at 08:03

## 2024-01-09 RX ADMIN — SPIRONOLACTONE 25 MG: 25 TABLET ORAL at 09:19

## 2024-01-09 RX ADMIN — PANTOPRAZOLE SODIUM 40 MG: 40 TABLET, DELAYED RELEASE ORAL at 05:48

## 2024-01-09 ASSESSMENT — PAIN SCALES - GENERAL
PAINLEVEL_OUTOF10: 5
PAINLEVEL_OUTOF10: 3

## 2024-01-09 ASSESSMENT — PAIN SCALES - WONG BAKER
WONGBAKER_NUMERICALRESPONSE: 0
WONGBAKER_NUMERICALRESPONSE: 0

## 2024-01-09 ASSESSMENT — PAIN - FUNCTIONAL ASSESSMENT: PAIN_FUNCTIONAL_ASSESSMENT: ACTIVITIES ARE NOT PREVENTED

## 2024-01-09 ASSESSMENT — PAIN DESCRIPTION - FREQUENCY: FREQUENCY: INTERMITTENT

## 2024-01-09 ASSESSMENT — PAIN DESCRIPTION - PAIN TYPE: TYPE: CHRONIC PAIN

## 2024-01-09 ASSESSMENT — PAIN DESCRIPTION - DESCRIPTORS: DESCRIPTORS: DISCOMFORT

## 2024-01-09 ASSESSMENT — PAIN DESCRIPTION - ONSET: ONSET: ON-GOING

## 2024-01-09 ASSESSMENT — PAIN DESCRIPTION - ORIENTATION: ORIENTATION: LEFT

## 2024-01-09 ASSESSMENT — PAIN DESCRIPTION - LOCATION: LOCATION: SHOULDER

## 2024-01-09 NOTE — DISCHARGE SUMMARY
Hospital Medicine Discharge Summary    Patient: Kareem Jimenezacher     Gender: male  : 1950   Age: 73 y.o.  MRN: [id number]    Admitting Physician: Racquel Acevedo DO  Discharge Physician: Racquel Acevedo DO    Code Status: Full Code     Admit Date: 2023   Discharge Date: 2024     Discharge Diagnoses:    Active Hospital Problems    Diagnosis Date Noted    MONIQUE (obstructive sleep apnea) [G47.33] 2017     Priority: High    Congestive heart failure (HCC) [I50.9] 10/16/2017     Priority: High    Generalized weakness [R53.1] 2023    Sepsis (HCC) [A41.9] 2023    Pneumonia due to infectious organism [J18.9] 2023    Community acquired pneumonia due to Chlamydia species [J16.0] 2023    Atrial fibrillation (HCC) [I48.91] 10/22/2019    Cellulitis of lower extremity [L03.119] 2016     Condition at Discharge: Stable    Hospital Course:     #Sepsis   #Likely from right foot/toe cellulitis   #Chronic venous ulcers lower extremities   -LA, WBC, procalc, +source   -has been following with wound clinic outpatient and recently finished antibiotics   -blood cultures ngtd  -podiatry consulted  -initially on Rocephin -> vanc/cefepime completed for 7 days now   -tramadol prn      #Elevated troponin   -47-->41 -> 42  -chronically elevated in the past   -EKG without acute ischemic changes  -no CP       #Atrial fibrillation/flutter s/p DCCV 19  #Secondary hypercoagulable state   - rate controlled  -on eliquis for AC      #Generalized weakness   -PT/OT recommends SNF  -fall precautions   -case management for SNF      #Chronic diastolic HF   #Pulmonary HTN  -last echo as above  -questionable compliance with home med regimen   -resumed torsemide and entresto   -daily weights, intake and output    -resumed aldactone     #Acute nocturnal hypoxic respiratory failure   #Atelectasis   -requiring 2 L O2   -likely 2/2 to MONIQUE, has been non-compliant with CPAP  -resumed CPAP  -wean as tolerated  -ICS

## 2024-01-09 NOTE — CARE COORDINATION
DISCHARGE ORDER  Date/Time 2024 11:50 AM  Completed by: Pamela Pascual, Case Management    Patient Name: Kareem Hansen    : 1950      Admit order Date and Status:2023 Stable  Noted discharge order. (verify MD's last order for status of admission/Traditional Medicare 3 [place] Inpatient qualifying stay required for SNF)    Confirmed discharge plan with:              Patient:  Yes              When pt confirms DC plan does any support person need to be contacted by CM No                Discharge to Facility: [facility]   Facility phone number for staff giving report: [phone]   Pre-certification completed: yes   Hospital Exemption Notification (HENS) completed: yes   Discharge orders and Continuity of Care faxed to facility:  can pull from [facility]      Transportation:               Medical Transport explained with choice list offered to pt/family.                Choice:(no preference)  Agency used: [facility]   time:   1200      Pt/family/Nursing/Facility aware of  time:   Yes Names: PT, CM, Alma RN, Margarette Mason RN, Marisol Alves at [facility]  Ambulance form completed: Yes     Date Last IMM Given: 2024    Comments:Reviewed chart, noted DC order, met with pt at bedside. Pt to be DC to [facility] today, [facility] providing transportation at 12 pm.       Pt is being d/c'd to [facility] today. Pt's O2 sats are 92% on RA.      Discharge timeout done with  PT, JARON, Alma BUSH, Margarette Mason RN, Marisol Alves at [facility]. All discharge needs and concerns addressed.    Discharging nurse to complete RONNY, reconcile AVS, and place final copy with patient's discharge packet. Discharging RN to ensure that written prescriptions for  Level II medications are sent with patient to the facility as per protocol.

## 2024-01-09 NOTE — FLOWSHEET NOTE
01/09/24 0915   Vital Signs   Temp 98 °F (36.7 °C)   Temp Source Oral   Pulse 60   Heart Rate Source Monitor   Respirations 18   /73   MAP (Calculated) 84   BP Location Right Arm   BP Method Automatic   Patient Position Semi fowlers   Pain Assessment   Pain Assessment 0-10   Pain Level 5   Johnson-Baker Pain Rating 0   Patient's Stated Pain Goal 0 - No pain   Pain Location Shoulder   Pain Orientation Left   Pain Descriptors Discomfort   Functional Pain Assessment Activities are not prevented   Pain Type Chronic pain   Pain Frequency Intermittent   Pain Onset On-going   Non-Pharmaceutical Pain Intervention(s) Distraction   Opioid-Induced Sedation   POSS Score 1   Oxygen Therapy   SpO2 92 %   O2 Device Nasal cannula   O2 Flow Rate (L/min) 2 L/min     AM assessment completed, see flow sheet. Pt is alert and oriented. Vital signs are WNL. Respirations are even & easy. No complaints voiced. Pt denies needs at this time. SR up x 2, and bed in low position. Call light is within reach.

## 2024-01-09 NOTE — TELEPHONE ENCOUNTER
Received a call from the [facility] stating that Joe was admitted there and they have their own Dr that will see him there. After he is discharged he can come back here.

## 2024-01-09 NOTE — PLAN OF CARE
Problem: Safety - Adult  Goal: Free from fall injury  1/1/2024 1020 by Kriss Martinez RN  Outcome: Progressing     Problem: Chronic Conditions and Co-morbidities  Goal: Patient's chronic conditions and co-morbidity symptoms are monitored and maintained or improved  1/1/2024 1020 by Kriss Martinez RN  Outcome: Progressing     Problem: Skin/Tissue Integrity  Goal: Absence of new skin breakdown  Description: 1.  Monitor for areas of redness and/or skin breakdown  2.  Assess vascular access sites hourly  3.  Every 4-6 hours minimum:  Change oxygen saturation probe site  4.  Every 4-6 hours:  If on nasal continuous positive airway pressure, respiratory therapy assess nares and determine need for appliance change or resting period.  1/1/2024 1020 by Kriss Martinez RN  Outcome: Progressing     Problem: Discharge Planning  Goal: Discharge to home or other facility with appropriate resources  1/1/2024 1020 by Kriss Martinez RN  Outcome: Progressing     Problem: Pain  Goal: Verbalizes/displays adequate comfort level or baseline comfort level  1/1/2024 1020 by Kriss Martinez RN  Outcome: Progressing     Problem: Infection - Adult  Goal: Absence of infection at discharge  Outcome: Progressing     Problem: Metabolic/Fluid and Electrolytes - Adult  Goal: Electrolytes maintained within normal limits  Outcome: Progressing     
  Problem: Safety - Adult  Goal: Free from fall injury  1/1/2024 2143 by Santos Leone RN  Outcome: Progressing  1/1/2024 1020 by Kriss Martinez RN  Outcome: Progressing     Problem: Chronic Conditions and Co-morbidities  Goal: Patient's chronic conditions and co-morbidity symptoms are monitored and maintained or improved  1/1/2024 2143 by Santos Leone RN  Outcome: Progressing  1/1/2024 1020 by Kriss Martinez RN  Outcome: Progressing     Problem: Skin/Tissue Integrity  Goal: Absence of new skin breakdown  Description: 1.  Monitor for areas of redness and/or skin breakdown  2.  Assess vascular access sites hourly  3.  Every 4-6 hours minimum:  Change oxygen saturation probe site  4.  Every 4-6 hours:  If on nasal continuous positive airway pressure, respiratory therapy assess nares and determine need for appliance change or resting period.  1/1/2024 2143 by Santos Leone RN  Outcome: Progressing  1/1/2024 1020 by Kriss Martinez RN  Outcome: Progressing     Problem: Discharge Planning  Goal: Discharge to home or other facility with appropriate resources  1/1/2024 2143 by Santos Leone RN  Outcome: Progressing  1/1/2024 1020 by Kriss Martinez RN  Outcome: Progressing     Problem: Pain  Goal: Verbalizes/displays adequate comfort level or baseline comfort level  1/1/2024 2143 by Santos Leone RN  Outcome: Progressing  1/1/2024 1020 by Kriss Martinez RN  Outcome: Progressing     Problem: Infection - Adult  Goal: Absence of infection at discharge  1/1/2024 2143 by Santos Leone RN  Outcome: Progressing  1/1/2024 1020 by Kriss Martinez RN  Outcome: Progressing     Problem: Metabolic/Fluid and Electrolytes - Adult  Goal: Electrolytes maintained within normal limits  1/1/2024 2143 by Santos Leone RN  Outcome: Progressing  1/1/2024 1020 by Kriss Martinez RN  Outcome: Progressing     
  Problem: Safety - Adult  Goal: Free from fall injury  1/2/2024 0935 by Kriss Martinez RN  Outcome: Progressing     Problem: Chronic Conditions and Co-morbidities  Goal: Patient's chronic conditions and co-morbidity symptoms are monitored and maintained or improved  1/2/2024 0935 by Kriss Martinez RN  Outcome: Progressing     Problem: Skin/Tissue Integrity  Goal: Absence of new skin breakdown  Description: 1.  Monitor for areas of redness and/or skin breakdown  2.  Assess vascular access sites hourly  3.  Every 4-6 hours minimum:  Change oxygen saturation probe site  4.  Every 4-6 hours:  If on nasal continuous positive airway pressure, respiratory therapy assess nares and determine need for appliance change or resting period.  1/2/2024 0935 by Kriss Martinez RN  Outcome: Progressing     Problem: Discharge Planning  Goal: Discharge to home or other facility with appropriate resources  1/2/2024 0935 by Kriss Martinez RN  Outcome: Progressing     Problem: Pain  Goal: Verbalizes/displays adequate comfort level or baseline comfort level  1/2/2024 0935 by Kriss Martinez RN  Outcome: Progressing     Problem: Infection - Adult  Goal: Absence of infection at discharge  1/2/2024 0935 by Kriss Martinez RN  Outcome: Progressing     
  Problem: Safety - Adult  Goal: Free from fall injury  1/2/2024 2332 by Santos Leone RN  Outcome: Progressing  1/2/2024 0935 by Kriss Martinez RN  Outcome: Progressing     Problem: Chronic Conditions and Co-morbidities  Goal: Patient's chronic conditions and co-morbidity symptoms are monitored and maintained or improved  1/2/2024 2332 by Santos Leone RN  Outcome: Progressing  1/2/2024 0935 by Kriss Martinez RN  Outcome: Progressing     Problem: Skin/Tissue Integrity  Goal: Absence of new skin breakdown  Description: 1.  Monitor for areas of redness and/or skin breakdown  2.  Assess vascular access sites hourly  3.  Every 4-6 hours minimum:  Change oxygen saturation probe site  4.  Every 4-6 hours:  If on nasal continuous positive airway pressure, respiratory therapy assess nares and determine need for appliance change or resting period.  1/2/2024 2332 by Santos Leone RN  Outcome: Progressing  1/2/2024 0935 by Kriss Martinez RN  Outcome: Progressing     Problem: Discharge Planning  Goal: Discharge to home or other facility with appropriate resources  1/2/2024 2332 by Santos Leone RN  Outcome: Progressing  1/2/2024 0935 by Kriss Martinez RN  Outcome: Progressing     Problem: Pain  Goal: Verbalizes/displays adequate comfort level or baseline comfort level  1/2/2024 2332 by Santos Leone RN  Outcome: Progressing  1/2/2024 0935 by Kriss Martinez RN  Outcome: Progressing     Problem: Infection - Adult  Goal: Absence of infection at discharge  1/2/2024 2332 by Santos Leone RN  Outcome: Progressing  1/2/2024 0935 by Kriss Martinez RN  Outcome: Progressing     Problem: Metabolic/Fluid and Electrolytes - Adult  Goal: Electrolytes maintained within normal limits  Outcome: Progressing     
  Problem: Safety - Adult  Goal: Free from fall injury  1/3/2024 2153 by Santos Leone RN  Outcome: Progressing     Problem: Chronic Conditions and Co-morbidities  Goal: Patient's chronic conditions and co-morbidity symptoms are monitored and maintained or improved  1/4/2024 1039 by Radha Willingham RN  Outcome: Progressing  1/3/2024 2153 by Santos Leone RN  Outcome: Progressing     Problem: Skin/Tissue Integrity  Goal: Absence of new skin breakdown  Description: 1.  Monitor for areas of redness and/or skin breakdown  2.  Assess vascular access sites hourly  3.  Every 4-6 hours minimum:  Change oxygen saturation probe site  4.  Every 4-6 hours:  If on nasal continuous positive airway pressure, respiratory therapy assess nares and determine need for appliance change or resting period.  1/4/2024 1039 by Radha Willingham RN  Outcome: Progressing  1/3/2024 2153 by Santos Leone RN  Outcome: Progressing     Problem: Discharge Planning  Goal: Discharge to home or other facility with appropriate resources  1/3/2024 2153 by Santos Leone RN  Outcome: Progressing     Problem: Pain  Goal: Verbalizes/displays adequate comfort level or baseline comfort level  1/3/2024 2153 by Santos Leone RN  Outcome: Progressing     Problem: Infection - Adult  Goal: Absence of infection at discharge  1/3/2024 2153 by Santos Leone RN  Outcome: Progressing     Problem: Metabolic/Fluid and Electrolytes - Adult  Goal: Electrolytes maintained within normal limits  1/3/2024 2153 by Santos Leone RN  Outcome: Progressing     
  Problem: Safety - Adult  Goal: Free from fall injury  1/9/2024 1029 by Alma Davis RN  Outcome: Progressing  1/9/2024 0406 by Marivel Dick RN  Outcome: Progressing     Problem: Chronic Conditions and Co-morbidities  Goal: Patient's chronic conditions and co-morbidity symptoms are monitored and maintained or improved  1/9/2024 1029 by Alma Davis RN  Outcome: Progressing  1/9/2024 0406 by Marivel Dick RN  Outcome: Progressing     Problem: Skin/Tissue Integrity  Goal: Absence of new skin breakdown  Description: 1.  Monitor for areas of redness and/or skin breakdown  2.  Assess vascular access sites hourly  3.  Every 4-6 hours minimum:  Change oxygen saturation probe site  4.  Every 4-6 hours:  If on nasal continuous positive airway pressure, respiratory therapy assess nares and determine need for appliance change or resting period.  1/9/2024 1029 by Alma Davis RN  Outcome: Progressing  1/9/2024 0406 by Marivel Dick RN  Outcome: Progressing     Problem: Discharge Planning  Goal: Discharge to home or other facility with appropriate resources  1/9/2024 1029 by Alma Davis RN  Outcome: Progressing  1/9/2024 0406 by Marivel Dick RN  Outcome: Progressing     Problem: Pain  Goal: Verbalizes/displays adequate comfort level or baseline comfort level  1/9/2024 1029 by Alma Davis RN  Outcome: Progressing  1/9/2024 0406 by Marivel Dick RN  Outcome: Progressing     Problem: Infection - Adult  Goal: Absence of infection at discharge  1/9/2024 1029 by Alma Davis RN  Outcome: Progressing  1/9/2024 0406 by Marivel Dick RN  Outcome: Progressing     Problem: Metabolic/Fluid and Electrolytes - Adult  Goal: Electrolytes maintained within normal limits  1/9/2024 1029 by Alma Davis RN  Outcome: Progressing  1/9/2024 0406 by Marivel Dick RN  Outcome: Progressing     Problem: Respiratory - Adult  Goal: Achieves optimal ventilation and oxygenation  1/9/2024 1029 by Alma Davis 
  Problem: Safety - Adult  Goal: Free from fall injury  1/9/2024 1110 by Alma Davis RN  Outcome: Adequate for Discharge  1/9/2024 1029 by Alma Davis RN  Outcome: Progressing  1/9/2024 0406 by Marivel Dick RN  Outcome: Progressing     Problem: Chronic Conditions and Co-morbidities  Goal: Patient's chronic conditions and co-morbidity symptoms are monitored and maintained or improved  1/9/2024 1110 by Alma Davis RN  Outcome: Adequate for Discharge  1/9/2024 1029 by Alma Davis RN  Outcome: Progressing  1/9/2024 0406 by Marivel Dick RN  Outcome: Progressing     Problem: Skin/Tissue Integrity  Goal: Absence of new skin breakdown  Description: 1.  Monitor for areas of redness and/or skin breakdown  2.  Assess vascular access sites hourly  3.  Every 4-6 hours minimum:  Change oxygen saturation probe site  4.  Every 4-6 hours:  If on nasal continuous positive airway pressure, respiratory therapy assess nares and determine need for appliance change or resting period.  1/9/2024 1110 by Alma Davis RN  Outcome: Adequate for Discharge  1/9/2024 1029 by Alma Davis RN  Outcome: Progressing  1/9/2024 0406 by Marivel Dick RN  Outcome: Progressing     Problem: Discharge Planning  Goal: Discharge to home or other facility with appropriate resources  1/9/2024 1110 by Alma Davis RN  Outcome: Adequate for Discharge  1/9/2024 1029 by Alma Davis RN  Outcome: Progressing  1/9/2024 0406 by Marivel Dick RN  Outcome: Progressing     Problem: Pain  Goal: Verbalizes/displays adequate comfort level or baseline comfort level  1/9/2024 1110 by Alma Davis RN  Outcome: Adequate for Discharge  1/9/2024 1029 by Alma Davis RN  Outcome: Progressing  1/9/2024 0406 by Marivel Dikc RN  Outcome: Progressing     Problem: Infection - Adult  Goal: Absence of infection at discharge  1/9/2024 1110 by Alma Davis RN  Outcome: Adequate for Discharge  1/9/2024 1029 by Alma Davis 
  Problem: Safety - Adult  Goal: Free from fall injury  12/28/2023 1222 by Kriss Martinez RN  Outcome: Progressing     Problem: Chronic Conditions and Co-morbidities  Goal: Patient's chronic conditions and co-morbidity symptoms are monitored and maintained or improved  12/28/2023 1222 by Kriss Martinez RN  Outcome: Progressing     Problem: Skin/Tissue Integrity  Goal: Absence of new skin breakdown  Description: 1.  Monitor for areas of redness and/or skin breakdown  2.  Assess vascular access sites hourly  3.  Every 4-6 hours minimum:  Change oxygen saturation probe site  4.  Every 4-6 hours:  If on nasal continuous positive airway pressure, respiratory therapy assess nares and determine need for appliance change or resting period.  12/28/2023 1222 by Kriss Martinez RN  Outcome: Progressing     Problem: Discharge Planning  Goal: Discharge to home or other facility with appropriate resources  12/28/2023 1222 by Kriss Martinez RN  Outcome: Progressing     Problem: Pain  Goal: Verbalizes/displays adequate comfort level or baseline comfort level  12/28/2023 1222 by Kriss Martinez RN  Outcome: Progressing     
  Problem: Safety - Adult  Goal: Free from fall injury  12/29/2023 0145 by SHIRLEY ROMERO  Outcome: Progressing  12/28/2023 1222 by Kriss Martinez, RN  Outcome: Progressing     Problem: Chronic Conditions and Co-morbidities  Goal: Patient's chronic conditions and co-morbidity symptoms are monitored and maintained or improved  12/29/2023 0145 by SHIRLEY ROMERO  Outcome: Progressing  12/28/2023 1222 by Kriss Martinez, RN  Outcome: Progressing     Problem: Skin/Tissue Integrity  Goal: Absence of new skin breakdown  Description: 1.  Monitor for areas of redness and/or skin breakdown  2.  Assess vascular access sites hourly  3.  Every 4-6 hours minimum:  Change oxygen saturation probe site  4.  Every 4-6 hours:  If on nasal continuous positive airway pressure, respiratory therapy assess nares and determine need for appliance change or resting period.  12/29/2023 0145 by SHIRLEY ROMERO  Outcome: Progressing  12/28/2023 1222 by Kriss Martinez, RN  Outcome: Progressing     Problem: Discharge Planning  Goal: Discharge to home or other facility with appropriate resources  12/29/2023 0145 by SHIRLEY ROMERO  Outcome: Progressing  12/28/2023 1222 by Kriss Martinez, RN  Outcome: Progressing     Problem: Pain  Goal: Verbalizes/displays adequate comfort level or baseline comfort level  12/29/2023 0145 by SHIRLEY ROMERO  Outcome: Progressing  12/28/2023 1222 by Kriss Martinez, RN  Outcome: Progressing     
  Problem: Safety - Adult  Goal: Free from fall injury  12/29/2023 1000 by Kriss Martinez RN  Outcome: Progressing     Problem: Chronic Conditions and Co-morbidities  Goal: Patient's chronic conditions and co-morbidity symptoms are monitored and maintained or improved  12/29/2023 1000 by Kriss Martinez RN  Outcome: Progressing     Problem: Skin/Tissue Integrity  Goal: Absence of new skin breakdown  Description: 1.  Monitor for areas of redness and/or skin breakdown  2.  Assess vascular access sites hourly  3.  Every 4-6 hours minimum:  Change oxygen saturation probe site  4.  Every 4-6 hours:  If on nasal continuous positive airway pressure, respiratory therapy assess nares and determine need for appliance change or resting period.  12/29/2023 1000 by Kriss Martinez RN  Outcome: Progressing     Problem: Discharge Planning  Goal: Discharge to home or other facility with appropriate resources  12/29/2023 1000 by Kriss Martinez RN  Outcome: Progressing     Problem: Pain  Goal: Verbalizes/displays adequate comfort level or baseline comfort level  12/29/2023 1000 by Kriss Martinez RN  Outcome: Progressing     
  Problem: Safety - Adult  Goal: Free from fall injury  12/30/2023 0250 by Marivel Dick RN  Outcome: Progressing  12/30/2023 0250 by Marivel Dick RN  Outcome: Progressing     Problem: Chronic Conditions and Co-morbidities  Goal: Patient's chronic conditions and co-morbidity symptoms are monitored and maintained or improved  12/30/2023 0250 by Marivel Dick RN  Outcome: Progressing  12/30/2023 0250 by Marivel Dick RN  Outcome: Progressing     Problem: Skin/Tissue Integrity  Goal: Absence of new skin breakdown  Description: 1.  Monitor for areas of redness and/or skin breakdown  2.  Assess vascular access sites hourly  3.  Every 4-6 hours minimum:  Change oxygen saturation probe site  4.  Every 4-6 hours:  If on nasal continuous positive airway pressure, respiratory therapy assess nares and determine need for appliance change or resting period.  12/30/2023 0250 by Marivel Dick RN  Outcome: Progressing  12/30/2023 0250 by Marivel Dick RN  Outcome: Progressing     Problem: Discharge Planning  Goal: Discharge to home or other facility with appropriate resources  12/30/2023 0250 by Marivle Dick RN  Outcome: Progressing  12/30/2023 0250 by Marivel Dick RN  Outcome: Progressing     Problem: Pain  Goal: Verbalizes/displays adequate comfort level or baseline comfort level  12/30/2023 0250 by Marivel Dick RN  Outcome: Progressing  12/30/2023 0250 by Marivel Dick RN  Outcome: Progressing     
  Problem: Safety - Adult  Goal: Free from fall injury  12/31/2023 1057 by Kriss Martinez RN  Outcome: Progressing  12/30/2023 2242 by Anju Davies RN  Outcome: Progressing     Problem: Safety - Adult  Goal: Free from fall injury  12/31/2023 1057 by Kriss Martinez RN  Outcome: Progressing     Problem: Chronic Conditions and Co-morbidities  Goal: Patient's chronic conditions and co-morbidity symptoms are monitored and maintained or improved  12/31/2023 1057 by Kriss Martinez RN  Outcome: Progressing     Problem: Skin/Tissue Integrity  Goal: Absence of new skin breakdown  Description: 1.  Monitor for areas of redness and/or skin breakdown  2.  Assess vascular access sites hourly  3.  Every 4-6 hours minimum:  Change oxygen saturation probe site  4.  Every 4-6 hours:  If on nasal continuous positive airway pressure, respiratory therapy assess nares and determine need for appliance change or resting period.  12/31/2023 1057 by Kriss Martinez RN  Outcome: Progressing     Problem: Discharge Planning  Goal: Discharge to home or other facility with appropriate resources  12/31/2023 1057 by Kriss Martinez RN  Outcome: Progressing     Problem: Pain  Goal: Verbalizes/displays adequate comfort level or baseline comfort level  12/31/2023 1057 by Kriss Martinez RN  Outcome: Progressing     
  Problem: Safety - Adult  Goal: Free from fall injury  12/31/2023 2357 by Celina Leon RN  Outcome: Progressing     Problem: Chronic Conditions and Co-morbidities  Goal: Patient's chronic conditions and co-morbidity symptoms are monitored and maintained or improved  12/31/2023 2357 by Celina Leon RN  Outcome: Progressing  Flowsheets (Taken 12/31/2023 2001)  Care Plan - Patient's Chronic Conditions and Co-Morbidity Symptoms are Monitored and Maintained or Improved: Monitor and assess patient's chronic conditions and comorbid symptoms for stability, deterioration, or improvement       Problem: Skin/Tissue Integrity  Goal: Absence of new skin breakdown  Description: 1.  Monitor for areas of redness and/or skin breakdown  2.  Assess vascular access sites hourly  3.  Every 4-6 hours minimum:  Change oxygen saturation probe site  4.  Every 4-6 hours:  If on nasal continuous positive airway pressure, respiratory therapy assess nares and determine need for appliance change or resting period.  12/31/2023 2357 by Celina Leon RN  Outcome: Progressing     Problem: Discharge Planning  Goal: Discharge to home or other facility with appropriate resources  12/31/2023 2357 by Celina Leon RN  Outcome: Progressing  Flowsheets (Taken 12/31/2023 2001)  Discharge to home or other facility with appropriate resources: Identify barriers to discharge with patient and caregiver     Problem: Pain  Goal: Verbalizes/displays adequate comfort level or baseline comfort level  12/31/2023 2357 by Celina Leon RN  Outcome: Progressing     
  Problem: Safety - Adult  Goal: Free from fall injury  Flowsheets (Taken 12/27/2023 0305)  Free From Fall Injury: Instruct family/caregiver on patient safety     Problem: Chronic Conditions and Co-morbidities  Goal: Patient's chronic conditions and co-morbidity symptoms are monitored and maintained or improved  Flowsheets (Taken 12/27/2023 0305)  Care Plan - Patient's Chronic Conditions and Co-Morbidity Symptoms are Monitored and Maintained or Improved:   Monitor and assess patient's chronic conditions and comorbid symptoms for stability, deterioration, or improvement   Collaborate with multidisciplinary team to address chronic and comorbid conditions and prevent exacerbation or deterioration     
  Problem: Safety - Adult  Goal: Free from fall injury  Outcome: Progressing     Problem: Chronic Conditions and Co-morbidities  Goal: Patient's chronic conditions and co-morbidity symptoms are monitored and maintained or improved  1/4/2024 1957 by Santos Leone, RN  Outcome: Progressing  1/4/2024 1039 by Radha Willingham, RN  Outcome: Progressing     Problem: Skin/Tissue Integrity  Goal: Absence of new skin breakdown  Description: 1.  Monitor for areas of redness and/or skin breakdown  2.  Assess vascular access sites hourly  3.  Every 4-6 hours minimum:  Change oxygen saturation probe site  4.  Every 4-6 hours:  If on nasal continuous positive airway pressure, respiratory therapy assess nares and determine need for appliance change or resting period.  1/4/2024 1957 by Santos Leone, RN  Outcome: Progressing  1/4/2024 1039 by Radha Willingham, RN  Outcome: Progressing     Problem: Discharge Planning  Goal: Discharge to home or other facility with appropriate resources  Outcome: Progressing     Problem: Pain  Goal: Verbalizes/displays adequate comfort level or baseline comfort level  Outcome: Progressing     Problem: Infection - Adult  Goal: Absence of infection at discharge  Outcome: Progressing     Problem: Metabolic/Fluid and Electrolytes - Adult  Goal: Electrolytes maintained within normal limits  Outcome: Progressing     
  Problem: Safety - Adult  Goal: Free from fall injury  Outcome: Progressing     Problem: Chronic Conditions and Co-morbidities  Goal: Patient's chronic conditions and co-morbidity symptoms are monitored and maintained or improved  Outcome: Progressing     Problem: Skin/Tissue Integrity  Goal: Absence of new skin breakdown  Description: 1.  Monitor for areas of redness and/or skin breakdown  2.  Assess vascular access sites hourly  3.  Every 4-6 hours minimum:  Change oxygen saturation probe site  4.  Every 4-6 hours:  If on nasal continuous positive airway pressure, respiratory therapy assess nares and determine need for appliance change or resting period.  Outcome: Progressing     Problem: Discharge Planning  Goal: Discharge to home or other facility with appropriate resources  Outcome: Progressing     
  Problem: Safety - Adult  Goal: Free from fall injury  Outcome: Progressing     Problem: Chronic Conditions and Co-morbidities  Goal: Patient's chronic conditions and co-morbidity symptoms are monitored and maintained or improved  Outcome: Progressing     Problem: Skin/Tissue Integrity  Goal: Absence of new skin breakdown  Description: 1.  Monitor for areas of redness and/or skin breakdown  2.  Assess vascular access sites hourly  3.  Every 4-6 hours minimum:  Change oxygen saturation probe site  4.  Every 4-6 hours:  If on nasal continuous positive airway pressure, respiratory therapy assess nares and determine need for appliance change or resting period.  Outcome: Progressing     Problem: Discharge Planning  Goal: Discharge to home or other facility with appropriate resources  Outcome: Progressing     Problem: Pain  Goal: Verbalizes/displays adequate comfort level or baseline comfort level  Outcome: Progressing     
  Problem: Safety - Adult  Goal: Free from fall injury  Outcome: Progressing     Problem: Chronic Conditions and Co-morbidities  Goal: Patient's chronic conditions and co-morbidity symptoms are monitored and maintained or improved  Outcome: Progressing     Problem: Skin/Tissue Integrity  Goal: Absence of new skin breakdown  Description: 1.  Monitor for areas of redness and/or skin breakdown  2.  Assess vascular access sites hourly  3.  Every 4-6 hours minimum:  Change oxygen saturation probe site  4.  Every 4-6 hours:  If on nasal continuous positive airway pressure, respiratory therapy assess nares and determine need for appliance change or resting period.  Outcome: Progressing     Problem: Discharge Planning  Goal: Discharge to home or other facility with appropriate resources  Outcome: Progressing     Problem: Pain  Goal: Verbalizes/displays adequate comfort level or baseline comfort level  Outcome: Progressing     
  Problem: Safety - Adult  Goal: Free from fall injury  Outcome: Progressing     Problem: Chronic Conditions and Co-morbidities  Goal: Patient's chronic conditions and co-morbidity symptoms are monitored and maintained or improved  Outcome: Progressing     Problem: Skin/Tissue Integrity  Goal: Absence of new skin breakdown  Description: 1.  Monitor for areas of redness and/or skin breakdown  2.  Assess vascular access sites hourly  3.  Every 4-6 hours minimum:  Change oxygen saturation probe site  4.  Every 4-6 hours:  If on nasal continuous positive airway pressure, respiratory therapy assess nares and determine need for appliance change or resting period.  Outcome: Progressing     Problem: Discharge Planning  Goal: Discharge to home or other facility with appropriate resources  Outcome: Progressing     Problem: Pain  Goal: Verbalizes/displays adequate comfort level or baseline comfort level  Outcome: Progressing     Problem: Infection - Adult  Goal: Absence of infection at discharge  Outcome: Progressing     Problem: Metabolic/Fluid and Electrolytes - Adult  Goal: Electrolytes maintained within normal limits  Outcome: Progressing     
  Problem: Safety - Adult  Goal: Free from fall injury  Outcome: Progressing     Problem: Chronic Conditions and Co-morbidities  Goal: Patient's chronic conditions and co-morbidity symptoms are monitored and maintained or improved  Outcome: Progressing     Problem: Skin/Tissue Integrity  Goal: Absence of new skin breakdown  Description: 1.  Monitor for areas of redness and/or skin breakdown  2.  Assess vascular access sites hourly  3.  Every 4-6 hours minimum:  Change oxygen saturation probe site  4.  Every 4-6 hours:  If on nasal continuous positive airway pressure, respiratory therapy assess nares and determine need for appliance change or resting period.  Outcome: Progressing     Problem: Discharge Planning  Goal: Discharge to home or other facility with appropriate resources  Outcome: Progressing     Problem: Pain  Goal: Verbalizes/displays adequate comfort level or baseline comfort level  Outcome: Progressing     Problem: Infection - Adult  Goal: Absence of infection at discharge  Outcome: Progressing     Problem: Metabolic/Fluid and Electrolytes - Adult  Goal: Electrolytes maintained within normal limits  Outcome: Progressing     Problem: Respiratory - Adult  Goal: Achieves optimal ventilation and oxygenation  Outcome: Progressing     Problem: Cardiovascular - Adult  Goal: Absence of cardiac dysrhythmias or at baseline  Outcome: Progressing     Problem: Skin/Tissue Integrity - Adult  Goal: Incisions, wounds, or drain sites healing without S/S of infection  Outcome: Progressing     Problem: Musculoskeletal - Adult  Goal: Return mobility to safest level of function  Outcome: Progressing     Problem: Genitourinary - Adult  Goal: Absence of urinary retention  Outcome: Progressing     
  Problem: Safety - Adult  Goal: Free from fall injury  Outcome: Progressing     Problem: Chronic Conditions and Co-morbidities  Goal: Patient's chronic conditions and co-morbidity symptoms are monitored and maintained or improved  Outcome: Progressing     Problem: Skin/Tissue Integrity  Goal: Absence of new skin breakdown  Description: 1.  Monitor for areas of redness and/or skin breakdown  2.  Assess vascular access sites hourly  3.  Every 4-6 hours minimum:  Change oxygen saturation probe site  4.  Every 4-6 hours:  If on nasal continuous positive airway pressure, respiratory therapy assess nares and determine need for appliance change or resting period.  Outcome: Progressing     Problem: Discharge Planning  Goal: Discharge to home or other facility with appropriate resources  Outcome: Progressing     Problem: Pain  Goal: Verbalizes/displays adequate comfort level or baseline comfort level  Outcome: Progressing     Problem: Infection - Adult  Goal: Absence of infection at discharge  Outcome: Progressing     Problem: Metabolic/Fluid and Electrolytes - Adult  Goal: Electrolytes maintained within normal limits  Outcome: Progressing     Problem: Respiratory - Adult  Goal: Achieves optimal ventilation and oxygenation  Outcome: Progressing     Problem: Cardiovascular - Adult  Goal: Absence of cardiac dysrhythmias or at baseline  Outcome: Progressing     Problem: Skin/Tissue Integrity - Adult  Goal: Incisions, wounds, or drain sites healing without S/S of infection  Outcome: Progressing     Problem: Musculoskeletal - Adult  Goal: Return mobility to safest level of function  Outcome: Progressing     Problem: Genitourinary - Adult  Goal: Absence of urinary retention  Outcome: Progressing     Problem: Gastrointestinal - Adult  Goal: Maintains or returns to baseline bowel function  Outcome: Progressing     
  Problem: Safety - Adult  Goal: Free from fall injury  Outcome: Progressing     Problem: Chronic Conditions and Co-morbidities  Goal: Patient's chronic conditions and co-morbidity symptoms are monitored and maintained or improved  Outcome: Progressing     Problem: Skin/Tissue Integrity  Goal: Absence of new skin breakdown  Description: 1.  Monitor for areas of redness and/or skin breakdown  2.  Assess vascular access sites hourly  3.  Every 4-6 hours minimum:  Change oxygen saturation probe site  4.  Every 4-6 hours:  If on nasal continuous positive airway pressure, respiratory therapy assess nares and determine need for appliance change or resting period.  Outcome: Progressing     Problem: Skin/Tissue Integrity  Goal: Absence of new skin breakdown  Description: 1.  Monitor for areas of redness and/or skin breakdown  2.  Assess vascular access sites hourly  3.  Every 4-6 hours minimum:  Change oxygen saturation probe site  4.  Every 4-6 hours:  If on nasal continuous positive airway pressure, respiratory therapy assess nares and determine need for appliance change or resting period.  Outcome: Progressing     Problem: Discharge Planning  Goal: Discharge to home or other facility with appropriate resources  Outcome: Progressing     Problem: Pain  Goal: Verbalizes/displays adequate comfort level or baseline comfort level  Outcome: Progressing     
HEART FAILURE CARE PLAN:    Comorbidities Reviewed: Yes   Patient has a past medical history of Allergic rhinitis, Arthritis, Bladder fistula, C. difficile diarrhea, Cellulitis of right lower extremity, CHF (congestive heart failure) ([facility]), Dental disease, Diabetes (HCC), Diverticulitis, Dizziness, DVT of lower extremity, bilateral (HCC), GERD (gastroesophageal reflux disease), Headache, Hearing loss, Hematuria, Hx of blood clots, Hyperlipidemia, Hypertension, Lung disease, MONIQUE (obstructive sleep apnea), Pancreatitis (HCC), Pulmonary emboli ([facility]), Rash, Sleep apnea, and Tinnitus.     ECHOCARDIOGRAM Reviewed: Yes   Patient's Ejection Fraction (EF) is greater than 40%    Weights Reviewed: Yes   Admission weight: 124 kg (273 lb 4.8 oz)   Wt Readings from Last 3 Encounters:   01/02/24 125.4 kg (276 lb 6.4 oz)   12/18/23 125.6 kg (277 lb)   12/12/23 124 kg (273 lb 6.4 oz)     Intake & Output Reviewed: Yes     Intake/Output Summary (Last 24 hours) at 1/4/2024 1038  Last data filed at 1/4/2024 0423  Gross per 24 hour   Intake 460 ml   Output 400 ml   Net 60 ml     Medications Reviewed: Yes   SCHEDULED HOSPITAL MEDICATIONS:   mineral oil-hydrophilic petrolatum   Topical Daily    insulin lispro  0-8 Units SubCUTAneous TID WC    insulin lispro  0-4 Units SubCUTAneous Nightly    spironolactone  25 mg Oral Daily    torsemide  20 mg Oral Daily    sodium chloride flush  5-40 mL IntraVENous 2 times per day    apixaban  5 mg Oral BID    aspirin  81 mg Oral Daily    atorvastatin  40 mg Oral Nightly    [Held by provider] insulin glargine  28 Units SubCUTAneous BID    montelukast  10 mg Oral Daily    pantoprazole  40 mg Oral QAM AC    sacubitril-valsartan  1 tablet Oral BID    isosorbide mononitrate  30 mg Oral Daily    albuterol sulfate HFA  2 puff Inhalation BID RT     ACE/ARB/ARNI is REQUIRED for EF </= 40% SYSTOLIC FAILURE:   ACE:: None  ARB:: None  ARNI:: Sacubitril/Valsartan-Entresto    Evidenced-Based Beta Blocker is REQUIRED 
HEART FAILURE CARE PLAN:    Comorbidities Reviewed: Yes   Patient has a past medical history of Allergic rhinitis, Arthritis, Bladder fistula, C. difficile diarrhea, Cellulitis of right lower extremity, CHF (congestive heart failure) ([facility]), Dental disease, Diabetes (HCC), Diverticulitis, Dizziness, DVT of lower extremity, bilateral (HCC), GERD (gastroesophageal reflux disease), Headache, Hearing loss, Hematuria, Hx of blood clots, Hyperlipidemia, Hypertension, Lung disease, MONIQUE (obstructive sleep apnea), Pancreatitis ([facility]), Pulmonary emboli ([facility]), Rash, Sleep apnea, and Tinnitus.     ECHOCARDIOGRAM Reviewed: Yes   Patient's Ejection Fraction (EF) is greater than 40%    Weights Reviewed: Yes   Admission weight: 124 kg (273 lb 4.8 oz)   Wt Readings from Last 3 Encounters:   01/07/24 119.3 kg (263 lb)   12/18/23 125.6 kg (277 lb)   12/12/23 124 kg (273 lb 6.4 oz)     Intake & Output Reviewed: Yes     Intake/Output Summary (Last 24 hours) at 1/7/2024 1545  Last data filed at 1/7/2024 1301  Gross per 24 hour   Intake 232 ml   Output 200 ml   Net 32 ml     Medications Reviewed: Yes   SCHEDULED HOSPITAL MEDICATIONS:   docusate sodium  100 mg Oral Daily    insulin glargine  12 Units SubCUTAneous Nightly    mineral oil-hydrophilic petrolatum   Topical Daily    insulin lispro  0-8 Units SubCUTAneous TID WC    insulin lispro  0-4 Units SubCUTAneous Nightly    spironolactone  25 mg Oral Daily    [Held by provider] torsemide  20 mg Oral Daily    sodium chloride flush  5-40 mL IntraVENous 2 times per day    apixaban  5 mg Oral BID    aspirin  81 mg Oral Daily    atorvastatin  40 mg Oral Nightly    montelukast  10 mg Oral Daily    pantoprazole  40 mg Oral QAM AC    sacubitril-valsartan  1 tablet Oral BID    [Held by provider] isosorbide mononitrate  30 mg Oral Daily    albuterol sulfate HFA  2 puff Inhalation BID RT     ACE/ARB/ARNI is REQUIRED for EF </= 40% SYSTOLIC FAILURE:   ACE:: None  ARB:: None  ARNI:: 
HEART FAILURE CARE PLAN:    Comorbidities Reviewed: Yes   Patient has a past medical history of Allergic rhinitis, Arthritis, Bladder fistula, C. difficile diarrhea, Cellulitis of right lower extremity, CHF (congestive heart failure) ([facility]), Dental disease, Diabetes (HCC), Diverticulitis, Dizziness, DVT of lower extremity, bilateral (HCC), GERD (gastroesophageal reflux disease), Headache, Hearing loss, Hematuria, Hx of blood clots, Hyperlipidemia, Hypertension, Lung disease, MONIQUE (obstructive sleep apnea), Pancreatitis (HCC), Pulmonary emboli ([facility]), Rash, Sleep apnea, and Tinnitus.     ECHOCARDIOGRAM Reviewed: Yes   Patient's Ejection Fraction (EF) is greater than 40%    Weights Reviewed: Yes   Admission weight: 124 kg (273 lb 4.8 oz)   Wt Readings from Last 3 Encounters:   12/30/23 123.5 kg (272 lb 4.8 oz)   12/18/23 125.6 kg (277 lb)   12/12/23 124 kg (273 lb 6.4 oz)     Intake & Output Reviewed: Yes     Intake/Output Summary (Last 24 hours) at 12/30/2023 2246  Last data filed at 12/30/2023 1848  Gross per 24 hour   Intake 921.59 ml   Output --   Net 921.59 ml     Medications Reviewed: Yes   SCHEDULED HOSPITAL MEDICATIONS:   vancomycin  1,500 mg IntraVENous Q12H    spironolactone  25 mg Oral Daily    torsemide  20 mg Oral Daily    sodium chloride flush  5-40 mL IntraVENous 2 times per day    apixaban  5 mg Oral BID    aspirin  81 mg Oral Daily    atorvastatin  40 mg Oral Nightly    [Held by provider] insulin glargine  28 Units SubCUTAneous BID    montelukast  10 mg Oral Daily    pantoprazole  40 mg Oral QAM AC    sacubitril-valsartan  1 tablet Oral BID    insulin lispro  0-4 Units SubCUTAneous TID WC    insulin lispro  0-4 Units SubCUTAneous Nightly    isosorbide mononitrate  30 mg Oral Daily    cefepime  2,000 mg IntraVENous Q12H    albuterol sulfate HFA  2 puff Inhalation BID RT     ACE/ARB/ARNI is REQUIRED for EF </= 40% SYSTOLIC FAILURE:   ACE:: None  ARB:: None  ARNI:: 
RN  Outcome: Progressing  1/8/2024 0120 by Marivel Dick RN  Outcome: Progressing     Problem: Cardiovascular - Adult  Goal: Absence of cardiac dysrhythmias or at baseline  1/8/2024 1102 by Alma Davis RN  Outcome: Progressing  1/8/2024 0120 by Marivel Dick RN  Outcome: Progressing     Problem: Skin/Tissue Integrity - Adult  Goal: Incisions, wounds, or drain sites healing without S/S of infection  1/8/2024 1102 by Alma Davis RN  Outcome: Progressing  1/8/2024 0120 by Marivel Dick RN  Outcome: Progressing     Problem: Musculoskeletal - Adult  Goal: Return mobility to safest level of function  1/8/2024 1102 by Alma Davis RN  Outcome: Progressing  1/8/2024 0120 by Marivel Dick RN  Outcome: Progressing     Problem: Genitourinary - Adult  Goal: Absence of urinary retention  1/8/2024 1102 by Alma Davis RN  Outcome: Progressing  1/8/2024 0120 by Marivel Dick RN  Outcome: Progressing     Problem: Gastrointestinal - Adult  Goal: Maintains or returns to baseline bowel function  1/8/2024 1102 by Alma Davis RN  Outcome: Progressing  1/8/2024 0120 by Marivel Dick RN  Outcome: Progressing     
Sacubitril/Valsartan-Entresto    Evidenced-Based Beta Blocker is REQUIRED for EF </= 40% SYSTOLIC FAILURE:   :: None    Diuretics:  :: Torsemide and Spironolactone    Diet Reviewed: Yes   ADULT DIET; Regular; Low Sodium (2 gm)    Goal of Care Reviewed: Yes   Patient and/or Family's stated Goal of Care this Admission: reduce shortness of breath, increase activity tolerance, better understand heart failure and disease management, be more comfortable, and reduce lower extremity edema prior to discharge.    
Monitor intake/output and perform bladder scan as needed     Problem: Gastrointestinal - Adult  Goal: Maintains or returns to baseline bowel function  1/8/2024 0120 by Marivel Dick, RN  Outcome: Progressing  1/7/2024 1544 by Bhakti Mcmanus, RN  Outcome: Progressing  Flowsheets (Taken 1/7/2024 1030)  Maintains or returns to baseline bowel function: Assess bowel function

## 2024-01-09 NOTE — CARE COORDINATION
CM delivered 2nd IMM and provided verbal explanation at bedside. Pt voiced understanding of discharge MCR rights and is agreeable to discharge within 4 hours of delivery of the IMM notice.

## 2024-01-09 NOTE — PROGRESS NOTES
01/02/24 2110   NIV Type   Mode CPAP   Mask Type Full face mask   Mask Size Large   Assessment   Pulse 80   Respirations 24   SpO2 94 %   Settings/Measurements   CPAP/EPAP 12 cmH2O   Vt (Measured) 415 mL   FiO2  30 %       
   01/04/24 2144   NIV Type   NIV Started/Stopped (S)  On   Equipment Type V60   Mode CPAP   Mask Type Full face mask   Mask Size Large   Assessment   Pulse 93   Respirations (!) 33   SpO2 93 %   Level of Consciousness 0   Comfort Level Good   Using Accessory Muscles No   Mask Compliance Good   Skin Assessment Clean, dry, & intact   Skin Protection for O2 Device Yes   Settings/Measurements   CPAP/EPAP 12 cmH2O   Vt (Measured) 399 mL   FiO2  30 %   Minute Volume (L/min) 12.3 Liters   Mask Leak (lpm) 39 lpm   Patient's Home Machine No   Alarm Settings   Alarms On Y       
   01/05/24 0314   NIV Type   NIV Started/Stopped (S)  Off       
   01/05/24 2221   NIV Type   Equipment Type v60   Mode CPAP   Mask Type Full face mask   Mask Size Large   Settings/Measurements   CPAP/EPAP 12 cmH2O   Vt (Measured) 488 mL   FiO2  30 %   Minute Volume (L/min) 15.9 Liters   Mask Leak (lpm) 38 lpm   Patient's Home Machine No   Patient Observation   Observations spo2 99% on 30% cpap 12       
   01/06/24 0320   NIV Type   Equipment Type V60   Mode CPAP   Mask Type Full face mask   Mask Size Large   Assessment   Pulse 75   Respirations 23   SpO2 98 %   Settings/Measurements   CPAP/EPAP 12 cmH2O   Vt (Measured) 411 mL   FiO2  30 %   Minute Volume (L/min) 8.7 Liters   Mask Leak (lpm) 21 lpm   Patient's Home Machine No   Alarm Settings   Alarms On Y       
   01/06/24 2130   NIV Type   NIV Started/Stopped (S)  On   Equipment Type V60   Mode CPAP   Mask Type Full face mask   Mask Size Large   Assessment   Pulse 78   Respirations 30   SpO2 92 %   Level of Consciousness 0   Comfort Level Good   Using Accessory Muscles No   Mask Compliance Good   Skin Assessment Redness (see comment/note)  (OK ON BRIDGE OF NOSE,  SIDE WHERE GLASSES NOSE PIECE RED.)   Skin Protection for O2 Device Yes   Settings/Measurements   CPAP/EPAP 12 cmH2O   Vt (Measured) 444 mL   FiO2  30 %   Minute Volume (L/min) 13.1 Liters   Mask Leak (lpm) 26 lpm   Patient's Home Machine No   Electrical Safety Check Performed Yes       
   01/07/24 0312   NIV Type   NIV Started/Stopped (S)  Off       
   01/07/24 2157   NIV Type   NIV Started/Stopped (S)  On   Equipment Type V60   Mode CPAP   Mask Type Full face mask   Mask Size Large   Assessment   Pulse 67   Respirations 28   SpO2 95 %   Level of Consciousness 0   Comfort Level Good   Using Accessory Muscles No   Mask Compliance Good   Skin Assessment Other (comment/note)  (BRIDGE OF NOSE OK.  SIDE OF NOSE WHERE GLASSES NOSE PIECE HAS SORES.)   Skin Protection for O2 Device Yes   Settings/Measurements   CPAP/EPAP 12 cmH2O   Vt (Measured) 674 mL   FiO2  30 %   Minute Volume (L/min) 17.2 Liters   Mask Leak (lpm) 20 lpm   Patient's Home Machine No   Alarm Settings   Alarms On Y       
   01/07/24 2157   NIV Type   NIV Started/Stopped (S)  On   Equipment Type V60   Mode CPAP   Mask Type Full face mask   Mask Size Large   Assessment   Pulse 67   Respirations 28   SpO2 95 %   Level of Consciousness 0   Comfort Level Good   Using Accessory Muscles No   Mask Compliance Good   Skin Assessment Other (comment/note)  (BRIDGE OF NOSE OK.  SIDE OF NOSE WHERE GLASSES NOSE PIECE HAS SORES.)   Skin Protection for O2 Device Yes   Settings/Measurements   CPAP/EPAP 12 cmH2O   Vt (Measured) 674 mL   FiO2  30 %   Minute Volume (L/min) 17.2 Liters   Mask Leak (lpm) 20 lpm   Patient's Home Machine No   Electrical Safety Check Performed Yes       
   01/08/24 0314   NIV Type   Equipment Type V60   Mode CPAP   Mask Type Full face mask   Mask Size Large   Assessment   Pulse 81   Respirations 21   SpO2 97 %   Settings/Measurements   CPAP/EPAP 12 cmH2O   Vt (Measured) 603 mL   FiO2  30 %   Minute Volume (L/min) 12.4 Liters   Mask Leak (lpm) 23 lpm   Patient's Home Machine No   Alarm Settings   Alarms On Y       
   01/08/24 2100   Assessment   SpO2 98 %   Settings/Measurements   CPAP/EPAP 12 cmH2O   Vt (Measured) 621 mL   O2 Flow Rate (L/min) 2 L/min   FiO2  30 %   Minute Volume (L/min) 10.8 Liters   Mask Leak (lpm) 18 lpm   Patient's Home Machine No   Alarm Settings   Alarms On Y   Low Pressure (cmH2O) 5 cmH2O   High Pressure (cmH2O) 30 cmH2O   Delay Alarm 20 sec(s)   RR Low (bpm) 5   RR High (bpm) 40 br/min   Oxygen Therapy/Pulse Ox   O2 Therapy Oxygen   O2 Device PAP (positive airway pressure)       
   12/27/23 0044   NIV Type   $NIV $Daily Charge   NIV Started/Stopped (S)  On   Equipment Type V60   Mode CPAP   Mask Type Full face mask   Mask Size Large   Assessment   Pulse 82   Respirations 28   SpO2 100 %   Level of Consciousness 0   Comfort Level Good   Using Accessory Muscles No   Mask Compliance Good   Skin Assessment Clean, dry, & intact   Skin Protection for O2 Device Yes   Settings/Measurements   CPAP/EPAP 12 cmH2O   Vt (Measured) 621 mL   FiO2  30 %   Minute Volume (L/min) 14.4 Liters   Mask Leak (lpm) 27 lpm   Patient's Home Machine No   Alarm Settings   Alarms On Y       
   12/27/23 0302   NIV Type   Equipment Type V60   Mode CPAP   Mask Type Full face mask   Mask Size Large   Assessment   Pulse 90   Respirations 29   SpO2 99 %   Settings/Measurements   CPAP/EPAP 12 cmH2O   Vt (Measured) 544 mL   FiO2  30 %   Minute Volume (L/min) 17.7 Liters   Mask Leak (lpm) 0 lpm   Patient's Home Machine No   Alarm Settings   Alarms On Y       
   12/27/23 0442   NIV Type   NIV Started/Stopped (S)  Off   Assessment   Pulse 93   SpO2 97 %       
   12/27/23 2711   NIV Type   NIV Started/Stopped (S)  On   Equipment Type V60   Mode CPAP   Mask Type Full face mask   Mask Size Large   Assessment   Pulse 69   Respirations 25   SpO2 97 %   Level of Consciousness 0   Comfort Level Good   Using Accessory Muscles No   Mask Compliance Good   Skin Assessment Clean, dry, & intact   Skin Protection for O2 Device Yes   Settings/Measurements   CPAP/EPAP 12 cmH2O   Vt (Measured) 487 mL   FiO2  30 %   Minute Volume (L/min) 12.2 Liters   Mask Leak (lpm) 20 lpm   Patient's Home Machine No   Alarm Settings   Alarms On Y       
   12/28/23 0319   NIV Type   Equipment Type V60   Mode CPAP   Mask Type Full face mask   Mask Size Large   Assessment   Pulse 86   Respirations 28   SpO2 98 %   Settings/Measurements   CPAP/EPAP 12 cmH2O   Vt (Measured) 477 mL   FiO2  30 %   Minute Volume (L/min) 13.1 Liters   Mask Leak (lpm) 11 lpm   Patient's Home Machine No   Alarm Settings   Alarms On Y       
   12/28/23 2035   NIV Type   NIV Started/Stopped On   Equipment Type V60   Mode CPAP   Mask Type Full face mask   Mask Size Large   Breath Sounds   Breath Sounds Bilateral Diminished   Settings/Measurements   CPAP/EPAP 12 cmH2O   Vt (Measured) 564 mL   O2 Flow Rate (L/min) 3 L/min   FiO2  30 %   Minute Volume (L/min) 22.1 Liters   Mask Leak (lpm) 60 lpm   Patient's Home Machine No   Alarm Settings   Alarms On Y   Low Pressure (cmH2O) 5 cmH2O   High Pressure (cmH2O) 30 cmH2O   Delay Alarm 20 sec(s)   RR Low (bpm) 5   RR High (bpm) 40 br/min   Oxygen Therapy/Pulse Ox   O2 Therapy Oxygen   O2 Device Nasal cannula       
   12/28/23 2035   RT Protocol   History Pulmonary Disease 2   Respiratory pattern 2   Breath sounds 2   Cough 0   Indications for Bronchodilator Therapy Decreased or absent breath sounds   Bronchodilator Assessment Score 6     RT Inhaler-Nebulizer Bronchodilator Protocol Note    There is a bronchodilator order in the chart from a provider indicating to follow the RT Bronchodilator Protocol and there is an “Initiate RT Inhaler-Nebulizer Bronchodilator Protocol” order as well (see protocol at bottom of note).    CXR Findings:  No results found.    The findings from the last RT Protocol Assessment were as follows:   History Pulmonary Disease: Chronic pulmonary disease  Respiratory Pattern: Dyspnea on exertion or RR 21-25 bpm  Breath Sounds: Slightly diminished and/or crackles  Cough: Strong, spontaneous, non-productive  Indication for Bronchodilator Therapy: Decreased or absent breath sounds  Bronchodilator Assessment Score: 6    Aerosolized bronchodilator medication orders have been revised according to the RT Inhaler-Nebulizer Bronchodilator Protocol below.    Respiratory Therapist to perform RT Therapy Protocol Assessment initially then follow the protocol.  Repeat RT Therapy Protocol Assessment PRN for score 0-3 or on second treatment, BID, and PRN for scores above 3.    No Indications - adjust the frequency to every 6 hours PRN wheezing or bronchospasm, if no treatments needed after 48 hours then discontinue using Per Protocol order mode.     If indication present, adjust the RT bronchodilator orders based on the Bronchodilator Assessment Score as indicated below.  Use Inhaler orders unless patient has one or more of the following: on home nebulizer, not able to hold breath for 10 seconds, is not alert and oriented, cannot activate and use MDI correctly, or respiratory rate 25 breaths per minute or more, then use the equivalent nebulizer order(s) with same Frequency and PRN reasons based on the score.  If a 
   12/28/23 4785   NIV Type   NIV Started/Stopped On   Equipment Type V60   Mode CPAP   Mask Type Full face mask   Mask Size Large   Assessment   Pulse 75   Respirations 23   SpO2 98 %   Settings/Measurements   CPAP/EPAP 12 cmH2O   Vt (Measured) 641 mL   O2 Flow Rate (L/min) 3 L/min   FiO2  30 %   Minute Volume (L/min) 14.1 Liters   Mask Leak (lpm) 23 lpm   Patient's Home Machine No   Oxygen Therapy/Pulse Ox   O2 Therapy Oxygen   O2 Device PAP (positive airway pressure)       
   12/29/23 0315   NIV Type   NIV Started/Stopped On   Equipment Type v60   Mode CPAP   Mask Type Full face mask   Mask Size Large   Assessment   Pulse 76   Respirations 23   SpO2 98 %   Settings/Measurements   CPAP/EPAP 12 cmH2O   Vt (Measured) 391 mL   FiO2  30 %   Minute Volume (L/min) 9 Liters   Mask Leak (lpm) 22 lpm   Patient's Home Machine No   Oxygen Therapy/Pulse Ox   O2 Therapy Oxygen   O2 Device PAP (positive airway pressure)       
   12/29/23 2000   RT Protocol   History Pulmonary Disease 2   Respiratory pattern 2   Breath sounds 2   Cough 0   Indications for Bronchodilator Therapy Decreased or absent breath sounds   Bronchodilator Assessment Score 6     RT Inhaler-Nebulizer Bronchodilator Protocol Note    There is a bronchodilator order in the chart from a provider indicating to follow the RT Bronchodilator Protocol and there is an “Initiate RT Inhaler-Nebulizer Bronchodilator Protocol” order as well (see protocol at bottom of note).    CXR Findings:  No results found.    The findings from the last RT Protocol Assessment were as follows:   History Pulmonary Disease: Chronic pulmonary disease  Respiratory Pattern: Dyspnea on exertion or RR 21-25 bpm  Breath Sounds: Slightly diminished and/or crackles  Cough: Strong, spontaneous, non-productive  Indication for Bronchodilator Therapy: Decreased or absent breath sounds  Bronchodilator Assessment Score: 6    Aerosolized bronchodilator medication orders have been revised according to the RT Inhaler-Nebulizer Bronchodilator Protocol below.    Respiratory Therapist to perform RT Therapy Protocol Assessment initially then follow the protocol.  Repeat RT Therapy Protocol Assessment PRN for score 0-3 or on second treatment, BID, and PRN for scores above 3.    No Indications - adjust the frequency to every 6 hours PRN wheezing or bronchospasm, if no treatments needed after 48 hours then discontinue using Per Protocol order mode.     If indication present, adjust the RT bronchodilator orders based on the Bronchodilator Assessment Score as indicated below.  Use Inhaler orders unless patient has one or more of the following: on home nebulizer, not able to hold breath for 10 seconds, is not alert and oriented, cannot activate and use MDI correctly, or respiratory rate 25 breaths per minute or more, then use the equivalent nebulizer order(s) with same Frequency and PRN reasons based on the score.  If a 
   12/29/23 2338   NIV Type   Equipment Type v60   Mode CPAP   Mask Type Full face mask   Mask Size Large   Settings/Measurements   CPAP/EPAP 12 cmH2O   Vt (Measured) 411 mL   FiO2  30 %   Minute Volume (L/min) 13 Liters   Mask Leak (lpm) 23 lpm   Patient's Home Machine No   Patient Observation   Observations spo2 95% on 30% cpap       
   12/30/23 0341   NIV Type   Equipment Type v60   Mode CPAP   Mask Type Full face mask   Mask Size Large   Settings/Measurements   CPAP/EPAP 12 cmH2O   Vt (Measured) 480 mL   FiO2  30 %   Minute Volume (L/min) 7.8 Liters   Mask Leak (lpm) 8 lpm   Patient Observation   Observations spo2 96% on 30% cpap 12       
   12/30/23 2000   RT Protocol   History Pulmonary Disease 2   Respiratory pattern 2   Breath sounds 2   Cough 0   Indications for Bronchodilator Therapy Decreased or absent breath sounds   Bronchodilator Assessment Score 6     RT Inhaler-Nebulizer Bronchodilator Protocol Note    There is a bronchodilator order in the chart from a provider indicating to follow the RT Bronchodilator Protocol and there is an “Initiate RT Inhaler-Nebulizer Bronchodilator Protocol” order as well (see protocol at bottom of note).    CXR Findings:  No results found.    The findings from the last RT Protocol Assessment were as follows:   History Pulmonary Disease: Chronic pulmonary disease  Respiratory Pattern: Dyspnea on exertion or RR 21-25 bpm  Breath Sounds: Slightly diminished and/or crackles  Cough: Strong, spontaneous, non-productive  Indication for Bronchodilator Therapy: Decreased or absent breath sounds  Bronchodilator Assessment Score: 6    Aerosolized bronchodilator medication orders have been revised according to the RT Inhaler-Nebulizer Bronchodilator Protocol below.    Respiratory Therapist to perform RT Therapy Protocol Assessment initially then follow the protocol.  Repeat RT Therapy Protocol Assessment PRN for score 0-3 or on second treatment, BID, and PRN for scores above 3.    No Indications - adjust the frequency to every 6 hours PRN wheezing or bronchospasm, if no treatments needed after 48 hours then discontinue using Per Protocol order mode.     If indication present, adjust the RT bronchodilator orders based on the Bronchodilator Assessment Score as indicated below.  Use Inhaler orders unless patient has one or more of the following: on home nebulizer, not able to hold breath for 10 seconds, is not alert and oriented, cannot activate and use MDI correctly, or respiratory rate 25 breaths per minute or more, then use the equivalent nebulizer order(s) with same Frequency and PRN reasons based on the score.  If a 
   12/30/23 2221   NIV Type   Equipment Type v60   Mode CPAP   Mask Type Full face mask   Mask Size Large   Settings/Measurements   CPAP/EPAP 12 cmH2O   Vt (Measured) 416 mL   FiO2  30 %   Minute Volume (L/min) 10.2 Liters   Mask Leak (lpm) 21 lpm   Patient's Home Machine No   Patient Observation   Observations spo2 95% on cpap 12 30%       
   12/31/23 2000   RT Protocol   History Pulmonary Disease 2   Respiratory pattern 2   Breath sounds 2   Cough 0   Indications for Bronchodilator Therapy Decreased or absent breath sounds   Bronchodilator Assessment Score 6     RT Inhaler-Nebulizer Bronchodilator Protocol Note    There is a bronchodilator order in the chart from a provider indicating to follow the RT Bronchodilator Protocol and there is an “Initiate RT Inhaler-Nebulizer Bronchodilator Protocol” order as well (see protocol at bottom of note).    CXR Findings:  No results found.    The findings from the last RT Protocol Assessment were as follows:   History Pulmonary Disease: Chronic pulmonary disease  Respiratory Pattern: Dyspnea on exertion or RR 21-25 bpm  Breath Sounds: Slightly diminished and/or crackles  Cough: Strong, spontaneous, non-productive  Indication for Bronchodilator Therapy: Decreased or absent breath sounds  Bronchodilator Assessment Score: 6    Aerosolized bronchodilator medication orders have been revised according to the RT Inhaler-Nebulizer Bronchodilator Protocol below.    Respiratory Therapist to perform RT Therapy Protocol Assessment initially then follow the protocol.  Repeat RT Therapy Protocol Assessment PRN for score 0-3 or on second treatment, BID, and PRN for scores above 3.    No Indications - adjust the frequency to every 6 hours PRN wheezing or bronchospasm, if no treatments needed after 48 hours then discontinue using Per Protocol order mode.     If indication present, adjust the RT bronchodilator orders based on the Bronchodilator Assessment Score as indicated below.  Use Inhaler orders unless patient has one or more of the following: on home nebulizer, not able to hold breath for 10 seconds, is not alert and oriented, cannot activate and use MDI correctly, or respiratory rate 25 breaths per minute or more, then use the equivalent nebulizer order(s) with same Frequency and PRN reasons based on the score.  If a 
   12/31/23 2120   NIV Type   Equipment Type v60   Mode CPAP   Mask Type Full face mask   Mask Size Large   Settings/Measurements   CPAP/EPAP 12 cmH2O   Vt (Measured) 512 mL   FiO2  30 %   Minute Volume (L/min) 8.5 Liters   Mask Leak (lpm) 15 lpm   Patient's Home Machine No   Patient Observation   Observations spo2 93% on 30% cpap 12       
   12/31/23 2308   NIV Type   Equipment Type v60   Mode CPAP   Mask Type Full face mask   Mask Size Large   Settings/Measurements   CPAP/EPAP 12 cmH2O   Vt (Measured) 423 mL   FiO2  30 %   Minute Volume (L/min) 11 Liters   Mask Leak (lpm) 1 lpm   Patient's Home Machine No   Patient Observation   Observations spo2 96% on 30% cpap 12       
  Pharmacy Vancomycin Consult     Vancomycin Day:   Current Dosin mg q12h  Current indication: SSTI    Recent Labs     23  0610 23  0706   BUN 20 17   CREATININE 0.6* 0.6*   WBC 5.6 5.9       Estimated Creatinine Clearance: 146 mL/min (A) (based on SCr of 0.6 mg/dL (L)).    Trough: 11  AUC: 473    Assessment/Plan:  Pt is therapeutic. Will continue current dosing of 1500 mg q12h. Pharmacy will continue to monitor.      Jennie Baeza PharmD, [facility], 2023 5:12 PM      
  Physician Progress Note      PATIENT:               VILLA YANG  [facility] #:                  [id number]  :                       1950  ADMIT DATE:       2023 1:21 PM  DISCH DATE:  RESPONDING  PROVIDER #:        MARQUES TARIQ          QUERY TEXT:    Pt admitted with pneumonia and cellulitis. Pt noted to have sepsis on .   If possible, please document in progress notes and discharge summary the   present on admission status of sepsis:    The medical record reflects the following:  Risk Factors: pneumonia, cellulitis, MONIQUE, DM  Clinical Indicators: acute hypoxic respiratory failure, \"Sepsis. Likely from   right foot/toe cellulitis, concern for osteomyelitis\" noted on , WBC 19,   lactic 2.9, procal 0.56, vitals on  1300- 1300: T 97.3, RR 17-29, HR   71-94, SPO2 %  Treatment: serial labs, cultures, IV Cefepime and Vancomycin, supportive care    Thank you,  Jacqueline James RN, CDS  [email or web address]  Options provided:  -- Yes, sepsis was present at the time of the order to admit to the hospital  -- No, sepsis was not present on admission and developed during the inpatient   stay  -- Other - I will add my own diagnosis  -- Disagree - Not applicable / Not valid  -- Disagree - Clinically unable to determine / Unknown  -- Refer to Clinical Documentation Reviewer    PROVIDER RESPONSE TEXT:    Yes, sepsis was present at the time of the order to admit to the hospital.    Query created by: Jacqueline James on 2023 10:40 AM      QUERY TEXT:    Patient admitted with fatigue. Documentation reflects pneumonia in ED and H&P   and then dropped.  If possible, please document in the progress notes and   discharge summary if pneumonia was:    The medical record reflects the following:  Risk Factors: MONIQUE, DM, atelectasis  Clinical Indicators: \"X-ray shows possible pneumonia\" and \"Acute hypoxic   respiratory failure due to pneumonia\" noted in H&P, CXR: Cardiomegaly and   large hiatal hernia. 
/78   Pulse 75   Temp 98.2 °F (36.8 °C) (Oral)   Resp 23   Ht 1.854 m (6' 1\")   Wt 127.1 kg (280 lb 1.6 oz)   SpO2 98%   BMI 36.95 kg/m²     Pt awake in bed. Pt alert and orientedX4. Assessment complete. Meds passed. Pt denies needs at this time.        Bedside Mobility Assessment Tool (BMAT):     Assessment Level 1- Sit and Shake    1. From a semi-reclined position, ask patient to sit up and rotate to a seated position at the side of the bed. Can use the bedrail.    2. Ask patient to reach out and grab your hand and shake making sure patient reaches across his/her midline.   Pass- Patient is able to come to a seated position, maintain core strength. Maintains seated balance while reaching across midline. Move on to Assessment Level 2.     Assessment Level 2- Stretch and Point   1. With patient in seated position at the side of the bed, have patient place both feet on the floor (or stool) with knees no higher than hips.    2. Ask patient to stretch one leg and straighten the knee, then bend the ankle/flex and point the toes. If appropriate, repeat with the other leg.   Fail- Patient is unable to complete task. Patient is MOBILITY LEVEL 2.     Assessment Level 3- Stand   1. Ask patient to elevate off the bed or chair (seated to standing) using an assistive device (cane, bedrail).    2. Patient should be able to raise buttocks off be and hold for a count of five. May repeat once.   Fail- Patient unable to demonstrate standing stability. Patient is MOBILITY LEVEL 3.     Assessment Level 4- Walk   1. Ask patient to march in place at bedside.    2. Then ask patient to advance step and return each foot. Some medical conditions may render a patient from stepping backwards, use your best clinical judgement.   Fail- Patient not able to complete tasks OR requires use of assistive device. Patient is MOBILITY LEVEL 3.       Mobility Level- 2    
/79   Pulse 70   Temp 98.9 °F (37.2 °C) (Oral)   Resp 19   Ht 1.854 m (6' 1\")   Wt 123.5 kg (272 lb 4.8 oz)   SpO2 95%   BMI 35.93 kg/m²     Patient alert and oriented x4, watching tv. With telemetry on, with oxygen at 2 lpm via nasal cannula.  Assessment completed. Respiration easy, even and unlabored. Patient tolerated night meds well. Call light and bedside table within reach. Bed at lowest position, locked, side rails x2, bed alarm on. Pt denies needs at this time.        
/80   Pulse 79   Temp 98.2 °F (36.8 °C) (Oral)   Resp 18   Ht 1.854 m (6' 0.99\")   Wt 125.4 kg (276 lb 6.4 oz)   SpO2 95%   BMI 36.47 kg/m²     Assessment complete. Meds passed. Pt denies needs at this time    Patient complains of shoulder pain, PRN meds not available yet    Bedside Mobility Assessment Tool (BMAT):     Assessment Level 1- Sit and Shake    1. From a semi-reclined position, ask patient to sit up and rotate to a seated position at the side of the bed. Can use the bedrail.    2. Ask patient to reach out and grab your hand and shake making sure patient reaches across his/her midline.   Pass- Patient is able to come to a seated position, maintain core strength. Maintains seated balance while reaching across midline. Move on to Assessment Level 2.     Assessment Level 2- Stretch and Point   1. With patient in seated position at the side of the bed, have patient place both feet on the floor (or stool) with knees no higher than hips.    2. Ask patient to stretch one leg and straighten the knee, then bend the ankle/flex and point the toes. If appropriate, repeat with the other leg.   Pass- Patient is able to demonstrate appropriate quad strength on intended weight bearing limb(s). Move onto Assessment Level 3.     Assessment Level 3- Stand   1. Ask patient to elevate off the bed or chair (seated to standing) using an assistive device (cane, bedrail).    2. Patient should be able to raise buttocks off be and hold for a count of five. May repeat once.   Pass- Patient maintains standing stability for at least 5 seconds, proceed to assessment level 4.    Assessment Level 4- Walk   1. Ask patient to march in place at bedside.    2. Then ask patient to advance step and return each foot. Some medical conditions may render a patient from stepping backwards, use your best clinical judgement.   Fail- Patient not able to complete tasks OR requires use of assistive device. Patient is MOBILITY LEVEL 3. 
12/29  Vanc trough = 10 mcg/mL at 0106.  Renal function has slightly improved.  Calculated AUC of 389.  Will increase vancomycin to 1500 mg q12.  Recheck vanc trough tomorrow (12/30 at 0200).  Maged Archuleta, PharmD  12/29/2023 2:02 AM  
4 Eyes Skin Assessment     NAME:  Kareem Hansen  YOB: 1950  MEDICAL RECORD NUMBER:  [id number]    The patient is being assessed for  Other SNF    I agree that at least one RN has performed a thorough Head to Toe Skin Assessment on the patient. ALL assessment sites listed below have been assessed.      Areas assessed by both nurses:    Head, Face, Ears, Shoulders, Back, Chest, Arms, Elbows, Hands, Sacrum. Buttock, Coccyx, Ischium, Legs. Feet and Heels, and Under Medical Devices    Abrasions BLE, Excoriated perineal area, Red and Abel BLE, scattered ecchymosis.    Does the Patient have a Wound? Yes wound(s) were present on assessment. LDA wound assessment was Initiated and completed by RN                     Pressure Injury (Stage 3,4, Unstageable, DTI, NWPT, and Complex wounds) if present, place Wound referral order by RN under : Yes    New Ostomies, if present place, Ostomy referral order under : No     Nurse 1 eSignature: Electronically signed by Alma Davis RN on 1/9/24 at 11:33 AM EST    **SHARE this note so that the co-signing nurse can place an eSignature**    Nurse 2 eSignature: Electronically signed by RAND SNYDER RN on 1/9/24 at 11:36 AM EST   
BP (!) 149/75   Pulse 69   Temp 98.2 °F (36.8 °C) (Oral)   Resp 25   Ht 1.854 m (6' 1\")   Wt 124.4 kg (274 lb 4.8 oz)   SpO2 97%   BMI 36.19 kg/m²     Pt awake in bed. Pt alert and orientedX4. Assessment complete. Meds passed. Pt denies needs at this time.        Bedside Mobility Assessment Tool (BMAT):     Assessment Level 1- Sit and Shake    1. From a semi-reclined position, ask patient to sit up and rotate to a seated position at the side of the bed. Can use the bedrail.    2. Ask patient to reach out and grab your hand and shake making sure patient reaches across his/her midline.   Fail- Patient is unable to perform tasks, patient is MOBILITY LEVEL 1.    Assessment Level 2- Stretch and Point   1. With patient in seated position at the side of the bed, have patient place both feet on the floor (or stool) with knees no higher than hips.    2. Ask patient to stretch one leg and straighten the knee, then bend the ankle/flex and point the toes. If appropriate, repeat with the other leg.   Fail- Patient is unable to complete task. Patient is MOBILITY LEVEL 2.     Assessment Level 3- Stand   1. Ask patient to elevate off the bed or chair (seated to standing) using an assistive device (cane, bedrail).    2. Patient should be able to raise buttocks off be and hold for a count of five. May repeat once.   Fail- Patient unable to demonstrate standing stability. Patient is MOBILITY LEVEL 3.     Assessment Level 4- Walk   1. Ask patient to march in place at bedside.    2. Then ask patient to advance step and return each foot. Some medical conditions may render a patient from stepping backwards, use your best clinical judgement.   Fail- Patient not able to complete tasks OR requires use of assistive device. Patient is MOBILITY LEVEL 3.       Mobility Level- 1    
Bedside Mobility Assessment Tool (BMAT):     Assessment Level 1- Sit and Shake    1. From a semi-reclined position, ask patient to sit up and rotate to a seated position at the side of the bed. Can use the bedrail.    2. Ask patient to reach out and grab your hand and shake making sure patient reaches across his/her midline.   Fail- Patient is unable to perform tasks, patient is MOBILITY LEVEL 1.    Assessment Level 2- Stretch and Point   1. With patient in seated position at the side of the bed, have patient place both feet on the floor (or stool) with knees no higher than hips.    2. Ask patient to stretch one leg and straighten the knee, then bend the ankle/flex and point the toes. If appropriate, repeat with the other leg.   Fail- Patient is unable to complete task. Patient is MOBILITY LEVEL 2.     Assessment Level 3- Stand   1. Ask patient to elevate off the bed or chair (seated to standing) using an assistive device (cane, bedrail).    2. Patient should be able to raise buttocks off be and hold for a count of five. May repeat once.   Fail- Patient unable to demonstrate standing stability. Patient is MOBILITY LEVEL 3.     Assessment Level 4- Walk   1. Ask patient to march in place at bedside.    2. Then ask patient to advance step and return each foot. Some medical conditions may render a patient from stepping backwards, use your best clinical judgement.   Fail- Patient not able to complete tasks OR requires use of assistive device. Patient is MOBILITY LEVEL 3.       Mobility Level- 1    
Bedside report given to Dorothy RN. Pt denies needs at this time. No s/s of distress. Respirations easy and unlabored. Pt stable. Bed in low position call light within reach. No further needs at this time.    
Bedside report given to Marivel BUSH. Pt denies needs at this time. No s/s of distress. Respirations easy and unlabored. Pt stable. Bed in low position call light within reach. No further needs at this time.    
Bedside report given to Santos BUSH. Pt denies needs at this time. No s/s of distress. Respirations easy and unlabored. Pt stable. Bed in low position call light within reach. No further needs at this time.    
Bhakti here to see pt dressing changed.  
Bilat leg dressings changed.  
Dr Tang here to see pt and changed dressings.  
Extended Infusion B-Lactam Antibiotics: Cefepime     Day: 3  Recent Labs     12/26/23  1606 12/28/23  0542 12/29/23  0610   CREATININE 0.9 0.6* 0.6*     Estimated Creatinine Clearance: 149 mL/min (A) (based on SCr of 0.6 mg/dL (L)).  Recent Labs     12/26/23  1726 12/28/23  0542 12/29/23  0610   WBC 19.0* 8.3 5.6       Cefepime-Extended Infusion (4-hour infusion) - Preferred Dosing Strategy   Renal Function (CrCl mL/min) ? 60 30 - 59 11 - 29 ? 10, HD PD CRRT   All indications - Loading dose of 2000 milligrams x 1 over 30 minutes or via IV push. Maintenance dose   should begin at the next regularly scheduled dosing interval based on indication/renal function.   Intra-abdominal infections, Skin and soft tissue infections, Urinary tract infections 2000mg q12h 2000mg q24h 1000mg q24h 500mg q24h 1000mg q24h 2000mg q12h   Bacteremia, CNS infections, Cystic fibrosis, Diabetic foot infections, Endocarditis, Febrile neutropenia, Healthcare-associated infections, Osteomyelitis/joint infections, Pneumonia, Sepsis, BMI > 40* 2000mg q8h 2000mg q12h 1000mg q12h 1000mg q24h 1000mg q24h 2000mg q8h       Jorge L Chatterjee RPH 12/29/2023 8:50 AM    
F/u with patient on med req per pharmacy request. Patient still asserts that the listed medications were taken 12/26/23 at 330. Pharmacy notified that patient believes to the best of his knowledge that he took the listed medications.   
Gave report to Quita Mcclendon [facility].   
HEART FAILURE CARE PLAN:    Comorbidities Reviewed: Yes   Patient has a past medical history of Allergic rhinitis, Arthritis, Bladder fistula, C. difficile diarrhea, Cellulitis of right lower extremity, CHF (congestive heart failure) ([facility]), Dental disease, Diabetes (HCC), Diverticulitis, Dizziness, DVT of lower extremity, bilateral (HCC), GERD (gastroesophageal reflux disease), Headache, Hearing loss, Hematuria, Hx of blood clots, Hyperlipidemia, Hypertension, Lung disease, MONIQUE (obstructive sleep apnea), Pancreatitis ([facility]), Pulmonary emboli ([facility]), Rash, Sleep apnea, and Tinnitus.     ECHOCARDIOGRAM Reviewed: Yes   Patient's Ejection Fraction (EF) is greater than 40%    Weights Reviewed: Yes   Admission weight: 124 kg (273 lb 4.8 oz)   Wt Readings from Last 3 Encounters:   01/09/24 115.1 kg (253 lb 12.8 oz)   12/18/23 125.6 kg (277 lb)   12/12/23 124 kg (273 lb 6.4 oz)     Intake & Output Reviewed: Yes     Intake/Output Summary (Last 24 hours) at 1/9/2024 1132  Last data filed at 1/8/2024 1531  Gross per 24 hour   Intake --   Output 300 ml   Net -300 ml     Medications Reviewed: Yes   SCHEDULED HOSPITAL MEDICATIONS:   docusate sodium  100 mg Oral Daily    insulin glargine  12 Units SubCUTAneous Nightly    mineral oil-hydrophilic petrolatum   Topical Daily    insulin lispro  0-8 Units SubCUTAneous TID WC    insulin lispro  0-4 Units SubCUTAneous Nightly    spironolactone  25 mg Oral Daily    [Held by provider] torsemide  20 mg Oral Daily    sodium chloride flush  5-40 mL IntraVENous 2 times per day    apixaban  5 mg Oral BID    aspirin  81 mg Oral Daily    atorvastatin  40 mg Oral Nightly    montelukast  10 mg Oral Daily    pantoprazole  40 mg Oral QAM AC    sacubitril-valsartan  1 tablet Oral BID    [Held by provider] isosorbide mononitrate  30 mg Oral Daily    albuterol sulfate HFA  2 puff Inhalation BID RT     ACE/ARB/ARNI is REQUIRED for EF </= 40% SYSTOLIC FAILURE:   ACE:: None  ARB:: None  ARNI:: 
HEART FAILURE CARE PLAN:    Comorbidities Reviewed: Yes   Patient has a past medical history of Allergic rhinitis, Arthritis, Bladder fistula, C. difficile diarrhea, Cellulitis of right lower extremity, CHF (congestive heart failure) ([facility]), Dental disease, Diabetes (HCC), Diverticulitis, Dizziness, DVT of lower extremity, bilateral (HCC), GERD (gastroesophageal reflux disease), Headache, Hearing loss, Hematuria, Hx of blood clots, Hyperlipidemia, Hypertension, Lung disease, MONIQUE (obstructive sleep apnea), Pancreatitis ([facility]), Pulmonary emboli ([facility]), Rash, Sleep apnea, and Tinnitus.     ECHOCARDIOGRAM Reviewed: Yes   Patient's Ejection Fraction (EF) is greater than 40%    Weights Reviewed: Yes   Admission weight: 124 kg (273 lb 4.8 oz)   Wt Readings from Last 3 Encounters:   01/08/24 115.1 kg (253 lb 11.2 oz)   12/18/23 125.6 kg (277 lb)   12/12/23 124 kg (273 lb 6.4 oz)     Intake & Output Reviewed: Yes     Intake/Output Summary (Last 24 hours) at 1/9/2024 0413  Last data filed at 1/8/2024 1531  Gross per 24 hour   Intake --   Output 300 ml   Net -300 ml     Medications Reviewed: Yes   SCHEDULED HOSPITAL MEDICATIONS:   docusate sodium  100 mg Oral Daily    insulin glargine  12 Units SubCUTAneous Nightly    mineral oil-hydrophilic petrolatum   Topical Daily    insulin lispro  0-8 Units SubCUTAneous TID WC    insulin lispro  0-4 Units SubCUTAneous Nightly    spironolactone  25 mg Oral Daily    [Held by provider] torsemide  20 mg Oral Daily    sodium chloride flush  5-40 mL IntraVENous 2 times per day    apixaban  5 mg Oral BID    aspirin  81 mg Oral Daily    atorvastatin  40 mg Oral Nightly    montelukast  10 mg Oral Daily    pantoprazole  40 mg Oral QAM AC    sacubitril-valsartan  1 tablet Oral BID    [Held by provider] isosorbide mononitrate  30 mg Oral Daily    albuterol sulfate HFA  2 puff Inhalation BID RT     ACE/ARB/ARNI is REQUIRED for EF </= 40% SYSTOLIC FAILURE:   ACE:: None  ARB:: None  ARNI:: 
Handoff report and transfer of care given at bedside to Alma BUSH.  Patient in stable condition, denies needs/concerns at this time.  Call light within reach.     
Handoff report and transfer of care given at bedside to Bhakti BUSH.  Patient in stable condition, denies needs/concerns at this time.  Call light within reach.     
Handoff report and transfer of care given at bedside to Kriss RN.  Patient in stable condition, denies needs/concerns at this time.  Call light within reach.     
Handoff report and transfer of care given at bedside to Kriss RN.  Patient in stable condition, denies needs/concerns at this time.  Call light within reach.     
Handoff report and transfer of care given at bedside to matt.  Patient in stable condition, denies needs/concerns at this time.  Call light within reach.     
Handoff report and transfer of care written to Alma BUSH.  Patient in stable condition, denies needs/concerns at this time.  Call light within reach.     Charge to watch patient until RN gets here.   
Inpatient Occupational Therapy Treatment    Unit: 2 [place]  Date:  1/5/2024  Patient Name:    Kareem Jimenezacher  Admitting diagnosis:  MONIQUE (obstructive sleep apnea) [G47.33]  Generalized weakness [R53.1]  Pneumonia due to infectious organism [J18.9]  Pneumonia due to infectious organism, unspecified laterality, unspecified part of lung [J18.9]  Community acquired pneumonia due to Chlamydia species [J16.0]  Admit Date:  12/26/2023  Precautions/Restrictions/WB Status/ Lines/ Wounds/ Oxygen: Fall risk, Bed/chair alarm, Lines (IV and Supplemental O2 (2L)), Telemetry, Continuous pulse oximetry, and Isolation Precautions: Contact    Pt seen for cotreatment this date due to patient safety, patient endurance, and limited functional status information    Treatment Time:  6060-4962  Treatment Number:  6 (progress note)  Timed Code Treatment Minutes:  30 minutes  Total Treatment Minutes: 30 minutes    Patient Goals for Therapy: \"I can't go back home by myself \"          Discharge Recommendations: SNF  DME needs for discharge: Defer to facility       Therapy recommendations for staff:   Assist of 2 for transfers with use of MAXI-Move and gait belt to/from chair    History of Present Illness: Per Dr. Vora H&P:  \"73 y.o. male with pmh of diabetes hypertension A-fib MONIQUE who presents with generalized weakness for the last few days and is short of breath. He has not been using his CPAP at night because he wants to be able to hear his phone because his wife is currently hospitalized as well in the [facility] of [facility] hospital. Today he was too weak to get up so his neighbor helped him get up and he was transported here. X-ray shows possible pneumonia. Treating as community-acquired. Stable on 2 L nasal cannula. COVID and flu negative.\"  Diagnosed with acute hypoxic respiratory failure due to pneumonia, MONIQUE.     Home Health S4 Level Recommendation:  NA    AM-PAC Score: AM-PAC Inpatient Daily Activity Raw Score: 13     Subjective:  Patient 
Inpatient Occupational Therapy Treatment    Unit: 2 [place]  Date:  1/4/2024  Patient Name:    Kareem Jimenezacher  Admitting diagnosis:  MONIQUE (obstructive sleep apnea) [G47.33]  Generalized weakness [R53.1]  Pneumonia due to infectious organism [J18.9]  Pneumonia due to infectious organism, unspecified laterality, unspecified part of lung [J18.9]  Community acquired pneumonia due to Chlamydia species [J16.0]  Admit Date:  12/26/2023  Precautions/Restrictions/WB Status/ Lines/ Wounds/ Oxygen: Fall risk, Bed/chair alarm, Lines (IV and Supplemental O2 (2L)), Telemetry, Continuous pulse oximetry, and Isolation Precautions: Contact    Pt seen for cotreatment this date due to patient safety, patient endurance, and limited functional status information    Treatment Time:  13:28-14:09  Treatment Number:  5  Timed Code Treatment Minutes:  41 minutes  Total Treatment Minutes: 41 minutes    Patient Goals for Therapy: \"I can't go back home by myself \"          Discharge Recommendations: SNF  DME needs for discharge: Defer to facility       Therapy recommendations for staff:   Assist of 2 for transfers with use of MAXI-Move and gait belt to/from chair    History of Present Illness: Per Dr. Vora H&P:  \"73 y.o. male with pmh of diabetes hypertension A-fib MONIQUE who presents with generalized weakness for the last few days and is short of breath. He has not been using his CPAP at night because he wants to be able to hear his phone because his wife is currently hospitalized as well in the [facility] of [facility] hospital. Today he was too weak to get up so his neighbor helped him get up and he was transported here. X-ray shows possible pneumonia. Treating as community-acquired. Stable on 2 L nasal cannula. COVID and flu negative.\"  Diagnosed with acute hypoxic respiratory failure due to pneumonia, MONIQUE.     Home Health S4 Level Recommendation:  NA    AM-PAC Score: AM-PAC Inpatient Daily Activity Raw Score: 14     Subjective:  Patient lying reclined 
Inpatient Occupational Therapy Treatment    Unit: 2 [place]  Date:  1/2/2024  Patient Name:    Kareem Jimenezacher  Admitting diagnosis:  MONIQUE (obstructive sleep apnea) [G47.33]  Generalized weakness [R53.1]  Pneumonia due to infectious organism [J18.9]  Pneumonia due to infectious organism, unspecified laterality, unspecified part of lung [J18.9]  Community acquired pneumonia due to Chlamydia species [J16.0]  Admit Date:  12/26/2023  Precautions/Restrictions/WB Status/ Lines/ Wounds/ Oxygen: Fall risk, Bed/chair alarm, Lines (IV and Supplemental O2 (2L)), Telemetry, Continuous pulse oximetry, and Isolation Precautions: Contact    Pt seen for cotreatment this date due to patient safety, patient endurance, and limited functional status information    Treatment Time:  6068-0798  Treatment Number:  3  Timed Code Treatment Minutes: 15 minutes  Total Treatment Minutes:  15  minutes    Patient Goals for Therapy: \"I can't go back home by myself \"          Discharge Recommendations: SNF  DME needs for discharge: Defer to facility       Therapy recommendations for staff:   Assist of 2 for transfers with use of JABARI STEDY and gait belt to/from BSC  to/from chair    History of Present Illness: Per Dr. Vora H&P:  \"73 y.o. male with pmh of diabetes hypertension A-fib MONIQUE who presents with generalized weakness for the last few days and is short of breath. He has not been using his CPAP at night because he wants to be able to hear his phone because his wife is currently hospitalized as well in the [facility] hospital. Today he was too weak to get up so his neighbor helped him get up and he was transported here. X-ray shows possible pneumonia. Treating as community-acquired. Stable on 2 L nasal cannula. COVID and flu negative.\"  Diagnosed with acute hypoxic respiratory failure due to pneumonia, MONIQUE.     Home Health S4 Level Recommendation:  NA    AM-PAC Score: AM-PAC Inpatient Daily Activity Raw Score: 14     Subjective:  Patient 
Inpatient Occupational Therapy Treatment    Unit: 2 [place]  Date:  1/3/2024  Patient Name:    Kareem Jimenezacher  Admitting diagnosis:  MONIQUE (obstructive sleep apnea) [G47.33]  Generalized weakness [R53.1]  Pneumonia due to infectious organism [J18.9]  Pneumonia due to infectious organism, unspecified laterality, unspecified part of lung [J18.9]  Community acquired pneumonia due to Chlamydia species [J16.0]  Admit Date:  12/26/2023  Precautions/Restrictions/WB Status/ Lines/ Wounds/ Oxygen: Fall risk, Bed/chair alarm, Lines (IV and Supplemental O2 (2L)), Telemetry, Continuous pulse oximetry, and Isolation Precautions: Contact    Pt seen for cotreatment this date due to patient safety, patient endurance, and limited functional status information    Treatment Time:  15:30-16:16  Treatment Number:  4  Timed Code Treatment Minutes: 46 minutes  Total Treatment Minutes:  46 minutes    Patient Goals for Therapy: \"I can't go back home by myself \"          Discharge Recommendations: SNF  DME needs for discharge: Defer to facility       Therapy recommendations for staff:   Assist of 2 for transfers with use of JABARI STEDY and gait belt to/from BSC  to/from chair    History of Present Illness: Per Dr. Vora H&P:  \"73 y.o. male with pmh of diabetes hypertension A-fib MONIQUE who presents with generalized weakness for the last few days and is short of breath. He has not been using his CPAP at night because he wants to be able to hear his phone because his wife is currently hospitalized as well in the [facility] of this hospital. Today he was too weak to get up so his neighbor helped him get up and he was transported here. X-ray shows possible pneumonia. Treating as community-acquired. Stable on 2 L nasal cannula. COVID and flu negative.\"  Diagnosed with acute hypoxic respiratory failure due to pneumonia, MONIQUE.     Home Health S4 Level Recommendation:  NA    AM-PAC Score: AM-PAC Inpatient Daily Activity Raw Score: 14     Subjective:  Patient 
Inpatient Occupational Therapy Treatment    Unit: 2 [place]  Date:  12/31/2023  Patient Name:    Kareem Jimenezacher  Admitting diagnosis:  MONIQUE (obstructive sleep apnea) [G47.33]  Generalized weakness [R53.1]  Pneumonia due to infectious organism [J18.9]  Pneumonia due to infectious organism, unspecified laterality, unspecified part of lung [J18.9]  Community acquired pneumonia due to Chlamydia species [J16.0]  Admit Date:  12/26/2023  Precautions/Restrictions/WB Status/ Lines/ Wounds/ Oxygen: Fall risk, Bed/chair alarm, Lines (IV and Supplemental O2 (2L)), Telemetry, Continuous pulse oximetry, and Isolation Precautions: Contact    Pt seen for cotreatment this date due to patient safety, patient endurance, and limited functional status information    Treatment Time:  789-164  Treatment Number:  2  Timed Code Treatment Minutes: 35 minutes  Total Treatment Minutes:  35  minutes    Patient Goals for Therapy: \"I can't go back home by myself \"          Discharge Recommendations: SNF  DME needs for discharge: Defer to facility       Therapy recommendations for staff:   Assist of 2 for transfers with use of JABARI STEDY and gait belt to/from BSC  to/from chair    History of Present Illness: Per Dr. Vora H&P:  \"73 y.o. male with pmh of diabetes hypertension A-fib MONIQUE who presents with generalized weakness for the last few days and is short of breath. He has not been using his CPAP at night because he wants to be able to hear his phone because his wife is currently hospitalized as well in the [facility] of [facility] hospital. Today he was too weak to get up so his neighbor helped him get up and he was transported here. X-ray shows possible pneumonia. Treating as community-acquired. Stable on 2 L nasal cannula. COVID and flu negative.\"  Diagnosed with acute hypoxic respiratory failure due to pneumonia, MONIQUE.     Home Health S4 Level Recommendation:  NA    AM-PAC Score: AM-PAC Inpatient Daily Activity Raw Score: 14     Subjective:  Patient 
Inpatient Physical Therapy Evaluation & Treatment    Unit: [facility]  Date:  12/28/2023  Patient Name:    Kareem Hansen  Admitting diagnosis:  MONIQUE (obstructive sleep apnea) [G47.33]  Generalized weakness [R53.1]  Pneumonia due to infectious organism [J18.9]  Pneumonia due to infectious organism, unspecified laterality, unspecified part of lung [J18.9]  Community acquired pneumonia due to Chlamydia species [J16.0]  Admit Date:  12/26/2023  Precautions/Restrictions/WB Status/ Lines/ Wounds/ Oxygen: Fall risk, Bed/chair alarm, Lines (IV and Supplemental O2 (3L)), Telemetry, Continuous pulse oximetry, and Isolation Precautions: Contact      Pt seen for cotreatment this date due to patient safety, patient endurance, acute illness/injury, and need for the assistance of 2 skilled therapists    Treatment Time:  9:05 - 9:45  Treatment Number:  1   Timed Code Treatment Minutes: 30 minutes  Total Treatment Minutes:  40  minutes    Patient Stated Goals for Therapy: \" get stronger \"          Discharge Recommendations: SNF  DME needs for discharge: Defer to facility       Therapy recommendation for EMS Transport: requires transport by cot due to pt needs lift equipment for safe transfers and pt needs A x 2 for safe transfers    Therapy recommendations for staff:   Assist of 2 for transfers with use of JABARI STEDY and gait belt to/from BSC  to/from chair    History of Present Illness:   Chief Complaint: Generalized weakness  Kareem Hansen is a 73 y.o. male with pmh of diabetes hypertension A-fib MONIQUE who presents with generalized weakness for the last few days and is short of breath. He has not been using his CPAP at night because he wants to be able to hear his phone because his wife is currently hospitalized as well in the [facility] of this hospital. Today he was too weak to get up so his neighbor helped him get up and he was transported here. X-ray shows possible pneumonia. Treating as community-acquired. Stable on 2 L nasal 
Inpatient Physical Therapy Treatment    Unit: 2 [place]  Date:  1/5/2024  Patient Name:    Kareem Hansen  Admitting diagnosis:  MONIQUE (obstructive sleep apnea) [G47.33]  Generalized weakness [R53.1]  Pneumonia due to infectious organism [J18.9]  Pneumonia due to infectious organism, unspecified laterality, unspecified part of lung [J18.9]  Community acquired pneumonia due to Chlamydia species [J16.0]  Admit Date:  12/26/2023  Precautions/Restrictions/WB Status/ Lines/ Wounds/ Oxygen: Fall risk, Bed/chair alarm, Lines (IV and Supplemental O2 (2L)), Telemetry, Continuous pulse oximetry, and Isolation Precautions: Contact    Pt seen for cotreatment this date due to patient safety, patient endurance, acute illness/injury, and need for the assistance of 2 skilled therapists    Treatment Time:  10:20 - 10:50  Treatment Number:  6 (progress note)   Timed Code Treatment Minutes: 30 minutes  Total Treatment Minutes:  30 minutes    Patient Stated Goals for Therapy: \" get stronger \"          Discharge Recommendations: SNF  DME needs for discharge: Defer to facility       Therapy recommendation for EMS Transport: requires transport by cot due to pt needs lift equipment for safe transfers and pt needs A x 2 for safe transfers    Therapy recommendations for staff:   Assist of 2 for transfers with use of MAXI-Move to/from BSC  to/from chair    History of Present Illness:   Chief Complaint: Generalized weakness  Kareem Hansne is a 73 y.o. male with pmh of diabetes hypertension A-fib MONIQUE who presents with generalized weakness for the last few days and is short of breath. He has not been using his CPAP at night because he wants to be able to hear his phone because his wife is currently hospitalized as well in the [facility]. Today he was too weak to get up so his neighbor helped him get up and he was transported here. X-ray shows possible pneumonia. Treating as community-acquired. Stable on 2 L nasal cannula. COVID 
Inpatient Physical Therapy Treatment    Unit: 2 [place]  Date:  12/29/2023  Patient Name:    Kareem Hansen  Admitting diagnosis:  MONIQUE (obstructive sleep apnea) [G47.33]  Generalized weakness [R53.1]  Pneumonia due to infectious organism [J18.9]  Pneumonia due to infectious organism, unspecified laterality, unspecified part of lung [J18.9]  Community acquired pneumonia due to Chlamydia species [J16.0]  Admit Date:  12/26/2023  Precautions/Restrictions/WB Status/ Lines/ Wounds/ Oxygen: Fall risk, Bed/chair alarm, Lines (IV and Supplemental O2 (3L)), Telemetry, Continuous pulse oximetry, and Isolation Precautions: Contact      Pt seen for cotreatment this date due to patient safety, patient endurance, acute illness/injury, and need for the assistance of 2 skilled therapists    Treatment Time:  14:10 - 14:35  Treatment Number:  2   Timed Code Treatment Minutes: 25 minutes  Total Treatment Minutes:  25 minutes    Patient Stated Goals for Therapy: \" get stronger \"          Discharge Recommendations: SNF  DME needs for discharge: Defer to facility       Therapy recommendation for EMS Transport: requires transport by cot due to pt needs lift equipment for safe transfers and pt needs A x 2 for safe transfers    Therapy recommendations for staff:   Assist of 2 for transfers with use of JABARI STEDY and gait belt to/from BSC  to/from chair    History of Present Illness:   Chief Complaint: Generalized weakness  Kareem Hansen is a 73 y.o. male with pmh of diabetes hypertension A-fib MONIQUE who presents with generalized weakness for the last few days and is short of breath. He has not been using his CPAP at night because he wants to be able to hear his phone because his wife is currently hospitalized as well in the [facility]. Today he was too weak to get up so his neighbor helped him get up and he was transported here. X-ray shows possible pneumonia. Treating as community-acquired. Stable on 2 L nasal cannula. 
Inpatient Physical Therapy Treatment    Unit: [facility]  Date:  1/4/2024  Patient Name:    Kareem Hansen  Admitting diagnosis:  MONIQUE (obstructive sleep apnea) [G47.33]  Generalized weakness [R53.1]  Pneumonia due to infectious organism [J18.9]  Pneumonia due to infectious organism, unspecified laterality, unspecified part of lung [J18.9]  Community acquired pneumonia due to Chlamydia species [J16.0]  Admit Date:  12/26/2023  Precautions/Restrictions/WB Status/ Lines/ Wounds/ Oxygen: Fall risk, Bed/chair alarm, Lines (IV and Supplemental O2 (2L)), Telemetry, Continuous pulse oximetry, and Isolation Precautions: Contact    Pt seen for cotreatment this date due to patient safety, patient endurance, acute illness/injury, and need for the assistance of 2 skilled therapists    Treatment Time:  1324 - 4253  Treatment Number:  5   Timed Code Treatment Minutes: 39 minutes  Total Treatment Minutes:  39 minutes    Patient Stated Goals for Therapy: \" get stronger \"          Discharge Recommendations: SNF  DME needs for discharge: Defer to facility       Therapy recommendation for EMS Transport: requires transport by cot due to pt needs lift equipment for safe transfers and pt needs A x 2 for safe transfers    Therapy recommendations for staff:   Assist of 2 for transfers with use of MAXI-Move to/from BSC  to/from chair    History of Present Illness:   Chief Complaint: Generalized weakness  Kareem Hansen is a 73 y.o. male with pmh of diabetes hypertension A-fib MONIQUE who presents with generalized weakness for the last few days and is short of breath. He has not been using his CPAP at night because he wants to be able to hear his phone because his wife is currently hospitalized as well in the [facility] this [facility]. Today he was too weak to get up so his neighbor helped him get up and he was transported here. X-ray shows possible pneumonia. Treating as community-acquired. Stable on 2 L nasal cannula. COVID and flu negative.   
Inpatient Physical Therapy Treatment    Unit: [facility]  Date:  1/2/2024  Patient Name:    Kareem Hansen  Admitting diagnosis:  MONIQUE (obstructive sleep apnea) [G47.33]  Generalized weakness [R53.1]  Pneumonia due to infectious organism [J18.9]  Pneumonia due to infectious organism, unspecified laterality, unspecified part of lung [J18.9]  Community acquired pneumonia due to Chlamydia species [J16.0]  Admit Date:  12/26/2023  Precautions/Restrictions/WB Status/ Lines/ Wounds/ Oxygen: Fall risk, Bed/chair alarm, Lines (IV and Supplemental O2 (2L)), Telemetry, Continuous pulse oximetry, and Isolation Precautions: Contact    Pt seen for cotreatment this date due to patient safety, patient endurance, acute illness/injury, and need for the assistance of 2 skilled therapists    Treatment Time:  15:30-16:00  Treatment Number:  4   Timed Code Treatment Minutes: 30 minutes  Total Treatment Minutes:  30 minutes    Patient Stated Goals for Therapy: \" get stronger \"          Discharge Recommendations: SNF  DME needs for discharge: Defer to facility       Therapy recommendation for EMS Transport: requires transport by cot due to pt needs lift equipment for safe transfers and pt needs A x 2 for safe transfers    Therapy recommendations for staff:   Assist of 2 for transfers with use of JABARI STEDY and gait belt to/from BSC  to/from chair    History of Present Illness:   Chief Complaint: Generalized weakness  Kareem Hansen is a 73 y.o. male with pmh of diabetes hypertension A-fib MONIQUE who presents with generalized weakness for the last few days and is short of breath. He has not been using his CPAP at night because he wants to be able to hear his phone because his wife is currently hospitalized as well in the [facility] hospital. Today he was too weak to get up so his neighbor helped him get up and he was transported here. X-ray shows possible pneumonia. Treating as community-acquired. Stable on 2 L nasal cannula. COVID and 
Inpatient Physical Therapy Treatment    Unit: [facility]  Date:  1/7/2024  Patient Name:    Kareem Hansen  Admitting diagnosis:  MONIQUE (obstructive sleep apnea) [G47.33]  Generalized weakness [R53.1]  Pneumonia due to infectious organism [J18.9]  Pneumonia due to infectious organism, unspecified laterality, unspecified part of lung [J18.9]  Community acquired pneumonia due to Chlamydia species [J16.0]  Admit Date:  12/26/2023  Precautions/Restrictions/WB Status/ Lines/ Wounds/ Oxygen: Fall risk, Bed/chair alarm, Lines (IV), Telemetry, Continuous pulse oximetry, and Isolation Precautions: Contact    Pt seen for cotreatment this date due to patient safety, patient endurance, acute illness/injury, and need for the assistance of 2 skilled therapists    Treatment Time:  09:45-10:15  Treatment Number:  7   Timed Code Treatment Minutes: 30 minutes  Total Treatment Minutes:  30 minutes    Patient Stated Goals for Therapy: \" get stronger \"          Discharge Recommendations: SNF  DME needs for discharge: Defer to facility       Therapy recommendation for EMS Transport: requires transport by cot due to pt needs lift equipment for safe transfers and pt needs A x 2 for safe transfers    Therapy recommendations for staff:   Assist of 2 for transfers with use of JABARI STEDY to/from BSC  to/from chair. MAXIMOVE if fatigued.    History of Present Illness:   Chief Complaint: Generalized weakness  Kareem Hansen is a 73 y.o. male with pmh of diabetes hypertension A-fib MONIQUE who presents with generalized weakness for the last few days and is short of breath. He has not been using his CPAP at night because he wants to be able to hear his phone because his wife is currently hospitalized as well in the [facility]. Today he was too weak to get up so his neighbor helped him get up and he was transported here. X-ray shows possible pneumonia. Treating as community-acquired. Stable on 2 L nasal cannula. COVID and flu negative.   
MD notified of runs of trigeminy and bigeminy. Pt VS normal and Pt is stable.   
Morning med given. PRN Tramadol given for left knee pain. IV Abx infusing. Patient repositioned for comfort.Bed alarm in place. Call light and bedside table within reach.   
Morning med given. PRN Tramadol given for left shoulder and elbow pain. Patient repositioned for comfort. No other needs noted at this time. Bed alarm in place. Call light and bedside table within reach.   
Nutrition Assessment     Type and Reason for Visit: Initial    Nutrition Recommendations/Plan:   Continue Reg, 2 gr Na Diet        Nutrition Assessment:  Pt. adequately nourished on admit . Not  At risk for further nutrition compromise.  Will not follow.   Consult RD if needs change.  Nutrition Related Findings:   A & O x 4; eating well Wound Type: None    Current Nutrition Therapies:    ADULT DIET; Regular; Low Sodium (2 gm)    Anthropometric Measures:  Height: 185.4 cm (6' 0.99\")  Current Body Wt: 125.4 kg (276 lb 7.3 oz)   BMI: 36.5    Nutrition Interventions:   Food and/or Nutrient Delivery: Continue Current Diet  Nutrition Education/Counseling: No recommendation at this time                Shayla Sanchez RD, LD  Contact: [phone]    
Only able to draw one set of blood cultures with ultrasound.  Patient will need Midline if loses peripheral iv lines.    
PRN tramadol given for shoulder and knee pain. See MAR for administration details  
PRN tramadol given for shoulder pain. See MAR for administration details  
PROGRESS NOTE    Admit Date:  12/26/2023    Subjective:  73 y.o. male who is seen for evaluation of lower extremity wounds and cellulitis of the lower legs. Patient is well known to me from the [facility].    States feels much better today. Strength is still down but feels good.       Past Medical History:        Diagnosis Date    Allergic rhinitis     Arthritis     Bladder fistula     resolved    C. difficile diarrhea 08/18/2015    +PCR    Cellulitis of right lower extremity 03/23/2016    CHF (congestive heart failure) ([facility])     EF 55% 5/23    Dental disease     Diabetes ([facility])     Diverticulitis     Dizziness     DVT of lower extremity, bilateral ([facility]) 10/16/2013    GERD (gastroesophageal reflux disease)     Headache     Hearing loss     Hematuria 01/02/2014    Hx of blood clots     Hyperlipidemia     Hypertension     Lung disease     MONIQUE (obstructive sleep apnea)     Pancreatitis ([facility]) 08/24/2013    Pulmonary emboli ([facility]) 2013    after cholecystectomy     Rash     Sleep apnea     Tinnitus        Past Surgical History:        Procedure Laterality Date    ABDOMEN SURGERY  05/16/2014    reveseral end peristomal hernia repair.    CARDIOVERSION  12/04/2019    Dr. Nava    CHOLECYSTECTOMY, OPEN N/A 9-17-13    LAPAROSCOPIC CONVERTED TO OPEN CHOLECYSTECTOMY WITH    COLON SURGERY      COLONOSCOPY  2008    COLONOSCOPY  12/4/2013    Severe Diverticulosis unable to finish    COLONOSCOPY  4/30/14    diverticula-10 year f/u    COLOSTOMY  Jan 2014    CYSTOSCOPY  12/10/13    with bladder biopsy    CYSTOSCOPY  1-28-14    Cystourethroscopy, left ureteral catheter     EPIDURAL STEROID INJECTION Right 1/21/2019    RIGHT LUMBAR TWO THREE EPIDURAL STEROID INJECTION SITE CONFIRMED BY FLUROOSCOPY performed by DAVID Vasquez MD at [facility] OR    EPIDURAL STEROID INJECTION Right 2/11/2019    RIGHT LUMBAR TWO THREE EPIDURAL STEROID INJECTION SITE CONFIRMED BY FLUOROSCOPY performed by DAVID Vasquez MD at [facility] OR    HERNIA 
Patient complaining of increased pain in left shoulder. Repositioned offered and patient denied. PRN tramadol not available until 1753. Amanda IVERSON notified and she said she would assess patient and decide a new pain management plan.   
Patient oriented and dependent. Patient is unable to move in the bed, x2 change, brief on. Patient is states he is from home, but too ill to return. Medical assistance at home with a visiting wound care nurse and wife is currently hospitalized as well.   
Patient placed on cpap 12 30% for the hs  
Patient rating pain 9/10 PRN pain medication given; Karen  
Patient refused CPAP tonight. Stated its to late to go on at this time.   
Patient requested medication for pain in his back. Hospitalist messaged x2, awaiting response  
Patient requested to take off cpap at this time. Hooked patient to INO2 at 2 lpm via nasal cannula.  
Prevention  dressing applied to bridge of nose and other facial bony prominences upon BiPAP/CPAP initiation.  Consulted RN regarding the assessment of skin integrity.     
Progress Note    Admit Date:  12/26/2023      Acute respiratory failure   Sepsis   Suspected right foot/toe cellulitis   CAP  DC planning to SNF pending percert    Subjective:  Mr. Hansen seen, a bit frustrated he is still waiting because he would really like to go do more therapy.   Hopeful DC to SNF soon    Objective:   Patient Vitals for the past 4 hrs:   BP Temp Temp src Pulse Resp SpO2   01/08/24 1528 120/72 98.4 °F (36.9 °C) Oral 75 18 93 %            Intake/Output Summary (Last 24 hours) at 1/8/2024 1705  Last data filed at 1/8/2024 1531  Gross per 24 hour   Intake 240 ml   Output 300 ml   Net -60 ml         Physical Exam:    Gen: No distress. Alert. +Chronically ill appearing male   Eyes: PERRL. No sclera icterus. No conjunctival injection.   Neck: No JVD.  Trachea midline.  Resp: No accessory muscle use. No crackles. No wheezes. No rhonchi. +Diminished breath sounds   CV: +irregularly irregular rate and rhythm No murmur.  No rub. +bilateral lower extremity edema.   Peripheral Pulses: +2 palpable, equal bilaterally   GI: Non-tender. Non-distended.  Normal bowel sounds.  Skin:  chronic pedal edema with erythema   M/S: No cyanosis. No joint deformity. No clubbing. +chronic left shoulder pain and limited ROM  Neuro: Awake. Grossly nonfocal, CN II-XII grossly intact, no dysarthria, no unilateral weakness (chronic bilateral hand reduced sensation)    Psych: Oriented x 3. No anxiety or agitation.       Scheduled Meds:   docusate sodium  100 mg Oral Daily    insulin glargine  12 Units SubCUTAneous Nightly    mineral oil-hydrophilic petrolatum   Topical Daily    insulin lispro  0-8 Units SubCUTAneous TID WC    insulin lispro  0-4 Units SubCUTAneous Nightly    spironolactone  25 mg Oral Daily    [Held by provider] torsemide  20 mg Oral Daily    sodium chloride flush  5-40 mL IntraVENous 2 times per day    apixaban  5 mg Oral BID    aspirin  81 mg Oral Daily    atorvastatin  40 mg Oral Nightly    montelukast  10 
Progress Note    Admit Date:  12/26/2023      Acute respiratory failure   Sepsis   Suspected right foot/toe cellulitis   CAP  DC planning to SNF pending percert    Subjective:  Mr. Hatchumbacher seen, no complaints at this time,   No new issues  Remains on RA  Good diuresis with lasix    Hopeful DC to SNF soon    Objective:   Patient Vitals for the past 4 hrs:   BP Temp Temp src Pulse Resp SpO2 Weight   01/07/24 0515 121/84 98.9 °F (37.2 °C) Oral 84 18 96 % --   01/07/24 0423 -- -- -- -- -- -- 119.3 kg (263 lb)            Intake/Output Summary (Last 24 hours) at 1/7/2024 0730  Last data filed at 1/6/2024 2304  Gross per 24 hour   Intake --   Output 200 ml   Net -200 ml         Physical Exam:    Gen: No distress. Alert. +Chronically ill appearing male   Eyes: PERRL. No sclera icterus. No conjunctival injection.   Neck: No JVD.  Trachea midline.  Resp: No accessory muscle use. No crackles. No wheezes. No rhonchi. +Diminished breath sounds   CV: +irregularly irregular rate and rhythm No murmur.  No rub. +bilateral lower extremity edema.   Peripheral Pulses: +2 palpable, equal bilaterally   GI: Non-tender. Non-distended.  Normal bowel sounds.  Skin:  chronic pedal edema with erythema   M/S: No cyanosis. No joint deformity. No clubbing. +chronic left shoulder pain and limited ROM  Neuro: Awake. Grossly nonfocal, CN II-XII grossly intact, no dysarthria, no unilateral weakness (chronic bilateral hand reduced sensation)    Psych: Oriented x 3. No anxiety or agitation.       Scheduled Meds:   insulin glargine  12 Units SubCUTAneous Nightly    mineral oil-hydrophilic petrolatum   Topical Daily    insulin lispro  0-8 Units SubCUTAneous TID WC    insulin lispro  0-4 Units SubCUTAneous Nightly    spironolactone  25 mg Oral Daily    [Held by provider] torsemide  20 mg Oral Daily    sodium chloride flush  5-40 mL IntraVENous 2 times per day    apixaban  5 mg Oral BID    aspirin  81 mg Oral Daily    atorvastatin  40 mg Oral Nightly    
Progress Note    Admit Date:  12/26/2023    Acute respiratory failure   Sepsis   Suspected right foot/toe cellulitis   CAP    Subjective:  Mr. Hansen today is seen in bed feels he is improving.     Objective:   Patient Vitals for the past 4 hrs:   Resp   01/01/24 1855 16   01/01/24 1825 16          Intake/Output Summary (Last 24 hours) at 1/1/2024 1916  Last data filed at 1/1/2024 1259  Gross per 24 hour   Intake 2232.06 ml   Output 1100 ml   Net 1132.06 ml         Physical Exam:    Gen: No distress. Alert. +Chronically ill appearing male   Eyes: PERRL. No sclera icterus. No conjunctival injection.   Neck: No JVD.  Trachea midline.  Resp: No accessory muscle use. No crackles. No wheezes. No rhonchi. +Diminished breath sounds   CV: +irregularly irregular rate and rhythm No murmur.  No rub. +bilateral lower extremity edema.   Peripheral Pulses: +2 palpable, equal bilaterally   GI: Non-tender. Non-distended.  Normal bowel sounds.  Skin: Warm and dry. No nodule on exposed extremities. Chronic venous stasis changes. No erythema near   M/S: No cyanosis. No joint deformity. No clubbing. +chronic left shoulder pain and limited ROM  Neuro: Awake. Grossly nonfocal, CN II-XII grossly intact, no dysarthria, no unilateral weakness (chronic bilateral hand reduced sensation)    Psych: Oriented x 3. No anxiety or agitation.       Medications:  vancomycin, 1,500 mg, Q12H  spironolactone, 25 mg, Daily  torsemide, 20 mg, Daily  sodium chloride flush, 5-40 mL, 2 times per day  apixaban, 5 mg, BID  aspirin, 81 mg, Daily  atorvastatin, 40 mg, Nightly  [Held by provider] insulin glargine, 28 Units, BID  montelukast, 10 mg, Daily  pantoprazole, 40 mg, QAM AC  sacubitril-valsartan, 1 tablet, BID  insulin lispro, 0-4 Units, TID WC  insulin lispro, 0-4 Units, Nightly  isosorbide mononitrate, 30 mg, Daily  cefepime, 2,000 mg, Q12H  albuterol sulfate HFA, 2 puff, BID RT      PRN Medications:  sodium chloride flush, 5-40 mL, PRN  sodium 
Progress Note    Admit Date:  12/26/2023    Acute respiratory failure   Sepsis   Suspected right foot/toe cellulitis   CAP    Subjective:  Mr. Hansen today is seen in bed feels ok today.     Objective:   No data found.       Intake/Output Summary (Last 24 hours) at 1/2/2024 1922  Last data filed at 1/2/2024 1454  Gross per 24 hour   Intake 540 ml   Output 3200 ml   Net -2660 ml         Physical Exam:    Gen: No distress. Alert. +Chronically ill appearing male   Eyes: PERRL. No sclera icterus. No conjunctival injection.   Neck: No JVD.  Trachea midline.  Resp: No accessory muscle use. No crackles. No wheezes. No rhonchi. +Diminished breath sounds   CV: +irregularly irregular rate and rhythm No murmur.  No rub. +bilateral lower extremity edema.   Peripheral Pulses: +2 palpable, equal bilaterally   GI: Non-tender. Non-distended.  Normal bowel sounds.  Skin: Warm and dry. No nodule on exposed extremities. Chronic venous stasis changes. No erythema near   M/S: No cyanosis. No joint deformity. No clubbing. +chronic left shoulder pain and limited ROM  Neuro: Awake. Grossly nonfocal, CN II-XII grossly intact, no dysarthria, no unilateral weakness (chronic bilateral hand reduced sensation)    Psych: Oriented x 3. No anxiety or agitation.       Medications:  spironolactone, 25 mg, Daily  torsemide, 20 mg, Daily  sodium chloride flush, 5-40 mL, 2 times per day  apixaban, 5 mg, BID  aspirin, 81 mg, Daily  atorvastatin, 40 mg, Nightly  [Held by provider] insulin glargine, 28 Units, BID  montelukast, 10 mg, Daily  pantoprazole, 40 mg, QAM AC  sacubitril-valsartan, 1 tablet, BID  insulin lispro, 0-4 Units, TID WC  insulin lispro, 0-4 Units, Nightly  isosorbide mononitrate, 30 mg, Daily  albuterol sulfate HFA, 2 puff, BID RT      PRN Medications:  sodium chloride flush, 5-40 mL, PRN  sodium chloride, , PRN  magnesium sulfate, 2,000 mg, PRN  ondansetron, 4 mg, Q8H PRN   Or  ondansetron, 4 mg, Q6H PRN  polyethylene glycol, 17 g, 
Progress Note    Admit Date:  12/26/2023    Acute respiratory failure   Sepsis   Suspected right foot/toe cellulitis   CAP    Subjective:  Mr. Hansen today is seen in bed getting care.     Objective:   Patient Vitals for the past 4 hrs:   BP Temp Temp src Pulse Resp SpO2 Weight   12/30/23 0815 (!) 157/87 97.6 °F (36.4 °C) Oral 84 16 94 % --   12/30/23 0801 -- -- -- -- -- 92 % --   12/30/23 0733 -- -- -- -- 16 -- --   12/30/23 0530 139/75 97.9 °F (36.6 °C) Oral 73 18 96 % 123.5 kg (272 lb 4.8 oz)        No intake or output data in the 24 hours ending 12/30/23 0853      Physical Exam:    Gen: No distress. Alert. +Chronically ill appearing male   Eyes: PERRL. No sclera icterus. No conjunctival injection.   Neck: No JVD.  Trachea midline.  Resp: No accessory muscle use. No crackles. No wheezes. No rhonchi. +Diminished breath sounds   CV: +irregularly irregular rate and rhythm No murmur.  No rub. +bilateral lower extremity edema.   Peripheral Pulses: +2 palpable, equal bilaterally   GI: Non-tender. Non-distended.  Normal bowel sounds.  Skin: Warm and dry. No nodule on exposed extremities. Chronic venous stasis changes. No erythema near   M/S: No cyanosis. No joint deformity. No clubbing. +chronic left shoulder pain and limited ROM  Neuro: Awake. Grossly nonfocal, CN II-XII grossly intact, no dysarthria, no unilateral weakness (chronic bilateral hand reduced sensation)    Psych: Oriented x 3. No anxiety or agitation.       Medications:  vancomycin, 1,500 mg, Q12H  spironolactone, 25 mg, Daily  torsemide, 20 mg, Daily  sodium chloride flush, 5-40 mL, 2 times per day  apixaban, 5 mg, BID  aspirin, 81 mg, Daily  atorvastatin, 40 mg, Nightly  [Held by provider] insulin glargine, 28 Units, BID  montelukast, 10 mg, Daily  pantoprazole, 40 mg, QAM AC  sacubitril-valsartan, 1 tablet, BID  insulin lispro, 0-4 Units, TID WC  insulin lispro, 0-4 Units, Nightly  isosorbide mononitrate, 30 mg, Daily  cefepime, 2,000 mg, 
Progress Note    Admit Date:  12/26/2023    Acute respiratory failure   Sepsis   Suspected right foot/toe cellulitis   CAP    Subjective:  Mr. Hansen today is seen resting in bed. Only complaint is feeling very weak all over.     Objective:   Patient Vitals for the past 4 hrs:   BP Temp Temp src Pulse Resp   12/27/23 0830 (!) 170/91 98.3 °F (36.8 °C) Oral 94 17          Intake/Output Summary (Last 24 hours) at 12/27/2023 1156  Last data filed at 12/27/2023 0330  Gross per 24 hour   Intake --   Output 0 ml   Net 0 ml       Physical Exam:    Gen: No distress. Alert. +Chronically ill appearing male   Eyes: PERRL. No sclera icterus. No conjunctival injection.   Neck: No JVD.  Trachea midline.  Resp: No accessory muscle use. No crackles. No wheezes. No rhonchi. +Diminished breath sounds   CV: +irregularly irregular rate and rhythm No murmur.  No rub. +bilateral lower extremity edema.   Peripheral Pulses: +2 palpable, equal bilaterally   GI: Non-tender. Non-distended.  Normal bowel sounds.  Skin: Warm and dry. No nodule on exposed extremities. ++bilateral lower extremity erythema, warmth and edema extending up into the groin region, macerated skin, right great toe with violaceous color and significant swelling   M/S: No cyanosis. No joint deformity. No clubbing. +chronic left shoulder pain and limited ROM  Neuro: Awake. Grossly nonfocal, CN II-XII grossly intact, no dysarthria, no unilateral weakness (chronic bilateral hand reduced sensation)    Psych: Oriented x 3. No anxiety or agitation.         Medications:  sodium chloride flush, 5-40 mL, 2 times per day  apixaban, 5 mg, BID  aspirin, 81 mg, Daily  atorvastatin, 40 mg, Nightly  insulin glargine, 28 Units, BID  montelukast, 10 mg, Daily  pantoprazole, 40 mg, QAM AC  sacubitril-valsartan, 1 tablet, BID      PRN Medications:  sodium chloride flush, 5-40 mL, PRN  sodium chloride, , PRN  potassium chloride, 40 mEq, PRN   Or  potassium alternative oral replacement, 40 
Progress Note    Admit Date:  12/26/2023    Acute respiratory failure   Sepsis   Suspected right foot/toe cellulitis   CAP    Subjective:  Mr. Hansen today is seen up in bed. He feels ok. He would like to have his legs wrapped as he does at home to help mobilize fluid. Discussed home medications.    Objective:   Patient Vitals for the past 4 hrs:   BP Temp Temp src Pulse Resp SpO2   12/29/23 1435 -- -- -- -- 16 --   12/29/23 1400 121/68 98.2 °F (36.8 °C) Oral 82 16 98 %          No intake or output data in the 24 hours ending 12/29/23 1446      Physical Exam:    Gen: No distress. Alert. +Chronically ill appearing male   Eyes: PERRL. No sclera icterus. No conjunctival injection.   Neck: No JVD.  Trachea midline.  Resp: No accessory muscle use. No crackles. No wheezes. No rhonchi. +Diminished breath sounds   CV: +irregularly irregular rate and rhythm No murmur.  No rub. +bilateral lower extremity edema.   Peripheral Pulses: +2 palpable, equal bilaterally   GI: Non-tender. Non-distended.  Normal bowel sounds.  Skin: Warm and dry. No nodule on exposed extremities. Chronic venous stasis changes. No erythema near   M/S: No cyanosis. No joint deformity. No clubbing. +chronic left shoulder pain and limited ROM  Neuro: Awake. Grossly nonfocal, CN II-XII grossly intact, no dysarthria, no unilateral weakness (chronic bilateral hand reduced sensation)    Psych: Oriented x 3. No anxiety or agitation.       Medications:  vancomycin, 1,500 mg, Q12H  spironolactone, 25 mg, Daily  [START ON 12/30/2023] torsemide, 20 mg, Daily  sodium chloride flush, 5-40 mL, 2 times per day  apixaban, 5 mg, BID  aspirin, 81 mg, Daily  atorvastatin, 40 mg, Nightly  [Held by provider] insulin glargine, 28 Units, BID  montelukast, 10 mg, Daily  pantoprazole, 40 mg, QAM AC  sacubitril-valsartan, 1 tablet, BID  insulin lispro, 0-4 Units, TID WC  insulin lispro, 0-4 Units, Nightly  isosorbide mononitrate, 30 mg, Daily  cefepime, 2,000 mg, 
Progress Note    Admit Date:  12/26/2023    Acute respiratory failure   Sepsis   Suspected right foot/toe cellulitis   CAP    Subjective:  Mr. Hansen today is seen up in chair. He feels better today.     Objective:   Patient Vitals for the past 4 hrs:   BP Temp Temp src Pulse Resp SpO2   12/28/23 1430 (!) 145/82 98.8 °F (37.1 °C) Oral 62 18 95 %          No intake or output data in the 24 hours ending 12/28/23 6621      Physical Exam:    Gen: No distress. Alert. +Chronically ill appearing male   Eyes: PERRL. No sclera icterus. No conjunctival injection.   Neck: No JVD.  Trachea midline.  Resp: No accessory muscle use. No crackles. No wheezes. No rhonchi. +Diminished breath sounds   CV: +irregularly irregular rate and rhythm No murmur.  No rub. +bilateral lower extremity edema.   Peripheral Pulses: +2 palpable, equal bilaterally   GI: Non-tender. Non-distended.  Normal bowel sounds.  Skin: Warm and dry. No nodule on exposed extremities. Chronic venous stasis changes. No erythema near   M/S: No cyanosis. No joint deformity. No clubbing. +chronic left shoulder pain and limited ROM  Neuro: Awake. Grossly nonfocal, CN II-XII grossly intact, no dysarthria, no unilateral weakness (chronic bilateral hand reduced sensation)    Psych: Oriented x 3. No anxiety or agitation.       Medications:  sodium chloride flush, 5-40 mL, 2 times per day  apixaban, 5 mg, BID  aspirin, 81 mg, Daily  atorvastatin, 40 mg, Nightly  insulin glargine, 28 Units, BID  montelukast, 10 mg, Daily  pantoprazole, 40 mg, QAM AC  sacubitril-valsartan, 1 tablet, BID  insulin lispro, 0-4 Units, TID WC  insulin lispro, 0-4 Units, Nightly  isosorbide mononitrate, 30 mg, Daily  torsemide, 20 mg, Daily  cefepime, 2,000 mg, Q12H  vancomycin, 1,250 mg, Q12H  albuterol sulfate HFA, 2 puff, BID RT      PRN Medications:  sodium chloride flush, 5-40 mL, PRN  sodium chloride, , PRN  potassium chloride, 40 mEq, PRN   Or  potassium alternative oral replacement, 40 
Pt awake in bed.  Denies needs. Call light within reach.    
Pt medicated for pain 7/10 in bilateral lower extremities.  Medicated with PRN tramadol.  See MAR and pain flow sheet.  No further assistance needed at this time.  
Pt medicated for pain 8/10 in bilateral lower extremities.  Medicated with PRN Tramadol.  See MAR and pain flow sheet.  No further assistance needed at this time. Will continue to monitor.  
Pt refused dressing changes at this time.   
Pt requesting pain medication for abdominal pain rating 7/10. PRN  pain medication given, see MAR. SR up x2, Call light and bedside table in easy reach.  Denies any other needs at this time.      
Pt resting as manifested by eyes closed in dark room. Respirations even and easy. Call light and bedside table in reach. Bed in low locked position. Denies any needs at this time.     
Pt resting as manifested by eyes closed in dark room. Respirations even and easy. Call light and bedside table in reach. Bed in low locked position. Denies any needs at this time.     
Pt resting in bed with eyes closed. Pt resp even and unlabored. Pt shows no s/s of distress. Pt bed is in the lowest position, bed alarm is on, and call light is within reach.    
RT Inhaler-Nebulizer Bronchodilator Protocol Note    There is a bronchodilator order in the chart from a provider indicating to follow the RT Bronchodilator Protocol and there is an “Initiate RT Inhaler-Nebulizer Bronchodilator Protocol” order as well (see protocol at bottom of note).    CXR Findings:  No results found.    The findings from the last RT Protocol Assessment were as follows:   History Pulmonary Disease: (P) Chronic pulmonary disease  Respiratory Pattern: (P) Dyspnea on exertion or RR 21-25 bpm  Breath Sounds: (P) Slightly diminished and/or crackles  Cough: (P) Strong, spontaneous, non-productive  Indication for Bronchodilator Therapy: (P) Decreased or absent breath sounds  Bronchodilator Assessment Score: (P) 6    Aerosolized bronchodilator medication orders have been revised according to the RT Inhaler-Nebulizer Bronchodilator Protocol below.    Respiratory Therapist to perform RT Therapy Protocol Assessment initially then follow the protocol.  Repeat RT Therapy Protocol Assessment PRN for score 0-3 or on second treatment, BID, and PRN for scores above 3.    No Indications - adjust the frequency to every 6 hours PRN wheezing or bronchospasm, if no treatments needed after 48 hours then discontinue using Per Protocol order mode.     If indication present, adjust the RT bronchodilator orders based on the Bronchodilator Assessment Score as indicated below.  Use Inhaler orders unless patient has one or more of the following: on home nebulizer, not able to hold breath for 10 seconds, is not alert and oriented, cannot activate and use MDI correctly, or respiratory rate 25 breaths per minute or more, then use the equivalent nebulizer order(s) with same Frequency and PRN reasons based on the score.  If a patient is on this medication at home then do not decrease Frequency below that used at home.    0-3 - enter or revise RT bronchodilator order(s) to equivalent RT Bronchodilator order with Frequency of every 4 
RT Inhaler-Nebulizer Bronchodilator Protocol Note    There is a bronchodilator order in the chart from a provider indicating to follow the RT Bronchodilator Protocol and there is an “Initiate RT Inhaler-Nebulizer Bronchodilator Protocol” order as well (see protocol at bottom of note).    CXR Findings:  No results found.    The findings from the last RT Protocol Assessment were as follows:   History Pulmonary Disease: Chronic pulmonary disease  Respiratory Pattern: Dyspnea on exertion or RR 21-25 bpm  Breath Sounds: Clear breath sounds  Cough: Strong, spontaneous, non-productive  Indication for Bronchodilator Therapy: Decreased or absent breath sounds  Bronchodilator Assessment Score: 4    Aerosolized bronchodilator medication orders have been revised according to the RT Inhaler-Nebulizer Bronchodilator Protocol below.    Respiratory Therapist to perform RT Therapy Protocol Assessment initially then follow the protocol.  Repeat RT Therapy Protocol Assessment PRN for score 0-3 or on second treatment, BID, and PRN for scores above 3.    No Indications - adjust the frequency to every 6 hours PRN wheezing or bronchospasm, if no treatments needed after 48 hours then discontinue using Per Protocol order mode.     If indication present, adjust the RT bronchodilator orders based on the Bronchodilator Assessment Score as indicated below.  Use Inhaler orders unless patient has one or more of the following: on home nebulizer, not able to hold breath for 10 seconds, is not alert and oriented, cannot activate and use MDI correctly, or respiratory rate 25 breaths per minute or more, then use the equivalent nebulizer order(s) with same Frequency and PRN reasons based on the score.  If a patient is on this medication at home then do not decrease Frequency below that used at home.    0-3 - enter or revise RT bronchodilator order(s) to equivalent RT Bronchodilator order with Frequency of every 4 hours PRN for wheezing or increased 
RT Inhaler-Nebulizer Bronchodilator Protocol Note    There is a bronchodilator order in the chart from a provider indicating to follow the RT Bronchodilator Protocol and there is an “Initiate RT Inhaler-Nebulizer Bronchodilator Protocol” order as well (see protocol at bottom of note).    CXR Findings:  No results found.    The findings from the last RT Protocol Assessment were as follows:   History Pulmonary Disease: Chronic pulmonary disease  Respiratory Pattern: Dyspnea on exertion or RR 21-25 bpm  Breath Sounds: Slightly diminished and/or crackles  Cough: Strong, spontaneous, non-productive  Indication for Bronchodilator Therapy: Decreased or absent breath sounds  Bronchodilator Assessment Score: 6    Aerosolized bronchodilator medication orders have been revised according to the RT Inhaler-Nebulizer Bronchodilator Protocol below.    Respiratory Therapist to perform RT Therapy Protocol Assessment initially then follow the protocol.  Repeat RT Therapy Protocol Assessment PRN for score 0-3 or on second treatment, BID, and PRN for scores above 3.    No Indications - adjust the frequency to every 6 hours PRN wheezing or bronchospasm, if no treatments needed after 48 hours then discontinue using Per Protocol order mode.     If indication present, adjust the RT bronchodilator orders based on the Bronchodilator Assessment Score as indicated below.  Use Inhaler orders unless patient has one or more of the following: on home nebulizer, not able to hold breath for 10 seconds, is not alert and oriented, cannot activate and use MDI correctly, or respiratory rate 25 breaths per minute or more, then use the equivalent nebulizer order(s) with same Frequency and PRN reasons based on the score.  If a patient is on this medication at home then do not decrease Frequency below that used at home.    0-3 - enter or revise RT bronchodilator order(s) to equivalent RT Bronchodilator order with Frequency of every 4 hours PRN for wheezing 
RT Inhaler-Nebulizer Bronchodilator Protocol Note    There is a bronchodilator order in the chart from a provider indicating to follow the RT Bronchodilator Protocol and there is an “Initiate RT Inhaler-Nebulizer Bronchodilator Protocol” order as well (see protocol at bottom of note).    CXR Findings:  XR CHEST PORTABLE    Result Date: 12/26/2023  Cardiomegaly and large hiatal hernia. Perihilar congestion but no definitive pulmonary edema. Bilateral lower lobe atelectatic changes worse on the left.       The findings from the last RT Protocol Assessment were as follows:   History Pulmonary Disease: Chronic pulmonary disease  Respiratory Pattern: Dyspnea on exertion or RR 21-25 bpm  Breath Sounds: Slightly diminished and/or crackles  Cough: Strong, spontaneous, non-productive  Indication for Bronchodilator Therapy: Decreased or absent breath sounds  Bronchodilator Assessment Score: 6    Aerosolized bronchodilator medication orders have been revised according to the RT Inhaler-Nebulizer Bronchodilator Protocol below.    Respiratory Therapist to perform RT Therapy Protocol Assessment initially then follow the protocol.  Repeat RT Therapy Protocol Assessment PRN for score 0-3 or on second treatment, BID, and PRN for scores above 3.    No Indications - adjust the frequency to every 6 hours PRN wheezing or bronchospasm, if no treatments needed after 48 hours then discontinue using Per Protocol order mode.     If indication present, adjust the RT bronchodilator orders based on the Bronchodilator Assessment Score as indicated below.  Use Inhaler orders unless patient has one or more of the following: on home nebulizer, not able to hold breath for 10 seconds, is not alert and oriented, cannot activate and use MDI correctly, or respiratory rate 25 breaths per minute or more, then use the equivalent nebulizer order(s) with same Frequency and PRN reasons based on the score.  If a patient is on this medication at home then do 
RT Inhaler-Nebulizer Bronchodilator Protocol Note    There is a bronchodilator order in the chart from a provider indicating to follow the RT Bronchodilator Protocol and there is an “Initiate RT Inhaler-Nebulizer Bronchodilator Protocol” order as well (see protocol at bottom of note).    CXR Findings:  XR CHEST PORTABLE    Result Date: 12/28/2023  Cardiomegaly and large hiatal hernia. Perihilar congestion but no definitive pulmonary edema. Bilateral lower lobe atelectatic changes worse on the left.       The findings from the last RT Protocol Assessment were as follows:   History Pulmonary Disease: Chronic pulmonary disease  Respiratory Pattern: Dyspnea on exertion or RR 21-25 bpm  Breath Sounds: Slightly diminished and/or crackles  Cough: Strong, spontaneous, non-productive  Indication for Bronchodilator Therapy: Decreased or absent breath sounds  Bronchodilator Assessment Score: 6    Aerosolized bronchodilator medication orders have been revised according to the RT Inhaler-Nebulizer Bronchodilator Protocol below.    Respiratory Therapist to perform RT Therapy Protocol Assessment initially then follow the protocol.  Repeat RT Therapy Protocol Assessment PRN for score 0-3 or on second treatment, BID, and PRN for scores above 3.    No Indications - adjust the frequency to every 6 hours PRN wheezing or bronchospasm, if no treatments needed after 48 hours then discontinue using Per Protocol order mode.     If indication present, adjust the RT bronchodilator orders based on the Bronchodilator Assessment Score as indicated below.  Use Inhaler orders unless patient has one or more of the following: on home nebulizer, not able to hold breath for 10 seconds, is not alert and oriented, cannot activate and use MDI correctly, or respiratory rate 25 breaths per minute or more, then use the equivalent nebulizer order(s) with same Frequency and PRN reasons based on the score.  If a patient is on this medication at home then do 
Report called to Yoana BUSH   
Report received at bedside from Kriss RN, Patient awake in bed. Denies any needs at this time.  
Respiratory called for home cpap  
[facility]   Pharmacy Pharmacokinetic Monitoring Service - Vancomycin     Kareem Hansen is a 73 y.o. male starting on vancomycin therapy for SSTI for 7 days. Pharmacy consulted by ZAYRA Lin for monitoring and adjustment.    Target Concentration: Goal AUC/MARGRET 400-600 mg*hr/L    Additional Antimicrobials: cefepime    Pertinent Laboratory Values:   Wt Readings from Last 1 Encounters:   12/27/23 124 kg (273 lb 4.8 oz)     Temp Readings from Last 1 Encounters:   12/27/23 98.3 °F (36.8 °C) (Oral)     Estimated Creatinine Clearance: 98 mL/min (based on SCr of 0.9 mg/dL).  Recent Labs     12/26/23  1606 12/26/23  1726   CREATININE 0.9  --    BUN 24*  --    WBC  --  19.0*     Procalcitonin: 0.56    Pertinent Cultures:  Culture Date Source Results        MRSA Nasal Swab: N/A. Non-respiratory infection.    Plan:  Dosing recommendations based on Bayesian software  Start vancomycin 1250mg q12h  Anticipated AUC of 494 and trough concentration of 15.1 at steady state  Renal labs as indicated   Vancomycin concentration ordered for 12/28 @ 1300   Pharmacy will continue to monitor patient and adjust therapy as indicated    Thank you for the consult,  Scarlet Morales RPH  12/27/2023 1:20 PM   
Shift assessment complete; see flow sheet.  Scheduled medications administered; See MAR.  IV flushed without difficulty. O2 2 LPM nc in place. Pt c/o back pain 6/10 PRN tramadol given at this time. Pt denies any needs at this time. Pt educated on use of call light and to call out with needs, verbalized understanding, bed in low locked position for pt safety    
Shift assessment complete; see flow sheet.  Scheduled medications administered; See MAR.  IV flushed without difficulty. O2 2 LPM nc in place. Pt c/o shoulder pain 9/10 PRN Tramadol given at this time. Pt denies any needs at this time. Pt educated on use of call light and to call out with needs, verbalized understanding, bed in low locked position for pt safety    
Shift assessment complete; see flow sheet.  Scheduled medications administered; See MAR.  IV infusing without difficulty. O2 2 LPM nc in place. Pt c/o shoulder pain 7/10 PRN tramadol given at this time. Pt denies any needs at this time. Pt educated on use of call light and to call out with needs, verbalized understanding, bed in low locked position, bed alarm on for pt safety    
Shift assessment complete; see flow sheet.  Scheduled medications administered; See MAR.  IV infusing without difficulty. O2 2 LPM nc in place. Pt denies pain at this time. Pt denies any needs at this time. Pt educated on use of call light and to call out with needs, verbalized understanding, bed in low locked position, bed alarm on for pt safety    
Telemetry Assignment Communication Form    Patient has orders for continuous telemetry OR pulse oximeter only orders    To be filled out by Clinical    Patient has Admission or Transfer orders and is assigned to Room Number: 210-1      (Once this top section is completed:  Select \"Route\" and send note to Fax number: [phone]))      ___________________________________________________________________________      To be filled out by CMU    Patient assigned to tele box number: __________________               (to be written in by CMU when telemetry box is assigned to patient)    ___________________________________________________________________________      Bedside RN confirming that the box listed above is in fact the telemetry box number being placed on the patient listed above.        X________________________________________ RN signature        __________________________________________RN assigned to Patient (please print)    _______________ Date    ____________ Time      
Took over care of this pt at this time. Pt in stable condition, Denies any needs, Call light within reach, Bed in low locked position.   
Transfer of care to transporters. Pt left in stable condition with belongings.  
Ultram 50mg po for c/o back and leg pain.  
Ultram 50mg po for c/o knee,leg and shoulder pain rated 8.  
Ultram po for c/o headache rated 8-9.  
Ultram po for c/o shoulder pain.  
VSS, pt remains afebrile. Pt states HA has improved. Pt is resting w/out evidence of distress @ this time.  
Vancomycin Day: 6  Current Regimen: 1500 mg IV every 12 hours    Patient's labs, cultures, vitals, and vancomycin regimen reviewed. No changes today.     
Was informed by CMU that patient had a pause of 2.30. Notified SHAKIRA Romero perfect serve. Ordered for cmp with mag, phos and cbc.     
Weaned to room air, sp02 98%  
Oral BID    isosorbide mononitrate  30 mg Oral Daily    albuterol sulfate HFA  2 puff Inhalation BID RT       Continuous Infusions:   sodium chloride 20 mL (12/27/23 0031)    dextrose         PRN Meds:  traMADol, sodium chloride flush, sodium chloride, magnesium sulfate, ondansetron **OR** ondansetron, polyethylene glycol, glucose, dextrose bolus **OR** dextrose bolus, glucagon (rDNA), dextrose, albuterol sulfate HFA      Data:  CBC:   No results for input(s): \"WBC\", \"HGB\", \"HCT\", \"MCV\", \"PLT\" in the last 72 hours.    BMP:   No results for input(s): \"NA\", \"K\", \"CL\", \"CO2\", \"PHOS\", \"BUN\", \"CREATININE\", \"CA\" in the last 72 hours.        CULTURES  Results       Procedure Component Value Units Date/Time    Blood Culture 2 [[id number]] Collected: 12/27/23 0000    Order Status: Canceled Specimen: Blood     Blood Culture 1 [[id number]] Collected: 12/27/23 0000    Order Status: Canceled Specimen: Blood     Culture, Respiratory [[id number]] Collected: 12/27/23 0000    Order Status: Canceled Specimen: Sputum Expectorated     COVID-19 & Influenza Combo [[id number]] Collected: 12/26/23 1740    Order Status: Completed Specimen: Nasopharyngeal Swab Updated: 12/26/23 1817     SARS-CoV-2 RNA, RT PCR NOT DETECTED     Comment: Not Detected results do not preclude SARS-CoV-2 infection and  should not be used as the sole basis for patient management  decisions.  Results must be combined with clinical observations,  patient history, and epidemiological information.  Testing was performed using EDILBERTO PATEL SARS-CoV-2 and Influenza A/B  nucleic acid assay. This test is a multiplex Real-Time Reverse  Transcriptase Polymerase Chain Reaction (RT-PCR)-based in vitro  diagnostic test intended for the qualitative detection of nucleic  acids from SARS-CoV-2, influenza A, and influenza B in nasopharyngeal  and nasal swab specimens for use under the FDA’s Emergency Use  Authorization (EUA) only.    Patient Fact Sheet:  
Sacubitril/Valsartan-Entresto    Evidenced-Based Beta Blocker is REQUIRED for EF </= 40% SYSTOLIC FAILURE:   :: None    Diuretics:  :: Torsemide    Diet Reviewed: Yes   ADULT DIET; Regular; Low Sodium (2 gm)    Goal of Care Reviewed: Yes   Patient and/or Family's stated Goal of Care this Admission: reduce shortness of breath, increase activity tolerance, better understand heart failure and disease management, be more comfortable, and reduce lower extremity edema prior to discharge.    
or increased work of breathing using Per Protocol order mode.        4-6 - enter or revise RT Bronchodilator order(s) to two equivalent RT bronchodilator orders with one order with BID Frequency and one order with Frequency of every 4 hours PRN wheezing or increased work of breathing using Per Protocol order mode.        7-10 - enter or revise RT Bronchodilator order(s) to two equivalent RT bronchodilator orders with one order with TID Frequency and one order with Frequency of every 4 hours PRN wheezing or increased work of breathing using Per Protocol order mode.       11-13 - enter or revise RT Bronchodilator order(s) to one equivalent RT bronchodilator order with QID Frequency and an Albuterol order with Frequency of every 4 hours PRN wheezing or increased work of breathing using Per Protocol order mode.      Greater than 13 - enter or revise RT Bronchodilator order(s) to one equivalent RT bronchodilator order with every 4 hours Frequency and an Albuterol order with Frequency of every 2 hours PRN wheezing or increased work of breathing using Per Protocol order mode.         Electronically signed by Caroline Steven RCP on 1/7/2024 at 7:42 PM  
or increased work of breathing using Per Protocol order mode.        4-6 - enter or revise RT Bronchodilator order(s) to two equivalent RT bronchodilator orders with one order with BID Frequency and one order with Frequency of every 4 hours PRN wheezing or increased work of breathing using Per Protocol order mode.        7-10 - enter or revise RT Bronchodilator order(s) to two equivalent RT bronchodilator orders with one order with TID Frequency and one order with Frequency of every 4 hours PRN wheezing or increased work of breathing using Per Protocol order mode.       11-13 - enter or revise RT Bronchodilator order(s) to one equivalent RT bronchodilator order with QID Frequency and an Albuterol order with Frequency of every 4 hours PRN wheezing or increased work of breathing using Per Protocol order mode.      Greater than 13 - enter or revise RT Bronchodilator order(s) to one equivalent RT bronchodilator order with every 4 hours Frequency and an Albuterol order with Frequency of every 2 hours PRN wheezing or increased work of breathing using Per Protocol order mode.     RT to enter RT Home Evaluation for COPD & MDI Assessment order using Per Protocol order mode.    Electronically signed by Bhakti Escobar RCP on 12/30/2023 at 8:02 AM  
patient is on this medication at home then do not decrease Frequency below that used at home.    0-3 - enter or revise RT bronchodilator order(s) to equivalent RT Bronchodilator order with Frequency of every 4 hours PRN for wheezing or increased work of breathing using Per Protocol order mode.        4-6 - enter or revise RT Bronchodilator order(s) to two equivalent RT bronchodilator orders with one order with BID Frequency and one order with Frequency of every 4 hours PRN wheezing or increased work of breathing using Per Protocol order mode.        7-10 - enter or revise RT Bronchodilator order(s) to two equivalent RT bronchodilator orders with one order with TID Frequency and one order with Frequency of every 4 hours PRN wheezing or increased work of breathing using Per Protocol order mode.       11-13 - enter or revise RT Bronchodilator order(s) to one equivalent RT bronchodilator order with QID Frequency and an Albuterol order with Frequency of every 4 hours PRN wheezing or increased work of breathing using Per Protocol order mode.      Greater than 13 - enter or revise RT Bronchodilator order(s) to one equivalent RT bronchodilator order with every 4 hours Frequency and an Albuterol order with Frequency of every 2 hours PRN wheezing or increased work of breathing using Per Protocol order mode.       Electronically signed by Maged Sanchez RCP on 1/5/2024 at 8:01 PM  
A/B  nucleic acid assay. This test is a multiplex Real-Time Reverse  Transcriptase Polymerase Chain Reaction (RT-PCR)-based in vitro  diagnostic test intended for the qualitative detection of nucleic  acids from SARS-CoV-2, influenza A, and influenza B in nasopharyngeal  and nasal swab specimens for use under the FDA’s Emergency Use  Authorization (EUA) only.    Patient Fact Sheet:  https://www.fda.gov/media/936495/download  Provider Fact Sheet: https://www.fda.gov/media/643274/download  EUA: https://www.fda.gov/media/688794/download  IFU: https://www.fda.gov/media/916504/download    Methodology:  RT-PCR          INFLUENZA A NOT DETECTED     INFLUENZA B NOT DETECTED               RADIOLOGY  IR MIDLINE CATH   Final Result      XR SHOULDER LEFT (MIN 2 VIEWS)   Final Result   1. No evidence of acute fracture or dislocation.   2. Superior migration of the left humeral head with remodeling of the   acromion, suggesting rotator cuff insufficiency.   3. Degenerative changes of the left glenohumeral and AC joints.         XR TIBIA FIBULA RIGHT (2 VIEWS)   Final Result   Soft tissue swelling throughout the calf. No appreciable acute osseous   abnormality.         XR FOOT LEFT (MIN 3 VIEWS)   Final Result   1. Soft tissue swelling along both feet, right side greater than left, where   underlying infection cannot be excluded.   2. No evidence of osteomyelitis or fracture.         XR FOOT RIGHT (MIN 3 VIEWS)   Final Result   1. Soft tissue swelling along both feet, right side greater than left, where   underlying infection cannot be excluded.   2. No evidence of osteomyelitis or fracture.         XR CHEST PORTABLE   Final Result   Cardiomegaly and large hiatal hernia. Perihilar congestion but no definitive   pulmonary edema. Bilateral lower lobe atelectatic changes worse on the left.           Echocardiogram from 5/30/23   Summary   Technically difficult examination secondary to habitus and left ribs sore to   touch due to 
hypoglycemia, left shoulder injury  Home Health Services:RN 1x/weekly (for BLE wounds)    Objective    Balance  Static Sitting:  Good ; Supervision  Dynamic Sitting:  Good - ; CGA   Comments: sitting EOB    Static Standing: Good ; CGA in STEDY  Dynamic Standing: Fair ; CGA  Comments: weight shifting in stedy-difficulty clearing feet    Posture  Seated: Forward head and neck  Standing: Forward head and neck    Bed Mobility   Supine to Sit:    Min A   Sit to Supine:   Not Tested   Rolling:   Mod A    Scooting in sitting: Min A    Scooting in supine:  Not Tested   Bridging:  Not Tested    Transfer Training     Sit to stand:   Min A  EOB to stedy, CGA from stedy paddles   Stand to sit:   Min A  x2 from STEDY to chair  Bed to/from Chair:  Total A with use of gait belt and JABARI STEDY      Pre gait    Marching in stedy minimal foot clearance,no LOB  Gait gait deferred due to difficulty with transfers; pt ambulated 0 ft.   Distance:      0 ft  Deviations (firm surface/linoleum):  N/A  Assistive Device Used:    N/A  Level of Assist:    Not Tested   Comment:     Stair Training deferred, pt does not have stairs in home environment      Therapeutic Exercises Initiated  deferred secondary to treatment focus on functional mobility  Supine:  N/A    Seated:  N/A    Standing:  N/A    Activity Tolerance   During therapy session noted pt with no adverse symptoms to activity       BP (mmHg) HR (bpm) SpO2 (%)  Comments   Supine at rest 152/87 93 90     Seated at EOB   102 86 RN added 2 L    Standing         End of session    91%          Positioning Needs   Pt reclined in chair, alarm set, positioned in proper neutral alignment and pressure relief provided.   Call light provided and all needs within reach  RN aware of pt position/status    Other Activities  None.    Patient/Family Education   Pt educated on role of inpatient PT, POC, importance of continued activity, DC recommendations, safety awareness, transfer techniques, pacing 
goals     N/A    To be met in 5 Visits:  Supine to/from Sit in preparation for ADL task:   CGA  Toileting        Min A  Grooming       Supervision  Upper Body Dressing:      Supervision  Lower Body Dressing:      Min A  Pt to demonstrate UE therapeutic exs x 15 reps with minimal cues    Rehabilitation Potential: Good  Strengths for achieving goals include: PLOF and Pt cooperative   Barriers to achieving goals include:  Weakness    Plan:  To be seen 3-5 x/wk while in acute care setting for therapeutic exercises, bed mobility, transfers, family/patient education, ADL/IADL retraining, and energy conservation training.    Electronically signed by Melissa Valdivia OT on 1/7/2024 at 10:19 AM      If patient discharges from this facility prior to next visit, this note will serve as the Discharge Summary    
home environment not conducive to patient recovery.    Goal(s) :   To be met in 3 Visits:  Bed to toilet/BSC:       Mod A  Pt will complete 3/3 CHF goals     N/A    To be met in 5 Visits:  Supine to/from Sit in preparation for ADL task:   CGA  Toileting        Min A  Grooming       Supervision  Upper Body Dressing:      Supervision  Lower Body Dressing:      Min A  Pt to demonstrate UE therapeutic exs x 15 reps with minimal cues    Rehabilitation Potential: Good  Strengths for achieving goals include: PLOF and Pt cooperative   Barriers to achieving goals include:  Weakness    Plan:  To be seen 3-5 x/wk while in acute care setting for therapeutic exercises, bed mobility, transfers, family/patient education, ADL/IADL retraining, and energy conservation training.    Electronically signed by Yandy Hoffman OT on 12/28/2023 at 9:56 AM      If patient discharges from this facility prior to next visit, this note will serve as the Discharge Summary    
the calf. No appreciable acute osseous   abnormality.       Physical Exam:    Sleeping in chair with legs in dependent position.     DP/PT palpable bilateral  Ulcers on lower legs with mild serous drainage.  No purulence or malodor noted. Right lower leg with mild to moderate erythema extending from lower leg to thigh with mild increase in skin temperature noted in thigh. No crepitus noted. No abscess noted. No malodor noted.   Upper leg edematous bilateral, surprisingly his lower legs have mild edema.   Venous dermatitis bilateral LE.         Assessment:  Patient Active Problem List   Diagnosis Code    Type 2 diabetes mellitus with hyperglycemia ([facility]) E11.65    Osteoarthritis M19.90    Morbid obesity with BMI of 40.0-44.9, adult ([facility]) E66.01, Z68.41    Edema R60.9    Anemia D64.9    Bradycardia R00.1    Cellulitis of right lower extremity L03.115    Venous stasis ulcer of right lower extremity ([facility]) I83.019, L97.919    Venous thrombosis of leg I82.90    Venous stasis dermatitis of both lower extremities I87.2    Hyperbilirubinemia E80.6    Moderate episode of recurrent major depressive disorder ([facility]) F33.1    Anxiety F41.9    Essential hypertension I10    Non-seasonal allergic rhinitis J30.89    Mixed hyperlipidemia E78.2    Congestive heart failure ([facility]) I50.9    Chronic pulmonary edema J81.1    Coronary artery disease involving native coronary artery of native heart without angina pectoris I25.10    Nonrheumatic aortic valve stenosis I35.0    Severe sleep apnea G47.30    Primary insomnia F51.01    Venous stasis ulcer of left calf limited to breakdown of skin with varicose veins ([facility]) I83.022, L97.221    Stage 3 chronic kidney disease ([facility]) N18.30    Renal osteodystrophy N25.0    Peripheral edema R60.9    Primary osteoarthritis of right hip M16.11    Grade III diastolic dysfunction I51.89    Atrial fibrillation ([facility]) I48.91    Simple chronic bronchitis ([facility]) J41.0    Non-pressure chronic ulcer of right calf with 
ventricular filling   pressure.   Severe bi-atrial enlargement.   The ascending aorta is mildly dilated 4.04cm.   The maximal transaortic velocity is 2.32m/s which gives peak pressure   gradient= 22mmHg and mean pressure gradient= 14mmHg which is c/w borderline   mild aortic stenosis.   Mild tricuspid regurgitation.   Systolic pulmonary artery pressure (SPAP) is estimated at 47 mmHg c/w mild   pulmonary hypertension (Right atrial pressure of 8 mmHg)       Assessment/Plan:     #Sepsis   #Likely from right foot/toe cellulitis   #Chronic venous ulcers lower extremities   -LA, WBC, procalc, +source   -has been following with wound clinic outpatient and recently finished antibiotics   -blood cultures ngtd  -podiatry consulted  -initially on Rocephin -> vanc/cefepime completed for 7 days now   -tramadol prn --> inc to q4h prn     #Elevated troponin   -47-->41 -> 42  -chronically elevated in the past   -EKG without acute ischemic changes  -no CP       #Atrial fibrillation/flutter s/p DCCV 12/4/19  #Secondary hypercoagulable state   - rate controlled  -on eliquis for AC      #Generalized weakness   -PT/OT recommends SNF  -fall precautions   -case management for SNF      #Chronic diastolic HF   #Pulmonary HTN  -last echo as above  -questionable compliance with home med regimen   -resumed torsemide and entresto   -daily weights, intake and output    -resumed aldactone     #Acute nocturnal hypoxic respiratory failure   #Atelectasis   -requiring 2 L O2   -likely 2/2 to MONIQUE, has been non-compliant with CPAP  -resume CPAP  -wean as tolerated  -ICS      #CAD  -ASA, statin, imdur      #Large hiatal hernia  -PO PPI   -follow up with PCP outpatient for possible surgery referral     #HTN   -torsemide, imdur, entresto      #DM type 2  -continue lantus   -SSI   -monitor BG      #Chronic bilateral hand numbness 2/2 to severe neuropathy   -good pulses  -had previous EMG studies at neurology  -follows with ortho       #Chronic left 
  #Ascending aortic aneurysm  -noted on echo above   -follows with cardiology      #HLD   -statin         Note sepsis, cellulitis, elevated troponin makes patient higher risk for morbidity and mortality requiring testing and treatment.      DC planning to SNF    DVT Prophylaxis: Lovenox  Diet: ADULT DIET; Regular; Low Sodium (2 gm)  Code Status: Full Code    Amanda Salas PA-C  1/4/2024  2:46 PM    
shoulder pain     #Hx of VTE   -on eliquis    #Ascending aortic aneurysm  -noted on echo above   -follows with cardiology     #HLD   -statin        Note sepsis, cellulitis, elevated troponin makes patient higher risk for morbidity and mortality requiring testing and treatment.     DC planning to SNF    DVT Prophylaxis: Lovenox   Diet: ADULT DIET; Regular; Low Sodium (2 gm)  Code Status: Full Code    Racquel Acevedo,   01/03/24  8:04 PM      
loosely formulated

## 2024-01-15 ENCOUNTER — HOSPITAL ENCOUNTER (OUTPATIENT)
Dept: WOUND CARE | Age: 74
Discharge: HOME | End: 2024-01-15
Attending: INTERNAL MEDICINE
Payer: MEDICARE

## 2024-01-15 VITALS
TEMPERATURE: 98.7 F | RESPIRATION RATE: 18 BRPM | DIASTOLIC BLOOD PRESSURE: 73 MMHG | WEIGHT: 253 LBS | BODY MASS INDEX: 34.27 KG/M2 | SYSTOLIC BLOOD PRESSURE: 109 MMHG | HEART RATE: 93 BPM | HEIGHT: 72 IN

## 2024-01-15 DIAGNOSIS — I87.313 IDIOPATHIC CHRONIC VENOUS HYPERTENSION OF BOTH LOWER EXTREMITIES WITH ULCER (HCC): ICD-10-CM

## 2024-01-15 DIAGNOSIS — Z79.4 TYPE 2 DIABETES MELLITUS WITH HYPERGLYCEMIA, WITH LONG-TERM CURRENT USE OF INSULIN (HCC): ICD-10-CM

## 2024-01-15 DIAGNOSIS — L97.919 IDIOPATHIC CHRONIC VENOUS HYPERTENSION OF BOTH LOWER EXTREMITIES WITH ULCER (HCC): ICD-10-CM

## 2024-01-15 DIAGNOSIS — L97.222 NON-PRESSURE CHRONIC ULCER OF LEFT CALF WITH FAT LAYER EXPOSED (HCC): ICD-10-CM

## 2024-01-15 DIAGNOSIS — I87.2 VENOUS STASIS DERMATITIS OF BOTH LOWER EXTREMITIES: ICD-10-CM

## 2024-01-15 DIAGNOSIS — E11.65 TYPE 2 DIABETES MELLITUS WITH HYPERGLYCEMIA, WITH LONG-TERM CURRENT USE OF INSULIN (HCC): ICD-10-CM

## 2024-01-15 DIAGNOSIS — L97.919 VENOUS STASIS ULCER OF RIGHT LOWER EXTREMITY (HCC): Primary | ICD-10-CM

## 2024-01-15 DIAGNOSIS — L97.212 NON-PRESSURE CHRONIC ULCER OF RIGHT CALF WITH FAT LAYER EXPOSED (HCC): ICD-10-CM

## 2024-01-15 DIAGNOSIS — I83.019 VENOUS STASIS ULCER OF RIGHT LOWER EXTREMITY (HCC): Primary | ICD-10-CM

## 2024-01-15 DIAGNOSIS — L97.929 IDIOPATHIC CHRONIC VENOUS HYPERTENSION OF BOTH LOWER EXTREMITIES WITH ULCER (HCC): ICD-10-CM

## 2024-01-15 PROCEDURE — 99213 OFFICE O/P EST LOW 20 MIN: CPT

## 2024-01-15 RX ORDER — LIDOCAINE 40 MG/G
CREAM TOPICAL ONCE
Status: DISCONTINUED | OUTPATIENT
Start: 2024-01-15 | End: 2024-01-16 | Stop reason: HOSPADM

## 2024-01-15 RX ORDER — BACITRACIN ZINC AND POLYMYXIN B SULFATE 500; 1000 [USP'U]/G; [USP'U]/G
OINTMENT TOPICAL ONCE
OUTPATIENT
Start: 2024-01-15 | End: 2024-01-15

## 2024-01-15 RX ORDER — TRIAMCINOLONE ACETONIDE 1 MG/G
OINTMENT TOPICAL ONCE
OUTPATIENT
Start: 2024-01-15 | End: 2024-01-15

## 2024-01-15 RX ORDER — LIDOCAINE HYDROCHLORIDE 40 MG/ML
SOLUTION TOPICAL ONCE
OUTPATIENT
Start: 2024-01-15 | End: 2024-01-15

## 2024-01-15 RX ORDER — FLUCONAZOLE 100 MG/1
100 TABLET ORAL DAILY
COMMUNITY

## 2024-01-15 RX ORDER — LIDOCAINE 50 MG/G
OINTMENT TOPICAL ONCE
OUTPATIENT
Start: 2024-01-15 | End: 2024-01-15

## 2024-01-15 RX ORDER — SODIUM CHLOR/HYPOCHLOROUS ACID 0.033 %
SOLUTION, IRRIGATION IRRIGATION ONCE
OUTPATIENT
Start: 2024-01-15 | End: 2024-01-15

## 2024-01-15 RX ORDER — TRAMADOL HYDROCHLORIDE 50 MG/1
50 TABLET ORAL EVERY 6 HOURS PRN
COMMUNITY

## 2024-01-15 RX ORDER — LIDOCAINE 40 MG/G
CREAM TOPICAL ONCE
OUTPATIENT
Start: 2024-01-15 | End: 2024-01-15

## 2024-01-15 RX ORDER — INSULIN GLARGINE 300 U/ML
INJECTION, SOLUTION SUBCUTANEOUS
Qty: 10 ADJUSTABLE DOSE PRE-FILLED PEN SYRINGE | Refills: 2 | Status: SHIPPED | OUTPATIENT
Start: 2024-01-15

## 2024-01-15 NOTE — PLAN OF CARE
Pt to the [facility] for follow up appointment.  Pt is presently at [facility].  Wounds improved on bilateral lower legs.  Pt to have bordered gauze to right medial lower leg and medigrips applied to bilateral lower legs.  Pt with deep tissue injury on left heel.  Pt to have mepilex border applied to wound.  Pt may continue with Physical Therapy.  Pt to follow up in the [facility] in 1 week.  Discharge instructions reviewed with patient, all questions answered, copy given to patient. Dressings were applied to all wounds per M.D. Instructions at this visit.

## 2024-01-15 NOTE — TELEPHONE ENCOUNTER
Refill Request     CONFIRM preferred pharmacy with the patient.    If Mail Order Rx - Pend for 90 day refill.      Last Seen: Last Seen Department: 6/23/2023  Last Seen by PCP: 6/23/2023    Last Written: 1/9/24 5 prefilled pens with 5 refills     If no future appointment scheduled:  Review the last OV with PCP and review information for follow-up visit,  Route STAFF MESSAGE with patient name to the  Pool for scheduling with the following information:            -  Timing of next visit           -  Visit type ie Physical, OV, etc           -  Diagnoses/Reason ie. COPD, HTN - Do not use MEDICATION, Follow-up or CHECK UP - Give reason for visit      Next Appointment:   Future Appointments   Date Time Provider Department Center   1/15/2024  8:15 AM Maged Tang DPM [facility]   4/2/2024 10:20 AM Aviva Collazo MD [facility] Neurology -       Message sent to  to schedule appt with patient?  NO      Requested Prescriptions     Pending Prescriptions Disp Refills    TOUJEO MAX SOLOSTAR 300 UNIT/ML SOPN [Pharmacy Med Name: TOUJEO MAX SOLOSTR 300 UNIT/ML]  14     Sig: INJECT 50 UNITS INTO THE SKIN 2 TIMES DAILY

## 2024-01-15 NOTE — DISCHARGE INSTRUCTIONS
Wound Care Center Physician Orders and Discharge Instructions  [facility]  [address], [address]  [place] [id number]  Telephone: [phone]      Fax: [phone]        Your home care company:  [facility]     Your wound-care supplies will be provided by: [facility] [place]     NAME:  Kareem Hansen   YOB: 1950  PRIMARY DIAGNOSIS FOR WOUND CARE CENTER:  Venous leg ulcer .     Wound cleansing:   Do not scrub or use excessive force.  Wash hands with soap and water before and after dressing changes.  Prior to applying a clean dressing, cleanse wound with normal saline, wound cleanser, or mild soap and water. Ask your physician or nurse before getting the wound(s) wet in the shower.                Wound care for home:     Right lower leg wounds:    Wash wounds with soap and water with dressing changes    Bordered gauze to medial right lower leg.    Change every 2-3 days.    Left Heel deep tissue injury:     Wash with soap and water with dressing changes.      Apply heel border to heel      Change every 2-3 days.       Medium medigrip toes to knees to bilateral lower legs.  Put on in the morning and take off at bedtime.  May wear during the night if patient wants to.        Please note, all wounds (unless stated otherwise here) were mechanically debrided at the time of cleansing here in the wound-care center today, so a small amount of pain, drainage or bleeding from that process might be expected, and is normal.      All products for home use, including multiple products for a single wound if applicable, are medically necessary in order to achieve the best chance at timely wound healing. See provider documentation for details if needed.     Substituted dressings applied in the [facility] today, if applicable:           New orders for this week (labs, imaging, medications, etc.):     Elevate legs as much as possible   May continue with Physical

## 2024-01-16 NOTE — DISCHARGE INSTRUCTIONS
Wound Care Center Physician Orders and Discharge Instructions  [facility]  [address], [address]  [place] [address]  Telephone: [phone]      Fax: [phone]        Your home care company:  [facility]     Your wound-care supplies will be provided by: [facility] [place]     NAME:  Kareem Hansen   YOB: 1950  PRIMARY DIAGNOSIS FOR WOUND CARE CENTER:  Venous leg ulcer .     Wound cleansing:   Do not scrub or use excessive force.  Wash hands with soap and water before and after dressing changes.  Prior to applying a clean dressing, cleanse wound with normal saline, wound cleanser, or mild soap and water. Ask your physician or nurse before getting the wound(s) wet in the shower.                Wound care for home:     Right lower leg wounds:    Wash wounds with soap and water with dressing changes    Bordered gauze to medial right lower leg.    Change every 2-3 days.     Left Heel deep tissue injury:     Wash with soap and water with dressing changes.      Apply heel border to heel      Change every 2-3 days.       Medium medigrip toes to knees to bilateral lower legs.  Put on in the morning and take off at bedtime.  May wear during the night if patient wants to.        Please note, all wounds (unless stated otherwise here) were mechanically debrided at the time of cleansing here in the wound-care center today, so a small amount of pain, drainage or bleeding from that process might be expected, and is normal.      All products for home use, including multiple products for a single wound if applicable, are medically necessary in order to achieve the best chance at timely wound healing. See provider documentation for details if needed.     Substituted dressings applied in the [facility] today, if applicable:           New orders for this week (labs, imaging, medications, etc.):     Elevate legs as much as possible   May continue with

## 2024-01-16 NOTE — PROGRESS NOTES
Control glucose levels to prevent future complications.     Encouraged him to elevate his legs.    Overall legs look improved.     Avoid pressure on left heel.     OK to WBAT with PT at [facility].       Electronically signed by Maged Tang DPM on 1/15/2024 at 8:20 PM.

## 2024-01-22 ENCOUNTER — HOSPITAL ENCOUNTER (OUTPATIENT)
Dept: WOUND CARE | Age: 74
Discharge: HOME | End: 2024-01-22
Attending: PODIATRIST

## 2024-01-29 ENCOUNTER — HOSPITAL ENCOUNTER (OUTPATIENT)
Dept: WOUND CARE | Age: 74
Discharge: HOME | End: 2024-01-29
Attending: PODIATRIST

## 2024-02-07 ENCOUNTER — TELEPHONE (OUTPATIENT)
Dept: FAMILY MEDICINE CLINIC | Age: 74
End: 2024-02-07

## 2024-02-07 NOTE — TELEPHONE ENCOUNTER
Left message for pt to schedule nurse AWV- This can be done  in office or via telephone-( please schedule as a VVAWV appt type if telephone.)

## 2024-02-18 DIAGNOSIS — E78.2 MIXED HYPERLIPIDEMIA: ICD-10-CM

## 2024-02-18 DIAGNOSIS — J30.89 NON-SEASONAL ALLERGIC RHINITIS, UNSPECIFIED TRIGGER: ICD-10-CM

## 2024-02-19 RX ORDER — ATORVASTATIN CALCIUM 40 MG/1
TABLET, FILM COATED ORAL
Qty: 90 TABLET | Refills: 0 | Status: SHIPPED | OUTPATIENT
Start: 2024-02-19

## 2024-02-19 RX ORDER — MONTELUKAST SODIUM 10 MG/1
TABLET ORAL
Qty: 90 TABLET | Refills: 0 | Status: SHIPPED | OUTPATIENT
Start: 2024-02-19

## 2024-02-19 NOTE — TELEPHONE ENCOUNTER
Refill Request     Last Seen: Last Seen Department: 6/23/2023  Last Seen by PCP: 6/23/2023    Last Written: 11/22/23      Next Appointment:   Future Appointments   Date Time Provider Department Center   4/2/2024 10:20 AM Aviva Collazo MD [facility] Neurology -           Requested Prescriptions     Pending Prescriptions Disp Refills    montelukast (SINGULAIR) 10 MG tablet [Pharmacy Med Name: MONTELUKAST SOD 10 MG TABLET] 90 tablet 0     Sig: TAKE 1 TABLET BY MOUTH EVERY DAY    atorvastatin (LIPITOR) 40 MG tablet [Pharmacy Med Name: ATORVASTATIN 40 MG TABLET] 90 tablet 0     Sig: TAKE 1 TABLET BY MOUTH EVERY DAY AT NIGHT

## 2024-04-01 ENCOUNTER — TELEPHONE (OUTPATIENT)
Age: 74
End: 2024-04-01

## 2024-04-01 NOTE — TELEPHONE ENCOUNTER
Provider cancelled npt ref by abril Wade With Dr. Macias on 4/2/24. Pt residing at [facility]. NF did not know pt had appt. NF will cb to reschedule.

## 2024-04-15 NOTE — TELEPHONE ENCOUNTER
Called and spoke to nurse. Was ask to cb and speak with Ladonna Guadarrama, . (Works 6a-2p).   Will cb.

## 2024-04-22 ENCOUNTER — TELEPHONE (OUTPATIENT)
Dept: NEUROLOGY | Age: 74
End: 2024-04-22

## 2024-04-22 ENCOUNTER — CARE COORDINATION (OUTPATIENT)
Dept: CARE COORDINATION | Age: 74
End: 2024-04-22

## 2024-04-22 NOTE — CARE COORDINATION
ACM outreach made. Spoke with patient who states he's now living at [facility]. No further outreaches to be made at this time.

## 2024-04-22 NOTE — TELEPHONE ENCOUNTER
Eva with [facility] is returning the call to reschedule an appt that was cancelled.  Please call Eva back at [phone].

## 2024-06-04 ENCOUNTER — OFFICE VISIT (OUTPATIENT)
Age: 74
End: 2024-06-04
Payer: MEDICARE

## 2024-06-04 VITALS
HEART RATE: 72 BPM | BODY MASS INDEX: 32.07 KG/M2 | HEIGHT: 73 IN | WEIGHT: 242 LBS | RESPIRATION RATE: 14 BRPM | SYSTOLIC BLOOD PRESSURE: 128 MMHG | DIASTOLIC BLOOD PRESSURE: 74 MMHG | OXYGEN SATURATION: 92 %

## 2024-06-04 DIAGNOSIS — G62.9 NEUROPATHY: Primary | ICD-10-CM

## 2024-06-04 DIAGNOSIS — I87.2 VENOUS INSUFFICIENCY: ICD-10-CM

## 2024-06-04 PROCEDURE — 3074F SYST BP LT 130 MM HG: CPT | Performed by: PSYCHIATRY & NEUROLOGY

## 2024-06-04 PROCEDURE — 1123F ACP DISCUSS/DSCN MKR DOCD: CPT | Performed by: PSYCHIATRY & NEUROLOGY

## 2024-06-04 PROCEDURE — 99204 OFFICE O/P NEW MOD 45 MIN: CPT | Performed by: PSYCHIATRY & NEUROLOGY

## 2024-06-04 PROCEDURE — 3078F DIAST BP <80 MM HG: CPT | Performed by: PSYCHIATRY & NEUROLOGY

## 2024-06-04 RX ORDER — VENLAFAXINE HYDROCHLORIDE 150 MG/1
150 CAPSULE, EXTENDED RELEASE ORAL DAILY
Qty: 90 CAPSULE | Refills: 1 | Status: SHIPPED | OUTPATIENT
Start: 2024-06-04

## 2024-06-04 RX ORDER — DULAGLUTIDE 1.5 MG/.5ML
1.5 INJECTION, SOLUTION SUBCUTANEOUS DAILY
COMMUNITY

## 2024-06-04 RX ORDER — ESCITALOPRAM OXALATE 5 MG/1
5 TABLET ORAL DAILY
COMMUNITY

## 2024-06-04 RX ORDER — ONDANSETRON 4 MG/1
4 TABLET, ORALLY DISINTEGRATING ORAL EVERY 8 HOURS PRN
COMMUNITY

## 2024-06-04 NOTE — PROGRESS NOTES
intact, no gaze preference or deviation    V: normal    VII: no facial asymmetry    VIII: normal hearing to speech  MOTOR: Unable to evaluate lower extremity due to significant chronic venous insufficiency.  REFLEXES: Absent on both legs.  SENSORY: Decreased light touch sensation on both legs below mid calf.  COORD: No tremor.    Imaging, procedure, and laboratory data:   I reviewed her previous EMG report.    Impression:      ICD-10-CM    1. Neuropathy  G62.9       2. Venous insufficiency  I87.2         Based on examination, the patient has evidence of severe peripheral neuropathy.  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  The patient denies significant pain at this time.    After discussion with the patient, the patient agrees to switch sertraline to venlafaxine.  The patient is aware that medical treatment with neuropathy cannot improve clinical numbness a lot when compared to burning sensation or pain.    Plan:    1.  Discontinue sertraline.  2.  Venlafaxine 150 mg daily.  3.  Follow-up in 6 months.         Plan discussed with patient who voiced understanding and agreed with the plan.   Portions of this note were created using voice recognition software, please excuse any typos.      Aviva Collazo MD

## 2024-08-22 ENCOUNTER — OFFICE VISIT (OUTPATIENT)
Dept: ORTHOPEDIC SURGERY | Age: 74
End: 2024-08-22

## 2024-08-22 VITALS — BODY MASS INDEX: 31.93 KG/M2 | HEIGHT: 73 IN

## 2024-08-22 DIAGNOSIS — M17.12 PRIMARY OSTEOARTHRITIS OF LEFT KNEE: ICD-10-CM

## 2024-08-22 DIAGNOSIS — M25.562 PAIN IN BOTH KNEES, UNSPECIFIED CHRONICITY: ICD-10-CM

## 2024-08-22 DIAGNOSIS — M25.561 PAIN IN BOTH KNEES, UNSPECIFIED CHRONICITY: ICD-10-CM

## 2024-08-22 DIAGNOSIS — M17.11 PRIMARY OSTEOARTHRITIS OF RIGHT KNEE: Primary | ICD-10-CM

## 2024-08-22 RX ORDER — ROPIVACAINE HYDROCHLORIDE 5 MG/ML
4 INJECTION, SOLUTION EPIDURAL; INFILTRATION; PERINEURAL ONCE
Status: COMPLETED | OUTPATIENT
Start: 2024-08-22 | End: 2024-08-22

## 2024-08-22 RX ORDER — TRIAMCINOLONE ACETONIDE 40 MG/ML
40 INJECTION, SUSPENSION INTRA-ARTICULAR; INTRAMUSCULAR ONCE
Status: COMPLETED | OUTPATIENT
Start: 2024-08-22 | End: 2024-08-22

## 2024-08-22 RX ADMIN — TRIAMCINOLONE ACETONIDE 40 MG: 40 INJECTION, SUSPENSION INTRA-ARTICULAR; INTRAMUSCULAR at 11:57

## 2024-08-22 RX ADMIN — ROPIVACAINE HYDROCHLORIDE 4 ML: 5 INJECTION, SOLUTION EPIDURAL; INFILTRATION; PERINEURAL at 12:12

## 2024-08-22 NOTE — PROGRESS NOTES
ORTHOPAEDIC SURGERY H&P / CONSULTATION NOTE    Chief complaint:   Chief Complaint   Patient presents with    Knee Pain     B KNEE PAIN      History of present illness: The patient is a 73 y.o. male with subjective symptoms of bilateral knee pain. The chief complaint is located at bilateral knees left greater than right. Duration of symptoms has been for 4 years. The severity of symptoms is rated at 8/10 pain at its most on intake form.  Patient is accompanied by health facility care technician.  Patient states no recent trauma however has had increasing pain over several years.  He recently just lost his wife.  He has been walking and attempting to do so however having increased discomfort and pain.  Left knee is worse than his right knee.  Dull throbbing aching pain.  Has limitations with ADLs.  This can be sharp pain as well.  Denies significant twisting knee pain.  Denies gross swelling.    The patient has tried the below listed items prior to today's consultation for above listed chief complaint.     -   Over-the-counter anti-inflammatories/prescription medication anti-inflammatory.     -   Physical therapy / guided home exercise program -     -   Previous corticosteroid injections    Past medical history:    Past Medical History:   Diagnosis Date    Allergic rhinitis     Arthritis     Bladder fistula     resolved    C. difficile diarrhea 08/18/2015    +PCR    Cellulitis of right lower extremity 03/23/2016    CHF (congestive heart failure) ([facility])     EF 55% 5/23    Dental disease     Diabetes ([facility])     Diverticulitis     Dizziness     DVT of lower extremity, bilateral ([facility]) 10/16/2013    GERD (gastroesophageal reflux disease)     Headache     Hearing loss     Hematuria 01/02/2014    Hx of blood clots     Hyperlipidemia     Hypertension     Lung disease     MONIQUE (obstructive sleep apnea)     Pancreatitis ([facility]) 08/24/2013    Pulmonary emboli ([facility]) 2013    after cholecystectomy     Rash     Sleep apnea     Tinnitus

## 2024-09-05 ENCOUNTER — OFFICE VISIT (OUTPATIENT)
Dept: ORTHOPEDIC SURGERY | Age: 74
End: 2024-09-05

## 2024-09-05 VITALS — HEIGHT: 73 IN | WEIGHT: 242 LBS | BODY MASS INDEX: 32.07 KG/M2

## 2024-09-05 DIAGNOSIS — M17.12 PRIMARY OSTEOARTHRITIS OF LEFT KNEE: ICD-10-CM

## 2024-09-05 DIAGNOSIS — M17.11 PRIMARY OSTEOARTHRITIS OF RIGHT KNEE: Primary | ICD-10-CM

## 2024-09-05 RX ORDER — ROPIVACAINE HYDROCHLORIDE 5 MG/ML
4 INJECTION, SOLUTION EPIDURAL; INFILTRATION; PERINEURAL ONCE
Status: COMPLETED | OUTPATIENT
Start: 2024-09-05 | End: 2024-09-05

## 2024-09-05 RX ORDER — TRIAMCINOLONE ACETONIDE 40 MG/ML
40 INJECTION, SUSPENSION INTRA-ARTICULAR; INTRAMUSCULAR ONCE
Status: COMPLETED | OUTPATIENT
Start: 2024-09-05 | End: 2024-09-05

## 2024-09-05 RX ADMIN — ROPIVACAINE HYDROCHLORIDE 4 ML: 5 INJECTION, SOLUTION EPIDURAL; INFILTRATION; PERINEURAL at 09:21

## 2024-09-05 RX ADMIN — TRIAMCINOLONE ACETONIDE 40 MG: 40 INJECTION, SUSPENSION INTRA-ARTICULAR; INTRAMUSCULAR at 09:22

## 2024-09-05 NOTE — PROGRESS NOTES
the potential increased risk of infection and potential increase in FSBG and to monitor FSBG and adjust medications as needed.  -After sterile prep of the right knee, a 5 cc corticosteroid injection (4cc ropivicaine and 1cc kenolog 40) was administered into the right knee intra-articular space.  The patient tolerated the procedure well and started to have immediate improvement in pain/symptoms.  -Physician directed physical therapy was also printed out given to the patient  -Continue low impact activity as listed previously  -Consideration for repeat corticosteroid injections in 3 to 4 months  -All questions answered to the patient's satisfaction and the patient expressed understanding and agreement with the above listed treatment plan  -Follow up in 3 to 4 months as listed per the above  -Thank you for the clinical consultation and allowing me to participate in the patient's care.      Electronically signed by Jeovany Bañuelos MD on 9/5/24 at 9:25 AM EDT         Jeovany Bañuelos MD       Orthopaedic Surgery-Sports Medicine    Disclaimer:  This note was dictated with voice recognition software.  Though review and correction are routinely performed, please contact the office/medical records for any errors requiring correction.

## 2024-12-10 ENCOUNTER — OFFICE VISIT (OUTPATIENT)
Dept: NEUROLOGY | Age: 74
End: 2024-12-10
Payer: MEDICARE

## 2024-12-10 VITALS
RESPIRATION RATE: 14 BRPM | OXYGEN SATURATION: 98 % | DIASTOLIC BLOOD PRESSURE: 74 MMHG | HEART RATE: 72 BPM | SYSTOLIC BLOOD PRESSURE: 136 MMHG | BODY MASS INDEX: 33.4 KG/M2 | WEIGHT: 252 LBS | HEIGHT: 73 IN

## 2024-12-10 DIAGNOSIS — G62.9 NEUROPATHY: Primary | ICD-10-CM

## 2024-12-10 DIAGNOSIS — F32.A DEPRESSION, UNSPECIFIED DEPRESSION TYPE: ICD-10-CM

## 2024-12-10 PROCEDURE — 3078F DIAST BP <80 MM HG: CPT | Performed by: PSYCHIATRY & NEUROLOGY

## 2024-12-10 PROCEDURE — 1123F ACP DISCUSS/DSCN MKR DOCD: CPT | Performed by: PSYCHIATRY & NEUROLOGY

## 2024-12-10 PROCEDURE — 99214 OFFICE O/P EST MOD 30 MIN: CPT | Performed by: PSYCHIATRY & NEUROLOGY

## 2024-12-10 PROCEDURE — 1159F MED LIST DOCD IN RCRD: CPT | Performed by: PSYCHIATRY & NEUROLOGY

## 2024-12-10 PROCEDURE — 3075F SYST BP GE 130 - 139MM HG: CPT | Performed by: PSYCHIATRY & NEUROLOGY

## 2024-12-10 RX ORDER — VENLAFAXINE HYDROCHLORIDE 150 MG/1
150 CAPSULE, EXTENDED RELEASE ORAL DAILY
Qty: 90 CAPSULE | Refills: 1 | Status: SHIPPED | OUTPATIENT
Start: 2024-12-10

## 2024-12-10 NOTE — PROGRESS NOTES
morning and at bedtime      Glucosamine-Chondroitin (GLUCOSAMINE CHONDR COMPLEX PO) Take 1 tablet by mouth 2 times daily      aspirin 81 MG chewable tablet Take 1 tablet by mouth daily 30 tablet 0    Multiple Vitamins-Minerals (THERAPEUTIC MULTIVITAMIN-MINERALS) tablet Take 1 tablet by mouth daily      ferrous sulfate 325 (65 FE) MG tablet Take 1 tablet by mouth daily       No current facility-administered medications for this visit.       Allergies:    Allergies as of 12/10/2024 - Fully Reviewed 12/10/2024   Allergen Reaction Noted    Hydrocodone-acetaminophen Other (See Comments) 11/22/2021        Social history:     reports that he has never smoked. He has never used smokeless tobacco. He reports that he does not currently use alcohol. He reports that he does not use drugs.     Family history:    Family History   Problem Relation Age of Onset    Heart Disease Father     Heart Attack Father     Stroke Brother     Heart Disease Brother     High Blood Pressure Brother     Kidney Disease Mother         kidney removed        Review of system:  No chest pain, shortness of breath, palpitation, cough, fever, abdominal pain, vomiting, diarrhea, dysuria, vertigo, joint pain, change in speech/vision or new onset of weakness/numbness. Remaining as per HPI.      /74 (Site: Left Upper Arm, Position: Sitting, Cuff Size: Small Adult)   Pulse 72   Resp 14   Ht 1.854 m (6' 1\")   Wt 114.3 kg (252 lb)   SpO2 98% Comment: RA  BMI 33.25 kg/m²     Neurological examination:  MENTAL STATUS: AAOx3  LANG/SPEECH: Fluent, intact naming, repetition & comprehension  CRANIAL NERVES:    II: Pupils equal and reactive, no RAPD, normal visual field and fundus    III, IV, VI: EOM intact, no gaze preference or deviation    V: normal    VII: no facial asymmetry    VIII: normal hearing to speech  MOTOR: Unable to evaluate lower extremity due to significant chronic venous insufficiency.  REFLEXES: Absent on both legs.  SENSORY: Decreased

## 2024-12-10 NOTE — PATIENT INSTRUCTIONS

## (undated) DEVICE — GLOVE SURG SZ 65 L12IN FNGR THK94MIL STD WHT LTX FREE

## (undated) DEVICE — STANDARD HYPODERMIC NEEDLE,POLYPROPYLENE HUB: Brand: MONOJECT

## (undated) DEVICE — TOWEL OR BLUEE 16X26IN ST 8 PACK ORB08 16X26ORTWL

## (undated) DEVICE — NEEDLE SPNL L3.5IN PNK HUB S STL REG WALL FIT STYL W/ QNCKE

## (undated) DEVICE — TOWEL,OR,DSP,ST,BLUE,STD,4/PK,20PK/CS: Brand: MEDLINE

## (undated) DEVICE — CHLORAPREP 26ML ORANGE

## (undated) DEVICE — METER ACCU-CHEK INFORM BASE GLUCOSE

## (undated) DEVICE — GAUZE,SPONGE,4"X4",16PLY,STRL,LF,10/TRAY: Brand: MEDLINE

## (undated) DEVICE — CATHETER IV 20GA L1.25IN PNK FEP SFTY STR HUB RADPQ DISP

## (undated) DEVICE — SET ADMIN PRIMING 7ML L30IN 7.35LB 20 GTT 2ND RLER CLMP

## (undated) DEVICE — KENDALL SCD EXPRESS SLEEVES, KNEE LENGTH, LARGE: Brand: KENDALL SCD

## (undated) DEVICE — SET GRAV VENT NVENT CK VLV 3 NDL FREE PRT 10 GTT

## (undated) DEVICE — VIAL ACCESS CANNULA,SMART TIP: Brand: MONOJECT

## (undated) DEVICE — STERILE POLYISOPRENE POWDER-FREE SURGICAL GLOVES: Brand: PROTEXIS

## (undated) DEVICE — ALCOHOL RUBBING 16OZ 70% ISO

## (undated) DEVICE — UNIVERSAL BLOCK TRAY: Brand: MEDLINE INDUSTRIES, INC.

## (undated) DEVICE — Z DISCONTINUED APPLICATOR SURG PREP 0.35OZ 2% CHG 70% ISO ALC W/ HI LT

## (undated) DEVICE — GLOVE,SURG,SENSICARE,ALOE,LF,PF,7: Brand: MEDLINE

## (undated) DEVICE — SOLUTION IV 1000ML LAC RINGERS PH 6.5 INJ USP VIAFLX PLAS

## (undated) DEVICE — SYRINGE, LUER LOCK, 10ML: Brand: MEDLINE

## (undated) DEVICE — 3M™ TEGADERM™ TRANSPARENT FILM DRESSING FRAME STYLE, 1624W, 2-3/8 IN X 2-3/4 IN (6 CM X 7 CM), 100/CT 4CT/CASE: Brand: 3M™ TEGADERM™